# Patient Record
Sex: MALE | Race: BLACK OR AFRICAN AMERICAN | NOT HISPANIC OR LATINO | Employment: OTHER | URBAN - NONMETROPOLITAN AREA
[De-identification: names, ages, dates, MRNs, and addresses within clinical notes are randomized per-mention and may not be internally consistent; named-entity substitution may affect disease eponyms.]

---

## 2022-03-23 DIAGNOSIS — M50.93 CERVICAL DISC DISORDER OF CERVICOTHORACIC REGION: Primary | ICD-10-CM

## 2022-03-25 ENCOUNTER — HOSPITAL ENCOUNTER (OUTPATIENT)
Dept: RADIOLOGY | Facility: HOSPITAL | Age: 62
Discharge: HOME | End: 2022-03-25
Attending: PHYSICIAN ASSISTANT
Payer: MEDICARE

## 2022-03-25 DIAGNOSIS — M50.93 CERVICAL DISC DISORDER OF CERVICOTHORACIC REGION: ICD-10-CM

## 2022-03-25 PROCEDURE — 72141 MRI NECK SPINE W/O DYE: CPT | Mod: TC

## 2022-10-24 DIAGNOSIS — R94.31 NONSPECIFIC ABNORMAL ELECTROCARDIOGRAM (ECG) (EKG): Primary | ICD-10-CM

## 2022-10-24 DIAGNOSIS — R06.02 SHORTNESS OF BREATH: ICD-10-CM

## 2022-10-26 ENCOUNTER — HOSPITAL ENCOUNTER (OUTPATIENT)
Dept: RADIOLOGY | Facility: HOSPITAL | Age: 62
Discharge: HOME | End: 2022-10-26
Attending: INTERNAL MEDICINE
Payer: MEDICARE

## 2022-10-26 ENCOUNTER — CLINICAL SUPPORT (OUTPATIENT)
Dept: CARDIOLOGY | Facility: HOSPITAL | Age: 62
End: 2022-10-26
Attending: INTERNAL MEDICINE
Payer: MEDICARE

## 2022-10-26 DIAGNOSIS — R06.02 SHORTNESS OF BREATH: ICD-10-CM

## 2022-10-26 DIAGNOSIS — R94.31 NONSPECIFIC ABNORMAL ELECTROCARDIOGRAM (ECG) (EKG): ICD-10-CM

## 2022-10-26 PROCEDURE — A9500 TC99M SESTAMIBI: HCPCS

## 2022-10-26 PROCEDURE — 93017 CV STRESS TEST TRACING ONLY: CPT

## 2022-10-29 LAB
CV STRESS BASE HR: 107 BPM
DIASTOLIC BLOOD PRESSURE: 106 MMHG
NUC REST EJECTION FRACTION: 16
OHS CV CPX 85 PERCENT MAX PREDICTED HEART RATE MALE: 135
OHS CV CPX ESTIMATED METS: 5
OHS CV CPX MAX PREDICTED HEART RATE: 159
OHS CV CPX PATIENT IS FEMALE: 0
OHS CV CPX PATIENT IS MALE: 1
OHS CV CPX PEAK DIASTOLIC BLOOD PRESSURE: 86 MMHG
OHS CV CPX PEAK HEAR RATE: 136 BPM
OHS CV CPX PEAK RATE PRESSURE PRODUCT: NORMAL
OHS CV CPX PEAK SYSTOLIC BLOOD PRESSURE: 189 MMHG
OHS CV CPX PERCENT MAX PREDICTED HEART RATE ACHIEVED: 86
OHS CV CPX RATE PRESSURE PRODUCT PRESENTING: NORMAL
STRESS ECHO POST EXERCISE DUR MIN: 4 MINUTES
STRESS ECHO POST EXERCISE DUR SEC: 0 SECONDS
STRESS ST DEPRESSION: 1.5 MM
SYSTOLIC BLOOD PRESSURE: 144 MMHG

## 2022-11-10 ENCOUNTER — LAB VISIT (OUTPATIENT)
Dept: LAB | Facility: HOSPITAL | Age: 62
End: 2022-11-10
Attending: INTERNAL MEDICINE
Payer: MEDICARE

## 2022-11-10 DIAGNOSIS — R94.30 NONSPECIFIC ABNORMAL FUNCTION STUDY, CARDIOVASCULAR: Primary | ICD-10-CM

## 2022-11-10 LAB
ALBUMIN SERPL BCP-MCNC: 3.5 G/DL (ref 3.5–5.2)
ALP SERPL-CCNC: 97 U/L (ref 55–135)
ALT SERPL W/O P-5'-P-CCNC: 32 U/L (ref 10–44)
ANION GAP SERPL CALC-SCNC: 8 MMOL/L (ref 8–16)
APTT BLDCRRT: 32.7 SEC (ref 21–32)
AST SERPL-CCNC: 40 U/L (ref 10–40)
BASOPHILS # BLD AUTO: 0.01 K/UL (ref 0–0.2)
BASOPHILS NFR BLD: 0.2 % (ref 0–1.9)
BILIRUB SERPL-MCNC: 0.8 MG/DL (ref 0.1–1)
BUN SERPL-MCNC: 25 MG/DL (ref 8–23)
CALCIUM SERPL-MCNC: 8.9 MG/DL (ref 8.7–10.5)
CHLORIDE SERPL-SCNC: 103 MMOL/L (ref 95–110)
CO2 SERPL-SCNC: 27 MMOL/L (ref 23–29)
CREAT SERPL-MCNC: 1.8 MG/DL (ref 0.5–1.4)
DIFFERENTIAL METHOD: ABNORMAL
EOSINOPHIL # BLD AUTO: 0 K/UL (ref 0–0.5)
EOSINOPHIL NFR BLD: 0.7 % (ref 0–8)
ERYTHROCYTE [DISTWIDTH] IN BLOOD BY AUTOMATED COUNT: 19 % (ref 11.5–14.5)
EST. GFR  (NO RACE VARIABLE): 42.3 ML/MIN/1.73 M^2
GLUCOSE SERPL-MCNC: 126 MG/DL (ref 70–110)
HCT VFR BLD AUTO: 31.8 % (ref 40–54)
HGB BLD-MCNC: 9.9 G/DL (ref 14–18)
IMM GRANULOCYTES # BLD AUTO: 0.02 K/UL (ref 0–0.04)
IMM GRANULOCYTES NFR BLD AUTO: 0.4 % (ref 0–0.5)
INR PPP: 2.3 (ref 0.8–1.2)
LYMPHOCYTES # BLD AUTO: 1.4 K/UL (ref 1–4.8)
LYMPHOCYTES NFR BLD: 31.3 % (ref 18–48)
MCH RBC QN AUTO: 25.8 PG (ref 27–31)
MCHC RBC AUTO-ENTMCNC: 31.1 G/DL (ref 32–36)
MCV RBC AUTO: 83 FL (ref 82–98)
MONOCYTES # BLD AUTO: 0.6 K/UL (ref 0.3–1)
MONOCYTES NFR BLD: 12.4 % (ref 4–15)
NEUTROPHILS # BLD AUTO: 2.5 K/UL (ref 1.8–7.7)
NEUTROPHILS NFR BLD: 55 % (ref 38–73)
NRBC BLD-RTO: 2 /100 WBC
PLATELET # BLD AUTO: 333 K/UL (ref 150–450)
PMV BLD AUTO: 9.8 FL (ref 9.2–12.9)
POTASSIUM SERPL-SCNC: 3.7 MMOL/L (ref 3.5–5.1)
PROT SERPL-MCNC: 8.3 G/DL (ref 6–8.4)
PROTHROMBIN TIME: 23 SEC (ref 9–12.5)
RBC # BLD AUTO: 3.84 M/UL (ref 4.6–6.2)
SODIUM SERPL-SCNC: 138 MMOL/L (ref 136–145)
WBC # BLD AUTO: 4.51 K/UL (ref 3.9–12.7)

## 2022-11-10 PROCEDURE — 85730 THROMBOPLASTIN TIME PARTIAL: CPT | Performed by: INTERNAL MEDICINE

## 2022-11-10 PROCEDURE — 85610 PROTHROMBIN TIME: CPT | Performed by: INTERNAL MEDICINE

## 2022-11-10 PROCEDURE — 85025 COMPLETE CBC W/AUTO DIFF WBC: CPT | Performed by: INTERNAL MEDICINE

## 2022-11-10 PROCEDURE — 36415 COLL VENOUS BLD VENIPUNCTURE: CPT | Performed by: INTERNAL MEDICINE

## 2022-11-10 PROCEDURE — 80053 COMPREHEN METABOLIC PANEL: CPT | Performed by: INTERNAL MEDICINE

## 2022-11-14 PROBLEM — E78.49 OTHER HYPERLIPIDEMIA: Status: ACTIVE | Noted: 2022-11-14

## 2022-11-14 PROBLEM — I10 PRIMARY HYPERTENSION: Status: ACTIVE | Noted: 2022-11-14

## 2022-11-14 PROBLEM — R05.1 ACUTE COUGH: Status: ACTIVE | Noted: 2022-11-14

## 2022-11-14 PROBLEM — I25.10 CORONARY ARTERY DISEASE WITHOUT ANGINA PECTORIS: Status: ACTIVE | Noted: 2022-11-14

## 2022-11-16 ENCOUNTER — TELEPHONE (OUTPATIENT)
Dept: CARDIOLOGY | Facility: CLINIC | Age: 62
End: 2022-11-16
Payer: MEDICARE

## 2022-11-16 NOTE — TELEPHONE ENCOUNTER
Telephoned patient to schedule heart cath with Dr. Ortega on 11/28 for 12noon with 1030am arrival  Reviewed NPO status, holding Coumadin 11/25, 26, 27 and diuretics on 11/28  Requested e-mail [email or web address] all instructions too.    You have been scheduled for Heart Cath (angiogram) on Monday, November 28, 2022, for 12noon with Dr. Ortega    Please report to [facility] [address] off [address] and [address] First [address] Registration Dept for 10:30AM    Do not eat or drink anything after midnight except for small amount of water with your usual morning medications    Avoid all diuretics (fluid pills) and diabetic medications    *Stop taking Coumadin for 3 days before angiogram (none on 11/25, 26, and 27)    Continue or start taking Aspirin 81 mg daily especially when you stop Coumadin *    No caffeine or caffeine products 24 hours before angiogram    Please arrange for transportation home    Feel free to call [phone] with any questions.    Thank you   Nurse Nilam      Received records and request to schedule heart cath with Dr. Ortega- patient with Abnormal stress test ,shortness of breath and chest pain.   Also history cardiomyopathy, atrial fibrillation- all records scanned to Media as received. Awaiting Ekg, Echo and Carotid studies

## 2022-11-26 PROBLEM — I48.0 PAF (PAROXYSMAL ATRIAL FIBRILLATION): Status: ACTIVE | Noted: 2022-11-26

## 2022-11-26 PROBLEM — I42.0 DILATED CARDIOMYOPATHY: Status: ACTIVE | Noted: 2022-11-26

## 2022-11-26 PROBLEM — R94.39 ABNORMAL NUCLEAR STRESS TEST: Status: ACTIVE | Noted: 2022-11-26

## 2022-11-28 ENCOUNTER — HOSPITAL ENCOUNTER (OUTPATIENT)
Facility: HOSPITAL | Age: 62
Discharge: HOME | End: 2022-11-30
Attending: INTERNAL MEDICINE | Admitting: HOSPITALIST
Payer: MEDICARE

## 2022-11-28 DIAGNOSIS — I50.9 HEART FAILURE: ICD-10-CM

## 2022-11-28 DIAGNOSIS — I42.0 DCM (DILATED CARDIOMYOPATHY): ICD-10-CM

## 2022-11-28 DIAGNOSIS — R94.39 ABNORMAL STRESS TEST: ICD-10-CM

## 2022-11-28 DIAGNOSIS — R06.02 SOB (SHORTNESS OF BREATH): ICD-10-CM

## 2022-11-28 DIAGNOSIS — I48.0 PAF (PAROXYSMAL ATRIAL FIBRILLATION): ICD-10-CM

## 2022-11-28 DIAGNOSIS — R07.9 CHEST PAIN, UNSPECIFIED TYPE: ICD-10-CM

## 2022-11-28 DIAGNOSIS — I50.23 ACUTE ON CHRONIC SYSTOLIC CHF (CONGESTIVE HEART FAILURE): Primary | ICD-10-CM

## 2022-11-28 DIAGNOSIS — I10 PRIMARY HYPERTENSION: ICD-10-CM

## 2022-11-28 PROBLEM — N17.9 AKI (ACUTE KIDNEY INJURY): Status: ACTIVE | Noted: 2022-11-28

## 2022-11-28 PROBLEM — I27.20 PULMONARY HTN: Status: ACTIVE | Noted: 2022-11-28

## 2022-11-28 PROBLEM — E87.6 HYPOKALEMIA: Status: ACTIVE | Noted: 2022-11-28

## 2022-11-28 PROBLEM — I50.22 CHRONIC SYSTOLIC CONGESTIVE HEART FAILURE: Status: ACTIVE | Noted: 2022-11-28

## 2022-11-28 LAB
ALBUMIN SERPL BCP-MCNC: 3.8 G/DL (ref 3.5–5.2)
ALP SERPL-CCNC: 87 U/L (ref 55–135)
ALT SERPL W/O P-5'-P-CCNC: 20 U/L (ref 10–44)
ANION GAP SERPL CALC-SCNC: 14 MMOL/L (ref 8–16)
AST SERPL-CCNC: 20 U/L (ref 10–40)
BACTERIA #/AREA URNS HPF: ABNORMAL /HPF
BILIRUB SERPL-MCNC: 1.3 MG/DL (ref 0.1–1)
BILIRUB UR QL STRIP: NEGATIVE
BUN SERPL-MCNC: 22 MG/DL (ref 8–23)
CALCIUM SERPL-MCNC: 9.5 MG/DL (ref 8.7–10.5)
CATH EF QUANTITATIVE: 20 %
CHLORIDE SERPL-SCNC: 105 MMOL/L (ref 95–110)
CLARITY UR: CLEAR
CO2 SERPL-SCNC: 22 MMOL/L (ref 23–29)
COLOR UR: COLORLESS
CREAT SERPL-MCNC: 1.6 MG/DL (ref 0.5–1.4)
EST. GFR  (NO RACE VARIABLE): 49 ML/MIN/1.73 M^2
GLUCOSE SERPL-MCNC: 101 MG/DL (ref 70–110)
GLUCOSE UR QL STRIP: NEGATIVE
HGB UR QL STRIP: NEGATIVE
HYALINE CASTS #/AREA URNS LPF: 1 /LPF
KETONES UR QL STRIP: NEGATIVE
LEUKOCYTE ESTERASE UR QL STRIP: ABNORMAL
MICROSCOPIC COMMENT: ABNORMAL
NITRITE UR QL STRIP: NEGATIVE
PH UR STRIP: 6 [PH] (ref 5–8)
POTASSIUM SERPL-SCNC: 3.3 MMOL/L (ref 3.5–5.1)
PROT SERPL-MCNC: 8.8 G/DL (ref 6–8.4)
PROT UR QL STRIP: NEGATIVE
RBC #/AREA URNS HPF: 1 /HPF (ref 0–4)
SODIUM SERPL-SCNC: 141 MMOL/L (ref 136–145)
SP GR UR STRIP: 1.02 (ref 1–1.03)
URN SPEC COLLECT METH UR: ABNORMAL
UROBILINOGEN UR STRIP-ACNC: NEGATIVE EU/DL
WBC #/AREA URNS HPF: 6 /HPF (ref 0–5)

## 2022-11-28 PROCEDURE — 25500020 PHARM REV CODE 255: Performed by: INTERNAL MEDICINE

## 2022-11-28 PROCEDURE — G0378 HOSPITAL OBSERVATION PER HR: HCPCS

## 2022-11-28 PROCEDURE — 81000 URINALYSIS NONAUTO W/SCOPE: CPT | Performed by: HOSPITALIST

## 2022-11-28 PROCEDURE — 93460 R&L HRT ART/VENTRICLE ANGIO: CPT | Mod: 26,,, | Performed by: INTERNAL MEDICINE

## 2022-11-28 PROCEDURE — 99152 PR MOD CONSCIOUS SEDATION, SAME PHYS, 5+ YRS, FIRST 15 MIN: ICD-10-PCS | Mod: ,,, | Performed by: INTERNAL MEDICINE

## 2022-11-28 PROCEDURE — C1769 GUIDE WIRE: HCPCS | Performed by: INTERNAL MEDICINE

## 2022-11-28 PROCEDURE — 63600175 PHARM REV CODE 636 W HCPCS: Performed by: INTERNAL MEDICINE

## 2022-11-28 PROCEDURE — 93460 PR CATH PLACE/CORON ANGIO, IMG SUPER/INTERP,R&L HRT CATH, L HRT VENTRIC: ICD-10-PCS | Mod: 26,,, | Performed by: INTERNAL MEDICINE

## 2022-11-28 PROCEDURE — 99152 MOD SED SAME PHYS/QHP 5/>YRS: CPT | Mod: ,,, | Performed by: INTERNAL MEDICINE

## 2022-11-28 PROCEDURE — C1894 INTRO/SHEATH, NON-LASER: HCPCS | Performed by: INTERNAL MEDICINE

## 2022-11-28 PROCEDURE — 93460 R&L HRT ART/VENTRICLE ANGIO: CPT | Performed by: INTERNAL MEDICINE

## 2022-11-28 PROCEDURE — 27201423 OPTIME MED/SURG SUP & DEVICES STERILE SUPPLY: Performed by: INTERNAL MEDICINE

## 2022-11-28 PROCEDURE — 25000003 PHARM REV CODE 250: Performed by: INTERNAL MEDICINE

## 2022-11-28 PROCEDURE — 80053 COMPREHEN METABOLIC PANEL: CPT | Performed by: INTERNAL MEDICINE

## 2022-11-28 PROCEDURE — 99152 MOD SED SAME PHYS/QHP 5/>YRS: CPT | Performed by: INTERNAL MEDICINE

## 2022-11-28 PROCEDURE — 99153 MOD SED SAME PHYS/QHP EA: CPT | Performed by: INTERNAL MEDICINE

## 2022-11-28 PROCEDURE — 25000003 PHARM REV CODE 250: Performed by: HOSPITALIST

## 2022-11-28 RX ORDER — HEPARIN SODIUM 1000 [USP'U]/ML
INJECTION, SOLUTION INTRAVENOUS; SUBCUTANEOUS
Status: DISCONTINUED | OUTPATIENT
Start: 2022-11-28 | End: 2022-11-28

## 2022-11-28 RX ORDER — DIAZEPAM 5 MG/1
5 TABLET ORAL
Status: DISCONTINUED | OUTPATIENT
Start: 2022-11-28 | End: 2022-11-28

## 2022-11-28 RX ORDER — SODIUM CHLORIDE 9 MG/ML
INJECTION, SOLUTION INTRAVENOUS CONTINUOUS
Status: ACTIVE | OUTPATIENT
Start: 2022-11-28 | End: 2022-11-28

## 2022-11-28 RX ORDER — DIPHENHYDRAMINE HCL 50 MG
50 CAPSULE ORAL ONCE
Status: COMPLETED | OUTPATIENT
Start: 2022-11-28 | End: 2022-11-28

## 2022-11-28 RX ORDER — ACETAMINOPHEN 325 MG/1
650 TABLET ORAL EVERY 4 HOURS PRN
Status: DISCONTINUED | OUTPATIENT
Start: 2022-11-28 | End: 2022-11-30 | Stop reason: HOSPADM

## 2022-11-28 RX ORDER — METOPROLOL SUCCINATE 50 MG/1
100 TABLET, EXTENDED RELEASE ORAL DAILY
Status: DISCONTINUED | OUTPATIENT
Start: 2022-11-29 | End: 2022-11-30 | Stop reason: HOSPADM

## 2022-11-28 RX ORDER — NITROGLYCERIN 5 MG/ML
INJECTION, SOLUTION INTRAVENOUS
Status: DISCONTINUED | OUTPATIENT
Start: 2022-11-28 | End: 2022-11-28

## 2022-11-28 RX ORDER — SODIUM CHLORIDE 0.9 % (FLUSH) 0.9 %
10 SYRINGE (ML) INJECTION
Status: DISCONTINUED | OUTPATIENT
Start: 2022-11-28 | End: 2022-11-30 | Stop reason: HOSPADM

## 2022-11-28 RX ORDER — PRAVASTATIN SODIUM 20 MG/1
20 TABLET ORAL DAILY
Status: DISCONTINUED | OUTPATIENT
Start: 2022-11-28 | End: 2022-11-30 | Stop reason: HOSPADM

## 2022-11-28 RX ORDER — NAPROXEN SODIUM 220 MG/1
81 TABLET, FILM COATED ORAL ONCE
Status: COMPLETED | OUTPATIENT
Start: 2022-11-28 | End: 2022-11-28

## 2022-11-28 RX ORDER — POTASSIUM CHLORIDE 20 MEQ/1
20 TABLET, EXTENDED RELEASE ORAL ONCE
Status: COMPLETED | OUTPATIENT
Start: 2022-11-28 | End: 2022-11-28

## 2022-11-28 RX ORDER — POTASSIUM CHLORIDE 20 MEQ/1
40 TABLET, EXTENDED RELEASE ORAL ONCE
Status: COMPLETED | OUTPATIENT
Start: 2022-11-28 | End: 2022-11-28

## 2022-11-28 RX ORDER — FENTANYL CITRATE 50 UG/ML
INJECTION, SOLUTION INTRAMUSCULAR; INTRAVENOUS
Status: DISCONTINUED | OUTPATIENT
Start: 2022-11-28 | End: 2022-11-28

## 2022-11-28 RX ORDER — CYCLOBENZAPRINE HCL 10 MG
10 TABLET ORAL 2 TIMES DAILY
Status: DISCONTINUED | OUTPATIENT
Start: 2022-11-28 | End: 2022-11-30 | Stop reason: HOSPADM

## 2022-11-28 RX ORDER — SODIUM CHLORIDE 9 MG/ML
INJECTION, SOLUTION INTRAVENOUS CONTINUOUS
Status: DISCONTINUED | OUTPATIENT
Start: 2022-11-28 | End: 2022-11-28

## 2022-11-28 RX ORDER — ONDANSETRON 8 MG/1
8 TABLET, ORALLY DISINTEGRATING ORAL EVERY 8 HOURS PRN
Status: DISCONTINUED | OUTPATIENT
Start: 2022-11-28 | End: 2022-11-30 | Stop reason: HOSPADM

## 2022-11-28 RX ORDER — VERAPAMIL HYDROCHLORIDE 2.5 MG/ML
INJECTION, SOLUTION INTRAVENOUS
Status: DISCONTINUED | OUTPATIENT
Start: 2022-11-28 | End: 2022-11-28

## 2022-11-28 RX ORDER — LIDOCAINE HYDROCHLORIDE 20 MG/ML
INJECTION, SOLUTION EPIDURAL; INFILTRATION; INTRACAUDAL; PERINEURAL
Status: DISCONTINUED | OUTPATIENT
Start: 2022-11-28 | End: 2022-11-28

## 2022-11-28 RX ORDER — HYDROCODONE BITARTRATE AND ACETAMINOPHEN 10; 325 MG/1; MG/1
1 TABLET ORAL 2 TIMES DAILY PRN
Status: DISCONTINUED | OUTPATIENT
Start: 2022-11-28 | End: 2022-11-30 | Stop reason: HOSPADM

## 2022-11-28 RX ORDER — FUROSEMIDE 10 MG/ML
INJECTION INTRAMUSCULAR; INTRAVENOUS
Status: DISCONTINUED | OUTPATIENT
Start: 2022-11-28 | End: 2022-11-28

## 2022-11-28 RX ORDER — MIDAZOLAM HYDROCHLORIDE 1 MG/ML
INJECTION, SOLUTION INTRAMUSCULAR; INTRAVENOUS
Status: DISCONTINUED | OUTPATIENT
Start: 2022-11-28 | End: 2022-11-28

## 2022-11-28 RX ORDER — LATANOPROST 50 UG/ML
1 SOLUTION/ DROPS OPHTHALMIC NIGHTLY
Status: DISCONTINUED | OUTPATIENT
Start: 2022-11-28 | End: 2022-11-30 | Stop reason: HOSPADM

## 2022-11-28 RX ORDER — IODIXANOL 320 MG/ML
INJECTION, SOLUTION INTRAVASCULAR
Status: DISCONTINUED | OUTPATIENT
Start: 2022-11-28 | End: 2022-11-28

## 2022-11-28 RX ADMIN — SODIUM CHLORIDE: 0.9 INJECTION, SOLUTION INTRAVENOUS at 04:11

## 2022-11-28 RX ADMIN — DIPHENHYDRAMINE HYDROCHLORIDE 50 MG: 50 CAPSULE ORAL at 10:11

## 2022-11-28 RX ADMIN — PRAVASTATIN SODIUM 20 MG: 20 TABLET ORAL at 04:11

## 2022-11-28 RX ADMIN — POTASSIUM CHLORIDE 40 MEQ: 1500 TABLET, EXTENDED RELEASE ORAL at 06:11

## 2022-11-28 RX ADMIN — SODIUM CHLORIDE: 0.9 INJECTION, SOLUTION INTRAVENOUS at 10:11

## 2022-11-28 RX ADMIN — DIAZEPAM 5 MG: 5 TABLET ORAL at 10:11

## 2022-11-28 RX ADMIN — POTASSIUM CHLORIDE 20 MEQ: 1500 TABLET, EXTENDED RELEASE ORAL at 12:11

## 2022-11-28 RX ADMIN — LATANOPROST 1 DROP: 50 SOLUTION OPHTHALMIC at 08:11

## 2022-11-28 RX ADMIN — ASPIRIN 81 MG CHEWABLE TABLET 81 MG: 81 TABLET CHEWABLE at 10:11

## 2022-11-28 RX ADMIN — CYCLOBENZAPRINE HYDROCHLORIDE 10 MG: 10 TABLET, FILM COATED ORAL at 08:11

## 2022-11-28 NOTE — Clinical Note
The DP pulses were 1+ bilaterally. The PT pulses were 2+ bilaterally. The radial pulses were +2 bilaterally.

## 2022-11-28 NOTE — Clinical Note
The radial band was applied to the left radial artery. 11 cc's of air were inserted into the closure device.

## 2022-11-28 NOTE — ASSESSMENT & PLAN NOTE
Patient is identified as having Systolic (HFrEF) heart failure that is Acute on chronic. CHF is currently uncontrolled due to Continued edema of extremities and Rales/crackles on pulmonary exam. Latest ECHO performed and demonstrates- No results found for this or any previous visit.  . Continue Furosemide and monitor clinical status closely. Monitor on telemetry. Patient is on CHF pathway.  Monitor strict Is&Os and daily weights.  Place on fluid restriction of 2 L. Continue to stress to patient importance of self efficacy and  on diet for CHF. Last BNP reviewed- and noted below No results for input(s): BNP, BNPTRIAGEBLO in the last 168 hours..    Repeat BMP in AM prior to next dose of Lasix  Strict I/O's, daily weights, Na/fluid restriction  Cardiology following, plans for LiveVest placement

## 2022-11-28 NOTE — H&P
[facility] - Cath Lab ([facility])  Cardiology  History and Physical     Patient Name: Cesario Tracey Jr.  MRN: [id number]  Admission Date: 11/28/2022  Code Status: No Order   Attending Provider: Zion Ortega MD   Primary Care Physician: Braxton Guzman Jr, MD  Principal Problem:Abnormal nuclear stress test    Patient information was obtained from patient and past medical records.     Subjective:     Chief Complaint:  wxwertional dyspnea    HPI:  A 61 yo male with chf cardiomyopathy htn hlp pulmonary htn pf cva with left sided facial droop and weakness has an abnormal cardiolite and significant new onset limiting exertional shortness of breath he is FC II-III. HAS NOloeg edema but has orthpnea intermittently. He is refererd for r/lhc by DR HERNANDEZ.      Past Medical History:   Diagnosis Date    Abnormal nuclear stress test 11/26/2022    Cervical radiculopathy     to rt arm    CHF (congestive heart failure)     Chronic systolic congestive heart failure 11/28/2022    Dilated cardiomyopathy 11/26/2022    Hyperlipidemia     Hypertension     Obesity, unspecified     PAF (paroxysmal atrial fibrillation) 11/26/2022    Pulmonary HTN 11/28/2022    Stroke        No past surgical history on file.    Review of patient's allergies indicates:  No Known Allergies    No current facility-administered medications on file prior to encounter.     Current Outpatient Medications on File Prior to Encounter   Medication Sig    cyclobenzaprine (FLEXERIL) 10 MG tablet Take 1 tablet by mouth 2 (two) times daily.    furosemide (LASIX) 20 MG tablet Take 1 tablet by mouth once daily.    hydroCHLOROthiazide (HYDRODIURIL) 25 MG tablet Take 1 tablet by mouth once daily.    HYDROcodone-acetaminophen (NORCO)  mg per tablet Take 1 tablet by mouth 2 (two) times daily as needed.    latanoprost 0.005 % ophthalmic solution Place 1 drop into both eyes nightly.    losartan (COZAAR) 100 MG tablet Take 1 tablet (100 mg total) by mouth once  daily.    metoprolol succinate (TOPROL-XL) 100 MG 24 hr tablet Take 1 tablet by mouth once daily.    pravastatin (PRAVACHOL) 20 MG tablet Take 20 mg by mouth once daily.    warfarin (COUMADIN) 5 MG tablet Take 1 tablet by mouth daily as needed.     Family History       Problem Relation (Age of Onset)    Coronary artery disease Father    Diabetes Father    Heart attack Father    Hyperlipidemia Father    Hypertension Mother, Father          Tobacco Use    Smoking status: Never    Smokeless tobacco: Never   Substance and Sexual Activity    Alcohol use: Yes    Drug use: Never    Sexual activity: Not Currently     Review of Systems   Constitutional: Negative for malaise/fatigue.   Eyes:  Negative for blurred vision.   Cardiovascular:  Positive for dyspnea on exertion. Negative for chest pain, claudication, cyanosis, irregular heartbeat, leg swelling, near-syncope, orthopnea, palpitations and paroxysmal nocturnal dyspnea.   Respiratory:  Positive for shortness of breath. Negative for cough and hemoptysis.    Hematologic/Lymphatic: Negative for bleeding problem. Does not bruise/bleed easily.   Skin:  Negative for dry skin and itching.   Musculoskeletal:  Negative for falls, muscle weakness and myalgias.   Gastrointestinal:  Negative for abdominal pain, diarrhea, heartburn, hematemesis, hematochezia and melena.   Genitourinary:  Negative for flank pain and hematuria.   Neurological:  Negative for dizziness, focal weakness, headaches, light-headedness, numbness, paresthesias, seizures and weakness.   Psychiatric/Behavioral:  Negative for altered mental status and memory loss. The patient is not nervous/anxious.    Allergic/Immunologic: Negative for hives.   Objective:     Vital Signs (Most Recent):  Temp: 97.7 °F (36.5 °C) (11/28/22 1016)  Pulse: 103 (11/28/22 1016)  Resp: 18 (11/28/22 1016)  BP: (!) 148/100 (11/28/22 1016)  SpO2: 100 % (11/28/22 1016)   Vital Signs (24h Range):  Temp:  [97.7 °F (36.5 °C)] 97.7 °F  (36.5 °C)  Pulse:  [103] 103  Resp:  [18] 18  SpO2:  [100 %] 100 %  BP: (148)/(100) 148/100     Weight: 97.5 kg (215 lb)  Body mass index is 28.37 kg/m².    SpO2: 100 %       No intake or output data in the 24 hours ending 11/28/22 1111    Lines/Drains/Airways       Peripheral Intravenous Line  Duration                  Peripheral IV - Single Lumen 11/28/22 1015 20 G Right Antecubital <1 day                    Physical Exam  Vitals and nursing note reviewed.   Constitutional:       General: He is not in acute distress.     Appearance: He is well-developed. He is not diaphoretic.   HENT:      Head: Normocephalic and atraumatic.   Eyes:      General:         Right eye: No discharge.         Left eye: No discharge.      Pupils: Pupils are equal, round, and reactive to light.   Neck:      Thyroid: No thyromegaly.      Vascular: No JVD.   Cardiovascular:      Rate and Rhythm: Normal rate and regular rhythm.      Pulses: Intact distal pulses.      Heart sounds: Normal heart sounds. No murmur heard.    No friction rub. No gallop.   Pulmonary:      Effort: Pulmonary effort is normal. No respiratory distress.      Breath sounds: Normal breath sounds. No wheezing or rales.   Chest:      Chest wall: No tenderness.   Abdominal:      General: Bowel sounds are normal. There is no distension.      Palpations: Abdomen is soft.      Tenderness: There is no abdominal tenderness.   Musculoskeletal:         General: Normal range of motion.      Cervical back: Neck supple.   Skin:     General: Skin is warm and dry.      Findings: No erythema or rash.   Neurological:      Mental Status: He is alert and oriented to person, place, and time.      Cranial Nerves: No cranial nerve deficit.      Comments: LEFT FACIAL DROOP   Psychiatric:         Mood and Affect: Mood normal.         Behavior: Behavior normal.         Judgment: Judgment normal.       Significant Labs:   No results found for: CHOL  No results found for: HDL  No results found  for: LDLCALC  No results found for: TRIG  No results found for: CHOLHDL  [unfilled]  No results found for: TSH  Lab Results   Component Value Date    INR 2.3 (H) 11/10/2022     Lab Results   Component Value Date    WBC 4.51 11/10/2022    HGB 9.9 (L) 11/10/2022    HCT 31.8 (L) 11/10/2022    MCV 83 11/10/2022     11/10/2022    Reviewed labs from DR HERNANDEZ  Conclusion         Abnormal myocardial perfusion scan.    There is a severe intensity, reversible perfusion abnormality that is consistent with ischemia in the typical distribution of the LAD, LCX and RCA territory.    The gated perfusion images showed an ejection fraction of 16% at rest.    The EKG portion of this study is positive for ischemia.    The patient reported no chest pain during the stress test.    ECHO SHOWED decreased ef 30% with pa pressure 55 mmhg    Assessment and Plan:     * Abnormal nuclear stress test  Suggestive of multilevel disease for [facility]    Pulmonary HTN  With exertional dyspnea will get [facility]    Chronic systolic congestive heart failure  Has FRC II-III symptoms      PAF (paroxysmal atrial fibrillation)  On coumadin    Dilated cardiomyopathy  On b blockers arb and diuretics will assess right heart pressures    Other hyperlipidemia  Statins on board    Primary hypertension  On appropriate meds.        VTE Risk Mitigation (From admission, onward)    None      for [facility] rdaial brachial approach  I have explained the risks, benefits , and alternatives of the procedure in detail.the patient voices understanding and all questions have been answered.the patient agrees to proceed as planned.    Jessica Ortega MD  Cardiology   [facility] Cath Lab ([facility])

## 2022-11-28 NOTE — H&P
[facility] Medicine  History & Physical    Patient Name: Cesario Tracey Jr.  MRN: [id number]  Patient Class: OP- Observation  Admission Date: 11/28/2022  Attending Physician: Michael Trejo MD   Primary Care Provider: Braxton Guzman Jr, MD         Patient information was obtained from patient, past medical records and primary team.     Subjective:     Principal Problem:Acute on chronic systolic CHF (congestive heart failure)    Chief Complaint: No chief complaint on file.       HPI: 63 y/o male with PMHx of CAD, CHF, pA-fib on coumadin, HTN, HLD who is here today s/p [facility] with volume overload. Patient received Lasix 20 mg IV in cardiac recovery. He was noted to be SOB and with mild BLE edema, given Lasix 20 mg IV. Patient denies chest pain, fevers/chills, nausea/vomiting He reports symptoms were progressing prior to [facility] today. Cardiology requested hospital medication admission for CHF exacerbation, plans for possible LiveVest placement.      Past Medical History:   Diagnosis Date    Abnormal nuclear stress test 11/26/2022    Cervical radiculopathy     to rt arm    CHF (congestive heart failure)     Chronic systolic congestive heart failure 11/28/2022    Dilated cardiomyopathy 11/26/2022    Hyperlipidemia     Hypertension     Obesity, unspecified     PAF (paroxysmal atrial fibrillation) 11/26/2022    Pulmonary HTN 11/28/2022    Stroke        No past surgical history on file.    Review of patient's allergies indicates:  No Known Allergies    No current facility-administered medications on file prior to encounter.     Current Outpatient Medications on File Prior to Encounter   Medication Sig    cyclobenzaprine (FLEXERIL) 10 MG tablet Take 1 tablet by mouth 2 (two) times daily.    furosemide (LASIX) 20 MG tablet Take 1 tablet by mouth once daily.    hydroCHLOROthiazide (HYDRODIURIL) 25 MG tablet Take 1 tablet by mouth once daily.    HYDROcodone-acetaminophen (NORCO)  mg per tablet Take 1  tablet by mouth 2 (two) times daily as needed.    latanoprost 0.005 % ophthalmic solution Place 1 drop into both eyes nightly.    losartan (COZAAR) 100 MG tablet Take 1 tablet (100 mg total) by mouth once daily.    metoprolol succinate (TOPROL-XL) 100 MG 24 hr tablet Take 1 tablet by mouth once daily.    pravastatin (PRAVACHOL) 20 MG tablet Take 20 mg by mouth once daily.    warfarin (COUMADIN) 5 MG tablet Take 1 tablet by mouth daily as needed.     Family History       Problem Relation (Age of Onset)    Coronary artery disease Father    Diabetes Father    Heart attack Father    Hyperlipidemia Father    Hypertension Mother, Father          Tobacco Use    Smoking status: Never    Smokeless tobacco: Never   Substance and Sexual Activity    Alcohol use: Yes    Drug use: Never    Sexual activity: Not Currently     Review of Systems   All other systems reviewed and are negative.  Objective:     Vital Signs (Most Recent):  Temp: 97.8 °F (36.6 °C) (11/28/22 1614)  Pulse: 95 (11/28/22 1614)  Resp: 18 (11/28/22 1614)  BP: (!) 144/105 (11/28/22 1614)  SpO2: 98 % (11/28/22 1614)   Vital Signs (24h Range):  Temp:  [97.7 °F (36.5 °C)-98 °F (36.7 °C)] 97.8 °F (36.6 °C)  Pulse:  [] 95  Resp:  [14-31] 18  SpO2:  [98 %-100 %] 98 %  BP: (134-158)/() 144/105     Weight: 97.5 kg (215 lb)  Body mass index is 28.37 kg/m².    Physical Exam  Vitals and nursing note reviewed.   Constitutional:       General: He is not in acute distress.     Appearance: Normal appearance. He is normal weight.   HENT:      Head: Normocephalic and atraumatic.      Nose: Nose normal.      Mouth/Throat:      Mouth: Mucous membranes are dry.      Pharynx: Oropharynx is clear.   Eyes:      Extraocular Movements: Extraocular movements intact.      Pupils: Pupils are equal, round, and reactive to light.   Cardiovascular:      Pulses: Normal pulses.      Heart sounds: Normal heart sounds.   Pulmonary:      Effort: Pulmonary effort is normal. No  respiratory distress.      Breath sounds: Rales present. No wheezing or rhonchi.   Abdominal:      General: Abdomen is flat. Bowel sounds are normal. There is no distension.      Palpations: Abdomen is soft.      Tenderness: There is no abdominal tenderness. There is no guarding.   Musculoskeletal:      Comments: Trace BLE edema   Neurological:      General: No focal deficit present.      Mental Status: He is alert and oriented to person, place, and time.   Psychiatric:         Mood and Affect: Mood normal.         Behavior: Behavior normal.         CRANIAL NERVES     CN III, IV, VI   Pupils are equal, round, and reactive to light.     Significant Labs: All pertinent labs within the past 24 hours have been reviewed.  BMP:   Recent Labs   Lab 11/28/22  1017         K 3.3*      CO2 22*   BUN 22   CREATININE 1.6*   CALCIUM 9.5     CMP:   Recent Labs   Lab 11/28/22  1017      K 3.3*      CO2 22*      BUN 22   CREATININE 1.6*   CALCIUM 9.5   PROT 8.8*   ALBUMIN 3.8   BILITOT 1.3*   ALKPHOS 87   AST 20   ALT 20   ANIONGAP 14       Significant Imaging: I have reviewed all pertinent imaging results/findings within the past 24 hours.    Assessment/Plan:     * Acute on chronic systolic CHF (congestive heart failure)  Patient is identified as having Systolic (HFrEF) heart failure that is Acute on chronic. CHF is currently uncontrolled due to Continued edema of extremities and Rales/crackles on pulmonary exam. Latest ECHO performed and demonstrates- No results found for this or any previous visit.  . Continue Furosemide and monitor clinical status closely. Monitor on telemetry. Patient is on CHF pathway.  Monitor strict Is&Os and daily weights.  Place on fluid restriction of 2 L. Continue to stress to patient importance of self efficacy and  on diet for CHF. Last BNP reviewed- and noted below No results for input(s): BNP, BNPTRIAGEBLO in the last 168 hours..    Repeat BMP in AM prior  to next dose of Lasix  Strict I/O's, daily weights, Na/fluid restriction  Cardiology following, plans for LiveVest placement    OVIDIO (acute kidney injury)  Patient with acute kidney injury likely due to pre-renal azotemia OVIDIO is currently stable. Labs reviewed- Renal function/electrolytes with Estimated Creatinine Clearance: 59.6 mL/min (A) (based on SCr of 1.6 mg/dL (H)). according to latest data. Monitor urine output and serial BMP and adjust therapy as needed. Avoid nephrotoxins and renally dose meds for GFR listed above.     Previous Cr 1.8, unclear baseline, possibly CKD  Check UA, renal US      Hypokalemia  Replete, monitor      VTE Risk Mitigation (From admission, onward)           Ordered     IP VTE HIGH RISK PATIENT  Once         11/28/22 1441     Place sequential compression device  Until discontinued         11/28/22 1441                       Michael rTejo MD  Department of Hospital Medicine   [facility] - Telemetry ([facility])

## 2022-11-28 NOTE — NURSING
Report called to nurse caring for patient. Patient AAOX4 with not complaints of pain at this time. Tele monitor placed on patient. Patient transported to room 250 at this time.

## 2022-11-28 NOTE — HPI
63 y/o male with PMHx of CAD, CHF, pA-fib on coumadin, HTN, HLD who is here today s/p [facility] with volume overload. Patient received Lasix 20 mg IV in cardiac recovery. He was noted to be SOB and with mild BLE edema, given Lasix 20 mg IV. Patient denies chest pain, fevers/chills, nausea/vomiting He reports symptoms were progressing prior to [facility] today. Cardiology requested hospital medication admission for CHF exacerbation, plans for possible LiveVest placement.

## 2022-11-28 NOTE — Clinical Note
The catheter was inserted over the wire into the left ventricle. Hemodynamics were performed.  and Pullback was recorded.  The angiography was performed via power injection. The injected amount was 30 mL contrast at 12 mL/s. The PSI from the power injection was 800.

## 2022-11-28 NOTE — SUBJECTIVE & OBJECTIVE
Past Medical History:   Diagnosis Date    Abnormal nuclear stress test 11/26/2022    Cervical radiculopathy     to rt arm    CHF (congestive heart failure)     Chronic systolic congestive heart failure 11/28/2022    Dilated cardiomyopathy 11/26/2022    Hyperlipidemia     Hypertension     Obesity, unspecified     PAF (paroxysmal atrial fibrillation) 11/26/2022    Pulmonary HTN 11/28/2022    Stroke        No past surgical history on file.    Review of patient's allergies indicates:  No Known Allergies    No current facility-administered medications on file prior to encounter.     Current Outpatient Medications on File Prior to Encounter   Medication Sig    cyclobenzaprine (FLEXERIL) 10 MG tablet Take 1 tablet by mouth 2 (two) times daily.    furosemide (LASIX) 20 MG tablet Take 1 tablet by mouth once daily.    hydroCHLOROthiazide (HYDRODIURIL) 25 MG tablet Take 1 tablet by mouth once daily.    HYDROcodone-acetaminophen (NORCO)  mg per tablet Take 1 tablet by mouth 2 (two) times daily as needed.    latanoprost 0.005 % ophthalmic solution Place 1 drop into both eyes nightly.    losartan (COZAAR) 100 MG tablet Take 1 tablet (100 mg total) by mouth once daily.    metoprolol succinate (TOPROL-XL) 100 MG 24 hr tablet Take 1 tablet by mouth once daily.    pravastatin (PRAVACHOL) 20 MG tablet Take 20 mg by mouth once daily.    warfarin (COUMADIN) 5 MG tablet Take 1 tablet by mouth daily as needed.     Family History       Problem Relation (Age of Onset)    Coronary artery disease Father    Diabetes Father    Heart attack Father    Hyperlipidemia Father    Hypertension Mother, Father          Tobacco Use    Smoking status: Never    Smokeless tobacco: Never   Substance and Sexual Activity    Alcohol use: Yes    Drug use: Never    Sexual activity: Not Currently     Review of Systems   Constitutional: Negative for malaise/fatigue.   Eyes:  Negative for blurred vision.   Cardiovascular:  Positive for dyspnea on exertion.  Negative for chest pain, claudication, cyanosis, irregular heartbeat, leg swelling, near-syncope, orthopnea, palpitations and paroxysmal nocturnal dyspnea.   Respiratory:  Positive for shortness of breath. Negative for cough and hemoptysis.    Hematologic/Lymphatic: Negative for bleeding problem. Does not bruise/bleed easily.   Skin:  Negative for dry skin and itching.   Musculoskeletal:  Negative for falls, muscle weakness and myalgias.   Gastrointestinal:  Negative for abdominal pain, diarrhea, heartburn, hematemesis, hematochezia and melena.   Genitourinary:  Negative for flank pain and hematuria.   Neurological:  Negative for dizziness, focal weakness, headaches, light-headedness, numbness, paresthesias, seizures and weakness.   Psychiatric/Behavioral:  Negative for altered mental status and memory loss. The patient is not nervous/anxious.    Allergic/Immunologic: Negative for hives.   Objective:     Vital Signs (Most Recent):  Temp: 97.7 °F (36.5 °C) (11/28/22 1016)  Pulse: 103 (11/28/22 1016)  Resp: 18 (11/28/22 1016)  BP: (!) 148/100 (11/28/22 1016)  SpO2: 100 % (11/28/22 1016)   Vital Signs (24h Range):  Temp:  [97.7 °F (36.5 °C)] 97.7 °F (36.5 °C)  Pulse:  [103] 103  Resp:  [18] 18  SpO2:  [100 %] 100 %  BP: (148)/(100) 148/100     Weight: 97.5 kg (215 lb)  Body mass index is 28.37 kg/m².    SpO2: 100 %       No intake or output data in the 24 hours ending 11/28/22 1111    Lines/Drains/Airways       Peripheral Intravenous Line  Duration                  Peripheral IV - Single Lumen 11/28/22 1015 20 G Right Antecubital <1 day                    Physical Exam  Vitals and nursing note reviewed.   Constitutional:       General: He is not in acute distress.     Appearance: He is well-developed. He is not diaphoretic.   HENT:      Head: Normocephalic and atraumatic.   Eyes:      General:         Right eye: No discharge.         Left eye: No discharge.      Pupils: Pupils are equal, round, and reactive to light.    Neck:      Thyroid: No thyromegaly.      Vascular: No JVD.   Cardiovascular:      Rate and Rhythm: Normal rate and regular rhythm.      Pulses: Intact distal pulses.      Heart sounds: Normal heart sounds. No murmur heard.    No friction rub. No gallop.   Pulmonary:      Effort: Pulmonary effort is normal. No respiratory distress.      Breath sounds: Normal breath sounds. No wheezing or rales.   Chest:      Chest wall: No tenderness.   Abdominal:      General: Bowel sounds are normal. There is no distension.      Palpations: Abdomen is soft.      Tenderness: There is no abdominal tenderness.   Musculoskeletal:         General: Normal range of motion.      Cervical back: Neck supple.   Skin:     General: Skin is warm and dry.      Findings: No erythema or rash.   Neurological:      Mental Status: He is alert and oriented to person, place, and time.      Cranial Nerves: No cranial nerve deficit.      Comments: LEFT FACIAL DROOP   Psychiatric:         Mood and Affect: Mood normal.         Behavior: Behavior normal.         Judgment: Judgment normal.       Significant Labs:   No results found for: CHOL  No results found for: HDL  No results found for: LDLCALC  No results found for: TRIG  No results found for: CHOLHDL  [unfilled]  No results found for: TSH  Lab Results   Component Value Date    INR 2.3 (H) 11/10/2022     Lab Results   Component Value Date    WBC 4.51 11/10/2022    HGB 9.9 (L) 11/10/2022    HCT 31.8 (L) 11/10/2022    MCV 83 11/10/2022     11/10/2022    Reviewed labs from DR HERNANDEZ  Conclusion         Abnormal myocardial perfusion scan.    There is a severe intensity, reversible perfusion abnormality that is consistent with ischemia in the typical distribution of the LAD, LCX and RCA territory.    The gated perfusion images showed an ejection fraction of 16% at rest.    The EKG portion of this study is positive for ischemia.    The patient reported no chest pain during the stress test.    ECHO  SHOWED decreased ef 30% with pa pressure 55 mmhg

## 2022-11-28 NOTE — HPI
A 63 yo male with chf cardiomyopathy htn hlp pulmonary htn pf cva with left sided facial droop and weakness has an abnormal cardiolite and significant new onset limiting exertional shortness of breath he is FC II-III. HAS NOloeg edema but has orthpnea intermittently. He is refererd for r/lhc by DR HERNNADEZ.

## 2022-11-28 NOTE — Clinical Note
The catheter was inserted over the wire into the ostial  left coronary artery. Hemodynamics were performed.  An angiography was performed of the left coronary arteries. Multiple views were taken. Over wholey 175cm

## 2022-11-28 NOTE — OP NOTE
INPATIENT Operative Note         SUMMARY     Surgery Date: 11/28/2022     Surgeon(s) and Role:     * Zion Ortega MD - Primary    ASSISTANT:none    Pre-op Diagnosis:  Abnormal stress test [R94.39]  Chest pain, unspecified type [R07.9]  PAF (paroxysmal atrial fibrillation) [I48.0]  DCM (dilated cardiomyopathy) [I42.0]  SOB (shortness of breath) [R06.02]  Primary hypertension [I10]      Post-op Diagnosis:  Abnormal stress test [R94.39]  Chest pain, unspecified type [R07.9]  PAF (paroxysmal atrial fibrillation) [I48.0]  DCM (dilated cardiomyopathy) [I42.0]  SOB (shortness of breath) [R06.02]  Primary hypertension [I10]    Procedure(s) (LRB):  CATHETERIZATION, HEART, LEFT (Left)  INSERTION, CATHETER, RIGHT HEART (N/A)    COMPLICATION:none    Anesthesia: RN IV Sedation    Findings/Key Components:  Lad 70% diagonal 50-70%   Lcx 40%    Rca non obs disease.  Ef 25%  Lvedp 32  Moderate to sever pulmonary htn  Elevated rt and left filling pressures.    Estimated Blood Loss: < 50 ML.         SPECIMEN: NONE    Devices/Prostetics: None    PLAN:   Admit diurese switch to entresto   Discuss life evst  Lad intervention after optimized therapy.

## 2022-11-28 NOTE — ASSESSMENT & PLAN NOTE
Patient with acute kidney injury likely due to pre-renal azotemia OVIDIO is currently stable. Labs reviewed- Renal function/electrolytes with Estimated Creatinine Clearance: 59.6 mL/min (A) (based on SCr of 1.6 mg/dL (H)). according to latest data. Monitor urine output and serial BMP and adjust therapy as needed. Avoid nephrotoxins and renally dose meds for GFR listed above.     Previous Cr 1.8, unclear baseline, possibly CKD  Check UA, renal US

## 2022-11-28 NOTE — SUBJECTIVE & OBJECTIVE
Past Medical History:   Diagnosis Date    Abnormal nuclear stress test 11/26/2022    Cervical radiculopathy     to rt arm    CHF (congestive heart failure)     Chronic systolic congestive heart failure 11/28/2022    Dilated cardiomyopathy 11/26/2022    Hyperlipidemia     Hypertension     Obesity, unspecified     PAF (paroxysmal atrial fibrillation) 11/26/2022    Pulmonary HTN 11/28/2022    Stroke        No past surgical history on file.    Review of patient's allergies indicates:  No Known Allergies    No current facility-administered medications on file prior to encounter.     Current Outpatient Medications on File Prior to Encounter   Medication Sig    cyclobenzaprine (FLEXERIL) 10 MG tablet Take 1 tablet by mouth 2 (two) times daily.    furosemide (LASIX) 20 MG tablet Take 1 tablet by mouth once daily.    hydroCHLOROthiazide (HYDRODIURIL) 25 MG tablet Take 1 tablet by mouth once daily.    HYDROcodone-acetaminophen (NORCO)  mg per tablet Take 1 tablet by mouth 2 (two) times daily as needed.    latanoprost 0.005 % ophthalmic solution Place 1 drop into both eyes nightly.    losartan (COZAAR) 100 MG tablet Take 1 tablet (100 mg total) by mouth once daily.    metoprolol succinate (TOPROL-XL) 100 MG 24 hr tablet Take 1 tablet by mouth once daily.    pravastatin (PRAVACHOL) 20 MG tablet Take 20 mg by mouth once daily.    warfarin (COUMADIN) 5 MG tablet Take 1 tablet by mouth daily as needed.     Family History       Problem Relation (Age of Onset)    Coronary artery disease Father    Diabetes Father    Heart attack Father    Hyperlipidemia Father    Hypertension Mother, Father          Tobacco Use    Smoking status: Never    Smokeless tobacco: Never   Substance and Sexual Activity    Alcohol use: Yes    Drug use: Never    Sexual activity: Not Currently     Review of Systems   All other systems reviewed and are negative.  Objective:     Vital Signs (Most Recent):  Temp: 97.8 °F (36.6 °C) (11/28/22 1614)  Pulse: 95  (11/28/22 1614)  Resp: 18 (11/28/22 1614)  BP: (!) 144/105 (11/28/22 1614)  SpO2: 98 % (11/28/22 1614)   Vital Signs (24h Range):  Temp:  [97.7 °F (36.5 °C)-98 °F (36.7 °C)] 97.8 °F (36.6 °C)  Pulse:  [] 95  Resp:  [14-31] 18  SpO2:  [98 %-100 %] 98 %  BP: (134-158)/() 144/105     Weight: 97.5 kg (215 lb)  Body mass index is 28.37 kg/m².    Physical Exam  Vitals and nursing note reviewed.   Constitutional:       General: He is not in acute distress.     Appearance: Normal appearance. He is normal weight.   HENT:      Head: Normocephalic and atraumatic.      Nose: Nose normal.      Mouth/Throat:      Mouth: Mucous membranes are dry.      Pharynx: Oropharynx is clear.   Eyes:      Extraocular Movements: Extraocular movements intact.      Pupils: Pupils are equal, round, and reactive to light.   Cardiovascular:      Pulses: Normal pulses.      Heart sounds: Normal heart sounds.   Pulmonary:      Effort: Pulmonary effort is normal. No respiratory distress.      Breath sounds: Rales present. No wheezing or rhonchi.   Abdominal:      General: Abdomen is flat. Bowel sounds are normal. There is no distension.      Palpations: Abdomen is soft.      Tenderness: There is no abdominal tenderness. There is no guarding.   Musculoskeletal:      Comments: Trace BLE edema   Neurological:      General: No focal deficit present.      Mental Status: He is alert and oriented to person, place, and time.   Psychiatric:         Mood and Affect: Mood normal.         Behavior: Behavior normal.         CRANIAL NERVES     CN III, IV, VI   Pupils are equal, round, and reactive to light.     Significant Labs: All pertinent labs within the past 24 hours have been reviewed.  BMP:   Recent Labs   Lab 11/28/22  1017         K 3.3*      CO2 22*   BUN 22   CREATININE 1.6*   CALCIUM 9.5     CMP:   Recent Labs   Lab 11/28/22  1017      K 3.3*      CO2 22*      BUN 22   CREATININE 1.6*   CALCIUM 9.5    PROT 8.8*   ALBUMIN 3.8   BILITOT 1.3*   ALKPHOS 87   AST 20   ALT 20   ANIONGAP 14       Significant Imaging: I have reviewed all pertinent imaging results/findings within the past 24 hours.

## 2022-11-29 LAB
ANION GAP SERPL CALC-SCNC: 11 MMOL/L (ref 8–16)
BASOPHILS # BLD AUTO: 0.01 K/UL (ref 0–0.2)
BASOPHILS NFR BLD: 0.3 % (ref 0–1.9)
BNP SERPL-MCNC: 971 PG/ML (ref 0–99)
BUN SERPL-MCNC: 24 MG/DL (ref 8–23)
CALCIUM SERPL-MCNC: 9.3 MG/DL (ref 8.7–10.5)
CHLORIDE SERPL-SCNC: 108 MMOL/L (ref 95–110)
CO2 SERPL-SCNC: 20 MMOL/L (ref 23–29)
CREAT SERPL-MCNC: 1.4 MG/DL (ref 0.5–1.4)
DIFFERENTIAL METHOD: ABNORMAL
EOSINOPHIL # BLD AUTO: 0 K/UL (ref 0–0.5)
EOSINOPHIL NFR BLD: 0.8 % (ref 0–8)
ERYTHROCYTE [DISTWIDTH] IN BLOOD BY AUTOMATED COUNT: 19.6 % (ref 11.5–14.5)
EST. GFR  (NO RACE VARIABLE): 57 ML/MIN/1.73 M^2
GLUCOSE SERPL-MCNC: 80 MG/DL (ref 70–110)
HCT VFR BLD AUTO: 35.3 % (ref 40–54)
HGB BLD-MCNC: 10.9 G/DL (ref 14–18)
IMM GRANULOCYTES # BLD AUTO: 0.03 K/UL (ref 0–0.04)
IMM GRANULOCYTES NFR BLD AUTO: 0.8 % (ref 0–0.5)
INR PPP: 1.2 (ref 0.8–1.2)
LYMPHOCYTES # BLD AUTO: 1 K/UL (ref 1–4.8)
LYMPHOCYTES NFR BLD: 26.6 % (ref 18–48)
MAGNESIUM SERPL-MCNC: 1.8 MG/DL (ref 1.6–2.6)
MCH RBC QN AUTO: 26.1 PG (ref 27–31)
MCHC RBC AUTO-ENTMCNC: 30.9 G/DL (ref 32–36)
MCV RBC AUTO: 85 FL (ref 82–98)
MONOCYTES # BLD AUTO: 0.3 K/UL (ref 0.3–1)
MONOCYTES NFR BLD: 9.1 % (ref 4–15)
NEUTROPHILS # BLD AUTO: 2.3 K/UL (ref 1.8–7.7)
NEUTROPHILS NFR BLD: 62.4 % (ref 38–73)
NRBC BLD-RTO: 0 /100 WBC
PLATELET # BLD AUTO: 253 K/UL (ref 150–450)
PMV BLD AUTO: 9.6 FL (ref 9.2–12.9)
POTASSIUM SERPL-SCNC: 3.6 MMOL/L (ref 3.5–5.1)
PROTHROMBIN TIME: 12.7 SEC (ref 9–12.5)
RBC # BLD AUTO: 4.17 M/UL (ref 4.6–6.2)
SODIUM SERPL-SCNC: 139 MMOL/L (ref 136–145)
WBC # BLD AUTO: 3.72 K/UL (ref 3.9–12.7)

## 2022-11-29 PROCEDURE — G0378 HOSPITAL OBSERVATION PER HR: HCPCS

## 2022-11-29 PROCEDURE — 83880 ASSAY OF NATRIURETIC PEPTIDE: CPT | Performed by: PHYSICIAN ASSISTANT

## 2022-11-29 PROCEDURE — 80048 BASIC METABOLIC PNL TOTAL CA: CPT | Performed by: INTERNAL MEDICINE

## 2022-11-29 PROCEDURE — 25000003 PHARM REV CODE 250: Performed by: INTERNAL MEDICINE

## 2022-11-29 PROCEDURE — 83735 ASSAY OF MAGNESIUM: CPT | Performed by: HOSPITALIST

## 2022-11-29 PROCEDURE — 99225 PR SUBSEQUENT OBSERVATION CARE,LEVEL II: ICD-10-PCS | Mod: ,,, | Performed by: PHYSICIAN ASSISTANT

## 2022-11-29 PROCEDURE — 85610 PROTHROMBIN TIME: CPT | Performed by: HOSPITALIST

## 2022-11-29 PROCEDURE — 94761 N-INVAS EAR/PLS OXIMETRY MLT: CPT

## 2022-11-29 PROCEDURE — 25000003 PHARM REV CODE 250: Performed by: PHYSICIAN ASSISTANT

## 2022-11-29 PROCEDURE — 99225 PR SUBSEQUENT OBSERVATION CARE,LEVEL II: CPT | Mod: ,,, | Performed by: PHYSICIAN ASSISTANT

## 2022-11-29 PROCEDURE — 36415 COLL VENOUS BLD VENIPUNCTURE: CPT | Performed by: PHYSICIAN ASSISTANT

## 2022-11-29 PROCEDURE — 85025 COMPLETE CBC W/AUTO DIFF WBC: CPT | Performed by: PHYSICIAN ASSISTANT

## 2022-11-29 PROCEDURE — 25000003 PHARM REV CODE 250: Performed by: NURSE PRACTITIONER

## 2022-11-29 PROCEDURE — 63600175 PHARM REV CODE 636 W HCPCS: Performed by: NURSE PRACTITIONER

## 2022-11-29 RX ORDER — POTASSIUM CHLORIDE 20 MEQ/1
40 TABLET, EXTENDED RELEASE ORAL ONCE
Status: COMPLETED | OUTPATIENT
Start: 2022-11-29 | End: 2022-11-29

## 2022-11-29 RX ORDER — ASPIRIN 81 MG/1
81 TABLET ORAL DAILY
Status: DISCONTINUED | OUTPATIENT
Start: 2022-11-29 | End: 2022-11-30 | Stop reason: HOSPADM

## 2022-11-29 RX ORDER — FUROSEMIDE 10 MG/ML
40 INJECTION INTRAMUSCULAR; INTRAVENOUS ONCE
Status: COMPLETED | OUTPATIENT
Start: 2022-11-29 | End: 2022-11-29

## 2022-11-29 RX ORDER — FUROSEMIDE 10 MG/ML
60 INJECTION INTRAMUSCULAR; INTRAVENOUS
Status: DISCONTINUED | OUTPATIENT
Start: 2022-11-29 | End: 2022-11-29

## 2022-11-29 RX ORDER — WARFARIN SODIUM 5 MG/1
5 TABLET ORAL DAILY
Status: DISCONTINUED | OUTPATIENT
Start: 2022-11-30 | End: 2022-11-30 | Stop reason: HOSPADM

## 2022-11-29 RX ADMIN — CYCLOBENZAPRINE HYDROCHLORIDE 10 MG: 10 TABLET, FILM COATED ORAL at 09:11

## 2022-11-29 RX ADMIN — PRAVASTATIN SODIUM 20 MG: 20 TABLET ORAL at 09:11

## 2022-11-29 RX ADMIN — FUROSEMIDE 40 MG: 10 INJECTION, SOLUTION INTRAMUSCULAR; INTRAVENOUS at 11:11

## 2022-11-29 RX ADMIN — METOPROLOL SUCCINATE 100 MG: 50 TABLET, EXTENDED RELEASE ORAL at 09:11

## 2022-11-29 RX ADMIN — POTASSIUM CHLORIDE 40 MEQ: 1500 TABLET, EXTENDED RELEASE ORAL at 09:11

## 2022-11-29 RX ADMIN — ASPIRIN 81 MG: 81 TABLET, COATED ORAL at 09:11

## 2022-11-29 RX ADMIN — LATANOPROST 1 DROP: 50 SOLUTION OPHTHALMIC at 08:11

## 2022-11-29 RX ADMIN — CYCLOBENZAPRINE HYDROCHLORIDE 10 MG: 10 TABLET, FILM COATED ORAL at 08:11

## 2022-11-29 NOTE — ASSESSMENT & PLAN NOTE
Has FRC II-III symptoms    11/29/22  -Give additional dose of IV Lasix today  -Continue Toprol XL  -Will plan to add Entresto tmw pending creatinine trend  -Lifevest for SCD prevention, patient agreeable

## 2022-11-29 NOTE — SUBJECTIVE & OBJECTIVE
Review of Systems   Constitutional: Negative.   HENT: Negative.     Eyes: Negative.    Cardiovascular:  Positive for dyspnea on exertion (improved).   Respiratory:  Positive for shortness of breath (improved).    Endocrine: Negative.    Hematologic/Lymphatic: Negative.    Skin: Negative.    Musculoskeletal: Negative.    Gastrointestinal: Negative.    Genitourinary: Negative.    Neurological: Negative.    Psychiatric/Behavioral: Negative.     Allergic/Immunologic: Negative.    Objective:     Vital Signs (Most Recent):  Temp: 98.5 °F (36.9 °C) (11/29/22 1237)  Pulse: 86 (11/29/22 1237)  Resp: 17 (11/29/22 1237)  BP: (!) 143/85 (11/29/22 1237)  SpO2: 98 % (11/29/22 1237)   Vital Signs (24h Range):  Temp:  [97.8 °F (36.6 °C)-99 °F (37.2 °C)] 98.5 °F (36.9 °C)  Pulse:  [] 86  Resp:  [16-31] 17  SpO2:  [97 %-100 %] 98 %  BP: (130-158)/() 143/85     Weight: 100.2 kg (220 lb 14.4 oz)  Body mass index is 29.14 kg/m².     SpO2: 98 %  O2 Device (Oxygen Therapy): room air      Intake/Output Summary (Last 24 hours) at 11/29/2022 1342  Last data filed at 11/29/2022 0635  Gross per 24 hour   Intake 434.54 ml   Output 2075 ml   Net -1640.46 ml       Lines/Drains/Airways       Peripheral Intravenous Line  Duration                  Peripheral IV - Single Lumen 11/28/22 1015 20 G Right Antecubital 1 day                    Physical Exam  Vitals and nursing note reviewed.   Constitutional:       General: He is not in acute distress.     Appearance: Normal appearance. He is well-developed. He is not diaphoretic.   HENT:      Head: Normocephalic and atraumatic.   Eyes:      General:         Right eye: No discharge.         Left eye: No discharge.      Pupils: Pupils are equal, round, and reactive to light.   Neck:      Thyroid: No thyromegaly.      Vascular: No JVD.      Trachea: No tracheal deviation.   Cardiovascular:      Rate and Rhythm: Normal rate and regular rhythm.      Heart sounds: Normal heart sounds, S1 normal  and S2 normal. No murmur heard.  Pulmonary:      Effort: Pulmonary effort is normal. No respiratory distress.      Breath sounds: Normal breath sounds. No wheezing or rales.   Abdominal:      General: There is no distension.      Tenderness: There is no rebound.   Musculoskeletal:      Cervical back: Neck supple.      Right lower leg: No edema.      Left lower leg: No edema.   Skin:     General: Skin is warm and dry.      Findings: No erythema.      Comments: Left radial access site C/D/I; no bleeding erythema or drainage, intact pulse    Left brachial access site C/D/I; no bleeding, erythema or drainage, intact pulse   Neurological:      General: No focal deficit present.      Mental Status: He is alert and oriented to person, place, and time.   Psychiatric:         Mood and Affect: Mood normal.         Behavior: Behavior normal.         Thought Content: Thought content normal.       Significant Labs: CMP   Recent Labs   Lab 11/28/22  1017 11/29/22  0458    139   K 3.3* 3.6    108   CO2 22* 20*    80   BUN 22 24*   CREATININE 1.6* 1.4   CALCIUM 9.5 9.3   PROT 8.8*  --    ALBUMIN 3.8  --    BILITOT 1.3*  --    ALKPHOS 87  --    AST 20  --    ALT 20  --    ANIONGAP 14 11   , CBC   Recent Labs   Lab 11/29/22  0900   WBC 3.72*   HGB 10.9*   HCT 35.3*      , INR   Recent Labs   Lab 11/29/22  0458   INR 1.2   , Troponin No results for input(s): TROPONINI in the last 48 hours., and All pertinent lab results from the last 24 hours have been reviewed.    Significant Imaging: Echocardiogram: Transthoracic echo (TTE) complete (Cupid Only): No results found for this or any previous visit., EKG: Reviwed, and X-Ray: CXR: X-Ray Chest 1 View (CXR): No results found for this visit on 11/28/22. and X-Ray Chest PA and Lateral (CXR): No results found for this visit on 11/28/22.

## 2022-11-29 NOTE — HOSPITAL COURSE
KATERINE. Patient sitting at edge of bed, reports urinating a good bit today after Lasix. Denies SOB, chest pain, fevers, nausea. LiveVest placement pending.

## 2022-11-29 NOTE — ASSESSMENT & PLAN NOTE
Patient with acute kidney injury likely due to pre-renal azotemia OVIDIO is currently stable. Labs reviewed- Renal function/electrolytes with Estimated Creatinine Clearance: 69 mL/min (based on SCr of 1.4 mg/dL). according to latest data. Monitor urine output and serial BMP and adjust therapy as needed. Avoid nephrotoxins and renally dose meds for GFR listed above.   Renal US with normal echogenicity (11/29)

## 2022-11-29 NOTE — ASSESSMENT & PLAN NOTE
Suggestive of multilevel disease for lhc    11/29/22  -s/p LHC which showed 70% LAD lesion as well as 50-70% diagonal lesion  -Continue ASA, BB, statin  -Intervention to be performed at a later date

## 2022-11-29 NOTE — CONSULTS
Food & Nutrition Education    Diet Education: Heart Failure  Time Spent: 10 minutes    Learners: Pt    Nutrition Education provided with handouts:  Heart Failure Nutrition Therapy(nutritioncaremanual.org)    Comments:  RD educated patient on low sodium diet and fluid restriction related to hospital diagnosis. Discussed the importance of limiting sodium to 2,000 mg per day and reading food labels to avoid further complications of CHF. Discussed using salt free seasonings and other herbs and spices in meals to enhance flavor without additional sodium. Discussed 1500 ml fluid restriction per MD and dietary sources of fluid. RD recommended using a cup with measurements for fluids and to try to consume small sips spread throughout the day rather than a lot at one time.    NFPE not performed, pt appears well nourished.    All questions and concerns answered.    Provided handout with dietitian's contact information.    *Please re-consult as needed.    Thanks!  Leonel Palafox, Registration Eligible, Provisional LDN

## 2022-11-29 NOTE — PROGRESS NOTES
[facility] Tele[facility] ([facility])  Cardiology  Progress Note    Patient Name: Cesario Tracey Jr.  MRN: [id number]  Admission Date: 11/28/2022  Hospital Length of Stay: 0 days  Code Status: Full Code   Attending Physician: Michael Trejo MD   Primary Care Physician: Braxton Guzman Jr, MD  Expected Discharge Date:   Principal Problem:Acute on chronic systolic CHF (congestive heart failure)    Subjective:   HPI:  A 61 yo male with chf cardiomyopathy htn hlp pulmonary htn pf cva with left sided facial droop and weakness has an abnormal cardiolite and significant new onset limiting exertional shortness of breath he is FC II-III. HAS NOloeg edema but has orthpnea intermittently. He is refererd for r/lhc by DR HERNANDEZ.       Hospital Course:   11/29/22-Patient seen and examined today, s/p R/LHC yesterday which showed elevated filling pressures/pulmonary HTN and 70 % LAD lesion. Admitted for diuresis/med optimization. Feeling better today, less SOB. No chest pain symptoms. Creatinine stable. Discussed LifeVest for SCD prevention, patient agreeable.           Review of Systems   Constitutional: Negative.   HENT: Negative.     Eyes: Negative.    Cardiovascular:  Positive for dyspnea on exertion (improved).   Respiratory:  Positive for shortness of breath (improved).    Endocrine: Negative.    Hematologic/Lymphatic: Negative.    Skin: Negative.    Musculoskeletal: Negative.    Gastrointestinal: Negative.    Genitourinary: Negative.    Neurological: Negative.    Psychiatric/Behavioral: Negative.     Allergic/Immunologic: Negative.    Objective:     Vital Signs (Most Recent):  Temp: 98.5 °F (36.9 °C) (11/29/22 1237)  Pulse: 86 (11/29/22 1237)  Resp: 17 (11/29/22 1237)  BP: (!) 143/85 (11/29/22 1237)  SpO2: 98 % (11/29/22 1237)   Vital Signs (24h Range):  Temp:  [97.8 °F (36.6 °C)-99 °F (37.2 °C)] 98.5 °F (36.9 °C)  Pulse:  [] 86  Resp:  [16-31] 17  SpO2:  [97 %-100 %] 98 %  BP: (130-158)/() 143/85     Weight: 100.2 kg (220  lb 14.4 oz)  Body mass index is 29.14 kg/m².     SpO2: 98 %  O2 Device (Oxygen Therapy): room air      Intake/Output Summary (Last 24 hours) at 11/29/2022 1342  Last data filed at 11/29/2022 0635  Gross per 24 hour   Intake 434.54 ml   Output 2075 ml   Net -1640.46 ml       Lines/Drains/Airways       Peripheral Intravenous Line  Duration                  Peripheral IV - Single Lumen 11/28/22 1015 20 G Right Antecubital 1 day                    Physical Exam  Vitals and nursing note reviewed.   Constitutional:       General: He is not in acute distress.     Appearance: Normal appearance. He is well-developed. He is not diaphoretic.   HENT:      Head: Normocephalic and atraumatic.   Eyes:      General:         Right eye: No discharge.         Left eye: No discharge.      Pupils: Pupils are equal, round, and reactive to light.   Neck:      Thyroid: No thyromegaly.      Vascular: No JVD.      Trachea: No tracheal deviation.   Cardiovascular:      Rate and Rhythm: Normal rate and regular rhythm.      Heart sounds: Normal heart sounds, S1 normal and S2 normal. No murmur heard.  Pulmonary:      Effort: Pulmonary effort is normal. No respiratory distress.      Breath sounds: Normal breath sounds. No wheezing or rales.   Abdominal:      General: There is no distension.      Tenderness: There is no rebound.   Musculoskeletal:      Cervical back: Neck supple.      Right lower leg: No edema.      Left lower leg: No edema.   Skin:     General: Skin is warm and dry.      Findings: No erythema.      Comments: Left radial access site C/D/I; no bleeding erythema or drainage, intact pulse    Left brachial access site C/D/I; no bleeding, erythema or drainage, intact pulse   Neurological:      General: No focal deficit present.      Mental Status: He is alert and oriented to person, place, and time.   Psychiatric:         Mood and Affect: Mood normal.         Behavior: Behavior normal.         Thought Content: Thought content normal.        Significant Labs: CMP   Recent Labs   Lab 11/28/22  1017 11/29/22  0458    139   K 3.3* 3.6    108   CO2 22* 20*    80   BUN 22 24*   CREATININE 1.6* 1.4   CALCIUM 9.5 9.3   PROT 8.8*  --    ALBUMIN 3.8  --    BILITOT 1.3*  --    ALKPHOS 87  --    AST 20  --    ALT 20  --    ANIONGAP 14 11   , CBC   Recent Labs   Lab 11/29/22  0900   WBC 3.72*   HGB 10.9*   HCT 35.3*      , INR   Recent Labs   Lab 11/29/22  0458   INR 1.2   , Troponin No results for input(s): TROPONINI in the last 48 hours., and All pertinent lab results from the last 24 hours have been reviewed.    Significant Imaging: Echocardiogram: Transthoracic echo (TTE) complete (Cupid Only): No results found for this or any previous visit., EKG: Reviwed, and X-Ray: CXR: X-Ray Chest 1 View (CXR): No results found for this visit on 11/28/22. and X-Ray Chest PA and Lateral (CXR): No results found for this visit on 11/28/22.    Assessment and Plan:   Patient who presents with systolic CHF/cardiomyopathy s/p R/LHC which showed elevated filling pressures and LAD lesion (intervention planned at later time). Continue IV diuresis. Will plan to add Entresto tmw pending creatinine trend. LifeVest ordered for SCD prevention.    * Acute on chronic systolic CHF (congestive heart failure)  Has FRC II-III symptoms    11/29/22  -Give additional dose of IV Lasix today  -Continue Toprol XL  -Will plan to add Entresto tmw pending creatinine trend  -Lifevest for SCD prevention, patient agreeable    OVIDIO (acute kidney injury)  Stable, monitor with diuresis     Pulmonary HTN  With exertional dyspnea will get rhc    11/29/22  -Assess response to IV diuresis     PAF (paroxysmal atrial fibrillation)  On coumadin    11/29/22  -Will resume Coumadin this evening pending discussion with interventionalist    Dilated cardiomyopathy  On b blockers arb and diuretics will assess right heart pressures    11/29/22  -Continue BB, Lasix  -LifeVest for SCD  prevention    Abnormal nuclear stress test  Suggestive of multilevel disease for [facility]    11/29/22  -s/p LHC which showed 70% LAD lesion as well as 50-70% diagonal lesion  -Continue ASA, BB, statin  -Intervention to be performed at a later date    Coronary artery disease without angina pectoris  -Continue ASA, BB, statin  -LAD intervention to be performed at a later date    Other hyperlipidemia  Statins on board    Primary hypertension  On appropriate meds.        VTE Risk Mitigation (From admission, onward)         Ordered     IP VTE HIGH RISK PATIENT  Once         11/28/22 1441     Place sequential compression device  Until discontinued         11/28/22 1441                Maria R Washington PA-C  Cardiology  [facility] - Tele[facility] ([facility])

## 2022-11-29 NOTE — ASSESSMENT & PLAN NOTE
Patient is identified as having Systolic (HFrEF) heart failure that is Acute on chronic. CHF is currently uncontrolled due to Continued edema of extremities and Rales/crackles on pulmonary exam. Latest ECHO performed and demonstrates- No results found for this or any previous visit.  . Continue Furosemide and monitor clinical status closely. Monitor on telemetry. Patient is on CHF pathway.  Monitor strict Is&Os and daily weights.  Place on fluid restriction of 2 L. Continue to stress to patient importance of self efficacy and  on diet for CHF. Last BNP reviewed- and noted below   Recent Labs   Lab 11/29/22  0900   *   .  Lasix IV, strict I/O's, daily weights, Na/fluid restriction  Cardiology following, plans for LiveVest placement

## 2022-11-29 NOTE — PLAN OF CARE
[facility] - Tele[facility] (Hospital)  Initial Discharge Assessment       Primary Care Provider: Braxton Guzman Jr, MD    Admission Diagnosis: Abnormal stress test [R94.39]  Chest pain, unspecified type [R07.9]  PAF (paroxysmal atrial fibrillation) [I48.0]  DCM (dilated cardiomyopathy) [I42.0]  SOB (shortness of breath) [R06.02]  Primary hypertension [I10]    Admission Date: 11/28/2022  Expected Discharge Date:     Discharge Barriers Identified: None    Payor: MEDICARE / Plan: MEDICARE PART A & B / Product Type: Government /     Extended Emergency Contact Information  Primary Emergency Contact: Yessenia Tracey  Mobile Phone: [phone]  Relation: Spouse  Preferred language: English   needed? No  Secondary Emergency Contact: Lois Tracey  Mobile Phone: [phone]  Relation: Daughter  Preferred language: English   needed? No    Discharge Plan A: Home with family         [id number] - [place], [place] - [address] [address]  [address] [address]  Phone: [phone] Fax: [phone]      Initial Assessment (most recent)       Adult Discharge Assessment - 11/29/22 1503          Discharge Assessment    Assessment Type Discharge Planning Assessment     Confirmed/corrected address, phone number and insurance Yes     Confirmed Demographics Correct on Facesheet     Source of Information patient     Communicated MARA with patient/caregiver Date not available/Unable to determine     Reason For Admission Acute on chronic systolic CHF     Lives With alone     Do you expect to return to your current living situation? No     Do you have help at home or someone to help you manage your care at home? Yes     Who are your caregiver(s) and their phone number(s)? spouse     Prior to hospitilization cognitive status: Alert/Oriented;No Deficits     Current cognitive status: Alert/Oriented;No Deficits     Walking or Climbing Stairs Difficulty none     Dressing/Bathing Difficulty none     Home  Accessibility wheelchair accessible     Home Layout Able to live on 1st floor     Equipment Currently Used at Home none     Readmission within 30 days? No     Patient currently being followed by outpatient case management? No     Do you currently have service(s) that help you manage your care at home? No     Do you take prescription medications? Yes     Do you have prescription coverage? Yes     Do you have any problems affording any of your prescribed medications? No     Is the patient taking medications as prescribed? yes     Who is going to help you get home at discharge? spouse     How do you get to doctors appointments? car, drives self;family or friend will provide     Are you on dialysis? No     Do you take coumadin? No     Discharge Plan A Home with family     DME Needed Upon Discharge  --   Leftvest    Discharge Plan discussed with: Patient     Discharge Barriers Identified None                   Anticipated DC dispo: home with family   Prior Level of Function: Acute on chronic systolic CHF   PCP:   Braxton Guzman Jr., MD      Comments:  CM met with patient at bedside to introduce role and discuss discharge planning. Patient currently lives alone. Spouse, Yessenia, will be help at home and can provide transport at time of discharge. CM discharge needs depends on hospital progress. CM will continue following to assist with other needs.

## 2022-11-29 NOTE — HOSPITAL COURSE
11/29/22-Patient seen and examined today, s/p R/LHC yesterday which showed elevated filling pressures/pulmonary HTN and 70 % LAD lesion. Admitted for diuresis/med optimization. Feeling better today, less SOB. No chest pain symptoms. Creatinine stable. Discussed LifeVest for SCD prevention, patient agreeable.     11/30/22- Patient seen and examined today. Feels better today, states breathing is improving Lasix 40mg IV x1 ordered. Labs reviewed, stable. LifeVest ordered

## 2022-11-29 NOTE — ASSESSMENT & PLAN NOTE
On b blockers arb and diuretics will assess right heart pressures    11/29/22  -Continue BB, Lasix  -LifeVest for SCD prevention

## 2022-11-29 NOTE — ASSESSMENT & PLAN NOTE
On coumadin    11/29/22  -Will resume Coumadin this evening pending discussion with interventionalist

## 2022-11-29 NOTE — PLAN OF CARE
Patient remained free from falls throughout shift,call bell within reach. No complaints of pain or discomfort. L wrist puncture site CDI. Vital stable, will continue to monitor.

## 2022-11-29 NOTE — PROGRESS NOTES
[facility] Medicine  Progress Note    Patient Name: Cesario Tracey Jr.  MRN: [id number]  Patient Class: OP- Observation   Admission Date: 11/28/2022  Length of Stay: 0 days  Attending Physician: Michael Trejo MD  Primary Care Provider: Braxton Guzman Jr, MD        Subjective:     Principal Problem:Acute on chronic systolic CHF (congestive heart failure)        HPI:  61 y/o male with PMHx of CAD, CHF, pA-fib on coumadin, HTN, HLD who is here today s/p C with volume overload. Patient received Lasix 20 mg IV in cardiac recovery. He was noted to be SOB and with mild BLE edema, given Lasix 20 mg IV. Patient denies chest pain, fevers/chills, nausea/vomiting He reports symptoms were progressing prior to [facility] today. Cardiology requested hospital medication admission for CHF exacerbation, plans for possible LiveVest placement.      Overview/Hospital Course:  NAEON. Patient sitting at edge of bed, reports urinating a good bit today after Lasix. Denies SOB, chest pain, fevers, nausea. LiveVest placement pending.        Review of Systems   All other systems reviewed and are negative.  Objective:     Vital Signs (Most Recent):  Temp: 98.5 °F (36.9 °C) (11/29/22 1237)  Pulse: 96 (11/29/22 1320)  Resp: 17 (11/29/22 1237)  BP: (!) 143/85 (11/29/22 1237)  SpO2: 98 % (11/29/22 1237)   Vital Signs (24h Range):  Temp:  [97.8 °F (36.6 °C)-99 °F (37.2 °C)] 98.5 °F (36.9 °C)  Pulse:  [79-96] 96  Resp:  [16-24] 17  SpO2:  [97 %-100 %] 98 %  BP: (130-144)/() 143/85     Weight: 100.2 kg (220 lb 14.4 oz)  Body mass index is 29.14 kg/m².    Intake/Output Summary (Last 24 hours) at 11/29/2022 1540  Last data filed at 11/29/2022 0635  Gross per 24 hour   Intake 434.54 ml   Output 1500 ml   Net -1065.46 ml      Physical Exam  Constitutional:       General: He is not in acute distress.     Appearance: Normal appearance.   Cardiovascular:      Rate and Rhythm: Normal rate and regular rhythm.      Heart sounds: No  murmur heard.  Pulmonary:      Effort: Pulmonary effort is normal. No respiratory distress.      Breath sounds: Normal breath sounds. No wheezing.   Abdominal:      General: There is no distension.      Palpations: Abdomen is soft.      Tenderness: There is no abdominal tenderness.   Neurological:      Mental Status: He is alert.       Significant Labs: All pertinent labs within the past 24 hours have been reviewed.  BMP:   Recent Labs   Lab 11/29/22  0458   GLU 80      K 3.6      CO2 20*   BUN 24*   CREATININE 1.4   CALCIUM 9.3   MG 1.8     CBC:   Recent Labs   Lab 11/29/22  0900   WBC 3.72*   HGB 10.9*   HCT 35.3*          Significant Imaging: I have reviewed all pertinent imaging results/findings within the past 24 hours.      Assessment/Plan:      * Acute on chronic systolic CHF (congestive heart failure)  Patient is identified as having Systolic (HFrEF) heart failure that is Acute on chronic. CHF is currently uncontrolled due to Continued edema of extremities and Rales/crackles on pulmonary exam. Latest ECHO performed and demonstrates- No results found for this or any previous visit.  . Continue Furosemide and monitor clinical status closely. Monitor on telemetry. Patient is on CHF pathway.  Monitor strict Is&Os and daily weights.  Place on fluid restriction of 2 L. Continue to stress to patient importance of self efficacy and  on diet for CHF. Last BNP reviewed- and noted below   Recent Labs   Lab 11/29/22  0900   *   .  Lasix IV, strict I/O's, daily weights, Na/fluid restriction  Cardiology following, plans for LiveVest placement    OVIDIO (acute kidney injury)  Patient with acute kidney injury likely due to pre-renal azotemia OVIDIO is currently stable. Labs reviewed- Renal function/electrolytes with Estimated Creatinine Clearance: 69 mL/min (based on SCr of 1.4 mg/dL). according to latest data. Monitor urine output and serial BMP and adjust therapy as needed. Avoid nephrotoxins  and renally dose meds for GFR listed above.   Renal US with normal echogenicity (11/29)    Hypokalemia  Replete, monitor      VTE Risk Mitigation (From admission, onward)         Ordered     IP VTE HIGH RISK PATIENT  Once         11/28/22 1441     Place sequential compression device  Until discontinued         11/28/22 1441                Discharge Planning   MARA:      Code Status: Full Code   Is the patient medically ready for discharge?:     Reason for patient still in hospital (select all that apply): Consult recommendations and Other (specify) LiveVest placement  Discharge Plan A: Home with family        Michael Trejo MD  Department of Hospital Medicine   [facility] - Telemetry ([facility])

## 2022-11-29 NOTE — PROGRESS NOTES
"Pt to floor from CVRU  L radial puncture site with palpable pulse and w/ out complications   BP (!) 144/105   Pulse 85   Temp 97.8 °F (36.6 °C)   Resp 18   Ht 6' 1" (1.854 m)   Wt 98 kg (216 lb 0.8 oz)   SpO2 98%   BMI 28.50 kg/m²    IV at 100   Po K administered for k of 3.3   Urine sent to lab to be resulted   Call bell w/ in reach   Fall precautions in place   "

## 2022-11-29 NOTE — SUBJECTIVE & OBJECTIVE
Review of Systems   All other systems reviewed and are negative.  Objective:     Vital Signs (Most Recent):  Temp: 98.5 °F (36.9 °C) (11/29/22 1237)  Pulse: 96 (11/29/22 1320)  Resp: 17 (11/29/22 1237)  BP: (!) 143/85 (11/29/22 1237)  SpO2: 98 % (11/29/22 1237)   Vital Signs (24h Range):  Temp:  [97.8 °F (36.6 °C)-99 °F (37.2 °C)] 98.5 °F (36.9 °C)  Pulse:  [79-96] 96  Resp:  [16-24] 17  SpO2:  [97 %-100 %] 98 %  BP: (130-144)/() 143/85     Weight: 100.2 kg (220 lb 14.4 oz)  Body mass index is 29.14 kg/m².    Intake/Output Summary (Last 24 hours) at 11/29/2022 1540  Last data filed at 11/29/2022 0635  Gross per 24 hour   Intake 434.54 ml   Output 1500 ml   Net -1065.46 ml      Physical Exam  Constitutional:       General: He is not in acute distress.     Appearance: Normal appearance.   Cardiovascular:      Rate and Rhythm: Normal rate and regular rhythm.      Heart sounds: No murmur heard.  Pulmonary:      Effort: Pulmonary effort is normal. No respiratory distress.      Breath sounds: Normal breath sounds. No wheezing.   Abdominal:      General: There is no distension.      Palpations: Abdomen is soft.      Tenderness: There is no abdominal tenderness.   Neurological:      Mental Status: He is alert.       Significant Labs: All pertinent labs within the past 24 hours have been reviewed.  BMP:   Recent Labs   Lab 11/29/22  0458   GLU 80      K 3.6      CO2 20*   BUN 24*   CREATININE 1.4   CALCIUM 9.3   MG 1.8     CBC:   Recent Labs   Lab 11/29/22  0900   WBC 3.72*   HGB 10.9*   HCT 35.3*          Significant Imaging: I have reviewed all pertinent imaging results/findings within the past 24 hours.

## 2022-11-29 NOTE — PLAN OF CARE
Patient is stable. No signs or symptoms of acute distress. Continuous cardiac monitoring in place. Meds given as ordered. I's and O's monitored. Call light in reach. Chart orders reviewed.

## 2022-11-29 NOTE — PLAN OF CARE
Problem: Adult Inpatient Plan of Care  Goal: Patient-Specific Goal (Individualized)  Outcome: Ongoing, Progressing     Pt AAO   SR on tele   Blood pressure stable   On room air   L radial site w/ out complications   IVF at 100  Urine sent to lab for resulting

## 2022-11-30 ENCOUNTER — TELEPHONE (OUTPATIENT)
Dept: CARDIOLOGY | Facility: HOSPITAL | Age: 62
End: 2022-11-30
Payer: MEDICARE

## 2022-11-30 ENCOUNTER — DOCUMENTATION ONLY (OUTPATIENT)
Dept: CARDIOLOGY | Facility: CLINIC | Age: 62
End: 2022-11-30
Payer: MEDICARE

## 2022-11-30 VITALS
WEIGHT: 190.06 LBS | TEMPERATURE: 98 F | SYSTOLIC BLOOD PRESSURE: 132 MMHG | HEIGHT: 73 IN | OXYGEN SATURATION: 99 % | RESPIRATION RATE: 18 BRPM | HEART RATE: 90 BPM | DIASTOLIC BLOOD PRESSURE: 88 MMHG | BODY MASS INDEX: 25.19 KG/M2

## 2022-11-30 PROBLEM — E87.6 HYPOKALEMIA: Status: RESOLVED | Noted: 2022-11-28 | Resolved: 2022-11-30

## 2022-11-30 PROBLEM — N17.9 AKI (ACUTE KIDNEY INJURY): Status: RESOLVED | Noted: 2022-11-28 | Resolved: 2022-11-30

## 2022-11-30 LAB
ANION GAP SERPL CALC-SCNC: 11 MMOL/L (ref 8–16)
BUN SERPL-MCNC: 25 MG/DL (ref 8–23)
CALCIUM SERPL-MCNC: 9.2 MG/DL (ref 8.7–10.5)
CHLORIDE SERPL-SCNC: 107 MMOL/L (ref 95–110)
CO2 SERPL-SCNC: 22 MMOL/L (ref 23–29)
CREAT SERPL-MCNC: 1.5 MG/DL (ref 0.5–1.4)
EST. GFR  (NO RACE VARIABLE): 53 ML/MIN/1.73 M^2
GLUCOSE SERPL-MCNC: 91 MG/DL (ref 70–110)
INR PPP: 1.2 (ref 0.8–1.2)
POTASSIUM SERPL-SCNC: 3.6 MMOL/L (ref 3.5–5.1)
PROTHROMBIN TIME: 12.6 SEC (ref 9–12.5)
SODIUM SERPL-SCNC: 140 MMOL/L (ref 136–145)

## 2022-11-30 PROCEDURE — 25000003 PHARM REV CODE 250: Performed by: INTERNAL MEDICINE

## 2022-11-30 PROCEDURE — 25000003 PHARM REV CODE 250: Performed by: HOSPITALIST

## 2022-11-30 PROCEDURE — G0378 HOSPITAL OBSERVATION PER HR: HCPCS

## 2022-11-30 PROCEDURE — 25000003 PHARM REV CODE 250: Performed by: STUDENT IN AN ORGANIZED HEALTH CARE EDUCATION/TRAINING PROGRAM

## 2022-11-30 PROCEDURE — 63600175 PHARM REV CODE 636 W HCPCS

## 2022-11-30 PROCEDURE — 99225 PR SUBSEQUENT OBSERVATION CARE,LEVEL II: CPT | Mod: ,,,

## 2022-11-30 PROCEDURE — 99225 PR SUBSEQUENT OBSERVATION CARE,LEVEL II: ICD-10-PCS | Mod: ,,,

## 2022-11-30 PROCEDURE — 36415 COLL VENOUS BLD VENIPUNCTURE: CPT | Performed by: HOSPITALIST

## 2022-11-30 PROCEDURE — 80048 BASIC METABOLIC PNL TOTAL CA: CPT | Performed by: HOSPITALIST

## 2022-11-30 PROCEDURE — 85610 PROTHROMBIN TIME: CPT | Performed by: HOSPITALIST

## 2022-11-30 PROCEDURE — 94761 N-INVAS EAR/PLS OXIMETRY MLT: CPT

## 2022-11-30 PROCEDURE — 25000003 PHARM REV CODE 250: Performed by: PHYSICIAN ASSISTANT

## 2022-11-30 RX ORDER — ASPIRIN 81 MG/1
81 TABLET ORAL DAILY
Refills: 0
Start: 2022-12-01 | End: 2024-04-02

## 2022-11-30 RX ORDER — FUROSEMIDE 10 MG/ML
40 INJECTION INTRAMUSCULAR; INTRAVENOUS ONCE
Status: COMPLETED | OUTPATIENT
Start: 2022-11-30 | End: 2022-11-30

## 2022-11-30 RX ADMIN — FUROSEMIDE 40 MG: 10 INJECTION, SOLUTION INTRAMUSCULAR; INTRAVENOUS at 11:11

## 2022-11-30 RX ADMIN — METOPROLOL SUCCINATE 100 MG: 50 TABLET, EXTENDED RELEASE ORAL at 08:11

## 2022-11-30 RX ADMIN — WARFARIN SODIUM 7.5 MG: 2.5 TABLET ORAL at 12:11

## 2022-11-30 RX ADMIN — PRAVASTATIN SODIUM 20 MG: 20 TABLET ORAL at 08:11

## 2022-11-30 RX ADMIN — CYCLOBENZAPRINE HYDROCHLORIDE 10 MG: 10 TABLET, FILM COATED ORAL at 08:11

## 2022-11-30 RX ADMIN — ASPIRIN 81 MG: 81 TABLET, COATED ORAL at 08:11

## 2022-11-30 RX ADMIN — SACUBITRIL AND VALSARTAN 1 TABLET: 24; 26 TABLET, FILM COATED ORAL at 08:11

## 2022-11-30 NOTE — ASSESSMENT & PLAN NOTE
Has FRC II-III symptoms    11/29/22  -Give additional dose of IV Lasix today  -Continue Toprol XL  -Will plan to add Entresto tmw pending creatinine trend  -Lifevest for SCD prevention, patient agreeable    11/30/22  Lasix 40mg IV x1 ordered for today  LifeVest  Entresto added  Follow up w/ Dr. Helton, cardiology

## 2022-11-30 NOTE — PROGRESS NOTES
Pharmacy Consult Note: Warfarin    Cesario Tracey JrBecky 's warfarin will be dosed and monitored by Pharmacy.  Indication: Afib  Home Dose: warfarin 5 mg QD  Target INR is 2-3    INR   Date Value Ref Range Status   11/29/2022 1.2 0.8 - 1.2 Final     Comment:     Coumadin Therapy:  2.0 - 3.0 for INR for all indicators except mechanical heart valves  and antiphospholipid syndromes which should use 2.5 - 3.5.         Patient will be initiated on a loading dose of 7.5 mg on 11/29 and then continued on home dose of 5 mg daily. Today's dose of 7.5 mg should be given today.     PT/INR will be monitored daily. Dose adjustments will be made accordingly.      Thank you for allowing us to participate in this patient's care.     Nika Garber 11/29/2022 10:42 PM

## 2022-11-30 NOTE — ASSESSMENT & PLAN NOTE
Suggestive of multilevel disease for lhc    11/29/22  -s/p LHC which showed 70% LAD lesion as well as 50-70% diagonal lesion  -Continue ASA, BB, statin  -Intervention to be performed at a later date    11/30/22  LifeVest ordered  LAD intervention to be performed at a later date  Entresto added

## 2022-11-30 NOTE — PROGRESS NOTES
"  [facility]) nurse navigator notified of HFTCC candidate in need of education and introduction to 4-6 week program.      PT aao x 3 while lying in bed. Introduced self to pt as HFTCC nurse navigator.     Patient given "Home Care Guide for Heart Failure Patients" , "Heart Failure [facility]itional Care Clinic" flyer and "Daily weight and symptom tracker".  Encouraged pt to review information.      Reviewed the following key points of HFTCC program with pt and family:   1.) Take your medications as directed.    2.) Weight yourself daily   3.) Follow low salt and limited fluid diet.    4.) Stop smoking and start exercising   5.) Go to your appointments and call your team.      Pt reminded to follow Symptom tracker and to call at the onset of symptoms according to tracker.     Reviewed plan for follow up once discharged to include phone calls, in person and virtual visits to assist pt optimizing their heart failure medication regimen and encouraging healthy lifestyle modifications.  Reminded pt that program will assist them over the next 4-6 weeks and then patient will be transferred to long term care provider .  Reminded pt how to contact HFTCC navigator via phone and or via [employer].     Pt instructed appointment with NAI Brizuela will be printed on hospital discharge paperwork.     Pt also reminded HF nurse will call 48-72 hours after discharge to check on them.     PT verbalize read back of information given.  Encouraged pt and family to read over information often and contact team with any questions or concerns.    "

## 2022-11-30 NOTE — ASSESSMENT & PLAN NOTE
Patient is identified as having Systolic (HFrEF) heart failure that is Acute on chronic. CHF is currently uncontrolled due to Continued edema of extremities and Rales/crackles on pulmonary exam. Latest ECHO performed and demonstrates- No results found for this or any previous visit.  . Continue Furosemide and monitor clinical status closely. Monitor on telemetry. Patient is on CHF pathway.  Monitor strict Is&Os and daily weights.  Place on fluid restriction of 2 L. Continue to stress to patient importance of self efficacy and  on diet for CHF. Last BNP reviewed- and noted below   Recent Labs   Lab 11/29/22  0900   *   .  Lasix IV, strict I/O's, daily weights, Na/fluid restriction  Cardiology following, LiveVest placed this admit

## 2022-11-30 NOTE — PROGRESS NOTES
[facility] - Telemetry ([facility])  Cardiology  Progress Note    Patient Name: Cesario Tracey Jr.  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 HAS NOloeg edema but has orthpnea intermittently. He is refererd for r/lhc by DR HERNANDEZ.  Hospital Course:   11/29/22-Patient seen and examined today, s/p R/LHC yesterday which showed elevated filling pressures/pulmonary HTN and 70 % LAD lesion. Admitted for diuresis/med optimization. Feeling better today, less SOB. No chest pain symptoms. Creatinine stable. Discussed LifeVest for SCD prevention, patient agreeable.     11/30/22- Patient seen and examined today. Feels better today, states breathing is improving Lasix 40mg IV x1 ordered. Labs reviewed, stable. LifeVest ordered          Review of Systems   Constitutional: Negative.   HENT: Negative.     Eyes: Negative.    Cardiovascular: Negative.    Respiratory: Negative.     Skin: Negative.    Musculoskeletal: Negative.    Gastrointestinal: Negative.    Genitourinary: Negative.    Neurological: Negative.    Psychiatric/Behavioral: Negative.     Objective:     Vital Signs (Most Recent):  Temp: 98.2 °F (36.8 °C) (11/30/22 1139)  Pulse: 92 (11/30/22 1139)  Resp: 18 (11/30/22 1139)  BP: 132/88 (11/30/22 1139)  SpO2: 99 % (11/30/22 1139) Vital Signs (24h Range):  Temp:  [97.9 °F (36.6 °C)-98.5 °F (36.9 °C)] 98.2 °F (36.8 °C)  Pulse:  [85-96] 92  Resp:  [17-19] 18  SpO2:  [97 %-100 %] 99 %  BP: (122-143)/(81-93) 132/88     Weight: 86.2 kg (190 lb 0.6  oz)  Body mass index is 25.07 kg/m².     SpO2: 99 %  O2 Device (Oxygen Therapy): room air      Intake/Output Summary (Last 24 hours) at 11/30/2022 1147  Last data filed at 11/30/2022 0812  Gross per 24 hour   Intake 350 ml   Output --   Net 350 ml       Lines/Drains/Airways       Peripheral Intravenous Line  Duration                  Peripheral IV - Single Lumen 11/28/22 1015 20 G Right Antecubital 2 days                    Physical Exam  Vitals and nursing note reviewed.   Constitutional:       Appearance: Normal appearance.   HENT:      Head: Normocephalic and atraumatic.   Eyes:      General:         Right eye: No discharge.         Left eye: No discharge.      Pupils: Pupils are equal, round, and reactive to light.   Cardiovascular:      Rate and Rhythm: Normal rate and regular rhythm.      Heart sounds: S1 normal and S2 normal. No murmur heard.    No friction rub.   Pulmonary:      Effort: Pulmonary effort is normal. No respiratory distress.      Breath sounds: Normal breath sounds. No rales.   Abdominal:      Palpations: Abdomen is soft.      Tenderness: There is no abdominal tenderness.   Musculoskeletal:      Cervical back: Neck supple.      Right lower leg: No edema.      Left lower leg: No edema.   Skin:     General: Skin is warm and dry.   Neurological:      General: No focal deficit present.      Mental Status: He is alert and oriented to person, place, and time.   Psychiatric:         Mood and Affect: Mood normal.         Behavior: Behavior normal.         Thought Content: Thought content normal.       Significant Labs: BMP:   Recent Labs   Lab 11/29/22  0458 11/30/22  0506   GLU 80 91    140   K 3.6 3.6    107   CO2 20* 22*   BUN 24* 25*   CREATININE 1.4 1.5*   CALCIUM 9.3 9.2   MG 1.8  --    , CMP   Recent Labs   Lab 11/29/22  0458 11/30/22  0506    140   K 3.6 3.6    107   CO2 20* 22*   GLU 80 91   BUN 24* 25*   CREATININE 1.4 1.5*   CALCIUM 9.3 9.2   ANIONGAP 11 11   , CBC    Recent Labs   Lab 11/29/22  0900   WBC 3.72*   HGB 10.9*   HCT 35.3*      , INR   Recent Labs   Lab 11/29/22  0458 11/30/22  0506   INR 1.2 1.2   , Lipid Panel No results for input(s): CHOL, HDL, LDLCALC, TRIG, CHOLHDL in the last 48 hours., Troponin No results for input(s): TROPONINI in the last 48 hours., and All pertinent lab results from the last 24 hours have been reviewed.    Significant Imaging: Echocardiogram: Transthoracic echo (TTE) complete (Cupid Only): No results found for this or any previous visit. and EKG: reviewed    Assessment and Plan:         * Acute on chronic systolic CHF (congestive heart failure)  Has FRC II-III symptoms    11/29/22  -Give additional dose of IV Lasix today  -Continue Toprol XL  -Will plan to add Entresto tmw pending creatinine trend  -Lifevest for SCD prevention, patient agreeable    11/30/22  Lasix 40mg IV x1 ordered for today  LifeVest  Entresto added  Follow up w/ Dr. Helton, cardiology    OVIDIO (acute kidney injury)  Stable, monitor with diuresis     Pulmonary HTN  With exertional dyspnea will get rhc    11/29/22  -Assess response to IV diuresis     11/30/22  Lasix 40mg IV x1 ordered    PAF (paroxysmal atrial fibrillation)  On coumadin    11/29/22  -Will resume Coumadin this evening pending discussion with interventionalist    Dilated cardiomyopathy  On b blockers arb and diuretics will assess right heart pressures    11/29/22  -Continue BB, Lasix  -LifeVest for SCD prevention    11/29/22  LifeVest ordered  Entresto added    Abnormal nuclear stress test  Suggestive of multilevel disease for [facility]    11/29/22  -s/p [facility] which showed 70% LAD lesion as well as 50-70% diagonal lesion  -Continue ASA, BB, statin  -Intervention to be performed at a later date    11/30/22  LifeVest ordered  LAD intervention to be performed at a later date  Entresto added    Coronary artery disease without angina pectoris  -Continue ASA, BB, statin  -LAD intervention to be performed at a later  date    Other hyperlipidemia  Statins on board    Primary hypertension  On appropriate meds.        VTE Risk Mitigation (From admission, onward)           Ordered     warfarin (COUMADIN) tablet 5 mg  Daily         11/29/22 2206     IP VTE HIGH RISK PATIENT  Once         11/28/22 1441     Place sequential compression device  Until discontinued         11/28/22 1441                    Abby Mancera NP  Cardiology  [facility] - Telemetry ([facility])

## 2022-11-30 NOTE — NURSING
Discharge summary, health teachings and instructions given to patient --verbalized understanding. IV removed-no complications noted. Tele monitor removed and returned to the monitor room. All questions answered with regards to discharge instructions. Reminded of the importance of follow-up appointments. Instructed about awaiting the new prescribed meds from [facility] outpatient pharmacy before getting discharged-verbalized understanding. Lifevest on the patient.

## 2022-11-30 NOTE — PLAN OF CARE
[facility] - Telemetry (Hospital)  Discharge Final Note    Primary Care Provider: Braxton Guzman Jr, MD    Expected Discharge Date: 11/30/2022    Final Discharge Note (most recent)       Final Note - 11/30/22 1312          Final Note    Assessment Type Final Discharge Note     Anticipated Discharge Disposition Home or Self Care     Hospital Resources/Appts/Education Provided Appointments scheduled and added to AVS;Appointments scheduled by Navigator/Coordinator        Post-Acute Status    Discharge Delays None known at this time                     Important Message from Medicare           Cardiology appt scheduled   Dec7 New Patient 2:00 PM   [facility] - Cardiology

## 2022-11-30 NOTE — ASSESSMENT & PLAN NOTE
Patient with acute kidney injury likely due to pre-renal azotemia OVIDIO is currently stable. Labs reviewed- Renal function/electrolytes with Estimated Creatinine Clearance: 58.4 mL/min (A) (based on SCr of 1.5 mg/dL (H)). according to latest data. Monitor urine output and serial BMP and adjust therapy as needed. Avoid nephrotoxins and renally dose meds for GFR listed above.   Renal US with normal echogenicity (11/29)

## 2022-11-30 NOTE — PLAN OF CARE
11/30/22 0820   Post-Acute Status   Post-Acute Authorization HME   HME Status (!) Pending Delivery     Patient was approved for Lifevest and will be fit this morning.

## 2022-11-30 NOTE — DISCHARGE SUMMARY
[facility] Medicine  Discharge Summary      Patient Name: Cesario Tracey Jr.  MRN: [id number]  ANANTH: [id number]  Patient Class: OP- Observation  Admission Date: 11/28/2022  Hospital Length of Stay: 0 days  Discharge Date and Time:  11/30/2022 1:17 PM  Attending Physician: Michael Trejo MD   Discharging Provider: Michael Trejo MD  Primary Care Provider: Braxton Guzman Jr, MD    Primary Care Team: Networked reference to record PCT     HPI:   63 y/o male with PMHx of CAD, CHF, pA-fib on coumadin, HTN, HLD who is here today s/p C with volume overload. Patient received Lasix 20 mg IV in cardiac recovery. He was noted to be SOB and with mild BLE edema, given Lasix 20 mg IV. Patient denies chest pain, fevers/chills, nausea/vomiting He reports symptoms were progressing prior to C today. Cardiology requested hospital medication admission for CHF exacerbation, plans for possible LiveVest placement.      Procedure(s) (LRB):  CATHETERIZATION, HEART, LEFT (Left)  INSERTION, CATHETER, RIGHT HEART (N/A)      Hospital Course:   NAEON. Patient sitting at edge of bed, reports urinating a good bit today after Lasix. Denies SOB, chest pain, fevers, nausea. LiveVest placement pending.       Goals of Care Treatment Preferences:  Code Status: Full Code      Consults:   Consults (From admission, onward)        Status Ordering Provider     Pharmacy to dose Warfarin consult  Once        Provider:  (Not yet assigned)    Acknowledged ERENDIRA RIOS     Inpatient consult to Social Work/Case Management  Once        Provider:  (Not yet assigned)    Completed KRYSTIN DOWNS     Inpatient consult to Social Work  Once        Provider:  (Not yet assigned)    Completed ALEJANDRINA GRIFFITH     Inpatient consult to Social Work/Case Management  Once        Provider:  (Not yet assigned)    Completed MICHAEL TREJO     Inpatient consult to Registered Dietitian/Nutritionist  Once        Provider:  (Not yet assigned)     Completed GOLDIE FERNÁNDEZ          * Acute on chronic systolic CHF (congestive heart failure)  Patient is identified as having Systolic (HFrEF) heart failure that is Acute on chronic. CHF is currently uncontrolled due to Continued edema of extremities and Rales/crackles on pulmonary exam. Latest ECHO performed and demonstrates- No results found for this or any previous visit.  . Continue Furosemide and monitor clinical status closely. Monitor on telemetry. Patient is on CHF pathway.  Monitor strict Is&Os and daily weights.  Place on fluid restriction of 2 L. Continue to stress to patient importance of self efficacy and  on diet for CHF. Last BNP reviewed- and noted below   Recent Labs   Lab 11/29/22  0900   *   .  Lasix IV, strict I/O's, daily weights, Na/fluid restriction  Cardiology following, LiveVest placed this admit    OVIDIO (acute kidney injury)-resolved as of 11/30/2022  Patient with acute kidney injury likely due to pre-renal azotemia OVIDIO is currently stable. Labs reviewed- Renal function/electrolytes with Estimated Creatinine Clearance: 58.4 mL/min (A) (based on SCr of 1.5 mg/dL (H)). according to latest data. Monitor urine output and serial BMP and adjust therapy as needed. Avoid nephrotoxins and renally dose meds for GFR listed above.   Renal US with normal echogenicity (11/29)    Hypokalemia-resolved as of 11/30/2022  Replete, monitor      Final Active Diagnoses:    Diagnosis Date Noted POA    PRINCIPAL PROBLEM:  Acute on chronic systolic CHF (congestive heart failure) [I50.23] 11/28/2022 Yes    Pulmonary HTN [I27.20] 11/28/2022 Yes    Abnormal stress test [R94.39] 11/26/2022 Yes    DCM (dilated cardiomyopathy) [I42.0] 11/26/2022 Yes    PAF (paroxysmal atrial fibrillation) [I48.0] 11/26/2022 No    Coronary artery disease without angina pectoris [I25.10] 11/14/2022 Yes    Primary hypertension [I10] 11/14/2022 Yes    Other hyperlipidemia [E78.49] 11/14/2022 Yes    Acute cough [R05.1]  11/14/2022 Yes      Problems Resolved During this Admission:    Diagnosis Date Noted Date Resolved POA    OVIDIO (acute kidney injury) [N17.9] 11/28/2022 11/30/2022 Yes    Hypokalemia [E87.6] 11/28/2022 11/30/2022 Yes       Discharged Condition: stable    Disposition: Home or Self Care    Follow Up:   Follow-up Information     Jessica Ortega MD. Schedule an appointment as soon as possible for a visit in 1 week(s).    Specialties: Interventional Cardiology, Cardiology  Contact information:  [address]  [place] [id number] [phone]                       Patient Instructions:      Activity as tolerated       Significant Diagnostic Studies: Labs:   BMP:   Recent Labs   Lab 11/29/22  0458 11/30/22  0506   GLU 80 91    140   K 3.6 3.6    107   CO2 20* 22*   BUN 24* 25*   CREATININE 1.4 1.5*   CALCIUM 9.3 9.2   MG 1.8  --        Pending Diagnostic Studies:     None         Medications:  Reconciled Home Medications:      Medication List      START taking these medications    aspirin 81 MG EC tablet  Commonly known as: ECOTRIN  Take 1 tablet (81 mg total) by mouth once daily.  Start taking on: December 1, 2022     sacubitriL-valsartan 24-26 mg per tablet  Commonly known as: ENTRESTO  Take 1 tablet by mouth 2 (two) times daily.        CONTINUE taking these medications    cyclobenzaprine 10 MG tablet  Commonly known as: FLEXERIL  Take 1 tablet by mouth 2 (two) times daily.     furosemide 20 MG tablet  Commonly known as: LASIX  Take 1 tablet by mouth once daily.     hydroCHLOROthiazide 25 MG tablet  Commonly known as: HYDRODIURIL  Take 1 tablet by mouth once daily.     HYDROcodone-acetaminophen  mg per tablet  Commonly known as: NORCO  Take 1 tablet by mouth 2 (two) times daily as needed.     latanoprost 0.005 % ophthalmic solution  Place 1 drop into both eyes nightly.     metoprolol succinate 100 MG 24 hr tablet  Commonly known as: TOPROL-XL  Take 1 tablet by mouth once daily.     pravastatin  20 MG tablet  Commonly known as: PRAVACHOL  Take 20 mg by mouth once daily.     warfarin 5 MG tablet  Commonly known as: COUMADIN  Take 1 tablet by mouth daily as needed.        STOP taking these medications    losartan 100 MG tablet  Commonly known as: COZAAR            Indwelling Lines/Drains at time of discharge:   Lines/Drains/Airways     None                 Time spent on the discharge of patient: < 30 minutes         Michael Trejo MD  Department of Hospital Medicine  [facility] - Tele[facility] ([facility])

## 2022-11-30 NOTE — PLAN OF CARE
Pt remains fall free this shift.  Pt AAOx4, verbal, clear speech.  Skin warm and dry. No new skin issues.  Telemonitoring in progress  Patient to meet with the life vest tech on today.Encouraged patient to have any family members available and to have a list of questions or concerns he might have   Room air  Patient continent of bowel and bladder  Independent in room   Bed low, side rails up x 2, wheels locked, call light in reach.  Bed alarm maintained for safety.  Patient instructed to call for assistance.  Hourly rounding completed.  24 hour chart check completed  POC updated and reviewed with pt. Will continue POC

## 2022-11-30 NOTE — PROGRESS NOTES
Pharmacy Consult Note: Warfarin     Cesario Tracey Jr. 's Coumadin will be dosed and monitored by Pharmacy.      Target INR goal is 2-3.    INR   Date Value Ref Range Status   11/30/2022 1.2 0.8 - 1.2 Final     Comment:     Coumadin Therapy:  2.0 - 3.0 for INR for all indicators except mechanical heart valves  and antiphospholipid syndromes which should use 2.5 - 3.5.         Indication: afib  Home dose: 5 mg daily  Patient will be continued on a dose of 5 mg per day.    Dose for Today: 5 mg     PT/INR will be monitored daily. Dose adjustments will be made accordingly.      Thank you for allowing us to participate in this patient's care.     Smita Palm, PharmD 11/30/2022 2:15 PM

## 2022-11-30 NOTE — ASSESSMENT & PLAN NOTE
With exertional dyspnea will get rhc    11/29/22  -Assess response to IV diuresis     11/30/22  Lasix 40mg IV x1 ordered

## 2022-11-30 NOTE — ASSESSMENT & PLAN NOTE
On b blockers arb and diuretics will assess right heart pressures    11/29/22  -Continue BB, Lasix  -LifeVest for SCD prevention    11/29/22  LifeVest ordered  Entresto added

## 2022-11-30 NOTE — SUBJECTIVE & OBJECTIVE
Review of Systems   Constitutional: Negative.   HENT: Negative.     Eyes: Negative.    Cardiovascular: Negative.    Respiratory: Negative.     Skin: Negative.    Musculoskeletal: Negative.    Gastrointestinal: Negative.    Genitourinary: Negative.    Neurological: Negative.    Psychiatric/Behavioral: Negative.     Objective:     Vital Signs (Most Recent):  Temp: 98.2 °F (36.8 °C) (11/30/22 1139)  Pulse: 92 (11/30/22 1139)  Resp: 18 (11/30/22 1139)  BP: 132/88 (11/30/22 1139)  SpO2: 99 % (11/30/22 1139) Vital Signs (24h Range):  Temp:  [97.9 °F (36.6 °C)-98.5 °F (36.9 °C)] 98.2 °F (36.8 °C)  Pulse:  [85-96] 92  Resp:  [17-19] 18  SpO2:  [97 %-100 %] 99 %  BP: (122-143)/(81-93) 132/88     Weight: 86.2 kg (190 lb 0.6 oz)  Body mass index is 25.07 kg/m².     SpO2: 99 %  O2 Device (Oxygen Therapy): room air      Intake/Output Summary (Last 24 hours) at 11/30/2022 1147  Last data filed at 11/30/2022 0812  Gross per 24 hour   Intake 350 ml   Output --   Net 350 ml       Lines/Drains/Airways       Peripheral Intravenous Line  Duration                  Peripheral IV - Single Lumen 11/28/22 1015 20 G Right Antecubital 2 days                    Physical Exam  Vitals and nursing note reviewed.   Constitutional:       Appearance: Normal appearance.   HENT:      Head: Normocephalic and atraumatic.   Eyes:      General:         Right eye: No discharge.         Left eye: No discharge.      Pupils: Pupils are equal, round, and reactive to light.   Cardiovascular:      Rate and Rhythm: Normal rate and regular rhythm.      Heart sounds: S1 normal and S2 normal. No murmur heard.    No friction rub.   Pulmonary:      Effort: Pulmonary effort is normal. No respiratory distress.      Breath sounds: Normal breath sounds. No rales.   Abdominal:      Palpations: Abdomen is soft.      Tenderness: There is no abdominal tenderness.   Musculoskeletal:      Cervical back: Neck supple.      Right lower leg: No edema.      Left lower leg: No  edema.   Skin:     General: Skin is warm and dry.   Neurological:      General: No focal deficit present.      Mental Status: He is alert and oriented to person, place, and time.   Psychiatric:         Mood and Affect: Mood normal.         Behavior: Behavior normal.         Thought Content: Thought content normal.       Significant Labs: BMP:   Recent Labs   Lab 11/29/22  0458 11/30/22  0506   GLU 80 91    140   K 3.6 3.6    107   CO2 20* 22*   BUN 24* 25*   CREATININE 1.4 1.5*   CALCIUM 9.3 9.2   MG 1.8  --    , CMP   Recent Labs   Lab 11/29/22  0458 11/30/22  0506    140   K 3.6 3.6    107   CO2 20* 22*   GLU 80 91   BUN 24* 25*   CREATININE 1.4 1.5*   CALCIUM 9.3 9.2   ANIONGAP 11 11   , CBC   Recent Labs   Lab 11/29/22  0900   WBC 3.72*   HGB 10.9*   HCT 35.3*      , INR   Recent Labs   Lab 11/29/22 0458 11/30/22  0506   INR 1.2 1.2   , Lipid Panel No results for input(s): CHOL, HDL, LDLCALC, TRIG, CHOLHDL in the last 48 hours., Troponin No results for input(s): TROPONINI in the last 48 hours., and All pertinent lab results from the last 24 hours have been reviewed.    Significant Imaging: Echocardiogram: Transthoracic echo (TTE) complete (Cupid Only): No results found for this or any previous visit. and EKG: reviewed

## 2022-12-02 ENCOUNTER — TELEPHONE (OUTPATIENT)
Dept: CARDIOLOGY | Facility: CLINIC | Age: 62
End: 2022-12-02
Payer: MEDICARE

## 2022-12-02 NOTE — TELEPHONE ENCOUNTER
I called pt. No answer. LVM for him to return my call.         ----- Message from Dinora Sharp RN sent at 2022 10:36 AM CST -----  Regardin hour call

## 2022-12-02 NOTE — TELEPHONE ENCOUNTER
"[facility]) hospital discharge 48-72 hour phone follow up completed.     Most Recent Hospital Discharge Date: 11:30am  Last admission Diagnosis/chief complaint:Acute on chronic systolic CHF (congestive heart failure)    TCC nurse Navigator spoke with pt    Pt instructed to weigh daily. He states he does have a working scale at home. Also to keep a bp log and bring with him to his upcoming appt.     Pt reports the following:  []  Shortness of Breath with Activity  []  Shortness of Breath at rest   []  Fatigue  []  Edema   [] Chest pain or tightness  [] Weight Increase since discharge  [x] None of the above    Medications:   Discharge medication reviewed with pt.  Pt reports having medication list available and has all medications at home for use per list.     Education:   Confirmed pt received "Home Care Guide for Heart Failure Patients" while admitted. Reviewed key points as listed below.     Recommend 2 -3 gram sodium restriction and 1500 cc-2000 cc fluid restriction.  Encourage physical activity with graded exercise program.  Requested patient to weigh themselves daily, and to notify us if their weight increases by more than 3 lbs in 1 day or 5 lbs in 3 days.   Reminded patient to use "Daily weight and symptom tracker" to record and guide patient on when and how to call [facility]. PT may also use symptom tracker if no scale available  Pt reports being in the green (color) Zone. If in yellow/red, reminded that they should be calling [facility] today or now.     Watch for these Signs and Symptoms: If any of these occur, contact [facility] immediately:   Increase in shortness of breath with movement   Increase in swelling in your legs and ankles   Weight gain of more than 3 pounds in a day or 5 pounds in 3 days.   Difficulty breathing when you are lying down   Worsening fatigue or tiredness   Stomach bloating, a full feeling or a loss of appetite   Increased coughing--especially when you are " lying down      Pt was able to verbalize back to nurse in their own words correct diet/fluid restrictions, necessity for exercise, warning signs and symptoms, when and how to contact their TCC team.      Pt educated on follow-up plan while in [facility] program to include:   Week 1 -  F/u appt with Provider and HF nurse (Date) 12/7. Pt says he has placed a call to his primary cardiologist Dr Helton. He says he will keep appt with us if he is not able to get in with him before 12/7   Week 2-5 - In person/ Virtual/ phone call check ins    Week 5-7 - Pt will discharge from [facility] and transition to longterm care provider (Cardiology/PCP/ Advanced Heart Failure).      Patient active on myChart? no      Pt given the following contact information for ease of communication: [phone] (Mon-Fri, 8a-5p) & for urgent issues on the weekend call Tim on-call [phone] to page the Cardiology MD on call.  Pt also encouraged utilize [facility] messaging as well.      Will follow up with pt at first clinic visit and HF nurse navigator available for pt questions, issues or concerns.

## 2022-12-05 ENCOUNTER — TELEPHONE (OUTPATIENT)
Dept: CARDIOLOGY | Facility: HOSPITAL | Age: 62
End: 2022-12-05
Payer: MEDICARE

## 2023-01-20 PROBLEM — R73.01 IFG (IMPAIRED FASTING GLUCOSE): Status: ACTIVE | Noted: 2023-01-20

## 2023-01-20 PROBLEM — N18.31 STAGE 3A CHRONIC KIDNEY DISEASE: Status: ACTIVE | Noted: 2023-01-20

## 2023-01-20 PROBLEM — Z00.00 ROUTINE GENERAL MEDICAL EXAMINATION AT A HEALTH CARE FACILITY: Status: ACTIVE | Noted: 2023-01-20

## 2023-01-20 PROBLEM — R79.89 ELEVATED SERUM CREATININE: Status: ACTIVE | Noted: 2023-01-20

## 2023-01-20 PROBLEM — D64.9 ANEMIA: Status: ACTIVE | Noted: 2023-01-20

## 2023-04-05 PROBLEM — Z86.73 HISTORY OF CVA (CEREBROVASCULAR ACCIDENT): Status: ACTIVE | Noted: 2023-04-05

## 2023-04-24 PROBLEM — Z00.00 ROUTINE GENERAL MEDICAL EXAMINATION AT A HEALTH CARE FACILITY: Status: RESOLVED | Noted: 2023-01-20 | Resolved: 2023-04-24

## 2023-05-31 ENCOUNTER — LAB VISIT (OUTPATIENT)
Dept: LAB | Facility: HOSPITAL | Age: 63
End: 2023-05-31
Attending: INTERNAL MEDICINE
Payer: MEDICARE

## 2023-05-31 DIAGNOSIS — I42.0 CONGESTIVE CARDIOMYOPATHY: Primary | ICD-10-CM

## 2023-05-31 LAB
ALBUMIN SERPL BCP-MCNC: 4.2 G/DL (ref 3.5–5.2)
ALP SERPL-CCNC: 58 U/L (ref 55–135)
ALT SERPL W/O P-5'-P-CCNC: 20 U/L (ref 10–44)
ANION GAP SERPL CALC-SCNC: 2 MMOL/L (ref 8–16)
APTT PPP: 37 SEC (ref 21–32)
AST SERPL-CCNC: 21 U/L (ref 10–40)
BASO STIPL BLD QL SMEAR: ABNORMAL
BASOPHILS # BLD AUTO: 0.01 K/UL (ref 0–0.2)
BASOPHILS NFR BLD: 0.2 % (ref 0–1.9)
BILIRUB SERPL-MCNC: 0.5 MG/DL (ref 0.1–1)
BUN SERPL-MCNC: 26 MG/DL (ref 8–23)
CALCIUM SERPL-MCNC: 9.6 MG/DL (ref 8.7–10.5)
CHLORIDE SERPL-SCNC: 103 MMOL/L (ref 95–110)
CO2 SERPL-SCNC: 31 MMOL/L (ref 23–29)
CREAT SERPL-MCNC: 1.7 MG/DL (ref 0.5–1.4)
DIFFERENTIAL METHOD: ABNORMAL
EOSINOPHIL # BLD AUTO: 0 K/UL (ref 0–0.5)
EOSINOPHIL NFR BLD: 0.4 % (ref 0–8)
ERYTHROCYTE [DISTWIDTH] IN BLOOD BY AUTOMATED COUNT: 18 % (ref 11.5–14.5)
EST. GFR  (NO RACE VARIABLE): 45 ML/MIN/1.73 M^2
GLUCOSE SERPL-MCNC: 103 MG/DL (ref 70–110)
HCT VFR BLD AUTO: 37.1 % (ref 40–54)
HGB BLD-MCNC: 11.8 G/DL (ref 14–18)
IMM GRANULOCYTES # BLD AUTO: 0.07 K/UL (ref 0–0.04)
IMM GRANULOCYTES NFR BLD AUTO: 1.3 % (ref 0–0.5)
INR PPP: 3.7 (ref 0.8–1.2)
LYMPHOCYTES # BLD AUTO: 2.9 K/UL (ref 1–4.8)
LYMPHOCYTES NFR BLD: 52.3 % (ref 18–48)
MCH RBC QN AUTO: 26.5 PG (ref 27–31)
MCHC RBC AUTO-ENTMCNC: 31.8 G/DL (ref 32–36)
MCV RBC AUTO: 83 FL (ref 82–98)
MONOCYTES # BLD AUTO: 1.1 K/UL (ref 0.3–1)
MONOCYTES NFR BLD: 19.5 % (ref 4–15)
NEUTROPHILS # BLD AUTO: 1.5 K/UL (ref 1.8–7.7)
NEUTROPHILS NFR BLD: 26.3 % (ref 38–73)
NRBC BLD-RTO: 1 /100 WBC
PLATELET # BLD AUTO: 132 K/UL (ref 150–450)
PLATELET BLD QL SMEAR: ABNORMAL
PMV BLD AUTO: 9 FL (ref 9.2–12.9)
POLYCHROMASIA BLD QL SMEAR: ABNORMAL
POTASSIUM SERPL-SCNC: 3.6 MMOL/L (ref 3.5–5.1)
PROT SERPL-MCNC: 8.9 G/DL (ref 6–8.4)
PROTHROMBIN TIME: 33.7 SEC (ref 9–12.5)
RBC # BLD AUTO: 4.45 M/UL (ref 4.6–6.2)
SODIUM SERPL-SCNC: 136 MMOL/L (ref 136–145)
WBC # BLD AUTO: 5.55 K/UL (ref 3.9–12.7)

## 2023-05-31 PROCEDURE — 85025 COMPLETE CBC W/AUTO DIFF WBC: CPT | Performed by: INTERNAL MEDICINE

## 2023-05-31 PROCEDURE — 85610 PROTHROMBIN TIME: CPT | Performed by: INTERNAL MEDICINE

## 2023-05-31 PROCEDURE — 85730 THROMBOPLASTIN TIME PARTIAL: CPT | Mod: GA | Performed by: INTERNAL MEDICINE

## 2023-05-31 PROCEDURE — 80053 COMPREHEN METABOLIC PANEL: CPT | Performed by: INTERNAL MEDICINE

## 2023-05-31 PROCEDURE — 36415 COLL VENOUS BLD VENIPUNCTURE: CPT | Performed by: INTERNAL MEDICINE

## 2023-06-09 ENCOUNTER — PATIENT MESSAGE (OUTPATIENT)
Dept: CARDIOLOGY | Facility: CLINIC | Age: 63
End: 2023-06-09
Payer: MEDICARE

## 2023-06-09 ENCOUNTER — TELEPHONE (OUTPATIENT)
Dept: CARDIOLOGY | Facility: CLINIC | Age: 63
End: 2023-06-09
Payer: MEDICARE

## 2023-06-09 NOTE — TELEPHONE ENCOUNTER
Left voice mail message for patient to return call to discuss scheduling heart cath    Received records and request from Dr. Helton' office to schedule patient for heart cath with Abnormal stress test and history of CAD, cardiomyopathy  Will scan all to Media

## 2023-06-09 NOTE — TELEPHONE ENCOUNTER
Recalled patient and confirmed heart cath for 6/20 at 11am with Dr. Ortega  Reviewed all instructions and will e-mail written instructions as requested.

## 2023-06-20 ENCOUNTER — HOSPITAL ENCOUNTER (INPATIENT)
Facility: HOSPITAL | Age: 63
LOS: 14 days | Discharge: HOME HEALTH | DRG: 233 | End: 2023-07-05
Attending: INTERNAL MEDICINE | Admitting: INTERNAL MEDICINE
Payer: MEDICARE

## 2023-06-20 DIAGNOSIS — I25.10 CAD (CORONARY ARTERY DISEASE): ICD-10-CM

## 2023-06-20 DIAGNOSIS — R94.39 ABNORMAL STRESS TEST: ICD-10-CM

## 2023-06-20 DIAGNOSIS — Z98.890 POST-OPERATIVE STATE: ICD-10-CM

## 2023-06-20 DIAGNOSIS — I25.10 CAD, MULTIPLE VESSEL: ICD-10-CM

## 2023-06-20 DIAGNOSIS — I42.0 DCM (DILATED CARDIOMYOPATHY): ICD-10-CM

## 2023-06-20 DIAGNOSIS — I48.0 PAF (PAROXYSMAL ATRIAL FIBRILLATION): ICD-10-CM

## 2023-06-20 DIAGNOSIS — Z86.73 HISTORY OF CVA (CEREBROVASCULAR ACCIDENT): ICD-10-CM

## 2023-06-20 DIAGNOSIS — Z95.1 S/P CABG X 3: ICD-10-CM

## 2023-06-20 DIAGNOSIS — Z01.810 PRE-OPERATIVE CARDIOVASCULAR EXAMINATION: ICD-10-CM

## 2023-06-20 DIAGNOSIS — R06.02 SOB (SHORTNESS OF BREATH): ICD-10-CM

## 2023-06-20 DIAGNOSIS — I25.10 CORONARY ARTERY DISEASE WITHOUT ANGINA PECTORIS: Primary | ICD-10-CM

## 2023-06-20 DIAGNOSIS — D64.9 ANEMIA, UNSPECIFIED TYPE: ICD-10-CM

## 2023-06-20 LAB
BNP SERPL-MCNC: 62 PG/ML (ref 0–99)
FERRITIN SERPL-MCNC: 212 NG/ML (ref 20–300)
POCT GLUCOSE: 90 MG/DL (ref 70–110)

## 2023-06-20 PROCEDURE — 63600175 PHARM REV CODE 636 W HCPCS: Performed by: THORACIC SURGERY (CARDIOTHORACIC VASCULAR SURGERY)

## 2023-06-20 PROCEDURE — 27201423 OPTIME MED/SURG SUP & DEVICES STERILE SUPPLY: Performed by: INTERNAL MEDICINE

## 2023-06-20 PROCEDURE — C1769 GUIDE WIRE: HCPCS | Performed by: INTERNAL MEDICINE

## 2023-06-20 PROCEDURE — 94761 N-INVAS EAR/PLS OXIMETRY MLT: CPT

## 2023-06-20 PROCEDURE — 99152 MOD SED SAME PHYS/QHP 5/>YRS: CPT | Performed by: INTERNAL MEDICINE

## 2023-06-20 PROCEDURE — 25000003 PHARM REV CODE 250: Performed by: INTERNAL MEDICINE

## 2023-06-20 PROCEDURE — 93010 ELECTROCARDIOGRAM REPORT: CPT | Mod: ,,, | Performed by: INTERNAL MEDICINE

## 2023-06-20 PROCEDURE — 93458 L HRT ARTERY/VENTRICLE ANGIO: CPT | Performed by: INTERNAL MEDICINE

## 2023-06-20 PROCEDURE — 93571 IV DOP VEL&/PRESS C FLO 1ST: CPT | Mod: 26,52,, | Performed by: INTERNAL MEDICINE

## 2023-06-20 PROCEDURE — 92978 ENDOLUMINL IVUS OCT C 1ST: CPT | Performed by: INTERNAL MEDICINE

## 2023-06-20 PROCEDURE — 25500020 PHARM REV CODE 255: Performed by: INTERNAL MEDICINE

## 2023-06-20 PROCEDURE — 99152 MOD SED SAME PHYS/QHP 5/>YRS: CPT | Mod: ,,, | Performed by: INTERNAL MEDICINE

## 2023-06-20 PROCEDURE — 93572 IV DOP VEL&/PRESS C FLO EA: CPT | Mod: 26,52,, | Performed by: INTERNAL MEDICINE

## 2023-06-20 PROCEDURE — 92979 ENDOLUMINL IVUS OCT C EA: CPT | Mod: 26,,, | Performed by: INTERNAL MEDICINE

## 2023-06-20 PROCEDURE — 99153 MOD SED SAME PHYS/QHP EA: CPT | Performed by: INTERNAL MEDICINE

## 2023-06-20 PROCEDURE — 83880 ASSAY OF NATRIURETIC PEPTIDE: CPT | Performed by: THORACIC SURGERY (CARDIOTHORACIC VASCULAR SURGERY)

## 2023-06-20 PROCEDURE — C1725 CATH, TRANSLUMIN NON-LASER: HCPCS | Performed by: INTERNAL MEDICINE

## 2023-06-20 PROCEDURE — C1887 CATHETER, GUIDING: HCPCS | Performed by: INTERNAL MEDICINE

## 2023-06-20 PROCEDURE — 93572 PR HEART FLOW RESERV MEASURE,ADDN VESSL: ICD-10-PCS | Mod: 26,52,, | Performed by: INTERNAL MEDICINE

## 2023-06-20 PROCEDURE — 63600175 PHARM REV CODE 636 W HCPCS: Performed by: INTERNAL MEDICINE

## 2023-06-20 PROCEDURE — 25000003 PHARM REV CODE 250: Performed by: THORACIC SURGERY (CARDIOTHORACIC VASCULAR SURGERY)

## 2023-06-20 PROCEDURE — 99152 PR MOD CONSCIOUS SEDATION, SAME PHYS, 5+ YRS, FIRST 15 MIN: ICD-10-PCS | Mod: ,,, | Performed by: INTERNAL MEDICINE

## 2023-06-20 PROCEDURE — 93458 L HRT ARTERY/VENTRICLE ANGIO: CPT | Mod: 26,,, | Performed by: INTERNAL MEDICINE

## 2023-06-20 PROCEDURE — 92979 PR INTRAVASC CORONARY SO2,ADDN VESSEL: ICD-10-PCS | Mod: 26,,, | Performed by: INTERNAL MEDICINE

## 2023-06-20 PROCEDURE — 93799 UNLISTED CV SVC/PROCEDURE: CPT | Performed by: INTERNAL MEDICINE

## 2023-06-20 PROCEDURE — C1753 CATH, INTRAVAS ULTRASOUND: HCPCS | Performed by: INTERNAL MEDICINE

## 2023-06-20 PROCEDURE — 93458 PR CATH PLACE/CORON ANGIO, IMG SUPER/INTERP,W LEFT HEART VENTRICULOGRAPHY: ICD-10-PCS | Mod: 26,,, | Performed by: INTERNAL MEDICINE

## 2023-06-20 PROCEDURE — 93571 PR HEART FLOW RESERV MEASURE,INIT VESSL: ICD-10-PCS | Mod: 26,52,, | Performed by: INTERNAL MEDICINE

## 2023-06-20 PROCEDURE — 93005 ELECTROCARDIOGRAM TRACING: CPT

## 2023-06-20 PROCEDURE — 92979 ENDOLUMINL IVUS OCT C EA: CPT | Performed by: INTERNAL MEDICINE

## 2023-06-20 PROCEDURE — 36415 COLL VENOUS BLD VENIPUNCTURE: CPT | Performed by: THORACIC SURGERY (CARDIOTHORACIC VASCULAR SURGERY)

## 2023-06-20 PROCEDURE — 85347 COAGULATION TIME ACTIVATED: CPT | Performed by: INTERNAL MEDICINE

## 2023-06-20 PROCEDURE — 92978 ENDOLUMINL IVUS OCT C 1ST: CPT | Mod: 26,,, | Performed by: INTERNAL MEDICINE

## 2023-06-20 PROCEDURE — 92978 PR IVUS, CORONARY, 1ST VESSEL: ICD-10-PCS | Mod: 26,,, | Performed by: INTERNAL MEDICINE

## 2023-06-20 PROCEDURE — C1894 INTRO/SHEATH, NON-LASER: HCPCS | Performed by: INTERNAL MEDICINE

## 2023-06-20 PROCEDURE — C1760 CLOSURE DEV, VASC: HCPCS | Performed by: INTERNAL MEDICINE

## 2023-06-20 PROCEDURE — 93010 EKG 12-LEAD: ICD-10-PCS | Mod: ,,, | Performed by: INTERNAL MEDICINE

## 2023-06-20 PROCEDURE — 82728 ASSAY OF FERRITIN: CPT | Performed by: THORACIC SURGERY (CARDIOTHORACIC VASCULAR SURGERY)

## 2023-06-20 DEVICE — ANGIO-SEAL VIP VASCULAR CLOSURE DEVICE
Type: IMPLANTABLE DEVICE | Site: GROIN | Status: FUNCTIONAL
Brand: ANGIO-SEAL

## 2023-06-20 RX ORDER — IBUPROFEN 200 MG
24 TABLET ORAL
Status: DISCONTINUED | OUTPATIENT
Start: 2023-06-20 | End: 2023-06-22

## 2023-06-20 RX ORDER — SODIUM CHLORIDE 9 MG/ML
INJECTION, SOLUTION INTRAVENOUS CONTINUOUS
Status: DISCONTINUED | OUTPATIENT
Start: 2023-06-20 | End: 2023-06-20

## 2023-06-20 RX ORDER — SODIUM CHLORIDE 9 MG/ML
INJECTION, SOLUTION INTRAVENOUS CONTINUOUS
Status: ACTIVE | OUTPATIENT
Start: 2023-06-20 | End: 2023-06-20

## 2023-06-20 RX ORDER — AMITRIPTYLINE HYDROCHLORIDE 50 MG/1
50 TABLET, FILM COATED ORAL NIGHTLY
Status: DISCONTINUED | OUTPATIENT
Start: 2023-06-20 | End: 2023-06-25

## 2023-06-20 RX ORDER — CYANOCOBALAMIN 1000 UG/ML
1000 INJECTION, SOLUTION INTRAMUSCULAR; SUBCUTANEOUS ONCE
Status: COMPLETED | OUTPATIENT
Start: 2023-06-20 | End: 2023-06-20

## 2023-06-20 RX ORDER — HYDROCODONE BITARTRATE AND ACETAMINOPHEN 10; 325 MG/1; MG/1
1 TABLET ORAL 2 TIMES DAILY PRN
Status: DISCONTINUED | OUTPATIENT
Start: 2023-06-20 | End: 2023-06-22

## 2023-06-20 RX ORDER — CYCLOBENZAPRINE HCL 10 MG
10 TABLET ORAL 2 TIMES DAILY
Status: DISCONTINUED | OUTPATIENT
Start: 2023-06-20 | End: 2023-06-22

## 2023-06-20 RX ORDER — LATANOPROST 50 UG/ML
1 SOLUTION/ DROPS OPHTHALMIC NIGHTLY
Status: DISCONTINUED | OUTPATIENT
Start: 2023-06-20 | End: 2023-07-05 | Stop reason: HOSPADM

## 2023-06-20 RX ORDER — CEFAZOLIN SODIUM 1 G/3ML
INJECTION, POWDER, FOR SOLUTION INTRAMUSCULAR; INTRAVENOUS
Status: DISCONTINUED | OUTPATIENT
Start: 2023-06-20 | End: 2023-06-20

## 2023-06-20 RX ORDER — PRAVASTATIN SODIUM 20 MG/1
20 TABLET ORAL DAILY
Status: DISCONTINUED | OUTPATIENT
Start: 2023-06-20 | End: 2023-07-05 | Stop reason: HOSPADM

## 2023-06-20 RX ORDER — MIDAZOLAM HYDROCHLORIDE 1 MG/ML
INJECTION, SOLUTION INTRAMUSCULAR; INTRAVENOUS
Status: DISCONTINUED | OUTPATIENT
Start: 2023-06-20 | End: 2023-06-20

## 2023-06-20 RX ORDER — NAPROXEN SODIUM 220 MG/1
81 TABLET, FILM COATED ORAL ONCE
Status: COMPLETED | OUTPATIENT
Start: 2023-06-20 | End: 2023-06-20

## 2023-06-20 RX ORDER — GLUCAGON 1 MG
1 KIT INJECTION
Status: DISCONTINUED | OUTPATIENT
Start: 2023-06-20 | End: 2023-06-22

## 2023-06-20 RX ORDER — FOLIC ACID 1 MG/1
2 TABLET ORAL DAILY
Status: DISCONTINUED | OUTPATIENT
Start: 2023-06-20 | End: 2023-07-05 | Stop reason: HOSPADM

## 2023-06-20 RX ORDER — FUROSEMIDE 20 MG/1
20 TABLET ORAL DAILY
Status: DISCONTINUED | OUTPATIENT
Start: 2023-06-20 | End: 2023-06-22

## 2023-06-20 RX ORDER — ACETAMINOPHEN 325 MG/1
650 TABLET ORAL EVERY 4 HOURS PRN
Status: DISCONTINUED | OUTPATIENT
Start: 2023-06-20 | End: 2023-06-24

## 2023-06-20 RX ORDER — METOPROLOL SUCCINATE 50 MG/1
100 TABLET, EXTENDED RELEASE ORAL DAILY
Status: DISCONTINUED | OUTPATIENT
Start: 2023-06-20 | End: 2023-06-22

## 2023-06-20 RX ORDER — DAPAGLIFLOZIN 10 MG/1
10 TABLET, FILM COATED ORAL DAILY
Status: DISCONTINUED | OUTPATIENT
Start: 2023-06-20 | End: 2023-06-20 | Stop reason: ALTCHOICE

## 2023-06-20 RX ORDER — NITROGLYCERIN 5 MG/ML
INJECTION, SOLUTION INTRAVENOUS
Status: DISCONTINUED | OUTPATIENT
Start: 2023-06-20 | End: 2023-06-20

## 2023-06-20 RX ORDER — SODIUM CHLORIDE 0.9 % (FLUSH) 0.9 %
10 SYRINGE (ML) INJECTION
Status: ACTIVE | OUTPATIENT
Start: 2023-06-20

## 2023-06-20 RX ORDER — HYDROMORPHONE HYDROCHLORIDE 2 MG/ML
INJECTION, SOLUTION INTRAMUSCULAR; INTRAVENOUS; SUBCUTANEOUS
Status: DISCONTINUED | OUTPATIENT
Start: 2023-06-20 | End: 2023-06-20

## 2023-06-20 RX ORDER — LIDOCAINE HYDROCHLORIDE 20 MG/ML
INJECTION, SOLUTION INFILTRATION; PERINEURAL
Status: DISCONTINUED | OUTPATIENT
Start: 2023-06-20 | End: 2023-06-20

## 2023-06-20 RX ORDER — ASPIRIN 81 MG/1
81 TABLET ORAL DAILY
Status: DISCONTINUED | OUTPATIENT
Start: 2023-06-21 | End: 2023-06-22

## 2023-06-20 RX ORDER — DIAZEPAM 5 MG/1
5 TABLET ORAL
Status: DISCONTINUED | OUTPATIENT
Start: 2023-06-20 | End: 2023-06-20

## 2023-06-20 RX ORDER — HEPARIN SODIUM 1000 [USP'U]/ML
INJECTION, SOLUTION INTRAVENOUS; SUBCUTANEOUS
Status: DISCONTINUED | OUTPATIENT
Start: 2023-06-20 | End: 2023-06-20

## 2023-06-20 RX ORDER — CLOPIDOGREL 300 MG/1
600 TABLET, FILM COATED ORAL ONCE
Status: COMPLETED | OUTPATIENT
Start: 2023-06-20 | End: 2023-06-20

## 2023-06-20 RX ORDER — ONDANSETRON 8 MG/1
8 TABLET, ORALLY DISINTEGRATING ORAL EVERY 8 HOURS PRN
Status: DISCONTINUED | OUTPATIENT
Start: 2023-06-20 | End: 2023-06-22

## 2023-06-20 RX ORDER — FENTANYL CITRATE 50 UG/ML
INJECTION, SOLUTION INTRAMUSCULAR; INTRAVENOUS
Status: DISCONTINUED | OUTPATIENT
Start: 2023-06-20 | End: 2023-06-20

## 2023-06-20 RX ORDER — INSULIN ASPART 100 [IU]/ML
1-10 INJECTION, SOLUTION INTRAVENOUS; SUBCUTANEOUS
Status: DISCONTINUED | OUTPATIENT
Start: 2023-06-20 | End: 2023-06-22

## 2023-06-20 RX ORDER — IBUPROFEN 200 MG
16 TABLET ORAL
Status: DISCONTINUED | OUTPATIENT
Start: 2023-06-20 | End: 2023-06-22

## 2023-06-20 RX ORDER — DIPHENHYDRAMINE HCL 50 MG
50 CAPSULE ORAL ONCE
Status: COMPLETED | OUTPATIENT
Start: 2023-06-20 | End: 2023-06-20

## 2023-06-20 RX ADMIN — METOPROLOL SUCCINATE 100 MG: 50 TABLET, EXTENDED RELEASE ORAL at 02:06

## 2023-06-20 RX ADMIN — SACUBITRIL AND VALSARTAN 1 TABLET: 24; 26 TABLET, FILM COATED ORAL at 03:06

## 2023-06-20 RX ADMIN — PRAVASTATIN SODIUM 20 MG: 20 TABLET ORAL at 02:06

## 2023-06-20 RX ADMIN — SODIUM CHLORIDE: 9 INJECTION, SOLUTION INTRAVENOUS at 04:06

## 2023-06-20 RX ADMIN — AMITRIPTYLINE HYDROCHLORIDE 50 MG: 50 TABLET, FILM COATED ORAL at 08:06

## 2023-06-20 RX ADMIN — ASPIRIN 81 MG CHEWABLE TABLET 81 MG: 81 TABLET CHEWABLE at 09:06

## 2023-06-20 RX ADMIN — CYANOCOBALAMIN 1000 MCG: 1000 INJECTION, SOLUTION INTRAMUSCULAR; SUBCUTANEOUS at 06:06

## 2023-06-20 RX ADMIN — FOLIC ACID 2 MG: 1 TABLET ORAL at 06:06

## 2023-06-20 RX ADMIN — CYCLOBENZAPRINE HYDROCHLORIDE 10 MG: 10 TABLET, FILM COATED ORAL at 08:06

## 2023-06-20 RX ADMIN — SODIUM CHLORIDE: 9 INJECTION, SOLUTION INTRAVENOUS at 09:06

## 2023-06-20 RX ADMIN — FUROSEMIDE 20 MG: 20 TABLET ORAL at 02:06

## 2023-06-20 RX ADMIN — DIAZEPAM 5 MG: 5 TABLET ORAL at 09:06

## 2023-06-20 RX ADMIN — DIPHENHYDRAMINE HYDROCHLORIDE 50 MG: 50 CAPSULE ORAL at 09:06

## 2023-06-20 RX ADMIN — IRON SUCROSE 200 MG: 20 INJECTION, SOLUTION INTRAVENOUS at 06:06

## 2023-06-20 RX ADMIN — EPOETIN ALFA-EPBX 10000 UNITS: 10000 INJECTION, SOLUTION INTRAVENOUS; SUBCUTANEOUS at 08:06

## 2023-06-20 RX ADMIN — CLOPIDOGREL BISULFATE 600 MG: 300 TABLET, FILM COATED ORAL at 09:06

## 2023-06-20 NOTE — Clinical Note
The catheter was inserted into the ostium   left main. An angiography was performed of the left coronary arteries. The result was suboptimal..

## 2023-06-20 NOTE — Clinical Note
280 ml of contrast were injected throughout the case. 0 mL of contrast was the total wasted during the case. 280 mL was the total amount used during the case.

## 2023-06-20 NOTE — Clinical Note
The wire was repositioned to the distal left anterior descending artery IFR  performed of distal and proximal LAD. Distal LAD 0.6 and proximal LAD is 0.6..

## 2023-06-20 NOTE — Clinical Note
The wire was removed from the distal left anterior descending artery Pt is having a lot of coronary spasm and ST changes.

## 2023-06-20 NOTE — CONSULTS
[facility])  Cardiothoracic Surgery  Consult Note    Patient Name: Cesario Tracey Jr.  MRN: [id number]  Admission Date: 6/20/2023  Attending Physician: Caatlino Rose MD  Referring Provider: Zion Ortega MD    Patient information was obtained from patient and past medical records.     Consults  Subjective:     Principal Problem: Abnormal stress test    History of Present Illness: The patient is a 63-year-old male with CHF, history of AFib, status post CVA, hyperlipidemia, hypertension, diabetes and cardiomyopathy who had abnormal Cardiolite test on workup.  The patient has complaints of dyspnea on exertion and shortness of breath.  In addition, the patient complains of paroxysmal nocturnal dyspnea and orthopnea.  The patient was brought to the operating room and cardiac catheterization shows significant multivessel coronary artery disease with a 70% ostial left main disease and a 70% proximal LAD disease.      No current facility-administered medications on file prior to encounter.     Current Outpatient Medications on File Prior to Encounter   Medication Sig    amitriptyline (ELAVIL) 50 MG tablet Take 50 mg by mouth every evening.    aspirin (ECOTRIN) 81 MG EC tablet Take 1 tablet (81 mg total) by mouth once daily.    cyclobenzaprine (FLEXERIL) 10 MG tablet Take 1 tablet by mouth 2 (two) times daily.    dapagliflozin (FARXIGA) 10 mg tablet Take 1 tablet (10 mg total) by mouth once daily.    furosemide (LASIX) 20 MG tablet Take 1 tablet by mouth once daily.    hydroCHLOROthiazide (HYDRODIURIL) 25 MG tablet Take 1 tablet by mouth once daily.    HYDROcodone-acetaminophen (NORCO)  mg per tablet Take 1 tablet by mouth 2 (two) times daily as needed.    latanoprost 0.005 % ophthalmic solution Place 1 drop into both eyes nightly.    metoprolol succinate (TOPROL-XL) 100 MG 24 hr tablet Take 1 tablet by mouth once daily.    pravastatin (PRAVACHOL) 20 MG tablet Take 20 mg by mouth once daily.     sacubitriL-valsartan (ENTRESTO) 24-26 mg per tablet Take 1 tablet by mouth 2 (two) times daily.    warfarin (COUMADIN) 5 MG tablet Take 1 tablet by mouth daily as needed.       Review of patient's allergies indicates:  No Known Allergies    Past Medical History:   Diagnosis Date    Abnormal nuclear stress test 11/26/2022    Cervical radiculopathy     to rt arm    CHF (congestive heart failure)     Chronic systolic congestive heart failure 11/28/2022    Dilated cardiomyopathy 11/26/2022    Hyperlipidemia     Hypertension     Obesity, unspecified     PAF (paroxysmal atrial fibrillation) 11/26/2022    Pulmonary HTN 11/28/2022    Stroke      Past Surgical History:   Procedure Laterality Date    LEFT HEART CATHETERIZATION Left 11/28/2022    Procedure: CATHETERIZATION, HEART, LEFT;  Surgeon: Zion Ortega MD;  Location: [facility] CATH LAB;  Service: Cardiology;  Laterality: Left;    RIGHT HEART CATHETERIZATION N/A 11/28/2022    Procedure: INSERTION, CATHETER, RIGHT HEART;  Surgeon: Zion Ortega MD;  Location: [facility] CATH LAB;  Service: Cardiology;  Laterality: N/A;  Congestive heart failure     Family History       Problem Relation (Age of Onset)    Coronary artery disease Father    Diabetes Father    Heart attack Father    Hyperlipidemia Father    Hypertension Mother, Father          Tobacco Use    Smoking status: Never    Smokeless tobacco: Never   Substance and Sexual Activity    Alcohol use: Yes    Drug use: Never    Sexual activity: Not Currently     Review of Systems   Constitutional:  Positive for activity change.   HENT:  Positive for congestion.    Eyes:  Positive for visual disturbance.   Respiratory:  Positive for chest tightness and shortness of breath.    Cardiovascular:  Positive for palpitations and leg swelling. Negative for chest pain.   Gastrointestinal:  Positive for abdominal distention, constipation and diarrhea.   Genitourinary:  Positive for hematuria.   Musculoskeletal:  Positive for arthralgias and  back pain.   Skin: Negative.    Allergic/Immunologic: Positive for environmental allergies.   Neurological:  Positive for light-headedness.   Hematological:  Does not bruise/bleed easily.   Psychiatric/Behavioral:  Positive for dysphoric mood.    Objective:     Vital Signs (Most Recent):  Temp: 97.7 °F (36.5 °C) (06/20/23 1245)  Pulse: 63 (06/20/23 1449)  Resp: 11 (06/20/23 1430)  BP: (!) 171/98 (06/20/23 1450)  SpO2: 100 % (06/20/23 1430) Vital Signs (24h Range):  Temp:  [97.7 °F (36.5 °C)-98.1 °F (36.7 °C)] 97.7 °F (36.5 °C)  Pulse:  [60-70] 63  Resp:  [11-18] 11  SpO2:  [98 %-100 %] 100 %  BP: (149-172)/() 171/98     Weight: 108.9 kg (240 lb)  Body mass index is 31.66 kg/m².    SpO2: 100 %        Intake/Output - Last 3 Shifts       None             Lines/Drains/Airways       Peripheral Intravenous Line  Duration                  Peripheral IV - Single Lumen 06/20/23 0944 20 G Posterior;Right Wrist <1 day                     STS Risk Score:   Risk of Mortality:3.905%  Renal Failure:6.207%  Permanent Stroke:1.178%  Prolonged Ventilation:13.764%  DSW Infection:0.344%  Reoperation:1.990%  Morbidity or Mortality:19.780%  Short Length of Stay:28.250%  Long Length of Stay:11.055%       Physical Exam  Constitutional:       Appearance: Normal appearance.   HENT:      Head: Normocephalic and atraumatic.      Nose: Nose normal.      Mouth/Throat:      Mouth: Mucous membranes are moist.   Eyes:      Extraocular Movements: Extraocular movements intact.      Conjunctiva/sclera: Conjunctivae normal.      Pupils: Pupils are equal, round, and reactive to light.   Cardiovascular:      Rate and Rhythm: Normal rate and regular rhythm.      Pulses: Normal pulses.      Heart sounds: Normal heart sounds.   Pulmonary:      Effort: Pulmonary effort is normal.      Breath sounds: Normal breath sounds.   Abdominal:      General: Abdomen is flat. Bowel sounds are normal.      Palpations: Abdomen is soft.   Musculoskeletal:      Right  lower leg: No edema.      Left lower leg: No edema.   Skin:     General: Skin is warm and dry.   Neurological:      Mental Status: He is alert and oriented to person, place, and time.      Comments: The patient has a left facial droop.   Psychiatric:         Behavior: Behavior normal.          Significant Labs:  All pertinent labs from the last 24 hours have been reviewed.    Significant Diagnostics:  I have reviewed all pertinent imaging results/findings within the past 24 hours.      Assessment/Plan:     NYHA Score: NYHA III: marked limitation of physical activity, comfortable at rest    Left main coronary artery disease  The patient is a 63-year-old male with CHF, history of AFib, status post CVA, hyperlipidemia, hypertension, diabetes and cardiomyopathy who had a normal Cardiolite test on workup.  The patient was brought to the operating room and cardiac catheterization shows significant multivessel coronary artery disease with a 70% ostial left main disease and a 70% proximal LAD disease.    The patient is a candidate for coronary artery bypass grafting and Woodruff Maze 4 procedure.  Preop workup is in progress.  The risks and benefits of the surgery were explained to the patient.  The patient understands the risks and benefits of surgery and has to proceed with surgery which has been scheduled for 06/22/2023.        Thank you for your consult. I will follow-up with patient. Please contact us if you have any additional questions.    Rustam Dave MD  Cardiothoracic Surgery  [facility] ([facility])

## 2023-06-20 NOTE — ASSESSMENT & PLAN NOTE
The patient is a 63-year-old male with CHF, history of AFib, status post CVA, hyperlipidemia, hypertension, diabetes and cardiomyopathy who had a normal Cardiolite test on workup.  The patient was brought to the operating room and cardiac catheterization shows significant multivessel coronary artery disease with a 70% ostial left main disease and a 70% proximal LAD disease.    The patient is a candidate for coronary artery bypass grafting and Woodruff Maze 4 procedure.  Preop workup is in progress.  The risks and benefits of the surgery were explained to the patient.  The patient understands the risks and benefits of surgery and has to proceed with surgery which has been scheduled for 06/22/2023.

## 2023-06-20 NOTE — DISCHARGE INSTRUCTIONS
"    1. DIET: It is advisable for you to follow a diet that limits the intake of salt, sugar, saturated fats and cholesterol.     2. DRIVING: Due to sedation you received during your procedure, DO NOT drive or operate machinery for 24 hours. Avoid making critical decisions or signing legal documents until tomorrow.    3. ACTIVITY: AVOID activities that require bending of the affected arm/wrist for 3 days and submerging the site in water for 3 days. REMOVE the dressing the day after  the procedure, and shower.  Apply a bandaid to site after shower.  WEAR wrist immobilizer until tomorrow night.    You may RESUME your normal activities or prescribed exercise program as instructed by your physician after 3 days.                                                                                                                 4. WOUND CARE: It is not unusual to have a small amount of bruising to appear at or near the puncture site. It is also common to have a tender "knot" develop beneath the skin at the puncture site of the wrist/arm. This is usually scar tissue and is not a cause for concern or alarm. This tender knot may take several weeks to fully resolve. The bruise will usually spread over several days. However, if the lump gets bigger, call your doctor immediately.    5. DISCOMFORT: For general discomfort at the puncture site, you may take 1 or 2 Acetaminophen (Tylenol) tablets every 4 - 6 hours as needed. (Do not take more than 4000 mg a day)    6. SIGNS AND SYMPTOMS TO REPORT:  Call your physician immediately if any of the following occur:                                            1. Loss of feeling, warmth or color to the affected arm/wrist                                                                                                          2. Mild beeding from the site                 3. Pain that is sudden, sharp or persistent in the affected arm/wrist                 4. Swelling or a change in "lump" size, " increased redness or drainage at the puncture site                                                                               5. High fever (101 degrees or higher)    7. GO TO  THE EMERGENCY ROOM OR CALL 911 IF YOU HAVE: Chest pains or discomforts not relieved with 3 nitroglycerin doses (sublingual tablets or spray), numbness or severe pain of limb, if your limb becomes cold or discolored or if you develop uncontrolled bleeding from the puncture site (quickly apply firm, direct pressure above the site).     Post-op Heart Catheterization    1. DIET: It is advisable for you to follow a diet that limits the intake of salt, sugar, saturated fats and cholesterol.     2. FOR THE NEXT 24 HOURS:   For the next 8 hours, you should be watched by a responsible adult. This person should make sure your condition is not getting worse.   Don't drink any alcohol for the next 24 hours.  Don't drive, operate dangerous machinery, or make important business or personal decisions during the next 24 hours.  Your healthcare provider may tell you not to take any medicine by mouth for pain or sleep in the next 4 hours. These medicines may react with the medicines you were given in the hospital. This could cause a much stronger response than usual.       3. ACTIVITY:                                Day of discharge:             NO vigorous activity, lifting or straining                                                   Day after discharge:         Avoid heavy lifting (up to 10 lbs)                                                                        The day after discharge you may shower, but avoid tub baths for 5 days                                                                         Wash site gently with soap and water        NO powder or lotion to your procedure site.                 Before you shower, remove dressing, apply bandaid if desired                                                                                          "                                                                                   2nd day after discharge:  Resume normal activities                                                                         Exercise program as instructed      4. WOUND CARE: It is not unusual to have a small amount of bruising appear in the groin area. It is also common to have a tender "knot" develop beneath the skin at the puncture site in the groin. This is scar tissue only and is not a cause for concern or alarm. This tender knot may take several weeks to fully resolve. The bruise will usually spread over several days. If the lump gets bigger, call you doctor immediately.    5. DISCOMFORT: For general discomfort at the puncture site, you may take 1 or 2 Acetaminophen (Tylenol) tablets every 4 hours as needed. (Do not take more than 4000 mg a day)                 6. CALL YOUR HEALTHCARE PROVIDER IF YOU START TO HAVE THE FOLLOWING SYMPTOMS:        1. Problems with the affected leg: Pain, discomfort, loss of warmth, numbness or tingling                                                                                                                            2. Problems at the groin site: Bleeding, pain that is sudden/sharp/persistent,                   swelling at site or a change in "lump" size, increased redness or drainage at                     puncture site                                                               3. High fever (101 degrees or higher)       4.  Drowsiness that doesn't get better       5. Weakness or dizziness that doesn't get better            6. Repeated vomiting        7. GO TO  THE EMERGENCY ROOM OR CALL 911 IF YOU HAVE: Chest pains or discomforts not relieved with 3 nitroglycerin doses (sublingual tablets or spray), numbness or severe pain or if your foot or leg becomes cold or discolored or uncontrolled bleeding from site (apply direct pressure above site).   "

## 2023-06-20 NOTE — H&P (VIEW-ONLY)
[facility])  Cardiothoracic Surgery  Consult Note    Patient Name: Cesario Tracey Jr.  MRN: [id number]  Admission Date: 6/20/2023  Attending Physician: Catalino Rose MD  Referring Provider: Zion Ortega MD    Patient information was obtained from patient and past medical records.     Consults  Subjective:     Principal Problem: Abnormal stress test    History of Present Illness: The patient is a 63-year-old male with CHF, history of AFib, status post CVA, hyperlipidemia, hypertension, diabetes and cardiomyopathy who had abnormal Cardiolite test on workup.  The patient has complaints of dyspnea on exertion and shortness of breath.  In addition, the patient complains of paroxysmal nocturnal dyspnea and orthopnea.  The patient was brought to the operating room and cardiac catheterization shows significant multivessel coronary artery disease with a 70% ostial left main disease and a 70% proximal LAD disease.      No current facility-administered medications on file prior to encounter.     Current Outpatient Medications on File Prior to Encounter   Medication Sig    amitriptyline (ELAVIL) 50 MG tablet Take 50 mg by mouth every evening.    aspirin (ECOTRIN) 81 MG EC tablet Take 1 tablet (81 mg total) by mouth once daily.    cyclobenzaprine (FLEXERIL) 10 MG tablet Take 1 tablet by mouth 2 (two) times daily.    dapagliflozin (FARXIGA) 10 mg tablet Take 1 tablet (10 mg total) by mouth once daily.    furosemide (LASIX) 20 MG tablet Take 1 tablet by mouth once daily.    hydroCHLOROthiazide (HYDRODIURIL) 25 MG tablet Take 1 tablet by mouth once daily.    HYDROcodone-acetaminophen (NORCO)  mg per tablet Take 1 tablet by mouth 2 (two) times daily as needed.    latanoprost 0.005 % ophthalmic solution Place 1 drop into both eyes nightly.    metoprolol succinate (TOPROL-XL) 100 MG 24 hr tablet Take 1 tablet by mouth once daily.    pravastatin (PRAVACHOL) 20 MG tablet Take 20 mg by mouth once daily.     sacubitriL-valsartan (ENTRESTO) 24-26 mg per tablet Take 1 tablet by mouth 2 (two) times daily.    warfarin (COUMADIN) 5 MG tablet Take 1 tablet by mouth daily as needed.       Review of patient's allergies indicates:  No Known Allergies    Past Medical History:   Diagnosis Date    Abnormal nuclear stress test 11/26/2022    Cervical radiculopathy     to rt arm    CHF (congestive heart failure)     Chronic systolic congestive heart failure 11/28/2022    Dilated cardiomyopathy 11/26/2022    Hyperlipidemia     Hypertension     Obesity, unspecified     PAF (paroxysmal atrial fibrillation) 11/26/2022    Pulmonary HTN 11/28/2022    Stroke      Past Surgical History:   Procedure Laterality Date    LEFT HEART CATHETERIZATION Left 11/28/2022    Procedure: CATHETERIZATION, HEART, LEFT;  Surgeon: Zion Ortega MD;  Location: [facility] CATH LAB;  Service: Cardiology;  Laterality: Left;    RIGHT HEART CATHETERIZATION N/A 11/28/2022    Procedure: INSERTION, CATHETER, RIGHT HEART;  Surgeon: Zion Ortega MD;  Location: [facility] CATH LAB;  Service: Cardiology;  Laterality: N/A;  Congestive heart failure     Family History       Problem Relation (Age of Onset)    Coronary artery disease Father    Diabetes Father    Heart attack Father    Hyperlipidemia Father    Hypertension Mother, Father          Tobacco Use    Smoking status: Never    Smokeless tobacco: Never   Substance and Sexual Activity    Alcohol use: Yes    Drug use: Never    Sexual activity: Not Currently     Review of Systems   Constitutional:  Positive for activity change.   HENT:  Positive for congestion.    Eyes:  Positive for visual disturbance.   Respiratory:  Positive for chest tightness and shortness of breath.    Cardiovascular:  Positive for palpitations and leg swelling. Negative for chest pain.   Gastrointestinal:  Positive for abdominal distention, constipation and diarrhea.   Genitourinary:  Positive for hematuria.   Musculoskeletal:  Positive for arthralgias and  back pain.   Skin: Negative.    Allergic/Immunologic: Positive for environmental allergies.   Neurological:  Positive for light-headedness.   Hematological:  Does not bruise/bleed easily.   Psychiatric/Behavioral:  Positive for dysphoric mood.    Objective:     Vital Signs (Most Recent):  Temp: 97.7 °F (36.5 °C) (06/20/23 1245)  Pulse: 63 (06/20/23 1449)  Resp: 11 (06/20/23 1430)  BP: (!) 171/98 (06/20/23 1450)  SpO2: 100 % (06/20/23 1430) Vital Signs (24h Range):  Temp:  [97.7 °F (36.5 °C)-98.1 °F (36.7 °C)] 97.7 °F (36.5 °C)  Pulse:  [60-70] 63  Resp:  [11-18] 11  SpO2:  [98 %-100 %] 100 %  BP: (149-172)/() 171/98     Weight: 108.9 kg (240 lb)  Body mass index is 31.66 kg/m².    SpO2: 100 %        Intake/Output - Last 3 Shifts       None             Lines/Drains/Airways       Peripheral Intravenous Line  Duration                  Peripheral IV - Single Lumen 06/20/23 0944 20 G Posterior;Right Wrist <1 day                     STS Risk Score:   Risk of Mortality:3.905%  Renal Failure:6.207%  Permanent Stroke:1.178%  Prolonged Ventilation:13.764%  DSW Infection:0.344%  Reoperation:1.990%  Morbidity or Mortality:19.780%  Short Length of Stay:28.250%  Long Length of Stay:11.055%       Physical Exam  Constitutional:       Appearance: Normal appearance.   HENT:      Head: Normocephalic and atraumatic.      Nose: Nose normal.      Mouth/Throat:      Mouth: Mucous membranes are moist.   Eyes:      Extraocular Movements: Extraocular movements intact.      Conjunctiva/sclera: Conjunctivae normal.      Pupils: Pupils are equal, round, and reactive to light.   Cardiovascular:      Rate and Rhythm: Normal rate and regular rhythm.      Pulses: Normal pulses.      Heart sounds: Normal heart sounds.   Pulmonary:      Effort: Pulmonary effort is normal.      Breath sounds: Normal breath sounds.   Abdominal:      General: Abdomen is flat. Bowel sounds are normal.      Palpations: Abdomen is soft.   Musculoskeletal:      Right  lower leg: No edema.      Left lower leg: No edema.   Skin:     General: Skin is warm and dry.   Neurological:      Mental Status: He is alert and oriented to person, place, and time.      Comments: The patient has a left facial droop.   Psychiatric:         Behavior: Behavior normal.          Significant Labs:  All pertinent labs from the last 24 hours have been reviewed.    Significant Diagnostics:  I have reviewed all pertinent imaging results/findings within the past 24 hours.      Assessment/Plan:     NYHA Score: NYHA III: marked limitation of physical activity, comfortable at rest    Left main coronary artery disease  The patient is a 63-year-old male with CHF, history of AFib, status post CVA, hyperlipidemia, hypertension, diabetes and cardiomyopathy who had a normal Cardiolite test on workup.  The patient was brought to the operating room and cardiac catheterization shows significant multivessel coronary artery disease with a 70% ostial left main disease and a 70% proximal LAD disease.    The patient is a candidate for coronary artery bypass grafting and Woodruff Maze 4 procedure.  Preop workup is in progress.  The risks and benefits of the surgery were explained to the patient.  The patient understands the risks and benefits of surgery and has to proceed with surgery which has been scheduled for 06/22/2023.        Thank you for your consult. I will follow-up with patient. Please contact us if you have any additional questions.    Rustam Dave MD  Cardiothoracic Surgery  [facility] ([facility])

## 2023-06-20 NOTE — OP NOTE
INPATIENT Operative Note         SUMMARY     Surgery Date: 6/20/2023     Surgeon(s) and Role:     * Zion Ortega MD - Primary    ASSISTANT:none    Pre-op Diagnosis:  Abnormal stress test [R94.39]  CAD, multiple vessel [I25.10]  DCM (dilated cardiomyopathy) [I42.0]  SOB (shortness of breath) [R06.02]  PAF (paroxysmal atrial fibrillation) [I48.0]  History of CVA (cerebrovascular accident) [Z86.73]      Post-op Diagnosis:  Abnormal stress test [R94.39]  CAD, multiple vessel [I25.10]  DCM (dilated cardiomyopathy) [I42.0]  SOB (shortness of breath) [R06.02]  PAF (paroxysmal atrial fibrillation) [I48.0]  History of CVA (cerebrovascular accident) [Z86.73]    Procedure(s) (LRB):  Left heart cath (Left)  Angioplasty-coronary  IVUS, Coronary    COMPLICATION:none    Anesthesia: RN IV Sedation    Findings/Key Components:  Lmca significant by ivus aND IFR ABNORMAL IN BOTH LCX AND LAD   RCA NON OBS DISEASE. HAD DAMPENING OF PRESSURE WITH GUIDE CATHETER.   LAD LESION SIGNIFICANT BY IVUS. PATIENT NEEDS CABG WILL OBTAIN SURGICAL CONSULT.    Estimated Blood Loss: < 50 ML.         SPECIMEN: NONE    Devices/Prostetics: None    PLAN: CARDIAC SURGERY CONSULT.

## 2023-06-20 NOTE — Clinical Note
The catheter was inserted into the ostium   left main. An angiography was performed of the left coronary arteries. The catheter was unable to engage the area..

## 2023-06-20 NOTE — HPI
The patient is a 63-year-old male with CHF, history of AFib, status post CVA, hyperlipidemia, hypertension, diabetes and cardiomyopathy who had a normal Cardiolite test on workup.  The patient has complaints of dyspnea on exertion and shortness of breath.  In addition, the patient complains of paroxysmal nocturnal dyspnea and orthopnea.  The patient was brought to the operating room and cardiac catheterization shows significant multivessel coronary artery disease with a 70% ostial left main disease and a 70% proximal LAD disease.

## 2023-06-20 NOTE — SUBJECTIVE & OBJECTIVE
No current facility-administered medications on file prior to encounter.     Current Outpatient Medications on File Prior to Encounter   Medication Sig    amitriptyline (ELAVIL) 50 MG tablet Take 50 mg by mouth every evening.    aspirin (ECOTRIN) 81 MG EC tablet Take 1 tablet (81 mg total) by mouth once daily.    cyclobenzaprine (FLEXERIL) 10 MG tablet Take 1 tablet by mouth 2 (two) times daily.    dapagliflozin (FARXIGA) 10 mg tablet Take 1 tablet (10 mg total) by mouth once daily.    furosemide (LASIX) 20 MG tablet Take 1 tablet by mouth once daily.    hydroCHLOROthiazide (HYDRODIURIL) 25 MG tablet Take 1 tablet by mouth once daily.    HYDROcodone-acetaminophen (NORCO)  mg per tablet Take 1 tablet by mouth 2 (two) times daily as needed.    latanoprost 0.005 % ophthalmic solution Place 1 drop into both eyes nightly.    metoprolol succinate (TOPROL-XL) 100 MG 24 hr tablet Take 1 tablet by mouth once daily.    pravastatin (PRAVACHOL) 20 MG tablet Take 20 mg by mouth once daily.    sacubitriL-valsartan (ENTRESTO) 24-26 mg per tablet Take 1 tablet by mouth 2 (two) times daily.    warfarin (COUMADIN) 5 MG tablet Take 1 tablet by mouth daily as needed.       Review of patient's allergies indicates:  No Known Allergies    Past Medical History:   Diagnosis Date    Abnormal nuclear stress test 11/26/2022    Cervical radiculopathy     to rt arm    CHF (congestive heart failure)     Chronic systolic congestive heart failure 11/28/2022    Dilated cardiomyopathy 11/26/2022    Hyperlipidemia     Hypertension     Obesity, unspecified     PAF (paroxysmal atrial fibrillation) 11/26/2022    Pulmonary HTN 11/28/2022    Stroke      Past Surgical History:   Procedure Laterality Date    LEFT HEART CATHETERIZATION Left 11/28/2022    Procedure: CATHETERIZATION, HEART, LEFT;  Surgeon: Zion Ortega MD;  Location: [facility] CATH LAB;  Service: Cardiology;  Laterality: Left;    RIGHT HEART CATHETERIZATION N/A 11/28/2022    Procedure:  INSERTION, CATHETER, RIGHT HEART;  Surgeon: Zion Ortega MD;  Location: [facility] CATH LAB;  Service: Cardiology;  Laterality: N/A;  Congestive heart failure     Family History       Problem Relation (Age of Onset)    Coronary artery disease Father    Diabetes Father    Heart attack Father    Hyperlipidemia Father    Hypertension Mother, Father          Tobacco Use    Smoking status: Never    Smokeless tobacco: Never   Substance and Sexual Activity    Alcohol use: Yes    Drug use: Never    Sexual activity: Not Currently     Review of Systems   Constitutional:  Positive for activity change.   HENT:  Positive for congestion.    Eyes:  Positive for visual disturbance.   Respiratory:  Positive for chest tightness and shortness of breath.    Cardiovascular:  Positive for palpitations and leg swelling. Negative for chest pain.   Gastrointestinal:  Positive for abdominal distention, constipation and diarrhea.   Genitourinary:  Positive for hematuria.   Musculoskeletal:  Positive for arthralgias and back pain.   Skin: Negative.    Allergic/Immunologic: Positive for environmental allergies.   Neurological:  Positive for light-headedness.   Hematological:  Does not bruise/bleed easily.   Psychiatric/Behavioral:  Positive for dysphoric mood.    Objective:     Vital Signs (Most Recent):  Temp: 97.7 °F (36.5 °C) (06/20/23 1245)  Pulse: 63 (06/20/23 1449)  Resp: 11 (06/20/23 1430)  BP: (!) 171/98 (06/20/23 1450)  SpO2: 100 % (06/20/23 1430) Vital Signs (24h Range):  Temp:  [97.7 °F (36.5 °C)-98.1 °F (36.7 °C)] 97.7 °F (36.5 °C)  Pulse:  [60-70] 63  Resp:  [11-18] 11  SpO2:  [98 %-100 %] 100 %  BP: (149-172)/() 171/98     Weight: 108.9 kg (240 lb)  Body mass index is 31.66 kg/m².    SpO2: 100 %        Intake/Output - Last 3 Shifts       None             Lines/Drains/Airways       Peripheral Intravenous Line  Duration                  Peripheral IV - Single Lumen 06/20/23 0944 20 G Posterior;Right Wrist <1 day                      STS Risk Score: pending     Physical Exam  Constitutional:       Appearance: Normal appearance.   HENT:      Head: Normocephalic and atraumatic.      Nose: Nose normal.      Mouth/Throat:      Mouth: Mucous membranes are moist.   Eyes:      Extraocular Movements: Extraocular movements intact.      Conjunctiva/sclera: Conjunctivae normal.      Pupils: Pupils are equal, round, and reactive to light.   Cardiovascular:      Rate and Rhythm: Normal rate and regular rhythm.      Pulses: Normal pulses.      Heart sounds: Normal heart sounds.   Pulmonary:      Effort: Pulmonary effort is normal.      Breath sounds: Normal breath sounds.   Abdominal:      General: Abdomen is flat. Bowel sounds are normal.      Palpations: Abdomen is soft.   Musculoskeletal:      Right lower leg: No edema.      Left lower leg: No edema.   Skin:     General: Skin is warm and dry.   Neurological:      Mental Status: He is alert and oriented to person, place, and time.      Comments: The patient has a left facial droop.   Psychiatric:         Behavior: Behavior normal.          Significant Labs:  All pertinent labs from the last 24 hours have been reviewed.    Significant Diagnostics:  I have reviewed all pertinent imaging results/findings within the past 24 hours.

## 2023-06-20 NOTE — PLAN OF CARE
Patient updated on plan of care. Instructed  patient to use call light, call light within reach. Hourly rounding performed. Fall precautions maintained; bed alarm on. Vitals [id number]. Chart check complete. Education provided, questions encouraged. Rhythm-SR on tele box # [id number]. New admit from [facility]- left bracial heart cath done, right groin angiogram done, consult to cardiothoracic surgery-CABG Thursday, ABN stress test done, ECHO ordered, CXR ordered, IV hydration.      Problem: Adult Inpatient Plan of Care  Goal: Plan of Care Review  Outcome: Ongoing, Progressing

## 2023-06-20 NOTE — NURSING TRANSFER
Nursing Transfer Note      6/20/2023 1615 Pt transferred to Tele 208 via stretcher, assisted to bed. VSS. IVF to pump, R hand IV intact. R groin WDL, no hematoma. L radial site WDL, dressing CDI, L wrist immobilizer secure. Care to Kathie SILVESTRE.

## 2023-06-20 NOTE — H&P
Admit Note  Cardiology      SUBJECTIVE:     History of Present Illness:  Patient is a 63 y.o. male presents with cardiomyopathy chf afib previous cva htn hlp diabetes had heart in November 2022 for abnormal cardiolite was found to have 70% significantly ald stenosis his filling pressures were elevated and his pulmonary htn was moderate he was optimized medical thrapy wise his ef improved to 35-40% by repeat echo on entresto .his lifevest was discontinued. He continues to have some exertional shortness of breath he is referred for intervention of lad today after reviewing the case with DR HERNANDEZ    Past Medical History:   Diagnosis Date    Abnormal nuclear stress test 11/26/2022    Cervical radiculopathy     to rt arm    CHF (congestive heart failure)     Chronic systolic congestive heart failure 11/28/2022    Dilated cardiomyopathy 11/26/2022    Hyperlipidemia     Hypertension     Obesity, unspecified     PAF (paroxysmal atrial fibrillation) 11/26/2022    Pulmonary HTN 11/28/2022    Stroke      Past Surgical History:   Procedure Laterality Date    LEFT HEART CATHETERIZATION Left 11/28/2022    Procedure: CATHETERIZATION, HEART, LEFT;  Surgeon: Zion Ortega MD;  Location: [facility] CATH LAB;  Service: Cardiology;  Laterality: Left;    RIGHT HEART CATHETERIZATION N/A 11/28/2022    Procedure: INSERTION, CATHETER, RIGHT HEART;  Surgeon: Zion Ortega MD;  Location: [facility] CATH LAB;  Service: Cardiology;  Laterality: N/A;  Congestive heart failure     Family History   Problem Relation Age of Onset    Hypertension Mother     Diabetes Father     Hyperlipidemia Father     Hypertension Father     Heart attack Father     Coronary artery disease Father      Social History     Tobacco Use    Smoking status: Never    Smokeless tobacco: Never   Substance Use Topics    Alcohol use: Yes    Drug use: Never        Review of patient's allergies indicates:  No Known Allergies    Current Facility-Administered Medications   Medication     0.9%  NaCl infusion    diazePAM tablet 5 mg    sodium chloride 0.9% flush 10 mL     No current facility-administered medications on file prior to encounter.     Current Outpatient Medications on File Prior to Encounter   Medication Sig    amitriptyline (ELAVIL) 50 MG tablet Take 50 mg by mouth every evening.    aspirin (ECOTRIN) 81 MG EC tablet Take 1 tablet (81 mg total) by mouth once daily.    cyclobenzaprine (FLEXERIL) 10 MG tablet Take 1 tablet by mouth 2 (two) times daily.    dapagliflozin (FARXIGA) 10 mg tablet Take 1 tablet (10 mg total) by mouth once daily.    furosemide (LASIX) 20 MG tablet Take 1 tablet by mouth once daily.    hydroCHLOROthiazide (HYDRODIURIL) 25 MG tablet Take 1 tablet by mouth once daily.    HYDROcodone-acetaminophen (NORCO)  mg per tablet Take 1 tablet by mouth 2 (two) times daily as needed.    latanoprost 0.005 % ophthalmic solution Place 1 drop into both eyes nightly.    metoprolol succinate (TOPROL-XL) 100 MG 24 hr tablet Take 1 tablet by mouth once daily.    pravastatin (PRAVACHOL) 20 MG tablet Take 20 mg by mouth once daily.    sacubitriL-valsartan (ENTRESTO) 24-26 mg per tablet Take 1 tablet by mouth 2 (two) times daily.    warfarin (COUMADIN) 5 MG tablet Take 1 tablet by mouth daily as needed.         Review of Systems:  Constitutional: negative  Eyes: negative  Ears, nose, mouth, throat, and face: negative  Respiratory: shortness of breath  Cardiovascular: negative  Gastrointestinal: negative  Genitourinary:negative  Hematologic/lymphatic: negative  Musculoskeletal:negative,   Neurological: negative  Behavioral/Psych: negative  Endocrine: negative  Allergic/Immunologic: negative    OBJECTIVE:     Vital Signs (Most Recent)  Temp: 98.1 °F (36.7 °C) (06/20/23 0934)  Pulse: 70 (06/20/23 0934)  Resp: 18 (06/20/23 0934)  BP: (!) 171/93 (06/20/23 0934)  SpO2: 100 % (06/20/23 0934)    Vital Signs Range (Last 24H):  Temp:  [98.1 °F (36.7 °C)]   Pulse:  [70]   Resp:  [18]   BP:  (171)/(93)   SpO2:  [100 %]     Physical Exam:  General appearance: alert, appears stated age and cooperative  Head: Normocephalic, without obvious abnormality, atraumatic  Eyes:  conjunctivae/corneas clear. PERRL, EOM's intact. Fundi benign.  Nose: no discharge  Throat: normal findings: tongue midline and normal  Neck: no adenopathy, no carotid bruit, no JVD, supple, symmetrical, trachea midline and thyroid not enlarged, symmetric, no tenderness/mass/nodules  Back:  no skin lesions, erythema, or scars  Lungs:  clear to auscultation bilaterally  Chest wall: no tenderness  Heart: regular rate and rhythm, S1, S2 normal, no murmur, click, rub or gallop  Abdomen: soft, non-tender; bowel sounds normal; no masses,  no organomegaly  Extremities: extremities normal, atraumatic, no cyanosis or edema  Pulses: 2+ and symmetric  Skin: Skin color, texture, turgor normal. No rashes or lesions  Neurologic: Grossly normal    Laboratory:  Chemistry:   Lab Results   Component Value Date     06/02/2023    K 3.9 06/02/2023    CL 99 06/02/2023    CO2 25 06/02/2023    BUN 17 06/02/2023    CREATININE 1.50 (H) 06/02/2023    CALCIUM 9.0 06/02/2023     Cardiac Markers: No results found for: CKTOTAL, CKMB, CKMBINDEX, TROPONINI  Cardiac Markers (Last 3): No results found for: CKTOTAL, CKMB, CKMBINDEX, TROPONINI  CBC:   Lab Results   Component Value Date    WBC 6.3 06/02/2023    WBC 5.55 05/31/2023    HGB 11.9 (L) 06/02/2023    HGB 11.8 (L) 05/31/2023    HCT 35.9 (L) 06/02/2023    HCT 37.1 (L) 05/31/2023    MCV 81 06/02/2023    MCV 83 05/31/2023     (L) 06/02/2023     (L) 05/31/2023     Lipids:   Lab Results   Component Value Date    CHOL 139 06/02/2023    TRIG 90 06/02/2023    HDL 30 (L) 06/02/2023     Coagulation:   Lab Results   Component Value Date    INR 3.7 (H) 05/31/2023    APTT 37.0 (H) 05/31/2023       Diagnostic Results:  ECG: Reviewed  X-Ray: Reviewed  US: Reviewed  CT: Reviewed  Echo:  Reviewed      ASSESSMENT/PLAN:     Patient Active Problem List   Diagnosis    Acute cough    Primary hypertension    Other hyperlipidemia    Coronary artery disease without angina pectoris    Abnormal stress test    DCM (dilated cardiomyopathy)    PAF (paroxysmal atrial fibrillation)    Acute on chronic systolic CHF (congestive heart failure)    Pulmonary HTN    Anemia    IFG (impaired fasting glucose)    Stage 3a chronic kidney disease    History of CVA (cerebrovascular accident)    For pci of  ald after optimization of medical therapy     Plan: .   I have explained the risks, benefits , and alternatives of the procedure in detail.the patient voices understanding and all questions have been answered.the patient agrees to proceed as planned.

## 2023-06-20 NOTE — Clinical Note
The radial band was applied to the left radial artery. 15 cc's of air were inserted into the closure device.

## 2023-06-21 ENCOUNTER — ANESTHESIA EVENT (OUTPATIENT)
Dept: SURGERY | Facility: HOSPITAL | Age: 63
DRG: 233 | End: 2023-06-21
Payer: MEDICARE

## 2023-06-21 LAB
ABO + RH BLD: NORMAL
ALBUMIN SERPL BCP-MCNC: 3.8 G/DL (ref 3.5–5.2)
ALP SERPL-CCNC: 64 U/L (ref 55–135)
ALT SERPL W/O P-5'-P-CCNC: 8 U/L (ref 10–44)
ANION GAP SERPL CALC-SCNC: 10 MMOL/L (ref 8–16)
ANISOCYTOSIS BLD QL SMEAR: SLIGHT
AORTIC ROOT ANNULUS: 3.37 CM
APTT PPP: 28.7 SEC (ref 21–32)
ASCENDING AORTA: 3.91 CM
AST SERPL-CCNC: 19 U/L (ref 10–40)
AV INDEX (PROSTH): 1.02
AV MEAN GRADIENT: 4 MMHG
AV PEAK GRADIENT: 6 MMHG
AV REGURGITATION PRESSURE HALF TIME: 1004.64 MS
AV VALVE AREA: 4.21 CM2
AV VELOCITY RATIO: 0.94
BACTERIA #/AREA URNS HPF: ABNORMAL /HPF
BASOPHILS # BLD AUTO: 0.01 K/UL (ref 0–0.2)
BASOPHILS NFR BLD: 0.1 % (ref 0–1.9)
BILIRUB SERPL-MCNC: 1 MG/DL (ref 0.1–1)
BILIRUB UR QL STRIP: NEGATIVE
BLD GP AB SCN CELLS X3 SERPL QL: NORMAL
BRPFT: ABNORMAL
BSA FOR ECHO PROCEDURE: 2.37 M2
BUN SERPL-MCNC: 24 MG/DL (ref 8–23)
CALCIUM SERPL-MCNC: 9.8 MG/DL (ref 8.7–10.5)
CHLORIDE SERPL-SCNC: 103 MMOL/L (ref 95–110)
CLARITY UR: CLEAR
CO2 SERPL-SCNC: 27 MMOL/L (ref 23–29)
COLOR UR: YELLOW
CREAT SERPL-MCNC: 1.6 MG/DL (ref 0.5–1.4)
CV ECHO LV RWT: 0.4 CM
DACRYOCYTES BLD QL SMEAR: ABNORMAL
DIFFERENTIAL METHOD: ABNORMAL
DOP CALC AO PEAK VEL: 1.26 M/S
DOP CALC AO VTI: 22.6 CM
DOP CALC LVOT AREA: 4.1 CM2
DOP CALC LVOT DIAMETER: 2.29 CM
DOP CALC LVOT PEAK VEL: 1.19 M/S
DOP CALC LVOT STROKE VOLUME: 95.09 CM3
DOP CALC MV VTI: 208.8 CM
DOP CALC RVOT PEAK VEL: 0.59 M/S
DOP CALC RVOT VTI: 12.1 CM
DOP CALCLVOT PEAK VEL VTI: 23.1 CM
E WAVE DECELERATION TIME: 197.43 MSEC
E/A RATIO: 0.72
E/E' RATIO: 6.5 M/S
ECHO LV POSTERIOR WALL: 1.11 CM (ref 0.6–1.1)
EJECTION FRACTION: 25 %
EOSINOPHIL # BLD AUTO: 0 K/UL (ref 0–0.5)
EOSINOPHIL NFR BLD: 0 % (ref 0–8)
ERYTHROCYTE [DISTWIDTH] IN BLOOD BY AUTOMATED COUNT: 18.8 % (ref 11.5–14.5)
EST. GFR  (NO RACE VARIABLE): 48 ML/MIN/1.73 M^2
ESTIMATED AVG GLUCOSE: 123 MG/DL (ref 68–131)
FEF 25 75 LLN: 1.09
FEF 25 75 PRE REF: 134.4 %
FEF 25 75 REF: 2.68
FEV1 FVC LLN: 65
FEV1 FVC PRE REF: 112.1 %
FEV1 FVC REF: 77
FEV1 LLN: 2.36
FEV1 PRE REF: 78.2 %
FEV1 REF: 3.29
FRACTIONAL SHORTENING: 14 % (ref 28–44)
FVC LLN: 3.15
FVC PRE REF: 69.6 %
FVC REF: 4.26
GLUCOSE SERPL-MCNC: 111 MG/DL (ref 70–110)
GLUCOSE UR QL STRIP: ABNORMAL
HBA1C MFR BLD: 5.9 % (ref 4–5.6)
HCT VFR BLD AUTO: 32.2 % (ref 40–54)
HGB BLD-MCNC: 10.3 G/DL (ref 14–18)
HGB UR QL STRIP: NEGATIVE
IMM GRANULOCYTES # BLD AUTO: 0.2 K/UL (ref 0–0.04)
IMM GRANULOCYTES NFR BLD AUTO: 2.8 % (ref 0–0.5)
INR PPP: 1.2 (ref 0.8–1.2)
INTERVENTRICULAR SEPTUM: 1.3 CM (ref 0.6–1.1)
IVC DIAMETER: 1 CM
IVRT: 95.15 MSEC
KETONES UR QL STRIP: NEGATIVE
LA MAJOR: 6.52 CM
LA MINOR: 4.65 CM
LA WIDTH: 3.6 CM
LEFT ATRIUM SIZE: 3.29 CM
LEFT ATRIUM VOLUME INDEX: 23.5 ML/M2
LEFT ATRIUM VOLUME: 54.65 CM3
LEFT INTERNAL DIMENSION IN SYSTOLE: 4.85 CM (ref 2.1–4)
LEFT VENTRICLE DIASTOLIC VOLUME INDEX: 66.19 ML/M2
LEFT VENTRICLE DIASTOLIC VOLUME: 154.22 ML
LEFT VENTRICLE MASS INDEX: 121 G/M2
LEFT VENTRICLE SYSTOLIC VOLUME INDEX: 47.2 ML/M2
LEFT VENTRICLE SYSTOLIC VOLUME: 109.98 ML
LEFT VENTRICULAR INTERNAL DIMENSION IN DIASTOLE: 5.61 CM (ref 3.5–6)
LEFT VENTRICULAR MASS: 282.89 G
LEUKOCYTE ESTERASE UR QL STRIP: ABNORMAL
LV LATERAL E/E' RATIO: 5.78 M/S
LV SEPTAL E/E' RATIO: 7.43 M/S
LVOT MG: 2.89 MMHG
LVOT MV: 0.77 CM/S
LYMPHOCYTES # BLD AUTO: 3.7 K/UL (ref 1–4.8)
LYMPHOCYTES NFR BLD: 50.6 % (ref 18–48)
MCH RBC QN AUTO: 26.1 PG (ref 27–31)
MCHC RBC AUTO-ENTMCNC: 32 G/DL (ref 32–36)
MCV RBC AUTO: 82 FL (ref 82–98)
MICROSCOPIC COMMENT: ABNORMAL
MONOCYTES # BLD AUTO: 1.5 K/UL (ref 0.3–1)
MONOCYTES NFR BLD: 20.4 % (ref 4–15)
MV MEAN GRADIENT: 71 MMHG
MV PEAK A VEL: 0.72 M/S
MV PEAK E VEL: 0.52 M/S
MV PEAK GRADIENT: 87 MMHG
MV STENOSIS PRESSURE HALF TIME: 57.26 MS
MV VALVE AREA BY CONTINUITY EQUATION: 0.46 CM2
MV VALVE AREA P 1/2 METHOD: 3.84 CM2
NEUTROPHILS # BLD AUTO: 1.9 K/UL (ref 1.8–7.7)
NEUTROPHILS NFR BLD: 26.1 % (ref 38–73)
NITRITE UR QL STRIP: NEGATIVE
NRBC BLD-RTO: 3 /100 WBC
PEF LLN: 6.21
PEF PRE REF: 61.9 %
PEF REF: 9.09
PH UR STRIP: 7 [PH] (ref 5–8)
PISA AR MAX VEL: 3.12 M/S
PLATELET # BLD AUTO: 80 K/UL (ref 150–450)
PLATELET BLD QL SMEAR: ABNORMAL
PMV BLD AUTO: 9.4 FL (ref 9.2–12.9)
POCT GLUCOSE: 104 MG/DL (ref 70–110)
POCT GLUCOSE: 110 MG/DL (ref 70–110)
POCT GLUCOSE: 117 MG/DL (ref 70–110)
POCT GLUCOSE: 85 MG/DL (ref 70–110)
POLYCHROMASIA BLD QL SMEAR: ABNORMAL
POTASSIUM SERPL-SCNC: 3.6 MMOL/L (ref 3.5–5.1)
PRE FEF 25 75: 3.6 L/S (ref 1.09–4.97)
PRE FET 100: 4.2 SEC
PRE FEV1 FVC: 86.61 % (ref 65.46–87.6)
PRE FEV1: 2.57 L (ref 2.36–4.16)
PRE FVC: 2.97 L (ref 3.15–5.39)
PRE PEF: 5.63 L/S (ref 6.21–11.98)
PREALB SERPL-MCNC: 21 MG/DL (ref 20–43)
PROT SERPL-MCNC: 7.8 G/DL (ref 6–8.4)
PROT UR QL STRIP: ABNORMAL
PROTHROMBIN TIME: 12.3 SEC (ref 9–12.5)
PV MEAN GRADIENT: 0.75 MMHG
RA MAJOR: 4.25 CM
RA PRESSURE: 3 MMHG
RBC # BLD AUTO: 3.95 M/UL (ref 4.6–6.2)
RBC #/AREA URNS HPF: 1 /HPF (ref 0–4)
SODIUM SERPL-SCNC: 140 MMOL/L (ref 136–145)
SP GR UR STRIP: 1.02 (ref 1–1.03)
SPECIMEN OUTDATE: NORMAL
STJ: 3.94 CM
TDI LATERAL: 0.09 M/S
TDI SEPTAL: 0.07 M/S
TDI: 0.08 M/S
TRICUSPID ANNULAR PLANE SYSTOLIC EXCURSION: 1.8 CM
URN SPEC COLLECT METH UR: ABNORMAL
UROBILINOGEN UR STRIP-ACNC: ABNORMAL EU/DL
WBC # BLD AUTO: 7.27 K/UL (ref 3.9–12.7)
WBC #/AREA URNS HPF: 9 /HPF (ref 0–5)
YEAST URNS QL MICRO: ABNORMAL

## 2023-06-21 PROCEDURE — 84134 ASSAY OF PREALBUMIN: CPT | Performed by: THORACIC SURGERY (CARDIOTHORACIC VASCULAR SURGERY)

## 2023-06-21 PROCEDURE — 83036 HEMOGLOBIN GLYCOSYLATED A1C: CPT | Performed by: THORACIC SURGERY (CARDIOTHORACIC VASCULAR SURGERY)

## 2023-06-21 PROCEDURE — 81000 URINALYSIS NONAUTO W/SCOPE: CPT | Performed by: THORACIC SURGERY (CARDIOTHORACIC VASCULAR SURGERY)

## 2023-06-21 PROCEDURE — 99900031 HC PATIENT EDUCATION (STAT)

## 2023-06-21 PROCEDURE — 86900 BLOOD TYPING SEROLOGIC ABO: CPT | Performed by: THORACIC SURGERY (CARDIOTHORACIC VASCULAR SURGERY)

## 2023-06-21 PROCEDURE — 94761 N-INVAS EAR/PLS OXIMETRY MLT: CPT

## 2023-06-21 PROCEDURE — 99233 SBSQ HOSP IP/OBS HIGH 50: CPT | Mod: 25,,, | Performed by: INTERNAL MEDICINE

## 2023-06-21 PROCEDURE — 36415 COLL VENOUS BLD VENIPUNCTURE: CPT | Performed by: THORACIC SURGERY (CARDIOTHORACIC VASCULAR SURGERY)

## 2023-06-21 PROCEDURE — 85025 COMPLETE CBC W/AUTO DIFF WBC: CPT | Performed by: THORACIC SURGERY (CARDIOTHORACIC VASCULAR SURGERY)

## 2023-06-21 PROCEDURE — 86920 COMPATIBILITY TEST SPIN: CPT | Performed by: THORACIC SURGERY (CARDIOTHORACIC VASCULAR SURGERY)

## 2023-06-21 PROCEDURE — 99233 PR SUBSEQUENT HOSPITAL CARE,LEVL III: ICD-10-PCS | Mod: 25,,, | Performed by: INTERNAL MEDICINE

## 2023-06-21 PROCEDURE — 25000003 PHARM REV CODE 250: Performed by: INTERNAL MEDICINE

## 2023-06-21 PROCEDURE — 25000003 PHARM REV CODE 250: Performed by: THORACIC SURGERY (CARDIOTHORACIC VASCULAR SURGERY)

## 2023-06-21 PROCEDURE — 85730 THROMBOPLASTIN TIME PARTIAL: CPT | Performed by: THORACIC SURGERY (CARDIOTHORACIC VASCULAR SURGERY)

## 2023-06-21 PROCEDURE — 21400001 HC TELEMETRY ROOM

## 2023-06-21 PROCEDURE — 94799 UNLISTED PULMONARY SVC/PX: CPT

## 2023-06-21 PROCEDURE — 94010 BREATHING CAPACITY TEST: CPT

## 2023-06-21 PROCEDURE — 85610 PROTHROMBIN TIME: CPT | Performed by: THORACIC SURGERY (CARDIOTHORACIC VASCULAR SURGERY)

## 2023-06-21 PROCEDURE — 80053 COMPREHEN METABOLIC PANEL: CPT | Performed by: THORACIC SURGERY (CARDIOTHORACIC VASCULAR SURGERY)

## 2023-06-21 RX ORDER — CHLORHEXIDINE GLUCONATE ORAL RINSE 1.2 MG/ML
10 SOLUTION DENTAL
Status: DISCONTINUED | OUTPATIENT
Start: 2023-06-21 | End: 2023-06-22 | Stop reason: HOSPADM

## 2023-06-21 RX ORDER — HYDRALAZINE HYDROCHLORIDE 25 MG/1
25 TABLET, FILM COATED ORAL EVERY 6 HOURS PRN
Status: DISCONTINUED | OUTPATIENT
Start: 2023-06-21 | End: 2023-06-22

## 2023-06-21 RX ORDER — METOPROLOL TARTRATE 25 MG/1
25 TABLET, FILM COATED ORAL
Status: COMPLETED | OUTPATIENT
Start: 2023-06-21 | End: 2023-06-22

## 2023-06-21 RX ORDER — CEFAZOLIN SODIUM 2 G/50ML
2 SOLUTION INTRAVENOUS
Status: DISCONTINUED | OUTPATIENT
Start: 2023-06-21 | End: 2023-06-22 | Stop reason: HOSPADM

## 2023-06-21 RX ADMIN — LATANOPROST 1 DROP: 50 SOLUTION OPHTHALMIC at 08:06

## 2023-06-21 RX ADMIN — AMITRIPTYLINE HYDROCHLORIDE 50 MG: 50 TABLET, FILM COATED ORAL at 08:06

## 2023-06-21 RX ADMIN — CYCLOBENZAPRINE HYDROCHLORIDE 10 MG: 10 TABLET, FILM COATED ORAL at 08:06

## 2023-06-21 RX ADMIN — ASPIRIN 81 MG: 81 TABLET, COATED ORAL at 09:06

## 2023-06-21 RX ADMIN — METOPROLOL SUCCINATE 100 MG: 50 TABLET, EXTENDED RELEASE ORAL at 09:06

## 2023-06-21 RX ADMIN — FOLIC ACID 2 MG: 1 TABLET ORAL at 09:06

## 2023-06-21 RX ADMIN — FUROSEMIDE 20 MG: 20 TABLET ORAL at 09:06

## 2023-06-21 RX ADMIN — PRAVASTATIN SODIUM 20 MG: 20 TABLET ORAL at 09:06

## 2023-06-21 NOTE — PLAN OF CARE
[facility] - Telemetry (Hospital)  Initial Discharge Assessment       Primary Care Provider: Braxton Guzman Jr, MD    Admission Diagnosis: Abnormal stress test [R94.39]  CAD, multiple vessel [I25.10]  DCM (dilated cardiomyopathy) [I42.0]  SOB (shortness of breath) [R06.02]  PAF (paroxysmal atrial fibrillation) [I48.0]  History of CVA (cerebrovascular accident) [Z86.73]    Admission Date: 6/20/2023  Expected Discharge Date:     Transition of Care Barriers: None    Payor: MEDICARE / Plan: MEDICARE PART A & B / Product Type: Government /     Extended Emergency Contact Information  Primary Emergency Contact: TraceyYessenia yeager  Mobile Phone: [phone]  Relation: Spouse  Preferred language: English   needed? No  Secondary Emergency Contact: Lois Tracey  Mobile Phone: [phone]  Relation: Daughter  Preferred language: English   needed? No    Discharge Plan A: Home with family         [id number] - [place], [place] - [address]  [address] [address]  Phone: [phone] Fax: [phone]      Initial Assessment (most recent)       Adult Discharge Assessment - 06/21/23 1010          Discharge Assessment    Assessment Type Discharge Planning Assessment     Confirmed/corrected address, phone number and insurance Yes     Confirmed Demographics Correct on Facesheet     Source of Information patient     Communicated MARA with patient/caregiver Date not available/Unable to determine     Reason For Admission Abnormal stress test     People in Home alone     Facility Arrived From: home     Do you expect to return to your current living situation? No     Do you have help at home or someone to help you manage your care at home? Yes     Who are your caregiver(s) and their phone number(s)? family     Prior to hospitilization cognitive status: Alert/Oriented     Current cognitive status: Alert/Oriented     Walking or Climbing Stairs --   independent    Dressing/Bathing --   independent     Home Accessibility stairs to enter home     Number of Stairs, Main Entrance four     Surface of Stairs, Main Entrance concrete     Stair Railings, Main Entrance none     Landing, Stairs, Main Entrance adequate turning radius     Equipment Currently Used at Home none     Readmission within 30 days? No     Patient currently being followed by outpatient case management? No     Do you currently have service(s) that help you manage your care at home? No     Do you take prescription medications? Yes     Do you have prescription coverage? Yes     Coverage Medicare A & B     Do you have any problems affording any of your prescribed medications? No     Is the patient taking medications as prescribed? yes     Who is going to help you get home at discharge? family     How do you get to doctors appointments? car, drives self     Are you on dialysis? No     Do you take coumadin? Yes     Who monitors your labs? Cardiologist - [place]     Discharge Plan A Home with family     DME Needed Upon Discharge  none     Discharge Plan discussed with: Patient     Transition of Care Barriers None                   Anticipated DC dispo: home with family  Prior Level of Function: independent with ADLs  PCP: Braxton Guzman Jr., MD    Comments:  SW met with patient at bedside to introduce role and discuss discharge planning. Patient lives alone but has family who can help at home and can provide transport at time of discharge. CM discharge needs depends on hospital progress. SW will continue following to assist with other needs.

## 2023-06-21 NOTE — SUBJECTIVE & OBJECTIVE
Review of Systems   Constitutional: Negative.   HENT: Negative.     Eyes: Negative.    Cardiovascular: Negative.    Respiratory: Negative.     Endocrine: Negative.    Hematologic/Lymphatic: Negative.    Skin: Negative.    Musculoskeletal: Negative.    Gastrointestinal: Negative.    Genitourinary: Negative.    Neurological: Negative.    Psychiatric/Behavioral: Negative.     Allergic/Immunologic: Negative.    Objective:     Vital Signs (Most Recent):  Temp: 97.7 °F (36.5 °C) (06/21/23 0709)  Pulse: 73 (06/21/23 0900)  Resp: 17 (06/21/23 0709)  BP: (!) 143/87 (06/21/23 0709)  SpO2: 98 % (06/21/23 0709) Vital Signs (24h Range):  Temp:  [97.7 °F (36.5 °C)-98.6 °F (37 °C)] 97.7 °F (36.5 °C)  Pulse:  [60-80] 73  Resp:  [11-20] 17  SpO2:  [94 %-100 %] 98 %  BP: (142-192)/() 143/87     Weight: 108.9 kg (240 lb)  Body mass index is 31.66 kg/m².     SpO2: 98 %         Intake/Output Summary (Last 24 hours) at 6/21/2023 1143  Last data filed at 6/20/2023 2000  Gross per 24 hour   Intake --   Output 600 ml   Net -600 ml       Lines/Drains/Airways       Peripheral Intravenous Line  Duration                  Peripheral IV - Single Lumen 06/20/23 0944 20 G Posterior;Right Wrist 1 day                       Physical Exam  Vitals and nursing note reviewed.   Constitutional:       General: He is not in acute distress.     Appearance: Normal appearance. He is well-developed. He is not diaphoretic.   HENT:      Head: Normocephalic and atraumatic.   Eyes:      General:         Right eye: No discharge.         Left eye: No discharge.      Pupils: Pupils are equal, round, and reactive to light.   Neck:      Thyroid: No thyromegaly.      Vascular: No JVD.      Trachea: No tracheal deviation.   Cardiovascular:      Rate and Rhythm: Normal rate and regular rhythm.      Heart sounds: Normal heart sounds, S1 normal and S2 normal. No murmur heard.  Pulmonary:      Effort: Pulmonary effort is normal. No respiratory distress.      Breath  sounds: Normal breath sounds. No wheezing or rales.   Abdominal:      General: There is no distension.      Tenderness: There is no rebound.   Musculoskeletal:      Cervical back: Neck supple.      Right lower leg: No edema.      Left lower leg: No edema.   Skin:     General: Skin is warm and dry.      Findings: No erythema.      Comments: L radial access site C/D/I; no bleeding erythema or drainage no hematoma   Neurological:      General: No focal deficit present.      Mental Status: He is alert and oriented to person, place, and time.   Psychiatric:         Mood and Affect: Mood normal.         Behavior: Behavior normal.         Thought Content: Thought content normal.          Significant Labs: CMP No results for input(s): NA, K, CL, CO2, GLU, BUN, CREATININE, CALCIUM, PROT, ALBUMIN, BILITOT, ALKPHOS, AST, ALT, ANIONGAP, ESTGFRAFRICA, EGFRNONAA in the last 48 hours., CBC   Recent Labs   Lab 06/21/23  1113   WBC 7.27   HGB 10.3*   HCT 32.2*   PLT 80*   , INR No results for input(s): INR, PROTIME in the last 48 hours., Troponin No results for input(s): TROPONINI in the last 48 hours., and All pertinent lab results from the last 24 hours have been reviewed.    Significant Imaging: Echocardiogram: Transthoracic echo (TTE) complete (Cupid Only):   Results for orders placed or performed during the hospital encounter of 06/20/23   Echo   Result Value Ref Range    BSA 2.37 m2    TDI SEPTAL 0.07 m/s    LV LATERAL E/E' RATIO 5.78 m/s    LV SEPTAL E/E' RATIO 7.43 m/s    LA WIDTH 3.60 cm    IVC diameter 1.00 cm    Left Ventricular Outflow Tract Mean Velocity 0.77 cm/s    Left Ventricular Outflow Tract Mean Gradient 2.89 mmHg    TDI LATERAL 0.09 m/s    LVIDd 5.61 3.5 - 6.0 cm    IVS 1.30 (A) 0.6 - 1.1 cm    Posterior Wall 1.11 (A) 0.6 - 1.1 cm    Ao root annulus 3.37 cm    LVIDs 4.85 (A) 2.1 - 4.0 cm    FS 14 28 - 44 %    LA volume 54.65 cm3    STJ 3.94 cm    Ascending aorta 3.91 cm    LV mass 282.89 g    LA size 3.29 cm     TAPSE 1.80 cm    Left Ventricle Relative Wall Thickness 0.40 cm    AV regurgitation pressure 1/2 time 1,004.941413200010992 ms    AV mean gradient 4 mmHg    AV valve area 4.21 cm2    AV Velocity Ratio 0.94     AV index (prosthetic) 1.02     MV mean gradient 71 mmHg    MV valve area p 1/2 method 3.84 cm2    MV valve area by continuity eq 0.46 cm2    E/A ratio 0.72     Mean e' 0.08 m/s    E wave deceleration time 197.43 msec    IVRT 95.15 msec    LVOT diameter 2.29 cm    LVOT area 4.1 cm2    LVOT peak ezequiel 1.19 m/s    LVOT peak VTI 23.10 cm    Ao peak ezequiel 1.26 m/s    Ao VTI 22.6 cm    RVOT peak ezequiel 0.59 m/s    RVOT peak VTI 12.1 cm    LVOT stroke volume 95.09 cm3    AV peak gradient 6 mmHg    MV peak gradient 87 mmHg    PV mean gradient 0.75 mmHg    E/E' ratio 6.50 m/s    MV Peak E Ezequiel 0.52 m/s    AR Max Ezequiel 3.12 m/s    MV .8 cm    MV stenosis pressure 1/2 time 57.26 ms    MV Peak A Ezequiel 0.72 m/s    LV Systolic Volume 109.98 mL    LV Systolic Volume Index 47.2 mL/m2    LV Diastolic Volume 154.22 mL    LV Diastolic Volume Index 66.19 mL/m2    LA Volume Index 23.5 mL/m2    LV Mass Index 121 g/m2    RA Major Axis 4.25 cm    Left Atrium Minor Axis 4.65 cm    Left Atrium Major Axis 6.52 cm    Right Atrial Pressure (from IVC) 3 mmHg    EF 25 %    Narrative    · The left ventricle is moderately enlarged with eccentric hypertrophy and   severely decreased systolic function.  · Grade I left ventricular diastolic dysfunction.  · Normal right ventricular size with normal right ventricular systolic   function.  · Normal central venous pressure (3 mmHg).  · The estimated ejection fraction is 25%.  · Mild aortic regurgitation.  · Mild mitral regurgitation.  · There is severe left ventricular global hypokinesis.      , EKG: reviewed, and X-Ray: CXR: X-Ray Chest 1 View (CXR):   Results for orders placed or performed during the hospital encounter of 06/20/23   X-Ray Chest 1 View    Narrative    EXAMINATION:  XR CHEST 1  VIEW    CLINICAL HISTORY:  Pre Op;    TECHNIQUE:  Single frontal view of the chest was performed.    COMPARISON:  None    FINDINGS:  The lungs are clear, with normal appearance of pulmonary vasculature and no pleural effusion or pneumothorax.    The cardiac silhouette is normal in size. The hilar and mediastinal contours are unremarkable.    Bones are intact.      Impression    No acute abnormality.      Electronically signed by: Torey Grady  Date:    06/20/2023  Time:    21:40    and X-Ray Chest PA and Lateral (CXR): No results found for this visit on 06/20/23.

## 2023-06-21 NOTE — ANESTHESIA PREPROCEDURE EVALUATION
06/21/2023  Cesario Tracey Jr. is a 63 y.o., male.    Patient Active Problem List   Diagnosis    Acute cough    Primary hypertension    Other hyperlipidemia    Coronary artery disease without angina pectoris    Abnormal stress test    DCM (dilated cardiomyopathy)    PAF (paroxysmal atrial fibrillation)    Acute on chronic systolic CHF (congestive heart failure)    Pulmonary HTN    Anemia    IFG (impaired fasting glucose)    Stage 3a chronic kidney disease    History of CVA (cerebrovascular accident)    Left main coronary artery disease     Past Medical History:   Diagnosis Date    Abnormal nuclear stress test 11/26/2022    Cervical radiculopathy     to rt arm    CHF (congestive heart failure)     Chronic systolic congestive heart failure 11/28/2022    Dilated cardiomyopathy 11/26/2022    Hyperlipidemia     Hypertension     Obesity, unspecified     PAF (paroxysmal atrial fibrillation) 11/26/2022    Pulmonary HTN 11/28/2022    Stroke      Past Surgical History:   Procedure Laterality Date    INSTANTANEOUS WAVE-FREE RATIO  6/20/2023    Procedure: Instantaneous Wave-Free Ratio;  Surgeon: Zion Ortega MD;  Location: [facility] CATH LAB;  Service: Cardiology;;    IVUS, CORONARY  6/20/2023    Procedure: IVUS, Coronary;  Surgeon: Zion Ortega MD;  Location: [facility] CATH LAB;  Service: Cardiology;;    LEFT HEART CATHETERIZATION Left 11/28/2022    Procedure: CATHETERIZATION, HEART, LEFT;  Surgeon: Zion Ortega MD;  Location: [facility] CATH LAB;  Service: Cardiology;  Laterality: Left;    LEFT HEART CATHETERIZATION Left 6/20/2023    Procedure: Left heart cath;  Surgeon: Zion Ortega MD;  Location: [facility] CATH LAB;  Service: Cardiology;  Laterality: Left;    PERCUTANEOUS TRANSLUMINAL BALLOON ANGIOPLASTY OF CORONARY ARTERY  6/20/2023    Procedure: Angioplasty-coronary;  Surgeon: Zion Ortega  MD;  Location: [facility] CATH LAB;  Service: Cardiology;;    RIGHT HEART CATHETERIZATION N/A 11/28/2022    Procedure: INSERTION, CATHETER, RIGHT HEART;  Surgeon: Zion Ortega MD;  Location: [facility] CATH LAB;  Service: Cardiology;  Laterality: N/A;  Congestive heart failure       Pre-op Assessment    I have reviewed the Patient Summary Reports.    I have reviewed the NPO Status.   I have reviewed the Medications.     Review of Systems  Anesthesia Hx:  No problems with previous Anesthesia  History of prior surgery of interest to airway management or planning: Previous anesthesia: General  Denies Personal Hx of Anesthesia complications.   Social:  Non-Smoker, No Alcohol Use    Hematology/Oncology:     Oncology Normal    -- Anemia: Hematology Comments: HCT 32  PLATELETS 80K     EENT/Dental:EENT/Dental Normal   Cardiovascular:   Hypertension CAD  Dysrhythmias atrial fibrillation CHF ECG has been reviewed. LM, 2v CAD   Pulmonary:  Pulmonary Normal    Renal/:   Chronic Renal Disease, CKD    Hepatic/GI:  Hepatic/GI Normal    Musculoskeletal:  Spine Disorders: cervical Degenerative disease    Neurological:   CVA, residual symptoms Left facial paralysis; Left-hemiparesis   Endocrine:  Endocrine Normal Denies Diabetes. bmi 32 Obesity / BMI > 30  Dermatological:  Skin Normal    Psych:  Psychiatric Normal           Physical Exam  General: Alert, Cooperative, Well nourished and Oriented    Airway:  Mallampati: IV   Mouth Opening: Normal  TM Distance: Normal  Tongue: Normal  Neck ROM: Normal ROM  Neck: Girth Increased    Dental:  Intact  Patient denies any currently loose or chipped teeth; Patient denies any removable dental appliances  ECHO   The left ventricle is moderately enlarged with eccentric hypertrophy and severely decreased systolic function.   Grade I left ventricular diastolic dysfunction.   Normal right ventricular size with normal right ventricular systolic function.   Normal central venous pressure (3  mmHg).   The estimated ejection fraction is 25%.   Mild aortic regurgitation.   Mild mitral regurgitation.   There is severe left ventricular global hypokinesis.         Anesthesia Plan  Type of Anesthesia, risks & benefits discussed:    Anesthesia Type: Gen ETT  Intra-op Monitoring Plan: Standard ASA Monitors, Art Line, Central Line, TYE, PA and CO  Post Op Pain Control Plan: multimodal analgesia and IV/PO Opioids PRN  Induction:  IV  Airway Plan: Direct, Post-Induction  Informed Consent: Informed consent signed with the Patient and all parties understand the risks and agree with anesthesia plan.  All questions answered.   ASA Score: 4  Day of Surgery Review of History & Physical: H&P Update referred to the surgeon/provider.  Anesthesia Plan Notes: **Patient is Jehova's Witness, but consents to all blood products required for procedure (confirmed and discussed with Dr. Dave, patient, and patient's daughter at bedside.      Ready For Surgery From Anesthesia Perspective.     .

## 2023-06-21 NOTE — HOSPITAL COURSE
6/21/23-Patient seen and examined today, s/p LHC yesterday which showed multivessel CAD. Awaiting CABG. Stable this AM. No CP/SOB. No labs to review. CABG planned for AM. Echo showed EF of 25%, DD.    6/23/23-Patient seen and examined today, s/p CABG x 3 POD # 1. Sitting up in bed. Feels ok. Complains of fatigue and post-op pain. Labs stable.     6.24.2023  s/p CABG x 3 POD # 2  Feels well  Sitting in chair     6/26/23:  pt seen and examined this am.  No acute CV issues noted overnight.  Labs reviewed. Anemia improved s/p PRBC. Discussed w CV surgery, and will arrange Life Vest, for CHF.    6/27/23-Patient seen and examined today, sitting up in bedside chair. Feels ok. Still weak/tired, especially with exertion. Labs reviewed/stable. LifeVest ordered.

## 2023-06-21 NOTE — PROGRESS NOTES
[facility] - Tele[facility] ([facility])  Cardiology  Progress Note    Patient Name: Cesario Tracey Jr.  MRN: [id number]  Admission Date: 6/20/2023  Hospital Length of Stay: 0 days  Code Status: Full Code   Attending Physician: Rustam Dave MD   Primary Care Physician: Braxton Guzman Jr, MD  Expected Discharge Date:   Principal Problem:Abnormal stress test    Subjective:   HPI:  Patient is a 63 y.o. male presents with cardiomyopathy chf afib previous cva htn hlp diabetes had heart in November 2022 for abnormal cardiolite was found to have 70% significantly ald stenosis his filling pressures were elevated and his pulmonary htn was moderate he was optimized medical thrapy wise his ef improved to 35-40% by repeat echo on entresto .his lifevest was discontinued. He continues to have some exertional shortness of breath he is referred for intervention of lad today after reviewing the case with DR HERNANDEZ    [facility] Course:   6/21/23-Patient seen and examined today, s/p LHC yesterday which showed multivessel CAD. Awaiting CABG. Stable this AM. No CP/SOB. Platelets 80,000. CABG planned for AM. Echo showed EF of 25%, DD.          Review of Systems   Constitutional: Negative.   HENT: Negative.     Eyes: Negative.    Cardiovascular: Negative.    Respiratory: Negative.     Endocrine: Negative.    Hematologic/Lymphatic: Negative.    Skin: Negative.    Musculoskeletal: Negative.    Gastrointestinal: Negative.    Genitourinary: Negative.    Neurological: Negative.    Psychiatric/Behavioral: Negative.     Allergic/Immunologic: Negative.    Objective:     Vital Signs (Most Recent):  Temp: 97.7 °F (36.5 °C) (06/21/23 0709)  Pulse: 73 (06/21/23 0900)  Resp: 17 (06/21/23 0709)  BP: (!) 143/87 (06/21/23 0709)  SpO2: 98 % (06/21/23 0709) Vital Signs (24h Range):  Temp:  [97.7 °F (36.5 °C)-98.6 °F (37 °C)] 97.7 °F (36.5 °C)  Pulse:  [60-80] 73  Resp:  [11-20] 17  SpO2:  [94 %-100 %] 98 %  BP: (142-192)/() 143/87     Weight: 108.9 kg (240  lb)  Body mass index is 31.66 kg/m².     SpO2: 98 %         Intake/Output Summary (Last 24 hours) at 6/21/2023 1143  Last data filed at 6/20/2023 2000  Gross per 24 hour   Intake --   Output 600 ml   Net -600 ml       Lines/Drains/Airways       Peripheral Intravenous Line  Duration                  Peripheral IV - Single Lumen 06/20/23 0944 20 G Posterior;Right Wrist 1 day                       Physical Exam  Vitals and nursing note reviewed.   Constitutional:       General: He is not in acute distress.     Appearance: Normal appearance. He is well-developed. He is not diaphoretic.   HENT:      Head: Normocephalic and atraumatic.   Eyes:      General:         Right eye: No discharge.         Left eye: No discharge.      Pupils: Pupils are equal, round, and reactive to light.   Neck:      Thyroid: No thyromegaly.      Vascular: No JVD.      Trachea: No tracheal deviation.   Cardiovascular:      Rate and Rhythm: Normal rate and regular rhythm.      Heart sounds: Normal heart sounds, S1 normal and S2 normal. No murmur heard.  Pulmonary:      Effort: Pulmonary effort is normal. No respiratory distress.      Breath sounds: Normal breath sounds. No wheezing or rales.   Abdominal:      General: There is no distension.      Tenderness: There is no rebound.   Musculoskeletal:      Cervical back: Neck supple.      Right lower leg: No edema.      Left lower leg: No edema.   Skin:     General: Skin is warm and dry.      Findings: No erythema.      Comments: L radial access site C/D/I; no bleeding erythema or drainage no hematoma   Neurological:      General: No focal deficit present.      Mental Status: He is alert and oriented to person, place, and time.   Psychiatric:         Mood and Affect: Mood normal.         Behavior: Behavior normal.         Thought Content: Thought content normal.          Significant Labs: CMP No results for input(s): NA, K, CL, CO2, GLU, BUN, CREATININE, CALCIUM, PROT, ALBUMIN, BILITOT, ALKPHOS, AST,  ALT, ANIONGAP, ESTGFRAFRICA, EGFRNONAA in the last 48 hours., CBC   Recent Labs   Lab 06/21/23  1113   WBC 7.27   HGB 10.3*   HCT 32.2*   PLT 80*   , INR No results for input(s): INR, PROTIME in the last 48 hours., Troponin No results for input(s): TROPONINI in the last 48 hours., and All pertinent lab results from the last 24 hours have been reviewed.    Significant Imaging: Echocardiogram: Transthoracic echo (TTE) complete (Cupid Only):   Results for orders placed or performed during the hospital encounter of 06/20/23   Echo   Result Value Ref Range    BSA 2.37 m2    TDI SEPTAL 0.07 m/s    LV LATERAL E/E' RATIO 5.78 m/s    LV SEPTAL E/E' RATIO 7.43 m/s    LA WIDTH 3.60 cm    IVC diameter 1.00 cm    Left Ventricular Outflow Tract Mean Velocity 0.77 cm/s    Left Ventricular Outflow Tract Mean Gradient 2.89 mmHg    TDI LATERAL 0.09 m/s    LVIDd 5.61 3.5 - 6.0 cm    IVS 1.30 (A) 0.6 - 1.1 cm    Posterior Wall 1.11 (A) 0.6 - 1.1 cm    Ao root annulus 3.37 cm    LVIDs 4.85 (A) 2.1 - 4.0 cm    FS 14 28 - 44 %    LA volume 54.65 cm3    STJ 3.94 cm    Ascending aorta 3.91 cm    LV mass 282.89 g    LA size 3.29 cm    TAPSE 1.80 cm    Left Ventricle Relative Wall Thickness 0.40 cm    AV regurgitation pressure 1/2 time 1,004.688595070078947 ms    AV mean gradient 4 mmHg    AV valve area 4.21 cm2    AV Velocity Ratio 0.94     AV index (prosthetic) 1.02     MV mean gradient 71 mmHg    MV valve area p 1/2 method 3.84 cm2    MV valve area by continuity eq 0.46 cm2    E/A ratio 0.72     Mean e' 0.08 m/s    E wave deceleration time 197.43 msec    IVRT 95.15 msec    LVOT diameter 2.29 cm    LVOT area 4.1 cm2    LVOT peak ezequiel 1.19 m/s    LVOT peak VTI 23.10 cm    Ao peak ezequiel 1.26 m/s    Ao VTI 22.6 cm    RVOT peak ezequiel 0.59 m/s    RVOT peak VTI 12.1 cm    LVOT stroke volume 95.09 cm3    AV peak gradient 6 mmHg    MV peak gradient 87 mmHg    PV mean gradient 0.75 mmHg    E/E' ratio 6.50 m/s    MV Peak E Ezequiel 0.52 m/s    AR Max Ezequiel 3.12  m/s    MV .8 cm    MV stenosis pressure 1/2 time 57.26 ms    MV Peak A Ezequiel 0.72 m/s    LV Systolic Volume 109.98 mL    LV Systolic Volume Index 47.2 mL/m2    LV Diastolic Volume 154.22 mL    LV Diastolic Volume Index 66.19 mL/m2    LA Volume Index 23.5 mL/m2    LV Mass Index 121 g/m2    RA Major Axis 4.25 cm    Left Atrium Minor Axis 4.65 cm    Left Atrium Major Axis 6.52 cm    Right Atrial Pressure (from IVC) 3 mmHg    EF 25 %    Narrative    · The left ventricle is moderately enlarged with eccentric hypertrophy and   severely decreased systolic function.  · Grade I left ventricular diastolic dysfunction.  · Normal right ventricular size with normal right ventricular systolic   function.  · Normal central venous pressure (3 mmHg).  · The estimated ejection fraction is 25%.  · Mild aortic regurgitation.  · Mild mitral regurgitation.  · There is severe left ventricular global hypokinesis.      , EKG: reviewed, and X-Ray: CXR: X-Ray Chest 1 View (CXR):   Results for orders placed or performed during the hospital encounter of 06/20/23   X-Ray Chest 1 View    Narrative    EXAMINATION:  XR CHEST 1 VIEW    CLINICAL HISTORY:  Pre Op;    TECHNIQUE:  Single frontal view of the chest was performed.    COMPARISON:  None    FINDINGS:  The lungs are clear, with normal appearance of pulmonary vasculature and no pleural effusion or pneumothorax.    The cardiac silhouette is normal in size. The hilar and mediastinal contours are unremarkable.    Bones are intact.      Impression    No acute abnormality.      Electronically signed by: Torey Grady  Date:    06/20/2023  Time:    21:40    and X-Ray Chest PA and Lateral (CXR): No results found for this visit on 06/20/23.    Assessment and Plan:   Patient who presents with LM/multivessel CAD. Awaiting CABG. Continue OMT.     Left main coronary artery disease  -CP free  -Continue ASA, BB, Entresto, Lasix, statin  -Awaiting CABG    History of CVA (cerebrovascular accident)  -ASA,  statin    DCM (dilated cardiomyopathy)  -Stable compensated  -Continue BB, Entresto, po Lasix    Coronary artery disease without angina pectoris  -Continue OMT-ASA, BB, Entresto, statin    Other hyperlipidemia  -Continue statin    Primary hypertension  -Continue BB, Entresto        VTE Risk Mitigation (From admission, onward)         Ordered     IP VTE LOW RISK PATIENT  Once         06/21/23 1044     Place CHANTELL hose  Until discontinued         06/21/23 1044     Place sequential compression device  Until discontinued         06/21/23 1044                Maria R Washington PA-C  Cardiology  [facility] - Telemetry ([facility])

## 2023-06-21 NOTE — PLAN OF CARE
Patient updated on plan of care. Instructed  patient to use call light, call light within reach. Hourly rounding performed. Fall precautions maintained; bed alarm on. Vitals [id number]. Chart check complete. Education provided, questions encouraged. Rhythm-SR on tele box # [id number]. Scheduled CABG for tomorrow, pre-op orders released, NPO at [facility].      Problem: Adult Inpatient Plan of Care  Goal: Plan of Care Review  Outcome: Ongoing, Progressing

## 2023-06-22 ENCOUNTER — ANESTHESIA (OUTPATIENT)
Dept: SURGERY | Facility: HOSPITAL | Age: 63
DRG: 233 | End: 2023-06-22
Payer: MEDICARE

## 2023-06-22 PROBLEM — Z97.8 ENDOTRACHEALLY INTUBATED: Status: ACTIVE | Noted: 2023-06-22

## 2023-06-22 PROBLEM — Z95.1 S/P CABG X 3: Status: ACTIVE | Noted: 2023-06-22

## 2023-06-22 LAB
ABO + RH BLD: NORMAL
ALLENS TEST: ABNORMAL
ALLENS TEST: ABNORMAL
ANION GAP SERPL CALC-SCNC: 12 MMOL/L (ref 8–16)
ANION GAP SERPL CALC-SCNC: 12 MMOL/L (ref 8–16)
ANISOCYTOSIS BLD QL SMEAR: SLIGHT
APTT PPP: 22.2 SEC (ref 21–32)
APTT PPP: 33.5 SEC (ref 21–32)
BASOPHILS NFR BLD: 0 % (ref 0–1.9)
BLD GP AB SCN CELLS X3 SERPL QL: NORMAL
BLD PROD TYP BPU: NORMAL
BLOOD UNIT EXPIRATION DATE: NORMAL
BLOOD UNIT TYPE CODE: 5100
BLOOD UNIT TYPE CODE: 6200
BLOOD UNIT TYPE CODE: 6200
BLOOD UNIT TYPE: NORMAL
BUN SERPL-MCNC: 22 MG/DL (ref 8–23)
BUN SERPL-MCNC: 25 MG/DL (ref 8–23)
CALCIUM SERPL-MCNC: 7.2 MG/DL (ref 8.7–10.5)
CALCIUM SERPL-MCNC: 7.2 MG/DL (ref 8.7–10.5)
CHLORIDE SERPL-SCNC: 107 MMOL/L (ref 95–110)
CHLORIDE SERPL-SCNC: 109 MMOL/L (ref 95–110)
CO2 SERPL-SCNC: 20 MMOL/L (ref 23–29)
CO2 SERPL-SCNC: 24 MMOL/L (ref 23–29)
CODING SYSTEM: NORMAL
CREAT SERPL-MCNC: 1.5 MG/DL (ref 0.5–1.4)
CREAT SERPL-MCNC: 1.6 MG/DL (ref 0.5–1.4)
CROSSMATCH INTERPRETATION: NORMAL
DELSYS: ABNORMAL
DELSYS: ABNORMAL
DIFFERENTIAL METHOD: ABNORMAL
DISPENSE STATUS: NORMAL
EOSINOPHIL NFR BLD: 0 % (ref 0–8)
ERYTHROCYTE [DISTWIDTH] IN BLOOD BY AUTOMATED COUNT: 17.1 % (ref 11.5–14.5)
ERYTHROCYTE [DISTWIDTH] IN BLOOD BY AUTOMATED COUNT: 17.2 % (ref 11.5–14.5)
ERYTHROCYTE [SEDIMENTATION RATE] IN BLOOD BY WESTERGREN METHOD: 18 MM/H
EST. GFR  (NO RACE VARIABLE): 48 ML/MIN/1.73 M^2
EST. GFR  (NO RACE VARIABLE): 52 ML/MIN/1.73 M^2
FIBRINOGEN PPP-MCNC: 167 MG/DL (ref 182–400)
FIBRINOGEN PPP-MCNC: 186 MG/DL (ref 182–400)
FIO2: 100
FIO2: 30
GLUCOSE SERPL-MCNC: 112 MG/DL (ref 70–110)
GLUCOSE SERPL-MCNC: 118 MG/DL (ref 70–110)
GLUCOSE SERPL-MCNC: 132 MG/DL (ref 70–110)
GLUCOSE SERPL-MCNC: 145 MG/DL (ref 70–110)
GLUCOSE SERPL-MCNC: 149 MG/DL (ref 70–110)
GLUCOSE SERPL-MCNC: 158 MG/DL (ref 70–110)
GLUCOSE SERPL-MCNC: 162 MG/DL (ref 70–110)
GLUCOSE SERPL-MCNC: 167 MG/DL (ref 70–110)
GLUCOSE SERPL-MCNC: 189 MG/DL (ref 70–110)
GLUCOSE SERPL-MCNC: 192 MG/DL (ref 70–110)
GLUCOSE SERPL-MCNC: 249 MG/DL (ref 70–110)
GLUCOSE SERPL-MCNC: 324 MG/DL (ref 70–110)
HCO3 UR-SCNC: 19.8 MMOL/L (ref 24–28)
HCO3 UR-SCNC: 20.8 MMOL/L (ref 24–28)
HCO3 UR-SCNC: 22.7 MMOL/L (ref 24–28)
HCO3 UR-SCNC: 24.5 MMOL/L (ref 24–28)
HCO3 UR-SCNC: 24.8 MMOL/L (ref 24–28)
HCO3 UR-SCNC: 24.9 MMOL/L (ref 24–28)
HCO3 UR-SCNC: 25.2 MMOL/L (ref 24–28)
HCO3 UR-SCNC: 25.4 MMOL/L (ref 24–28)
HCO3 UR-SCNC: 25.8 MMOL/L (ref 24–28)
HCO3 UR-SCNC: 27.2 MMOL/L (ref 24–28)
HCO3 UR-SCNC: 27.8 MMOL/L (ref 24–28)
HCT VFR BLD AUTO: 22.4 % (ref 40–54)
HCT VFR BLD AUTO: 26.5 % (ref 40–54)
HCT VFR BLD AUTO: 30.2 % (ref 40–54)
HCT VFR BLD CALC: 16 %PCV (ref 36–54)
HCT VFR BLD CALC: 19 %PCV (ref 36–54)
HCT VFR BLD CALC: 20 %PCV (ref 36–54)
HCT VFR BLD CALC: 21 %PCV (ref 36–54)
HCT VFR BLD CALC: 21 %PCV (ref 36–54)
HCT VFR BLD CALC: 22 %PCV (ref 36–54)
HCT VFR BLD CALC: 24 %PCV (ref 36–54)
HCT VFR BLD CALC: 25 %PCV (ref 36–54)
HCT VFR BLD CALC: 26 %PCV (ref 36–54)
HCT VFR BLD CALC: 31 %PCV (ref 36–54)
HGB BLD-MCNC: 7.2 G/DL (ref 14–18)
HGB BLD-MCNC: 8.5 G/DL (ref 14–18)
HGB BLD-MCNC: 9.9 G/DL (ref 14–18)
IMM GRANULOCYTES # BLD AUTO: ABNORMAL K/UL (ref 0–0.04)
IMM GRANULOCYTES NFR BLD AUTO: ABNORMAL % (ref 0–0.5)
INR PPP: 1.4 (ref 0.8–1.2)
INR PPP: 1.7 (ref 0.8–1.2)
LYMPHOCYTES NFR BLD: 63 % (ref 18–48)
MAGNESIUM SERPL-MCNC: 3.2 MG/DL (ref 1.6–2.6)
MAGNESIUM SERPL-MCNC: 3.2 MG/DL (ref 1.6–2.6)
MAGNESIUM SERPL-MCNC: 3.4 MG/DL (ref 1.6–2.6)
MAP: 83
MCH RBC QN AUTO: 27.4 PG (ref 27–31)
MCH RBC QN AUTO: 27.5 PG (ref 27–31)
MCHC RBC AUTO-ENTMCNC: 32.1 G/DL (ref 32–36)
MCHC RBC AUTO-ENTMCNC: 32.1 G/DL (ref 32–36)
MCV RBC AUTO: 86 FL (ref 82–98)
MCV RBC AUTO: 86 FL (ref 82–98)
METAMYELOCYTES NFR BLD MANUAL: 7 %
MIN VOL: 12
MIN VOL: 13.7
MODE: ABNORMAL
MODE: ABNORMAL
MONOCYTES NFR BLD: 16 % (ref 4–15)
NEUTROPHILS NFR BLD: 8 % (ref 38–73)
NEUTS BAND NFR BLD MANUAL: 1 %
NRBC BLD-RTO: 4 /100 WBC
NUM UNITS TRANS PACKED RBC: NORMAL
OVALOCYTES BLD QL SMEAR: ABNORMAL
PCO2 BLDA: 28.8 MMHG (ref 35–45)
PCO2 BLDA: 37.7 MMHG (ref 35–45)
PCO2 BLDA: 38 MMHG (ref 35–45)
PCO2 BLDA: 39.9 MMHG (ref 35–45)
PCO2 BLDA: 41.8 MMHG (ref 35–45)
PCO2 BLDA: 42.1 MMHG (ref 35–45)
PCO2 BLDA: 42.8 MMHG (ref 35–45)
PCO2 BLDA: 43.2 MMHG (ref 35–45)
PCO2 BLDA: 44.2 MMHG (ref 35–45)
PCO2 BLDA: 46.2 MMHG (ref 35–45)
PCO2 BLDA: 54.3 MMHG (ref 35–45)
PEEP: 5
PEEP: 5
PH SMN: 7.3 [PH] (ref 7.35–7.45)
PH SMN: 7.31 [PH] (ref 7.35–7.45)
PH SMN: 7.32 [PH] (ref 7.35–7.45)
PH SMN: 7.34 [PH] (ref 7.35–7.45)
PH SMN: 7.37 [PH] (ref 7.35–7.45)
PH SMN: 7.38 [PH] (ref 7.35–7.45)
PH SMN: 7.38 [PH] (ref 7.35–7.45)
PH SMN: 7.41 [PH] (ref 7.35–7.45)
PH SMN: 7.44 [PH] (ref 7.35–7.45)
PH SMN: 7.45 [PH] (ref 7.35–7.45)
PH SMN: 7.48 [PH] (ref 7.35–7.45)
PIP: 29
PLATELET # BLD AUTO: 151 K/UL (ref 150–450)
PLATELET # BLD AUTO: 33 K/UL (ref 150–450)
PLATELET BLD QL SMEAR: ABNORMAL
PLATELET BLD QL SMEAR: ABNORMAL
PMV BLD AUTO: 11.1 FL (ref 9.2–12.9)
PMV BLD AUTO: 9.7 FL (ref 9.2–12.9)
PO2 BLDA: 123 MMHG (ref 80–100)
PO2 BLDA: 207 MMHG (ref 80–100)
PO2 BLDA: 215 MMHG (ref 80–100)
PO2 BLDA: 232 MMHG (ref 80–100)
PO2 BLDA: 237 MMHG (ref 80–100)
PO2 BLDA: 241 MMHG (ref 80–100)
PO2 BLDA: 290 MMHG (ref 80–100)
PO2 BLDA: 336 MMHG (ref 80–100)
PO2 BLDA: 348 MMHG (ref 80–100)
PO2 BLDA: 401 MMHG (ref 80–100)
PO2 BLDA: 402 MMHG (ref 80–100)
POC ACTIVATED CLOTTING TIME K: 143 SEC (ref 74–137)
POC ACTIVATED CLOTTING TIME K: 161 SEC (ref 74–137)
POC ACTIVATED CLOTTING TIME K: 161 SEC (ref 74–137)
POC ACTIVATED CLOTTING TIME K: 636 SEC (ref 74–137)
POC ACTIVATED CLOTTING TIME K: 690 SEC (ref 74–137)
POC ACTIVATED CLOTTING TIME K: 696 SEC (ref 74–137)
POC ACTIVATED CLOTTING TIME K: 967 SEC (ref 74–137)
POC BE: -1 MMOL/L
POC BE: -1 MMOL/L
POC BE: -3 MMOL/L
POC BE: -4 MMOL/L
POC BE: -5 MMOL/L
POC BE: 0 MMOL/L
POC BE: 0 MMOL/L
POC BE: 1 MMOL/L
POC BE: 1 MMOL/L
POC BE: 2 MMOL/L
POC BE: 4 MMOL/L
POC IONIZED CALCIUM: 0.88 MMOL/L (ref 1.06–1.42)
POC IONIZED CALCIUM: 0.97 MMOL/L (ref 1.06–1.42)
POC IONIZED CALCIUM: 1 MMOL/L (ref 1.06–1.42)
POC IONIZED CALCIUM: 1.01 MMOL/L (ref 1.06–1.42)
POC IONIZED CALCIUM: 1.01 MMOL/L (ref 1.06–1.42)
POC IONIZED CALCIUM: 1.03 MMOL/L (ref 1.06–1.42)
POC IONIZED CALCIUM: 1.04 MMOL/L (ref 1.06–1.42)
POC IONIZED CALCIUM: 1.15 MMOL/L (ref 1.06–1.42)
POC IONIZED CALCIUM: 1.17 MMOL/L (ref 1.06–1.42)
POC IONIZED CALCIUM: 1.18 MMOL/L (ref 1.06–1.42)
POC SATURATED O2: 100 % (ref 95–100)
POC SATURATED O2: 99 % (ref 95–100)
POCT GLUCOSE: 185 MG/DL (ref 70–110)
POCT GLUCOSE: 188 MG/DL (ref 70–110)
POCT GLUCOSE: 195 MG/DL (ref 70–110)
POCT GLUCOSE: 203 MG/DL (ref 70–110)
POCT GLUCOSE: 213 MG/DL (ref 70–110)
POCT GLUCOSE: 220 MG/DL (ref 70–110)
POCT GLUCOSE: 232 MG/DL (ref 70–110)
POCT GLUCOSE: 241 MG/DL (ref 70–110)
POCT GLUCOSE: 326 MG/DL (ref 70–110)
POCT GLUCOSE: 96 MG/DL (ref 70–110)
POLYCHROMASIA BLD QL SMEAR: ABNORMAL
POTASSIUM BLD-SCNC: 3.5 MMOL/L (ref 3.5–5.1)
POTASSIUM BLD-SCNC: 3.5 MMOL/L (ref 3.5–5.1)
POTASSIUM BLD-SCNC: 3.7 MMOL/L (ref 3.5–5.1)
POTASSIUM BLD-SCNC: 3.9 MMOL/L (ref 3.5–5.1)
POTASSIUM BLD-SCNC: 3.9 MMOL/L (ref 3.5–5.1)
POTASSIUM BLD-SCNC: 4.4 MMOL/L (ref 3.5–5.1)
POTASSIUM BLD-SCNC: 4.5 MMOL/L (ref 3.5–5.1)
POTASSIUM BLD-SCNC: 4.5 MMOL/L (ref 3.5–5.1)
POTASSIUM BLD-SCNC: 5.1 MMOL/L (ref 3.5–5.1)
POTASSIUM BLD-SCNC: 5.9 MMOL/L (ref 3.5–5.1)
POTASSIUM SERPL-SCNC: 3.9 MMOL/L (ref 3.5–5.1)
POTASSIUM SERPL-SCNC: 3.9 MMOL/L (ref 3.5–5.1)
POTASSIUM SERPL-SCNC: 4.4 MMOL/L (ref 3.5–5.1)
PROTHROMBIN TIME: 14 SEC (ref 9–12.5)
PROTHROMBIN TIME: 17.7 SEC (ref 9–12.5)
PS: 10
RBC # BLD AUTO: 2.62 M/UL (ref 4.6–6.2)
RBC # BLD AUTO: 3.1 M/UL (ref 4.6–6.2)
SAMPLE: ABNORMAL
SITE: ABNORMAL
SITE: ABNORMAL
SODIUM BLD-SCNC: 137 MMOL/L (ref 136–145)
SODIUM BLD-SCNC: 139 MMOL/L (ref 136–145)
SODIUM BLD-SCNC: 140 MMOL/L (ref 136–145)
SODIUM BLD-SCNC: 141 MMOL/L (ref 136–145)
SODIUM BLD-SCNC: 142 MMOL/L (ref 136–145)
SODIUM BLD-SCNC: 143 MMOL/L (ref 136–145)
SODIUM BLD-SCNC: 144 MMOL/L (ref 136–145)
SODIUM BLD-SCNC: 144 MMOL/L (ref 136–145)
SODIUM SERPL-SCNC: 141 MMOL/L (ref 136–145)
SODIUM SERPL-SCNC: 143 MMOL/L (ref 136–145)
SP02: 100
SP02: 100
SPONT RATE: 30
UNIT NUMBER: NORMAL
UNIT NUMBER: NORMAL
VOL: 461
VT: 500
WBC # BLD AUTO: 12.9 K/UL (ref 3.9–12.7)
WBC # BLD AUTO: 38.06 K/UL (ref 3.9–12.7)
WBC OTHER NFR BLD MANUAL: 5 %

## 2023-06-22 PROCEDURE — 20000000 HC ICU ROOM

## 2023-06-22 PROCEDURE — 63600175 PHARM REV CODE 636 W HCPCS: Performed by: THORACIC SURGERY (CARDIOTHORACIC VASCULAR SURGERY)

## 2023-06-22 PROCEDURE — 33533 PR CABG, ARTERIAL, SINGLE: ICD-10-PCS | Mod: ,,, | Performed by: THORACIC SURGERY (CARDIOTHORACIC VASCULAR SURGERY)

## 2023-06-22 PROCEDURE — 83735 ASSAY OF MAGNESIUM: CPT | Mod: 91 | Performed by: THORACIC SURGERY (CARDIOTHORACIC VASCULAR SURGERY)

## 2023-06-22 PROCEDURE — P9045 ALBUMIN (HUMAN), 5%, 250 ML: HCPCS | Mod: JZ,JG | Performed by: THORACIC SURGERY (CARDIOTHORACIC VASCULAR SURGERY)

## 2023-06-22 PROCEDURE — 94640 AIRWAY INHALATION TREATMENT: CPT

## 2023-06-22 PROCEDURE — 25000003 PHARM REV CODE 250: Performed by: NURSE ANESTHETIST, CERTIFIED REGISTERED

## 2023-06-22 PROCEDURE — 33533 CABG ARTERIAL SINGLE: CPT | Mod: ,,, | Performed by: THORACIC SURGERY (CARDIOTHORACIC VASCULAR SURGERY)

## 2023-06-22 PROCEDURE — C9399 UNCLASSIFIED DRUGS OR BIOLOG: HCPCS | Performed by: THORACIC SURGERY (CARDIOTHORACIC VASCULAR SURGERY)

## 2023-06-22 PROCEDURE — 36000712 HC OR TIME LEV V 1ST 15 MIN: Performed by: THORACIC SURGERY (CARDIOTHORACIC VASCULAR SURGERY)

## 2023-06-22 PROCEDURE — 27800903 OPTIME MED/SURG SUP & DEVICES OTHER IMPLANTS: Performed by: THORACIC SURGERY (CARDIOTHORACIC VASCULAR SURGERY)

## 2023-06-22 PROCEDURE — 86920 COMPATIBILITY TEST SPIN: CPT | Performed by: THORACIC SURGERY (CARDIOTHORACIC VASCULAR SURGERY)

## 2023-06-22 PROCEDURE — 25000003 PHARM REV CODE 250: Performed by: THORACIC SURGERY (CARDIOTHORACIC VASCULAR SURGERY)

## 2023-06-22 PROCEDURE — C1729 CATH, DRAINAGE: HCPCS | Performed by: THORACIC SURGERY (CARDIOTHORACIC VASCULAR SURGERY)

## 2023-06-22 PROCEDURE — 63600175 PHARM REV CODE 636 W HCPCS

## 2023-06-22 PROCEDURE — 25000003 PHARM REV CODE 250: Performed by: INTERNAL MEDICINE

## 2023-06-22 PROCEDURE — 84295 ASSAY OF SERUM SODIUM: CPT

## 2023-06-22 PROCEDURE — P9016 RBC LEUKOCYTES REDUCED: HCPCS | Performed by: THORACIC SURGERY (CARDIOTHORACIC VASCULAR SURGERY)

## 2023-06-22 PROCEDURE — 25000242 PHARM REV CODE 250 ALT 637 W/ HCPCS: Performed by: THORACIC SURGERY (CARDIOTHORACIC VASCULAR SURGERY)

## 2023-06-22 PROCEDURE — S0017 INJECTION, AMINOCAPROIC ACID: HCPCS | Performed by: NURSE ANESTHETIST, CERTIFIED REGISTERED

## 2023-06-22 PROCEDURE — 99900035 HC TECH TIME PER 15 MIN (STAT)

## 2023-06-22 PROCEDURE — 82803 BLOOD GASES ANY COMBINATION: CPT

## 2023-06-22 PROCEDURE — 84132 ASSAY OF SERUM POTASSIUM: CPT

## 2023-06-22 PROCEDURE — 63600175 PHARM REV CODE 636 W HCPCS: Performed by: NURSE ANESTHETIST, CERTIFIED REGISTERED

## 2023-06-22 PROCEDURE — 33518 CABG ARTERY-VEIN TWO: CPT | Mod: ,,, | Performed by: THORACIC SURGERY (CARDIOTHORACIC VASCULAR SURGERY)

## 2023-06-22 PROCEDURE — C1769 GUIDE WIRE: HCPCS | Performed by: THORACIC SURGERY (CARDIOTHORACIC VASCULAR SURGERY)

## 2023-06-22 PROCEDURE — C9113 INJ PANTOPRAZOLE SODIUM, VIA: HCPCS | Performed by: THORACIC SURGERY (CARDIOTHORACIC VASCULAR SURGERY)

## 2023-06-22 PROCEDURE — 37799 UNLISTED PX VASCULAR SURGERY: CPT

## 2023-06-22 PROCEDURE — 27201423 OPTIME MED/SURG SUP & DEVICES STERILE SUPPLY: Performed by: THORACIC SURGERY (CARDIOTHORACIC VASCULAR SURGERY)

## 2023-06-22 PROCEDURE — P9035 PLATELET PHERES LEUKOREDUCED: HCPCS | Performed by: THORACIC SURGERY (CARDIOTHORACIC VASCULAR SURGERY)

## 2023-06-22 PROCEDURE — 37000008 HC ANESTHESIA 1ST 15 MINUTES: Performed by: THORACIC SURGERY (CARDIOTHORACIC VASCULAR SURGERY)

## 2023-06-22 PROCEDURE — C9290 INJ, BUPIVACAINE LIPOSOME: HCPCS | Performed by: THORACIC SURGERY (CARDIOTHORACIC VASCULAR SURGERY)

## 2023-06-22 PROCEDURE — 85014 HEMATOCRIT: CPT

## 2023-06-22 PROCEDURE — 85730 THROMBOPLASTIN TIME PARTIAL: CPT | Performed by: THORACIC SURGERY (CARDIOTHORACIC VASCULAR SURGERY)

## 2023-06-22 PROCEDURE — 99232 PR SUBSEQUENT HOSPITAL CARE,LEVL II: ICD-10-PCS | Mod: ,,, | Performed by: INTERNAL MEDICINE

## 2023-06-22 PROCEDURE — C1887 CATHETER, GUIDING: HCPCS | Performed by: THORACIC SURGERY (CARDIOTHORACIC VASCULAR SURGERY)

## 2023-06-22 PROCEDURE — 84132 ASSAY OF SERUM POTASSIUM: CPT | Performed by: THORACIC SURGERY (CARDIOTHORACIC VASCULAR SURGERY)

## 2023-06-22 PROCEDURE — 33508 ENDOSCOPIC VEIN HARVEST: CPT | Mod: 59,,, | Performed by: THORACIC SURGERY (CARDIOTHORACIC VASCULAR SURGERY)

## 2023-06-22 PROCEDURE — 82330 ASSAY OF CALCIUM: CPT

## 2023-06-22 PROCEDURE — 94003 VENT MGMT INPAT SUBQ DAY: CPT

## 2023-06-22 PROCEDURE — 85014 HEMATOCRIT: CPT | Performed by: THORACIC SURGERY (CARDIOTHORACIC VASCULAR SURGERY)

## 2023-06-22 PROCEDURE — 85027 COMPLETE CBC AUTOMATED: CPT | Performed by: THORACIC SURGERY (CARDIOTHORACIC VASCULAR SURGERY)

## 2023-06-22 PROCEDURE — 85060 BLOOD SMEAR INTERPRETATION: CPT | Mod: ,,, | Performed by: PATHOLOGY

## 2023-06-22 PROCEDURE — 85060 PATHOLOGIST REVIEW: ICD-10-PCS | Mod: ,,, | Performed by: PATHOLOGY

## 2023-06-22 PROCEDURE — 85018 HEMOGLOBIN: CPT | Performed by: THORACIC SURGERY (CARDIOTHORACIC VASCULAR SURGERY)

## 2023-06-22 PROCEDURE — 36430 TRANSFUSION BLD/BLD COMPNT: CPT

## 2023-06-22 PROCEDURE — 85384 FIBRINOGEN ACTIVITY: CPT | Mod: 91 | Performed by: THORACIC SURGERY (CARDIOTHORACIC VASCULAR SURGERY)

## 2023-06-22 PROCEDURE — 99291 CRITICAL CARE FIRST HOUR: CPT | Mod: ,,, | Performed by: INTERNAL MEDICINE

## 2023-06-22 PROCEDURE — 80048 BASIC METABOLIC PNL TOTAL CA: CPT | Performed by: THORACIC SURGERY (CARDIOTHORACIC VASCULAR SURGERY)

## 2023-06-22 PROCEDURE — 37000009 HC ANESTHESIA EA ADD 15 MINS: Performed by: THORACIC SURGERY (CARDIOTHORACIC VASCULAR SURGERY)

## 2023-06-22 PROCEDURE — P9045 ALBUMIN (HUMAN), 5%, 250 ML: HCPCS | Mod: JG

## 2023-06-22 PROCEDURE — 33259 PR ABLATE/ RECONSTUCT ATRIA, W OTHER PROCED EXTENS W/ BYPASS: ICD-10-PCS | Mod: ,,, | Performed by: THORACIC SURGERY (CARDIOTHORACIC VASCULAR SURGERY)

## 2023-06-22 PROCEDURE — 99232 SBSQ HOSP IP/OBS MODERATE 35: CPT | Mod: ,,, | Performed by: INTERNAL MEDICINE

## 2023-06-22 PROCEDURE — 86900 BLOOD TYPING SEROLOGIC ABO: CPT | Performed by: THORACIC SURGERY (CARDIOTHORACIC VASCULAR SURGERY)

## 2023-06-22 PROCEDURE — 85007 BL SMEAR W/DIFF WBC COUNT: CPT | Performed by: THORACIC SURGERY (CARDIOTHORACIC VASCULAR SURGERY)

## 2023-06-22 PROCEDURE — 33259 ABLATE ATRIA W/BYPASS ADD-ON: CPT | Mod: ,,, | Performed by: THORACIC SURGERY (CARDIOTHORACIC VASCULAR SURGERY)

## 2023-06-22 PROCEDURE — 27200667 HC PACEMAKER, TEMPORARY MONITORING, PER SHIFT

## 2023-06-22 PROCEDURE — 33518 PR CABG, ARTERY-VEIN, TWO: ICD-10-PCS | Mod: ,,, | Performed by: THORACIC SURGERY (CARDIOTHORACIC VASCULAR SURGERY)

## 2023-06-22 PROCEDURE — A4216 STERILE WATER/SALINE, 10 ML: HCPCS | Performed by: NURSE ANESTHETIST, CERTIFIED REGISTERED

## 2023-06-22 PROCEDURE — 33508 PR ENDOSCOPY W/VIDEO-ASST VEIN HARVEST,CABG: ICD-10-PCS | Mod: 59,,, | Performed by: THORACIC SURGERY (CARDIOTHORACIC VASCULAR SURGERY)

## 2023-06-22 PROCEDURE — 36000713 HC OR TIME LEV V EA ADD 15 MIN: Performed by: THORACIC SURGERY (CARDIOTHORACIC VASCULAR SURGERY)

## 2023-06-22 PROCEDURE — 99291 PR CRITICAL CARE, E/M 30-74 MINUTES: ICD-10-PCS | Mod: ,,, | Performed by: INTERNAL MEDICINE

## 2023-06-22 PROCEDURE — 27000221 HC OXYGEN, UP TO 24 HOURS

## 2023-06-22 PROCEDURE — 94761 N-INVAS EAR/PLS OXIMETRY MLT: CPT

## 2023-06-22 PROCEDURE — 85610 PROTHROMBIN TIME: CPT | Performed by: THORACIC SURGERY (CARDIOTHORACIC VASCULAR SURGERY)

## 2023-06-22 PROCEDURE — 85027 COMPLETE CBC AUTOMATED: CPT | Mod: 91 | Performed by: THORACIC SURGERY (CARDIOTHORACIC VASCULAR SURGERY)

## 2023-06-22 RX ORDER — NICARDIPINE HYDROCHLORIDE 0.2 MG/ML
0-15 INJECTION INTRAVENOUS CONTINUOUS
Status: DISCONTINUED | OUTPATIENT
Start: 2023-06-22 | End: 2023-06-24

## 2023-06-22 RX ORDER — OXYCODONE HYDROCHLORIDE 5 MG/1
5 TABLET ORAL EVERY 4 HOURS PRN
Status: DISCONTINUED | OUTPATIENT
Start: 2023-06-22 | End: 2023-06-24

## 2023-06-22 RX ORDER — PROPOFOL 10 MG/ML
VIAL (ML) INTRAVENOUS
Status: DISCONTINUED | OUTPATIENT
Start: 2023-06-22 | End: 2023-06-22 | Stop reason: HOSPADM

## 2023-06-22 RX ORDER — CEFAZOLIN SODIUM 2 G/50ML
2 SOLUTION INTRAVENOUS
Status: COMPLETED | OUTPATIENT
Start: 2023-06-22 | End: 2023-06-23

## 2023-06-22 RX ORDER — INDOCYANINE GREEN AND WATER 25 MG
KIT INJECTION
Status: DISCONTINUED | OUTPATIENT
Start: 2023-06-22 | End: 2023-06-22 | Stop reason: HOSPADM

## 2023-06-22 RX ORDER — AMINOCAPROIC ACID 250 MG/ML
INJECTION, SOLUTION INTRAVENOUS
Status: DISCONTINUED | OUTPATIENT
Start: 2023-06-22 | End: 2023-06-22 | Stop reason: HOSPADM

## 2023-06-22 RX ORDER — POTASSIUM CHLORIDE 20 MEQ/1
20 TABLET, EXTENDED RELEASE ORAL EVERY 12 HOURS
Status: DISCONTINUED | OUTPATIENT
Start: 2023-06-23 | End: 2023-07-05 | Stop reason: HOSPADM

## 2023-06-22 RX ORDER — FENTANYL CITRATE 50 UG/ML
25 INJECTION, SOLUTION INTRAMUSCULAR; INTRAVENOUS
Status: DISCONTINUED | OUTPATIENT
Start: 2023-06-22 | End: 2023-06-24

## 2023-06-22 RX ORDER — MUPIROCIN 20 MG/G
OINTMENT TOPICAL 2 TIMES DAILY
Status: DISCONTINUED | OUTPATIENT
Start: 2023-06-22 | End: 2023-06-22 | Stop reason: SDUPTHER

## 2023-06-22 RX ORDER — ADHESIVE BANDAGE
5 BANDAGE TOPICAL 2 TIMES DAILY
Status: DISCONTINUED | OUTPATIENT
Start: 2023-06-23 | End: 2023-07-05 | Stop reason: HOSPADM

## 2023-06-22 RX ORDER — ADENOSINE 3 MG/ML
INJECTION INTRAVENOUS
Status: DISCONTINUED | OUTPATIENT
Start: 2023-06-22 | End: 2023-06-22 | Stop reason: HOSPADM

## 2023-06-22 RX ORDER — OXYCODONE HYDROCHLORIDE 5 MG/1
10 TABLET ORAL EVERY 4 HOURS PRN
Status: DISCONTINUED | OUTPATIENT
Start: 2023-06-22 | End: 2023-06-24

## 2023-06-22 RX ORDER — CEFAZOLIN SODIUM 1 G/3ML
INJECTION, POWDER, FOR SOLUTION INTRAMUSCULAR; INTRAVENOUS
Status: DISCONTINUED | OUTPATIENT
Start: 2023-06-22 | End: 2023-06-22 | Stop reason: HOSPADM

## 2023-06-22 RX ORDER — BUPIVACAINE HYDROCHLORIDE 2.5 MG/ML
INJECTION, SOLUTION EPIDURAL; INFILTRATION; INTRACAUDAL
Status: DISCONTINUED | OUTPATIENT
Start: 2023-06-22 | End: 2023-06-22 | Stop reason: HOSPADM

## 2023-06-22 RX ORDER — ALBUMIN HUMAN 50 G/1000ML
SOLUTION INTRAVENOUS CONTINUOUS PRN
Status: DISCONTINUED | OUTPATIENT
Start: 2023-06-22 | End: 2023-06-22 | Stop reason: HOSPADM

## 2023-06-22 RX ORDER — CALCIUM GLUCONATE 20 MG/ML
1 INJECTION, SOLUTION INTRAVENOUS
Status: DISCONTINUED | OUTPATIENT
Start: 2023-06-22 | End: 2023-07-05 | Stop reason: HOSPADM

## 2023-06-22 RX ORDER — PANTOPRAZOLE SODIUM 40 MG/10ML
40 INJECTION, POWDER, LYOPHILIZED, FOR SOLUTION INTRAVENOUS DAILY
Status: DISCONTINUED | OUTPATIENT
Start: 2023-06-22 | End: 2023-06-23

## 2023-06-22 RX ORDER — DOCUSATE SODIUM 100 MG/1
100 CAPSULE, LIQUID FILLED ORAL 2 TIMES DAILY
Status: DISCONTINUED | OUTPATIENT
Start: 2023-06-23 | End: 2023-07-05 | Stop reason: HOSPADM

## 2023-06-22 RX ORDER — ACETAMINOPHEN 10 MG/ML
1000 INJECTION, SOLUTION INTRAVENOUS
Status: DISCONTINUED | OUTPATIENT
Start: 2023-06-22 | End: 2023-06-22 | Stop reason: SDUPTHER

## 2023-06-22 RX ORDER — CALCIUM GLUCONATE 20 MG/ML
2 INJECTION, SOLUTION INTRAVENOUS
Status: DISCONTINUED | OUTPATIENT
Start: 2023-06-22 | End: 2023-07-05 | Stop reason: HOSPADM

## 2023-06-22 RX ORDER — ONDANSETRON 2 MG/ML
4 INJECTION INTRAMUSCULAR; INTRAVENOUS EVERY 12 HOURS PRN
Status: DISCONTINUED | OUTPATIENT
Start: 2023-06-22 | End: 2023-07-05 | Stop reason: HOSPADM

## 2023-06-22 RX ORDER — EPINEPHRINE 1 MG/ML
INJECTION, SOLUTION INTRACARDIAC; INTRAMUSCULAR; INTRAVENOUS; SUBCUTANEOUS
Status: DISCONTINUED | OUTPATIENT
Start: 2023-06-22 | End: 2023-06-22 | Stop reason: HOSPADM

## 2023-06-22 RX ORDER — SODIUM BICARBONATE 1 MEQ/ML
SYRINGE (ML) INTRAVENOUS
Status: DISCONTINUED | OUTPATIENT
Start: 2023-06-22 | End: 2023-06-22 | Stop reason: HOSPADM

## 2023-06-22 RX ORDER — FENTANYL CITRATE 50 UG/ML
12.5 INJECTION, SOLUTION INTRAMUSCULAR; INTRAVENOUS
Status: DISCONTINUED | OUTPATIENT
Start: 2023-06-22 | End: 2023-06-24

## 2023-06-22 RX ORDER — POTASSIUM CHLORIDE 14.9 MG/ML
20 INJECTION INTRAVENOUS
Status: DISCONTINUED | OUTPATIENT
Start: 2023-06-22 | End: 2023-07-05 | Stop reason: HOSPADM

## 2023-06-22 RX ORDER — VASOPRESSIN IN DEXTROSE 5 % 25/250 ML
0.04 PLASTIC BAG, INJECTION (ML) INTRAVENOUS CONTINUOUS
Status: DISCONTINUED | OUTPATIENT
Start: 2023-06-22 | End: 2023-06-24

## 2023-06-22 RX ORDER — MAGNESIUM SULFATE HEPTAHYDRATE 40 MG/ML
INJECTION, SOLUTION INTRAVENOUS
Status: DISCONTINUED | OUTPATIENT
Start: 2023-06-22 | End: 2023-06-22 | Stop reason: HOSPADM

## 2023-06-22 RX ORDER — LANOLIN ALCOHOL/MO/W.PET/CERES
1 CREAM (GRAM) TOPICAL DAILY
Status: DISCONTINUED | OUTPATIENT
Start: 2023-06-23 | End: 2023-07-05 | Stop reason: HOSPADM

## 2023-06-22 RX ORDER — PAPAVERINE HYDROCHLORIDE 30 MG/ML
INJECTION INTRAMUSCULAR; INTRAVENOUS
Status: DISCONTINUED | OUTPATIENT
Start: 2023-06-22 | End: 2023-06-22 | Stop reason: HOSPADM

## 2023-06-22 RX ORDER — ONDANSETRON 2 MG/ML
INJECTION INTRAMUSCULAR; INTRAVENOUS
Status: DISCONTINUED | OUTPATIENT
Start: 2023-06-22 | End: 2023-06-22 | Stop reason: HOSPADM

## 2023-06-22 RX ORDER — METOCLOPRAMIDE HYDROCHLORIDE 5 MG/ML
5 INJECTION INTRAMUSCULAR; INTRAVENOUS EVERY 6 HOURS PRN
Status: DISCONTINUED | OUTPATIENT
Start: 2023-06-22 | End: 2023-07-05 | Stop reason: HOSPADM

## 2023-06-22 RX ORDER — DEXTROSE, SODIUM CHLORIDE, SODIUM LACTATE, POTASSIUM CHLORIDE, AND CALCIUM CHLORIDE 5; .6; .31; .03; .02 G/100ML; G/100ML; G/100ML; G/100ML; G/100ML
INJECTION, SOLUTION INTRAVENOUS CONTINUOUS
Status: ACTIVE | OUTPATIENT
Start: 2023-06-22 | End: 2023-06-23

## 2023-06-22 RX ORDER — CALCIUM GLUCONATE 20 MG/ML
3 INJECTION, SOLUTION INTRAVENOUS
Status: DISCONTINUED | OUTPATIENT
Start: 2023-06-22 | End: 2023-07-05 | Stop reason: HOSPADM

## 2023-06-22 RX ORDER — CYANOCOBALAMIN (VITAMIN B-12) 250 MCG
1000 TABLET ORAL DAILY
Status: DISCONTINUED | OUTPATIENT
Start: 2023-06-24 | End: 2023-07-05 | Stop reason: HOSPADM

## 2023-06-22 RX ORDER — MIDAZOLAM HYDROCHLORIDE 1 MG/ML
INJECTION INTRAMUSCULAR; INTRAVENOUS
Status: DISCONTINUED | OUTPATIENT
Start: 2023-06-22 | End: 2023-06-22 | Stop reason: HOSPADM

## 2023-06-22 RX ORDER — HYDROCODONE BITARTRATE AND ACETAMINOPHEN 500; 5 MG/1; MG/1
TABLET ORAL
Status: DISCONTINUED | OUTPATIENT
Start: 2023-06-22 | End: 2023-06-28

## 2023-06-22 RX ORDER — POTASSIUM CHLORIDE 14.9 MG/ML
60 INJECTION INTRAVENOUS
Status: DISCONTINUED | OUTPATIENT
Start: 2023-06-22 | End: 2023-07-05 | Stop reason: HOSPADM

## 2023-06-22 RX ORDER — VASOPRESSIN IN DEXTROSE 5 % 25/250 ML
0.01 PLASTIC BAG, INJECTION (ML) INTRAVENOUS CONTINUOUS
Status: DISCONTINUED | OUTPATIENT
Start: 2023-06-22 | End: 2023-06-22 | Stop reason: HOSPADM

## 2023-06-22 RX ORDER — AMIODARONE HYDROCHLORIDE 200 MG/1
200 TABLET ORAL
Status: COMPLETED | OUTPATIENT
Start: 2023-06-22 | End: 2023-06-22

## 2023-06-22 RX ORDER — NICARDIPINE HYDROCHLORIDE 2.5 MG/ML
INJECTION INTRAVENOUS
Status: DISCONTINUED | OUTPATIENT
Start: 2023-06-22 | End: 2023-06-22 | Stop reason: HOSPADM

## 2023-06-22 RX ORDER — LIDOCAINE HYDROCHLORIDE 20 MG/ML
INJECTION INTRAVENOUS
Status: DISCONTINUED | OUTPATIENT
Start: 2023-06-22 | End: 2023-06-22 | Stop reason: HOSPADM

## 2023-06-22 RX ORDER — CHLORHEXIDINE GLUCONATE ORAL RINSE 1.2 MG/ML
10 SOLUTION DENTAL 2 TIMES DAILY
Status: COMPLETED | OUTPATIENT
Start: 2023-06-22 | End: 2023-06-27

## 2023-06-22 RX ORDER — MAGNESIUM SULFATE HEPTAHYDRATE 40 MG/ML
4 INJECTION, SOLUTION INTRAVENOUS
Status: DISCONTINUED | OUTPATIENT
Start: 2023-06-22 | End: 2023-07-05 | Stop reason: HOSPADM

## 2023-06-22 RX ORDER — HEPARIN SODIUM 1000 [USP'U]/ML
INJECTION, SOLUTION INTRAVENOUS; SUBCUTANEOUS
Status: DISCONTINUED | OUTPATIENT
Start: 2023-06-22 | End: 2023-06-22 | Stop reason: HOSPADM

## 2023-06-22 RX ORDER — ALBUMIN HUMAN 50 G/1000ML
25 SOLUTION INTRAVENOUS
Status: DISCONTINUED | OUTPATIENT
Start: 2023-06-22 | End: 2023-07-05 | Stop reason: HOSPADM

## 2023-06-22 RX ORDER — NOREPINEPHRINE BITARTRATE 1 MG/ML
INJECTION, SOLUTION INTRAVENOUS
Status: DISCONTINUED | OUTPATIENT
Start: 2023-06-22 | End: 2023-06-22 | Stop reason: HOSPADM

## 2023-06-22 RX ORDER — ASCORBIC ACID 500 MG
500 TABLET ORAL 2 TIMES DAILY
Status: DISCONTINUED | OUTPATIENT
Start: 2023-06-23 | End: 2023-07-05 | Stop reason: HOSPADM

## 2023-06-22 RX ORDER — CLOPIDOGREL BISULFATE 75 MG/1
75 TABLET ORAL DAILY
Status: DISCONTINUED | OUTPATIENT
Start: 2023-06-23 | End: 2023-06-23

## 2023-06-22 RX ORDER — FOLIC ACID 1 MG/1
1 TABLET ORAL DAILY
Status: DISCONTINUED | OUTPATIENT
Start: 2023-06-24 | End: 2023-06-22

## 2023-06-22 RX ORDER — KETAMINE HCL IN 0.9 % NACL 50 MG/5 ML
SYRINGE (ML) INTRAVENOUS
Status: DISCONTINUED | OUTPATIENT
Start: 2023-06-22 | End: 2023-06-22 | Stop reason: HOSPADM

## 2023-06-22 RX ORDER — ACETAMINOPHEN 10 MG/ML
INJECTION, SOLUTION INTRAVENOUS
Status: DISCONTINUED | OUTPATIENT
Start: 2023-06-22 | End: 2023-06-22 | Stop reason: HOSPADM

## 2023-06-22 RX ORDER — LIDOCAINE HYDROCHLORIDE ANHYDROUS AND DEXTROSE MONOHYDRATE .8; 5 G/100ML; G/100ML
INJECTION, SOLUTION INTRAVENOUS CONTINUOUS PRN
Status: DISCONTINUED | OUTPATIENT
Start: 2023-06-22 | End: 2023-06-22 | Stop reason: HOSPADM

## 2023-06-22 RX ORDER — ACETAMINOPHEN 10 MG/ML
1000 INJECTION, SOLUTION INTRAVENOUS EVERY 8 HOURS
Status: COMPLETED | OUTPATIENT
Start: 2023-06-22 | End: 2023-06-23

## 2023-06-22 RX ORDER — IPRATROPIUM BROMIDE AND ALBUTEROL SULFATE 2.5; .5 MG/3ML; MG/3ML
3 SOLUTION RESPIRATORY (INHALATION) EVERY 4 HOURS
Status: DISPENSED | OUTPATIENT
Start: 2023-06-22 | End: 2023-06-23

## 2023-06-22 RX ORDER — ROCURONIUM BROMIDE 10 MG/ML
INJECTION, SOLUTION INTRAVENOUS
Status: DISCONTINUED | OUTPATIENT
Start: 2023-06-22 | End: 2023-06-22 | Stop reason: HOSPADM

## 2023-06-22 RX ORDER — PROTAMINE SULFATE 10 MG/ML
INJECTION, SOLUTION INTRAVENOUS
Status: DISCONTINUED | OUTPATIENT
Start: 2023-06-22 | End: 2023-06-22 | Stop reason: HOSPADM

## 2023-06-22 RX ORDER — FENTANYL CITRATE 50 UG/ML
INJECTION, SOLUTION INTRAMUSCULAR; INTRAVENOUS
Status: DISCONTINUED | OUTPATIENT
Start: 2023-06-22 | End: 2023-06-22 | Stop reason: HOSPADM

## 2023-06-22 RX ORDER — DEXMEDETOMIDINE HYDROCHLORIDE 4 UG/ML
0-1.4 INJECTION INTRAVENOUS CONTINUOUS
Status: DISCONTINUED | OUTPATIENT
Start: 2023-06-22 | End: 2023-06-23

## 2023-06-22 RX ORDER — FUROSEMIDE 10 MG/ML
40 INJECTION INTRAMUSCULAR; INTRAVENOUS 2 TIMES DAILY
Status: DISCONTINUED | OUTPATIENT
Start: 2023-06-23 | End: 2023-07-05 | Stop reason: HOSPADM

## 2023-06-22 RX ORDER — SODIUM CHLORIDE, SODIUM LACTATE, POTASSIUM CHLORIDE, CALCIUM CHLORIDE 600; 310; 30; 20 MG/100ML; MG/100ML; MG/100ML; MG/100ML
1000 INJECTION, SOLUTION INTRAVENOUS
Status: DISCONTINUED | OUTPATIENT
Start: 2023-06-22 | End: 2023-07-05 | Stop reason: HOSPADM

## 2023-06-22 RX ORDER — POTASSIUM CHLORIDE 29.8 MG/ML
40 INJECTION INTRAVENOUS
Status: DISCONTINUED | OUTPATIENT
Start: 2023-06-22 | End: 2023-07-05 | Stop reason: HOSPADM

## 2023-06-22 RX ORDER — METOPROLOL TARTRATE 25 MG/1
25 TABLET, FILM COATED ORAL 2 TIMES DAILY
Status: DISCONTINUED | OUTPATIENT
Start: 2023-06-23 | End: 2023-06-27

## 2023-06-22 RX ORDER — ASPIRIN 81 MG/1
81 TABLET ORAL DAILY
Status: DISCONTINUED | OUTPATIENT
Start: 2023-06-23 | End: 2023-06-29

## 2023-06-22 RX ORDER — PHENYLEPHRINE HYDROCHLORIDE 10 MG/ML
INJECTION INTRAVENOUS
Status: DISCONTINUED | OUTPATIENT
Start: 2023-06-22 | End: 2023-06-22 | Stop reason: HOSPADM

## 2023-06-22 RX ORDER — PHYTONADIONE 1 MG/.5ML
2 INJECTION, EMULSION INTRAMUSCULAR; INTRAVENOUS; SUBCUTANEOUS ONCE
Status: DISCONTINUED | OUTPATIENT
Start: 2023-06-22 | End: 2023-06-22

## 2023-06-22 RX ORDER — POLYETHYLENE GLYCOL 3350 17 G/17G
17 POWDER, FOR SOLUTION ORAL DAILY
Status: DISCONTINUED | OUTPATIENT
Start: 2023-06-23 | End: 2023-07-05 | Stop reason: HOSPADM

## 2023-06-22 RX ADMIN — PHYTONADIONE 2 MG: 10 INJECTION, EMULSION INTRAMUSCULAR; INTRAVENOUS; SUBCUTANEOUS at 09:06

## 2023-06-22 RX ADMIN — FENTANYL CITRATE 50 MCG: 50 INJECTION, SOLUTION INTRAMUSCULAR; INTRAVENOUS at 07:06

## 2023-06-22 RX ADMIN — PHENYLEPHRINE HYDROCHLORIDE 100 MCG: 10 INJECTION INTRAVENOUS at 08:06

## 2023-06-22 RX ADMIN — DEXMEDETOMIDINE HYDROCHLORIDE 0.4 MCG/KG/HR: 4 INJECTION INTRAVENOUS at 06:06

## 2023-06-22 RX ADMIN — METOPROLOL TARTRATE 25 MG: 25 TABLET, FILM COATED ORAL at 05:06

## 2023-06-22 RX ADMIN — SODIUM CHLORIDE 10 UNITS/HR: 9 INJECTION, SOLUTION INTRAVENOUS at 10:06

## 2023-06-22 RX ADMIN — HEPARIN SODIUM 40000 UNITS: 1000 INJECTION, SOLUTION INTRAVENOUS; SUBCUTANEOUS at 09:06

## 2023-06-22 RX ADMIN — CALCIUM CHLORIDE 0.5 G: 100 INJECTION, SOLUTION INTRAVENOUS at 01:06

## 2023-06-22 RX ADMIN — NOREPINEPHRINE BITARTRATE 16 MCG: 1 INJECTION, SOLUTION, CONCENTRATE INTRAVENOUS at 02:06

## 2023-06-22 RX ADMIN — CEFAZOLIN 2 G: 330 INJECTION, POWDER, FOR SOLUTION INTRAMUSCULAR; INTRAVENOUS at 12:06

## 2023-06-22 RX ADMIN — NOREPINEPHRINE BITARTRATE 16 MCG: 1 INJECTION, SOLUTION, CONCENTRATE INTRAVENOUS at 01:06

## 2023-06-22 RX ADMIN — MAGNESIUM SULFATE HEPTAHYDRATE 2 G: 2 INJECTION, SOLUTION INTRAVENOUS at 07:06

## 2023-06-22 RX ADMIN — EPINEPHRINE 0.08 MCG/KG/MIN: 1 INJECTION INTRAMUSCULAR; INTRAVENOUS; SUBCUTANEOUS at 12:06

## 2023-06-22 RX ADMIN — FENTANYL CITRATE 25 MCG: 50 INJECTION, SOLUTION INTRAMUSCULAR; INTRAVENOUS at 07:06

## 2023-06-22 RX ADMIN — POTASSIUM CHLORIDE 20 MEQ: 200 INJECTION, SOLUTION INTRAVENOUS at 06:06

## 2023-06-22 RX ADMIN — FENTANYL CITRATE 25 MCG: 50 INJECTION INTRAMUSCULAR; INTRAVENOUS at 05:06

## 2023-06-22 RX ADMIN — SODIUM BICARBONATE 100 MEQ: 84 INJECTION INTRAVENOUS at 01:06

## 2023-06-22 RX ADMIN — NOREPINEPHRINE BITARTRATE 16 MCG: 1 INJECTION, SOLUTION, CONCENTRATE INTRAVENOUS at 09:06

## 2023-06-22 RX ADMIN — LATANOPROST 1 DROP: 50 SOLUTION OPHTHALMIC at 09:06

## 2023-06-22 RX ADMIN — SODIUM CHLORIDE: 0.9 INJECTION, SOLUTION INTRAVENOUS at 07:06

## 2023-06-22 RX ADMIN — EPINEPHRINE 2.5 MCG: 1 INJECTION, SOLUTION INTRACARDIAC; INTRAMUSCULAR; INTRAVENOUS; SUBCUTANEOUS at 10:06

## 2023-06-22 RX ADMIN — IPRATROPIUM BROMIDE AND ALBUTEROL SULFATE 3 ML: .5; 3 SOLUTION RESPIRATORY (INHALATION) at 03:06

## 2023-06-22 RX ADMIN — FENTANYL CITRATE 25 MCG: 50 INJECTION INTRAMUSCULAR; INTRAVENOUS at 06:06

## 2023-06-22 RX ADMIN — NOREPINEPHRINE BITARTRATE 0.01 MCG/KG/MIN: 1 INJECTION, SOLUTION, CONCENTRATE INTRAVENOUS at 09:06

## 2023-06-22 RX ADMIN — CHLORHEXIDINE GLUCONATE 0.12% ORAL RINSE 10 ML: 1.2 LIQUID ORAL at 09:06

## 2023-06-22 RX ADMIN — ACETAMINOPHEN 1000 MG: 10 INJECTION, SOLUTION INTRAVENOUS at 07:06

## 2023-06-22 RX ADMIN — Medication 40 MG: at 01:06

## 2023-06-22 RX ADMIN — ROCURONIUM BROMIDE 50 MG: 10 SOLUTION INTRAVENOUS at 10:06

## 2023-06-22 RX ADMIN — PANTOPRAZOLE SODIUM 40 MG: 40 INJECTION, POWDER, FOR SOLUTION INTRAVENOUS at 03:06

## 2023-06-22 RX ADMIN — ROCURONIUM BROMIDE 100 MG: 10 SOLUTION INTRAVENOUS at 07:06

## 2023-06-22 RX ADMIN — SUGAMMADEX 200 MG: 100 INJECTION, SOLUTION INTRAVENOUS at 02:06

## 2023-06-22 RX ADMIN — AMINOCAPROIC ACID 5 G: 250 INJECTION, SOLUTION INTRAVENOUS at 08:06

## 2023-06-22 RX ADMIN — SODIUM CHLORIDE, POTASSIUM CHLORIDE, SODIUM LACTATE AND CALCIUM CHLORIDE: 600; 310; 30; 20 INJECTION, SOLUTION INTRAVENOUS at 01:06

## 2023-06-22 RX ADMIN — CEFAZOLIN SODIUM 2 G: 2 SOLUTION INTRAVENOUS at 08:06

## 2023-06-22 RX ADMIN — ROCURONIUM BROMIDE 30 MG: 10 SOLUTION INTRAVENOUS at 12:06

## 2023-06-22 RX ADMIN — CALCIUM CHLORIDE 1 G: 100 INJECTION, SOLUTION INTRAVENOUS at 01:06

## 2023-06-22 RX ADMIN — ACETAMINOPHEN 650 MG: 325 TABLET ORAL at 06:06

## 2023-06-22 RX ADMIN — VASOPRESSIN 0.04 UNITS/MIN: 0.2 INJECTION INTRAVENOUS at 03:06

## 2023-06-22 RX ADMIN — ROCURONIUM BROMIDE 30 MG: 10 SOLUTION INTRAVENOUS at 09:06

## 2023-06-22 RX ADMIN — PHENYLEPHRINE HYDROCHLORIDE 200 MCG: 10 INJECTION INTRAVENOUS at 08:06

## 2023-06-22 RX ADMIN — INSULIN HUMAN 10 UNITS: 100 INJECTION, SOLUTION PARENTERAL at 12:06

## 2023-06-22 RX ADMIN — SODIUM CHLORIDE: 0.9 INJECTION, SOLUTION INTRAVENOUS at 10:06

## 2023-06-22 RX ADMIN — PROPOFOL 30 MG: 10 INJECTION, EMULSION INTRAVENOUS at 07:06

## 2023-06-22 RX ADMIN — NICARDIPINE HYDROCHLORIDE 0.2 MG: 2.5 INJECTION INTRAVENOUS at 01:06

## 2023-06-22 RX ADMIN — LIDOCAINE HYDROCHLORIDE 60 MG: 20 INJECTION INTRAVENOUS at 07:06

## 2023-06-22 RX ADMIN — NOREPINEPHRINE BITARTRATE 8 MCG: 1 INJECTION, SOLUTION, CONCENTRATE INTRAVENOUS at 09:06

## 2023-06-22 RX ADMIN — SODIUM CHLORIDE, SODIUM LACTATE, POTASSIUM CHLORIDE, CALCIUM CHLORIDE AND DEXTROSE MONOHYDRATE: 5; 600; 310; 30; 20 INJECTION, SOLUTION INTRAVENOUS at 03:06

## 2023-06-22 RX ADMIN — SODIUM CHLORIDE 1 UNITS/HR: 9 INJECTION, SOLUTION INTRAVENOUS at 03:06

## 2023-06-22 RX ADMIN — OXYCODONE HYDROCHLORIDE 10 MG: 5 TABLET ORAL at 07:06

## 2023-06-22 RX ADMIN — PROTAMINE SULFATE 450 MG: 10 INJECTION, SOLUTION INTRAVENOUS at 01:06

## 2023-06-22 RX ADMIN — LIDOCAINE HYDROCHLORIDE 2 MG/MIN: 8 INJECTION, SOLUTION INTRAVENOUS at 07:06

## 2023-06-22 RX ADMIN — INSULIN HUMAN 5 UNITS: 100 INJECTION, SOLUTION PARENTERAL at 07:06

## 2023-06-22 RX ADMIN — PHENYLEPHRINE HYDROCHLORIDE 100 MCG: 10 INJECTION INTRAVENOUS at 09:06

## 2023-06-22 RX ADMIN — ACETAMINOPHEN 1000 MG: 10 INJECTION INTRAVENOUS at 11:06

## 2023-06-22 RX ADMIN — VANCOMYCIN HYDROCHLORIDE 1000 MG: 1 INJECTION, POWDER, LYOPHILIZED, FOR SOLUTION INTRAVENOUS at 09:06

## 2023-06-22 RX ADMIN — ROCURONIUM BROMIDE 50 MG: 10 SOLUTION INTRAVENOUS at 01:06

## 2023-06-22 RX ADMIN — AMITRIPTYLINE HYDROCHLORIDE 50 MG: 50 TABLET, FILM COATED ORAL at 09:06

## 2023-06-22 RX ADMIN — VASOPRESSIN 0.04 UNITS/MIN: 20 INJECTION INTRAVENOUS at 10:06

## 2023-06-22 RX ADMIN — ROCURONIUM BROMIDE 20 MG: 10 SOLUTION INTRAVENOUS at 08:06

## 2023-06-22 RX ADMIN — EPINEPHRINE 0.14 MCG/KG/MIN: 1 INJECTION INTRAMUSCULAR; INTRAVENOUS; SUBCUTANEOUS at 07:06

## 2023-06-22 RX ADMIN — CEFAZOLIN 2 G: 330 INJECTION, POWDER, FOR SOLUTION INTRAMUSCULAR; INTRAVENOUS at 08:06

## 2023-06-22 RX ADMIN — NOREPINEPHRINE BITARTRATE 16 MCG: 1 INJECTION, SOLUTION, CONCENTRATE INTRAVENOUS at 10:06

## 2023-06-22 RX ADMIN — EPINEPHRINE 0.1 MCG/KG/MIN: 1 INJECTION INTRAMUSCULAR; INTRAVENOUS; SUBCUTANEOUS at 03:06

## 2023-06-22 RX ADMIN — AMINOCAPROIC ACID 5 G: 250 INJECTION, SOLUTION INTRAVENOUS at 01:06

## 2023-06-22 RX ADMIN — EPINEPHRINE 2 MCG: 1 INJECTION, SOLUTION INTRACARDIAC; INTRAMUSCULAR; INTRAVENOUS; SUBCUTANEOUS at 10:06

## 2023-06-22 RX ADMIN — CHLORHEXIDINE GLUCONATE 0.12% ORAL RINSE 10 ML: 1.2 LIQUID ORAL at 06:06

## 2023-06-22 RX ADMIN — PROTAMINE SULFATE 50 MG: 10 INJECTION, SOLUTION INTRAVENOUS at 01:06

## 2023-06-22 RX ADMIN — MIDAZOLAM HYDROCHLORIDE 2 MG: 1 INJECTION, SOLUTION INTRAMUSCULAR; INTRAVENOUS at 07:06

## 2023-06-22 RX ADMIN — INSULIN HUMAN 10 UNITS: 100 INJECTION, SOLUTION PARENTERAL at 10:06

## 2023-06-22 RX ADMIN — OXYCODONE HYDROCHLORIDE 10 MG: 5 TABLET ORAL at 11:06

## 2023-06-22 RX ADMIN — ROCURONIUM BROMIDE 30 MG: 10 SOLUTION INTRAVENOUS at 08:06

## 2023-06-22 RX ADMIN — NOREPINEPHRINE BITARTRATE 8 MCG: 1 INJECTION, SOLUTION, CONCENTRATE INTRAVENOUS at 10:06

## 2023-06-22 RX ADMIN — DEXMEDETOMIDINE 0.3 MCG/KG/HR: 200 INJECTION, SOLUTION INTRAVENOUS at 10:06

## 2023-06-22 RX ADMIN — ALBUMIN HUMAN: 50 SOLUTION INTRAVENOUS at 10:06

## 2023-06-22 RX ADMIN — AMIODARONE HYDROCHLORIDE 200 MG: 200 TABLET ORAL at 06:06

## 2023-06-22 RX ADMIN — ACETAMINOPHEN 1000 MG: 10 INJECTION INTRAVENOUS at 04:06

## 2023-06-22 RX ADMIN — INSULIN HUMAN 10 UNITS: 100 INJECTION, SOLUTION PARENTERAL at 11:06

## 2023-06-22 RX ADMIN — IPRATROPIUM BROMIDE AND ALBUTEROL SULFATE 3 ML: .5; 3 SOLUTION RESPIRATORY (INHALATION) at 08:06

## 2023-06-22 RX ADMIN — PHENYLEPHRINE HYDROCHLORIDE 200 MCG: 10 INJECTION INTRAVENOUS at 07:06

## 2023-06-22 RX ADMIN — ALBUMIN (HUMAN) 25 G: 2.5 SOLUTION INTRAVENOUS at 03:06

## 2023-06-22 RX ADMIN — DEXMEDETOMIDINE HYDROCHLORIDE 0.3 MCG/KG/HR: 4 INJECTION INTRAVENOUS at 03:06

## 2023-06-22 RX ADMIN — SODIUM CHLORIDE 1600 MG: 9 INJECTION, SOLUTION INTRAVENOUS at 07:06

## 2023-06-22 NOTE — PROGRESS NOTES
The patient is a 63-year-old male with CHF, AFib, status post CVA, hyperlipidemia, hypertension, diabetes and cardiomyopathy who had an abnormal Cardiolite test on workup.  Cardiac catheterization shows severe multivessel coronary disease with a 70% ostial left main disease.  Echocardiogram shows a severely depressed ejection fraction of 25%.  The patient is now in the operating room for coronary artery bypass grafting and Woodruff Maze 4 procedure.  The risks and benefits of surgery were explained to the patient and his wife.  The patient risks include but not limited to death, stroke, myocardial infarction, heart failure, renal failure, infection, bleeding, reoperation, prolonged hospitalization and prolonged intubation.  In addition, the patient understands that and Impella left ventricular assist device may be inserted in the event of post cardiotomy cardiogenic shock.  The patient understand the risks and benefits of surgery and has agreed to proceed with urgent coronary artery bypass grafting and Woodruff Maze 4 procedure.

## 2023-06-22 NOTE — HOSPITAL COURSE
Transferred to ICU intubated sedated with Precedex.  On pressors vasopressin and epinephrine.  Chest x-ray and ABG reviewed.  Status post CABG x3.  I changed him to pressure support.  Cardiac index 1.8 cardiac output 4.2  6/23 seen and examined.  No major events overnight.  Postop day 1 status post CABG x3.  On vaso on epinephrine drip.  On insulin drip.  Paced rhythm at 90.  Extubated yesterday around 6:00 p.m..  Urine output last 24 hours 1.2 L.  Chest tube output 400 mL.  Positive 4 L since admission  6/24 no major events overnight.  Postop day 2 status post CABG.  Paced rhythm.  Status post PICC line.  Afebrile.  Urine output 1.4 L last 24 hours positive 2.4 L since admission

## 2023-06-22 NOTE — ASSESSMENT & PLAN NOTE
Patient is identified as having Systolic (HFrEF) heart failure that is Chronic. CHF is currently controlled. Latest ECHO performed and demonstrates- Results for orders placed during the hospital encounter of 06/20/23    Echo    Interpretation Summary  · The left ventricle is moderately enlarged with eccentric hypertrophy and severely decreased systolic function.  · Grade I left ventricular diastolic dysfunction.  · Normal right ventricular size with normal right ventricular systolic function.  · Normal central venous pressure (3 mmHg).  · The estimated ejection fraction is 25%.  · Mild aortic regurgitation.  · Mild mitral regurgitation.  · There is severe left ventricular global hypokinesis.  . Continue Beta Blocker and monitor clinical status closely. Monitor on telemetry. Patient is on CHF pathway.  Monitor strict Is&Os and daily weights.  Place on fluid restriction of 1.5 L. Continue to stress to patient importance of self efficacy and  on diet for CHF. Last BNP reviewed- and noted below   Recent Labs   Lab 06/20/23 1807   BNP 63   .

## 2023-06-22 NOTE — TRANSFER OF CARE
"Anesthesia Transfer of Care Note    Patient: Gustavo Tracey Jr.    Procedure(s) Performed: Procedure(s) (LRB):  CORONARY ARTERY BYPASS GRAFT (CABG) (N/A)  PURDY MAZE PROCEDURE (N/A)  SURGICAL PROCUREMENT, VEIN, ENDOSCOPIC (Left)  ECHOCARDIOGRAM,TRANSESOPHAGEAL (N/A)  BLOCK, NERVE, INTERCOSTAL, 2 OR MORE (N/A)  CLOSURE, LEFT ATRIAL APPENDAGE, USING DEVICE (Left)    Patient location: ICU    Anesthesia Type: general    Transport from OR: Transported from OR on room air with adequate spontaneous ventilation. Transported from OR intubated on 100% O2  with adequate ventilation controlled by transport ventilator    Post pain: adequate analgesia    Post assessment: no apparent anesthetic complications    Post vital signs: stable    Level of consciousness: sedated    Nausea/Vomiting: no nausea/vomiting    Complications: none    Transfer of care protocol was followed      Last vitals:   Visit Vitals  /77   Pulse 76   Temp 36.8 °C (98.3 °F)   Resp 18   Ht 6' 1" (1.854 m)   Wt 108.9 kg (240 lb)   SpO2 97%   BMI 31.66 kg/m²     "

## 2023-06-22 NOTE — ANESTHESIA PROCEDURE NOTES
Monitor TYE    Diagnosis: CAD  Patient location during procedure: OR  Procedure start time: 6/22/2023 7:55 AM  Exam type: Monitor Only    Staffing  Authorizing Provider: Javid Cramer MD  Performing Provider: Javid Cramer MD    Staffing  Anesthesiologist Present  Yes  Setup & Induction  Patient preparation: bite block inserted  Probe Insertion: easy      Findings    Aortic valve without significant stenosis or AI  Mitral valve without significant stenosis; mild MR    Tricuspid valve without significant stenosis or TR  No vegetations or pericardial effusions noted    EF approx 25%  Intervent septum thickness WNL and without defect; septal akinesis noted  Images saved to disk   Impression    Other Findings    Probe Removal

## 2023-06-22 NOTE — HPI
63-year-old male patient with abnormal stress test found to have severe CAD on left heart catheterization status post CABG x3 today transferred to ICU for postop care.

## 2023-06-22 NOTE — ANESTHESIA PROCEDURE NOTES
Arterial    Diagnosis: CAD    Patient location during procedure: done in OR  Procedure start time: 6/22/2023 7:24 AM  Timeout: 6/22/2023 7:24 AM  Procedure end time: 6/22/2023 7:26 AM    Staffing  Authorizing Provider: Javid Cramer MD  Performing Provider: Javid Cramer MD    Anesthesiologist was present at the time of the procedure.    Preanesthetic Checklist  Completed: patient identified, IV checked, site marked, risks and benefits discussed, surgical consent, monitors and equipment checked, pre-op evaluation, timeout performed and anesthesia consent givenArterial  Skin Prep: chlorhexidine gluconate  Local Infiltration: lidocaine  Orientation: right  Location: radial    Catheter Size: 20 G  Catheter placement by Ultrasound guidance and Anatomical landmarks. Heme positive aspiration all ports.   Vessel Caliber: large, patent, compressibility normal  Vascular Doppler:  not done  Needle advanced into vessel with real time Ultrasound guidance.Insertion Attempts: 2  Assessment  Dressing: secured with tape and tegaderm and sutured in place and taped  Patient: Tolerated well

## 2023-06-22 NOTE — INTERVAL H&P NOTE
The patient has been examined and the H&P has been reviewed:    I concur with the findings and no changes have occurred since H&P was written.    Surgery risks, benefits and alternative options discussed and understood by patient/family.          Active Hospital Problems    Diagnosis  POA    *Abnormal stress test [R94.39]  Yes    Left main coronary artery disease [I25.10]  Unknown    History of CVA (cerebrovascular accident) [Z86.73]  Not Applicable    Stage 3a chronic kidney disease [N18.31]  Yes    IFG (impaired fasting glucose) [R73.01]  Yes    Pulmonary HTN [I27.20]  Yes    Acute on chronic systolic CHF (congestive heart failure) [I50.23]  Yes    PAF (paroxysmal atrial fibrillation) [I48.0]  Yes    DCM (dilated cardiomyopathy) [I42.0]  Yes    Primary hypertension [I10]  Yes    Other hyperlipidemia [E78.49]  Yes    Coronary artery disease without angina pectoris [I25.10]  Yes      Resolved Hospital Problems   No resolved problems to display.

## 2023-06-22 NOTE — CONSULTS
Interested CPAP bottle on-call hello id fever [facility] - Intensive Care ([facility])  Critical Care Medicine  Consult Note    Patient Name: Gustavo Tracey Jr.  MRN: [id number]  Admission Date: 6/20/2023  Hospital Length of Stay: 1 days  Code Status: Full Code  Attending Physician: Rustam Dave MD   Primary Care Provider: Braxton Guzman Jr, MD   Principal Problem: Abnormal stress test    [unfilled]  Subjective:     HPI:  63-year-old male patient with abnormal stress test found to have severe CAD on left heart catheterization status post CABG x3 today transferred to ICU for postop care.      Hospital/ICU Course:  Transferred to ICU intubated sedated with Precedex.  On pressors vasopressin and epinephrine.  Chest x-ray and ABG reviewed.  Status post CABG x3.  I changed him to pressure support.  Cardiac index 1.8 cardiac output 4.2      Past Medical History:   Diagnosis Date    Abnormal nuclear stress test 11/26/2022    Cervical radiculopathy     to rt arm    CHF (congestive heart failure)     Chronic systolic congestive heart failure 11/28/2022    Dilated cardiomyopathy 11/26/2022    Hyperlipidemia     Hypertension     Obesity, unspecified     PAF (paroxysmal atrial fibrillation) 11/26/2022    Pulmonary HTN 11/28/2022    Stroke        Past Surgical History:   Procedure Laterality Date    INSTANTANEOUS WAVE-FREE RATIO  6/20/2023    Procedure: Instantaneous Wave-Free Ratio;  Surgeon: Zion Ortega MD;  Location: [facility] CATH LAB;  Service: Cardiology;;    IVUS, CORONARY  6/20/2023    Procedure: IVUS, Coronary;  Surgeon: Zion Ortega MD;  Location: [facility] CATH LAB;  Service: Cardiology;;    LEFT HEART CATHETERIZATION Left 11/28/2022    Procedure: CATHETERIZATION, HEART, LEFT;  Surgeon: Zion Ortega MD;  Location: [facility] CATH LAB;  Service: Cardiology;  Laterality: Left;    LEFT HEART CATHETERIZATION Left 6/20/2023    Procedure: Left heart cath;  Surgeon: Zion Ortega MD;  Location: [facility] CATH LAB;  Service:  Cardiology;  Laterality: Left;    PERCUTANEOUS TRANSLUMINAL BALLOON ANGIOPLASTY OF CORONARY ARTERY  6/20/2023    Procedure: Angioplasty-coronary;  Surgeon: Zion Ortega MD;  Location: [facility] CATH LAB;  Service: Cardiology;;    RIGHT HEART CATHETERIZATION N/A 11/28/2022    Procedure: INSERTION, CATHETER, RIGHT HEART;  Surgeon: Zion Ortega MD;  Location: [facility] CATH LAB;  Service: Cardiology;  Laterality: N/A;  Congestive heart failure       Review of patient's allergies indicates:  No Known Allergies    Family History       Problem Relation (Age of Onset)    Coronary artery disease Father    Diabetes Father    Heart attack Father    Hyperlipidemia Father    Hypertension Mother, Father          Tobacco Use    Smoking status: Never    Smokeless tobacco: Never   Substance and Sexual Activity    Alcohol use: Yes    Drug use: Never    Sexual activity: Not Currently         Review of Systems   Unable to perform ROS: Intubated   Objective:     Vital Signs (Most Recent):  Temp: 98.3 °F (36.8 °C) (06/22/23 0429)  Pulse: 91 (06/22/23 1545)  Resp: (!) 25 (06/22/23 1545)  BP: (!) 86/57 (06/22/23 1506)  SpO2: 100 % (06/22/23 1545) Vital Signs (24h Range):  Temp:  [97.9 °F (36.6 °C)-98.9 °F (37.2 °C)] 98.3 °F (36.8 °C)  Pulse:  [] 91  Resp:  [16-27] 25  SpO2:  [95 %-100 %] 100 %  BP: ()/(57-92) 86/57  Arterial Line BP: ()/(58-65) 113/64     Weight: 108.9 kg (240 lb)  Body mass index is 31.66 kg/m².      Intake/Output Summary (Last 24 hours) at 6/22/2023 1554  Last data filed at 6/22/2023 1455  Gross per 24 hour   Intake 4250 ml   Output 400 ml   Net 3850 ml        Physical Exam  Constitutional:       Appearance: He is obese.   HENT:      Mouth/Throat:      Comments: ETT  Neck:      Comments: Right IJ right heart catheter  Cardiovascular:      Rate and Rhythm: Normal rate and regular rhythm.   Pulmonary:      Effort: Respiratory distress present.      Breath sounds: No stridor.      Comments: Chest tubes  noted  Abdominal:      Palpations: Abdomen is soft.   Genitourinary:     Comments: Rankin catheter  Musculoskeletal:         General: Deformity present.      Comments: Radial A-line   Skin:     General: Skin is warm.      Comments: Lower extremity dressing    Chest wound dressing    Neurological:      General: No focal deficit present.        Vents:  Vent Mode: A/C (06/22/23 1453)  Set Rate: 18 BPM (06/22/23 1453)  Vt Set: 500 mL (06/22/23 1453)  PEEP/CPAP: 5 cmH20 (06/22/23 1453)  Oxygen Concentration (%): 100 (06/22/23 1453)  Peak Airway Pressure: 19 cmH20 (06/22/23 1453)  Total Ve: 10.3 L/m (06/22/23 1453)  F/VT Ratio<105 (RSBI): (!) 35.29 (06/22/23 1453)    Lines/Drains/Airways       Central Venous Catheter Line  Duration             Pulmonary Artery Catheter Assessment  06/22/23 0741 <1 day              Drain  Duration                  Chest Tube 06/22/23 1317 Tube - 3 Left Pleural 24 Fr. <1 day         Closed/Suction Drain 06/22/23 1257 Left Leg Bulb 19 Fr. <1 day         Urethral Catheter 06/22/23 0733 Silicone;Temperature probe;Straight-tip 16 Fr. <1 day         Y Chest Tube 1 and 2 06/22/23 1316 1 Right Mediastinal 24 Fr. 2 Left Mediastinal 24 Fr. <1 day              Airway  Duration                  Airway - Non-Surgical 06/22/23 0729 <1 day              Arterial Line  Duration             Arterial Line 06/22/23 0724 Right Radial <1 day              Peripheral Intravenous Line  Duration                  Peripheral IV - Single Lumen 06/20/23 0944 20 G Posterior;Right Wrist 2 days                    Significant Labs:    CBC/Anemia Profile:  Recent Labs   Lab   0000 06/20/23  1807 06/21/23  1113 06/22/23  0805 06/22/23  1311 06/22/23  1328 06/22/23  1351 06/22/23  1456 06/22/23  1505   WBC  --   --  7.27  --  12.90*  --   --  38.06*  --    HGB  --   --  10.3*  --  8.5*  --   --  7.2*  --    HCT   < >  --  32.2*   < > 26.5*   < > 24* 22.4* 21*   PLT  --   --  80*  --  33*  --   --  151  --    MCV  --   --  82   --  86  --   --  86  --    RDW  --   --  18.8*  --  17.2*  --   --  17.1*  --    FERRITIN  --  212  --   --   --   --   --   --   --     < > = values in this interval not displayed.        Chemistries:  Recent Labs   Lab 06/21/23  1113 06/22/23  1456    143   K 3.6 3.9    107   CO2 27 24   BUN 24* 22   CREATININE 1.6* 1.5*   CALCIUM 9.8 7.2*   ALBUMIN 3.8  --    PROT 7.8  --    BILITOT 1.0  --    ALKPHOS 64  --    ALT 8*  --    AST 19  --    MG  --  3.4*      Latest Reference Range & Units 06/22/23 15:05   POC PH 7.35 - 7.45  7.440   POC PCO2 35 - 45 mmHg 38.0   POC PO2 80 - 100 mmHg 348 (H)   POC HCO3 24 - 28 mmol/L 25.8   Sodium, Blood Gas 136 - 145 mmol/L 144   Potassium, Blood Gas 3.5 - 5.1 mmol/L 3.9   POC SATURATED O2 95 - 100 % 100   Sample  ARTERIAL   POC Ionized Calcium 1.06 - 1.42 mmol/L 1.01 (L)   POC Glucose 70 - 110 mg/dL 167 (H)   POC Hematocrit 36 - 54 %PCV 21 (L)   POC BE -2 to 2 mmol/L 2   FiO2  100   Vt  500   PiP  29   PEEP  5   DelSys  Adult Vent   Site  Kristin/UAC   Mode  AC/PRVC   Rate  18     2D echo 06/21/2023    The left ventricle is moderately enlarged with eccentric hypertrophy and severely decreased systolic function.  Grade I left ventricular diastolic dysfunction.  Normal right ventricular size with normal right ventricular systolic function.  Normal central venous pressure (3 mmHg).  The estimated ejection fraction is 25%.  Mild aortic regurgitation.  Mild mitral regurgitation.  There is severe left ventricular global hypokinesis.     Significant Imaging:       Chest x-ray 06/22/2023    EXAMINATION:  XR CHEST AP PORTABLE     CLINICAL HISTORY:  Post-op;     COMPARISON:  06/20/2023     FINDINGS:  There has been interval placement of multiple sternotomy wires.  There has been interval placement of a right internal jugular venous [place]-Klever line.  Its tip is in the region of the main pulmonary artery.  There has been interval placement of an endotracheal tube.  Its tip is located  75 mm superior to the lesley.  There has been interval placement of a mediastinal drain.  There has been interval placement of a left-sided chest tube.  There is no pneumothorax visualized.  There has been interval development of a mild amount of haziness in the base of the left lung.  The right lung is clear.  The right costophrenic angle is sharp.  The left costophrenic angle is not well seen secondary to overlying ribs.  The size of the heart is normal.     Impression:     1. There has been interval placement of multiple sternotomy wires. There has been interval placement of a right internal jugular venous [place]-Klever line. Its tip is in the region of the main pulmonary artery. There has been interval placement of an endotracheal tube. Its tip is located 75 mm superior to the lesley. There has been interval placement of a mediastinal drain. There has been interval placement of a left-sided chest tube. There is no pneumothorax visualized.  2. There has been interval development of a mild amount of haziness in the base of the left lung.  This is characteristic of atelectasis          ABG  Recent Labs   Lab 06/22/23  1505   PH 7.440   PO2 348*   PCO2 38.0   HCO3 25.8   BE 2     Assessment/Plan:     Neuro  History of CVA (cerebrovascular accident)  Aspirin Plavix statin    Cardiac/Vascular  Acute on chronic systolic CHF (congestive heart failure)  Patient is identified as having Systolic (HFrEF) heart failure that is Chronic. CHF is currently controlled. Latest ECHO performed and demonstrates- Results for orders placed during the hospital encounter of 06/20/23    Echo    Interpretation Summary  · The left ventricle is moderately enlarged with eccentric hypertrophy and severely decreased systolic function.  · Grade I left ventricular diastolic dysfunction.  · Normal right ventricular size with normal right ventricular systolic function.  · Normal central venous pressure (3 mmHg).  · The estimated ejection fraction is  25%.  · Mild aortic regurgitation.  · Mild mitral regurgitation.  · There is severe left ventricular global hypokinesis.  . Continue Beta Blocker and monitor clinical status closely. Monitor on telemetry. Patient is on CHF pathway.  Monitor strict Is&Os and daily weights.  Place on fluid restriction of 1.5 L. Continue to stress to patient importance of self efficacy and  on diet for CHF. Last BNP reviewed- and noted below   Recent Labs   Lab 06/20/23  1807   BNP 63   .      PAF (paroxysmal atrial fibrillation)  Beta-blocker aspirin Plavix cardiac monitoring     DCM (dilated cardiomyopathy)  Avoid fluid overload.  Strict I&Os.    Coronary artery disease without angina pectoris  Status post CABG x3.  Aspirin statin Plavix beta-blocker    Other  Endotracheally intubated  O2 mV pulmonary toilet.  Wean off sedation SBT to extubate        Critical Care Daily Checklist:    A: Awake: RASS Goal/Actual Goal: RASS Goal: 0-->alert and calm  Actual:     B: Spontaneous Breathing Trial Performed? Spon. Breathing Trial Initiated?: Not initiated (06/22/23 1627)   C: SAT & SBT Coordinated?  Y                started on pressure support for extubation      D: Delirium: CAM-ICU     E: Early Mobility Performed? Yes   F: Feeding Goal:    Status:     Current Diet Order   No orders of the defined types were placed in this encounter.      AS: Analgesia/Sedation Precedex gtt    T: Thromboembolic Prophylaxis Mechanical prophylaxis   H: HOB > 300 Yes   U: Stress Ulcer Prophylaxis (if needed) PPI   G: Glucose Control Fingerstick p.r.n.   B: Bowel Function     I: Indwelling Catheter (Lines & Rankin) Necessity Critically ill keep Rankin   D: De-escalation of Antimicrobials/Pharmacotherapies Prophylactic cephalosporin and vancomycin for wound infection prophylaxis    Plan for the day/ETD Y    Code Status:  Family/Goals of Care: Full Code       Critical Care Time: 35 minutes  Critical secondary to Patient has a condition that poses threat to  life and bodily function:  SEVERE CAD STATUS POST CABG X3 ENDOTRACHEALLY INTUBATED     Critical care was time spent personally by me on the following activities: development of treatment plan with patient or surrogate and bedside caregivers, discussions with consultants, evaluation of patient's response to treatment, examination of patient, ordering and performing treatments and interventions, ordering and review of laboratory studies, ordering and review of radiographic studies, pulse oximetry, re-evaluation of patient's condition. This critical care time did not overlap with that of any other provider or involve time for any procedures.    Thank you for your consult. I will follow-up with patient. Please contact us if you have any additional questions.     Leidy Rose MD  Critical Care Medicine  [facility] - Intensive Care \[facility]\"")

## 2023-06-22 NOTE — ANESTHESIA PROCEDURE NOTES
[place] Klever Line    Diagnosis: CAD  Patient location during procedure: done in OR  Procedure start time: 6/22/2023 7:41 AM  Timeout: 6/22/2023 7:40 AM  Procedure end time: 6/22/2023 7:50 AM    Staffing  Authorizing Provider: Javid Cramer MD  Performing Provider: Javid Cramer MD    Anesthesiologist was present at the time of the procedure.  Preanesthetic Checklist  Completed: patient identified, IV checked, site marked, risks and benefits discussed, surgical consent, monitors and equipment checked, pre-op evaluation, timeout performed and anesthesia consent given  [place] Klever Line  Skin Prep: chlorhexidine gluconate  Local Infiltration: none  Location: right,  internal jugular vein  Vessel Caliber: medium, patent, compressibility normal  Vascular Doppler:  not done  Introducer: 9 Fr single lumen,   Device: standard thermodilution catheter   Catheter Size: 8 Fr  Catheter placement by yes. Heme positive aspiration all ports. PAC floated with balloon up not wedgedSterile sheath used  Locked at: 47 cm.Insertion Attempts: 2  Indication: intravenous therapy, hemodynamic monitoring  Ultrasound Guidance  Needle advanced into vessel with real time Ultrasound guidance.  Guidewire confirmed in vessel.  Sterile sheath used.  Assessment  Central Line Bundle Protocol followed. Hand hygiene before procedure, surgical cap worn, surgical mask worn, sterile surgical gloves worn, large sterile drape used.  Verification: ultrasound  Dressing: sutured in place and taped  Patient: Tolerated Well

## 2023-06-22 NOTE — ANESTHESIA POSTPROCEDURE EVALUATION
Anesthesia Post Evaluation    Patient: Gustavo Tracey Jr.    Procedure(s) Performed: Procedure(s) (LRB):  CORONARY ARTERY BYPASS GRAFT (CABG) (N/A)  PURDY MAZE PROCEDURE (N/A)  SURGICAL PROCUREMENT, VEIN, ENDOSCOPIC (Left)  ECHOCARDIOGRAM,TRANSESOPHAGEAL (N/A)  BLOCK, NERVE, INTERCOSTAL, 2 OR MORE (N/A)  CLOSURE, LEFT ATRIAL APPENDAGE, USING DEVICE (Left)    Final Anesthesia Type: general      Patient location during evaluation: ICU  Patient participation: No - Unable to Participate, Intubation  Level of consciousness: sedated  Post-procedure vital signs: reviewed and stable  Pain management: adequate  Airway patency: patent  MONSERRAT mitigation strategies: Multimodal analgesia  PONV status at discharge: No PONV  Anesthetic complications: no      Cardiovascular status: hemodynamically stable, hypotensive and stable (pressors required for support)  Respiratory status: ETT, intubated and ventilator  Hydration status: euvolemic  Follow-up not needed.          Vitals Value Taken Time   BP 94/67 06/22/23 1802   Temp 38.4 °C (101.12 °F) 06/22/23 1826   Pulse 95 06/22/23 1826   Resp 25 06/22/23 1826   SpO2 97 % 06/22/23 1826   Vitals shown include unvalidated device data.      No case tracking events are documented in the log.      Pain/Ashwin Score: Pain Rating Prior to Med Admin: 5 (6/22/2023  6:16 PM)

## 2023-06-22 NOTE — OP NOTE
[facility] - Surgery ([facility])  Cardiothoracic Surgery  Operative Note    SUMMARY     Date of Procedure: 6/22/2023     Procedure: Procedure(s) (LRB):  CORONARY ARTERY BYPASS GRAFT (CABG) (N/A)  PURDY MAZE PROCEDURE (N/A)  SURGICAL PROCUREMENT, VEIN, ENDOSCOPIC (Left)  ECHOCARDIOGRAM,TRANSESOPHAGEAL (N/A)  BLOCK, NERVE, INTERCOSTAL, 2 OR MORE (N/A)  CLOSURE, LEFT ATRIAL APPENDAGE, USING DEVICE (Left)      Coronary artery bypass grafting x3 with LIMA to LAD and reverse saphenous vein graft to OM and reverse saphenous vein graft to diagonal 1    Purdy Maze 4 procedure     Surgeon(s) and Role:     * Rustam Dave MD - Primary    Assisting Surgeon: None    Pre-Operative Diagnosis: Abnormal stress test [R94.39]  CAD, multiple vessel [I25.10]  PAF (paroxysmal atrial fibrillation) [I48.0]    Post-Operative Diagnosis: Post-Op Diagnosis Codes:     * Abnormal stress test [R94.39]     * CAD, multiple vessel [I25.10]     * PAF (paroxysmal atrial fibrillation) [I48.0]    Anesthesia: General    Operative Findings (including complications, if any):  Intraop patient developed atrial fibrillation with rapid ventricular response.  Patient had rate increased to 180s to 190s with hemodynamic instability.  Patient was cardioverted.  Intraop TYE shows preoperatively severely depressed ejection fraction with akinetic/hypokinetic ventricular septum.  Post bypass, the patient's ejection fraction was improved with contractility in the the interventricular septum.  The patient is greater saphenous vein was about 5 mm in diameter.  With 2 areas of varicosities.  The vein grafts were performed such that the varicose areas of the greater saphenous vein were excised and not use for the bypass.  Intraop epiaortic ultrasound shows that the ascending aorta was free of mobile atheroma.  An intraop indocyanine green angiogram shows the vein graft were wide up open and perfusing the myocardium.  And the LIMA to LAD was also wide open and perfusing the  anterior wall    Description of Technical Procedures:  The patient was prepped and draped sterilely.  A parasternal block was then performed by injecting Exparel into the intercostal spaces bilaterally midclavicular line.  A median sternotomy incision was then performed which was carried down sharply to the sternum was then encountered.  The sternum was then opened with a sternal saw.  The left half of the sternum was elevated with a mammary retractor.  The left internal mammary artery was then dissected down as a pedicle.  While this was being performed a 2nd member of the team was harvesting the greater saphenous vein from the patient's left thigh using an endoscopic vein harvesting technique.  The patient was then given heparin.  The pericardium was then opened.  Pericardial sutures were placed.  An epiaortic ultrasound then showed the ascending aorta was free of mobile atherosclerotic plaques.  The aortic pursestring was then placed in the distal ascending aorta.  The venous pursestring was placed in the right atrial appendage.  And the retrograde cardioplegia cannula pursestring was placed in the free wall of the right atrium.  And an antegrade cardioplegia cannula pursestring was placed in the mid ascending aorta.  Therapeutic ACT was then ascertained.  The patient was then cannulated with a 24 Yoruba aortic cannula.  A 3 stage venous cannula was then placed in the IVC.  And the retrograde cardioplegia cannula was placed in the coronary sinus.  An antegrade cardioplegia cannula was placed in the mid ascending aorta.  The patient was then started on cardiopulmonary bypass.    Next the decision was then made to perform the left atrial lesions of the Woodruff Maze 4 procedure.  The right pulmonary veins were then dissected out and the oblique sinus was entered.  A radial frequency ablation probe was then used to perform the right pulmonary vein lesions.  Attention was placed to ensure that the ablation line was on the  left atrial wall.  The aorta was then crossclamped.  The left pulmonary veins were then dissected out.  The left pulmonary vein isolation was then performed with a radiofrequency probe on the left atrial wall.  Next a box lesion was then performed by applying a cryoprobe through the transverse sinus to the roof of the left atrium and connecting the pulmonary vein probes.  And the inferior lesion of the pulmonary veins were also connected with the radiofrequency probe.  The left atrial appendage was isolated with a radiofrequency ablation probe.  The left atrial appendage was measured to a 45 AtriClip.  Which was then applied.  A connection lesion was then made between the left atrial appendage lesion and the pulmonary vein lesions with a cryoprobe.    Attention was then drawn to the OM.  The OM was dissected out and the OM was identified and opened.  The OM is about 3 mm in diameter.  The reverse saphenous vein graft distal anastomosis was then performed to the OM as an end-to-side anastomosis using 7 0 continuous Prolene sutures.  An indocyanine green angiogram was then performed which showed that the graft perfused the OM and the OM was perfusing the lateral wall of the left ventricle.  Next attention was drawn to the aorta with 4.4 punch aortotomy was performed.  The proximal anastomosis of the reverse saphenous vein graft was then performed to the aorta using 6 0 continuous Prolene sutures.  Next attention was drawn to diagonal 1.  The diagonal was partially intra myocardial.  The diagonal 1 was dissected out.  The diagonal was about 2.25 mm in diameter.  The distal anastomosis of the reverse saphenous vein graft was then performed to the diagonal as an end-to-side anastomosis using 7 0 continuous Prolene sutures.  An indocyanine green angiogram was then performed which showed that the diagonal 1 graft was wide open and perfusing the left ventricular myocardium.  Next attention was drawn to the aorta with 4.4  punch aortotomy was performed.  The proximal anastomosis of the reverse saphenous vein graft was then performed to the aorta using 6 0 continuous Prolene sutures.  Both proximal anastomosis were marked with a proximal anastomosis marker.  An indocyanine green angiogram was then performed to interrogate the proximal anastomosis and the proximal anastomosis were wide open and perfused.  Next attention was drawn to the LAD.  The LAD was dissected out the mid LAD region.  The LAD was then opened.  The LAD is about 2.5 mm in diameter.  The LIMA was then anastomosed to the mid LAD as an end-to-side anastomosis using 8 0 continuous Prolene sutures.  Next an indocyanine angiogram was then performed which showed the LIMA to LAD graft was wide open and perfusing the anterior wall of the left atrium..  Next the patient was then started on hotshot cardioplegia.  While this was being performed the decision was then made to perform the right-sided lesions of the Woodruff Maze 4 procedure.  Through the retrograde cardioplegia insertion site the radiofrequency probe was inserted and the SVC and IVC lesions were performed.  The lesion to the right atrium was also performed.  A cryoprobe was then introduced and the tricuspid valve annular lesion was then performed at about 2 oclock..  By this time the patient has been rewarming.  The aortic crossclamp was then removed.  Atrial and ventricular pacemaker wires were placed.  Two mediastinal and a left pleural chest tube were placed.  Ventilation was then restarted.  The patient was then weaned from cardiopulmonary bypass without any problems.  The patient was then decannulated.  Surgical sites were inspected and were free of bleeding.  Patient's heparin was then reversed with protamine.  The patient appeared coagulopathic with diffuse oozing from surgical sites.  Patient's post bypass CBC returned a platelet count of 33.  The patient was then transfused 2 units packed red blood cells with  improvement in coagulopathy.  The patient's sternum was then closed with figure-of-eight sternal wires.  Fascia was closed with 1. Vicryl sutures.  And skin was closed with 4 0 Monocryl sutures.    Significant Surgical Tasks Conducted by the Assistant(s), if Applicable:  Endoscopic vein harvesting    Estimated Blood Loss (EBL): 750 ml  Implants:   Implant Name Type Inv. Item Serial No.  Lot No. LRB No. Used Action   KRISHNA/WIRE TEMPORARY CARDIAC PACING WIRE   N/A  [id number]  4 Implanted   AME RADIOMARK GRAFT MARKER   N/A  [id number]  2 Implanted   ATRICURE OTILIA EXCLUSION SYSTEM   N/A  [id number]  1 Implanted       Specimens:   Specimen (24h ago, onward)      None                    Condition: Good    Disposition: ICU - intubated and hemodynamically stable.    Attestation: I performed the procedure.

## 2023-06-22 NOTE — ANESTHESIA PROCEDURE NOTES
Intubation    Date/Time: 6/22/2023 7:29 AM  Performed by: Janet Vieyra CRNA  Authorized by: Janet Vieyra CRNA     Intubation:     Induction:  Intravenous    Intubated:  Postinduction    Mask Ventilation:  Easy mask    Attempts:  1    Attempted By:  CRNA    Method of Intubation:  Direct    Blade:  Faust 2    Laryngeal View Grade: Grade I - full view of cords      Difficult Airway Encountered?: No      Complications:  None    Airway Device:  Oral endotracheal tube    Airway Device Size:  8.0    Style/Cuff Inflation:  Cuffed (inflated to minimal occlusive pressure)    Tube secured:  23    Secured at:  The lips    Placement Verified By:  Capnometry    Complicating Factors:  None    Findings Post-Intubation:  BS equal bilateral and atraumatic/condition of teeth unchanged

## 2023-06-22 NOTE — CONSULTS
[facility]ensive [facility])  Cardiology  Consult Note    Patient Name: Gustavo Tracey Jr.  MRN: [id number]  Admission Date: 6/20/2023  Hospital Length of Stay: 1 days  Code Status: Full Code   Attending Provider: Rustam Dave MD   Consulting Provider: Catalino Rose MD  Primary Care Physician: Braxton Guzman Jr, MD  Principal Problem:Abnormal stress test    Patient information was obtained from patient, past medical records and ER records.     Consult to Cardiology  Consult performed by: Catalino Rose MD  Consult ordered by: Rustam Dave MD  Reason for consult: s/p cabg 3        Subjective:     Chief Complaint:  S/p cabg x3    HPI:   6.22.2023  S/p cabg x3 earlier   Intubated, chest tubes   On vaso and epi , ci 1.9       No new subjective & objective note has been filed under this hospital service since the last note was generated.    Assessment and Plan:     S/P CABG x 3  6.22.2023  S/p cabg x3 earlier   Intubated, chest tubes   On vaso and epi , ci 1.9     Left main coronary artery disease  -CP free  -Continue ASA, BB, Entresto, Lasix, statin  -Awaiting CABG    History of CVA (cerebrovascular accident)  -ASA, statin    DCM (dilated cardiomyopathy)  -Stable compensated  -Continue BB, Entresto, po Lasix    Coronary artery disease without angina pectoris  -Continue OMT-ASA, BB, Entresto, statin    Other hyperlipidemia  -Continue statin    Primary hypertension  -Continue BB, Entresto        VTE Risk Mitigation (From admission, onward)         Ordered     Place CHANTELL hose  Until discontinued         06/21/23 1044     Place sequential compression device  Until discontinued         06/21/23 1044                Thank you for your consult. I will follow-up with patient. Please contact us if you have any additional questions.    Catalino Rose MD  Cardiology   [facility])

## 2023-06-22 NOTE — NURSING
Flowtrack cardiac monitor reading CI 1.3/CO 3.0. Pt hemodynamically stable without change in pressor support. Resting comfortably in bed. Aaox4. RR even and unlabored. Position of Friona checked and unchanged from when pt was received from OR. Second cardiac monitor retrieved and connected to arterial line to compare. Arterial flowtrack reading CI 2.3/CO 4.8. Dr. Dave called and updated on discrepancy between hemodynamic monitors. Per MD, disconnect swan from cardiac monitor and use arterial line.

## 2023-06-22 NOTE — SUBJECTIVE & OBJECTIVE
Past Medical History:   Diagnosis Date    Abnormal nuclear stress test 11/26/2022    Cervical radiculopathy     to rt arm    CHF (congestive heart failure)     Chronic systolic congestive heart failure 11/28/2022    Dilated cardiomyopathy 11/26/2022    Hyperlipidemia     Hypertension     Obesity, unspecified     PAF (paroxysmal atrial fibrillation) 11/26/2022    Pulmonary HTN 11/28/2022    Stroke        Past Surgical History:   Procedure Laterality Date    INSTANTANEOUS WAVE-FREE RATIO  6/20/2023    Procedure: Instantaneous Wave-Free Ratio;  Surgeon: Zion Ortega MD;  Location: [facility] CATH LAB;  Service: Cardiology;;    IVUS, CORONARY  6/20/2023    Procedure: IVUS, Coronary;  Surgeon: Zion Ortega MD;  Location: [facility] CATH LAB;  Service: Cardiology;;    LEFT HEART CATHETERIZATION Left 11/28/2022    Procedure: CATHETERIZATION, HEART, LEFT;  Surgeon: Zion Ortega MD;  Location: [facility] CATH LAB;  Service: Cardiology;  Laterality: Left;    LEFT HEART CATHETERIZATION Left 6/20/2023    Procedure: Left heart cath;  Surgeon: Zion Ortega MD;  Location: [facility] CATH LAB;  Service: Cardiology;  Laterality: Left;    PERCUTANEOUS TRANSLUMINAL BALLOON ANGIOPLASTY OF CORONARY ARTERY  6/20/2023    Procedure: Angioplasty-coronary;  Surgeon: Zion Ortega MD;  Location: [facility] CATH LAB;  Service: Cardiology;;    RIGHT HEART CATHETERIZATION N/A 11/28/2022    Procedure: INSERTION, CATHETER, RIGHT HEART;  Surgeon: Zion Ortega MD;  Location: [facility] CATH LAB;  Service: Cardiology;  Laterality: N/A;  Congestive heart failure       Review of patient's allergies indicates:  No Known Allergies    Family History       Problem Relation (Age of Onset)    Coronary artery disease Father    Diabetes Father    Heart attack Father    Hyperlipidemia Father    Hypertension Mother, Father          Tobacco Use    Smoking status: Never    Smokeless tobacco: Never   Substance and Sexual Activity    Alcohol use: Yes    Drug use: Never    Sexual  activity: Not Currently         Review of Systems   Unable to perform ROS: Intubated   Objective:     Vital Signs (Most Recent):  Temp: 98.3 °F (36.8 °C) (06/22/23 0429)  Pulse: 91 (06/22/23 1545)  Resp: (!) 25 (06/22/23 1545)  BP: (!) 86/57 (06/22/23 1506)  SpO2: 100 % (06/22/23 1545) Vital Signs (24h Range):  Temp:  [97.9 °F (36.6 °C)-98.9 °F (37.2 °C)] 98.3 °F (36.8 °C)  Pulse:  [] 91  Resp:  [16-27] 25  SpO2:  [95 %-100 %] 100 %  BP: ()/(57-92) 86/57  Arterial Line BP: ()/(58-65) 113/64     Weight: 108.9 kg (240 lb)  Body mass index is 31.66 kg/m².      Intake/Output Summary (Last 24 hours) at 6/22/2023 1554  Last data filed at 6/22/2023 1455  Gross per 24 hour   Intake 4250 ml   Output 400 ml   Net 3850 ml        Physical Exam  Constitutional:       Appearance: He is obese.   HENT:      Mouth/Throat:      Comments: ETT  Neck:      Comments: Right IJ right heart catheter  Cardiovascular:      Rate and Rhythm: Normal rate and regular rhythm.   Pulmonary:      Effort: Respiratory distress present.      Breath sounds: No stridor.      Comments: Chest tubes noted  Abdominal:      Palpations: Abdomen is soft.   Genitourinary:     Comments: Rankin catheter  Musculoskeletal:         General: Deformity present.      Comments: Radial A-line   Skin:     General: Skin is warm.      Comments: Lower extremity dressing    Chest wound dressing    Neurological:      General: No focal deficit present.        Vents:  Vent Mode: A/C (06/22/23 1453)  Set Rate: 18 BPM (06/22/23 1453)  Vt Set: 500 mL (06/22/23 1453)  PEEP/CPAP: 5 cmH20 (06/22/23 1453)  Oxygen Concentration (%): 100 (06/22/23 1453)  Peak Airway Pressure: 19 cmH20 (06/22/23 1453)  Total Ve: 10.3 L/m (06/22/23 1453)  F/VT Ratio<105 (RSBI): (!) 35.29 (06/22/23 1453)    Lines/Drains/Airways       Central Venous Catheter Line  Duration             Pulmonary Artery Catheter Assessment  06/22/23 0741 <1 day              Drain  Duration                  Chest  Tube 06/22/23 1317 Tube - 3 Left Pleural 24 Fr. <1 day         Closed/Suction Drain 06/22/23 1257 Left Leg Bulb 19 Fr. <1 day         Urethral Catheter 06/22/23 0733 Silicone;Temperature probe;Straight-tip 16 Fr. <1 day         Y Chest Tube 1 and 2 06/22/23 1316 1 Right Mediastinal 24 Fr. 2 Left Mediastinal 24 Fr. <1 day              Airway  Duration                  Airway - Non-Surgical 06/22/23 0729 <1 day              Arterial Line  Duration             Arterial Line 06/22/23 0724 Right Radial <1 day              Peripheral Intravenous Line  Duration                  Peripheral IV - Single Lumen 06/20/23 0944 20 G Posterior;Right Wrist 2 days                    Significant Labs:    CBC/Anemia Profile:  Recent Labs   Lab   0000 06/20/23  1807 06/21/23  1113 06/22/23  0805 06/22/23  1311 06/22/23  1328 06/22/23  1351 06/22/23  1456 06/22/23  1505   WBC  --   --  7.27  --  12.90*  --   --  38.06*  --    HGB  --   --  10.3*  --  8.5*  --   --  7.2*  --    HCT   < >  --  32.2*   < > 26.5*   < > 24* 22.4* 21*   PLT  --   --  80*  --  33*  --   --  151  --    MCV  --   --  82  --  86  --   --  86  --    RDW  --   --  18.8*  --  17.2*  --   --  17.1*  --    FERRITIN  --  212  --   --   --   --   --   --   --     < > = values in this interval not displayed.        Chemistries:  Recent Labs   Lab 06/21/23  1113 06/22/23  1456    143   K 3.6 3.9    107   CO2 27 24   BUN 24* 22   CREATININE 1.6* 1.5*   CALCIUM 9.8 7.2*   ALBUMIN 3.8  --    PROT 7.8  --    BILITOT 1.0  --    ALKPHOS 64  --    ALT 8*  --    AST 19  --    MG  --  3.4*      Latest Reference Range & Units 06/22/23 15:05   POC PH 7.35 - 7.45  7.440   POC PCO2 35 - 45 mmHg 38.0   POC PO2 80 - 100 mmHg 348 (H)   POC HCO3 24 - 28 mmol/L 25.8   Sodium, Blood Gas 136 - 145 mmol/L 144   Potassium, Blood Gas 3.5 - 5.1 mmol/L 3.9   POC SATURATED O2 95 - 100 % 100   Sample  ARTERIAL   POC Ionized Calcium 1.06 - 1.42 mmol/L 1.01 (L)   POC Glucose 70 - 110 mg/dL  167 (H)   POC Hematocrit 36 - 54 %PCV 21 (L)   POC BE -2 to 2 mmol/L 2   FiO2  100   Vt  500   PiP  29   PEEP  5   DelSys  Adult Vent   Site  Kristin/UAC   Mode  AC/PRVC   Rate  18     2D echo 06/21/2023    The left ventricle is moderately enlarged with eccentric hypertrophy and severely decreased systolic function.  Grade I left ventricular diastolic dysfunction.  Normal right ventricular size with normal right ventricular systolic function.  Normal central venous pressure (3 mmHg).  The estimated ejection fraction is 25%.  Mild aortic regurgitation.  Mild mitral regurgitation.  There is severe left ventricular global hypokinesis.     Significant Imaging:       Chest x-ray 06/22/2023    EXAMINATION:  XR CHEST AP PORTABLE     CLINICAL HISTORY:  Post-op;     COMPARISON:  06/20/2023     FINDINGS:  There has been interval placement of multiple sternotomy wires.  There has been interval placement of a right internal jugular venous Creighton-Klever line.  Its tip is in the region of the main pulmonary artery.  There has been interval placement of an endotracheal tube.  Its tip is located 75 mm superior to the lesley.  There has been interval placement of a mediastinal drain.  There has been interval placement of a left-sided chest tube.  There is no pneumothorax visualized.  There has been interval development of a mild amount of haziness in the base of the left lung.  The right lung is clear.  The right costophrenic angle is sharp.  The left costophrenic angle is not well seen secondary to overlying ribs.  The size of the heart is normal.     Impression:     1. There has been interval placement of multiple sternotomy wires. There has been interval placement of a right internal jugular venous Creighton-Klever line. Its tip is in the region of the main pulmonary artery. There has been interval placement of an endotracheal tube. Its tip is located 75 mm superior to the lesley. There has been interval placement of a mediastinal drain.  There has been interval placement of a left-sided chest tube. There is no pneumothorax visualized.  2. There has been interval development of a mild amount of haziness in the base of the left lung.  This is characteristic of atelectasis

## 2023-06-22 NOTE — PLAN OF CARE
Patient acknowledged that he is a [employer] and initially declined blood transfusions.  Dr. Dave spoke to the patient regarding the possible need for a blood transfusion as well as other blood products.  The patient and his wife discussed their options and both decided that they would be amenable to receiving blood products.

## 2023-06-23 PROBLEM — Z98.890 POST-OPERATIVE STATE: Status: ACTIVE | Noted: 2023-06-23

## 2023-06-23 PROBLEM — R06.02 SOB (SHORTNESS OF BREATH): Status: ACTIVE | Noted: 2023-06-23

## 2023-06-23 LAB
ALLENS TEST: ABNORMAL
ANION GAP SERPL CALC-SCNC: 11 MMOL/L (ref 8–16)
ANION GAP SERPL CALC-SCNC: 9 MMOL/L (ref 8–16)
APTT PPP: 24.3 SEC (ref 21–32)
BASOPHILS NFR BLD: 0 % (ref 0–1.9)
BUN SERPL-MCNC: 27 MG/DL (ref 8–23)
BUN SERPL-MCNC: 29 MG/DL (ref 8–23)
CALCIUM SERPL-MCNC: 8.1 MG/DL (ref 8.7–10.5)
CALCIUM SERPL-MCNC: 9.5 MG/DL (ref 8.7–10.5)
CHLORIDE SERPL-SCNC: 108 MMOL/L (ref 95–110)
CHLORIDE SERPL-SCNC: 110 MMOL/L (ref 95–110)
CO2 SERPL-SCNC: 21 MMOL/L (ref 23–29)
CO2 SERPL-SCNC: 22 MMOL/L (ref 23–29)
CREAT SERPL-MCNC: 1.6 MG/DL (ref 0.5–1.4)
CREAT SERPL-MCNC: 1.7 MG/DL (ref 0.5–1.4)
DELSYS: ABNORMAL
DIFFERENTIAL METHOD: ABNORMAL
EOSINOPHIL NFR BLD: 0 % (ref 0–8)
ERYTHROCYTE [DISTWIDTH] IN BLOOD BY AUTOMATED COUNT: 15.9 % (ref 11.5–14.5)
EST. GFR  (NO RACE VARIABLE): 45 ML/MIN/1.73 M^2
EST. GFR  (NO RACE VARIABLE): 48 ML/MIN/1.73 M^2
FIO2: 32
FLOW: 3
GLUCOSE SERPL-MCNC: 126 MG/DL (ref 70–110)
GLUCOSE SERPL-MCNC: 138 MG/DL (ref 70–110)
GLUCOSE SERPL-MCNC: 166 MG/DL (ref 70–110)
HCO3 UR-SCNC: 20.2 MMOL/L (ref 24–28)
HCT VFR BLD AUTO: 28.2 % (ref 40–54)
HCT VFR BLD CALC: 27 %PCV (ref 36–54)
HGB BLD-MCNC: 9.5 G/DL (ref 14–18)
IMM GRANULOCYTES # BLD AUTO: ABNORMAL K/UL (ref 0–0.04)
IMM GRANULOCYTES NFR BLD AUTO: ABNORMAL % (ref 0–0.5)
INR PPP: 1.1 (ref 0.8–1.2)
LYMPHOCYTES NFR BLD: 40 % (ref 18–48)
MAGNESIUM SERPL-MCNC: 2.5 MG/DL (ref 1.6–2.6)
MAGNESIUM SERPL-MCNC: 3.1 MG/DL (ref 1.6–2.6)
MCH RBC QN AUTO: 27.8 PG (ref 27–31)
MCHC RBC AUTO-ENTMCNC: 33.7 G/DL (ref 32–36)
MCV RBC AUTO: 83 FL (ref 82–98)
METAMYELOCYTES NFR BLD MANUAL: 1 %
MODE: ABNORMAL
MONOCYTES NFR BLD: 11 % (ref 4–15)
NEUTROPHILS NFR BLD: 45 % (ref 38–73)
NEUTS BAND NFR BLD MANUAL: 3 %
NRBC BLD-RTO: 25 /100 WBC
OVALOCYTES BLD QL SMEAR: ABNORMAL
PATH REV BLD -IMP: NORMAL
PCO2 BLDA: 34 MMHG (ref 35–45)
PH SMN: 7.38 [PH] (ref 7.35–7.45)
PLATELET # BLD AUTO: 116 K/UL (ref 150–450)
PMV BLD AUTO: 10.3 FL (ref 9.2–12.9)
PO2 BLDA: 84 MMHG (ref 80–100)
POC BE: -5 MMOL/L
POC IONIZED CALCIUM: 1.05 MMOL/L (ref 1.06–1.42)
POC SATURATED O2: 96 % (ref 95–100)
POCT GLUCOSE: 104 MG/DL (ref 70–110)
POCT GLUCOSE: 117 MG/DL (ref 70–110)
POCT GLUCOSE: 122 MG/DL (ref 70–110)
POCT GLUCOSE: 128 MG/DL (ref 70–110)
POCT GLUCOSE: 151 MG/DL (ref 70–110)
POCT GLUCOSE: 153 MG/DL (ref 70–110)
POCT GLUCOSE: 184 MG/DL (ref 70–110)
POCT GLUCOSE: 198 MG/DL (ref 70–110)
POCT GLUCOSE: 79 MG/DL (ref 70–110)
POCT GLUCOSE: 82 MG/DL (ref 70–110)
POCT GLUCOSE: 94 MG/DL (ref 70–110)
POCT GLUCOSE: 96 MG/DL (ref 70–110)
POTASSIUM BLD-SCNC: 3.5 MMOL/L (ref 3.5–5.1)
POTASSIUM SERPL-SCNC: 4.4 MMOL/L (ref 3.5–5.1)
POTASSIUM SERPL-SCNC: 4.8 MMOL/L (ref 3.5–5.1)
PROTHROMBIN TIME: 11.7 SEC (ref 9–12.5)
RBC # BLD AUTO: 3.42 M/UL (ref 4.6–6.2)
SAMPLE: ABNORMAL
SITE: ABNORMAL
SODIUM BLD-SCNC: 143 MMOL/L (ref 136–145)
SODIUM SERPL-SCNC: 140 MMOL/L (ref 136–145)
SODIUM SERPL-SCNC: 141 MMOL/L (ref 136–145)
WBC # BLD AUTO: 4.81 K/UL (ref 3.9–12.7)

## 2023-06-23 PROCEDURE — 84295 ASSAY OF SERUM SODIUM: CPT

## 2023-06-23 PROCEDURE — 82803 BLOOD GASES ANY COMBINATION: CPT

## 2023-06-23 PROCEDURE — 99233 SBSQ HOSP IP/OBS HIGH 50: CPT | Mod: ,,, | Performed by: PHYSICIAN ASSISTANT

## 2023-06-23 PROCEDURE — 87040 BLOOD CULTURE FOR BACTERIA: CPT | Performed by: INTERNAL MEDICINE

## 2023-06-23 PROCEDURE — 25000003 PHARM REV CODE 250: Performed by: INTERNAL MEDICINE

## 2023-06-23 PROCEDURE — 97162 PT EVAL MOD COMPLEX 30 MIN: CPT

## 2023-06-23 PROCEDURE — 80048 BASIC METABOLIC PNL TOTAL CA: CPT | Mod: 91 | Performed by: THORACIC SURGERY (CARDIOTHORACIC VASCULAR SURGERY)

## 2023-06-23 PROCEDURE — 97166 OT EVAL MOD COMPLEX 45 MIN: CPT

## 2023-06-23 PROCEDURE — 37799 UNLISTED PX VASCULAR SURGERY: CPT

## 2023-06-23 PROCEDURE — 97530 THERAPEUTIC ACTIVITIES: CPT

## 2023-06-23 PROCEDURE — 83735 ASSAY OF MAGNESIUM: CPT | Performed by: THORACIC SURGERY (CARDIOTHORACIC VASCULAR SURGERY)

## 2023-06-23 PROCEDURE — 27200667 HC PACEMAKER, TEMPORARY MONITORING, PER SHIFT

## 2023-06-23 PROCEDURE — 85730 THROMBOPLASTIN TIME PARTIAL: CPT | Performed by: THORACIC SURGERY (CARDIOTHORACIC VASCULAR SURGERY)

## 2023-06-23 PROCEDURE — 84132 ASSAY OF SERUM POTASSIUM: CPT

## 2023-06-23 PROCEDURE — 85027 COMPLETE CBC AUTOMATED: CPT | Performed by: THORACIC SURGERY (CARDIOTHORACIC VASCULAR SURGERY)

## 2023-06-23 PROCEDURE — 25000003 PHARM REV CODE 250: Performed by: THORACIC SURGERY (CARDIOTHORACIC VASCULAR SURGERY)

## 2023-06-23 PROCEDURE — 36569 INSJ PICC 5 YR+ W/O IMAGING: CPT

## 2023-06-23 PROCEDURE — 27000221 HC OXYGEN, UP TO 24 HOURS

## 2023-06-23 PROCEDURE — 20000000 HC ICU ROOM

## 2023-06-23 PROCEDURE — 94761 N-INVAS EAR/PLS OXIMETRY MLT: CPT

## 2023-06-23 PROCEDURE — 99291 PR CRITICAL CARE, E/M 30-74 MINUTES: ICD-10-PCS | Mod: ,,, | Performed by: INTERNAL MEDICINE

## 2023-06-23 PROCEDURE — 99291 CRITICAL CARE FIRST HOUR: CPT | Mod: ,,, | Performed by: INTERNAL MEDICINE

## 2023-06-23 PROCEDURE — C1751 CATH, INF, PER/CENT/MIDLINE: HCPCS

## 2023-06-23 PROCEDURE — P9045 ALBUMIN (HUMAN), 5%, 250 ML: HCPCS | Mod: JZ,JG | Performed by: THORACIC SURGERY (CARDIOTHORACIC VASCULAR SURGERY)

## 2023-06-23 PROCEDURE — 63600175 PHARM REV CODE 636 W HCPCS: Performed by: THORACIC SURGERY (CARDIOTHORACIC VASCULAR SURGERY)

## 2023-06-23 PROCEDURE — 85014 HEMATOCRIT: CPT

## 2023-06-23 PROCEDURE — 82330 ASSAY OF CALCIUM: CPT

## 2023-06-23 PROCEDURE — 94640 AIRWAY INHALATION TREATMENT: CPT

## 2023-06-23 PROCEDURE — C9399 UNCLASSIFIED DRUGS OR BIOLOG: HCPCS | Performed by: THORACIC SURGERY (CARDIOTHORACIC VASCULAR SURGERY)

## 2023-06-23 PROCEDURE — 25000242 PHARM REV CODE 250 ALT 637 W/ HCPCS: Performed by: THORACIC SURGERY (CARDIOTHORACIC VASCULAR SURGERY)

## 2023-06-23 PROCEDURE — 99900035 HC TECH TIME PER 15 MIN (STAT)

## 2023-06-23 PROCEDURE — 99233 PR SUBSEQUENT HOSPITAL CARE,LEVL III: ICD-10-PCS | Mod: ,,, | Performed by: PHYSICIAN ASSISTANT

## 2023-06-23 PROCEDURE — 85610 PROTHROMBIN TIME: CPT | Performed by: THORACIC SURGERY (CARDIOTHORACIC VASCULAR SURGERY)

## 2023-06-23 PROCEDURE — 85007 BL SMEAR W/DIFF WBC COUNT: CPT | Performed by: THORACIC SURGERY (CARDIOTHORACIC VASCULAR SURGERY)

## 2023-06-23 RX ORDER — IBUPROFEN 200 MG
24 TABLET ORAL
Status: DISCONTINUED | OUTPATIENT
Start: 2023-06-23 | End: 2023-07-05 | Stop reason: HOSPADM

## 2023-06-23 RX ORDER — INSULIN ASPART 100 [IU]/ML
1-10 INJECTION, SOLUTION INTRAVENOUS; SUBCUTANEOUS
Status: DISCONTINUED | OUTPATIENT
Start: 2023-06-23 | End: 2023-07-05 | Stop reason: HOSPADM

## 2023-06-23 RX ORDER — GLUCAGON 1 MG
1 KIT INJECTION
Status: DISCONTINUED | OUTPATIENT
Start: 2023-06-23 | End: 2023-07-05 | Stop reason: HOSPADM

## 2023-06-23 RX ORDER — IBUPROFEN 200 MG
16 TABLET ORAL
Status: DISCONTINUED | OUTPATIENT
Start: 2023-06-23 | End: 2023-07-05 | Stop reason: HOSPADM

## 2023-06-23 RX ORDER — ACETAMINOPHEN 325 MG/1
650 TABLET ORAL EVERY 6 HOURS
Status: DISPENSED | OUTPATIENT
Start: 2023-06-23 | End: 2023-06-26

## 2023-06-23 RX ORDER — PANTOPRAZOLE SODIUM 40 MG/1
40 TABLET, DELAYED RELEASE ORAL DAILY
Status: DISCONTINUED | OUTPATIENT
Start: 2023-06-23 | End: 2023-07-05 | Stop reason: HOSPADM

## 2023-06-23 RX ADMIN — MAGNESIUM HYDROXIDE 400 MG: 400 SUSPENSION ORAL at 09:06

## 2023-06-23 RX ADMIN — ASPIRIN 81 MG: 81 TABLET, COATED ORAL at 08:06

## 2023-06-23 RX ADMIN — DOCUSATE SODIUM 100 MG: 100 CAPSULE, LIQUID FILLED ORAL at 09:06

## 2023-06-23 RX ADMIN — VASOPRESSIN 0.04 UNITS/MIN: 0.2 INJECTION INTRAVENOUS at 12:06

## 2023-06-23 RX ADMIN — POTASSIUM CHLORIDE 20 MEQ: 200 INJECTION, SOLUTION INTRAVENOUS at 02:06

## 2023-06-23 RX ADMIN — ACETAMINOPHEN 650 MG: 325 TABLET ORAL at 11:06

## 2023-06-23 RX ADMIN — METOPROLOL TARTRATE 25 MG: 25 TABLET, FILM COATED ORAL at 09:06

## 2023-06-23 RX ADMIN — PANTOPRAZOLE SODIUM 40 MG: 40 TABLET, DELAYED RELEASE ORAL at 08:06

## 2023-06-23 RX ADMIN — OXYCODONE HYDROCHLORIDE 5 MG: 5 TABLET ORAL at 03:06

## 2023-06-23 RX ADMIN — POTASSIUM CHLORIDE 20 MEQ: 1500 TABLET, EXTENDED RELEASE ORAL at 09:06

## 2023-06-23 RX ADMIN — CALCIUM GLUCONATE 1 G: 20 INJECTION, SOLUTION INTRAVENOUS at 02:06

## 2023-06-23 RX ADMIN — CLOPIDOGREL BISULFATE 75 MG: 75 TABLET ORAL at 08:06

## 2023-06-23 RX ADMIN — VASOPRESSIN 0.03 UNITS/MIN: 0.2 INJECTION INTRAVENOUS at 04:06

## 2023-06-23 RX ADMIN — FENTANYL CITRATE 25 MCG: 50 INJECTION INTRAMUSCULAR; INTRAVENOUS at 01:06

## 2023-06-23 RX ADMIN — CHLORHEXIDINE GLUCONATE 0.12% ORAL RINSE 10 ML: 1.2 LIQUID ORAL at 09:06

## 2023-06-23 RX ADMIN — FUROSEMIDE 40 MG: 10 INJECTION, SOLUTION INTRAMUSCULAR; INTRAVENOUS at 08:06

## 2023-06-23 RX ADMIN — IPRATROPIUM BROMIDE AND ALBUTEROL SULFATE 3 ML: .5; 3 SOLUTION RESPIRATORY (INHALATION) at 04:06

## 2023-06-23 RX ADMIN — DEXMEDETOMIDINE HYDROCHLORIDE 0.4 MCG/KG/HR: 4 INJECTION INTRAVENOUS at 01:06

## 2023-06-23 RX ADMIN — CHLORHEXIDINE GLUCONATE 0.12% ORAL RINSE 10 ML: 1.2 LIQUID ORAL at 08:06

## 2023-06-23 RX ADMIN — IPRATROPIUM BROMIDE AND ALBUTEROL SULFATE 3 ML: .5; 3 SOLUTION RESPIRATORY (INHALATION) at 12:06

## 2023-06-23 RX ADMIN — POTASSIUM CHLORIDE 20 MEQ: 1500 TABLET, EXTENDED RELEASE ORAL at 08:06

## 2023-06-23 RX ADMIN — ALBUMIN (HUMAN) 25 G: 2.5 SOLUTION INTRAVENOUS at 02:06

## 2023-06-23 RX ADMIN — FERROUS SULFATE TAB 325 MG (65 MG ELEMENTAL FE) 1 EACH: 325 (65 FE) TAB at 08:06

## 2023-06-23 RX ADMIN — OXYCODONE HYDROCHLORIDE 10 MG: 5 TABLET ORAL at 08:06

## 2023-06-23 RX ADMIN — FUROSEMIDE 40 MG: 10 INJECTION, SOLUTION INTRAMUSCULAR; INTRAVENOUS at 09:06

## 2023-06-23 RX ADMIN — FOLIC ACID 2 MG: 1 TABLET ORAL at 08:06

## 2023-06-23 RX ADMIN — OXYCODONE HYDROCHLORIDE 10 MG: 5 TABLET ORAL at 03:06

## 2023-06-23 RX ADMIN — FENTANYL CITRATE 25 MCG: 50 INJECTION INTRAMUSCULAR; INTRAVENOUS at 09:06

## 2023-06-23 RX ADMIN — IPRATROPIUM BROMIDE AND ALBUTEROL SULFATE 3 ML: .5; 3 SOLUTION RESPIRATORY (INHALATION) at 07:06

## 2023-06-23 RX ADMIN — PRAVASTATIN SODIUM 20 MG: 20 TABLET ORAL at 08:06

## 2023-06-23 RX ADMIN — LATANOPROST 1 DROP: 50 SOLUTION OPHTHALMIC at 09:06

## 2023-06-23 RX ADMIN — DOCUSATE SODIUM 100 MG: 100 CAPSULE, LIQUID FILLED ORAL at 08:06

## 2023-06-23 RX ADMIN — OXYCODONE HYDROCHLORIDE 10 MG: 5 TABLET ORAL at 09:06

## 2023-06-23 RX ADMIN — ONDANSETRON 4 MG: 2 INJECTION INTRAMUSCULAR; INTRAVENOUS at 05:06

## 2023-06-23 RX ADMIN — AMITRIPTYLINE HYDROCHLORIDE 50 MG: 50 TABLET, FILM COATED ORAL at 09:06

## 2023-06-23 RX ADMIN — OXYCODONE HYDROCHLORIDE AND ACETAMINOPHEN 500 MG: 500 TABLET ORAL at 09:06

## 2023-06-23 RX ADMIN — EPINEPHRINE 0.08 MCG/KG/MIN: 1 INJECTION INTRAMUSCULAR; INTRAVENOUS; SUBCUTANEOUS at 01:06

## 2023-06-23 RX ADMIN — CALCIUM CHLORIDE 2 G: 100 INJECTION, SOLUTION INTRAVENOUS at 07:06

## 2023-06-23 RX ADMIN — MAGNESIUM HYDROXIDE 400 MG: 400 SUSPENSION ORAL at 08:06

## 2023-06-23 RX ADMIN — CEFAZOLIN SODIUM 2 G: 2 SOLUTION INTRAVENOUS at 03:06

## 2023-06-23 RX ADMIN — OXYCODONE HYDROCHLORIDE AND ACETAMINOPHEN 500 MG: 500 TABLET ORAL at 08:06

## 2023-06-23 RX ADMIN — POLYETHYLENE GLYCOL 3350 17 G: 17 POWDER, FOR SOLUTION ORAL at 08:06

## 2023-06-23 RX ADMIN — SODIUM CHLORIDE 6.5 UNITS/HR: 9 INJECTION, SOLUTION INTRAVENOUS at 05:06

## 2023-06-23 NOTE — PLAN OF CARE
P.T. EVAL COMPLETE.  PT CURRENTLY REQUIRES FERNANDO X 2 FOR SEATED SCOOT AND SIT<>STAND TF.  P.T. RECOMMENDS HHPT AT D/C WITH FAMILY ASSIST AS NEEDED

## 2023-06-23 NOTE — ASSESSMENT & PLAN NOTE
6.22.2023  S/p cabg x3 earlier   Intubated, chest tubes   On vaso and epi , ci 1.9     6/23/23  -Stable, recovering well  -Continue ASA, Plavix, BB, statin  -IS usage and ambulation as able  -Mgmt as per CTS

## 2023-06-23 NOTE — PT/OT/SLP EVAL
Physical Therapy Evaluation    Patient Name:  Gustavo Tracey Jr.   MRN:  [id number]    Recommendations:     Discharge Recommendations: home health PT (WITH FAMILY ASSIST AS NEEDED)   Discharge Equipment Recommendations: shower chair   Barriers to discharge: None    Assessment:     Gustavo Tracey Jr. is a 63 y.o. male admitted with a medical diagnosis of Abnormal stress test.  He presents with the following impairments/functional limitations: weakness, impaired endurance, impaired functional mobility, gait instability, impaired balance, pain, decreased safety awareness, decreased lower extremity function, decreased coordination, impaired cardiopulmonary response to activity, decreased upper extremity function, edema.    Rehab Prognosis: Good; patient would benefit from acute skilled PT services to address these deficits and reach maximum level of function.    Recent Surgery: Procedure(s) (LRB):  CORONARY ARTERY BYPASS GRAFT (CABG) (N/A)  PURDY MAZE PROCEDURE (N/A)  SURGICAL PROCUREMENT, VEIN, ENDOSCOPIC (Left)  ECHOCARDIOGRAM,TRANSESOPHAGEAL (N/A)  BLOCK, NERVE, INTERCOSTAL, 2 OR MORE (N/A)  CLOSURE, LEFT ATRIAL APPENDAGE, USING DEVICE (Left) 1 Day Post-Op    Plan:     During this hospitalization, patient to be seen 3 x/week to address the identified rehab impairments via gait training, therapeutic activities, therapeutic exercises and progress toward the following goals:    Plan of Care Expires:  07/07/23    Subjective     Chief Complaint: GAS PAIN  Patient/Family Comments/goals:   Pain/Comfort:  Pain Rating 1: 10/10  Location - Orientation 1: lower  Location 1: abdomen (GAS PAIN)  Pain Addressed 1: Reposition, Distraction, Nurse notified  Pain Rating 2: 10/10  Location - Side 2: Bilateral  Location - Orientation 2: generalized  Location 2: chest (CHEST TUBE SITE)  Pain Addressed 2: Reposition, Distraction    Patients cultural, spiritual, Temple conflicts given the current situation:      Living Environment:  PT  LIVES ALONE BUT WILL BE STAYING WITH WIFE AND DAUGHTER DURING RECOVERY, 1 STORY HOUSE 4 STEPS WITH RAIL, PT AMB INDEP COMMUNITY DISTANCES, DRIVES, INDEP WITH ADL'S  Prior to admission, patients level of function was INDEP.  Equipment used at home: none.  DME owned (not currently used): none.  Upon discharge, patient will have assistance from FAMILY.    Objective:     Communicated with NURSE SANTORO prior to session.  Patient found up in chair with arterial line, blood pressure cuff, central line, chest tube, pulse ox (continuous), telemetry, wound vac, peripheral IV, oxygen, ANDERS drain, owusu catheter, external pacer  upon PT entry to room.    General Precautions: Standard, respiratory, sternal, fall, [place] Klever catheter (PA cordis)  Orthopedic Precautions:N/A   Braces: N/A  Respiratory Status: Nasal cannula, flow 2 L/min    Exams:  Cognitive Exam:  Patient is oriented to Person, Place, Time, and Situation  Postural Exam:  Patient presented with the following abnormalities:    -       Rounded shoulders  Sensation:    -       Intact  Skin Integrity/Edema:      -       Edema: Mild BLE  RLE ROM: WFL  RLE Strength: WFL  LLE ROM: WFL  LLE Strength: WFL    Functional Mobility:  Bed Mobility:     Scooting: minimum assistance and of 2 persons, SEATED SCOOTING ON/OFF BED PAN IN CHAIR  Transfers:     Sit to Stand:  minimum assistance and of 2 persons with no AD, CUES FOR CORRECT TECHNIQUE, GOOD COMPLIANCE WITH STERNAL PRECAUTIONS  Gait: NT, LIMITED BY ICU LINES  Balance: FAIR SITTING BALANCE, POOR+ DYNAMIC BALANCE    AM-PAC 6 CLICK MOBILITY  Total Score:8     Treatment & Education:  PT EDUCATED IN ROLE OF P.T. AND POC IN ACUTE CARE HOSPITAL SETTING, EDUCATED IN STERNAL PRECAUTIONS, ENCOURAGED TO INCREASE TIME OOB IN CHAIR TO TOLERANCE, EDUCATED IN AND ENCOURAGED TO PERFORM BLE THEREX WHILE SEATED OR SUPINE THROUGHOUT THE DAY TO TOLERANCE: HIP FLEX/EXT, QUAD SET, LAQ, HEEL SLIDES, AP'S.  PT AGREEABLE TO REQUESTS  PT EDUCATED ON  "RISK FOR FALLS DUE TO GENERALIZED WEAKNESS, EDUCATED ON "CALL DON'T FALL", ENCOURAGED TO CALL FOR ASSISTANCE WITH ALL NEEDS SUCH AS BED<>CHAIR TRANSFERS OR TRIPS TO BATHROOM, PT AGREEABLE TO SAFETY PRECAUTIONS    Patient left up in chair with all lines intact, call button in reach, bed alarm on, and NURSE notified.    GOALS:   Multidisciplinary Problems       Physical Therapy Goals          Problem: Physical Therapy    Goal Priority Disciplines Outcome Goal Variances Interventions   Physical Therapy Goal     PT, PT/OT      Description: LTG'S TO BE MET IN 14 DAYS (7-7-23)  PT WILL REQUIRE CGA FOR BED MOBILITY  PT WILL REQUIRE SPV FOR BED<>CHAIR TF'S  PT WILL  FEET NO AD SPV                         History:     Past Medical History:   Diagnosis Date    Abnormal nuclear stress test 11/26/2022    Cervical radiculopathy     to rt arm    CHF (congestive heart failure)     Chronic systolic congestive heart failure 11/28/2022    Dilated cardiomyopathy 11/26/2022    Hyperlipidemia     Hypertension     Obesity, unspecified     PAF (paroxysmal atrial fibrillation) 11/26/2022    Pulmonary HTN 11/28/2022    Stroke        Past Surgical History:   Procedure Laterality Date    INSTANTANEOUS WAVE-FREE RATIO  6/20/2023    Procedure: Instantaneous Wave-Free Ratio;  Surgeon: Zion Ortega MD;  Location: [facility] CATH LAB;  Service: Cardiology;;    IVUS, CORONARY  6/20/2023    Procedure: IVUS, Coronary;  Surgeon: Zion Ortega MD;  Location: [facility] CATH LAB;  Service: Cardiology;;    LEFT HEART CATHETERIZATION Left 11/28/2022    Procedure: CATHETERIZATION, HEART, LEFT;  Surgeon: Zion Ortega MD;  Location: [facility] CATH LAB;  Service: Cardiology;  Laterality: Left;    LEFT HEART CATHETERIZATION Left 6/20/2023    Procedure: Left heart cath;  Surgeon: Zion Ortega MD;  Location: [facility] CATH LAB;  Service: Cardiology;  Laterality: Left;    PERCUTANEOUS TRANSLUMINAL BALLOON ANGIOPLASTY OF CORONARY ARTERY  6/20/2023    Procedure: " Angioplasty-coronary;  Surgeon: Zion Ortega MD;  Location: [facility] CATH LAB;  Service: Cardiology;;    RIGHT HEART CATHETERIZATION N/A 11/28/2022    Procedure: INSERTION, CATHETER, RIGHT HEART;  Surgeon: Zion Ortega MD;  Location: [facility] CATH LAB;  Service: Cardiology;  Laterality: N/A;  Congestive heart failure       Time Tracking:     PT Received On: 06/23/23  PT Start Time: 1045     PT Stop Time: 1110  PT Total Time (min): 25 min     Billable Minutes: Evaluation 15 and Therapeutic Activity 10    06/23/2023

## 2023-06-23 NOTE — SUBJECTIVE & OBJECTIVE
Interval History: The patient is postop day 1 status post coronary artery bypass grafting x3 and Woodruff Maze 4 procedure.    ROS  Medications:  Continuous Infusions:   dexmedeTOMIDine (Precedex) infusion (titrating) Stopped (06/23/23 0415)    dextrose 5% lactated ringers 25 mL/hr at 06/22/23 1509    EPINEPHrine Stopped (06/23/23 0843)    niCARdipine      vasopressin 0.04 Units/min (06/23/23 0900)     Scheduled Meds:   acetaminophen  650 mg Oral Q6H    albuterol-ipratropium  3 mL Nebulization Q4H    amitriptyline  50 mg Oral QHS    ascorbic acid (vitamin C)  500 mg Oral BID    aspirin  81 mg Oral Daily    chlorhexidine  10 mL Mouth/Throat BID    clopidogreL  75 mg Oral Daily    [START ON 6/24/2023] cyanocobalamin  1,000 mcg Oral Daily    docusate sodium  100 mg Oral BID    ferrous sulfate  1 tablet Oral Daily    folic acid  2 mg Oral Daily    furosemide (LASIX) injection  40 mg Intravenous BID    insulin detemir U-100  10 Units Subcutaneous Daily    latanoprost  1 drop Both Eyes Nightly    magnesium hydroxide 400 mg/5 ml  5 mL Oral BID    metoprolol tartrate  25 mg Oral BID    pantoprazole  40 mg Oral Daily    polyethylene glycol  17 g Oral Daily    potassium chloride  20 mEq Oral Q12H    pravastatin  20 mg Oral Daily     PRN Meds:sodium chloride, acetaminophen, albumin human 5%, calcium gluconate IVPB, calcium gluconate IVPB, calcium gluconate IVPB, dextrose 10%, dextrose 10%, fentaNYL, fentaNYL, glucagon (human recombinant), glucose, glucose, insulin aspart U-100, lactated ringers, magnesium sulfate IVPB, metoclopramide HCl, ondansetron, oxyCODONE, oxyCODONE, pneumoc 20-mary conj-dip cr(PF), potassium chloride in water, potassium chloride in water, potassium chloride in water, sodium chloride 0.9%     Objective:     Vital Signs (Most Recent):  Temp: 97.6 °F (36.4 °C) (06/23/23 0730)  Pulse: 91 (06/23/23 0945)  Resp: (!) 27 (06/23/23 0945)  BP: 111/80 (06/23/23 0402)  SpO2: 100 % (06/23/23 0945) Vital Signs (24h  Range):  Temp:  [97.6 °F (36.4 °C)-101.5 °F (38.6 °C)] 97.6 °F (36.4 °C)  Pulse:  [88-99] 91  Resp:  [17-49] 27  SpO2:  [94 %-100 %] 100 %  BP: ()/(57-80) 111/80  Arterial Line BP: (-2-139)/(-3-76) 106/60     Weight: 113.5 kg (250 lb 3.6 oz)  Body mass index is 33.01 kg/m².    SpO2: 100 %       Intake/Output - Last 3 Shifts         06/21 0700  06/22 0659 06/22 0700 06/23 0659 06/23 0700  06/24 0659    I.V. (mL/kg)  2238.3 (19.7) 146.6 (1.3)    Blood  637.5     IV Piggyback  4736.8 99.3    Total Intake(mL/kg)  7612.6 (67.1) 245.8 (2.2)    Urine (mL/kg/hr) 1350 (0.5) 1210 (0.4) 425 (1.2)    Drains  45     Other  0     Chest Tube  415 54    Total Output 1350 1670 479    Net -1350 +5942.6 -233.2                   Lines/Drains/Airways       Central Venous Catheter Line  Duration             Pulmonary Artery Catheter Assessment  06/22/23 0741 1 day              Drain  Duration                  Urethral Catheter 06/22/23 0733 Silicone;Temperature probe;Straight-tip 16 Fr. 1 day         Chest Tube 06/22/23 1317 Tube - 3 Left Pleural 24 Fr. <1 day         Closed/Suction Drain 06/22/23 1257 Left Leg Bulb 19 Fr. <1 day         Y Chest Tube 1 and 2 06/22/23 1316 1 Right Mediastinal 24 Fr. 2 Left Mediastinal 24 Fr. <1 day              Arterial Line  Duration             Arterial Line 06/22/23 2350 Left Radial <1 day              Line  Duration                  Pacer Wires 06/22/23 1500 <1 day              Peripheral Intravenous Line  Duration                  Peripheral IV - Single Lumen 06/20/23 0944 20 G Posterior;Right Wrist 3 days                     Physical Exam  Constitutional:       Appearance: Normal appearance.   HENT:      Head: Normocephalic and atraumatic.      Nose: Nose normal.   Cardiovascular:      Rate and Rhythm: Normal rate and regular rhythm.      Pulses: Normal pulses.      Heart sounds: Normal heart sounds.   Pulmonary:      Effort: Pulmonary effort is normal.      Breath sounds: Normal breath  sounds.   Abdominal:      General: Abdomen is flat.      Palpations: Abdomen is soft.   Musculoskeletal:      Right lower leg: Edema present.      Left lower leg: Edema present.   Skin:     General: Skin is warm and dry.   Neurological:      Mental Status: He is alert and oriented to person, place, and time.   Psychiatric:         Behavior: Behavior normal.          Significant Labs:  All pertinent labs from the last 24 hours have been reviewed.    Significant Diagnostics:  I have reviewed all pertinent imaging results/findings within the past 24 hours.

## 2023-06-23 NOTE — PLAN OF CARE
Nutrition Recommendations 6/23:  1. Recommend modify pt diet to Consistent carbohydrate, Cardiac diet   2. If pt PO intake 50% or <, recommend [place] TID to assist filling nutritional gaps   3. Recommend Evan BID x 2 weeks for wound healing   4. Recommend continue bowel regimen (No BM x 4 days)   5. Recommend weekly weights  6. Collaboration of care with medical providers     Goals:   1. Pt diet will be modified by RD follow up   2. Pt will tolerate and consume > 60% est Needs by RD follow up   3. Pt will consume Evan BID prior to RD follow up   4. Pt will have BM by RD follow up    Esther Lawrence, Registration Eligible, Provisional LDN

## 2023-06-23 NOTE — PT/OT/SLP EVAL
Occupational Therapy Evaluation and Treatment    Patient Name: Gustavo Tracey Jr.   MRN: [id number]  Admitting Diagnosis: Abnormal stress test   Recent Surgery: Procedure(s) (LRB):  CORONARY ARTERY BYPASS GRAFT (CABG) (N/A)  PURDY MAZE PROCEDURE (N/A)  SURGICAL PROCUREMENT, VEIN, ENDOSCOPIC (Left)  ECHOCARDIOGRAM,TRANSESOPHAGEAL (N/A)  BLOCK, NERVE, INTERCOSTAL, 2 OR MORE (N/A)  CLOSURE, LEFT ATRIAL APPENDAGE, USING DEVICE (Left) 1 Day Post-Op    Recommendations:     Discharge Recommendations: home health OT (24/7 SPV and A)  Level of Assistance Recommended: 24 hours significant assistance  Discharge Equipment Recommendations: shower chair  Barriers to discharge: None    Assessment:     Gustavo Tracey Jr. is a 63 y.o. male with a medical diagnosis of Abnormal stress test. He presents with performance deficits affecting function including weakness, impaired endurance, impaired self care skills, impaired functional mobility, impaired balance, impaired cardiopulmonary response to activity, pain, decreased safety awareness, edema, impaired fine motor (sternal precautions).    Rehab Prognosis: Good; patient would benefit from acute OT services to address these deficits and reach maximum level of function.    Plan:     Patient to be seen 2 x/week to address the above listed problems via self-care/home management, therapeutic activities, therapeutic exercises  Plan of Care Expires: 07/07/23  Plan of Care Reviewed with: patient, spouse      Subjective     Chief Complaint: No chief complaint on file.    Patient Comments/Goals: get stronger  Pain/Comfort:  Pain Rating 1: 8/10  Location 1: abdomen  Pain Addressed 1:  (activity pacing)    Social History:  Living Environment: Patient typically lives at home alone. Reports rekindling relationship with his wife. Plans to d/c home with wife and daughter to a 1 story home.  Prior Level of Function: Prior to admission, patient was independent with ADLs and community distance  ambulation.  Roles and Routines: Patient was driving and not working prior to admission.  Equipment Used at Home: none  DME owned (not currently used): none  Assistance Upon Discharge: family      Objective:     Communicated with nurse, Brian, prior to session. Patient found up in chair with arterial line, blood pressure cuff, central line, chest tube, owusu catheter, external pacer, ANDERS drain, oxygen, peripheral IV, wound vac, telemetry, pulse ox (continuous) upon OT entry to room.    General Precautions: Standard, fall, [place] Klever catheter (PA cordis), sternal, respiratory   Orthopedic Precautions: N/A   Braces: N/A    Respiratory Status: Nasal cannula, flow 2 L/min    Occupational Performance    Bed Mobility:   OOB in chair at start and end of session    Functional Mobility/Transfers:  Sit <> Stand Transfer with minimum assistance and 2 persons with no AD  Seated scooting to edge of chair with min A of 2  Unable to progress away from bedside chair due to multiple ICU lines    Activities of Daily Living:  Upper Body Dressing: maximal assistance  Lower Body Dressing: total assistance    Cognitive/Visual Perceptual:  Cognitive/Psychosocial Skills:    -     Oriented to: Person, Place, Time, Situation  -     Follows Commands/attention: Follows one-step commands    Hearing: Intact    Physical Exam:  Balance:    -     Sitting: contact guard assistance  -     Standing: minimum assistance  Limited assessment of B UE due to multiple ICU lines and sternal precautions. Grossly WFL AROM. Will continue to assess.    AMPAC 6 Click ADL:  AMPAC Total Score: 10    Treatment & Education:  Therapist provided facilitation and instruction of proper body mechanics, energy conservation, and fall prevention strategies during tasks listed above.  Patient educated on role of OT, POC, and goals for therapy  Patient educated on importance of OOB activities with staff member assistance and sitting OOB majority of the day.  Educated on sternal  precautions and their functional implications. Will require reinforcement of all education.    Patient left up in chair with all lines intact, call button in reach, and RN notified.    GOALS:   Multidisciplinary Problems       Occupational Therapy Goals          Problem: Occupational Therapy    Goal Priority Disciplines Outcome Interventions   Occupational Therapy Goal     OT, PT/OT     Description: Goals to be met by: 7/7/23     Patient will increase functional independence with ADLs by performing:    Toileting from toilet with Minimal Assistance for hygiene and clothing management.   Toilet transfer to toilet with Contact Guard Assistance.  Demonstrates 100% functional compliance with sternal precautions.                           History:     Past Medical History:   Diagnosis Date    Abnormal nuclear stress test 11/26/2022    Cervical radiculopathy     to rt arm    CHF (congestive heart failure)     Chronic systolic congestive heart failure 11/28/2022    Dilated cardiomyopathy 11/26/2022    Hyperlipidemia     Hypertension     Obesity, unspecified     PAF (paroxysmal atrial fibrillation) 11/26/2022    Pulmonary HTN 11/28/2022    Stroke          Past Surgical History:   Procedure Laterality Date    INSTANTANEOUS WAVE-FREE RATIO  6/20/2023    Procedure: Instantaneous Wave-Free Ratio;  Surgeon: Zion Ortega MD;  Location: [facility] CATH LAB;  Service: Cardiology;;    IVUS, CORONARY  6/20/2023    Procedure: IVUS, Coronary;  Surgeon: Zion Ortega MD;  Location: [facility] CATH LAB;  Service: Cardiology;;    LEFT HEART CATHETERIZATION Left 11/28/2022    Procedure: CATHETERIZATION, HEART, LEFT;  Surgeon: Zion Ortega MD;  Location: [facility] CATH LAB;  Service: Cardiology;  Laterality: Left;    LEFT HEART CATHETERIZATION Left 6/20/2023    Procedure: Left heart cath;  Surgeon: Zion Ortega MD;  Location: [facility] CATH LAB;  Service: Cardiology;  Laterality: Left;    PERCUTANEOUS TRANSLUMINAL BALLOON ANGIOPLASTY OF CORONARY ARTERY   6/20/2023    Procedure: Angioplasty-coronary;  Surgeon: Zion Ortega MD;  Location: [facility] CATH LAB;  Service: Cardiology;;    RIGHT HEART CATHETERIZATION N/A 11/28/2022    Procedure: INSERTION, CATHETER, RIGHT HEART;  Surgeon: Zion Ortega MD;  Location: [facility] CATH LAB;  Service: Cardiology;  Laterality: N/A;  Congestive heart failure       Time Tracking:     OT Date of Treatment: 06/23/23  OT Start Time: 1100  OT Stop Time: 1125  OT Total Time (min): 25 min    Billable Minutes: Evaluation 15 and Therapeutic Activity 10    6/23/2023

## 2023-06-23 NOTE — PLAN OF CARE
Plan of care reviewed with pt and pt's family at bedside.  Pt underwent CABG x3.  Off all gtts. PA cath and cordis removed, art line removed.  PICC line placed per MD order.  Titrated O2 off, stable on room air  Pt educated on sternal precautions and IS.  Insulin gtt off, accuchecks achs and diet initiated, decreased appetite.  CT x3, ANDERS x1, and owusu cath remain in use.  Pain management progressing on PO meds.

## 2023-06-23 NOTE — HOSPITAL COURSE
06/23/2023   The patient is postop day 1 status post coronary artery bypass grafting x3 and Woodruff Maze 4 procedure.    06/24/2023   The patient is postop day 2 status post coronary artery bypass grafting x3 and Woodruff Maze 4 procedure.    06/25/2023   The patient is postop day 3 status post coronary artery bypass grafting x3 and Woodruff Maze 4 procedure.    06/26/2023   The patient is postop day 4 status post coronary artery bypass grafting x3 and Woodruff Maze 4 procedure.    06/27/2023   The patient is postop day 5 status post coronary artery bypass grafting x3 and Woodruff Maze 4 procedure.

## 2023-06-23 NOTE — SUBJECTIVE & OBJECTIVE
6/23 seen and examined.  No major events overnight.  Postop day 1 status post CABG x3.  On vaso on epinephrine drip.  On insulin drip.  Paced rhythm at 90.  Extubated yesterday around 6:00 p.m..  Urine output last 24 hours 1.2 L.  Chest tube output 400 mL.  Positive 4 L since admission.  Last BM 6 /19.  T-max 101°  Review of Systems   Constitutional:  Positive for activity change and fatigue. Negative for chills and fever.   HENT:  Negative for drooling, ear discharge and nosebleeds.    Eyes:  Negative for pain, discharge and itching.   Respiratory:  Positive for shortness of breath. Negative for choking.    Cardiovascular:  Negative for chest pain.   Gastrointestinal:  Negative for anal bleeding.   Endocrine: Negative for cold intolerance.   Genitourinary:  Negative for hematuria.   Musculoskeletal:  Positive for arthralgias. Negative for neck stiffness.   Skin:  Negative for rash.   Allergic/Immunologic: Negative for immunocompromised state.   Neurological:  Positive for weakness. Negative for seizures and facial asymmetry.   Hematological:  Negative for adenopathy.   Psychiatric/Behavioral:  Negative for behavioral problems, self-injury and suicidal ideas.    Objective:     Vital Signs (Most Recent):  Temp: 98.4 °F (36.9 °C) (06/23/23 0445)  Pulse: 91 (06/23/23 0709)  Resp: (!) 22 (06/23/23 0711)  BP: 111/80 (06/23/23 0402)  SpO2: 99 % (06/23/23 0709) Vital Signs (24h Range):  Temp:  [98.4 °F (36.9 °C)-101.5 °F (38.6 °C)] 98.4 °F (36.9 °C)  Pulse:  [88-99] 91  Resp:  [17-49] 22  SpO2:  [94 %-100 %] 99 %  BP: ()/(57-80) 111/80  Arterial Line BP: (-2-139)/(-3-76) 111/56     Weight: 113.5 kg (250 lb 3.6 oz)  Body mass index is 33.01 kg/m².      Intake/Output Summary (Last 24 hours) at 6/23/2023 0804  Last data filed at 6/23/2023 0600  Gross per 24 hour   Intake 7512.6 ml   Output 1670 ml   Net 5842.6 ml          Physical Exam  Constitutional:       Appearance: He is obese.   Neck:      Comments: Right IJ right  heart catheter  Cardiovascular:      Rate and Rhythm: Normal rate and regular rhythm.   Pulmonary:      Effort: Respiratory distress present.      Breath sounds: No stridor.      Comments: Chest tubes noted  Abdominal:      Palpations: Abdomen is soft.   Genitourinary:     Comments: Rankin catheter  Musculoskeletal:         General: Deformity present.      Comments: Radial A-line   Skin:     General: Skin is warm.      Comments: Lower extremity dressing    Chest wound dressing    Neurological:      General: No focal deficit present.        Vents:  Vent Mode: (S) Spont (06/22/23 1627)  Set Rate: 18 BPM (06/22/23 1453)  Vt Set: 500 mL (06/22/23 1453)  Pressure Support: 10 cmH20 (06/22/23 1627)  PEEP/CPAP: 5 cmH20 (06/22/23 1627)  Oxygen Concentration (%): 30 (06/22/23 1627)  Peak Airway Pressure: 15 cmH20 (06/22/23 1627)  Total Ve: 12.5 L/m (06/22/23 1627)  F/VT Ratio<105 (RSBI): (!) 59.6 (06/22/23 1627)    Lines/Drains/Airways       Central Venous Catheter Line  Duration             Pulmonary Artery Catheter Assessment  06/22/23 0741 1 day              Drain  Duration                  Urethral Catheter 06/22/23 0733 Silicone;Temperature probe;Straight-tip 16 Fr. 1 day         Chest Tube 06/22/23 1317 Tube - 3 Left Pleural 24 Fr. <1 day         Closed/Suction Drain 06/22/23 1257 Left Leg Bulb 19 Fr. <1 day         Y Chest Tube 1 and 2 06/22/23 1316 1 Right Mediastinal 24 Fr. 2 Left Mediastinal 24 Fr. <1 day              Arterial Line  Duration             Arterial Line 06/22/23 2350 Left Radial <1 day              Line  Duration                  Pacer Wires 06/22/23 1500 <1 day              Peripheral Intravenous Line  Duration                  Peripheral IV - Single Lumen 06/20/23 0944 20 G Posterior;Right Wrist 2 days                    Significant Labs:    CBC/Anemia Profile:  Recent Labs   Lab 06/22/23  1311 06/22/23  1328 06/22/23  1456 06/22/23  1505 06/22/23  2020 06/23/23  0228 06/23/23  0355   WBC 12.90*  --   38.06*  --   --   --  4.81   HGB 8.5*  --  7.2*  --  9.9*  --  9.5*   HCT 26.5*   < > 22.4*   < > 30.2* 27* 28.2*   PLT 33*  --  151  --   --   --  116*   MCV 86  --  86  --   --   --  83   RDW 17.2*  --  17.1*  --   --   --  15.9*    < > = values in this interval not displayed.          Chemistries:  Recent Labs   Lab 06/21/23  1113 06/21/23  1113 06/22/23  1456 06/22/23  1717 06/22/23 2020 06/23/23  0355     --  143 141  --  140   K 3.6  --  3.9 3.9 4.4 4.4     --  107 109  --  110   CO2 27  --  24 20*  --  21*   BUN 24*  --  22 25*  --  27*   CREATININE 1.6*  --  1.5* 1.6*  --  1.7*   CALCIUM 9.8  --  7.2* 7.2*  --  8.1*   ALBUMIN 3.8  --   --   --   --   --    PROT 7.8  --   --   --   --   --    BILITOT 1.0  --   --   --   --   --    ALKPHOS 64  --   --   --   --   --    ALT 8*  --   --   --   --   --    AST 19  --   --   --   --   --    MG  --    < > 3.4* 3.2* 3.2* 3.1*    < > = values in this interval not displayed.        Latest Reference Range & Units 06/22/23 15:05   POC PH 7.35 - 7.45  7.440   POC PCO2 35 - 45 mmHg 38.0   POC PO2 80 - 100 mmHg 348 (H)   POC HCO3 24 - 28 mmol/L 25.8   Sodium, Blood Gas 136 - 145 mmol/L 144   Potassium, Blood Gas 3.5 - 5.1 mmol/L 3.9   POC SATURATED O2 95 - 100 % 100   Sample  ARTERIAL   POC Ionized Calcium 1.06 - 1.42 mmol/L 1.01 (L)   POC Glucose 70 - 110 mg/dL 167 (H)   POC Hematocrit 36 - 54 %PCV 21 (L)   POC BE -2 to 2 mmol/L 2   FiO2  100   Vt  500   PiP  29   PEEP  5   DelSys  Adult Vent   Site  Kristin/UAC   Mode  AC/PRVC   Rate  18     2D echo 06/21/2023    The left ventricle is moderately enlarged with eccentric hypertrophy and severely decreased systolic function.  Grade I left ventricular diastolic dysfunction.  Normal right ventricular size with normal right ventricular systolic function.  Normal central venous pressure (3 mmHg).  The estimated ejection fraction is 25%.  Mild aortic regurgitation.  Mild mitral regurgitation.  There is severe left  ventricular global hypokinesis.     Significant Imaging:       Chest x-ray 06/22/2023    EXAMINATION:  XR CHEST AP PORTABLE     CLINICAL HISTORY:  Post-op;     COMPARISON:  06/20/2023     FINDINGS:  There has been interval placement of multiple sternotomy wires.  There has been interval placement of a right internal jugular venous Chimayo-Klever line.  Its tip is in the region of the main pulmonary artery.  There has been interval placement of an endotracheal tube.  Its tip is located 75 mm superior to the lesley.  There has been interval placement of a mediastinal drain.  There has been interval placement of a left-sided chest tube.  There is no pneumothorax visualized.  There has been interval development of a mild amount of haziness in the base of the left lung.  The right lung is clear.  The right costophrenic angle is sharp.  The left costophrenic angle is not well seen secondary to overlying ribs.  The size of the heart is normal.     Impression:     1. There has been interval placement of multiple sternotomy wires. There has been interval placement of a right internal jugular venous Chimayo-Klever line. Its tip is in the region of the main pulmonary artery. There has been interval placement of an endotracheal tube. Its tip is located 75 mm superior to the lesley. There has been interval placement of a mediastinal drain. There has been interval placement of a left-sided chest tube. There is no pneumothorax visualized.  2. There has been interval development of a mild amount of haziness in the base of the left lung.  This is characteristic of atelectasis

## 2023-06-23 NOTE — ASSESSMENT & PLAN NOTE
6/23 postop day 1.  Aspirin statin Plavix.  Wean off pressors.  Monitor chest tube output.  Pacing wires in place

## 2023-06-23 NOTE — PROGRESS NOTES
[facility] - Intensive Care ([facility])  Cardiothoracic Surgery  Progress Note    Patient Name: Gustavo Tracey Jr.  MRN: [id number]  Admission Date: 6/20/2023  Hospital Length of Stay: 2 days  Code Status: Full Code   Attending Physician: Rustam Dave MD   Referring Provider: Zion Ortega MD  Principal Problem:Abnormal stress test            Subjective:     Post-Op Info:  Procedure(s) (LRB):  CORONARY ARTERY BYPASS GRAFT (CABG) (N/A)  PURDY MAZE PROCEDURE (N/A)  SURGICAL PROCUREMENT, VEIN, ENDOSCOPIC (Left)  ECHOCARDIOGRAM,TRANSESOPHAGEAL (N/A)  BLOCK, NERVE, INTERCOSTAL, 2 OR MORE (N/A)  CLOSURE, LEFT ATRIAL APPENDAGE, USING DEVICE (Left)   1 Day Post-Op     Interval History: The patient is postop day 1 status post coronary artery bypass grafting x3 and Purdy Maze 4 procedure.    ROS  Medications:  Continuous Infusions:   dexmedeTOMIDine (Precedex) infusion (titrating) Stopped (06/23/23 0415)    dextrose 5% lactated ringers 25 mL/hr at 06/22/23 1509    EPINEPHrine Stopped (06/23/23 0843)    niCARdipine      vasopressin 0.04 Units/min (06/23/23 0900)     Scheduled Meds:   acetaminophen  650 mg Oral Q6H    albuterol-ipratropium  3 mL Nebulization Q4H    amitriptyline  50 mg Oral QHS    ascorbic acid (vitamin C)  500 mg Oral BID    aspirin  81 mg Oral Daily    chlorhexidine  10 mL Mouth/Throat BID    clopidogreL  75 mg Oral Daily    [START ON 6/24/2023] cyanocobalamin  1,000 mcg Oral Daily    docusate sodium  100 mg Oral BID    ferrous sulfate  1 tablet Oral Daily    folic acid  2 mg Oral Daily    furosemide (LASIX) injection  40 mg Intravenous BID    insulin detemir U-100  10 Units Subcutaneous Daily    latanoprost  1 drop Both Eyes Nightly    magnesium hydroxide 400 mg/5 ml  5 mL Oral BID    metoprolol tartrate  25 mg Oral BID    pantoprazole  40 mg Oral Daily    polyethylene glycol  17 g Oral Daily    potassium chloride  20 mEq Oral Q12H    pravastatin  20 mg Oral Daily     PRN Meds:sodium  chloride, acetaminophen, albumin human 5%, calcium gluconate IVPB, calcium gluconate IVPB, calcium gluconate IVPB, dextrose 10%, dextrose 10%, fentaNYL, fentaNYL, glucagon (human recombinant), glucose, glucose, insulin aspart U-100, lactated ringers, magnesium sulfate IVPB, metoclopramide HCl, ondansetron, oxyCODONE, oxyCODONE, pneumoc 20-mary conj-dip cr(PF), potassium chloride in water, potassium chloride in water, potassium chloride in water, sodium chloride 0.9%     Objective:     Vital Signs (Most Recent):  Temp: 97.6 °F (36.4 °C) (06/23/23 0730)  Pulse: 91 (06/23/23 0945)  Resp: (!) 27 (06/23/23 0945)  BP: 111/80 (06/23/23 0402)  SpO2: 100 % (06/23/23 0945) Vital Signs (24h Range):  Temp:  [97.6 °F (36.4 °C)-101.5 °F (38.6 °C)] 97.6 °F (36.4 °C)  Pulse:  [88-99] 91  Resp:  [17-49] 27  SpO2:  [94 %-100 %] 100 %  BP: ()/(57-80) 111/80  Arterial Line BP: (-2-139)/(-3-76) 106/60     Weight: 113.5 kg (250 lb 3.6 oz)  Body mass index is 33.01 kg/m².    SpO2: 100 %       Intake/Output - Last 3 Shifts         06/21 0700 06/22 0659 06/22 0700 06/23 0659 06/23 0700 06/24 0659    I.V. (mL/kg)  2238.3 (19.7) 146.6 (1.3)    Blood  637.5     IV Piggyback  4736.8 99.3    Total Intake(mL/kg)  7612.6 (67.1) 245.8 (2.2)    Urine (mL/kg/hr) 1350 (0.5) 1210 (0.4) 425 (1.2)    Drains  45     Other  0     Chest Tube  415 54    Total Output 1350 1670 479    Net -1350 +5942.6 -233.2                   Lines/Drains/Airways       Central Venous Catheter Line  Duration             Pulmonary Artery Catheter Assessment  06/22/23 0741 1 day              Drain  Duration                  Urethral Catheter 06/22/23 0733 Silicone;Temperature probe;Straight-tip 16 Fr. 1 day         Chest Tube 06/22/23 1317 Tube - 3 Left Pleural 24 Fr. <1 day         Closed/Suction Drain 06/22/23 1257 Left Leg Bulb 19 Fr. <1 day         Y Chest Tube 1 and 2 06/22/23 1316 1 Right Mediastinal 24 Fr. 2 Left Mediastinal 24 Fr. <1 day              Arterial  Line  Duration             Arterial Line 06/22/23 2350 Left Radial <1 day              Line  Duration                  Pacer Wires 06/22/23 1500 <1 day              Peripheral Intravenous Line  Duration                  Peripheral IV - Single Lumen 06/20/23 0944 20 G Posterior;Right Wrist 3 days                     Physical Exam  Constitutional:       Appearance: Normal appearance.   HENT:      Head: Normocephalic and atraumatic.      Nose: Nose normal.   Cardiovascular:      Rate and Rhythm: Normal rate and regular rhythm.      Pulses: Normal pulses.      Heart sounds: Normal heart sounds.   Pulmonary:      Effort: Pulmonary effort is normal.      Breath sounds: Normal breath sounds.   Abdominal:      General: Abdomen is flat.      Palpations: Abdomen is soft.   Musculoskeletal:      Right lower leg: Edema present.      Left lower leg: Edema present.   Skin:     General: Skin is warm and dry.   Neurological:      Mental Status: He is alert and oriented to person, place, and time.   Psychiatric:         Behavior: Behavior normal.          Significant Labs:  All pertinent labs from the last 24 hours have been reviewed.    Significant Diagnostics:  I have reviewed all pertinent imaging results/findings within the past 24 hours.    Assessment/Plan:     S/P CABG x 3  06/23/2023   The patient is postop day 1 status post coronary artery bypass grafting x3 and Woodruff Maze 4 procedure.  Overall the patient is doing well.    Neuro:  Patient is awake alert and oriented x3.  Neuro exam is nonfocal.  Patient moves all 4.  Patient's pain is being controlled with current pain regimen.    Cardiac:  Patient has been hemodynamically stable cardiac index has been about 2.2.  Patient is on low-dose epi and vasopressin.  Wean vasopressin and epi to off.    Respiratory:  Patient was extubated yesterday.  Patient has good sats on nasal cannula.  Continue pulmonary toileting.  Continue incentive spirometer.  GI:  Will advance patient's to  regular diet as tolerated.    Renal: Patient has good urine output.  Creatinine is 1.7.  Will start diuresis.  Endocrine:  Patient's glucose is controlled with an insulin drip.  Will convert to sliding scale and long-acting insulin.  Heme:  Hematocrit is 28.2 and platelet count is 116.  Will start patient on a NOAC for anticoagulation once patient has chest tubes have been discontinued.  Patient requires anticoagulation since he is status post Woodruff Maze 4 for atrial fibrillation.  Id:  White count is 4.  Will continue to follow.  Patient is on venu op antibiotics.    Activities:  Patient is out of bed to chair.  Will advance activities as tolerated.    Line tubes and drains:  Patient has a right IJ Lima and Cordis, chest tubes, pacer wires, A-line, Rankin catheter and saphenectomy site ANDERS drains.    Left main coronary artery disease  The patient is a 63-year-old male with CHF, history of AFib, status post CVA, hyperlipidemia, hypertension, diabetes and cardiomyopathy who had a normal Cardiolite test on workup.  The patient was brought to the operating room and cardiac catheterization shows significant multivessel coronary artery disease with a 70% ostial left main disease and a 70% proximal LAD disease.    The patient is a candidate for coronary artery bypass grafting and Woodruff Maze 4 procedure.  Preop workup is in progress.  The risks and benefits of the surgery were explained to the patient.  The patient understands the risks and benefits of surgery and has to proceed with surgery which has been scheduled for 06/22/2023.        Rustam Dave MD  Cardiothoracic Surgery  [facility] - Intensive Care ([facility])

## 2023-06-23 NOTE — CONSULTS
[facility] - Intensive Care ([facility])  Adult Nutrition  Consult Note    SUMMARY     Recommendations    Recommendation/Intervention:   1. Recommend modify pt diet to Consistent carbohydrate, Cardiac diet   2. If pt PO intake 50% or <, recommend Suplena TID to assist filling nutritional gaps   3. Recommend Evan BID x 2 weeks for wound healing   4. Recommend continue bowel regimen (No BM x 4 days)   5. Recommend weekly weights    Goals:   1. Pt diet will be modified by RD follow up   2. Pt will tolerate and consume > 60% est Needs by RD follow up   3. Pt will consume Evan BID prior to RD follow up   4. Pt will have BM by RD follow up  Nutrition Goal Status: new  Communication of RD Recs: other (comment); (POC, sticky note)    Assessment and Plan    Nutrition Problem  Food and nutrition related knowledge deficit  Inadequate protein/energy intake  Increased nutrient needs     Related to (etiology):   Lack of prior exposure to accurate nutrition related information   Decreased ability to consume sufficient protein/energy   Increased demand for nutrition     Signs and Symptoms (as evidenced by):   Demonstrated inability to apply food/nutrition related knowledge as pt consumes high sodium/fat foods; CVA, CABG  Decreased appetite: 25%   Diabetic with wound healing needs     Interventions(treatment strategy):  1. Consistent carbohydrate, Sodium and Fat modified diet   2. Commercial beverage   3. Wound healing medical food supplement therapy  4. Collaboration of care with medical providers     Nutrition Diagnosis Status:   New         Malnutrition Assessment                                       Reason for Assessment    Reason For Assessment: consult (Post Op)  Diagnosis:  (Abnormal stress test)  Relevant Medical History: S/p CABG x 3 6/22/23, CHF, CKD 3, HTN, HLD, CAD, DCM, PAF, IFG  Hx: CVA 4/2023, Diabetes   General Information Comments:   6/23/23: 63 y.o. Male admitted for Abnormal stress test. Pt is postop day 1 S/p CABG  "x3 and Woodruff Maze 4 procedure, noted overall pt doing well. Visited pt at bedside, pt working with RN's. Spoke to pt wife outside pt room. Pt wife stated that pt had a good appetite PTA, confirmed 100% PO intake of meals, reported that pt has not consumed meal since admit, noted pt in a lot of pain. Pt wife stated she belives pt weight is between 220-250 lbs last weighed on home scale. Provided pt wife with "Cardiac TLC Nutrition Therapy" and "Low-Sodium Nutrition Therapy" (nutritioncaremanual.org) handouts. Discussed the importance of limiting sodium to less than 2,000 mg per day. Recommended salt free seasonings and other herbs and spices in meals to enhance flavor without additional sodium. Recommended a well balanced diet with a variety of fresh foods, fruits and vegetables (5 cups/day), whole grains (3 oz/day), and fat-free or low fat dairy. Discussed reading food packages, food labels, and nutrition facts labels to identify nutrient content of foods. Discussed the importance of fiber (especially soluble fiber), dietary sources, and a goal intake of >20-30g/day. Pt wife expressed appreciation and understanding of nutritional education. All questions/concerns answered at this time. Encouraged pt wife to use RD contact information provided on handouts with any further questions/concerns, informed pt wife will discuss nutrition education with pt at follow up. Pt appeared well nourished, no NFPE warranted at this time. Spoke to RN via secure, confirmed pt is a diabetic, stated "yes ally I was told hx of dm but does not take anything at home for it". Reviewed chart: Currently no PO intake charted: LBM 6/19 (x 4 days no BM); Skin: dry, incisions chest/ L leg WDL; Boris score: 18 (mild risk); Edema: None. Labs, meds, weight reviewed. Weight charted 5/16 240 lbs, 6/23 250 lbs (BMI 33.01, Obese), +10 lb wt gain x 1+ month. RD will continue to monitor.  Nutrition Discharge Planning: Consistent carbohydrate, Cardiac " "diet    Nutrition Risk Screen    Nutrition Risk Screen: no indicators present    Nutrition/Diet History    Spiritual, Cultural Beliefs, Amish Practices, Values that Affect Care: yes (Worship)  Food Allergies: NKFA  Factors Affecting Nutritional Intake: pain    Anthropometrics    Temp: 97.5 °F (36.4 °C)  Height Method: Stated  Height: 6' 1" (185.4 cm)  Height (inches): 73 in  Weight Method: Bed Scale  Weight: 113.5 kg (250 lb 3.6 oz)  Weight (lb): 250.22 lb  Ideal Body Weight (IBW), Male: 184 lb  % Ideal Body Weight, Male (lb): 135.99 %  BMI (Calculated): 33  BMI Grade: 30 - 34.9- obesity - grade I     Wt Readings from Last 15 Encounters:   06/23/23 113.5 kg (250 lb 3.6 oz)   05/16/23 108.9 kg (240 lb)   04/05/23 107 kg (236 lb)   01/20/23 101.6 kg (224 lb)   11/30/22 86.2 kg (190 lb 0.6 oz)   11/21/22 97.5 kg (215 lb)   11/14/22 97.1 kg (214 lb)     Lab/Procedures/Meds    Pertinent Labs Reviewed: reviewed  Pertinent Labs Comments: Calcium (L), Glu (H), BUN (H), Cr (H), eGFR 45  Pertinent Medications Reviewed: reviewed  Pertinent Medications Comments: pravastatin, potassium chloride, polyethylene glycol, pantoprazole, Lopressor, magnesium hydroxide, insulin Levemir, furosemide, folic acid, docusate, asprin, vitamin C, amitriptyline, acetaminophen  BMP  Lab Results   Component Value Date     06/23/2023    K 4.4 06/23/2023     06/23/2023    CO2 21 (L) 06/23/2023    BUN 27 (H) 06/23/2023    CREATININE 1.7 (H) 06/23/2023    CALCIUM 8.1 (L) 06/23/2023    ANIONGAP 9 06/23/2023    EGFRNORACEVR 45 (A) 06/23/2023     Recent Labs   Lab 06/23/23 1133   POCTGLUCOSE 96     Lab Results   Component Value Date    HGBA1C 5.9 (H) 06/21/2023     Scheduled Meds:   acetaminophen  650 mg Oral Q6H    albuterol-ipratropium  3 mL Nebulization Q4H    amitriptyline  50 mg Oral QHS    ascorbic acid (vitamin C)  500 mg Oral BID    aspirin  81 mg Oral Daily    chlorhexidine  10 mL Mouth/Throat BID    [START ON " 6/24/2023] cyanocobalamin  1,000 mcg Oral Daily    docusate sodium  100 mg Oral BID    ferrous sulfate  1 tablet Oral Daily    folic acid  2 mg Oral Daily    furosemide (LASIX) injection  40 mg Intravenous BID    insulin detemir U-100  10 Units Subcutaneous Daily    latanoprost  1 drop Both Eyes Nightly    magnesium hydroxide 400 mg/5 ml  5 mL Oral BID    metoprolol tartrate  25 mg Oral BID    pantoprazole  40 mg Oral Daily    polyethylene glycol  17 g Oral Daily    potassium chloride  20 mEq Oral Q12H    pravastatin  20 mg Oral Daily     Continuous Infusions:   EPINEPHrine Stopped (06/23/23 0843)    niCARdipine      vasopressin Stopped (06/23/23 0930)     PRN Meds:.sodium chloride, acetaminophen, albumin human 5%, calcium gluconate IVPB, calcium gluconate IVPB, calcium gluconate IVPB, dextrose 10%, dextrose 10%, fentaNYL, fentaNYL, glucagon (human recombinant), glucose, glucose, insulin aspart U-100, lactated ringers, magnesium sulfate IVPB, metoclopramide HCl, ondansetron, oxyCODONE, oxyCODONE, pneumoc 20-mary conj-dip cr(PF), potassium chloride in water, potassium chloride in water, potassium chloride in water, sodium chloride 0.9%    Physical Findings/Assessment         Estimated/Assessed Needs    Weight Used For Calorie Calculations: 113.5 kg (250 lb 3.6 oz)  Energy Calorie Requirements (kcal): 1989 kcals (MSJ (CABG, Obese BMI 33.01)  Energy Need Method: [facility]  Protein Requirements: 54-73 g (0.6-0.8 g/kg Adj BW (CKD 3, no dialysis, Diabetic, Obese 135.99% IBW)  Weight Used For Protein Calculations: 91.1 kg (200 lb 13.4 oz) (Adj BW)  Fluid Requirements (mL): 1989 mL (1 mL/kcal)  Estimated Fluid Requirement Method: RDA Method  RDA Method (mL): 1989  CHO Requirement: 249 (1989 kcals/8)      Nutrition Prescription Ordered    Current Diet Order: Cardiac diet    Evaluation of Received Nutrient/Fluid Intake  I/O: (Net since admit)   6/23: +3068.4 mL    Energy Calories Required: not meeting needs  Protein  Required: not meeting needs  Fluid Required: not meeting needs  Tolerance: tolerating  % Intake of Estimated Energy Needs: 25%  % Meal Intake: 25%     Nutrition Risk    Level of Risk/Frequency of Follow-up: high (F/u x 2 weekly)       Monitor and Evaluation    Food and Nutrient Intake: energy intake, food and beverage intake  Food and Nutrient Adminstration: diet order  Knowledge/Beliefs/Attitudes: food and nutrition knowledge/skill, beliefs and attitudes  Anthropometric Measurements: weight, weight change, body mass index  Biochemical Data, Medical Tests and Procedures: glucose/endocrine profile, electrolyte and renal panel, inflammatory profile       Nutrition Follow-Up    RD Follow-up?: Yes  Esther Lawrence, Registration Eligible, Provisional LDN

## 2023-06-23 NOTE — PLAN OF CARE
PT AAOX4, with precedex titrated to maintain RASS goal of 0. Gtt off at 0415 per MD for PT to get up to chair.  On 2L NC sats in upper 90s.  Vaso and Epi gtt on and titrated per order to maintain MAP goal range.  Pacer wires in place, A-Paced at 90.  Midline incision dressing clean, dry and intact with prevena wound vac.  Max T for shift 101.5, now normothermic at 98.5.  Insulin gtt titrated per protocol with Q1H bloodsugar checks.   Electrolyte replacements given per PRN orders.   PRN pain medications given (see mar).  PT tolerated getting up to chair well, C/O slight nausea (PRN med given with full relief).  Rankin remains in place with adequate UOP.    POC reviewed with PT at bedside.   Alarms audible and appropriate.  Call light in reach, chair locked. Instructed pt to call for any assistance.

## 2023-06-23 NOTE — PROGRESS NOTES
[facility] - Intensive Care ([facility])  Cardiology  Progress Note    Patient Name: Gustavo Tracey Jr.  MRN: [id number]  Admission Date: 6/20/2023  Hospital Length of Stay: 2 days  Code Status: Full Code   Attending Physician: Rustam Dave MD   Primary Care Physician: Braxton Guzman Jr, MD  Expected Discharge Date:   Principal Problem:Abnormal stress test    Subjective:   HPI:  Patient is a 63 y.o. male presents with cardiomyopathy chf afib previous cva htn hlp diabetes had heart in November 2022 for abnormal cardiolite was found to have 70% significantly ald stenosis his filling pressures were elevated and his pulmonary htn was moderate he was optimized medical thrapy wise his ef improved to 35-40% by repeat echo on entresto .his lifevest was discontinued. He continues to have some exertional shortness of breath he is referred for intervention of lad today after reviewing the case with DR HERNANDEZ    [facility] Course:   6/21/23-Patient seen and examined today, s/p LHC yesterday which showed multivessel CAD. Awaiting CABG. Stable this AM. No CP/SOB. No labs to review. CABG planned for AM. Echo showed EF of 25%, DD.    6/23/23-Patient seen and examined today, s/p CABG x 3 POD # 1. Sitting up in bed. Feels ok. Complains of fatigue and post-op pain. Labs stable.         Review of Systems   Constitutional: Positive for malaise/fatigue.   HENT: Negative.     Eyes: Negative.    Cardiovascular:  Positive for chest pain (incisional).   Respiratory: Negative.     Hematologic/Lymphatic: Negative.    Skin: Negative.    Musculoskeletal:  Positive for arthritis and joint pain.   Gastrointestinal: Negative.    Genitourinary: Negative.    Neurological: Negative.    Psychiatric/Behavioral: Negative.     Allergic/Immunologic: Negative.    Objective:     Vital Signs (Most Recent):  Temp: 97.6 °F (36.4 °C) (06/23/23 0730)  Pulse: 91 (06/23/23 1315)  Resp: (!) 22 (06/23/23 1315)  BP: 111/80 (06/23/23 0402)  SpO2: 99 % (06/23/23 1315) Vital  Signs (24h Range):  Temp:  [97.6 °F (36.4 °C)-101.5 °F (38.6 °C)] 97.6 °F (36.4 °C)  Pulse:  [88-99] 91  Resp:  [16-49] 22  SpO2:  [93 %-100 %] 99 %  BP: ()/(57-80) 111/80  Arterial Line BP: (-2-139)/(-3-85) 120/66     Weight: 113.5 kg (250 lb 3.6 oz)  Body mass index is 33.01 kg/m².     SpO2: 99 %         Intake/Output Summary (Last 24 hours) at 6/23/2023 1323  Last data filed at 6/23/2023 1300  Gross per 24 hour   Intake 5608.42 ml   Output 2940 ml   Net 2668.42 ml       Lines/Drains/Airways       Peripherally Inserted Central Catheter Line  Duration             PICC Double Lumen 06/23/23 1222 right basilic <1 day              Drain  Duration                  Chest Tube 06/22/23 1317 Tube - 3 Left Pleural 24 Fr. 1 day         Closed/Suction Drain 06/22/23 1257 Left Leg Bulb 19 Fr. 1 day         Urethral Catheter 06/22/23 0733 Silicone;Temperature probe;Straight-tip 16 Fr. 1 day         Y Chest Tube 1 and 2 06/22/23 1316 1 Right Mediastinal 24 Fr. 2 Left Mediastinal 24 Fr. 1 day              Arterial Line  Duration             Arterial Line 06/22/23 2350 Left Radial <1 day              Line  Duration                  Pacer Wires 06/22/23 1500 <1 day              Peripheral Intravenous Line  Duration                  Peripheral IV - Single Lumen 06/20/23 0944 20 G Posterior;Right Wrist 3 days                       Physical Exam  Vitals and nursing note reviewed.   Constitutional:       General: He is not in acute distress.     Appearance: Normal appearance. He is well-developed. He is not diaphoretic.   HENT:      Head: Normocephalic and atraumatic.   Eyes:      General:         Right eye: No discharge.         Left eye: No discharge.      Pupils: Pupils are equal, round, and reactive to light.   Neck:      Thyroid: No thyromegaly.      Vascular: No JVD.      Trachea: No tracheal deviation.   Cardiovascular:      Rate and Rhythm: Normal rate and regular rhythm.      Heart sounds: Normal heart sounds, S1 normal  and S2 normal. No murmur heard.     Comments: Sternotomy site C/D/I; dressing in place    Chest tubes in place  Pulmonary:      Effort: Pulmonary effort is normal. No respiratory distress.      Breath sounds: No wheezing.      Comments: Diminished BS at bases  Abdominal:      General: There is no distension.      Tenderness: There is no rebound.   Musculoskeletal:      Cervical back: Neck supple.      Right lower leg: Edema present.      Left lower leg: Edema present.   Skin:     General: Skin is warm and dry.      Findings: No erythema.   Neurological:      General: No focal deficit present.      Mental Status: He is alert and oriented to person, place, and time.   Psychiatric:         Mood and Affect: Mood normal.         Behavior: Behavior normal.         Thought Content: Thought content normal.          Significant Labs: CMP   Recent Labs   Lab 06/22/23  1456 06/22/23  1717 06/22/23 2020 06/23/23  0355    141  --  140   K 3.9 3.9 4.4 4.4    109  --  110   CO2 24 20*  --  21*   * 249*  --  138*   BUN 22 25*  --  27*   CREATININE 1.5* 1.6*  --  1.7*   CALCIUM 7.2* 7.2*  --  8.1*   ANIONGAP 12 12  --  9   , CBC   Recent Labs   Lab 06/22/23  1311 06/22/23  1328 06/22/23  1456 06/22/23  1505 06/22/23 2020 06/23/23  0228 06/23/23  0355   WBC 12.90*  --  38.06*  --   --   --  4.81   HGB 8.5*  --  7.2*  --  9.9*  --  9.5*   HCT 26.5*   < > 22.4*   < > 30.2*   < > 28.2*   PLT 33*  --  151  --   --   --  116*    < > = values in this interval not displayed.   , Troponin No results for input(s): TROPONINI in the last 48 hours., and All pertinent lab results from the last 24 hours have been reviewed.    Significant Imaging: Echocardiogram: Transthoracic echo (TTE) complete (Cupid Only):   Results for orders placed or performed during the hospital encounter of 06/20/23   Echo   Result Value Ref Range    BSA 2.37 m2    TDI SEPTAL 0.07 m/s    LV LATERAL E/E' RATIO 5.78 m/s    LV SEPTAL E/E' RATIO 7.43 m/s     LA WIDTH 3.60 cm    IVC diameter 1.00 cm    Left Ventricular Outflow Tract Mean Velocity 0.77 cm/s    Left Ventricular Outflow Tract Mean Gradient 2.89 mmHg    TDI LATERAL 0.09 m/s    LVIDd 5.61 3.5 - 6.0 cm    IVS 1.30 (A) 0.6 - 1.1 cm    Posterior Wall 1.11 (A) 0.6 - 1.1 cm    Ao root annulus 3.37 cm    LVIDs 4.85 (A) 2.1 - 4.0 cm    FS 14 28 - 44 %    LA volume 54.65 cm3    STJ 3.94 cm    Ascending aorta 3.91 cm    LV mass 282.89 g    LA size 3.29 cm    TAPSE 1.80 cm    Left Ventricle Relative Wall Thickness 0.40 cm    AV regurgitation pressure 1/2 time 1,[id number] ms    AV mean gradient 4 mmHg    AV valve area 4.21 cm2    AV Velocity Ratio 0.94     AV index (prosthetic) 1.02     MV mean gradient 71 mmHg    MV valve area p 1/2 method 3.84 cm2    MV valve area by continuity eq 0.46 cm2    E/A ratio 0.72     Mean e' 0.08 m/s    E wave deceleration time 197.43 msec    IVRT 95.15 msec    LVOT diameter 2.29 cm    LVOT area 4.1 cm2    LVOT peak ezequiel 1.19 m/s    LVOT peak VTI 23.10 cm    Ao peak ezequiel 1.26 m/s    Ao VTI 22.6 cm    RVOT peak ezequiel 0.59 m/s    RVOT peak VTI 12.1 cm    LVOT stroke volume 95.09 cm3    AV peak gradient 6 mmHg    MV peak gradient 87 mmHg    PV mean gradient 0.75 mmHg    E/E' ratio 6.50 m/s    MV Peak E Ezequiel 0.52 m/s    AR Max Ezequiel 3.12 m/s    MV .8 cm    MV stenosis pressure 1/2 time 57.26 ms    MV Peak A Ezequiel 0.72 m/s    LV Systolic Volume 109.98 mL    LV Systolic Volume Index 47.2 mL/m2    LV Diastolic Volume 154.22 mL    LV Diastolic Volume Index 66.19 mL/m2    LA Volume Index 23.5 mL/m2    LV Mass Index 121 g/m2    RA Major Axis 4.25 cm    Left Atrium Minor Axis 4.65 cm    Left Atrium Major Axis 6.52 cm    Right Atrial Pressure (from IVC) 3 mmHg    EF 25 %    Narrative    · The left ventricle is moderately enlarged with eccentric hypertrophy and   severely decreased systolic function.  · Grade I left ventricular diastolic dysfunction.  · Normal right ventricular size with  normal right ventricular systolic   function.  · Normal central venous pressure (3 mmHg).  · The estimated ejection fraction is 25%.  · Mild aortic regurgitation.  · Mild mitral regurgitation.  · There is severe left ventricular global hypokinesis.      , EKG: Reviewed, and X-Ray: CXR: X-Ray Chest 1 View (CXR):   Results for orders placed or performed during the hospital encounter of 06/20/23   X-Ray Chest 1 View    Narrative    EXAMINATION:  XR CHEST 1 VIEW    CLINICAL HISTORY:  Pre Op;    TECHNIQUE:  Single frontal view of the chest was performed.    COMPARISON:  None    FINDINGS:  The lungs are clear, with normal appearance of pulmonary vasculature and no pleural effusion or pneumothorax.    The cardiac silhouette is normal in size. The hilar and mediastinal contours are unremarkable.    Bones are intact.      Impression    No acute abnormality.      Electronically signed by: Torey Grady  Date:    06/20/2023  Time:    21:40    and X-Ray Chest PA and Lateral (CXR): No results found for this visit on 06/20/23.    Assessment and Plan:   Patient with DCM/multivessel CAD recovering s/p CABG x 3. Continue current post-op mgmt as per CTS.    S/P CABG x 3  6.22.2023  S/p cabg x3 earlier   Intubated, chest tubes   On vaso and epi , ci 1.9     6/23/23  -Stable, recovering well  -Continue ASA, Plavix, BB, statin  -IS usage and ambulation as able  -Mgmt as per CTS    Left main coronary artery disease  -CP free  -Continue ASA, BB, Entresto, Lasix, statin  -Awaiting CABG    History of CVA (cerebrovascular accident)  -ASA, statin    DCM (dilated cardiomyopathy)  -Stable compensated  -Continue BB, Entresto, po Lasix    Coronary artery disease without angina pectoris  -Continue OMT-ASA, BB, Entresto, statin    Other hyperlipidemia  -Continue statin    Primary hypertension  -Continue BB, Entresto    6/23/23  -Continue BB  -Resume Entresto as tolerated        VTE Risk Mitigation (From admission, onward)         Ordered     Place  CHANTELL hose  Until discontinued         06/21/23 1044     Place sequential compression device  Until discontinued         06/21/23 1044                Maria R Washington PA-C  Cardiology  [facility] ([facility])

## 2023-06-23 NOTE — SUBJECTIVE & OBJECTIVE
Review of Systems   Constitutional: Positive for malaise/fatigue.   HENT: Negative.     Eyes: Negative.    Cardiovascular:  Positive for chest pain (incisional).   Respiratory: Negative.     Hematologic/Lymphatic: Negative.    Skin: Negative.    Musculoskeletal:  Positive for arthritis and joint pain.   Gastrointestinal: Negative.    Genitourinary: Negative.    Neurological: Negative.    Psychiatric/Behavioral: Negative.     Allergic/Immunologic: Negative.    Objective:     Vital Signs (Most Recent):  Temp: 97.6 °F (36.4 °C) (06/23/23 0730)  Pulse: 91 (06/23/23 1315)  Resp: (!) 22 (06/23/23 1315)  BP: 111/80 (06/23/23 0402)  SpO2: 99 % (06/23/23 1315) Vital Signs (24h Range):  Temp:  [97.6 °F (36.4 °C)-101.5 °F (38.6 °C)] 97.6 °F (36.4 °C)  Pulse:  [88-99] 91  Resp:  [16-49] 22  SpO2:  [93 %-100 %] 99 %  BP: ()/(57-80) 111/80  Arterial Line BP: (-2-139)/(-3-85) 120/66     Weight: 113.5 kg (250 lb 3.6 oz)  Body mass index is 33.01 kg/m².     SpO2: 99 %         Intake/Output Summary (Last 24 hours) at 6/23/2023 1323  Last data filed at 6/23/2023 1300  Gross per 24 hour   Intake 5608.42 ml   Output 2940 ml   Net 2668.42 ml       Lines/Drains/Airways       Peripherally Inserted Central Catheter Line  Duration             PICC Double Lumen 06/23/23 1222 right basilic <1 day              Drain  Duration                  Chest Tube 06/22/23 1317 Tube - 3 Left Pleural 24 Fr. 1 day         Closed/Suction Drain 06/22/23 1257 Left Leg Bulb 19 Fr. 1 day         Urethral Catheter 06/22/23 0733 Silicone;Temperature probe;Straight-tip 16 Fr. 1 day         Y Chest Tube 1 and 2 06/22/23 1316 1 Right Mediastinal 24 Fr. 2 Left Mediastinal 24 Fr. 1 day              Arterial Line  Duration             Arterial Line 06/22/23 2350 Left Radial <1 day              Line  Duration                  Pacer Wires 06/22/23 1500 <1 day              Peripheral Intravenous Line  Duration                  Peripheral IV - Single Lumen 06/20/23  0944 20 G Posterior;Right Wrist 3 days                       Physical Exam  Vitals and nursing note reviewed.   Constitutional:       General: He is not in acute distress.     Appearance: Normal appearance. He is well-developed. He is not diaphoretic.   HENT:      Head: Normocephalic and atraumatic.   Eyes:      General:         Right eye: No discharge.         Left eye: No discharge.      Pupils: Pupils are equal, round, and reactive to light.   Neck:      Thyroid: No thyromegaly.      Vascular: No JVD.      Trachea: No tracheal deviation.   Cardiovascular:      Rate and Rhythm: Normal rate and regular rhythm.      Heart sounds: Normal heart sounds, S1 normal and S2 normal. No murmur heard.     Comments: Sternotomy site C/D/I; dressing in place    Chest tubes in place  Pulmonary:      Effort: Pulmonary effort is normal. No respiratory distress.      Breath sounds: No wheezing.      Comments: Diminished BS at bases  Abdominal:      General: There is no distension.      Tenderness: There is no rebound.   Musculoskeletal:      Cervical back: Neck supple.      Right lower leg: Edema present.      Left lower leg: Edema present.   Skin:     General: Skin is warm and dry.      Findings: No erythema.   Neurological:      General: No focal deficit present.      Mental Status: He is alert and oriented to person, place, and time.   Psychiatric:         Mood and Affect: Mood normal.         Behavior: Behavior normal.         Thought Content: Thought content normal.          Significant Labs: CMP   Recent Labs   Lab 06/22/23  1456 06/22/23  1717 06/22/23 2020 06/23/23  0355    141  --  140   K 3.9 3.9 4.4 4.4    109  --  110   CO2 24 20*  --  21*   * 249*  --  138*   BUN 22 25*  --  27*   CREATININE 1.5* 1.6*  --  1.7*   CALCIUM 7.2* 7.2*  --  8.1*   ANIONGAP 12 12  --  9   , CBC   Recent Labs   Lab 06/22/23  1311 06/22/23  1328 06/22/23  1456 06/22/23  1505 06/22/23 2020 06/23/23  0228 06/23/23  0355   WBC  12.90*  --  38.06*  --   --   --  4.81   HGB 8.5*  --  7.2*  --  9.9*  --  9.5*   HCT 26.5*   < > 22.4*   < > 30.2*   < > 28.2*   PLT 33*  --  151  --   --   --  116*    < > = values in this interval not displayed.   , Troponin No results for input(s): TROPONINI in the last 48 hours., and All pertinent lab results from the last 24 hours have been reviewed.    Significant Imaging: Echocardiogram: Transthoracic echo (TTE) complete (Cupid Only):   Results for orders placed or performed during the hospital encounter of 06/20/23   Echo   Result Value Ref Range    BSA 2.37 m2    TDI SEPTAL 0.07 m/s    LV LATERAL E/E' RATIO 5.78 m/s    LV SEPTAL E/E' RATIO 7.43 m/s    LA WIDTH 3.60 cm    IVC diameter 1.00 cm    Left Ventricular Outflow Tract Mean Velocity 0.77 cm/s    Left Ventricular Outflow Tract Mean Gradient 2.89 mmHg    TDI LATERAL 0.09 m/s    LVIDd 5.61 3.5 - 6.0 cm    IVS 1.30 (A) 0.6 - 1.1 cm    Posterior Wall 1.11 (A) 0.6 - 1.1 cm    Ao root annulus 3.37 cm    LVIDs 4.85 (A) 2.1 - 4.0 cm    FS 14 28 - 44 %    LA volume 54.65 cm3    STJ 3.94 cm    Ascending aorta 3.91 cm    LV mass 282.89 g    LA size 3.29 cm    TAPSE 1.80 cm    Left Ventricle Relative Wall Thickness 0.40 cm    AV regurgitation pressure 1/2 time 1,004.697504365491741 ms    AV mean gradient 4 mmHg    AV valve area 4.21 cm2    AV Velocity Ratio 0.94     AV index (prosthetic) 1.02     MV mean gradient 71 mmHg    MV valve area p 1/2 method 3.84 cm2    MV valve area by continuity eq 0.46 cm2    E/A ratio 0.72     Mean e' 0.08 m/s    E wave deceleration time 197.43 msec    IVRT 95.15 msec    LVOT diameter 2.29 cm    LVOT area 4.1 cm2    LVOT peak ezequiel 1.19 m/s    LVOT peak VTI 23.10 cm    Ao peak ezequiel 1.26 m/s    Ao VTI 22.6 cm    RVOT peak ezequiel 0.59 m/s    RVOT peak VTI 12.1 cm    LVOT stroke volume 95.09 cm3    AV peak gradient 6 mmHg    MV peak gradient 87 mmHg    PV mean gradient 0.75 mmHg    E/E' ratio 6.50 m/s    MV Peak E Ezequiel 0.52 m/s    AR Max Ezequiel  3.12 m/s    MV .8 cm    MV stenosis pressure 1/2 time 57.26 ms    MV Peak A Ezequiel 0.72 m/s    LV Systolic Volume 109.98 mL    LV Systolic Volume Index 47.2 mL/m2    LV Diastolic Volume 154.22 mL    LV Diastolic Volume Index 66.19 mL/m2    LA Volume Index 23.5 mL/m2    LV Mass Index 121 g/m2    RA Major Axis 4.25 cm    Left Atrium Minor Axis 4.65 cm    Left Atrium Major Axis 6.52 cm    Right Atrial Pressure (from IVC) 3 mmHg    EF 25 %    Narrative    · The left ventricle is moderately enlarged with eccentric hypertrophy and   severely decreased systolic function.  · Grade I left ventricular diastolic dysfunction.  · Normal right ventricular size with normal right ventricular systolic   function.  · Normal central venous pressure (3 mmHg).  · The estimated ejection fraction is 25%.  · Mild aortic regurgitation.  · Mild mitral regurgitation.  · There is severe left ventricular global hypokinesis.      , EKG: Reviewed, and X-Ray: CXR: X-Ray Chest 1 View (CXR):   Results for orders placed or performed during the hospital encounter of 06/20/23   X-Ray Chest 1 View    Narrative    EXAMINATION:  XR CHEST 1 VIEW    CLINICAL HISTORY:  Pre Op;    TECHNIQUE:  Single frontal view of the chest was performed.    COMPARISON:  None    FINDINGS:  The lungs are clear, with normal appearance of pulmonary vasculature and no pleural effusion or pneumothorax.    The cardiac silhouette is normal in size. The hilar and mediastinal contours are unremarkable.    Bones are intact.      Impression    No acute abnormality.      Electronically signed by: Torey Grady  Date:    06/20/2023  Time:    21:40    and X-Ray Chest PA and Lateral (CXR): No results found for this visit on 06/20/23.

## 2023-06-23 NOTE — PROGRESS NOTES
Interested CPAP bottle on-call hello id fever [facility] - Intensive Care ([facility])  Critical Care Medicine  Consult Note    Patient Name: Gustavo Tracey Jr.  MRN: [id number]  Admission Date: 6/20/2023  Hospital Length of Stay: 2 days  Code Status: Full Code  Attending Physician: Rustam Dave MD   Primary Care Provider: Braxton Guzman Jr, MD   Principal Problem: Abnormal stress test    [unfilled]  Subjective:     HPI:  63-year-old male patient with abnormal stress test found to have severe CAD on left heart catheterization status post CABG x3 today transferred to ICU for postop care.        Hospital/ICU Course:  Transferred to ICU intubated sedated with Precedex.  On pressors vasopressin and epinephrine.  Chest x-ray and ABG reviewed.  Status post CABG x3.  I changed him to pressure support.  Cardiac index 1.8 cardiac output 4.2  6/23 seen and examined.  No major events overnight.  Postop day 1 status post CABG x3.  On vaso on epinephrine drip.  On insulin drip.  Paced rhythm at 90.  Extubated yesterday around 6:00 p.m..  Urine output last 24 hours 1.2 L.  Chest tube output 400 mL.  Positive 4 L since admission        6/23 seen and examined.  No major events overnight.  Postop day 1 status post CABG x3.  On vaso on epinephrine drip.  On insulin drip.  Paced rhythm at 90.  Extubated yesterday around 6:00 p.m..  Urine output last 24 hours 1.2 L.  Chest tube output 400 mL.  Positive 4 L since admission.  Last BM 6 /19.  T-max 101°  Review of Systems   Constitutional:  Positive for activity change and fatigue. Negative for chills and fever.   HENT:  Negative for drooling, ear discharge and nosebleeds.    Eyes:  Negative for pain, discharge and itching.   Respiratory:  Positive for shortness of breath. Negative for choking.    Cardiovascular:  Negative for chest pain.   Gastrointestinal:  Negative for anal bleeding.   Endocrine: Negative for cold intolerance.   Genitourinary:  Negative for hematuria.    Musculoskeletal:  Positive for arthralgias. Negative for neck stiffness.   Skin:  Negative for rash.   Allergic/Immunologic: Negative for immunocompromised state.   Neurological:  Positive for weakness. Negative for seizures and facial asymmetry.   Hematological:  Negative for adenopathy.   Psychiatric/Behavioral:  Negative for behavioral problems, self-injury and suicidal ideas.    Objective:     Vital Signs (Most Recent):  Temp: 98.4 °F (36.9 °C) (06/23/23 0445)  Pulse: 91 (06/23/23 0709)  Resp: (!) 22 (06/23/23 0711)  BP: 111/80 (06/23/23 0402)  SpO2: 99 % (06/23/23 0709) Vital Signs (24h Range):  Temp:  [98.4 °F (36.9 °C)-101.5 °F (38.6 °C)] 98.4 °F (36.9 °C)  Pulse:  [88-99] 91  Resp:  [17-49] 22  SpO2:  [94 %-100 %] 99 %  BP: ()/(57-80) 111/80  Arterial Line BP: (-2-139)/(-3-76) 111/56     Weight: 113.5 kg (250 lb 3.6 oz)  Body mass index is 33.01 kg/m².      Intake/Output Summary (Last 24 hours) at 6/23/2023 0804  Last data filed at 6/23/2023 0600  Gross per 24 hour   Intake 7512.6 ml   Output 1670 ml   Net 5842.6 ml          Physical Exam  Constitutional:       Appearance: He is obese.   Neck:      Comments: Right IJ right heart catheter  Cardiovascular:      Rate and Rhythm: Normal rate and regular rhythm.   Pulmonary:      Effort: Respiratory distress present.      Breath sounds: No stridor.      Comments: Chest tubes noted  Abdominal:      Palpations: Abdomen is soft.   Genitourinary:     Comments: Rankin catheter  Musculoskeletal:         General: Deformity present.      Comments: Radial A-line   Skin:     General: Skin is warm.      Comments: Lower extremity dressing    Chest wound dressing    Neurological:      General: No focal deficit present.        Vents:  Vent Mode: (S) Spont (06/22/23 1627)  Set Rate: 18 BPM (06/22/23 1453)  Vt Set: 500 mL (06/22/23 1453)  Pressure Support: 10 cmH20 (06/22/23 1627)  PEEP/CPAP: 5 cmH20 (06/22/23 1627)  Oxygen Concentration (%): 30 (06/22/23 1627)  Peak  Airway Pressure: 15 cmH20 (06/22/23 1627)  Total Ve: 12.5 L/m (06/22/23 1627)  F/VT Ratio<105 (RSBI): (!) 59.6 (06/22/23 1627)    Lines/Drains/Airways       Central Venous Catheter Line  Duration             Pulmonary Artery Catheter Assessment  06/22/23 0741 1 day              Drain  Duration                  Urethral Catheter 06/22/23 0733 Silicone;Temperature probe;Straight-tip 16 Fr. 1 day         Chest Tube 06/22/23 1317 Tube - 3 Left Pleural 24 Fr. <1 day         Closed/Suction Drain 06/22/23 1257 Left Leg Bulb 19 Fr. <1 day         Y Chest Tube 1 and 2 06/22/23 1316 1 Right Mediastinal 24 Fr. 2 Left Mediastinal 24 Fr. <1 day              Arterial Line  Duration             Arterial Line 06/22/23 2350 Left Radial <1 day              Line  Duration                  Pacer Wires 06/22/23 1500 <1 day              Peripheral Intravenous Line  Duration                  Peripheral IV - Single Lumen 06/20/23 0944 20 G Posterior;Right Wrist 2 days                    Significant Labs:    CBC/Anemia Profile:  Recent Labs   Lab 06/22/23  1311 06/22/23  1328 06/22/23  1456 06/22/23  1505 06/22/23 2020 06/23/23  0228 06/23/23  0355   WBC 12.90*  --  38.06*  --   --   --  4.81   HGB 8.5*  --  7.2*  --  9.9*  --  9.5*   HCT 26.5*   < > 22.4*   < > 30.2* 27* 28.2*   PLT 33*  --  151  --   --   --  116*   MCV 86  --  86  --   --   --  83   RDW 17.2*  --  17.1*  --   --   --  15.9*    < > = values in this interval not displayed.          Chemistries:  Recent Labs   Lab 06/21/23  1113 06/21/23  1113 06/22/23  1456 06/22/23  1717 06/22/23 2020 06/23/23  0355     --  143 141  --  140   K 3.6  --  3.9 3.9 4.4 4.4     --  107 109  --  110   CO2 27  --  24 20*  --  21*   BUN 24*  --  22 25*  --  27*   CREATININE 1.6*  --  1.5* 1.6*  --  1.7*   CALCIUM 9.8  --  7.2* 7.2*  --  8.1*   ALBUMIN 3.8  --   --   --   --   --    PROT 7.8  --   --   --   --   --    BILITOT 1.0  --   --   --   --   --    ALKPHOS 64  --   --   --    --   --    ALT 8*  --   --   --   --   --    AST 19  --   --   --   --   --    MG  --    < > 3.4* 3.2* 3.2* 3.1*    < > = values in this interval not displayed.        Latest Reference Range & Units 06/22/23 15:05   POC PH 7.35 - 7.45  7.440   POC PCO2 35 - 45 mmHg 38.0   POC PO2 80 - 100 mmHg 348 (H)   POC HCO3 24 - 28 mmol/L 25.8   Sodium, Blood Gas 136 - 145 mmol/L 144   Potassium, Blood Gas 3.5 - 5.1 mmol/L 3.9   POC SATURATED O2 95 - 100 % 100   Sample  ARTERIAL   POC Ionized Calcium 1.06 - 1.42 mmol/L 1.01 (L)   POC Glucose 70 - 110 mg/dL 167 (H)   POC Hematocrit 36 - 54 %PCV 21 (L)   POC BE -2 to 2 mmol/L 2   FiO2  100   Vt  500   PiP  29   PEEP  5   DelSys  Adult Vent   Site  Kristin/UAC   Mode  AC/PRVC   Rate  18     2D echo 06/21/2023    The left ventricle is moderately enlarged with eccentric hypertrophy and severely decreased systolic function.  Grade I left ventricular diastolic dysfunction.  Normal right ventricular size with normal right ventricular systolic function.  Normal central venous pressure (3 mmHg).  The estimated ejection fraction is 25%.  Mild aortic regurgitation.  Mild mitral regurgitation.  There is severe left ventricular global hypokinesis.     Significant Imaging:       Chest x-ray 06/22/2023    EXAMINATION:  XR CHEST AP PORTABLE     CLINICAL HISTORY:  Post-op;     COMPARISON:  06/20/2023     FINDINGS:  There has been interval placement of multiple sternotomy wires.  There has been interval placement of a right internal jugular venous [place]Klever line.  Its tip is in the region of the main pulmonary artery.  There has been interval placement of an endotracheal tube.  Its tip is located 75 mm superior to the lesley.  There has been interval placement of a mediastinal drain.  There has been interval placement of a left-sided chest tube.  There is no pneumothorax visualized.  There has been interval development of a mild amount of haziness in the base of the left lung.  The right lung is  clear.  The right costophrenic angle is sharp.  The left costophrenic angle is not well seen secondary to overlying ribs.  The size of the heart is normal.     Impression:     1. There has been interval placement of multiple sternotomy wires. There has been interval placement of a right internal jugular venous [place]-Klever line. Its tip is in the region of the main pulmonary artery. There has been interval placement of an endotracheal tube. Its tip is located 75 mm superior to the lesley. There has been interval placement of a mediastinal drain. There has been interval placement of a left-sided chest tube. There is no pneumothorax visualized.  2. There has been interval development of a mild amount of haziness in the base of the left lung.  This is characteristic of atelectasis            ABG  Recent Labs   Lab 06/23/23  0228   PH 7.381   PO2 84   PCO2 34.0*   HCO3 20.2*   BE -5     Assessment/Plan:     Neuro  History of CVA (cerebrovascular accident)  Aspirin Plavix statin  6/23 aspirin statin p.o. plavix     Cardiac/Vascular  S/P CABG x 3  6/23 postop day 1.  Aspirin statin Plavix.  Wean off pressors.  Monitor chest tube output.  Pacing wires in place    Acute on chronic systolic CHF (congestive heart failure)  Patient is identified as having Systolic (HFrEF) heart failure that is Chronic. CHF is currently controlled. Latest ECHO performed and demonstrates- Results for orders placed during the hospital encounter of 06/20/23    Echo    Interpretation Summary  · The left ventricle is moderately enlarged with eccentric hypertrophy and severely decreased systolic function.  · Grade I left ventricular diastolic dysfunction.  · Normal right ventricular size with normal right ventricular systolic function.  · Normal central venous pressure (3 mmHg).  · The estimated ejection fraction is 25%.  · Mild aortic regurgitation.  · Mild mitral regurgitation.  · There is severe left ventricular global hypokinesis.  . Continue Beta  Blocker and monitor clinical status closely. Monitor on telemetry. Patient is on CHF pathway.  Monitor strict Is&Os and daily weights.  Place on fluid restriction of 1.5 L. Continue to stress to patient importance of self efficacy and  on diet for CHF. Last BNP reviewed- and noted below   Recent Labs   Lab 06/20/23 1807   BNP 63   .      PAF (paroxysmal atrial fibrillation)  Beta-blocker aspirin Plavix cardiac monitoring   6/23 patient rhythm.    DCM (dilated cardiomyopathy)  Avoid fluid overload.  Strict I&Os.  6/23 IV Lasix.  Positive 4 L.    Coronary artery disease without angina pectoris  Status post CABG x3.  Aspirin statin Plavix beta-blocker    Other  Endotracheally intubated  O2 mV pulmonary toilet.  Wean off sedation SBT to extubate  6/23 extubated yesterday in the evening.  O2 via nasal cannula.  Target O2 sat 94%      Critical Care Daily Checklist:    A: Awake: RASS Goal/Actual Goal: RASS Goal: 0-->alert and calm  Actual:     B: Spontaneous Breathing Trial Performed? Spon. Breathing Trial Initiated?: Initiated (06/22/23 1725)   C: SAT & SBT Coordinated?  Y                  D: Delirium: CAM-ICU Overall CAM-ICU: Negative   E: Early Mobility Performed? Yes   F: Feeding Goal:    Status:     Current Diet Order   No orders of the defined types were placed in this encounter.      AS: Analgesia/Sedation Wean  off Precedex gtt    T: Thromboembolic Prophylaxis Mechanical prophylaxis   H: HOB > 300 Yes   U: Stress Ulcer Prophylaxis (if needed) PPI   G: Glucose Control Fingerstick p.r.n.   B: Bowel Function     I: Indwelling Catheter (Lines & Rankin) Necessity Critically ill keep Rankin   D: De-escalation of Antimicrobials/Pharmacotherapies T-max 101° send blood culture.  Received cephalosporin and vancomycin prophylaxis periop    Plan for the day/ETD Y    Code Status:  Family/Goals of Care: Full Code       Critical Care Time: 35 minutes  Critical secondary to Patient has a condition that poses threat to life  and bodily function:  SEVERE CAD STATUS POST CABG X3   Critical care was time spent personally by me on the following activities: development of treatment plan with patient or surrogate and bedside caregivers, discussions with consultants, evaluation of patient's response to treatment, examination of patient, ordering and performing treatments and interventions, ordering and review of laboratory studies, ordering and review of radiographic studies, pulse oximetry, re-evaluation of patient's condition. This critical care time did not overlap with that of any other provider or involve time for any procedures.         Leidy Rose MD  Critical Care Medicine  [facility] - Intensive Care ([facility])

## 2023-06-23 NOTE — ASSESSMENT & PLAN NOTE
06/23/2023   The patient is postop day 1 status post coronary artery bypass grafting x3 and Woodruff Maze 4 procedure.  Overall the patient is doing well.    Neuro:  Patient is awake alert and oriented x3.  Neuro exam is nonfocal.  Patient moves all 4.  Patient's pain is being controlled with current pain regimen.    Cardiac:  Patient has been hemodynamically stable cardiac index has been about 2.2.  Patient is on low-dose epi and vasopressin.  Wean vasopressin and epi to off.    Respiratory:  Patient was extubated yesterday.  Patient has good sats on nasal cannula.  Continue pulmonary toileting.  Continue incentive spirometer.  GI:  Will advance patient's to regular diet as tolerated.    Renal: Patient has good urine output.  Creatinine is 1.7.  Will start diuresis.  Endocrine:  Patient's glucose is controlled with an insulin drip.  Will convert to sliding scale and long-acting insulin.  Heme:  Hematocrit is 28.2 and platelet count is 116.  Will start patient on a NOAC for anticoagulation once patient has chest tubes have been discontinued.  Patient requires anticoagulation since he is status post Woodruff Maze 4 for atrial fibrillation.  Id:  White count is 4.  Will continue to follow.  Patient is on venu op antibiotics.    Activities:  Patient is out of bed to chair.  Will advance activities as tolerated.    Line tubes and drains:  Patient has a right IJ [place] and Cordis, chest tubes, pacer wires, A-line, Rankin catheter and saphenectomy site ANDERS drains.

## 2023-06-23 NOTE — PROCEDURES
"Gustavo Tracey Jr. is a 63 y.o. male patient.    Temp: 97.6 °F (36.4 °C) (06/23/23 0730)  Pulse: 92 (06/23/23 1100)  Resp: 16 (06/23/23 1100)  BP: 111/80 (06/23/23 0402)  SpO2: 100 % (06/23/23 1100)  Weight: 113.5 kg (250 lb 3.6 oz) (06/23/23 0527)  Height: 6' 1" (185.4 cm) (06/22/23 1627)    PICC  Date/Time: 6/23/2023 12:22 PM  Performed by: Geovany Castillo RN  Supervising provider: Brian Mata RN  Time out: Immediately prior to procedure a time out was called to verify the correct patient, procedure, equipment, support staff and site/side marked as required  Indications: vascular access  Anesthesia: local infiltration  Local anesthetic: lidocaine 1% without epinephrine  Anesthetic Total (mL): 3  Preparation: skin prepped with ChloraPrep  Skin prep agent dried: skin prep agent completely dried prior to procedure  Sterile barriers: all five maximum sterile barriers used - cap, mask, sterile gown, sterile gloves, and large sterile sheet  Hand hygiene: hand hygiene performed prior to central venous catheter insertion  Location details: right basilic  Catheter type: double lumen  Catheter size: 4 Fr  Catheter Length: 40cm    Ultrasound guidance: yes  Vessel Caliber: medium and patent, compressibility normal  Vascular Doppler: not done  Needle advanced into vessel with real time Ultrasound guidance.  Guidewire confirmed in vessel.  Sterile sheath used.  no esophageal manometryNumber of attempts: 1  Post-procedure: blood return through all ports, chlorhexidine patch and sterile dressing applied  Estimated blood loss (mL): 0  Specimens: No  Implants: No  Assessment: placement verified by x-ray  Complications: none        Name Geovany Castillo RN  6/23/2023    "

## 2023-06-23 NOTE — ASSESSMENT & PLAN NOTE
O2 mV pulmonary toilet.  Wean off sedation SBT to extubate  6/23 extubated yesterday in the evening.  O2 via nasal cannula.  Target O2 sat 94%

## 2023-06-24 LAB
ANION GAP SERPL CALC-SCNC: 13 MMOL/L (ref 8–16)
ANION GAP SERPL CALC-SCNC: 8 MMOL/L (ref 8–16)
BASOPHILS # BLD AUTO: 0.02 K/UL (ref 0–0.2)
BASOPHILS NFR BLD: 0.2 % (ref 0–1.9)
BUN SERPL-MCNC: 34 MG/DL (ref 8–23)
BUN SERPL-MCNC: 38 MG/DL (ref 8–23)
CALCIUM SERPL-MCNC: 8.6 MG/DL (ref 8.7–10.5)
CALCIUM SERPL-MCNC: 8.7 MG/DL (ref 8.7–10.5)
CHLORIDE SERPL-SCNC: 105 MMOL/L (ref 95–110)
CHLORIDE SERPL-SCNC: 106 MMOL/L (ref 95–110)
CO2 SERPL-SCNC: 21 MMOL/L (ref 23–29)
CO2 SERPL-SCNC: 23 MMOL/L (ref 23–29)
CREAT SERPL-MCNC: 1.7 MG/DL (ref 0.5–1.4)
CREAT SERPL-MCNC: 1.7 MG/DL (ref 0.5–1.4)
DACRYOCYTES BLD QL SMEAR: ABNORMAL
DIFFERENTIAL METHOD: ABNORMAL
EOSINOPHIL # BLD AUTO: 0 K/UL (ref 0–0.5)
EOSINOPHIL NFR BLD: 0 % (ref 0–8)
ERYTHROCYTE [DISTWIDTH] IN BLOOD BY AUTOMATED COUNT: 16.6 % (ref 11.5–14.5)
EST. GFR  (NO RACE VARIABLE): 45 ML/MIN/1.73 M^2
EST. GFR  (NO RACE VARIABLE): 45 ML/MIN/1.73 M^2
GLUCOSE SERPL-MCNC: 116 MG/DL (ref 70–110)
GLUCOSE SERPL-MCNC: 162 MG/DL (ref 70–110)
HCT VFR BLD AUTO: 23.7 % (ref 40–54)
HGB BLD-MCNC: 7.8 G/DL (ref 14–18)
IMM GRANULOCYTES # BLD AUTO: 0.35 K/UL (ref 0–0.04)
IMM GRANULOCYTES NFR BLD AUTO: 3.9 % (ref 0–0.5)
LYMPHOCYTES # BLD AUTO: 2.1 K/UL (ref 1–4.8)
LYMPHOCYTES NFR BLD: 23.1 % (ref 18–48)
MAGNESIUM SERPL-MCNC: 2.2 MG/DL (ref 1.6–2.6)
MAGNESIUM SERPL-MCNC: 2.3 MG/DL (ref 1.6–2.6)
MCH RBC QN AUTO: 28 PG (ref 27–31)
MCHC RBC AUTO-ENTMCNC: 32.9 G/DL (ref 32–36)
MCV RBC AUTO: 85 FL (ref 82–98)
MONOCYTES # BLD AUTO: 3.4 K/UL (ref 0.3–1)
MONOCYTES NFR BLD: 38.3 % (ref 4–15)
NEUTROPHILS # BLD AUTO: 3.1 K/UL (ref 1.8–7.7)
NEUTROPHILS NFR BLD: 34.5 % (ref 38–73)
NRBC BLD-RTO: 11 /100 WBC
OVALOCYTES BLD QL SMEAR: ABNORMAL
PLATELET # BLD AUTO: 62 K/UL (ref 150–450)
PLATELET BLD QL SMEAR: ABNORMAL
PMV BLD AUTO: 10.3 FL (ref 9.2–12.9)
POCT GLUCOSE: 111 MG/DL (ref 70–110)
POCT GLUCOSE: 128 MG/DL (ref 70–110)
POCT GLUCOSE: 59 MG/DL (ref 70–110)
POCT GLUCOSE: 67 MG/DL (ref 70–110)
POCT GLUCOSE: 77 MG/DL (ref 70–110)
POCT GLUCOSE: 97 MG/DL (ref 70–110)
POTASSIUM SERPL-SCNC: 4.1 MMOL/L (ref 3.5–5.1)
POTASSIUM SERPL-SCNC: 4.6 MMOL/L (ref 3.5–5.1)
RBC # BLD AUTO: 2.79 M/UL (ref 4.6–6.2)
SODIUM SERPL-SCNC: 137 MMOL/L (ref 136–145)
SODIUM SERPL-SCNC: 139 MMOL/L (ref 136–145)
WBC # BLD AUTO: 8.92 K/UL (ref 3.9–12.7)

## 2023-06-24 PROCEDURE — 63600175 PHARM REV CODE 636 W HCPCS: Performed by: THORACIC SURGERY (CARDIOTHORACIC VASCULAR SURGERY)

## 2023-06-24 PROCEDURE — 99232 SBSQ HOSP IP/OBS MODERATE 35: CPT | Mod: ,,, | Performed by: INTERNAL MEDICINE

## 2023-06-24 PROCEDURE — 21400001 HC TELEMETRY ROOM

## 2023-06-24 PROCEDURE — 99291 CRITICAL CARE FIRST HOUR: CPT | Mod: ,,, | Performed by: INTERNAL MEDICINE

## 2023-06-24 PROCEDURE — 83735 ASSAY OF MAGNESIUM: CPT | Performed by: THORACIC SURGERY (CARDIOTHORACIC VASCULAR SURGERY)

## 2023-06-24 PROCEDURE — 25000003 PHARM REV CODE 250: Performed by: INTERNAL MEDICINE

## 2023-06-24 PROCEDURE — 80048 BASIC METABOLIC PNL TOTAL CA: CPT | Performed by: THORACIC SURGERY (CARDIOTHORACIC VASCULAR SURGERY)

## 2023-06-24 PROCEDURE — 94761 N-INVAS EAR/PLS OXIMETRY MLT: CPT

## 2023-06-24 PROCEDURE — 99291 PR CRITICAL CARE, E/M 30-74 MINUTES: ICD-10-PCS | Mod: ,,, | Performed by: INTERNAL MEDICINE

## 2023-06-24 PROCEDURE — 27200667 HC PACEMAKER, TEMPORARY MONITORING, PER SHIFT

## 2023-06-24 PROCEDURE — 99232 PR SUBSEQUENT HOSPITAL CARE,LEVL II: ICD-10-PCS | Mod: ,,, | Performed by: INTERNAL MEDICINE

## 2023-06-24 PROCEDURE — 25000003 PHARM REV CODE 250: Performed by: THORACIC SURGERY (CARDIOTHORACIC VASCULAR SURGERY)

## 2023-06-24 PROCEDURE — 85025 COMPLETE CBC W/AUTO DIFF WBC: CPT | Performed by: THORACIC SURGERY (CARDIOTHORACIC VASCULAR SURGERY)

## 2023-06-24 RX ORDER — HYDROMORPHONE HYDROCHLORIDE 2 MG/1
2 TABLET ORAL EVERY 4 HOURS PRN
Status: DISCONTINUED | OUTPATIENT
Start: 2023-06-24 | End: 2023-07-05 | Stop reason: HOSPADM

## 2023-06-24 RX ADMIN — DOCUSATE SODIUM 100 MG: 100 CAPSULE, LIQUID FILLED ORAL at 08:06

## 2023-06-24 RX ADMIN — AMITRIPTYLINE HYDROCHLORIDE 50 MG: 50 TABLET, FILM COATED ORAL at 08:06

## 2023-06-24 RX ADMIN — METOPROLOL TARTRATE 25 MG: 25 TABLET, FILM COATED ORAL at 08:06

## 2023-06-24 RX ADMIN — CHLORHEXIDINE GLUCONATE 0.12% ORAL RINSE 10 ML: 1.2 LIQUID ORAL at 08:06

## 2023-06-24 RX ADMIN — LATANOPROST 1 DROP: 50 SOLUTION OPHTHALMIC at 08:06

## 2023-06-24 RX ADMIN — FUROSEMIDE 40 MG: 10 INJECTION, SOLUTION INTRAMUSCULAR; INTRAVENOUS at 09:06

## 2023-06-24 RX ADMIN — ACETAMINOPHEN 650 MG: 325 TABLET ORAL at 11:06

## 2023-06-24 RX ADMIN — CYANOCOBALAMIN TAB 1000 MCG 1000 MCG: 1000 TAB at 09:06

## 2023-06-24 RX ADMIN — FUROSEMIDE 40 MG: 10 INJECTION, SOLUTION INTRAMUSCULAR; INTRAVENOUS at 08:06

## 2023-06-24 RX ADMIN — CHLORHEXIDINE GLUCONATE 0.12% ORAL RINSE 10 ML: 1.2 LIQUID ORAL at 09:06

## 2023-06-24 RX ADMIN — OXYCODONE HYDROCHLORIDE AND ACETAMINOPHEN 500 MG: 500 TABLET ORAL at 09:06

## 2023-06-24 RX ADMIN — MAGNESIUM HYDROXIDE 400 MG: 400 SUSPENSION ORAL at 08:06

## 2023-06-24 RX ADMIN — POTASSIUM CHLORIDE 20 MEQ: 1500 TABLET, EXTENDED RELEASE ORAL at 09:06

## 2023-06-24 RX ADMIN — FOLIC ACID 2 MG: 1 TABLET ORAL at 09:06

## 2023-06-24 RX ADMIN — POLYETHYLENE GLYCOL 3350 17 G: 17 POWDER, FOR SOLUTION ORAL at 09:06

## 2023-06-24 RX ADMIN — PANTOPRAZOLE SODIUM 40 MG: 40 TABLET, DELAYED RELEASE ORAL at 09:06

## 2023-06-24 RX ADMIN — ONDANSETRON 4 MG: 2 INJECTION INTRAMUSCULAR; INTRAVENOUS at 05:06

## 2023-06-24 RX ADMIN — ACETAMINOPHEN 650 MG: 325 TABLET ORAL at 05:06

## 2023-06-24 RX ADMIN — METOPROLOL TARTRATE 25 MG: 25 TABLET, FILM COATED ORAL at 09:06

## 2023-06-24 RX ADMIN — HYDROMORPHONE HYDROCHLORIDE 2 MG: 2 TABLET ORAL at 09:06

## 2023-06-24 RX ADMIN — POTASSIUM CHLORIDE 20 MEQ: 1500 TABLET, EXTENDED RELEASE ORAL at 08:06

## 2023-06-24 RX ADMIN — MAGNESIUM HYDROXIDE 400 MG: 400 SUSPENSION ORAL at 09:06

## 2023-06-24 RX ADMIN — FERROUS SULFATE TAB 325 MG (65 MG ELEMENTAL FE) 1 EACH: 325 (65 FE) TAB at 09:06

## 2023-06-24 RX ADMIN — Medication 16 G: at 11:06

## 2023-06-24 RX ADMIN — APIXABAN 2.5 MG: 2.5 TABLET, FILM COATED ORAL at 09:06

## 2023-06-24 RX ADMIN — PRAVASTATIN SODIUM 20 MG: 20 TABLET ORAL at 09:06

## 2023-06-24 RX ADMIN — DOCUSATE SODIUM 100 MG: 100 CAPSULE, LIQUID FILLED ORAL at 09:06

## 2023-06-24 RX ADMIN — OXYCODONE HYDROCHLORIDE AND ACETAMINOPHEN 500 MG: 500 TABLET ORAL at 08:06

## 2023-06-24 RX ADMIN — ASPIRIN 81 MG: 81 TABLET, COATED ORAL at 09:06

## 2023-06-24 RX ADMIN — OXYCODONE HYDROCHLORIDE 10 MG: 5 TABLET ORAL at 04:06

## 2023-06-24 RX ADMIN — Medication 16 G: at 04:06

## 2023-06-24 NOTE — PROGRESS NOTES
[facility] - Intensive Care [facility])  Cardiothoracic Surgery  Progress Note    Patient Name: Gustavo Tracey Jr.  MRN: [id number]  Admission Date: 6/20/2023  Hospital Length of Stay: 3 days  Code Status: Full Code   Attending Physician: Rustam Dave MD   Referring Provider: Zion Ortega MD  Principal Problem:Abnormal stress test            Subjective:     Post-Op Info:  Procedure(s) (LRB):  CORONARY ARTERY BYPASS GRAFT (CABG) (N/A)  PURDY MAZE PROCEDURE (N/A)  SURGICAL PROCUREMENT, VEIN, ENDOSCOPIC (Left)  ECHOCARDIOGRAM,TRANSESOPHAGEAL (N/A)  BLOCK, NERVE, INTERCOSTAL, 2 OR MORE (N/A)  CLOSURE, LEFT ATRIAL APPENDAGE, USING DEVICE (Left)   2 Days Post-Op     Interval History: The patient is postop day 2 status post coronary artery bypass grafting x3 and Purdy Maze 4 procedure.    ROS  Medications:  Continuous Infusions:   EPINEPHrine Stopped (06/23/23 0843)    niCARdipine      vasopressin Stopped (06/23/23 0930)     Scheduled Meds:   acetaminophen  650 mg Oral Q6H    amitriptyline  50 mg Oral QHS    apixaban  2.5 mg Oral BID    ascorbic acid (vitamin C)  500 mg Oral BID    aspirin  81 mg Oral Daily    chlorhexidine  10 mL Mouth/Throat BID    cyanocobalamin  1,000 mcg Oral Daily    docusate sodium  100 mg Oral BID    ferrous sulfate  1 tablet Oral Daily    folic acid  2 mg Oral Daily    furosemide (LASIX) injection  40 mg Intravenous BID    insulin detemir U-100  10 Units Subcutaneous Daily    latanoprost  1 drop Both Eyes Nightly    magnesium hydroxide 400 mg/5 ml  5 mL Oral BID    metoprolol tartrate  25 mg Oral BID    pantoprazole  40 mg Oral Daily    polyethylene glycol  17 g Oral Daily    potassium chloride  20 mEq Oral Q12H    pravastatin  20 mg Oral Daily     PRN Meds:sodium chloride, acetaminophen, albumin human 5%, calcium gluconate IVPB, calcium gluconate IVPB, calcium gluconate IVPB, dextrose 10%, dextrose 10%, glucagon (human recombinant), glucose, glucose, HYDROmorphone, insulin  aspart U-100, lactated ringers, magnesium sulfate IVPB, metoclopramide HCl, ondansetron, pneumoc 20-mary conj-dip cr(PF), potassium chloride in water, potassium chloride in water, potassium chloride in water, sodium chloride 0.9%     Objective:     Vital Signs (Most Recent):  Temp: 98.2 °F (36.8 °C) (06/24/23 0715)  Pulse: 90 (06/24/23 0800)  Resp: (!) 33 (06/24/23 0800)  BP: (!) 90/58 (06/24/23 0800)  SpO2: (!) 94 % (06/24/23 0800) Vital Signs (24h Range):  Temp:  [97.5 °F (36.4 °C)-98.2 °F (36.8 °C)] 98.2 °F (36.8 °C)  Pulse:  [90-94] 90  Resp:  [8-40] 33  SpO2:  [89 %-100 %] 94 %  BP: ()/(51-73) 90/58  Arterial Line BP: ()/(51-85) 112/63     Weight: 113.5 kg (250 lb 3.6 oz)  Body mass index is 33.01 kg/m².    SpO2: (!) 94 %       Intake/Output - Last 3 Shifts         06/22 0700 06/23 0659 06/23 0700 06/24 0659 06/24 0700  06/25 0659    P.O.  240     I.V. (mL/kg) 2238.3 (19.7) 350.7 (3.1)     Blood 637.5      IV Piggyback 4736.8 99.3     Total Intake(mL/kg) 7612.6 (67.1) 690 (6.1)     Urine (mL/kg/hr) 1210 (0.4) 1420 (0.5) 95 (0.4)    Drains 45 25     Other 0      Chest Tube 415 360 42    Total Output 1670 1805 137    Net +5942.6 -1115 -137                   Lines/Drains/Airways       Peripherally Inserted Central Catheter Line  Duration             PICC Double Lumen 06/23/23 1222 right basilic <1 day              Drain  Duration                  Urethral Catheter 06/22/23 0733 Silicone;Temperature probe;Straight-tip 16 Fr. 2 days              Line  Duration                  Pacer Wires 06/22/23 1500 1 day              Peripheral Intravenous Line  Duration                  Peripheral IV - Single Lumen 06/20/23 0944 20 G Posterior;Right Wrist 3 days                     Physical Exam  Constitutional:       Appearance: Normal appearance.   HENT:      Head: Atraumatic.   Cardiovascular:      Rate and Rhythm: Normal rate.      Heart sounds: Normal heart sounds.   Pulmonary:      Breath sounds: Normal  breath sounds.   Abdominal:      General: Abdomen is flat.      Palpations: Abdomen is soft.   Skin:     General: Skin is warm and dry.   Neurological:      Mental Status: He is alert and oriented to person, place, and time.   Psychiatric:         Behavior: Behavior normal.          Significant Labs:  All pertinent labs from the last 24 hours have been reviewed.    Significant Diagnostics:  I have reviewed all pertinent imaging results/findings within the past 24 hours.    Assessment/Plan:     S/P CABG x 3  06/23/2023   The patient is postop day 1 status post coronary artery bypass grafting x3 and Woodruff Maze 4 procedure.  Overall the patient is doing well.    Neuro:  Patient is awake alert and oriented x3.  Neuro exam is nonfocal.  Patient moves all 4.  Patient's pain is being controlled with current pain regimen.    Cardiac:  Patient has been hemodynamically stable cardiac index has been about 2.2.  Patient is on low-dose epi and vasopressin.  Wean vasopressin and epi to off.    Respiratory:  Patient was extubated yesterday.  Patient has good sats on nasal cannula.  Continue pulmonary toileting.  Continue incentive spirometer.  GI:  Will advance patient's to regular diet as tolerated.    Renal: Patient has good urine output.  Creatinine is 1.7.  Will start diuresis.  Endocrine:  Patient's glucose is controlled with an insulin drip.  Will convert to sliding scale and long-acting insulin.  Heme:  Hematocrit is 28.2 and platelet count is 116.  Will start patient on a NOAC for anticoagulation once patient has chest tubes have been discontinued.  Patient requires anticoagulation since he is status post Woodruff Maze 4 for atrial fibrillation.  Id:  White count is 4.  Will continue to follow.  Patient is on venu op antibiotics.    Activities:  Patient is out of bed to chair.  Will advance activities as tolerated.    Line tubes and drains:  Patient has a right IJ [place] and Cordis, chest tubes, pacer wires, A-line, Rankin catheter  and saphenectomy site ANDERS drains.    06/24/2023   The patient is postop day 2 status post coronary artery bypass grafting x3 and Woodruff Maze 4 procedure.  Overall the patient is doing well.    Neuro:  Patient is awake alert and oriented x3.  Neuro exam is nonfocal.  Patient moves all 4.  Pain is controlled current pain med main.  Cardiac:  Patient is hemodynamically stable.  Patient is off all pressors.  Continue metoprolol.    Respiratory:  Patient has good sats continue pulmonary toileting.  Continue incentive spirometer.    GI:  Patient is tolerating p.o. intake.  Continue advance as tolerated.    Renal:  Patient has good urine output.  Creatinine is 1.7.  Continue diuresis.  Endocrine:  Glucose is controlled with sliding scale.  Heme:  Hematocrit is 23.7 and platelet count is 62.  Will start patient on apixaban.  While patient is platelet count is low will start patient on 2.5 mg p.o. b.i.d. of apixaban.  Will increase dose once platelet count recovers.  Id:  White count is 8.92.  Will follow continue to follow white count.  Activities patient is out of bed to chair continue to advance as tolerated.    Line tubes and drains:  Patient has pacer wires, PICC line and Rankin catheter.  Chest tubes have been discontinued.  Will discontinue Rankin catheter.    Left main coronary artery disease  The patient is a 63-year-old male with CHF, history of AFib, status post CVA, hyperlipidemia, hypertension, diabetes and cardiomyopathy who had a normal Cardiolite test on workup.  The patient was brought to the operating room and cardiac catheterization shows significant multivessel coronary artery disease with a 70% ostial left main disease and a 70% proximal LAD disease.    The patient is a candidate for coronary artery bypass grafting and Woodruff Maze 4 procedure.  Preop workup is in progress.  The risks and benefits of the surgery were explained to the patient.  The patient understands the risks and benefits of surgery and has to  proceed with surgery which has been scheduled for 06/22/2023.        Rustam Dave MD  Cardiothoracic Surgery  [facility] - Intensive Care ([facility])

## 2023-06-24 NOTE — PLAN OF CARE
POC reviewed with pt. Pt remains aaox4 and VSS. Plan to pull chest tubes and ANDERS drain this am. Report to be given to day shift RN who will assume care.

## 2023-06-24 NOTE — PROGRESS NOTES
Interested CPAP bottle on-call hello id fever [facility] - Intensive Care ([facility])  Critical Care Medicine  Consult Note    Patient Name: Gustavo Tracey Jr.  MRN: [id number]  Admission Date: 6/20/2023  Hospital Length of Stay: 3 days  Code Status: Full Code  Attending Physician: Rustam Dave MD   Primary Care Provider: Braxton Guzman Jr, MD   Principal Problem: Abnormal stress test    [unfilled]  Subjective:     HPI:  63-year-old male patient with abnormal stress test found to have severe CAD on left heart catheterization status post CABG x3 today transferred to ICU for postop care.        Hospital/ICU Course:  Transferred to ICU intubated sedated with Precedex.  On pressors vasopressin and epinephrine.  Chest x-ray and ABG reviewed.  Status post CABG x3.  I changed him to pressure support.  Cardiac index 1.8 cardiac output 4.2  6/23 seen and examined.  No major events overnight.  Postop day 1 status post CABG x3.  On vaso on epinephrine drip.  On insulin drip.  Paced rhythm at 90.  Extubated yesterday around 6:00 p.m..  Urine output last 24 hours 1.2 L.  Chest tube output 400 mL.  Positive 4 L since admission  6/24 no major events overnight.  Postop day 2 status post CABG.  Paced rhythm.  Status post PICC line.  Afebrile.  Urine output 1.4 L last 24 hours positive 2.4 L since admission        6/24 no major events overnight.  Postop day 2 status post CABG.  Paced rhythm.  Status post PICC line.  Afebrile.  Urine output 1.4 L last 24 hours positive 2.4 L since admission O2 sat 95% on 2 liter/minutes  Review of Systems   Constitutional:  Positive for activity change and fatigue. Negative for chills and fever.   HENT:  Negative for drooling, ear discharge and nosebleeds.    Eyes:  Negative for pain, discharge and itching.   Respiratory:  Positive for shortness of breath. Negative for choking.    Cardiovascular:  Negative for chest pain.   Gastrointestinal:  Negative for anal bleeding.   Endocrine: Negative for cold  intolerance.   Genitourinary:  Negative for hematuria.   Musculoskeletal:  Positive for arthralgias. Negative for neck stiffness.   Skin:  Negative for rash.   Allergic/Immunologic: Negative for immunocompromised state.   Neurological:  Positive for weakness. Negative for seizures and facial asymmetry.   Hematological:  Negative for adenopathy.   Psychiatric/Behavioral:  Negative for behavioral problems, self-injury and suicidal ideas.    Objective:     Vital Signs (Most Recent):  Temp: 98.2 °F (36.8 °C) (06/24/23 0715)  Pulse: 91 (06/24/23 1000)  Resp: (!) 24 (06/24/23 1000)  BP: 98/60 (06/24/23 1000)  SpO2: 95 % (06/24/23 1000) Vital Signs (24h Range):  Temp:  [97.5 °F (36.4 °C)-98.2 °F (36.8 °C)] 98.2 °F (36.8 °C)  Pulse:  [90-92] 91  Resp:  [8-40] 24  SpO2:  [89 %-100 %] 95 %  BP: ()/(51-73) 98/60  Arterial Line BP: ()/(51-85) 112/63     Weight: 113.5 kg (250 lb 3.6 oz)  Body mass index is 33.01 kg/m².      Intake/Output Summary (Last 24 hours) at 6/24/2023 1049  Last data filed at 6/24/2023 0900  Gross per 24 hour   Intake 646.67 ml   Output 1322 ml   Net -675.33 ml          Physical Exam  Constitutional:       Appearance: He is obese.   Neck:      Comments: Right IJ right heart catheter removed  Cardiovascular:      Rate and Rhythm: Normal rate and regular rhythm.   Pulmonary:      Effort: Respiratory distress present.      Breath sounds: No stridor.      Comments: Chest tubes removed  Abdominal:      Palpations: Abdomen is soft.   Genitourinary:     Comments: Rankin catheter  Musculoskeletal:         General: Swelling and deformity present.      Comments: Right upper extremity PICC line   Skin:     General: Skin is warm.      Comments: Lower extremity dressing    Chest wound dressing    Neurological:      General: No focal deficit present.   Psychiatric:         Mood and Affect: Mood normal.         Thought Content: Thought content normal.         Judgment: Judgment normal.        Vents:  Vent  Mode: (S) Spont (06/22/23 1627)  Set Rate: 18 BPM (06/22/23 1453)  Vt Set: 500 mL (06/22/23 1453)  Pressure Support: 10 cmH20 (06/22/23 1627)  PEEP/CPAP: 5 cmH20 (06/22/23 1627)  Oxygen Concentration (%): 30 (06/22/23 1627)  Peak Airway Pressure: 15 cmH20 (06/22/23 1627)  Total Ve: 12.5 L/m (06/22/23 1627)  F/VT Ratio<105 (RSBI): (!) 59.6 (06/22/23 1627)    Lines/Drains/Airways       Peripherally Inserted Central Catheter Line  Duration             PICC Double Lumen 06/23/23 1222 right basilic <1 day              Drain  Duration                  Urethral Catheter 06/22/23 0733 Silicone;Temperature probe;Straight-tip 16 Fr. 2 days              Line  Duration                  Pacer Wires 06/22/23 1500 1 day              Peripheral Intravenous Line  Duration                  Peripheral IV - Single Lumen 06/20/23 0944 20 G Posterior;Right Wrist 4 days                    Significant Labs:    CBC/Anemia Profile:  Recent Labs   Lab 06/22/23  1456 06/22/23  1505 06/22/23  2020 06/23/23  0228 06/23/23  0355 06/24/23  0444   WBC 38.06*  --   --   --  4.81 8.92   HGB 7.2*  --  9.9*  --  9.5* 7.8*   HCT 22.4*   < > 30.2* 27* 28.2* 23.7*     --   --   --  116* 62*   MCV 86  --   --   --  83 85   RDW 17.1*  --   --   --  15.9* 16.6*    < > = values in this interval not displayed.          Chemistries:  Recent Labs   Lab 06/23/23  0355 06/23/23  1700 06/24/23  0444    141 137   K 4.4 4.8 4.6    108 106   CO2 21* 22* 23   BUN 27* 29* 34*   CREATININE 1.7* 1.6* 1.7*   CALCIUM 8.1* 9.5 8.7   MG 3.1* 2.5 2.3        Latest Reference Range & Units 06/22/23 15:05   POC PH 7.35 - 7.45  7.440   POC PCO2 35 - 45 mmHg 38.0   POC PO2 80 - 100 mmHg 348 (H)   POC HCO3 24 - 28 mmol/L 25.8   Sodium, Blood Gas 136 - 145 mmol/L 144   Potassium, Blood Gas 3.5 - 5.1 mmol/L 3.9   POC SATURATED O2 95 - 100 % 100   Sample  ARTERIAL   POC Ionized Calcium 1.06 - 1.42 mmol/L 1.01 (L)   POC Glucose 70 - 110 mg/dL 167 (H)   POC Hematocrit  36 - 54 %PCV 21 (L)   POC BE -2 to 2 mmol/L 2   FiO2  100   Vt  500   PiP  29   PEEP  5   DelSys  Adult Vent   Site  Kristin/UAC   Mode  AC/PRVC   Rate  18     2D echo 06/21/2023    The left ventricle is moderately enlarged with eccentric hypertrophy and severely decreased systolic function.  Grade I left ventricular diastolic dysfunction.  Normal right ventricular size with normal right ventricular systolic function.  Normal central venous pressure (3 mmHg).  The estimated ejection fraction is 25%.  Mild aortic regurgitation.  Mild mitral regurgitation.  There is severe left ventricular global hypokinesis.     Significant Imaging:       Chest x-ray 06/22/2023    EXAMINATION:  XR CHEST AP PORTABLE     CLINICAL HISTORY:  Post-op;     COMPARISON:  06/20/2023     FINDINGS:  There has been interval placement of multiple sternotomy wires.  There has been interval placement of a right internal jugular venous Raymore-Klever line.  Its tip is in the region of the main pulmonary artery.  There has been interval placement of an endotracheal tube.  Its tip is located 75 mm superior to the lesley.  There has been interval placement of a mediastinal drain.  There has been interval placement of a left-sided chest tube.  There is no pneumothorax visualized.  There has been interval development of a mild amount of haziness in the base of the left lung.  The right lung is clear.  The right costophrenic angle is sharp.  The left costophrenic angle is not well seen secondary to overlying ribs.  The size of the heart is normal.     Impression:     1. There has been interval placement of multiple sternotomy wires. There has been interval placement of a right internal jugular venous Raymore-Klever line. Its tip is in the region of the main pulmonary artery. There has been interval placement of an endotracheal tube. Its tip is located 75 mm superior to the lesley. There has been interval placement of a mediastinal drain. There has been interval  placement of a left-sided chest tube. There is no pneumothorax visualized.  2. There has been interval development of a mild amount of haziness in the base of the left lung.  This is characteristic of atelectasis            ABG  Recent Labs   Lab 06/23/23  0228   PH 7.381   PO2 84   PCO2 34.0*   HCO3 20.2*   BE -5     Assessment/Plan:     Neuro  History of CVA (cerebrovascular accident)  Aspirin Plavix statin  6/23 aspirin statin p.o. plavix   6/24 aspirin apixaban statins    Cardiac/Vascular  S/P CABG x 3  6/23 postop day 1.  Aspirin statin Plavix.  Wean off pressors.  Monitor chest tube output.  Pacing wires in place  6/24 postop day 2.  Aspirin statin apixaban beta-blocker chest tubes out.  Central line discontinued A-line discontinued out of bed to chair    Acute on chronic systolic CHF (congestive heart failure)  Patient is identified as having Systolic (HFrEF) heart failure that is Chronic. CHF is currently controlled. Latest ECHO performed and demonstrates- Results for orders placed during the hospital encounter of 06/20/23    Echo    Interpretation Summary  · The left ventricle is moderately enlarged with eccentric hypertrophy and severely decreased systolic function.  · Grade I left ventricular diastolic dysfunction.  · Normal right ventricular size with normal right ventricular systolic function.  · Normal central venous pressure (3 mmHg).  · The estimated ejection fraction is 25%.  · Mild aortic regurgitation.  · Mild mitral regurgitation.  · There is severe left ventricular global hypokinesis.  . Continue Beta Blocker and monitor clinical status closely. Monitor on telemetry. Patient is on CHF pathway.  Monitor strict Is&Os and daily weights.  Place on fluid restriction of 1.5 L. Continue to stress to patient importance of self efficacy and  on diet for CHF. Last BNP reviewed- and noted below   Recent Labs   Lab 06/20/23 1807   BNP 63   .      PAF (paroxysmal atrial fibrillation)  Beta-blocker  aspirin Plavix cardiac monitoring   6/23 patient rhythm.  6/24 beta-blocker apixaban    DCM (dilated cardiomyopathy)  Avoid fluid overload.  Strict I&Os.  6/23 IV Lasix.  Positive 4 L.  6/24 beta-blocker Lasix strict I&Os    Coronary artery disease without angina pectoris  Status post CABG x3.  Aspirin statin Plavix beta-blocker  6/24 status post CABG times 3 postop day 2.  Aspirin statin beta-blocker.    Other  Endotracheally intubated  O2 mV pulmonary toilet.  Wean off sedation SBT to extubate  6/23 extubated yesterday in the evening.  O2 via nasal cannula.  Target O2 sat 94%      Critical Care Daily Checklist:    A: Awake: RASS Goal/Actual Goal: RASS Goal: 0-->alert and calm  Actual:     B: Spontaneous Breathing Trial Performed? Spon. Breathing Trial Initiated?: Initiated (06/22/23 1725)   C: SAT & SBT Coordinated?  Not intubated              D: Delirium: CAM-ICU Overall CAM-ICU: Negative   E: Early Mobility Performed? Out of bed to chair   F: Feeding Goal: Goals: 1. Pt diet will be modified by RD follow up 2. Pt will tolerate and consume > 60% est Needs by RD follow up 3. Pt will consume Evan BID prior to RD follow up 4. Pt will have BM by RD follow up  Status: Nutrition Goal Status: new   Current Diet Order   Procedures    Diet Cardiac      AS: Analgesia/Sedation Not sedated   T: Thromboembolic Prophylaxis Mechanical prophylaxis plus apixaban for AFib   H: HOB > 300 Yes   U: Stress Ulcer Prophylaxis (if needed) PPI   G: Glucose Control Fingerstick p.r.n. sliding scale insulin   B: Bowel Function     I: Indwelling Catheter (Lines & Rankin) Necessity Critically ill keep Rankin   D: De-escalation of Antimicrobials/Pharmacotherapies Fever resolved blood culture no growth to date afebrile not on antibiotic    Plan for the day/ETD Y    Code Status:  Family/Goals of Care: Full Code       Critical Care Time: 35 minutes  Critical secondary to Patient has a condition that poses threat to life and bodily function:  SEVERE  CAD STATUS POST CABG X3   Critical care was time spent personally by me on the following activities: development of treatment plan with patient or surrogate and bedside caregivers, discussions with consultants, evaluation of patient's response to treatment, examination of patient, ordering and performing treatments and interventions, ordering and review of laboratory studies, ordering and review of radiographic studies, pulse oximetry, re-evaluation of patient's condition. This critical care time did not overlap with that of any other provider or involve time for any procedures.         Leidy Rose MD  Critical Care Medicine  [facility] - Intensive Care [facility])

## 2023-06-24 NOTE — ASSESSMENT & PLAN NOTE
06/23/2023   The patient is postop day 1 status post coronary artery bypass grafting x3 and Woodruff Maze 4 procedure.  Overall the patient is doing well.    Neuro:  Patient is awake alert and oriented x3.  Neuro exam is nonfocal.  Patient moves all 4.  Patient's pain is being controlled with current pain regimen.    Cardiac:  Patient has been hemodynamically stable cardiac index has been about 2.2.  Patient is on low-dose epi and vasopressin.  Wean vasopressin and epi to off.    Respiratory:  Patient was extubated yesterday.  Patient has good sats on nasal cannula.  Continue pulmonary toileting.  Continue incentive spirometer.  GI:  Will advance patient's to regular diet as tolerated.    Renal: Patient has good urine output.  Creatinine is 1.7.  Will start diuresis.  Endocrine:  Patient's glucose is controlled with an insulin drip.  Will convert to sliding scale and long-acting insulin.  Heme:  Hematocrit is 28.2 and platelet count is 116.  Will start patient on a NOAC for anticoagulation once patient has chest tubes have been discontinued.  Patient requires anticoagulation since he is status post Woodruff Maze 4 for atrial fibrillation.  Id:  White count is 4.  Will continue to follow.  Patient is on venu op antibiotics.    Activities:  Patient is out of bed to chair.  Will advance activities as tolerated.    Line tubes and drains:  Patient has a right IJ Morton and Cordis, chest tubes, pacer wires, A-line, Rankin catheter and saphenectomy site ANDERS drains.    06/24/2023   The patient is postop day 2 status post coronary artery bypass grafting x3 and Woodruff Maze 4 procedure.  Overall the patient is doing well.    Neuro:  Patient is awake alert and oriented x3.  Neuro exam is nonfocal.  Patient moves all 4.  Pain is controlled current pain med main.  Cardiac:  Patient is hemodynamically stable.  Patient is off all pressors.  Continue metoprolol.    Respiratory:  Patient has good sats continue pulmonary toileting.  Continue incentive  spirometer.    GI:  Patient is tolerating p.o. intake.  Continue advance as tolerated.    Renal:  Patient has good urine output.  Creatinine is 1.7.  Continue diuresis.  Endocrine:  Glucose is controlled with sliding scale.  Heme:  Hematocrit is 23.7 and platelet count is 62.  Will start patient on apixaban.  While patient is platelet count is low will start patient on 2.5 mg p.o. b.i.d. of apixaban.  Will increase dose once platelet count recovers.  Id:  White count is 8.92.  Will follow continue to follow white count.  Activities patient is out of bed to chair continue to advance as tolerated.    Line tubes and drains:  Patient has pacer wires, PICC line and Rankin catheter.  Chest tubes have been discontinued.  Will discontinue Rankin catheter.

## 2023-06-24 NOTE — ASSESSMENT & PLAN NOTE
Status post CABG x3.  Aspirin statin Plavix beta-blocker  6/24 status post CABG times 3 postop day 2.  Aspirin statin beta-blocker.

## 2023-06-24 NOTE — SUBJECTIVE & OBJECTIVE
Interval History: The patient is postop day 2 status post coronary artery bypass grafting x3 and Woodruff Maze 4 procedure.    ROS  Medications:  Continuous Infusions:   EPINEPHrine Stopped (06/23/23 0843)    niCARdipine      vasopressin Stopped (06/23/23 0930)     Scheduled Meds:   acetaminophen  650 mg Oral Q6H    amitriptyline  50 mg Oral QHS    apixaban  2.5 mg Oral BID    ascorbic acid (vitamin C)  500 mg Oral BID    aspirin  81 mg Oral Daily    chlorhexidine  10 mL Mouth/Throat BID    cyanocobalamin  1,000 mcg Oral Daily    docusate sodium  100 mg Oral BID    ferrous sulfate  1 tablet Oral Daily    folic acid  2 mg Oral Daily    furosemide (LASIX) injection  40 mg Intravenous BID    insulin detemir U-100  10 Units Subcutaneous Daily    latanoprost  1 drop Both Eyes Nightly    magnesium hydroxide 400 mg/5 ml  5 mL Oral BID    metoprolol tartrate  25 mg Oral BID    pantoprazole  40 mg Oral Daily    polyethylene glycol  17 g Oral Daily    potassium chloride  20 mEq Oral Q12H    pravastatin  20 mg Oral Daily     PRN Meds:sodium chloride, acetaminophen, albumin human 5%, calcium gluconate IVPB, calcium gluconate IVPB, calcium gluconate IVPB, dextrose 10%, dextrose 10%, glucagon (human recombinant), glucose, glucose, HYDROmorphone, insulin aspart U-100, lactated ringers, magnesium sulfate IVPB, metoclopramide HCl, ondansetron, pneumoc 20-mary conj-dip cr(PF), potassium chloride in water, potassium chloride in water, potassium chloride in water, sodium chloride 0.9%     Objective:     Vital Signs (Most Recent):  Temp: 98.2 °F (36.8 °C) (06/24/23 0715)  Pulse: 90 (06/24/23 0800)  Resp: (!) 33 (06/24/23 0800)  BP: (!) 90/58 (06/24/23 0800)  SpO2: (!) 94 % (06/24/23 0800) Vital Signs (24h Range):  Temp:  [97.5 °F (36.4 °C)-98.2 °F (36.8 °C)] 98.2 °F (36.8 °C)  Pulse:  [90-94] 90  Resp:  [8-40] 33  SpO2:  [89 %-100 %] 94 %  BP: ()/(51-73) 90/58  Arterial Line BP: ()/(51-85) 112/63     Weight: 113.5 kg (250 lb 3.6  oz)  Body mass index is 33.01 kg/m².    SpO2: (!) 94 %       Intake/Output - Last 3 Shifts         06/22 0700  06/23 0659 06/23 0700  06/24 0659 06/24 0700  06/25 0659    P.O.  240     I.V. (mL/kg) 2238.3 (19.7) 350.7 (3.1)     Blood 637.5      IV Piggyback 4736.8 99.3     Total Intake(mL/kg) 7612.6 (67.1) 690 (6.1)     Urine (mL/kg/hr) 1210 (0.4) 1420 (0.5) 95 (0.4)    Drains 45 25     Other 0      Chest Tube 415 360 42    Total Output 1670 1805 137    Net +5942.6 -1115 -137                   Lines/Drains/Airways       Peripherally Inserted Central Catheter Line  Duration             PICC Double Lumen 06/23/23 1222 right basilic <1 day              Drain  Duration                  Urethral Catheter 06/22/23 0733 Silicone;Temperature probe;Straight-tip 16 Fr. 2 days              Line  Duration                  Pacer Wires 06/22/23 1500 1 day              Peripheral Intravenous Line  Duration                  Peripheral IV - Single Lumen 06/20/23 0944 20 G Posterior;Right Wrist 3 days                     Physical Exam  Constitutional:       Appearance: Normal appearance.   HENT:      Head: Atraumatic.   Cardiovascular:      Rate and Rhythm: Normal rate.      Heart sounds: Normal heart sounds.   Pulmonary:      Breath sounds: Normal breath sounds.   Abdominal:      General: Abdomen is flat.      Palpations: Abdomen is soft.   Skin:     General: Skin is warm and dry.   Neurological:      Mental Status: He is alert and oriented to person, place, and time.   Psychiatric:         Behavior: Behavior normal.          Significant Labs:  All pertinent labs from the last 24 hours have been reviewed.    Significant Diagnostics:  I have reviewed all pertinent imaging results/findings within the past 24 hours.

## 2023-06-24 NOTE — SUBJECTIVE & OBJECTIVE
6/24 no major events overnight.  Postop day 2 status post CABG.  Paced rhythm.  Status post PICC line.  Afebrile.  Urine output 1.4 L last 24 hours positive 2.4 L since admission O2 sat 95% on 2 liter/minutes  Review of Systems   Constitutional:  Positive for activity change and fatigue. Negative for chills and fever.   HENT:  Negative for drooling, ear discharge and nosebleeds.    Eyes:  Negative for pain, discharge and itching.   Respiratory:  Positive for shortness of breath. Negative for choking.    Cardiovascular:  Negative for chest pain.   Gastrointestinal:  Negative for anal bleeding.   Endocrine: Negative for cold intolerance.   Genitourinary:  Negative for hematuria.   Musculoskeletal:  Positive for arthralgias. Negative for neck stiffness.   Skin:  Negative for rash.   Allergic/Immunologic: Negative for immunocompromised state.   Neurological:  Positive for weakness. Negative for seizures and facial asymmetry.   Hematological:  Negative for adenopathy.   Psychiatric/Behavioral:  Negative for behavioral problems, self-injury and suicidal ideas.    Objective:     Vital Signs (Most Recent):  Temp: 98.2 °F (36.8 °C) (06/24/23 0715)  Pulse: 91 (06/24/23 1000)  Resp: (!) 24 (06/24/23 1000)  BP: 98/60 (06/24/23 1000)  SpO2: 95 % (06/24/23 1000) Vital Signs (24h Range):  Temp:  [97.5 °F (36.4 °C)-98.2 °F (36.8 °C)] 98.2 °F (36.8 °C)  Pulse:  [90-92] 91  Resp:  [8-40] 24  SpO2:  [89 %-100 %] 95 %  BP: ()/(51-73) 98/60  Arterial Line BP: ()/(51-85) 112/63     Weight: 113.5 kg (250 lb 3.6 oz)  Body mass index is 33.01 kg/m².      Intake/Output Summary (Last 24 hours) at 6/24/2023 1049  Last data filed at 6/24/2023 0900  Gross per 24 hour   Intake 646.67 ml   Output 1322 ml   Net -675.33 ml          Physical Exam  Constitutional:       Appearance: He is obese.   Neck:      Comments: Right IJ right heart catheter removed  Cardiovascular:      Rate and Rhythm: Normal rate and regular rhythm.   Pulmonary:       Effort: Respiratory distress present.      Breath sounds: No stridor.      Comments: Chest tubes removed  Abdominal:      Palpations: Abdomen is soft.   Genitourinary:     Comments: Rnakin catheter  Musculoskeletal:         General: Swelling and deformity present.      Comments: Right upper extremity PICC line   Skin:     General: Skin is warm.      Comments: Lower extremity dressing    Chest wound dressing    Neurological:      General: No focal deficit present.   Psychiatric:         Mood and Affect: Mood normal.         Thought Content: Thought content normal.         Judgment: Judgment normal.        Vents:  Vent Mode: (S) Spont (06/22/23 1627)  Set Rate: 18 BPM (06/22/23 1453)  Vt Set: 500 mL (06/22/23 1453)  Pressure Support: 10 cmH20 (06/22/23 1627)  PEEP/CPAP: 5 cmH20 (06/22/23 1627)  Oxygen Concentration (%): 30 (06/22/23 1627)  Peak Airway Pressure: 15 cmH20 (06/22/23 1627)  Total Ve: 12.5 L/m (06/22/23 1627)  F/VT Ratio<105 (RSBI): (!) 59.6 (06/22/23 1627)    Lines/Drains/Airways       Peripherally Inserted Central Catheter Line  Duration             PICC Double Lumen 06/23/23 1222 right basilic <1 day              Drain  Duration                  Urethral Catheter 06/22/23 0733 Silicone;Temperature probe;Straight-tip 16 Fr. 2 days              Line  Duration                  Pacer Wires 06/22/23 1500 1 day              Peripheral Intravenous Line  Duration                  Peripheral IV - Single Lumen 06/20/23 0944 20 G Posterior;Right Wrist 4 days                    Significant Labs:    CBC/Anemia Profile:  Recent Labs   Lab 06/22/23  1456 06/22/23  1505 06/22/23  2020 06/23/23  0228 06/23/23  0355 06/24/23  0444   WBC 38.06*  --   --   --  4.81 8.92   HGB 7.2*  --  9.9*  --  9.5* 7.8*   HCT 22.4*   < > 30.2* 27* 28.2* 23.7*     --   --   --  116* 62*   MCV 86  --   --   --  83 85   RDW 17.1*  --   --   --  15.9* 16.6*    < > = values in this interval not displayed.          Chemistries:  Recent  Labs   Lab 06/23/23  0355 06/23/23  1700 06/24/23  0444    141 137   K 4.4 4.8 4.6    108 106   CO2 21* 22* 23   BUN 27* 29* 34*   CREATININE 1.7* 1.6* 1.7*   CALCIUM 8.1* 9.5 8.7   MG 3.1* 2.5 2.3        Latest Reference Range & Units 06/22/23 15:05   POC PH 7.35 - 7.45  7.440   POC PCO2 35 - 45 mmHg 38.0   POC PO2 80 - 100 mmHg 348 (H)   POC HCO3 24 - 28 mmol/L 25.8   Sodium, Blood Gas 136 - 145 mmol/L 144   Potassium, Blood Gas 3.5 - 5.1 mmol/L 3.9   POC SATURATED O2 95 - 100 % 100   Sample  ARTERIAL   POC Ionized Calcium 1.06 - 1.42 mmol/L 1.01 (L)   POC Glucose 70 - 110 mg/dL 167 (H)   POC Hematocrit 36 - 54 %PCV 21 (L)   POC BE -2 to 2 mmol/L 2   FiO2  100   Vt  500   PiP  29   PEEP  5   DelSys  Adult Vent   Site  Kristin/UAC   Mode  AC/PRVC   Rate  18     2D echo 06/21/2023    The left ventricle is moderately enlarged with eccentric hypertrophy and severely decreased systolic function.  Grade I left ventricular diastolic dysfunction.  Normal right ventricular size with normal right ventricular systolic function.  Normal central venous pressure (3 mmHg).  The estimated ejection fraction is 25%.  Mild aortic regurgitation.  Mild mitral regurgitation.  There is severe left ventricular global hypokinesis.     Significant Imaging:       Chest x-ray 06/22/2023    EXAMINATION:  XR CHEST AP PORTABLE     CLINICAL HISTORY:  Post-op;     COMPARISON:  06/20/2023     FINDINGS:  There has been interval placement of multiple sternotomy wires.  There has been interval placement of a right internal jugular venous Lannon-Klever line.  Its tip is in the region of the main pulmonary artery.  There has been interval placement of an endotracheal tube.  Its tip is located 75 mm superior to the lesley.  There has been interval placement of a mediastinal drain.  There has been interval placement of a left-sided chest tube.  There is no pneumothorax visualized.  There has been interval development of a mild amount of haziness in  the base of the left lung.  The right lung is clear.  The right costophrenic angle is sharp.  The left costophrenic angle is not well seen secondary to overlying ribs.  The size of the heart is normal.     Impression:     1. There has been interval placement of multiple sternotomy wires. There has been interval placement of a right internal jugular venous Dupont-Klever line. Its tip is in the region of the main pulmonary artery. There has been interval placement of an endotracheal tube. Its tip is located 75 mm superior to the lesley. There has been interval placement of a mediastinal drain. There has been interval placement of a left-sided chest tube. There is no pneumothorax visualized.  2. There has been interval development of a mild amount of haziness in the base of the left lung.  This is characteristic of atelectasis

## 2023-06-24 NOTE — ASSESSMENT & PLAN NOTE
Avoid fluid overload.  Strict I&Os.  6/23 IV Lasix.  Positive 4 L.  6/24 beta-blocker Lasix strict I&Os

## 2023-06-24 NOTE — PROGRESS NOTES
[facility] - Intensive Care ([facility])  Cardiology  Progress Note    Patient Name: Gustavo Tracey Jr.  MRN: [id number]  Admission Date: 6/20/2023  Hospital Length of Stay: 3 days  Code Status: Full Code   Attending Physician: Rustam Dave MD   Primary Care Physician: Braxton Guzman Jr, MD  Expected Discharge Date:   Principal Problem:Abnormal stress test    Subjective:     Hospital Course:   6/21/23-Patient seen and examined today, s/p LHC yesterday which showed multivessel CAD. Awaiting CABG. Stable this AM. No CP/SOB. No labs to review. CABG planned for AM. Echo showed EF of 25%, DD.    6/23/23-Patient seen and examined today, s/p CABG x 3 POD # 1. Sitting up in bed. Feels ok. Complains of fatigue and post-op pain. Labs stable.     6.24.2023  s/p CABG x 3 POD # 2  Feels well  Sitting in chair       No new subjective & objective note has been filed under this hospital service since the last note was generated.    Assessment and Plan:         S/P CABG x 3  6.22.2023  S/p cabg x3 earlier   Intubated, chest tubes   On vaso and epi , ci 1.9     6/23/23  -Stable, recovering well  -Continue ASA, Plavix, BB, statin  -IS usage and ambulation as able  -Mgmt as per CTS    6.24.2023  Doing well   Encourage incentive spirometer   CTS managing   Will follow prn     Left main coronary artery disease  -CP free  -Continue ASA, BB, Entresto, Lasix, statin  -Awaiting CABG    History of CVA (cerebrovascular accident)  -ASA, statin    DCM (dilated cardiomyopathy)  -Stable compensated  -Continue BB, Entresto, po Lasix    Coronary artery disease without angina pectoris  -Continue OMT-ASA, BB, Entresto, statin    Other hyperlipidemia  -Continue statin    Primary hypertension  -Continue BB, Entresto    6/23/23  -Continue BB  -Resume Entresto as tolerated        VTE Risk Mitigation (From admission, onward)         Ordered     apixaban tablet 2.5 mg  2 times daily         06/24/23 0933     Place CHANTELL hose  Until discontinued         06/21/23  1044     Place sequential compression device  Until discontinued         06/21/23 1044                Catalino Rose MD  Cardiology  '[place] - Intensive Care ([facility])

## 2023-06-24 NOTE — ASSESSMENT & PLAN NOTE
6.22.2023  S/p cabg x3 earlier   Intubated, chest tubes   On vaso and epi , ci 1.9     6/23/23  -Stable, recovering well  -Continue ASA, Plavix, BB, statin  -IS usage and ambulation as able  -Mgmt as per CTS    6.24.2023  Doing well   Encourage incentive spirometer   CTS managing   Will follow prn    Chief Complaint   Patient presents with    Urinary Frequency     Patient son states that patient was treated at [facility] last Thursday for UTI. He states that his mother had high WBC in urine. Patient is hallucinating, not wanting to sleep in her room, and is seeing people. Assessment & Plan:     1. Dementia with behavioral disturbance, unspecified dementia type [facility])  Assessment & Plan:  Urine culture reviewed, negative, no indication for abx at this time  Recent episode likely d/t dementia, advised to contact neurology for sooner appointment with recurrence  2. Hypertensive kidney disease with stage 3a chronic kidney disease ([facility] [address]  Assessment & Plan:  BP recheck at goal, continue regimen  3. Stage 3a chronic kidney disease ([facility] [address]  Assessment & Plan:  Avoid nephrotoxins, continue hydration    Follow-up and Dispositions    Return in 2 months (on 9/26/2022) for cholesterol, prediabetes, blood pressure, lab results (CMP, lipid panel). Subjective:     HPI    Patient is accompanied today by her son, Demetria Tena -  Patient was seen by neurology on 07/21/2022   Labs and UA with culture was ordered  UA showed some bacteria but culture was negative  Patient is currently on aricept for dementia  Has a follow up appointment with neurology on 08/31/2022    Hypertension-  Symptoms:  None  BP readings at home are 237H systolic  Comorbid: CKD  Current treatment: Losartan 25 mg, amlodipine 10 mg    CKD-  Stage of Chronic Kidney Disease III   Denies any BLE swelling, SOB, chest pain  NSAID use:  Uses OTC occasionally  Risk factors/Comorbid: HTN, HLD    Review of Systems   Constitutional:  Negative for chills, fever and malaise/fatigue. Respiratory:  Negative for shortness of breath. Cardiovascular:  Negative for chest pain.      Objective:   /74   Pulse 99   Temp 98.1 °F (36.7 °C) (Oral)   Resp 16   Ht 5' 7\" (1.702 m)   Wt 154 lb (69.9 kg)   SpO2 96%   BMI 24.12 kg/m²      Physical Exam  Vitals and nursing note reviewed. Constitutional:       General: She is not in acute distress. Appearance: She is not ill-appearing. HENT:      Head: Normocephalic and atraumatic. Cardiovascular:      Rate and Rhythm: Normal rate and regular rhythm. Pulmonary:      Effort: Pulmonary effort is normal. No respiratory distress. Breath sounds: No wheezing, rhonchi or rales. Skin:     General: Skin is warm and dry. Neurological:      General: No focal deficit present. Mental Status: She is alert and oriented to person, place, and time. Psychiatric:         Mood and Affect: Mood normal.         Thought Content:  Thought content normal.         Judgment: Judgment normal.          Tracy Miller, FNP-C

## 2023-06-24 NOTE — ASSESSMENT & PLAN NOTE
6/23 postop day 1.  Aspirin statin Plavix.  Wean off pressors.  Monitor chest tube output.  Pacing wires in place  6/24 postop day 2.  Aspirin statin apixaban beta-blocker chest tubes out.  Central line discontinued A-line discontinued out of bed to chair

## 2023-06-24 NOTE — PLAN OF CARE
Plan of care reviewed with pt.  VSS. CT x3 and ANDERS removed by MD this shift.  Rankin cath d/c, due to void by 2029.  Pt with little to no appetite and nauseous. Hypoglycemia requiring PO glucose tabs. Boost plus provided.  Pt educated on sternal precautions and IS.  Pain well managed on PO meds.  Step down to tele, awaiting bed availability for transfer.

## 2023-06-25 LAB
ANION GAP SERPL CALC-SCNC: 10 MMOL/L (ref 8–16)
ANION GAP SERPL CALC-SCNC: 10 MMOL/L (ref 8–16)
BASOPHILS # BLD AUTO: 0.02 K/UL (ref 0–0.2)
BASOPHILS NFR BLD: 0.1 % (ref 0–1.9)
BILIRUB UR QL STRIP: NEGATIVE
BUN SERPL-MCNC: 34 MG/DL (ref 8–23)
BUN SERPL-MCNC: 36 MG/DL (ref 8–23)
CALCIUM SERPL-MCNC: 8.3 MG/DL (ref 8.7–10.5)
CALCIUM SERPL-MCNC: 8.5 MG/DL (ref 8.7–10.5)
CHLORIDE SERPL-SCNC: 100 MMOL/L (ref 95–110)
CHLORIDE SERPL-SCNC: 104 MMOL/L (ref 95–110)
CLARITY UR: CLEAR
CO2 SERPL-SCNC: 23 MMOL/L (ref 23–29)
CO2 SERPL-SCNC: 25 MMOL/L (ref 23–29)
COLOR UR: COLORLESS
CREAT SERPL-MCNC: 1.5 MG/DL (ref 0.5–1.4)
CREAT SERPL-MCNC: 1.6 MG/DL (ref 0.5–1.4)
DACRYOCYTES BLD QL SMEAR: ABNORMAL
DIFFERENTIAL METHOD: ABNORMAL
EOSINOPHIL # BLD AUTO: 0 K/UL (ref 0–0.5)
EOSINOPHIL NFR BLD: 0 % (ref 0–8)
ERYTHROCYTE [DISTWIDTH] IN BLOOD BY AUTOMATED COUNT: 16.8 % (ref 11.5–14.5)
EST. GFR  (NO RACE VARIABLE): 48 ML/MIN/1.73 M^2
EST. GFR  (NO RACE VARIABLE): 52 ML/MIN/1.73 M^2
GLUCOSE SERPL-MCNC: 94 MG/DL (ref 70–110)
GLUCOSE SERPL-MCNC: 99 MG/DL (ref 70–110)
GLUCOSE UR QL STRIP: NEGATIVE
HCT VFR BLD AUTO: 21.5 % (ref 40–54)
HGB BLD-MCNC: 6.9 G/DL (ref 14–18)
HGB UR QL STRIP: NEGATIVE
IMM GRANULOCYTES # BLD AUTO: 0.67 K/UL (ref 0–0.04)
IMM GRANULOCYTES NFR BLD AUTO: 3.5 % (ref 0–0.5)
KETONES UR QL STRIP: NEGATIVE
LEUKOCYTE ESTERASE UR QL STRIP: NEGATIVE
LYMPHOCYTES # BLD AUTO: 3.9 K/UL (ref 1–4.8)
LYMPHOCYTES NFR BLD: 20.2 % (ref 18–48)
MAGNESIUM SERPL-MCNC: 2.1 MG/DL (ref 1.6–2.6)
MAGNESIUM SERPL-MCNC: 3.3 MG/DL (ref 1.6–2.6)
MCH RBC QN AUTO: 27.7 PG (ref 27–31)
MCHC RBC AUTO-ENTMCNC: 32.1 G/DL (ref 32–36)
MCV RBC AUTO: 86 FL (ref 82–98)
MONOCYTES # BLD AUTO: 10.4 K/UL (ref 0.3–1)
MONOCYTES NFR BLD: 53.8 % (ref 4–15)
NEUTROPHILS # BLD AUTO: 4.4 K/UL (ref 1.8–7.7)
NEUTROPHILS NFR BLD: 22.4 % (ref 38–73)
NITRITE UR QL STRIP: NEGATIVE
NRBC BLD-RTO: 5 /100 WBC
PH UR STRIP: 5 [PH] (ref 5–8)
PLATELET # BLD AUTO: 61 K/UL (ref 150–450)
PLATELET BLD QL SMEAR: ABNORMAL
PMV BLD AUTO: 10.1 FL (ref 9.2–12.9)
POCT GLUCOSE: 105 MG/DL (ref 70–110)
POCT GLUCOSE: 106 MG/DL (ref 70–110)
POCT GLUCOSE: 118 MG/DL (ref 70–110)
POCT GLUCOSE: 96 MG/DL (ref 70–110)
POLYCHROMASIA BLD QL SMEAR: ABNORMAL
POTASSIUM SERPL-SCNC: 4.1 MMOL/L (ref 3.5–5.1)
POTASSIUM SERPL-SCNC: 4.3 MMOL/L (ref 3.5–5.1)
PROT UR QL STRIP: NEGATIVE
RBC # BLD AUTO: 2.49 M/UL (ref 4.6–6.2)
SODIUM SERPL-SCNC: 135 MMOL/L (ref 136–145)
SODIUM SERPL-SCNC: 137 MMOL/L (ref 136–145)
SP GR UR STRIP: 1.01 (ref 1–1.03)
URN SPEC COLLECT METH UR: ABNORMAL
UROBILINOGEN UR STRIP-ACNC: NEGATIVE EU/DL
WBC # BLD AUTO: 19.42 K/UL (ref 3.9–12.7)

## 2023-06-25 PROCEDURE — 21400001 HC TELEMETRY ROOM

## 2023-06-25 PROCEDURE — P9016 RBC LEUKOCYTES REDUCED: HCPCS | Performed by: THORACIC SURGERY (CARDIOTHORACIC VASCULAR SURGERY)

## 2023-06-25 PROCEDURE — 80048 BASIC METABOLIC PNL TOTAL CA: CPT | Performed by: THORACIC SURGERY (CARDIOTHORACIC VASCULAR SURGERY)

## 2023-06-25 PROCEDURE — 25000003 PHARM REV CODE 250: Performed by: INTERNAL MEDICINE

## 2023-06-25 PROCEDURE — 25000003 PHARM REV CODE 250: Performed by: THORACIC SURGERY (CARDIOTHORACIC VASCULAR SURGERY)

## 2023-06-25 PROCEDURE — 81003 URINALYSIS AUTO W/O SCOPE: CPT | Performed by: THORACIC SURGERY (CARDIOTHORACIC VASCULAR SURGERY)

## 2023-06-25 PROCEDURE — 97530 THERAPEUTIC ACTIVITIES: CPT

## 2023-06-25 PROCEDURE — 83735 ASSAY OF MAGNESIUM: CPT | Performed by: THORACIC SURGERY (CARDIOTHORACIC VASCULAR SURGERY)

## 2023-06-25 PROCEDURE — 97116 GAIT TRAINING THERAPY: CPT

## 2023-06-25 PROCEDURE — 63600175 PHARM REV CODE 636 W HCPCS: Performed by: THORACIC SURGERY (CARDIOTHORACIC VASCULAR SURGERY)

## 2023-06-25 PROCEDURE — S0030 INJECTION, METRONIDAZOLE: HCPCS | Performed by: THORACIC SURGERY (CARDIOTHORACIC VASCULAR SURGERY)

## 2023-06-25 PROCEDURE — 94761 N-INVAS EAR/PLS OXIMETRY MLT: CPT

## 2023-06-25 PROCEDURE — 87040 BLOOD CULTURE FOR BACTERIA: CPT | Performed by: THORACIC SURGERY (CARDIOTHORACIC VASCULAR SURGERY)

## 2023-06-25 PROCEDURE — 85025 COMPLETE CBC W/AUTO DIFF WBC: CPT | Performed by: THORACIC SURGERY (CARDIOTHORACIC VASCULAR SURGERY)

## 2023-06-25 PROCEDURE — 36415 COLL VENOUS BLD VENIPUNCTURE: CPT | Performed by: THORACIC SURGERY (CARDIOTHORACIC VASCULAR SURGERY)

## 2023-06-25 PROCEDURE — 36430 TRANSFUSION BLD/BLD COMPNT: CPT

## 2023-06-25 RX ORDER — HYDROCODONE BITARTRATE AND ACETAMINOPHEN 500; 5 MG/1; MG/1
TABLET ORAL
Status: DISCONTINUED | OUTPATIENT
Start: 2023-06-25 | End: 2023-07-05 | Stop reason: HOSPADM

## 2023-06-25 RX ORDER — METRONIDAZOLE 500 MG/100ML
500 INJECTION, SOLUTION INTRAVENOUS
Status: DISCONTINUED | OUTPATIENT
Start: 2023-06-25 | End: 2023-06-28

## 2023-06-25 RX ORDER — MAGNESIUM SULFATE HEPTAHYDRATE 40 MG/ML
4 INJECTION, SOLUTION INTRAVENOUS ONCE
Status: COMPLETED | OUTPATIENT
Start: 2023-06-25 | End: 2023-06-25

## 2023-06-25 RX ORDER — SYRING-NEEDL,DISP,INSUL,0.3 ML 29 G X1/2"
296 SYRINGE, EMPTY DISPOSABLE MISCELLANEOUS ONCE
Status: COMPLETED | OUTPATIENT
Start: 2023-06-25 | End: 2023-06-25

## 2023-06-25 RX ORDER — LINEZOLID 2 MG/ML
600 INJECTION, SOLUTION INTRAVENOUS
Status: DISCONTINUED | OUTPATIENT
Start: 2023-06-25 | End: 2023-06-25

## 2023-06-25 RX ORDER — AMITRIPTYLINE HYDROCHLORIDE 50 MG/1
50 TABLET, FILM COATED ORAL NIGHTLY
Status: DISCONTINUED | OUTPATIENT
Start: 2023-06-25 | End: 2023-07-05 | Stop reason: HOSPADM

## 2023-06-25 RX ADMIN — AMITRIPTYLINE HYDROCHLORIDE 50 MG: 50 TABLET, FILM COATED ORAL at 08:06

## 2023-06-25 RX ADMIN — DOCUSATE SODIUM 100 MG: 100 CAPSULE, LIQUID FILLED ORAL at 09:06

## 2023-06-25 RX ADMIN — CEFEPIME 2 G: 2 INJECTION, POWDER, FOR SOLUTION INTRAVENOUS at 06:06

## 2023-06-25 RX ADMIN — APIXABAN 2.5 MG: 2.5 TABLET, FILM COATED ORAL at 08:06

## 2023-06-25 RX ADMIN — FERROUS SULFATE TAB 325 MG (65 MG ELEMENTAL FE) 1 EACH: 325 (65 FE) TAB at 09:06

## 2023-06-25 RX ADMIN — CEFEPIME 2 G: 2 INJECTION, POWDER, FOR SOLUTION INTRAVENOUS at 10:06

## 2023-06-25 RX ADMIN — MAGNESIUM HYDROXIDE 400 MG: 400 SUSPENSION ORAL at 08:06

## 2023-06-25 RX ADMIN — METRONIDAZOLE 500 MG: 5 INJECTION, SOLUTION INTRAVENOUS at 06:06

## 2023-06-25 RX ADMIN — POLYETHYLENE GLYCOL 3350 17 G: 17 POWDER, FOR SOLUTION ORAL at 09:06

## 2023-06-25 RX ADMIN — MAGNESIUM SULFATE HEPTAHYDRATE 4 G: 40 INJECTION, SOLUTION INTRAVENOUS at 06:06

## 2023-06-25 RX ADMIN — OXYCODONE HYDROCHLORIDE AND ACETAMINOPHEN 500 MG: 500 TABLET ORAL at 09:06

## 2023-06-25 RX ADMIN — MAGNESIUM HYDROXIDE 400 MG: 400 SUSPENSION ORAL at 09:06

## 2023-06-25 RX ADMIN — POTASSIUM CHLORIDE 20 MEQ: 1500 TABLET, EXTENDED RELEASE ORAL at 09:06

## 2023-06-25 RX ADMIN — BE HEALTH MAGNESIUM CITRATE ORAL SOLUTION - LEMON 296 ML: 1.75 LIQUID ORAL at 11:06

## 2023-06-25 RX ADMIN — METOPROLOL TARTRATE 25 MG: 25 TABLET, FILM COATED ORAL at 09:06

## 2023-06-25 RX ADMIN — METRONIDAZOLE 500 MG: 5 INJECTION, SOLUTION INTRAVENOUS at 11:06

## 2023-06-25 RX ADMIN — OXYCODONE HYDROCHLORIDE AND ACETAMINOPHEN 500 MG: 500 TABLET ORAL at 08:06

## 2023-06-25 RX ADMIN — METOPROLOL TARTRATE 25 MG: 25 TABLET, FILM COATED ORAL at 08:06

## 2023-06-25 RX ADMIN — PANTOPRAZOLE SODIUM 40 MG: 40 TABLET, DELAYED RELEASE ORAL at 09:06

## 2023-06-25 RX ADMIN — ACETAMINOPHEN 650 MG: 325 TABLET ORAL at 05:06

## 2023-06-25 RX ADMIN — ACETAMINOPHEN 650 MG: 325 TABLET ORAL at 12:06

## 2023-06-25 RX ADMIN — DOCUSATE SODIUM 100 MG: 100 CAPSULE, LIQUID FILLED ORAL at 08:06

## 2023-06-25 RX ADMIN — ACETAMINOPHEN 650 MG: 325 TABLET ORAL at 11:06

## 2023-06-25 RX ADMIN — FUROSEMIDE 40 MG: 10 INJECTION, SOLUTION INTRAMUSCULAR; INTRAVENOUS at 09:06

## 2023-06-25 RX ADMIN — ASPIRIN 81 MG: 81 TABLET, COATED ORAL at 09:06

## 2023-06-25 RX ADMIN — CHLORHEXIDINE GLUCONATE 0.12% ORAL RINSE 10 ML: 1.2 LIQUID ORAL at 09:06

## 2023-06-25 RX ADMIN — ACETAMINOPHEN 650 MG: 325 TABLET ORAL at 06:06

## 2023-06-25 RX ADMIN — MAGNESIUM SULFATE HEPTAHYDRATE 4 G: 40 INJECTION, SOLUTION INTRAVENOUS at 10:06

## 2023-06-25 RX ADMIN — LATANOPROST 1 DROP: 50 SOLUTION OPHTHALMIC at 08:06

## 2023-06-25 RX ADMIN — FUROSEMIDE 40 MG: 10 INJECTION, SOLUTION INTRAMUSCULAR; INTRAVENOUS at 08:06

## 2023-06-25 RX ADMIN — FOLIC ACID 2 MG: 1 TABLET ORAL at 09:06

## 2023-06-25 RX ADMIN — PRAVASTATIN SODIUM 20 MG: 20 TABLET ORAL at 09:06

## 2023-06-25 RX ADMIN — APIXABAN 2.5 MG: 2.5 TABLET, FILM COATED ORAL at 09:06

## 2023-06-25 RX ADMIN — CHLORHEXIDINE GLUCONATE 0.12% ORAL RINSE 10 ML: 1.2 LIQUID ORAL at 08:06

## 2023-06-25 RX ADMIN — CYANOCOBALAMIN TAB 1000 MCG 1000 MCG: 1000 TAB at 09:06

## 2023-06-25 RX ADMIN — VANCOMYCIN HYDROCHLORIDE 2000 MG: 10 INJECTION, POWDER, LYOPHILIZED, FOR SOLUTION INTRAVENOUS at 12:06

## 2023-06-25 NOTE — PT/OT/SLP PROGRESS
Physical Therapy Treatment    Patient Name:  Gustavo Tracey Jr.   MRN:  [id number]    Recommendations:     Discharge Recommendations: home health PT and 24/7 SPV  Discharge Equipment Recommendations: shower chair  Barriers to discharge: None    Assessment:     Gustavo Tracey Jr. is a 63 y.o. male admitted with a medical diagnosis of Abnormal stress test.  He presents with the following impairments/functional limitations: weakness, impaired endurance, decreased ROM, decreased coordination, impaired self care skills, decreased upper extremity function, impaired functional mobility, decreased lower extremity function, gait instability, decreased safety awareness, impaired balance, impaired cardiopulmonary response to activity.    Rehab Prognosis: Good; patient would benefit from acute skilled PT services to address these deficits and reach maximum level of function.    Recent Surgery: Procedure(s) (LRB):  CORONARY ARTERY BYPASS GRAFT (CABG) (N/A)  PURDY MAZE PROCEDURE (N/A)  SURGICAL PROCUREMENT, VEIN, ENDOSCOPIC (Left)  ECHOCARDIOGRAM,TRANSESOPHAGEAL (N/A)  BLOCK, NERVE, INTERCOSTAL, 2 OR MORE (N/A)  CLOSURE, LEFT ATRIAL APPENDAGE, USING DEVICE (Left) 3 Days Post-Op    Plan:     During this hospitalization, patient to be seen 3 x/week to address the identified rehab impairments via gait training, therapeutic activities, therapeutic exercises and progress toward the following goals:    Plan of Care Expires:  07/07/23    Subjective     Chief Complaint: Patient had no complaints.   Patient/Family Comments/goals: Get better and return home.   Pain/Comfort:  Pain Rating 1: 0/10      Objective:     Communicated with RN prior to session.  Patient found up in chair with peripheral IV, wound vac, external pacer upon PT entry to room.     General Precautions: Standard, fall, sternal  Orthopedic Precautions: N/A  Braces: N/A  Respiratory Status: Room air     Functional Mobility:  Transfers:     Sit to Stand:  minimum assistance  with no AD  Gait: 80 feet x2, Min A       AM-PAC 6 CLICK MOBILITY  Turning over in bed (including adjusting bedclothes, sheets and blankets)?: 3  Sitting down on and standing up from a chair with arms (e.g., wheelchair, bedside commode, etc.): 3  Moving from lying on back to sitting on the side of the bed?: 3  Moving to and from a bed to a chair (including a wheelchair)?: 3  Need to walk in hospital room?: 3  Climbing 3-5 steps with a railing?: 3  Basic Mobility Total Score: 18       Treatment & Education:    Patient found up in chair upon PT arrival to room. Patient completed STS transfer, SPV. Patient compliant with sternal precautions. Patient ambulated 80 feet x2, Min A with one seated rest break. Patient tolerated therapy session well.     Patient left up in chair with all lines intact and call button in reach..    GOALS:   Multidisciplinary Problems       Physical Therapy Goals          Problem: Physical Therapy    Goal Priority Disciplines Outcome Goal Variances Interventions   Physical Therapy Goal     PT, PT/OT Ongoing, Progressing     Description: LTG'S TO BE MET IN 14 DAYS (7-7-23)  PT WILL REQUIRE CGA FOR BED MOBILITY  PT WILL REQUIRE SPV FOR BED<>CHAIR TF'S  PT WILL  FEET NO AD SPV                         Time Tracking:     PT Received On: 06/25/23  PT Start Time: 0730     PT Stop Time: 0753  PT Total Time (min): 23 min     Billable Minutes: Gait Training 12 and Therapeutic Activity 11    Treatment Type: Treatment  PT/PTA: PT     Number of PTA visits since last PT visit: 0     06/25/2023

## 2023-06-25 NOTE — PLAN OF CARE
Discussed Plan of Care with patient and verbalized understanding - Patient remains AAOx4 - remains free of falls, accidents and trauma during the day shift. Bed is in the low position and the call light is within reach. Patient sat up in chair all day, ambulated in the [place] with PT - received 2 units PRBCs and started on IV antibiotics - blood cultures, urine specimen collected - need sputum to send to lab.  Will continue to monitor

## 2023-06-25 NOTE — PT/OT/SLP PROGRESS
"Occupational Therapy   Treatment    Name: Gustavo Tracey Jr.  MRN: [id number]  Admitting Diagnosis:  Abnormal stress test  3 Days Post-Op    Recommendations:     Discharge Recommendations: home health OT (24/7 SPV and A)  Discharge Equipment Recommendations:  shower chair  Barriers to discharge:  None    Assessment:     Gustavo Tracey Jr. is a 63 y.o. male with a medical diagnosis of Abnormal stress test.  He presents with the following performance deficits affecting function are weakness, impaired endurance, impaired self care skills, impaired functional mobility, impaired balance, impaired cardiopulmonary response to activity (sternal precautions).     Rehab Prognosis:  Good; patient would benefit from acute skilled OT services to address these deficits and reach maximum level of function.       Plan:     Patient to be seen 2 x/week to address the above listed problems via self-care/home management, therapeutic activities, therapeutic exercises  Plan of Care Expires: 07/07/23  Plan of Care Reviewed with: patient    Subjective     Chief Complaint: Reported "I am doing better today.'  Patient/Family Comments/goals: get stronger  Pain/Comfort:  Pain Rating 1: 0/10    Objective:     Communicated with: NurseCassandra, prior to session.  Patient found up in chair with peripheral IV, wound vac, external pacer upon OT entry to room.    General Precautions: Standard, fall, sternal    Orthopedic Precautions:N/A  Braces: N/A  Respiratory Status: Room air     Occupational Performance:     Bed Mobility:    OOB in chair at start and end of treatment session    Functional Mobility/Transfers:  Patient completed Sit <> Stand Transfer with minimum assistance  with  no assistive device   Patient completed Bed <> Chair Transfer using Step Transfer technique with minimum assistance with no assistive device  Functional Mobility: Patient completed x80ft x2 trials functional mobility without AD and min A to increase dynamic standing balance " and activity tolerance needed for ADL completion.  Seated rest break between mobility trials. Dizziness after initial mobility trial that resolved with rest.    Activities of Daily Living:  Grooming: setup completed simple grooming from bedside chair/tray with setup for item retrieval.    [facility] 6 Click ADL: 16    Treatment & Education:  Patient tolerated intervention well. Provided with additional education regarding sternal precautions and their functional implications. Patient provided minimal cueing throughout for compliance. Encouraged completion of B UE AROM therex, within sternal precautions, throughout the day to increase functional strength and activity tolerance needed for ADL completion. Educated on benefits of OOB activity and need to call for A to transfer back to bed.    Patient stated understanding and in agreement with POC. Chair alarm initiated for safety.    Patient left up in chair with all lines intact, call button in reach, and chair alarm on    GOALS:   Multidisciplinary Problems       Occupational Therapy Goals          Problem: Occupational Therapy    Goal Priority Disciplines Outcome Interventions   Occupational Therapy Goal     OT, PT/OT Ongoing, Progressing    Description: Goals to be met by: 7/7/23     Patient will increase functional independence with ADLs by performing:    Toileting from toilet with Minimal Assistance for hygiene and clothing management.   Toilet transfer to toilet with Contact Guard Assistance.  Demonstrates 100% functional compliance with sternal precautions.                         Time Tracking:     OT Date of Treatment: 06/25/23  OT Start Time: 0710  OT Stop Time: 0735  OT Total Time (min): 25 min    Billable Minutes:Therapeutic Activity 25 6/25/2023

## 2023-06-25 NOTE — NURSING TRANSFER
Nursing Transfer Note      6/24/2023     Reason patient is being transferred: no longer needing higher level of care    Transfer To: tele room 205    Transfer via wheelchair    Transfer with cardiac monitoring    Transported by nurse    Telemetry: Rate 90 and Rhythm a paced    Medicines sent: yes    Any special needs or follow-up needed: none    Chart send with patient: Yes    Notified: pt notified family    Patient reassessed at: nurse present upon arrival (date, time)  1  Upon arrival to floor: cardiac monitor applied, patient oriented to room, call bell in reach, and bed in lowest position

## 2023-06-25 NOTE — CONSULTS
Pharmacokinetic Initial Assessment: IV Vancomycin    Assessment/Plan:    Initiate intravenous vancomycin with loading dose of 2000 mg once with subsequent doses when random concentrations are less than 20 mcg/mL  Desired empiric serum trough concentration is 15 to 20 mcg/mL  Draw vancomycin random level on 6/26 at 0600.  Pharmacy will continue to follow and monitor vancomycin.      Please contact pharmacy at extension [phone]   with any questions regarding this assessment.     Patient brief summary:  Gustavo Tracey Jr. is a 63 y.o. male initiated on antimicrobial therapy with IV Vancomycin for empiric coverage post CABG    Drug Allergies:   Review of patient's allergies indicates:  No Known Allergies    Actual Body Weight:   113.5 kg    Renal Function:   Estimated Creatinine Clearance: 67.4 mL/min (A) (based on SCr of 1.5 mg/dL (H)).,     Dialysis Method (if applicable):  N/A    CBC (last 72 hours):  Recent Labs   Lab Result Units 06/22/23  1311 06/22/23  1456 06/22/23 2020 06/23/23  0355 06/24/23  0444 06/25/23  0522   WBC K/uL 12.90* 38.06*  --  4.81 8.92 19.42*   Hemoglobin g/dL 8.5* 7.2* 9.9* 9.5* 7.8* 6.9*   Hematocrit % 26.5* 22.4* 30.2* 28.2* 23.7* 21.5*   Platelets K/uL 33* 151  --  116* 62* 61*   Gran % %  --  8.0*  --  45.0 34.5* 22.4*   Lymph % %  --  63.0*  --  40.0 23.1 20.2   Mono % %  --  16.0*  --  11.0 38.3* 53.8*   Eosinophil % %  --  0.0  --  0.0 0.0 0.0   Basophil % %  --  0.0  --  0.0 0.2 0.1   Differential Method   --  Manual  --  Manual Automated Automated       Metabolic Panel (last 72 hours):  Recent Labs   Lab Result Units 06/22/23  1456 06/22/23  1717 06/22/23 2020 06/23/23  0355 06/23/23  1700 06/24/23  0444 06/24/23  1728 06/25/23  0522   Sodium mmol/L 143 141  --  140 141 137 139 137   Potassium mmol/L 3.9 3.9 4.4 4.4 4.8 4.6 4.1 4.3   Chloride mmol/L 107 109  --  110 108 106 105 104   CO2 mmol/L 24 20*  --  21* 22* 23 21* 23   Glucose mg/dL 324* 249*  --  138* 126* 116* 162* 99    BUN mg/dL 22 25*  --  27* 29* 34* 38* 36*   Creatinine mg/dL 1.5* 1.6*  --  1.7* 1.6* 1.7* 1.7* 1.5*   Magnesium mg/dL 3.4* 3.2* 3.2* 3.1* 2.5 2.3 2.2 2.1       Drug levels (last 3 results):  No results for input(s): VANCOMYCINRA, VANCORANDOM, VANCOMYCINPE, VANCOPEAK, VANCOMYCINTR, VANCOTROUGH in the last 72 hours.    Microbiologic Results:  Microbiology Results (last 7 days)       Procedure Component Value Units Date/Time    Blood culture [[id number]] Collected: 06/25/23 1134    Order Status: Sent Specimen: Blood Updated: 06/25/23 1134    Culture, Respiratory with Gram Stain [[id number]]     Order Status: No result Specimen: Respiratory from Sputum, Expectorated     Blood culture [[id number]] Collected: 06/23/23 0913    Order Status: Completed Specimen: Blood from Antecubital, Left Arm Updated: 06/24/23 1812     Blood Culture, Routine No Growth to date      No Growth to date    Blood culture [[id number]] Collected: 06/23/23 0913    Order Status: Completed Specimen: Blood from Antecubital, Right Arm Updated: 06/24/23 1812     Blood Culture, Routine No Growth to date      No Growth to date             Patient

## 2023-06-26 LAB
ANION GAP SERPL CALC-SCNC: 10 MMOL/L (ref 8–16)
ANION GAP SERPL CALC-SCNC: 12 MMOL/L (ref 8–16)
ANION GAP SERPL CALC-SCNC: 8 MMOL/L (ref 8–16)
ANISOCYTOSIS BLD QL SMEAR: SLIGHT
BASOPHILS NFR BLD: 0 % (ref 0–1.9)
BLASTS NFR BLD MANUAL: 25 %
BLD PROD TYP BPU: NORMAL
BLOOD UNIT EXPIRATION DATE: NORMAL
BLOOD UNIT TYPE CODE: 5100
BLOOD UNIT TYPE: NORMAL
BUN SERPL-MCNC: 27 MG/DL (ref 8–23)
BUN SERPL-MCNC: 30 MG/DL (ref 8–23)
BUN SERPL-MCNC: 34 MG/DL (ref 8–23)
CALCIUM SERPL-MCNC: 8.2 MG/DL (ref 8.7–10.5)
CALCIUM SERPL-MCNC: 8.2 MG/DL (ref 8.7–10.5)
CALCIUM SERPL-MCNC: 8.4 MG/DL (ref 8.7–10.5)
CHLORIDE SERPL-SCNC: 101 MMOL/L (ref 95–110)
CHLORIDE SERPL-SCNC: 101 MMOL/L (ref 95–110)
CHLORIDE SERPL-SCNC: 102 MMOL/L (ref 95–110)
CO2 SERPL-SCNC: 22 MMOL/L (ref 23–29)
CO2 SERPL-SCNC: 24 MMOL/L (ref 23–29)
CO2 SERPL-SCNC: 26 MMOL/L (ref 23–29)
CODING SYSTEM: NORMAL
CREAT SERPL-MCNC: 1.3 MG/DL (ref 0.5–1.4)
CREAT SERPL-MCNC: 1.4 MG/DL (ref 0.5–1.4)
CREAT SERPL-MCNC: 1.5 MG/DL (ref 0.5–1.4)
CROSSMATCH INTERPRETATION: NORMAL
DIFFERENTIAL METHOD: ABNORMAL
DISPENSE STATUS: NORMAL
EOSINOPHIL NFR BLD: 0 % (ref 0–8)
ERYTHROCYTE [DISTWIDTH] IN BLOOD BY AUTOMATED COUNT: 16.3 % (ref 11.5–14.5)
EST. GFR  (NO RACE VARIABLE): 52 ML/MIN/1.73 M^2
EST. GFR  (NO RACE VARIABLE): 57 ML/MIN/1.73 M^2
EST. GFR  (NO RACE VARIABLE): >60 ML/MIN/1.73 M^2
GIANT PLATELETS BLD QL SMEAR: PRESENT
GLUCOSE SERPL-MCNC: 100 MG/DL (ref 70–110)
GLUCOSE SERPL-MCNC: 104 MG/DL (ref 70–110)
GLUCOSE SERPL-MCNC: 108 MG/DL (ref 70–110)
HCT VFR BLD AUTO: 27.7 % (ref 40–54)
HGB BLD-MCNC: 9 G/DL (ref 14–18)
IMM GRANULOCYTES # BLD AUTO: ABNORMAL K/UL (ref 0–0.04)
IMM GRANULOCYTES NFR BLD AUTO: ABNORMAL % (ref 0–0.5)
LYMPHOCYTES NFR BLD: 41 % (ref 18–48)
MAGNESIUM SERPL-MCNC: 2.3 MG/DL (ref 1.6–2.6)
MAGNESIUM SERPL-MCNC: 2.7 MG/DL (ref 1.6–2.6)
MAGNESIUM SERPL-MCNC: 2.9 MG/DL (ref 1.6–2.6)
MCH RBC QN AUTO: 28 PG (ref 27–31)
MCHC RBC AUTO-ENTMCNC: 32.5 G/DL (ref 32–36)
MCV RBC AUTO: 86 FL (ref 82–98)
METAMYELOCYTES NFR BLD MANUAL: 3 %
MONOCYTES NFR BLD: 0 % (ref 4–15)
NEUTROPHILS NFR BLD: 30 % (ref 38–73)
NEUTS BAND NFR BLD MANUAL: 1 %
NRBC BLD-RTO: 9 /100 WBC
NUM UNITS TRANS PACKED RBC: NORMAL
PATH REV BLD -IMP: NORMAL
PLATELET # BLD AUTO: 62 K/UL (ref 150–450)
PLATELET BLD QL SMEAR: ABNORMAL
PMV BLD AUTO: 10.1 FL (ref 9.2–12.9)
POCT GLUCOSE: 101 MG/DL (ref 70–110)
POCT GLUCOSE: 105 MG/DL (ref 70–110)
POCT GLUCOSE: 106 MG/DL (ref 70–110)
POCT GLUCOSE: 116 MG/DL (ref 70–110)
POTASSIUM SERPL-SCNC: 3.7 MMOL/L (ref 3.5–5.1)
POTASSIUM SERPL-SCNC: 3.8 MMOL/L (ref 3.5–5.1)
POTASSIUM SERPL-SCNC: 4.2 MMOL/L (ref 3.5–5.1)
RBC # BLD AUTO: 3.21 M/UL (ref 4.6–6.2)
SMUDGE CELLS BLD QL SMEAR: PRESENT
SODIUM SERPL-SCNC: 135 MMOL/L (ref 136–145)
SODIUM SERPL-SCNC: 135 MMOL/L (ref 136–145)
SODIUM SERPL-SCNC: 136 MMOL/L (ref 136–145)
VANCOMYCIN SERPL-MCNC: 13.1 UG/ML
WBC # BLD AUTO: 18.3 K/UL (ref 3.9–12.7)

## 2023-06-26 PROCEDURE — 25000003 PHARM REV CODE 250: Performed by: INTERNAL MEDICINE

## 2023-06-26 PROCEDURE — S0030 INJECTION, METRONIDAZOLE: HCPCS | Performed by: THORACIC SURGERY (CARDIOTHORACIC VASCULAR SURGERY)

## 2023-06-26 PROCEDURE — 87205 SMEAR GRAM STAIN: CPT | Performed by: THORACIC SURGERY (CARDIOTHORACIC VASCULAR SURGERY)

## 2023-06-26 PROCEDURE — 99233 SBSQ HOSP IP/OBS HIGH 50: CPT | Mod: ,,, | Performed by: INTERNAL MEDICINE

## 2023-06-26 PROCEDURE — 85007 BL SMEAR W/DIFF WBC COUNT: CPT | Performed by: THORACIC SURGERY (CARDIOTHORACIC VASCULAR SURGERY)

## 2023-06-26 PROCEDURE — 87070 CULTURE OTHR SPECIMN AEROBIC: CPT | Performed by: THORACIC SURGERY (CARDIOTHORACIC VASCULAR SURGERY)

## 2023-06-26 PROCEDURE — 85027 COMPLETE CBC AUTOMATED: CPT | Performed by: THORACIC SURGERY (CARDIOTHORACIC VASCULAR SURGERY)

## 2023-06-26 PROCEDURE — 97535 SELF CARE MNGMENT TRAINING: CPT

## 2023-06-26 PROCEDURE — 80202 ASSAY OF VANCOMYCIN: CPT | Performed by: THORACIC SURGERY (CARDIOTHORACIC VASCULAR SURGERY)

## 2023-06-26 PROCEDURE — 99233 PR SUBSEQUENT HOSPITAL CARE,LEVL III: ICD-10-PCS | Mod: ,,, | Performed by: INTERNAL MEDICINE

## 2023-06-26 PROCEDURE — 97530 THERAPEUTIC ACTIVITIES: CPT

## 2023-06-26 PROCEDURE — 25000003 PHARM REV CODE 250: Performed by: THORACIC SURGERY (CARDIOTHORACIC VASCULAR SURGERY)

## 2023-06-26 PROCEDURE — 63600175 PHARM REV CODE 636 W HCPCS: Performed by: THORACIC SURGERY (CARDIOTHORACIC VASCULAR SURGERY)

## 2023-06-26 PROCEDURE — 94761 N-INVAS EAR/PLS OXIMETRY MLT: CPT

## 2023-06-26 PROCEDURE — 21400001 HC TELEMETRY ROOM

## 2023-06-26 PROCEDURE — 25000242 PHARM REV CODE 250 ALT 637 W/ HCPCS: Performed by: THORACIC SURGERY (CARDIOTHORACIC VASCULAR SURGERY)

## 2023-06-26 PROCEDURE — 80048 BASIC METABOLIC PNL TOTAL CA: CPT | Mod: 91 | Performed by: THORACIC SURGERY (CARDIOTHORACIC VASCULAR SURGERY)

## 2023-06-26 PROCEDURE — 85060 BLOOD SMEAR INTERPRETATION: CPT | Mod: ,,, | Performed by: PATHOLOGY

## 2023-06-26 PROCEDURE — 85060 PATHOLOGIST REVIEW: ICD-10-PCS | Mod: ,,, | Performed by: PATHOLOGY

## 2023-06-26 PROCEDURE — 83735 ASSAY OF MAGNESIUM: CPT | Performed by: THORACIC SURGERY (CARDIOTHORACIC VASCULAR SURGERY)

## 2023-06-26 PROCEDURE — 97116 GAIT TRAINING THERAPY: CPT

## 2023-06-26 RX ORDER — SODIUM CHLORIDE 9 MG/ML
INJECTION, SOLUTION INTRAVENOUS
Status: DISCONTINUED | OUTPATIENT
Start: 2023-06-26 | End: 2023-07-05 | Stop reason: HOSPADM

## 2023-06-26 RX ADMIN — CHLORHEXIDINE GLUCONATE 0.12% ORAL RINSE 10 ML: 1.2 LIQUID ORAL at 09:06

## 2023-06-26 RX ADMIN — METOPROLOL TARTRATE 25 MG: 25 TABLET, FILM COATED ORAL at 08:06

## 2023-06-26 RX ADMIN — DOCUSATE SODIUM 100 MG: 100 CAPSULE, LIQUID FILLED ORAL at 09:06

## 2023-06-26 RX ADMIN — POTASSIUM CHLORIDE 20 MEQ: 1500 TABLET, EXTENDED RELEASE ORAL at 09:06

## 2023-06-26 RX ADMIN — FOLIC ACID 2 MG: 1 TABLET ORAL at 08:06

## 2023-06-26 RX ADMIN — SODIUM CHLORIDE 30 ML/HR: 9 INJECTION, SOLUTION INTRAVENOUS at 02:06

## 2023-06-26 RX ADMIN — POLYETHYLENE GLYCOL 3350 17 G: 17 POWDER, FOR SOLUTION ORAL at 08:06

## 2023-06-26 RX ADMIN — AMITRIPTYLINE HYDROCHLORIDE 50 MG: 50 TABLET, FILM COATED ORAL at 09:06

## 2023-06-26 RX ADMIN — CEFEPIME 2 G: 2 INJECTION, POWDER, FOR SOLUTION INTRAVENOUS at 02:06

## 2023-06-26 RX ADMIN — ACETAMINOPHEN 650 MG: 325 TABLET ORAL at 05:06

## 2023-06-26 RX ADMIN — METRONIDAZOLE 500 MG: 5 INJECTION, SOLUTION INTRAVENOUS at 05:06

## 2023-06-26 RX ADMIN — FERROUS SULFATE TAB 325 MG (65 MG ELEMENTAL FE) 1 EACH: 325 (65 FE) TAB at 08:06

## 2023-06-26 RX ADMIN — METOPROLOL TARTRATE 25 MG: 25 TABLET, FILM COATED ORAL at 09:06

## 2023-06-26 RX ADMIN — OXYCODONE HYDROCHLORIDE AND ACETAMINOPHEN 500 MG: 500 TABLET ORAL at 09:06

## 2023-06-26 RX ADMIN — SACUBITRIL AND VALSARTAN 1 TABLET: 24; 26 TABLET, FILM COATED ORAL at 09:06

## 2023-06-26 RX ADMIN — ASPIRIN 81 MG: 81 TABLET, COATED ORAL at 08:06

## 2023-06-26 RX ADMIN — CYANOCOBALAMIN TAB 1000 MCG 1000 MCG: 1000 TAB at 08:06

## 2023-06-26 RX ADMIN — DOCUSATE SODIUM 100 MG: 100 CAPSULE, LIQUID FILLED ORAL at 08:06

## 2023-06-26 RX ADMIN — POTASSIUM CHLORIDE 20 MEQ: 1500 TABLET, EXTENDED RELEASE ORAL at 08:06

## 2023-06-26 RX ADMIN — CEFEPIME 2 G: 2 INJECTION, POWDER, FOR SOLUTION INTRAVENOUS at 05:06

## 2023-06-26 RX ADMIN — METRONIDAZOLE 500 MG: 5 INJECTION, SOLUTION INTRAVENOUS at 09:06

## 2023-06-26 RX ADMIN — CEFEPIME 2 G: 2 INJECTION, POWDER, FOR SOLUTION INTRAVENOUS at 10:06

## 2023-06-26 RX ADMIN — METRONIDAZOLE 500 MG: 5 INJECTION, SOLUTION INTRAVENOUS at 02:06

## 2023-06-26 RX ADMIN — APIXABAN 2.5 MG: 2.5 TABLET, FILM COATED ORAL at 08:06

## 2023-06-26 RX ADMIN — FUROSEMIDE 40 MG: 10 INJECTION, SOLUTION INTRAMUSCULAR; INTRAVENOUS at 09:06

## 2023-06-26 RX ADMIN — SACUBITRIL AND VALSARTAN 1 TABLET: 24; 26 TABLET, FILM COATED ORAL at 11:06

## 2023-06-26 RX ADMIN — CHLORHEXIDINE GLUCONATE 0.12% ORAL RINSE 10 ML: 1.2 LIQUID ORAL at 08:06

## 2023-06-26 RX ADMIN — OXYCODONE HYDROCHLORIDE AND ACETAMINOPHEN 500 MG: 500 TABLET ORAL at 08:06

## 2023-06-26 RX ADMIN — MAGNESIUM HYDROXIDE 400 MG: 400 SUSPENSION ORAL at 09:06

## 2023-06-26 RX ADMIN — PANTOPRAZOLE SODIUM 40 MG: 40 TABLET, DELAYED RELEASE ORAL at 08:06

## 2023-06-26 RX ADMIN — FUROSEMIDE 40 MG: 10 INJECTION, SOLUTION INTRAMUSCULAR; INTRAVENOUS at 08:06

## 2023-06-26 RX ADMIN — PRAVASTATIN SODIUM 20 MG: 20 TABLET ORAL at 08:06

## 2023-06-26 RX ADMIN — APIXABAN 2.5 MG: 2.5 TABLET, FILM COATED ORAL at 09:06

## 2023-06-26 RX ADMIN — LATANOPROST 1 DROP: 50 SOLUTION OPHTHALMIC at 09:06

## 2023-06-26 RX ADMIN — VANCOMYCIN HYDROCHLORIDE 1750 MG: 10 INJECTION, POWDER, LYOPHILIZED, FOR SOLUTION INTRAVENOUS at 08:06

## 2023-06-26 RX ADMIN — EMPAGLIFLOZIN 10 MG: 10 TABLET, FILM COATED ORAL at 04:06

## 2023-06-26 RX ADMIN — MAGNESIUM HYDROXIDE 400 MG: 400 SUSPENSION ORAL at 08:06

## 2023-06-26 NOTE — PLAN OF CARE
Problem: Adult Inpatient Plan of Care  Goal: Plan of Care Review  Outcome: Ongoing, Progressing  Goal: Patient-Specific Goal (Individualized)  Outcome: Ongoing, Progressing  Goal: Absence of Hospital-Acquired Illness or Injury  Outcome: Ongoing, Progressing  Goal: Optimal Comfort and Wellbeing  Outcome: Ongoing, Progressing  Goal: Readiness for Transition of Care  Outcome: Ongoing, Progressing     Problem: Infection  Goal: Absence of Infection Signs and Symptoms  Outcome: Ongoing, Progressing     Problem: Skin Injury Risk Increased  Goal: Skin Health and Integrity  Outcome: Ongoing, Progressing     Problem: Fall Injury Risk  Goal: Absence of Fall and Fall-Related Injury  Outcome: Ongoing, Progressing     Problem: Bowel Motility Impaired (Cardiovascular Surgery)  Goal: Effective Bowel Elimination (Cardiovascular Surgery)  Outcome: Ongoing, Progressing     Problem: Glycemic Control Impaired (Cardiovascular Surgery)  Goal: Blood Glucose Level Within Targeted Range (Cardiovascular Surgery)  Outcome: Ongoing, Progressing     Problem: Pain (Cardiovascular Surgery)  Goal: Acceptable Pain Control  Outcome: Ongoing, Progressing     Problem: Postoperative Nausea and Vomiting (Cardiovascular Surgery)  Goal: Nausea and Vomiting Relief (Cardiovascular Surgery)  Outcome: Ongoing, Progressing

## 2023-06-26 NOTE — PT/OT/SLP PROGRESS
Physical Therapy Treatment    Patient Name:  Gustavo Tracey Jr.   MRN:  [id number]    Recommendations:     Discharge Recommendations: home health PT  Discharge Equipment Recommendations: shower chair  Barriers to discharge: None    Assessment:     Gustavo Tracey Jr. is a 63 y.o. male admitted with a medical diagnosis of Abnormal stress test.  He presents with the following impairments/functional limitations: weakness, impaired endurance, gait instability, impaired balance, impaired functional mobility, decreased safety awareness, impaired cardiopulmonary response to activity, decreased lower extremity function, decreased upper extremity function, decreased coordination.    Rehab Prognosis: Good; patient would benefit from acute skilled PT services to address these deficits and reach maximum level of function.    Recent Surgery: Procedure(s) (LRB):  CORONARY ARTERY BYPASS GRAFT (CABG) (N/A)  PURDY MAZE PROCEDURE (N/A)  SURGICAL PROCUREMENT, VEIN, ENDOSCOPIC (Left)  ECHOCARDIOGRAM,TRANSESOPHAGEAL (N/A)  BLOCK, NERVE, INTERCOSTAL, 2 OR MORE (N/A)  CLOSURE, LEFT ATRIAL APPENDAGE, USING DEVICE (Left) 4 Days Post-Op    Plan:     During this hospitalization, patient to be seen 3 x/week to address the identified rehab impairments via gait training, therapeutic activities, therapeutic exercises and progress toward the following goals:    Plan of Care Expires:  07/07/23    Subjective     Chief Complaint: REQUEST TO USE THE BATHROOM, EAGER TO WALK  Patient/Family Comments/goals:   Pain/Comfort:  Pain Rating 1: 0/10      Objective:     Communicated with NURSE RODNEY KING prior to session.  Patient found up in chair with telemetry, wound vac, external pacer, peripheral IV (PACER TURNED OFF PRIOR TO THERAPY SESSION PER PT) upon PT entry to room.     General Precautions: Standard, fall, sternal  Orthopedic Precautions: N/A  Braces: N/A  Respiratory Status: Room air     Functional Mobility:  Bed Mobility:     Scooting: stand by  "assistance  Transfers:     Sit to Stand:  stand by assistance with no AD  Bed to Chair: contact guard assistance with  no AD  using  Step Transfer  Toilet Transfer: contact guard assistance with  no AD  using  Step Transfer  Gait: PT ' NO AD WITH CGA, STEADY PACE, NO LOB, C/O MILD SOB ON ROOM AIR, 1 SMALL STANDING REST BREAK  Balance: GOOD SITTING BALANCE, FAIR DYNAMIC BALANCE DURING GAIT    AM-PAC 6 CLICK MOBILITY  Turning over in bed (including adjusting bedclothes, sheets and blankets)?: 1  Sitting down on and standing up from a chair with arms (e.g., wheelchair, bedside commode, etc.): 4  Moving from lying on back to sitting on the side of the bed?: 1  Moving to and from a bed to a chair (including a wheelchair)?: 3  Need to walk in hospital room?: 3  Climbing 3-5 steps with a railing?: 1  Basic Mobility Total Score: 13     Treatment & Education:  PT EDUCATION:  -ROLE OF P.T. AND POC IN ACUTE CARE HOSPITAL SETTING  -REVIEW STERNAL PRECAUTIONS  -ENCOURAGED TO INCREASE TIME OOB IN CHAIR TO TOLERANCE TO INCREASE CV ENDURANCE  -TO CONTINUE THERAPUETIC EXERCISES THROUGHOUT THE DAY TO INCREASE ACTIVITY TOLERANCE AND DECREASE RISK FOR PNEUMONIA AND BLOOD CLOTS: HIP FLEX/EXT, HIP ABD/ADD, QUAD SET, HEEL SLIDE, AP  PT EDUCATED ON RISK FOR FALLS DUE TO GENERALIZED WEAKNESS, EDUCATED ON "CALL DON'T FALL", ENCOURAGED TO CALL FOR ASSISTANCE WITH ALL NEEDS SUCH AS BED<>CHAIR TRANSFERS OR TRIPS TO BATHROOM, PT AGREEABLE TO SAFETY PRECAUTIONS    Patient left up in chair with call button in reach, chair alarm on, and NURSE notified..    GOALS:   Multidisciplinary Problems       Physical Therapy Goals          Problem: Physical Therapy    Goal Priority Disciplines Outcome Goal Variances Interventions   Physical Therapy Goal     PT, PT/OT Ongoing, Progressing     Description: LTG'S TO BE MET IN 14 DAYS (7-7-23)  PT WILL REQUIRE CGA FOR BED MOBILITY  PT WILL REQUIRE SPV FOR BED<>CHAIR TF'S  PT WILL  FEET NO AD SPV   "                       Time Tracking:     PT Received On: 06/26/23  PT Start Time: 0750     PT Stop Time: 0815  PT Total Time (min): 25 min     Billable Minutes: Gait Training 10 and Therapeutic Activity 15    Treatment Type: Treatment  PT/PTA: PT     Number of PTA visits since last PT visit: 0     06/26/2023

## 2023-06-26 NOTE — SUBJECTIVE & OBJECTIVE
Review of Systems   Constitutional: Positive for malaise/fatigue.   HENT: Negative.     Eyes: Negative.    Cardiovascular:  Positive for dyspnea on exertion.   Respiratory:  Positive for shortness of breath.    Endocrine: Negative.    Hematologic/Lymphatic: Negative.    Skin: Negative.    Musculoskeletal: Negative.    Gastrointestinal: Negative.    Genitourinary: Negative.    Neurological: Negative.    Psychiatric/Behavioral: Negative.     Allergic/Immunologic: Negative.    Objective:     Vital Signs (Most Recent):  Temp: 98.3 °F (36.8 °C) (06/26/23 0704)  Pulse: 89 (06/26/23 0900)  Resp: 18 (06/26/23 0737)  BP: 114/63 (06/26/23 0704)  SpO2: 96 % (06/26/23 0737) Vital Signs (24h Range):  Temp:  [97.6 °F (36.4 °C)-98.9 °F (37.2 °C)] 98.3 °F (36.8 °C)  Pulse:  [88-92] 89  Resp:  [16-20] 18  SpO2:  [93 %-99 %] 96 %  BP: (100-120)/(56-66) 114/63     Weight: 113.5 kg (250 lb 3.6 oz)  Body mass index is 33.01 kg/m².     SpO2: 96 %         Intake/Output Summary (Last 24 hours) at 6/26/2023 1034  Last data filed at 6/26/2023 0609  Gross per 24 hour   Intake 1605 ml   Output 950 ml   Net 655 ml       Lines/Drains/Airways       Peripherally Inserted Central Catheter Line  Duration             PICC Double Lumen 06/23/23 1222 right basilic 2 days              Line  Duration                  Pacer Wires 06/22/23 1500 3 days              Peripheral Intravenous Line  Duration                  Peripheral IV - Single Lumen 06/25/23 1058 20 G Anterior;Distal;Left Upper Arm <1 day                       Physical Exam  Vitals and nursing note reviewed.   Constitutional:       Appearance: He is well-developed.   HENT:      Head: Normocephalic.   Neck:      Vascular: No carotid bruit or JVD.   Cardiovascular:      Rate and Rhythm: Normal rate and regular rhythm.      Pulses:           Radial pulses are 2+ on the right side and 2+ on the left side.      Heart sounds: S1 normal and S2 normal. Heart sounds not distant. No midsystolic click  and no opening snap. No murmur heard.    No friction rub. No S3 or S4 sounds.   Pulmonary:      Effort: Pulmonary effort is normal.      Breath sounds: Examination of the right-lower field reveals decreased breath sounds. Examination of the left-lower field reveals decreased breath sounds. Decreased breath sounds present. No wheezing or rales.   Abdominal:      General: Bowel sounds are decreased. There is no distension or abdominal bruit.      Palpations: Abdomen is soft.      Tenderness: There is no abdominal tenderness.   Musculoskeletal:      Cervical back: Neck supple.   Skin:     General: Skin is warm.   Neurological:      Mental Status: He is alert and oriented to person, place, and time.   Psychiatric:         Behavior: Behavior normal.          Significant Labs: ABG: No results for input(s): PH, PCO2, HCO3, POCSATURATED, BE in the last 48 hours., Blood Culture:   Recent Labs   Lab 06/25/23  1134   LABBLOO No Growth to date   , BMP:   Recent Labs   Lab 06/25/23  0522 06/25/23  1639 06/26/23  0609   GLU 99 94 100    135* 135*   K 4.3 4.1 3.8    100 101   CO2 23 25 24   BUN 36* 34* 34*   CREATININE 1.5* 1.6* 1.5*   CALCIUM 8.3* 8.5* 8.2*   MG 2.1 3.3* 2.9*   , CMP   Recent Labs   Lab 06/25/23  0522 06/25/23  1639 06/26/23  0609    135* 135*   K 4.3 4.1 3.8    100 101   CO2 23 25 24   GLU 99 94 100   BUN 36* 34* 34*   CREATININE 1.5* 1.6* 1.5*   CALCIUM 8.3* 8.5* 8.2*   ANIONGAP 10 10 10   , CBC   Recent Labs   Lab 06/25/23  0522 06/26/23  0609   WBC 19.42* 18.30*   HGB 6.9* 9.0*   HCT 21.5* 27.7*   PLT 61* 62*   , INR No results for input(s): INR, PROTIME in the last 48 hours., Lipid Panel No results for input(s): CHOL, HDL, LDLCALC, TRIG, CHOLHDL in the last 48 hours., and Troponin No results for input(s): TROPONINI in the last 48 hours.    Significant Imaging: Echocardiogram: Transthoracic echo (TTE) complete (Cupid Only):   Results for orders placed or performed during the hospital  encounter of 06/20/23   Echo   Result Value Ref Range    BSA 2.37 m2    TDI SEPTAL 0.07 m/s    LV LATERAL E/E' RATIO 5.78 m/s    LV SEPTAL E/E' RATIO 7.43 m/s    LA WIDTH 3.60 cm    IVC diameter 1.00 cm    Left Ventricular Outflow Tract Mean Velocity 0.77 cm/s    Left Ventricular Outflow Tract Mean Gradient 2.89 mmHg    TDI LATERAL 0.09 m/s    LVIDd 5.61 3.5 - 6.0 cm    IVS 1.30 (A) 0.6 - 1.1 cm    Posterior Wall 1.11 (A) 0.6 - 1.1 cm    Ao root annulus 3.37 cm    LVIDs 4.85 (A) 2.1 - 4.0 cm    FS 14 28 - 44 %    LA volume 54.65 cm3    STJ 3.94 cm    Ascending aorta 3.91 cm    LV mass 282.89 g    LA size 3.29 cm    TAPSE 1.80 cm    Left Ventricle Relative Wall Thickness 0.40 cm    AV regurgitation pressure 1/2 time 1,[id number] ms    AV mean gradient 4 mmHg    AV valve area 4.21 cm2    AV Velocity Ratio 0.94     AV index (prosthetic) 1.02     MV mean gradient 71 mmHg    MV valve area p 1/2 method 3.84 cm2    MV valve area by continuity eq 0.46 cm2    E/A ratio 0.72     Mean e' 0.08 m/s    E wave deceleration time 197.43 msec    IVRT 95.15 msec    LVOT diameter 2.29 cm    LVOT area 4.1 cm2    LVOT peak ezequiel 1.19 m/s    LVOT peak VTI 23.10 cm    Ao peak ezequiel 1.26 m/s    Ao VTI 22.6 cm    RVOT peak ezequiel 0.59 m/s    RVOT peak VTI 12.1 cm    LVOT stroke volume 95.09 cm3    AV peak gradient 6 mmHg    MV peak gradient 87 mmHg    PV mean gradient 0.75 mmHg    E/E' ratio 6.50 m/s    MV Peak E Ezequiel 0.52 m/s    AR Max Ezequiel 3.12 m/s    MV .8 cm    MV stenosis pressure 1/2 time 57.26 ms    MV Peak A Ezequiel 0.72 m/s    LV Systolic Volume 109.98 mL    LV Systolic Volume Index 47.2 mL/m2    LV Diastolic Volume 154.22 mL    LV Diastolic Volume Index 66.19 mL/m2    LA Volume Index 23.5 mL/m2    LV Mass Index 121 g/m2    RA Major Axis 4.25 cm    Left Atrium Minor Axis 4.65 cm    Left Atrium Major Axis 6.52 cm    Right Atrial Pressure (from IVC) 3 mmHg    EF 25 %    Narrative    · The left ventricle is moderately enlarged with  eccentric hypertrophy and   severely decreased systolic function.  · Grade I left ventricular diastolic dysfunction.  · Normal right ventricular size with normal right ventricular systolic   function.  · Normal central venous pressure (3 mmHg).  · The estimated ejection fraction is 25%.  · Mild aortic regurgitation.  · Mild mitral regurgitation.  · There is severe left ventricular global hypokinesis.

## 2023-06-26 NOTE — SUBJECTIVE & OBJECTIVE
Interval History: The patient is postop day 3 status post coronary artery bypass grafting x3 and Woodruff Maze 4 procedure.    ROS  Medications:  Continuous Infusions:  Scheduled Meds:   acetaminophen  650 mg Oral Q6H    amitriptyline  50 mg Oral QHS    apixaban  2.5 mg Oral BID    ascorbic acid (vitamin C)  500 mg Oral BID    aspirin  81 mg Oral Daily    ceFEPime (MAXIPIME) IVPB  2 g Intravenous Q8H    chlorhexidine  10 mL Mouth/Throat BID    cyanocobalamin  1,000 mcg Oral Daily    docusate sodium  100 mg Oral BID    ferrous sulfate  1 tablet Oral Daily    folic acid  2 mg Oral Daily    furosemide (LASIX) injection  40 mg Intravenous BID    latanoprost  1 drop Both Eyes Nightly    magnesium hydroxide 400 mg/5 ml  5 mL Oral BID    metoprolol tartrate  25 mg Oral BID    metronidazole  500 mg Intravenous Q8H    pantoprazole  40 mg Oral Daily    polyethylene glycol  17 g Oral Daily    potassium chloride  20 mEq Oral Q12H    pravastatin  20 mg Oral Daily    vancomycin (VANCOCIN) IVPB  15 mg/kg Intravenous Once     PRN Meds:sodium chloride, sodium chloride, sodium chloride 0.9%, albumin human 5%, calcium gluconate IVPB, calcium gluconate IVPB, calcium gluconate IVPB, dextrose 10%, dextrose 10%, glucagon (human recombinant), glucose, glucose, HYDROmorphone, insulin aspart U-100, lactated ringers, magnesium sulfate IVPB, metoclopramide HCl, ondansetron, pneumoc 20-mary conj-dip cr(PF), potassium chloride in water, potassium chloride in water, potassium chloride in water, sodium chloride 0.9%, Pharmacy to dose Vancomycin consult **AND** vancomycin - pharmacy to dose     Objective:     Vital Signs (Most Recent):  Temp: 98.3 °F (36.8 °C) (06/26/23 0704)  Pulse: 89 (06/26/23 0900)  Resp: 18 (06/26/23 0737)  BP: 114/63 (06/26/23 0704)  SpO2: 96 % (06/26/23 0737) Vital Signs (24h Range):  Temp:  [97.6 °F (36.4 °C)-98.9 °F (37.2 °C)] 98.3 °F (36.8 °C)  Pulse:  [88-92] 89  Resp:  [16-20] 18  SpO2:  [93 %-99 %] 96 %  BP:  (100-120)/(56-66) 114/63     Weight: 113.5 kg (250 lb 3.6 oz)  Body mass index is 33.01 kg/m².    SpO2: 96 %       Intake/Output - Last 3 Shifts         06/24 0700  06/25 0659 06/25 0700  06/26 0659 06/26 0700  06/27 0659    P.O. 600 480     I.V. (mL/kg)       Blood  1245     IV Piggyback       Total Intake(mL/kg) 600 (5.3) 1725 (15.2)     Urine (mL/kg/hr) 1455 (0.5) 1300 (0.5)     Drains       Stool  0     Chest Tube 42      Total Output 1497 1300     Net -897 +425            Urine Occurrence 1 x      Stool Occurrence  1 x             Lines/Drains/Airways       Peripherally Inserted Central Catheter Line  Duration             PICC Double Lumen 06/23/23 1222 right basilic 2 days              Line  Duration                  Pacer Wires 06/22/23 1500 3 days              Peripheral Intravenous Line  Duration                  Peripheral IV - Single Lumen 06/25/23 1058 20 G Anterior;Distal;Left Upper Arm <1 day                     Physical Exam  Constitutional:       Appearance: Normal appearance.   HENT:      Head: Normocephalic and atraumatic.   Cardiovascular:      Rate and Rhythm: Normal rate and regular rhythm.      Heart sounds: Normal heart sounds.   Pulmonary:      Effort: Pulmonary effort is normal.      Breath sounds: Normal breath sounds.   Abdominal:      General: Abdomen is flat.      Palpations: Abdomen is soft.   Musculoskeletal:      Right lower leg: Edema present.      Left lower leg: Edema present.   Skin:     General: Skin is warm and dry.   Neurological:      Mental Status: He is alert and oriented to person, place, and time.   Psychiatric:         Behavior: Behavior normal.          Significant Labs:  All pertinent labs from the last 24 hours have been reviewed.    Significant Diagnostics:  I have reviewed all pertinent imaging results/findings within the past 24 hours.

## 2023-06-26 NOTE — SUBJECTIVE & OBJECTIVE
Interval History: The patient is postop day 4 status post coronary artery bypass grafting x3 and Woodruff Maze 4 procedure.    ROS  Medications:  Continuous Infusions:  Scheduled Meds:   acetaminophen  650 mg Oral Q6H    amitriptyline  50 mg Oral QHS    apixaban  2.5 mg Oral BID    ascorbic acid (vitamin C)  500 mg Oral BID    aspirin  81 mg Oral Daily    ceFEPime (MAXIPIME) IVPB  2 g Intravenous Q8H    chlorhexidine  10 mL Mouth/Throat BID    cyanocobalamin  1,000 mcg Oral Daily    docusate sodium  100 mg Oral BID    ferrous sulfate  1 tablet Oral Daily    folic acid  2 mg Oral Daily    furosemide (LASIX) injection  40 mg Intravenous BID    latanoprost  1 drop Both Eyes Nightly    magnesium hydroxide 400 mg/5 ml  5 mL Oral BID    metoprolol tartrate  25 mg Oral BID    metronidazole  500 mg Intravenous Q8H    pantoprazole  40 mg Oral Daily    polyethylene glycol  17 g Oral Daily    potassium chloride  20 mEq Oral Q12H    pravastatin  20 mg Oral Daily    vancomycin (VANCOCIN) IVPB  15 mg/kg Intravenous Once     PRN Meds:sodium chloride, sodium chloride, sodium chloride 0.9%, albumin human 5%, calcium gluconate IVPB, calcium gluconate IVPB, calcium gluconate IVPB, dextrose 10%, dextrose 10%, glucagon (human recombinant), glucose, glucose, HYDROmorphone, insulin aspart U-100, lactated ringers, magnesium sulfate IVPB, metoclopramide HCl, ondansetron, pneumoc 20-mary conj-dip cr(PF), potassium chloride in water, potassium chloride in water, potassium chloride in water, sodium chloride 0.9%, Pharmacy to dose Vancomycin consult **AND** vancomycin - pharmacy to dose     Objective:     Vital Signs (Most Recent):  Temp: 98.3 °F (36.8 °C) (06/26/23 0704)  Pulse: 89 (06/26/23 0900)  Resp: 18 (06/26/23 0737)  BP: 114/63 (06/26/23 0704)  SpO2: 96 % (06/26/23 0737) Vital Signs (24h Range):  Temp:  [97.6 °F (36.4 °C)-98.9 °F (37.2 °C)] 98.3 °F (36.8 °C)  Pulse:  [88-92] 89  Resp:  [16-20] 18  SpO2:  [93 %-99 %] 96 %  BP:  (100-120)/(56-66) 114/63     Weight: 113.5 kg (250 lb 3.6 oz)  Body mass index is 33.01 kg/m².    SpO2: 96 %       Intake/Output - Last 3 Shifts         06/24 0700  06/25 0659 06/25 0700  06/26 0659 06/26 0700  06/27 0659    P.O. 600 480     I.V. (mL/kg)       Blood  1245     IV Piggyback       Total Intake(mL/kg) 600 (5.3) 1725 (15.2)     Urine (mL/kg/hr) 1455 (0.5) 1300 (0.5)     Drains       Stool  0     Chest Tube 42      Total Output 1497 1300     Net -897 +425            Urine Occurrence 1 x      Stool Occurrence  1 x             Lines/Drains/Airways       Peripherally Inserted Central Catheter Line  Duration             PICC Double Lumen 06/23/23 1222 right basilic 2 days              Line  Duration                  Pacer Wires 06/22/23 1500 3 days              Peripheral Intravenous Line  Duration                  Peripheral IV - Single Lumen 06/25/23 1058 20 G Anterior;Distal;Left Upper Arm <1 day                     Physical Exam  Constitutional:       Appearance: Normal appearance.   HENT:      Head: Normocephalic and atraumatic.      Nose: Nose normal.   Cardiovascular:      Rate and Rhythm: Normal rate and regular rhythm.      Heart sounds: Normal heart sounds.   Pulmonary:      Breath sounds: Normal breath sounds.   Abdominal:      General: Abdomen is flat. Bowel sounds are normal.      Palpations: Abdomen is soft.   Musculoskeletal:      Right lower leg: Edema present.      Left lower leg: Edema present.   Skin:     General: Skin is warm and dry.   Neurological:      Mental Status: He is oriented to person, place, and time.   Psychiatric:         Behavior: Behavior normal.          Significant Labs:  All pertinent labs from the last 24 hours have been reviewed.    Significant Diagnostics:  I have reviewed all pertinent imaging results/findings within the past 24 hours.

## 2023-06-26 NOTE — ASSESSMENT & PLAN NOTE
-Stable compensated  -Continue BB, Entresto, po Lasix    Optimize meds in future for CHF.  Arrange Life Vest prior to discharge.

## 2023-06-26 NOTE — PROGRESS NOTES
[facility] - [facility])  Cardiothoracic Surgery  Progress Note    Patient Name: Gustavo Tracey Jr.  MRN: [id number]  Admission Date: 6/20/2023  Hospital Length of Stay: 5 days  Code Status: Full Code   Attending Physician: Rustam Dave MD   Referring Provider: Zion Ortega MD  Principal Problem:Abnormal stress test            Subjective:     Post-Op Info:  Procedure(s) (LRB):  CORONARY ARTERY BYPASS GRAFT (CABG) (N/A)  PURDY MAZE PROCEDURE (N/A)  SURGICAL PROCUREMENT, VEIN, ENDOSCOPIC (Left)  ECHOCARDIOGRAM,TRANSESOPHAGEAL (N/A)  BLOCK, NERVE, INTERCOSTAL, 2 OR MORE (N/A)  CLOSURE, LEFT ATRIAL APPENDAGE, USING DEVICE (Left)   4 Days Post-Op     Interval History: The patient is postop day 4 status post coronary artery bypass grafting x3 and Purdy Maze 4 procedure.    ROS  Medications:  Continuous Infusions:  Scheduled Meds:   acetaminophen  650 mg Oral Q6H    amitriptyline  50 mg Oral QHS    apixaban  2.5 mg Oral BID    ascorbic acid (vitamin C)  500 mg Oral BID    aspirin  81 mg Oral Daily    ceFEPime (MAXIPIME) IVPB  2 g Intravenous Q8H    chlorhexidine  10 mL Mouth/Throat BID    cyanocobalamin  1,000 mcg Oral Daily    docusate sodium  100 mg Oral BID    ferrous sulfate  1 tablet Oral Daily    folic acid  2 mg Oral Daily    furosemide (LASIX) injection  40 mg Intravenous BID    latanoprost  1 drop Both Eyes Nightly    magnesium hydroxide 400 mg/5 ml  5 mL Oral BID    metoprolol tartrate  25 mg Oral BID    metronidazole  500 mg Intravenous Q8H    pantoprazole  40 mg Oral Daily    polyethylene glycol  17 g Oral Daily    potassium chloride  20 mEq Oral Q12H    pravastatin  20 mg Oral Daily    vancomycin (VANCOCIN) IVPB  15 mg/kg Intravenous Once     PRN Meds:sodium chloride, sodium chloride, sodium chloride 0.9%, albumin human 5%, calcium gluconate IVPB, calcium gluconate IVPB, calcium gluconate IVPB, dextrose 10%, dextrose 10%, glucagon (human recombinant), glucose, glucose,  HYDROmorphone, insulin aspart U-100, lactated ringers, magnesium sulfate IVPB, metoclopramide HCl, ondansetron, pneumoc 20-mary conj-dip cr(PF), potassium chloride in water, potassium chloride in water, potassium chloride in water, sodium chloride 0.9%, Pharmacy to dose Vancomycin consult **AND** vancomycin - pharmacy to dose     Objective:     Vital Signs (Most Recent):  Temp: 98.3 °F (36.8 °C) (06/26/23 0704)  Pulse: 89 (06/26/23 0900)  Resp: 18 (06/26/23 0737)  BP: 114/63 (06/26/23 0704)  SpO2: 96 % (06/26/23 0737) Vital Signs (24h Range):  Temp:  [97.6 °F (36.4 °C)-98.9 °F (37.2 °C)] 98.3 °F (36.8 °C)  Pulse:  [88-92] 89  Resp:  [16-20] 18  SpO2:  [93 %-99 %] 96 %  BP: (100-120)/(56-66) 114/63     Weight: 113.5 kg (250 lb 3.6 oz)  Body mass index is 33.01 kg/m².    SpO2: 96 %       Intake/Output - Last 3 Shifts         06/24 0700 06/25 0659 06/25 0700 06/26 0659 06/26 0700 06/27 0659    P.O. 600 480     I.V. (mL/kg)       Blood  1245     IV Piggyback       Total Intake(mL/kg) 600 (5.3) 1725 (15.2)     Urine (mL/kg/hr) 1455 (0.5) 1300 (0.5)     Drains       Stool  0     Chest Tube 42      Total Output 1497 1300     Net -897 +425            Urine Occurrence 1 x      Stool Occurrence  1 x             Lines/Drains/Airways       Peripherally Inserted Central Catheter Line  Duration             PICC Double Lumen 06/23/23 1222 right basilic 2 days              Line  Duration                  Pacer Wires 06/22/23 1500 3 days              Peripheral Intravenous Line  Duration                  Peripheral IV - Single Lumen 06/25/23 1058 20 G Anterior;Distal;Left Upper Arm <1 day                     Physical Exam  Constitutional:       Appearance: Normal appearance.   HENT:      Head: Normocephalic and atraumatic.      Nose: Nose normal.   Cardiovascular:      Rate and Rhythm: Normal rate and regular rhythm.      Heart sounds: Normal heart sounds.   Pulmonary:      Breath sounds: Normal breath sounds.   Abdominal:       General: Abdomen is flat. Bowel sounds are normal.      Palpations: Abdomen is soft.   Musculoskeletal:      Right lower leg: Edema present.      Left lower leg: Edema present.   Skin:     General: Skin is warm and dry.   Neurological:      Mental Status: He is oriented to person, place, and time.   Psychiatric:         Behavior: Behavior normal.          Significant Labs:  All pertinent labs from the last 24 hours have been reviewed.    Significant Diagnostics:  I have reviewed all pertinent imaging results/findings within the past 24 hours.    Assessment/Plan:     S/P CABG x 3  06/23/2023   The patient is postop day 1 status post coronary artery bypass grafting x3 and Woodruff Maze 4 procedure.  Overall the patient is doing well.    Neuro:  Patient is awake alert and oriented x3.  Neuro exam is nonfocal.  Patient moves all 4.  Patient's pain is being controlled with current pain regimen.    Cardiac:  Patient has been hemodynamically stable cardiac index has been about 2.2.  Patient is on low-dose epi and vasopressin.  Wean vasopressin and epi to off.    Respiratory:  Patient was extubated yesterday.  Patient has good sats on nasal cannula.  Continue pulmonary toileting.  Continue incentive spirometer.  GI:  Will advance patient's to regular diet as tolerated.    Renal: Patient has good urine output.  Creatinine is 1.7.  Will start diuresis.  Endocrine:  Patient's glucose is controlled with an insulin drip.  Will convert to sliding scale and long-acting insulin.  Heme:  Hematocrit is 28.2 and platelet count is 116.  Will start patient on a NOAC for anticoagulation once patient has chest tubes have been discontinued.  Patient requires anticoagulation since he is status post Woodruff Maze 4 for atrial fibrillation.  Id:  White count is 4.  Will continue to follow.  Patient is on venu op antibiotics.    Activities:  Patient is out of bed to chair.  Will advance activities as tolerated.    Line tubes and drains:  Patient has a  right IJ Hampton and Cordis, chest tubes, pacer wires, A-line, Rankin catheter and saphenectomy site ANDERS drains.    06/24/2023   The patient is postop day 2 status post coronary artery bypass grafting x3 and Woodruff Maze 4 procedure.  Overall the patient is doing well.    Neuro:  Patient is awake alert and oriented x3.  Neuro exam is nonfocal.  Patient moves all 4.  Pain is controlled current pain med main.  Cardiac:  Patient is hemodynamically stable.  Patient is off all pressors.  Continue metoprolol.    Respiratory:  Patient has good sats continue pulmonary toileting.  Continue incentive spirometer.    GI:  Patient is tolerating p.o. intake.  Continue advance as tolerated.    Renal:  Patient has good urine output.  Creatinine is 1.7.  Continue diuresis.  Endocrine:  Glucose is controlled with sliding scale.  Heme:  Hematocrit is 23.7 and platelet count is 62.  Will start patient on apixaban.  While patient is platelet count is low will start patient on 2.5 mg p.o. b.i.d. of apixaban.  Will increase dose once platelet count recovers.  Id:  White count is 8.92.  Will follow continue to follow white count.  Activities patient is out of bed to chair continue to advance as tolerated.    Line tubes and drains:  Patient has pacer wires, PICC line and Rankin catheter.  Chest tubes have been discontinued.  Will discontinue Rankin catheter.    06/25/2023   The patient is postop day 3 status post coronary artery bypass grafting x3 and Woodruff Maze 4 procedure.    Neuro:  Patient is awake alert and oriented x3.  Neuro exam is nonfocal.  Pain is well controlled.    Cardiac:  Patient is hemodynamically stable.  Continue metoprolol.  Respiratory: Patient has good sats on room air continue pulmonary toileting.  GI:  Patient is tolerating a regular diet.  Patient has not had a bowel movement.  Renal: Patient has good urine output.  Creatinine is 1.6.  Heme:  Hematocrit is 21.5 and platelet count is 61.  Will transfuse 2 units of plaque probe  blood cells.  Continue apixaban 2.5 mg for anticoagulation.  Id:  White count is increased to 19.  Will panculture patient.  Will start on empiric broad-spectrum antibiotics.  Activities:  Patient is ambulating in the hallways.  Advance as tolerated.  Line tubes and drains:  Patient has a PICC line, and pacer wires.    06/26/2023   The patient is postop day 4 status post coronary artery bypass grafting x3 and Woodruff Maze 4 procedure.  Overall the patient is doing well.  Anticipate discharge in the next 48-72 hours.  Neuro:  Patient is awake alert and oriented x3.  Neuro exam is nonfocal.  Patient's pain is well controlled.  Cardiac:  Patient is tolerating metoprolol.  Patient has preop low ejection fraction.  Will add Entresto.  In light of severely depressed ejection fraction preoperatively,  patient will need LifeVest prior to discharge.  Will start patient on his preop Entresto and dapagliflozin  Respiratory:  Patient has good sats on room air.  Continue pulmonary toileting.  Continue incentive spirometer.  GI:  Patient is tolerating regular diet.  Patient has had bowel movements.    Renal:  Patient has good urine output.  Creatinine is 1.5.  Will continue diuresis.  Patient appears to still be fluid overloaded.    Heme:  Hematocrit is 27.  Status post 2 units packed red cells.  Platelet count this 62.  Continue apixaban 2.5 mg for anticoagulation.    Id: Patient's white count is down to 18.  Patient is on broad-spectrum antibiotics.  Cultures are pending.    Heme:  Patient's glucose is controlled with sliding scale.  Activities:  Patient is ambulating in the hallways.    Line tubes and drains:  Patient has a PICC line and pacer wires.    Left main coronary artery disease  The patient is a 63-year-old male with CHF, history of AFib, status post CVA, hyperlipidemia, hypertension, diabetes and cardiomyopathy who had a normal Cardiolite test on workup.  The patient was brought to the operating room and cardiac  catheterization shows significant multivessel coronary artery disease with a 70% ostial left main disease and a 70% proximal LAD disease.    The patient is a candidate for coronary artery bypass grafting and Woodruff Maze 4 procedure.  Preop workup is in progress.  The risks and benefits of the surgery were explained to the patient.  The patient understands the risks and benefits of surgery and has to proceed with surgery which has been scheduled for 06/22/2023.        Rustam Dave MD  Cardiothoracic Surgery  Pending sale to [facility] ([facility])

## 2023-06-26 NOTE — PROGRESS NOTES
[place] [facility] ([facility])  Cardiology  Progress Note    Patient Name: Gustavo Tracey Jr.  MRN: [id number]  Admission Date: 6/20/2023  Hospital Length of Stay: 5 days  Code Status: Full Code   Attending Physician: Rustam Dave MD   Primary Care Physician: Braxton Guzman Jr, MD  Expected Discharge Date:   Principal Problem:Abnormal stress test    Subjective:     HPI:  History of Present Illness:  Patient is a 63 y.o. male presents with cardiomyopathy chf afib previous cva htn hlp diabetes had heart in November 2022 for abnormal cardiolite was found to have 70% significantly ald stenosis his filling pressures were elevated and his pulmonary htn was moderate he was optimized medical thrapy wise his ef improved to 35-40% by repeat echo on entresto .his lifevest was discontinued. He continues to have some exertional shortness of breath he is referred for intervention of lad today after reviewing the case with DR HERNANDEZ      [facility] Course:   6/21/23-Patient seen and examined today, s/p LHC yesterday which showed multivessel CAD. Awaiting CABG. Stable this AM. No CP/SOB. No labs to review. CABG planned for AM. Echo showed EF of 25%, DD.    6/23/23-Patient seen and examined today, s/p CABG x 3 POD # 1. Sitting up in bed. Feels ok. Complains of fatigue and post-op pain. Labs stable.     6.24.2023  s/p CABG x 3 POD # 2  Feels well  Sitting in chair     6/26/23:  pt seen and examined this am.  No acute CV issues noted overnight.  Labs reviewed. Anemia improved s/p PRBC. Discussed w CV surgery, and will arrange Life Vest, for CHF.          Review of Systems   Constitutional: Positive for malaise/fatigue.   HENT: Negative.     Eyes: Negative.    Cardiovascular:  Positive for dyspnea on exertion.   Respiratory:  Positive for shortness of breath.    Endocrine: Negative.    Hematologic/Lymphatic: Negative.    Skin: Negative.    Musculoskeletal: Negative.    Gastrointestinal: Negative.    Genitourinary: Negative.     Neurological: Negative.    Psychiatric/Behavioral: Negative.     Allergic/Immunologic: Negative.    Objective:     Vital Signs (Most Recent):  Temp: 98.3 °F (36.8 °C) (06/26/23 0704)  Pulse: 89 (06/26/23 0900)  Resp: 18 (06/26/23 0737)  BP: 114/63 (06/26/23 0704)  SpO2: 96 % (06/26/23 0737) Vital Signs (24h Range):  Temp:  [97.6 °F (36.4 °C)-98.9 °F (37.2 °C)] 98.3 °F (36.8 °C)  Pulse:  [88-92] 89  Resp:  [16-20] 18  SpO2:  [93 %-99 %] 96 %  BP: (100-120)/(56-66) 114/63     Weight: 113.5 kg (250 lb 3.6 oz)  Body mass index is 33.01 kg/m².     SpO2: 96 %         Intake/Output Summary (Last 24 hours) at 6/26/2023 1034  Last data filed at 6/26/2023 0609  Gross per 24 hour   Intake 1605 ml   Output 950 ml   Net 655 ml       Lines/Drains/Airways       Peripherally Inserted Central Catheter Line  Duration             PICC Double Lumen 06/23/23 1222 right basilic 2 days              Line  Duration                  Pacer Wires 06/22/23 1500 3 days              Peripheral Intravenous Line  Duration                  Peripheral IV - Single Lumen 06/25/23 1058 20 G Anterior;Distal;Left Upper Arm <1 day                       Physical Exam  Vitals and nursing note reviewed.   Constitutional:       Appearance: He is well-developed.   HENT:      Head: Normocephalic.   Neck:      Vascular: No carotid bruit or JVD.   Cardiovascular:      Rate and Rhythm: Normal rate and regular rhythm.      Pulses:           Radial pulses are 2+ on the right side and 2+ on the left side.      Heart sounds: S1 normal and S2 normal. Heart sounds not distant. No midsystolic click and no opening snap. No murmur heard.    No friction rub. No S3 or S4 sounds.   Pulmonary:      Effort: Pulmonary effort is normal.      Breath sounds: Examination of the right-lower field reveals decreased breath sounds. Examination of the left-lower field reveals decreased breath sounds. Decreased breath sounds present. No wheezing or rales.   Abdominal:      General: Bowel  sounds are decreased. There is no distension or abdominal bruit.      Palpations: Abdomen is soft.      Tenderness: There is no abdominal tenderness.   Musculoskeletal:      Cervical back: Neck supple.   Skin:     General: Skin is warm.   Neurological:      Mental Status: He is alert and oriented to person, place, and time.   Psychiatric:         Behavior: Behavior normal.          Significant Labs: ABG: No results for input(s): PH, PCO2, HCO3, POCSATURATED, BE in the last 48 hours., Blood Culture:   Recent Labs   Lab 06/25/23  1134   LABBLOO No Growth to date   , BMP:   Recent Labs   Lab 06/25/23  0522 06/25/23  1639 06/26/23  0609   GLU 99 94 100    135* 135*   K 4.3 4.1 3.8    100 101   CO2 23 25 24   BUN 36* 34* 34*   CREATININE 1.5* 1.6* 1.5*   CALCIUM 8.3* 8.5* 8.2*   MG 2.1 3.3* 2.9*   , CMP   Recent Labs   Lab 06/25/23  0522 06/25/23  1639 06/26/23  0609    135* 135*   K 4.3 4.1 3.8    100 101   CO2 23 25 24   GLU 99 94 100   BUN 36* 34* 34*   CREATININE 1.5* 1.6* 1.5*   CALCIUM 8.3* 8.5* 8.2*   ANIONGAP 10 10 10   , CBC   Recent Labs   Lab 06/25/23  0522 06/26/23  0609   WBC 19.42* 18.30*   HGB 6.9* 9.0*   HCT 21.5* 27.7*   PLT 61* 62*   , INR No results for input(s): INR, PROTIME in the last 48 hours., Lipid Panel No results for input(s): CHOL, HDL, LDLCALC, TRIG, CHOLHDL in the last 48 hours., and Troponin No results for input(s): TROPONINI in the last 48 hours.    Significant Imaging: Echocardiogram: Transthoracic echo (TTE) complete (Cupid Only):   Results for orders placed or performed during the hospital encounter of 06/20/23   Echo   Result Value Ref Range    BSA 2.37 m2    TDI SEPTAL 0.07 m/s    LV LATERAL E/E' RATIO 5.78 m/s    LV SEPTAL E/E' RATIO 7.43 m/s    LA WIDTH 3.60 cm    IVC diameter 1.00 cm    Left Ventricular Outflow Tract Mean Velocity 0.77 cm/s    Left Ventricular Outflow Tract Mean Gradient 2.89 mmHg    TDI LATERAL 0.09 m/s    LVIDd 5.61 3.5 - 6.0 cm    IVS  1.30 (A) 0.6 - 1.1 cm    Posterior Wall 1.11 (A) 0.6 - 1.1 cm    Ao root annulus 3.37 cm    LVIDs 4.85 (A) 2.1 - 4.0 cm    FS 14 28 - 44 %    LA volume 54.65 cm3    STJ 3.94 cm    Ascending aorta 3.91 cm    LV mass 282.89 g    LA size 3.29 cm    TAPSE 1.80 cm    Left Ventricle Relative Wall Thickness 0.40 cm    AV regurgitation pressure 1/2 time 1,[id number] ms    AV mean gradient 4 mmHg    AV valve area 4.21 cm2    AV Velocity Ratio 0.94     AV index (prosthetic) 1.02     MV mean gradient 71 mmHg    MV valve area p 1/2 method 3.84 cm2    MV valve area by continuity eq 0.46 cm2    E/A ratio 0.72     Mean e' 0.08 m/s    E wave deceleration time 197.43 msec    IVRT 95.15 msec    LVOT diameter 2.29 cm    LVOT area 4.1 cm2    LVOT peak ezequiel 1.19 m/s    LVOT peak VTI 23.10 cm    Ao peak ezequiel 1.26 m/s    Ao VTI 22.6 cm    RVOT peak ezequiel 0.59 m/s    RVOT peak VTI 12.1 cm    LVOT stroke volume 95.09 cm3    AV peak gradient 6 mmHg    MV peak gradient 87 mmHg    PV mean gradient 0.75 mmHg    E/E' ratio 6.50 m/s    MV Peak E Ezequiel 0.52 m/s    AR Max Ezequiel 3.12 m/s    MV .8 cm    MV stenosis pressure 1/2 time 57.26 ms    MV Peak A Ezequiel 0.72 m/s    LV Systolic Volume 109.98 mL    LV Systolic Volume Index 47.2 mL/m2    LV Diastolic Volume 154.22 mL    LV Diastolic Volume Index 66.19 mL/m2    LA Volume Index 23.5 mL/m2    LV Mass Index 121 g/m2    RA Major Axis 4.25 cm    Left Atrium Minor Axis 4.65 cm    Left Atrium Major Axis 6.52 cm    Right Atrial Pressure (from IVC) 3 mmHg    EF 25 %    Narrative    · The left ventricle is moderately enlarged with eccentric hypertrophy and   severely decreased systolic function.  · Grade I left ventricular diastolic dysfunction.  · Normal right ventricular size with normal right ventricular systolic   function.  · Normal central venous pressure (3 mmHg).  · The estimated ejection fraction is 25%.  · Mild aortic regurgitation.  · Mild mitral regurgitation.  · There is severe left  ventricular global hypokinesis.        Assessment and Plan:     * Abnormal stress test  Multivessel CAD on cath.  S/p CABG.    Post-operative state  Continue post op CABG care/mgt.  Ambulate.    S/P CABG x 3  S/p CABG.  Progressing in post op period.  Continue current post op care/mgt.    Left main coronary artery disease  -CP free  -Continue ASA, BB, Entresto, Lasix, statin  -Awaiting CABG    History of CVA (cerebrovascular accident)  -ASA, statin    Stage 3a chronic kidney disease  Monitor.  F/u labs.    PAF (paroxysmal atrial fibrillation)  Monitor.  Eliquis.  S/p Maze procedure w CABG.    DCM (dilated cardiomyopathy)  -Stable compensated  -Continue BB, Entresto, po Lasix    Optimize meds in future for CHF.  Arrange Life Vest prior to discharge.    Coronary artery disease without angina pectoris  -Continue OMT-ASA, BB, Entresto, statin    Other hyperlipidemia  -Continue statin    Primary hypertension  -Continue BB, Entresto    6/23/23  -Continue BB  -Resume Entresto as tolerated        VTE Risk Mitigation (From admission, onward)         Ordered     apixaban tablet 2.5 mg  2 times daily         06/24/23 0933     Place CHANTELL hose  Until discontinued         06/21/23 1044     Place sequential compression device  Until discontinued         06/21/23 1044                Humberto Nuno MD  Cardiology  [place] - Telemetry ([facility])

## 2023-06-26 NOTE — PLAN OF CARE
Ongoing (interventions implemented as appropriate)  Pt is alert and oriented.   VSS  Pt able to make needs known.  Pt remained afebrile throughout this shift.   Pt remained free of falls this shift.   Pt denies pain this shift.  Plan of care reviewed. Patient verbalizes understanding.   Pt moving/turing independent. Frequent weight shifting encouraged.  Patient paced rhythm on monitor.   Bed low, side rails up x 2, wheels locked, call light in reach.   Hourly rounding completed.   Will continue to observe.    - - -

## 2023-06-26 NOTE — CONSULTS
Pharmacokinetic Assessment Follow Up: IV Vancomycin    Vancomycin serum concentration assessment(s):    The random level was drawn correctly and can be used to guide therapy at this time. The measurement is below the desired definitive target range of 15 to 20 mcg/mL.    Vancomycin Regimen Plan:    Continue pulse dosing regimen with Vancomycin 1750 mg IV once today. The next serum random concentration will be measured at 0845 on 6/27.    Drug levels (last 3 results):  Recent Labs   Lab Result Units 06/26/23  0609   Vancomycin, Random ug/mL 13.1       Pharmacy will continue to follow and monitor vancomycin.    Please contact pharmacy at extension [phone]   for questions regarding this assessment.    Patient brief summary:  Gustavo Tracey Jr. is a 63 y.o. male initiated on antimicrobial therapy with IV Vancomycin for treatment of  empiric coverage post CABG    The patient's current regimen is pulse dosing. He will receive 1750 mg IV once today.    Drug Allergies:   Review of patient's allergies indicates:  No Known Allergies    Actual Body Weight:   113.5 kg    Renal Function:   Estimated Creatinine Clearance: 67.4 mL/min (A) (based on SCr of 1.5 mg/dL (H)).,     Dialysis Method (if applicable):  N/A    CBC (last 72 hours):  Recent Labs   Lab Result Units 06/24/23  0444 06/25/23  0522 06/26/23  0609   WBC K/uL 8.92 19.42* 18.30*   Hemoglobin g/dL 7.8* 6.9* 9.0*   Hematocrit % 23.7* 21.5* 27.7*   Platelets K/uL 62* 61* 62*   Gran % % 34.5* 22.4* 30.0*   Lymph % % 23.1 20.2 41.0   Mono % % 38.3* 53.8* 0.0*   Eosinophil % % 0.0 0.0 0.0   Basophil % % 0.2 0.1 0.0   Differential Method  Automated Automated Manual       Metabolic Panel (last 72 hours):  Recent Labs   Lab Result Units 06/23/23  1700 06/24/23  0444 06/24/23  1728 06/25/23  0522 06/25/23  1152 06/25/23  1639 06/26/23  0609   Sodium mmol/L 141 137 139 137  --  135* 135*   Potassium mmol/L 4.8 4.6 4.1 4.3  --  4.1 3.8   Chloride mmol/L 108 106 105 104  --   100 101   CO2 mmol/L 22* 23 21* 23  --  25 24   Glucose mg/dL 126* 116* 162* 99  --  94 100   Glucose, UA   --   --   --   --  Negative  --   --    BUN mg/dL 29* 34* 38* 36*  --  34* 34*   Creatinine mg/dL 1.6* 1.7* 1.7* 1.5*  --  1.6* 1.5*   Magnesium mg/dL 2.5 2.3 2.2 2.1  --  3.3* 2.9*       Vancomycin Administrations:  vancomycin given in the last 96 hours                     vancomycin 1.75 g in 5 % dextrose 500 mL IVPB (mg) 1,750 mg New Bag 06/26/23 0846    vancomycin 2 g in dextrose 5 % 500 mL IVPB (mg) 2,000 mg New Bag 06/25/23 1213    vancomycin (VANCOCIN) 1,000 mg in dextrose 5 % (D5W) 250 mL IVPB (Vial-Mate) (mg) 1,000 mg New Bag 06/22/23 2110                    Microbiologic Results:  Microbiology Results (last 7 days)       Procedure Component Value Units Date/Time    Blood culture [[id number]] Collected: 06/25/23 1134    Order Status: Completed Specimen: Blood Updated: 06/26/23 0115     Blood Culture, Routine No Growth to date    Blood culture [[id number]] Collected: 06/23/23 0913    Order Status: Completed Specimen: Blood from Antecubital, Left Arm Updated: 06/25/23 1812     Blood Culture, Routine No Growth to date      No Growth to date      No Growth to date    Blood culture [[id number]] Collected: 06/23/23 0913    Order Status: Completed Specimen: Blood from Antecubital, Right Arm Updated: 06/25/23 1812     Blood Culture, Routine No Growth to date      No Growth to date      No Growth to date    Culture, Respiratory with Gram Stain [[id number]]     Order Status: No result Specimen: Respiratory from Sputum, Expectorated

## 2023-06-26 NOTE — PT/OT/SLP PROGRESS
"Occupational Therapy   Treatment    Name: Gustavo Tracey Jr.  MRN: [id number]  Admitting Diagnosis:  Abnormal stress test  4 Days Post-Op    Recommendations:     Discharge Recommendations: home health OT (24/7 SPV and A)  Discharge Equipment Recommendations:  shower chair  Barriers to discharge:  None    Assessment:     Gustavo Tracey Jr. is a 63 y.o. male with a medical diagnosis of Abnormal stress test.  He presents with the following performance deficits affecting function are weakness, impaired endurance, impaired self care skills, impaired functional mobility, impaired balance, impaired cardiopulmonary response to activity (sternal precautions).     Rehab Prognosis:  Good; patient would benefit from acute skilled OT services to address these deficits and reach maximum level of function.       Plan:     Patient to be seen 2 x/week to address the above listed problems via self-care/home management, therapeutic activities, therapeutic exercises  Plan of Care Expires: 07/07/23  Plan of Care Reviewed with: patient    Subjective     Chief Complaint: Reported "I am feeling better."  Patient/Family Comments/goals: get better and home  Pain/Comfort:  Pain Rating 1: 0/10    Objective:     Communicated with: NurseMedina, prior to session.  Patient found up in chair with peripheral IV, wound vac, external pacer, telemetry (pacer turned off via MD prior to therapist arrival per patient) upon OT entry to room.    General Precautions: Standard, fall, sternal    Orthopedic Precautions:N/A  Braces: N/A  Respiratory Status: Room air     Occupational Performance:     Functional Mobility/Transfers:  Patient completed Sit <> Stand Transfer with stand by assistance  with  no assistive device   Patient completed Bed <> Chair Transfer using Step Transfer technique with stand by assistance with no assistive device  Patient completed Toilet Transfer Step Transfer technique with stand by assistance with  no AD  Functional Mobility: " Patient completed x200ft x2 trials functional mobility without AD and CGA to increase dynamic standing balance and activity tolerance needed for ADL completion.  Utilizing standing rest breaks as needed throughout. Good self initiation. Minimal dyspnea on exertion.    [facility] 6 Click ADL: 20    Treatment & Education:  Presented to room initially and patient requesting to use restroom. Walked to commode with CGA and transferred to commode. Requested to remain on commode in attempts to have BM. Left with emergency call light in hand. ([phone]).   Returned to room ([id number]) for completion of mobility trial as listed above.    Educated at both sessions on needed to call for A with all transfers to decrease fall risk. Reinforced sternal precautions and their functional implications, continued improvements in compliances. Patient in agreement with POC.    Patient left up in chair with all lines intact, call button in reach, and chair alarm on    GOALS:   Multidisciplinary Problems       Occupational Therapy Goals          Problem: Occupational Therapy    Goal Priority Disciplines Outcome Interventions   Occupational Therapy Goal     OT, PT/OT Ongoing, Progressing    Description: Goals to be met by: 7/7/23     Patient will increase functional independence with ADLs by performing:    Toileting from toilet with Minimal Assistance for hygiene and clothing management.   Toilet transfer to toilet with Contact Guard Assistance.  Demonstrates 100% functional compliance with sternal precautions.                         Time Tracking:     OT Date of Treatment: 06/26/23  OT Start Time: 0750 (1100)  OT Stop Time: 0800 (1115)  OT Total Time (min): 10 min    Billable Minutes:Self Care/Home Management 10  Therapeutic Activity 15    6/26/2023

## 2023-06-26 NOTE — ASSESSMENT & PLAN NOTE
06/23/2023   The patient is postop day 1 status post coronary artery bypass grafting x3 and Woodruff Maze 4 procedure.  Overall the patient is doing well.    Neuro:  Patient is awake alert and oriented x3.  Neuro exam is nonfocal.  Patient moves all 4.  Patient's pain is being controlled with current pain regimen.    Cardiac:  Patient has been hemodynamically stable cardiac index has been about 2.2.  Patient is on low-dose epi and vasopressin.  Wean vasopressin and epi to off.    Respiratory:  Patient was extubated yesterday.  Patient has good sats on nasal cannula.  Continue pulmonary toileting.  Continue incentive spirometer.  GI:  Will advance patient's to regular diet as tolerated.    Renal: Patient has good urine output.  Creatinine is 1.7.  Will start diuresis.  Endocrine:  Patient's glucose is controlled with an insulin drip.  Will convert to sliding scale and long-acting insulin.  Heme:  Hematocrit is 28.2 and platelet count is 116.  Will start patient on a NOAC for anticoagulation once patient has chest tubes have been discontinued.  Patient requires anticoagulation since he is status post Woodruff Maze 4 for atrial fibrillation.  Id:  White count is 4.  Will continue to follow.  Patient is on venu op antibiotics.    Activities:  Patient is out of bed to chair.  Will advance activities as tolerated.    Line tubes and drains:  Patient has a right IJ [place] and Cordis, chest tubes, pacer wires, A-line, Rankin catheter and saphenectomy site ANDERS drains.    06/24/2023   The patient is postop day 2 status post coronary artery bypass grafting x3 and Woodruff Maze 4 procedure.  Overall the patient is doing well.    Neuro:  Patient is awake alert and oriented x3.  Neuro exam is nonfocal.  Patient moves all 4.  Pain is controlled current pain med main.  Cardiac:  Patient is hemodynamically stable.  Patient is off all pressors.  Continue metoprolol.    Respiratory:  Patient has good sats continue pulmonary toileting.  Continue incentive  spirometer.    GI:  Patient is tolerating p.o. intake.  Continue advance as tolerated.    Renal:  Patient has good urine output.  Creatinine is 1.7.  Continue diuresis.  Endocrine:  Glucose is controlled with sliding scale.  Heme:  Hematocrit is 23.7 and platelet count is 62.  Will start patient on apixaban.  While patient is platelet count is low will start patient on 2.5 mg p.o. b.i.d. of apixaban.  Will increase dose once platelet count recovers.  Id:  White count is 8.92.  Will follow continue to follow white count.  Activities patient is out of bed to chair continue to advance as tolerated.    Line tubes and drains:  Patient has pacer wires, PICC line and Rankin catheter.  Chest tubes have been discontinued.  Will discontinue Rankin catheter.    06/25/2023   The patient is postop day 3 status post coronary artery bypass grafting x3 and Woodruff Maze 4 procedure.    Neuro:  Patient is awake alert and oriented x3.  Neuro exam is nonfocal.  Pain is well controlled.    Cardiac:  Patient is hemodynamically stable.  Continue metoprolol.  Respiratory: Patient has good sats on room air continue pulmonary toileting.  GI:  Patient is tolerating a regular diet.  Patient has not had a bowel movement.  Renal: Patient has good urine output.  Creatinine is 1.6.  Heme:  Hematocrit is 21.5 and platelet count is 61.  Will transfuse 2 units of plaque probe blood cells.  Continue apixaban 2.5 mg for anticoagulation.  Id:  White count is increased to 19.  Will panculture patient.  Will start on empiric broad-spectrum antibiotics.  Activities:  Patient is ambulating in the hallways.  Advance as tolerated.  Line tubes and drains:  Patient has a PICC line, and pacer wires.    06/26/2023   The patient is postop day 4 status post coronary artery bypass grafting x3 and Woodruff Maze 4 procedure.  Overall the patient is doing well.  Anticipate discharge in the next 48-72 hours.  Neuro:  Patient is awake alert and oriented x3.  Neuro exam is nonfocal.   Patient's pain is well controlled.  Cardiac:  Patient is tolerating metoprolol.  Patient has preop low ejection fraction.  Will add Entresto.  In light of severely depressed ejection fraction preoperatively,  patient will need LifeVest prior to discharge.  Will start patient on his preop Entresto and dapagliflozin  Respiratory:  Patient has good sats on room air.  Continue pulmonary toileting.  Continue incentive spirometer.  GI:  Patient is tolerating regular diet.  Patient has had bowel movements.    Renal:  Patient has good urine output.  Creatinine is 1.5.  Will continue diuresis.  Patient appears to still be fluid overloaded.    Heme:  Hematocrit is 27.  Status post 2 units packed red cells.  Platelet count this 62.  Continue apixaban 2.5 mg for anticoagulation.    Id: Patient's white count is down to 18.  Patient is on broad-spectrum antibiotics.  Cultures are pending.    Heme:  Patient's glucose is controlled with sliding scale.  Activities:  Patient is ambulating in the hallways.    Line tubes and drains:  Patient has a PICC line and pacer wires.

## 2023-06-26 NOTE — PROGRESS NOTES
[place] - Telemetry [facility])  Cardiothoracic Surgery  Progress Note    Patient Name: Gustavo Tracey Jr.  MRN: [id number]  Admission Date: 6/20/2023  Hospital Length of Stay: 5 days  Code Status: Full Code   Attending Physician: Rustam Dave MD   Referring Provider: Zion Ortega MD  Principal Problem:Abnormal stress test            Subjective:     Post-Op Info:  Procedure(s) (LRB):  CORONARY ARTERY BYPASS GRAFT (CABG) (N/A)  PURDY MAZE PROCEDURE (N/A)  SURGICAL PROCUREMENT, VEIN, ENDOSCOPIC (Left)  ECHOCARDIOGRAM,TRANSESOPHAGEAL (N/A)  BLOCK, NERVE, INTERCOSTAL, 2 OR MORE (N/A)  CLOSURE, LEFT ATRIAL APPENDAGE, USING DEVICE (Left)   4 Days Post-Op     Interval History: The patient is postop day 3 status post coronary artery bypass grafting x3 and Purdy Maze 4 procedure.    ROS  Medications:  Continuous Infusions:  Scheduled Meds:   acetaminophen  650 mg Oral Q6H    amitriptyline  50 mg Oral QHS    apixaban  2.5 mg Oral BID    ascorbic acid (vitamin C)  500 mg Oral BID    aspirin  81 mg Oral Daily    ceFEPime (MAXIPIME) IVPB  2 g Intravenous Q8H    chlorhexidine  10 mL Mouth/Throat BID    cyanocobalamin  1,000 mcg Oral Daily    docusate sodium  100 mg Oral BID    ferrous sulfate  1 tablet Oral Daily    folic acid  2 mg Oral Daily    furosemide (LASIX) injection  40 mg Intravenous BID    latanoprost  1 drop Both Eyes Nightly    magnesium hydroxide 400 mg/5 ml  5 mL Oral BID    metoprolol tartrate  25 mg Oral BID    metronidazole  500 mg Intravenous Q8H    pantoprazole  40 mg Oral Daily    polyethylene glycol  17 g Oral Daily    potassium chloride  20 mEq Oral Q12H    pravastatin  20 mg Oral Daily    vancomycin (VANCOCIN) IVPB  15 mg/kg Intravenous Once     PRN Meds:sodium chloride, sodium chloride, sodium chloride 0.9%, albumin human 5%, calcium gluconate IVPB, calcium gluconate IVPB, calcium gluconate IVPB, dextrose 10%, dextrose 10%, glucagon (human recombinant), glucose, glucose,  HYDROmorphone, insulin aspart U-100, lactated ringers, magnesium sulfate IVPB, metoclopramide HCl, ondansetron, pneumoc 20-mary conj-dip cr(PF), potassium chloride in water, potassium chloride in water, potassium chloride in water, sodium chloride 0.9%, Pharmacy to dose Vancomycin consult **AND** vancomycin - pharmacy to dose     Objective:     Vital Signs (Most Recent):  Temp: 98.3 °F (36.8 °C) (06/26/23 0704)  Pulse: 89 (06/26/23 0900)  Resp: 18 (06/26/23 0737)  BP: 114/63 (06/26/23 0704)  SpO2: 96 % (06/26/23 0737) Vital Signs (24h Range):  Temp:  [97.6 °F (36.4 °C)-98.9 °F (37.2 °C)] 98.3 °F (36.8 °C)  Pulse:  [88-92] 89  Resp:  [16-20] 18  SpO2:  [93 %-99 %] 96 %  BP: (100-120)/(56-66) 114/63     Weight: 113.5 kg (250 lb 3.6 oz)  Body mass index is 33.01 kg/m².    SpO2: 96 %       Intake/Output - Last 3 Shifts         06/24 0700 06/25 0659 06/25 0700 06/26 0659 06/26 0700 06/27 0659    P.O. 600 480     I.V. (mL/kg)       Blood  1245     IV Piggyback       Total Intake(mL/kg) 600 (5.3) 1725 (15.2)     Urine (mL/kg/hr) 1455 (0.5) 1300 (0.5)     Drains       Stool  0     Chest Tube 42      Total Output 1497 1300     Net -897 +425            Urine Occurrence 1 x      Stool Occurrence  1 x             Lines/Drains/Airways       Peripherally Inserted Central Catheter Line  Duration             PICC Double Lumen 06/23/23 1222 right basilic 2 days              Line  Duration                  Pacer Wires 06/22/23 1500 3 days              Peripheral Intravenous Line  Duration                  Peripheral IV - Single Lumen 06/25/23 1058 20 G Anterior;Distal;Left Upper Arm <1 day                     Physical Exam  Constitutional:       Appearance: Normal appearance.   HENT:      Head: Normocephalic and atraumatic.   Cardiovascular:      Rate and Rhythm: Normal rate and regular rhythm.      Heart sounds: Normal heart sounds.   Pulmonary:      Effort: Pulmonary effort is normal.      Breath sounds: Normal breath sounds.    Abdominal:      General: Abdomen is flat.      Palpations: Abdomen is soft.   Musculoskeletal:      Right lower leg: Edema present.      Left lower leg: Edema present.   Skin:     General: Skin is warm and dry.   Neurological:      Mental Status: He is alert and oriented to person, place, and time.   Psychiatric:         Behavior: Behavior normal.          Significant Labs:  All pertinent labs from the last 24 hours have been reviewed.    Significant Diagnostics:  I have reviewed all pertinent imaging results/findings within the past 24 hours.    Assessment/Plan:     S/P CABG x 3  06/23/2023   The patient is postop day 1 status post coronary artery bypass grafting x3 and Woodruff Maze 4 procedure.  Overall the patient is doing well.    Neuro:  Patient is awake alert and oriented x3.  Neuro exam is nonfocal.  Patient moves all 4.  Patient's pain is being controlled with current pain regimen.    Cardiac:  Patient has been hemodynamically stable cardiac index has been about 2.2.  Patient is on low-dose epi and vasopressin.  Wean vasopressin and epi to off.    Respiratory:  Patient was extubated yesterday.  Patient has good sats on nasal cannula.  Continue pulmonary toileting.  Continue incentive spirometer.  GI:  Will advance patient's to regular diet as tolerated.    Renal: Patient has good urine output.  Creatinine is 1.7.  Will start diuresis.  Endocrine:  Patient's glucose is controlled with an insulin drip.  Will convert to sliding scale and long-acting insulin.  Heme:  Hematocrit is 28.2 and platelet count is 116.  Will start patient on a NOAC for anticoagulation once patient has chest tubes have been discontinued.  Patient requires anticoagulation since he is status post Woodruff Maze 4 for atrial fibrillation.  Id:  White count is 4.  Will continue to follow.  Patient is on venu op antibiotics.    Activities:  Patient is out of bed to chair.  Will advance activities as tolerated.    Line tubes and drains:  Patient has  a right IJ [place] and Cordis, chest tubes, pacer wires, A-line, Rankin catheter and saphenectomy site ANDERS drains.    06/24/2023   The patient is postop day 2 status post coronary artery bypass grafting x3 and Woodruff Maze 4 procedure.  Overall the patient is doing well.    Neuro:  Patient is awake alert and oriented x3.  Neuro exam is nonfocal.  Patient moves all 4.  Pain is controlled current pain med main.  Cardiac:  Patient is hemodynamically stable.  Patient is off all pressors.  Continue metoprolol.    Respiratory:  Patient has good sats continue pulmonary toileting.  Continue incentive spirometer.    GI:  Patient is tolerating p.o. intake.  Continue advance as tolerated.    Renal:  Patient has good urine output.  Creatinine is 1.7.  Continue diuresis.  Endocrine:  Glucose is controlled with sliding scale.  Heme:  Hematocrit is 23.7 and platelet count is 62.  Will start patient on apixaban.  While patient is platelet count is low will start patient on 2.5 mg p.o. b.i.d. of apixaban.  Will increase dose once platelet count recovers.  Id:  White count is 8.92.  Will follow continue to follow white count.  Activities patient is out of bed to chair continue to advance as tolerated.    Line tubes and drains:  Patient has pacer wires, PICC line and Rankin catheter.  Chest tubes have been discontinued.  Will discontinue Rankin catheter.    06/25/2023   The patient is postop day 3 status post coronary artery bypass grafting x3 and Woodruff Maze 4 procedure.    Neuro:  Patient is awake alert and oriented x3.  Neuro exam is nonfocal.  Pain is well controlled.    Cardiac:  Patient is hemodynamically stable.  Continue metoprolol.  Respiratory: Patient has good sats on room air continue pulmonary toileting.  GI:  Patient is tolerating a regular diet.  Patient has not had a bowel movement.  Renal: Patient has good urine output.  Creatinine is 1.6.  Heme:  Hematocrit is 21.5 and platelet count is 61.  Will transfuse 2 units of plaque probe  blood cells.  Continue apixaban 2.5 mg for anticoagulation.  Id:  White count is increased to 19.  Will panculture patient.  Will start on empiric broad-spectrum antibiotics.  Activities:  Patient is ambulating in the hallways.  Advance as tolerated.  Line tubes and drains:  Patient has a PICC line, and pacer wires.    Left main coronary artery disease  The patient is a 63-year-old male with CHF, history of AFib, status post CVA, hyperlipidemia, hypertension, diabetes and cardiomyopathy who had a normal Cardiolite test on workup.  The patient was brought to the operating room and cardiac catheterization shows significant multivessel coronary artery disease with a 70% ostial left main disease and a 70% proximal LAD disease.    The patient is a candidate for coronary artery bypass grafting and Woodruff Maze 4 procedure.  Preop workup is in progress.  The risks and benefits of the surgery were explained to the patient.  The patient understands the risks and benefits of surgery and has to proceed with surgery which has been scheduled for 06/22/2023.        Rustam Dave MD  Cardiothoracic Surgery  [facility])

## 2023-06-27 LAB
ANION GAP SERPL CALC-SCNC: 12 MMOL/L (ref 8–16)
ANION GAP SERPL CALC-SCNC: 13 MMOL/L (ref 8–16)
ANION GAP SERPL CALC-SCNC: 13 MMOL/L (ref 8–16)
ANISOCYTOSIS BLD QL SMEAR: SLIGHT
BASOPHILS NFR BLD: 0 % (ref 0–1.9)
BLASTS NFR BLD MANUAL: 26 %
BUN SERPL-MCNC: 22 MG/DL (ref 8–23)
BUN SERPL-MCNC: 25 MG/DL (ref 8–23)
BUN SERPL-MCNC: 28 MG/DL (ref 8–23)
CALCIUM SERPL-MCNC: 8.2 MG/DL (ref 8.7–10.5)
CALCIUM SERPL-MCNC: 8.2 MG/DL (ref 8.7–10.5)
CALCIUM SERPL-MCNC: 8.4 MG/DL (ref 8.7–10.5)
CHLORIDE SERPL-SCNC: 100 MMOL/L (ref 95–110)
CHLORIDE SERPL-SCNC: 101 MMOL/L (ref 95–110)
CHLORIDE SERPL-SCNC: 101 MMOL/L (ref 95–110)
CO2 SERPL-SCNC: 21 MMOL/L (ref 23–29)
CO2 SERPL-SCNC: 22 MMOL/L (ref 23–29)
CO2 SERPL-SCNC: 23 MMOL/L (ref 23–29)
CREAT SERPL-MCNC: 1.2 MG/DL (ref 0.5–1.4)
CREAT SERPL-MCNC: 1.2 MG/DL (ref 0.5–1.4)
CREAT SERPL-MCNC: 1.3 MG/DL (ref 0.5–1.4)
DIFFERENTIAL METHOD: ABNORMAL
EOSINOPHIL NFR BLD: 0 % (ref 0–8)
ERYTHROCYTE [DISTWIDTH] IN BLOOD BY AUTOMATED COUNT: 16.6 % (ref 11.5–14.5)
EST. GFR  (NO RACE VARIABLE): >60 ML/MIN/1.73 M^2
GLUCOSE SERPL-MCNC: 109 MG/DL (ref 70–110)
GLUCOSE SERPL-MCNC: 110 MG/DL (ref 70–110)
GLUCOSE SERPL-MCNC: 89 MG/DL (ref 70–110)
HCT VFR BLD AUTO: 29.9 % (ref 40–54)
HGB BLD-MCNC: 9.7 G/DL (ref 14–18)
IMM GRANULOCYTES # BLD AUTO: ABNORMAL K/UL (ref 0–0.04)
IMM GRANULOCYTES NFR BLD AUTO: ABNORMAL % (ref 0–0.5)
LYMPHOCYTES NFR BLD: 38 % (ref 18–48)
MAGNESIUM SERPL-MCNC: 2.4 MG/DL (ref 1.6–2.6)
MAGNESIUM SERPL-MCNC: 2.6 MG/DL (ref 1.6–2.6)
MAGNESIUM SERPL-MCNC: 2.7 MG/DL (ref 1.6–2.6)
MCH RBC QN AUTO: 28.3 PG (ref 27–31)
MCHC RBC AUTO-ENTMCNC: 32.4 G/DL (ref 32–36)
MCV RBC AUTO: 87 FL (ref 82–98)
METAMYELOCYTES NFR BLD MANUAL: 3 %
MONOCYTES NFR BLD: 5 % (ref 4–15)
MYELOCYTES NFR BLD MANUAL: 1 %
NEUTROPHILS NFR BLD: 22 % (ref 38–73)
NEUTS BAND NFR BLD MANUAL: 5 %
NRBC BLD-RTO: 5 /100 WBC
PLATELET # BLD AUTO: 65 K/UL (ref 150–450)
PLATELET BLD QL SMEAR: ABNORMAL
PMV BLD AUTO: 10.4 FL (ref 9.2–12.9)
POCT GLUCOSE: 100 MG/DL (ref 70–110)
POCT GLUCOSE: 111 MG/DL (ref 70–110)
POCT GLUCOSE: 118 MG/DL (ref 70–110)
POCT GLUCOSE: 129 MG/DL (ref 70–110)
POLYCHROMASIA BLD QL SMEAR: ABNORMAL
POTASSIUM SERPL-SCNC: 3.8 MMOL/L (ref 3.5–5.1)
POTASSIUM SERPL-SCNC: 3.9 MMOL/L (ref 3.5–5.1)
POTASSIUM SERPL-SCNC: 4 MMOL/L (ref 3.5–5.1)
RBC # BLD AUTO: 3.43 M/UL (ref 4.6–6.2)
SODIUM SERPL-SCNC: 135 MMOL/L (ref 136–145)
SODIUM SERPL-SCNC: 135 MMOL/L (ref 136–145)
SODIUM SERPL-SCNC: 136 MMOL/L (ref 136–145)
VANCOMYCIN SERPL-MCNC: 13.4 UG/ML
WBC # BLD AUTO: 12.26 K/UL (ref 3.9–12.7)

## 2023-06-27 PROCEDURE — 25000242 PHARM REV CODE 250 ALT 637 W/ HCPCS: Performed by: THORACIC SURGERY (CARDIOTHORACIC VASCULAR SURGERY)

## 2023-06-27 PROCEDURE — 63600175 PHARM REV CODE 636 W HCPCS: Performed by: THORACIC SURGERY (CARDIOTHORACIC VASCULAR SURGERY)

## 2023-06-27 PROCEDURE — 97530 THERAPEUTIC ACTIVITIES: CPT

## 2023-06-27 PROCEDURE — 80202 ASSAY OF VANCOMYCIN: CPT | Performed by: THORACIC SURGERY (CARDIOTHORACIC VASCULAR SURGERY)

## 2023-06-27 PROCEDURE — 21400001 HC TELEMETRY ROOM

## 2023-06-27 PROCEDURE — 97116 GAIT TRAINING THERAPY: CPT

## 2023-06-27 PROCEDURE — 25000003 PHARM REV CODE 250: Performed by: INTERNAL MEDICINE

## 2023-06-27 PROCEDURE — 99232 SBSQ HOSP IP/OBS MODERATE 35: CPT | Mod: ,,, | Performed by: PHYSICIAN ASSISTANT

## 2023-06-27 PROCEDURE — 94761 N-INVAS EAR/PLS OXIMETRY MLT: CPT

## 2023-06-27 PROCEDURE — 99232 PR SUBSEQUENT HOSPITAL CARE,LEVL II: ICD-10-PCS | Mod: ,,, | Performed by: PHYSICIAN ASSISTANT

## 2023-06-27 PROCEDURE — 83735 ASSAY OF MAGNESIUM: CPT | Mod: 91 | Performed by: THORACIC SURGERY (CARDIOTHORACIC VASCULAR SURGERY)

## 2023-06-27 PROCEDURE — 25000003 PHARM REV CODE 250: Performed by: THORACIC SURGERY (CARDIOTHORACIC VASCULAR SURGERY)

## 2023-06-27 PROCEDURE — 85007 BL SMEAR W/DIFF WBC COUNT: CPT | Performed by: THORACIC SURGERY (CARDIOTHORACIC VASCULAR SURGERY)

## 2023-06-27 PROCEDURE — 80048 BASIC METABOLIC PNL TOTAL CA: CPT | Mod: 91 | Performed by: THORACIC SURGERY (CARDIOTHORACIC VASCULAR SURGERY)

## 2023-06-27 PROCEDURE — S0030 INJECTION, METRONIDAZOLE: HCPCS | Performed by: THORACIC SURGERY (CARDIOTHORACIC VASCULAR SURGERY)

## 2023-06-27 PROCEDURE — 85027 COMPLETE CBC AUTOMATED: CPT | Performed by: THORACIC SURGERY (CARDIOTHORACIC VASCULAR SURGERY)

## 2023-06-27 RX ORDER — METOPROLOL TARTRATE 50 MG/1
50 TABLET ORAL 2 TIMES DAILY
Status: DISCONTINUED | OUTPATIENT
Start: 2023-06-27 | End: 2023-07-05 | Stop reason: HOSPADM

## 2023-06-27 RX ORDER — POTASSIUM CHLORIDE 20 MEQ/1
20 TABLET, EXTENDED RELEASE ORAL ONCE
Status: COMPLETED | OUTPATIENT
Start: 2023-06-27 | End: 2023-06-27

## 2023-06-27 RX ORDER — HYDROMORPHONE HYDROCHLORIDE 2 MG/1
2 TABLET ORAL EVERY 4 HOURS PRN
Qty: 7 TABLET | Refills: 0 | Status: SHIPPED | OUTPATIENT
Start: 2023-06-27 | End: 2023-07-04

## 2023-06-27 RX ORDER — AMOXICILLIN AND CLAVULANATE POTASSIUM 875; 125 MG/1; MG/1
1 TABLET, FILM COATED ORAL EVERY 12 HOURS
Qty: 14 TABLET | Refills: 0 | Status: SHIPPED | OUTPATIENT
Start: 2023-06-27 | End: 2023-07-05 | Stop reason: HOSPADM

## 2023-06-27 RX ORDER — ASCORBIC ACID 500 MG
500 TABLET ORAL 2 TIMES DAILY
Qty: 60 TABLET | Refills: 0 | Status: SHIPPED | OUTPATIENT
Start: 2023-06-27 | End: 2023-08-04

## 2023-06-27 RX ORDER — ADHESIVE BANDAGE
5 BANDAGE TOPICAL 2 TIMES DAILY
Qty: 300 ML | Refills: 0 | Status: SHIPPED | OUTPATIENT
Start: 2023-06-27 | End: 2023-07-27

## 2023-06-27 RX ORDER — LANOLIN ALCOHOL/MO/W.PET/CERES
1000 CREAM (GRAM) TOPICAL DAILY
Qty: 30 TABLET | Refills: 0 | Status: SHIPPED | OUTPATIENT
Start: 2023-06-28 | End: 2023-08-04

## 2023-06-27 RX ORDER — PANTOPRAZOLE SODIUM 40 MG/1
40 TABLET, DELAYED RELEASE ORAL DAILY
Qty: 30 TABLET | Refills: 0 | Status: SHIPPED | OUTPATIENT
Start: 2023-06-28 | End: 2024-04-02

## 2023-06-27 RX ORDER — FERROUS SULFATE 325(65) MG
325 TABLET, DELAYED RELEASE (ENTERIC COATED) ORAL DAILY
Qty: 30 TABLET | Refills: 0 | Status: SHIPPED | OUTPATIENT
Start: 2023-06-27 | End: 2023-08-04

## 2023-06-27 RX ORDER — DOCUSATE SODIUM 100 MG/1
100 CAPSULE, LIQUID FILLED ORAL 2 TIMES DAILY
Qty: 60 CAPSULE | Refills: 0 | Status: SHIPPED | OUTPATIENT
Start: 2023-06-27 | End: 2023-08-04

## 2023-06-27 RX ORDER — FOLIC ACID 1 MG/1
2 TABLET ORAL DAILY
Qty: 60 TABLET | Refills: 0 | Status: SHIPPED | OUTPATIENT
Start: 2023-06-28 | End: 2024-04-02

## 2023-06-27 RX ORDER — METOPROLOL TARTRATE 25 MG/1
25 TABLET, FILM COATED ORAL ONCE
Status: COMPLETED | OUTPATIENT
Start: 2023-06-27 | End: 2023-06-27

## 2023-06-27 RX ORDER — POLYETHYLENE GLYCOL 3350 17 G/17G
17 POWDER, FOR SOLUTION ORAL DAILY
Qty: 510 G | Refills: 0 | Status: SHIPPED | OUTPATIENT
Start: 2023-06-28 | End: 2023-08-04

## 2023-06-27 RX ADMIN — VANCOMYCIN HYDROCHLORIDE 1750 MG: 10 INJECTION, POWDER, LYOPHILIZED, FOR SOLUTION INTRAVENOUS at 10:06

## 2023-06-27 RX ADMIN — POTASSIUM CHLORIDE 20 MEQ: 1500 TABLET, EXTENDED RELEASE ORAL at 09:06

## 2023-06-27 RX ADMIN — CEFEPIME 2 G: 2 INJECTION, POWDER, FOR SOLUTION INTRAVENOUS at 02:06

## 2023-06-27 RX ADMIN — METOPROLOL TARTRATE 50 MG: 50 TABLET, FILM COATED ORAL at 09:06

## 2023-06-27 RX ADMIN — FUROSEMIDE 40 MG: 10 INJECTION, SOLUTION INTRAMUSCULAR; INTRAVENOUS at 09:06

## 2023-06-27 RX ADMIN — METOPROLOL TARTRATE 25 MG: 25 TABLET, FILM COATED ORAL at 09:06

## 2023-06-27 RX ADMIN — EMPAGLIFLOZIN 10 MG: 10 TABLET, FILM COATED ORAL at 04:06

## 2023-06-27 RX ADMIN — LATANOPROST 1 DROP: 50 SOLUTION OPHTHALMIC at 09:06

## 2023-06-27 RX ADMIN — AMITRIPTYLINE HYDROCHLORIDE 50 MG: 50 TABLET, FILM COATED ORAL at 09:06

## 2023-06-27 RX ADMIN — SODIUM CHLORIDE 30 ML/HR: 9 INJECTION, SOLUTION INTRAVENOUS at 02:06

## 2023-06-27 RX ADMIN — CEFEPIME 2 G: 2 INJECTION, POWDER, FOR SOLUTION INTRAVENOUS at 04:06

## 2023-06-27 RX ADMIN — CYANOCOBALAMIN TAB 1000 MCG 1000 MCG: 1000 TAB at 09:06

## 2023-06-27 RX ADMIN — SACUBITRIL AND VALSARTAN 1 TABLET: 24; 26 TABLET, FILM COATED ORAL at 09:06

## 2023-06-27 RX ADMIN — APIXABAN 2.5 MG: 2.5 TABLET, FILM COATED ORAL at 09:06

## 2023-06-27 RX ADMIN — PRAVASTATIN SODIUM 20 MG: 20 TABLET ORAL at 09:06

## 2023-06-27 RX ADMIN — METRONIDAZOLE 500 MG: 5 INJECTION, SOLUTION INTRAVENOUS at 02:06

## 2023-06-27 RX ADMIN — FERROUS SULFATE TAB 325 MG (65 MG ELEMENTAL FE) 1 EACH: 325 (65 FE) TAB at 09:06

## 2023-06-27 RX ADMIN — FOLIC ACID 2 MG: 1 TABLET ORAL at 09:06

## 2023-06-27 RX ADMIN — MAGNESIUM SULFATE HEPTAHYDRATE 4 G: 40 INJECTION, SOLUTION INTRAVENOUS at 10:06

## 2023-06-27 RX ADMIN — METRONIDAZOLE 500 MG: 5 INJECTION, SOLUTION INTRAVENOUS at 09:06

## 2023-06-27 RX ADMIN — METRONIDAZOLE 500 MG: 5 INJECTION, SOLUTION INTRAVENOUS at 04:06

## 2023-06-27 RX ADMIN — CEFEPIME 2 G: 2 INJECTION, POWDER, FOR SOLUTION INTRAVENOUS at 09:06

## 2023-06-27 RX ADMIN — ASPIRIN 81 MG: 81 TABLET, COATED ORAL at 09:06

## 2023-06-27 RX ADMIN — OXYCODONE HYDROCHLORIDE AND ACETAMINOPHEN 500 MG: 500 TABLET ORAL at 09:06

## 2023-06-27 RX ADMIN — CHLORHEXIDINE GLUCONATE 0.12% ORAL RINSE 10 ML: 1.2 LIQUID ORAL at 09:06

## 2023-06-27 RX ADMIN — PANTOPRAZOLE SODIUM 40 MG: 40 TABLET, DELAYED RELEASE ORAL at 09:06

## 2023-06-27 NOTE — PROGRESS NOTES
[facility] Tele[facility] ([facility])  Cardiology  Progress Note    Patient Name: Gustavo Tracey Jr.  MRN: [id number]  Admission Date: 6/20/2023  Hospital Length of Stay: 6 days  Code Status: Full Code   Attending Physician: Rustam Dave MD   Primary Care Physician: Braxton Guzman Jr, MD  Expected Discharge Date:   Principal Problem:Abnormal stress test    Subjective:   HPI:  Patient is a 63 y.o. male presents with cardiomyopathy chf afib previous cva htn hlp diabetes had heart in November 2022 for abnormal cardiolite was found to have 70% significantly ald stenosis his filling pressures were elevated and his pulmonary htn was moderate he was optimized medical thrapy wise his ef improved to 35-40% by repeat echo on entresto .his lifevest was discontinued. He continues to have some exertional shortness of breath he is referred for intervention of lad today after reviewing the case with DR HERNANDEZ    [facility] Course:   6/21/23-Patient seen and examined today, s/p LHC yesterday which showed multivessel CAD. Awaiting CABG. Stable this AM. No CP/SOB. No labs to review. CABG planned for AM. Echo showed EF of 25%, DD.    6/23/23-Patient seen and examined today, s/p CABG x 3 POD # 1. Sitting up in bed. Feels ok. Complains of fatigue and post-op pain. Labs stable.     6.24.2023  s/p CABG x 3 POD # 2  Feels well  Sitting in chair     6/26/23:  pt seen and examined this am.  No acute CV issues noted overnight.  Labs reviewed. Anemia improved s/p PRBC. Discussed w CV surgery, and will arrange Life Vest, for CHF.    6/27/23-Patient seen and examined today, sitting up in bedside chair. Feels ok. Still weak/tired, especially with exertion. Labs reviewed/stable. LifeVest ordered.          Review of Systems   Constitutional: Positive for malaise/fatigue.   HENT: Negative.     Eyes: Negative.    Cardiovascular:  Positive for chest pain (incisional) and dyspnea on exertion.   Respiratory: Negative.     Hematologic/Lymphatic: Bruises/bleeds  easily.   Skin: Negative.    Musculoskeletal:  Positive for arthritis and joint pain.   Gastrointestinal: Negative.    Genitourinary: Negative.    Neurological: Negative.    Psychiatric/Behavioral: Negative.     Allergic/Immunologic: Negative.    Objective:     Vital Signs (Most Recent):  Temp: 98.2 °F (36.8 °C) (06/27/23 0717)  Pulse: 84 (06/27/23 1100)  Resp: 18 (06/27/23 0717)  BP: 136/69 (06/27/23 0717)  SpO2: 97 % (06/27/23 0717) Vital Signs (24h Range):  Temp:  [97.9 °F (36.6 °C)-98.8 °F (37.1 °C)] 98.2 °F (36.8 °C)  Pulse:  [84-99] 84  Resp:  [18] 18  SpO2:  [93 %-97 %] 97 %  BP: ()/(53-75) 136/69     Weight: 113.5 kg (250 lb 3.6 oz)  Body mass index is 33.01 kg/m².     SpO2: 97 %         Intake/Output Summary (Last 24 hours) at 6/27/2023 1127  Last data filed at 6/27/2023 1054  Gross per 24 hour   Intake 1162.52 ml   Output 2000 ml   Net -837.48 ml       Lines/Drains/Airways       Peripherally Inserted Central Catheter Line  Duration             PICC Double Lumen 06/23/23 1222 right basilic 3 days              Line  Duration                  Pacer Wires 06/22/23 1500 4 days              Peripheral Intravenous Line  Duration                  Peripheral IV - Single Lumen 06/25/23 1058 20 G Anterior;Distal;Left Upper Arm 2 days                       Physical Exam  Vitals and nursing note reviewed.   Constitutional:       General: He is not in acute distress.     Appearance: Normal appearance. He is well-developed. He is not diaphoretic.   HENT:      Head: Normocephalic and atraumatic.   Eyes:      General:         Right eye: No discharge.         Left eye: No discharge.      Pupils: Pupils are equal, round, and reactive to light.   Neck:      Thyroid: No thyromegaly.      Vascular: No JVD.      Trachea: No tracheal deviation.   Cardiovascular:      Rate and Rhythm: Normal rate and regular rhythm.      Heart sounds: Normal heart sounds, S1 normal and S2 normal. No murmur heard.     Comments: Sternotomy  site C/D/I; dressing in place  Pulmonary:      Effort: Pulmonary effort is normal. No respiratory distress.      Breath sounds: No wheezing.      Comments: Diminished BS at bases  Abdominal:      General: There is no distension.      Tenderness: There is no rebound.   Musculoskeletal:      Cervical back: Neck supple.      Right lower leg: Edema present.      Left lower leg: Edema present.   Skin:     General: Skin is warm and dry.      Findings: No erythema.   Neurological:      Mental Status: He is alert and oriented to person, place, and time.   Psychiatric:         Mood and Affect: Mood normal.         Behavior: Behavior normal.         Thought Content: Thought content normal.          Significant Labs: CMP   Recent Labs   Lab 06/26/23  1637 06/26/23  2159 06/27/23  0744   * 136 136   K 4.2 3.7 4.0    102 101   CO2 26 22* 23    104 89   BUN 30* 27* 28*   CREATININE 1.4 1.3 1.2   CALCIUM 8.4* 8.2* 8.2*   ANIONGAP 8 12 12   , CBC   Recent Labs   Lab 06/26/23  0609 06/27/23  0522   WBC 18.30* 12.26   HGB 9.0* 9.7*   HCT 27.7* 29.9*   PLT 62* 65*   , Troponin No results for input(s): TROPONINI in the last 48 hours., and All pertinent lab results from the last 24 hours have been reviewed.    Significant Imaging: Echocardiogram: Transthoracic echo (TTE) complete (Cupid Only):   Results for orders placed or performed during the hospital encounter of 06/20/23   Echo   Result Value Ref Range    BSA 2.37 m2    TDI SEPTAL 0.07 m/s    LV LATERAL E/E' RATIO 5.78 m/s    LV SEPTAL E/E' RATIO 7.43 m/s    LA WIDTH 3.60 cm    IVC diameter 1.00 cm    Left Ventricular Outflow Tract Mean Velocity 0.77 cm/s    Left Ventricular Outflow Tract Mean Gradient 2.89 mmHg    TDI LATERAL 0.09 m/s    LVIDd 5.61 3.5 - 6.0 cm    IVS 1.30 (A) 0.6 - 1.1 cm    Posterior Wall 1.11 (A) 0.6 - 1.1 cm    Ao root annulus 3.37 cm    LVIDs 4.85 (A) 2.1 - 4.0 cm    FS 14 28 - 44 %    LA volume 54.65 cm3    STJ 3.94 cm    Ascending aorta  3.91 cm    LV mass 282.89 g    LA size 3.29 cm    TAPSE 1.80 cm    Left Ventricle Relative Wall Thickness 0.40 cm    AV regurgitation pressure 1/2 time 1,004.882683706481877 ms    AV mean gradient 4 mmHg    AV valve area 4.21 cm2    AV Velocity Ratio 0.94     AV index (prosthetic) 1.02     MV mean gradient 71 mmHg    MV valve area p 1/2 method 3.84 cm2    MV valve area by continuity eq 0.46 cm2    E/A ratio 0.72     Mean e' 0.08 m/s    E wave deceleration time 197.43 msec    IVRT 95.15 msec    LVOT diameter 2.29 cm    LVOT area 4.1 cm2    LVOT peak ezequiel 1.19 m/s    LVOT peak VTI 23.10 cm    Ao peak ezequiel 1.26 m/s    Ao VTI 22.6 cm    RVOT peak ezequiel 0.59 m/s    RVOT peak VTI 12.1 cm    LVOT stroke volume 95.09 cm3    AV peak gradient 6 mmHg    MV peak gradient 87 mmHg    PV mean gradient 0.75 mmHg    E/E' ratio 6.50 m/s    MV Peak E Ezequiel 0.52 m/s    AR Max Ezequiel 3.12 m/s    MV .8 cm    MV stenosis pressure 1/2 time 57.26 ms    MV Peak A Ezequiel 0.72 m/s    LV Systolic Volume 109.98 mL    LV Systolic Volume Index 47.2 mL/m2    LV Diastolic Volume 154.22 mL    LV Diastolic Volume Index 66.19 mL/m2    LA Volume Index 23.5 mL/m2    LV Mass Index 121 g/m2    RA Major Axis 4.25 cm    Left Atrium Minor Axis 4.65 cm    Left Atrium Major Axis 6.52 cm    Right Atrial Pressure (from IVC) 3 mmHg    EF 25 %    Narrative    · The left ventricle is moderately enlarged with eccentric hypertrophy and   severely decreased systolic function.  · Grade I left ventricular diastolic dysfunction.  · Normal right ventricular size with normal right ventricular systolic   function.  · Normal central venous pressure (3 mmHg).  · The estimated ejection fraction is 25%.  · Mild aortic regurgitation.  · Mild mitral regurgitation.  · There is severe left ventricular global hypokinesis.      , EKG: Reviewed, and X-Ray: CXR: X-Ray Chest 1 View (CXR):   Results for orders placed or performed during the hospital encounter of 06/20/23   X-Ray Chest 1 View     Narrative    EXAMINATION:  XR CHEST 1 VIEW    CLINICAL HISTORY:  Pre Op;    TECHNIQUE:  Single frontal view of the chest was performed.    COMPARISON:  None    FINDINGS:  The lungs are clear, with normal appearance of pulmonary vasculature and no pleural effusion or pneumothorax.    The cardiac silhouette is normal in size. The hilar and mediastinal contours are unremarkable.    Bones are intact.      Impression    No acute abnormality.      Electronically signed by: Torey Grady  Date:    06/20/2023  Time:    21:40    and X-Ray Chest PA and Lateral (CXR): No results found for this visit on 06/20/23.    Assessment and Plan:   Patient who presents with multivessel CAD. Recovering well s/p CABG. Mgmt as per CTS. LifeVest being arranged.     * Abnormal stress test  Multivessel CAD on cath.  S/p CABG.    Post-operative state  Continue post op CABG care/mgt.  Ambulate.    S/P CABG x 3  S/p CABG.  Progressing in post op period.  Continue current post op care/mgt.    Left main coronary artery disease  -CP free  -Continue ASA, BB, Entresto, Lasix, statin  -Awaiting CABG    History of CVA (cerebrovascular accident)  -ASA, statin    Stage 3a chronic kidney disease  Monitor.  F/u labs.    PAF (paroxysmal atrial fibrillation)  Monitor.  Eliquis.  S/p Maze procedure w CABG.    DCM (dilated cardiomyopathy)  -Stable compensated  -Continue BB, Entresto, po Lasix    Optimize meds in future for CHF.  Arrange Life Vest prior to discharge.    6/27/23  -Continue BB, Entresto, po Lasix  -LifeVest being arranged for SCD prevention    Coronary artery disease without angina pectoris  -Continue OMT-ASA, BB, Entresto, statin    Other hyperlipidemia  -Continue statin    Primary hypertension  -Continue BB, Entresto    6/23/23  -Continue BB  -Resume Entresto as tolerated        VTE Risk Mitigation (From admission, onward)         Ordered     apixaban tablet 2.5 mg  2 times daily         06/24/23 0933     Place CHANTELL hose  Until discontinued          06/21/23 1044     Place sequential compression device  Until discontinued         06/21/23 1044                Maria R Washington PA-C  Cardiology  [facility] - Telemetry ([facility])

## 2023-06-27 NOTE — SUBJECTIVE & OBJECTIVE
Review of Systems   Constitutional: Positive for malaise/fatigue.   HENT: Negative.     Eyes: Negative.    Cardiovascular:  Positive for chest pain (incisional) and dyspnea on exertion.   Respiratory: Negative.     Hematologic/Lymphatic: Bruises/bleeds easily.   Skin: Negative.    Musculoskeletal:  Positive for arthritis and joint pain.   Gastrointestinal: Negative.    Genitourinary: Negative.    Neurological: Negative.    Psychiatric/Behavioral: Negative.     Allergic/Immunologic: Negative.    Objective:     Vital Signs (Most Recent):  Temp: 98.2 °F (36.8 °C) (06/27/23 0717)  Pulse: 84 (06/27/23 1100)  Resp: 18 (06/27/23 0717)  BP: 136/69 (06/27/23 0717)  SpO2: 97 % (06/27/23 0717) Vital Signs (24h Range):  Temp:  [97.9 °F (36.6 °C)-98.8 °F (37.1 °C)] 98.2 °F (36.8 °C)  Pulse:  [84-99] 84  Resp:  [18] 18  SpO2:  [93 %-97 %] 97 %  BP: ()/(53-75) 136/69     Weight: 113.5 kg (250 lb 3.6 oz)  Body mass index is 33.01 kg/m².     SpO2: 97 %         Intake/Output Summary (Last 24 hours) at 6/27/2023 1127  Last data filed at 6/27/2023 1054  Gross per 24 hour   Intake 1162.52 ml   Output 2000 ml   Net -837.48 ml       Lines/Drains/Airways       Peripherally Inserted Central Catheter Line  Duration             PICC Double Lumen 06/23/23 1222 right basilic 3 days              Line  Duration                  Pacer Wires 06/22/23 1500 4 days              Peripheral Intravenous Line  Duration                  Peripheral IV - Single Lumen 06/25/23 1058 20 G Anterior;Distal;Left Upper Arm 2 days                       Physical Exam  Vitals and nursing note reviewed.   Constitutional:       General: He is not in acute distress.     Appearance: Normal appearance. He is well-developed. He is not diaphoretic.   HENT:      Head: Normocephalic and atraumatic.   Eyes:      General:         Right eye: No discharge.         Left eye: No discharge.      Pupils: Pupils are equal, round, and reactive to light.   Neck:      Thyroid:  No thyromegaly.      Vascular: No JVD.      Trachea: No tracheal deviation.   Cardiovascular:      Rate and Rhythm: Normal rate and regular rhythm.      Heart sounds: Normal heart sounds, S1 normal and S2 normal. No murmur heard.     Comments: Sternotomy site C/D/I; dressing in place  Pulmonary:      Effort: Pulmonary effort is normal. No respiratory distress.      Breath sounds: No wheezing.      Comments: Diminished BS at bases  Abdominal:      General: There is no distension.      Tenderness: There is no rebound.   Musculoskeletal:      Cervical back: Neck supple.      Right lower leg: Edema present.      Left lower leg: Edema present.   Skin:     General: Skin is warm and dry.      Findings: No erythema.   Neurological:      Mental Status: He is alert and oriented to person, place, and time.   Psychiatric:         Mood and Affect: Mood normal.         Behavior: Behavior normal.         Thought Content: Thought content normal.          Significant Labs: CMP   Recent Labs   Lab 06/26/23  1637 06/26/23  2159 06/27/23  0744   * 136 136   K 4.2 3.7 4.0    102 101   CO2 26 22* 23    104 89   BUN 30* 27* 28*   CREATININE 1.4 1.3 1.2   CALCIUM 8.4* 8.2* 8.2*   ANIONGAP 8 12 12   , CBC   Recent Labs   Lab 06/26/23  0609 06/27/23  0522   WBC 18.30* 12.26   HGB 9.0* 9.7*   HCT 27.7* 29.9*   PLT 62* 65*   , Troponin No results for input(s): TROPONINI in the last 48 hours., and All pertinent lab results from the last 24 hours have been reviewed.    Significant Imaging: Echocardiogram: Transthoracic echo (TTE) complete (Cupid Only):   Results for orders placed or performed during the hospital encounter of 06/20/23   Echo   Result Value Ref Range    BSA 2.37 m2    TDI SEPTAL 0.07 m/s    LV LATERAL E/E' RATIO 5.78 m/s    LV SEPTAL E/E' RATIO 7.43 m/s    LA WIDTH 3.60 cm    IVC diameter 1.00 cm    Left Ventricular Outflow Tract Mean Velocity 0.77 cm/s    Left Ventricular Outflow Tract Mean Gradient 2.89 mmHg     TDI LATERAL 0.09 m/s    LVIDd 5.61 3.5 - 6.0 cm    IVS 1.30 (A) 0.6 - 1.1 cm    Posterior Wall 1.11 (A) 0.6 - 1.1 cm    Ao root annulus 3.37 cm    LVIDs 4.85 (A) 2.1 - 4.0 cm    FS 14 28 - 44 %    LA volume 54.65 cm3    STJ 3.94 cm    Ascending aorta 3.91 cm    LV mass 282.89 g    LA size 3.29 cm    TAPSE 1.80 cm    Left Ventricle Relative Wall Thickness 0.40 cm    AV regurgitation pressure 1/2 time 1,004.551804539447319 ms    AV mean gradient 4 mmHg    AV valve area 4.21 cm2    AV Velocity Ratio 0.94     AV index (prosthetic) 1.02     MV mean gradient 71 mmHg    MV valve area p 1/2 method 3.84 cm2    MV valve area by continuity eq 0.46 cm2    E/A ratio 0.72     Mean e' 0.08 m/s    E wave deceleration time 197.43 msec    IVRT 95.15 msec    LVOT diameter 2.29 cm    LVOT area 4.1 cm2    LVOT peak ezequiel 1.19 m/s    LVOT peak VTI 23.10 cm    Ao peak ezequiel 1.26 m/s    Ao VTI 22.6 cm    RVOT peak ezequiel 0.59 m/s    RVOT peak VTI 12.1 cm    LVOT stroke volume 95.09 cm3    AV peak gradient 6 mmHg    MV peak gradient 87 mmHg    PV mean gradient 0.75 mmHg    E/E' ratio 6.50 m/s    MV Peak E Ezequiel 0.52 m/s    AR Max Ezequiel 3.12 m/s    MV .8 cm    MV stenosis pressure 1/2 time 57.26 ms    MV Peak A Ezequiel 0.72 m/s    LV Systolic Volume 109.98 mL    LV Systolic Volume Index 47.2 mL/m2    LV Diastolic Volume 154.22 mL    LV Diastolic Volume Index 66.19 mL/m2    LA Volume Index 23.5 mL/m2    LV Mass Index 121 g/m2    RA Major Axis 4.25 cm    Left Atrium Minor Axis 4.65 cm    Left Atrium Major Axis 6.52 cm    Right Atrial Pressure (from IVC) 3 mmHg    EF 25 %    Narrative    · The left ventricle is moderately enlarged with eccentric hypertrophy and   severely decreased systolic function.  · Grade I left ventricular diastolic dysfunction.  · Normal right ventricular size with normal right ventricular systolic   function.  · Normal central venous pressure (3 mmHg).  · The estimated ejection fraction is 25%.  · Mild aortic  regurgitation.  · Mild mitral regurgitation.  · There is severe left ventricular global hypokinesis.      , EKG: Reviewed, and X-Ray: CXR: X-Ray Chest 1 View (CXR):   Results for orders placed or performed during the hospital encounter of 06/20/23   X-Ray Chest 1 View    Narrative    EXAMINATION:  XR CHEST 1 VIEW    CLINICAL HISTORY:  Pre Op;    TECHNIQUE:  Single frontal view of the chest was performed.    COMPARISON:  None    FINDINGS:  The lungs are clear, with normal appearance of pulmonary vasculature and no pleural effusion or pneumothorax.    The cardiac silhouette is normal in size. The hilar and mediastinal contours are unremarkable.    Bones are intact.      Impression    No acute abnormality.      Electronically signed by: Torey Grady  Date:    06/20/2023  Time:    21:40    and X-Ray Chest PA and Lateral (CXR): No results found for this visit on 06/20/23.

## 2023-06-27 NOTE — PLAN OF CARE
FAIR TOLERANCE TO TX, REPORTS FEELING POORLY TODAY, MODA FOR BED MOBILITY, FERNANDO FOR TF'S AND GAIT

## 2023-06-27 NOTE — PROGRESS NOTES
Pharmacokinetic Assessment Follow Up: IV Vancomycin    Vancomycin serum concentration assessment(s):    The random level was drawn correctly and can be used to guide therapy at this time. The measurement is below the desired definitive target range of 15 to 20 mcg/mL.    Vancomycin Regimen Plan:    Will give a 15mg/kg= 1750mg x1 dose now.   Re-dose when the random level is less than 20 mcg/mL, next level to be drawn at 0930 on 6/28    Drug levels (last 3 results):  Recent Labs   Lab Result Units 06/26/23  0609 06/27/23  0744   Vancomycin, Random ug/mL 13.1 13.4       Pharmacy will continue to follow and monitor vancomycin.    Please contact pharmacy at extension [phone] for questions regarding this assessment.    Thank you for the consult,   Aysha Michaels       Patient brief summary:  Gustavo Tracey Jr. is a 63 y.o. male initiated on antimicrobial therapy with IV Vancomycin for treatment of  empiric s/p CABG    The patient's current regimen is pulse dosing    Drug Allergies:   Review of patient's allergies indicates:  No Known Allergies    Actual Body Weight:   113.5kg    Renal Function:   Estimated Creatinine Clearance: 84.2 mL/min (based on SCr of 1.2 mg/dL).,     Dialysis Method (if applicable):  N/A    CBC (last 72 hours):  Recent Labs   Lab Result Units 06/25/23  0522 06/26/23  0609 06/27/23  0522   WBC K/uL 19.42* 18.30* 12.26   Hemoglobin g/dL 6.9* 9.0* 9.7*   Hematocrit % 21.5* 27.7* 29.9*   Platelets K/uL 61* 62* 65*   Gran % % 22.4* 30.0* 22.0*   Lymph % % 20.2 41.0 38.0   Mono % % 53.8* 0.0* 5.0   Eosinophil % % 0.0 0.0 0.0   Basophil % % 0.1 0.0 0.0   Differential Method  Automated Manual Manual       Metabolic Panel (last 72 hours):  Recent Labs   Lab Result Units 06/24/23  1728 06/25/23  0522 06/25/23  1152 06/25/23  1639 06/26/23  0609 06/26/23  1637 06/26/23  2159 06/27/23  0744   Sodium mmol/L 139 137  --  135* 135* 135* 136 136   Potassium mmol/L 4.1 4.3  --  4.1 3.8 4.2 3.7 4.0   Chloride  mmol/L 105 104  --  100 101 101 102 101   CO2 mmol/L 21* 23  --  25 24 26 22* 23   Glucose mg/dL 162* 99  --  94 100 108 104 89   Glucose, UA   --   --  Negative  --   --   --   --   --    BUN mg/dL 38* 36*  --  34* 34* 30* 27* 28*   Creatinine mg/dL 1.7* 1.5*  --  1.6* 1.5* 1.4 1.3 1.2   Magnesium mg/dL 2.2 2.1  --  3.3* 2.9* 2.7* 2.3 2.4       Vancomycin Administrations:  vancomycin given in the last 96 hours                     vancomycin 1.75 g in 5 % dextrose 500 mL IVPB (mg) 1,750 mg New Bag 06/27/23 1038    vancomycin 1.75 g in 5 % dextrose 500 mL IVPB (mg) 1,750 mg New Bag 06/26/23 0846    vancomycin 2 g in dextrose 5 % 500 mL IVPB (mg) 2,000 mg New Bag 06/25/23 1213                    Microbiologic Results:  Microbiology Results (last 7 days)       Procedure Component Value Units Date/Time    Culture, Respiratory with Gram Stain [[id number]] Collected: 06/26/23 2158    Order Status: Completed Specimen: Respiratory from Sputum, Expectorated Updated: 06/27/23 0520     Gram Stain (Respiratory) <10 epithelial cells per low power field.     Gram Stain (Respiratory) Few WBC's     Gram Stain (Respiratory) Moderate Gram positive cocci     Gram Stain (Respiratory) Rare Gram negative rods    Blood culture [[id number]] Collected: 06/25/23 1134    Order Status: Completed Specimen: Blood Updated: 06/26/23 2012     Blood Culture, Routine No Growth to date      No Growth to date    Blood culture [[id number]] Collected: 06/23/23 0913    Order Status: Completed Specimen: Blood from Antecubital, Left Arm Updated: 06/26/23 1812     Blood Culture, Routine No Growth to date      No Growth to date      No Growth to date      No Growth to date    Blood culture [[id number]] Collected: 06/23/23 0913    Order Status: Completed Specimen: Blood from Antecubital, Right Arm Updated: 06/26/23 1812     Blood Culture, Routine No Growth to date      No Growth to date      No Growth to date      No Growth to date

## 2023-06-27 NOTE — ASSESSMENT & PLAN NOTE
-Stable compensated  -Continue BB, Entresto, po Lasix    Optimize meds in future for CHF.  Arrange Life Vest prior to discharge.    6/27/23  -Continue BB, Entresto, po Lasix  -LifeVest being arranged for SCD prevention

## 2023-06-27 NOTE — PT/OT/SLP PROGRESS
"Physical Therapy Treatment    Patient Name:  Gustavo Tracey Jr.   MRN:  [id number]    Recommendations:     Discharge Recommendations: home health PT (WITH FAMILY ASSIST)  Discharge Equipment Recommendations: shower chair  Barriers to discharge: None    Assessment:     Gustavo Tracey Jr. is a 63 y.o. male admitted with a medical diagnosis of Abnormal stress test.  He presents with the following impairments/functional limitations: weakness, impaired endurance, impaired functional mobility, gait instability, impaired balance, decreased safety awareness, decreased lower extremity function, decreased coordination.    Rehab Prognosis: Good; patient would benefit from acute skilled PT services to address these deficits and reach maximum level of function.    Recent Surgery: Procedure(s) (LRB):  CORONARY ARTERY BYPASS GRAFT (CABG) (N/A)  PURDY MAZE PROCEDURE (N/A)  SURGICAL PROCUREMENT, VEIN, ENDOSCOPIC (Left)  ECHOCARDIOGRAM,TRANSESOPHAGEAL (N/A)  BLOCK, NERVE, INTERCOSTAL, 2 OR MORE (N/A)  CLOSURE, LEFT ATRIAL APPENDAGE, USING DEVICE (Left) 5 Days Post-Op    Plan:     During this hospitalization, patient to be seen 3 x/week to address the identified rehab impairments via gait training, therapeutic activities, therapeutic exercises and progress toward the following goals:    Plan of Care Expires:  07/07/23    Subjective     Chief Complaint: "VERY TIRED", C/O SORENESS TO CHEST, NO SLEEP, UP ALL NIGHT DUE TO DIARRHEA  Patient/Family Comments/goals:   Pain/Comfort:  Pain Rating 1: 0/10 (PT DENIES PAIN BUT REPORTS SORENESS TO CHEST)      Objective:     Communicated with NURSE STEVENS prior to session.  Patient found supine with telemetry, peripheral IV, wound vac, external pacer upon PT entry to room.     General Precautions: Standard, fall, sternal  Orthopedic Precautions: N/A  Braces: N/A  Respiratory Status: Room air     Functional Mobility:  Bed Mobility:     Scooting: stand by assistance  Supine to Sit: moderate " "assistance  Transfers:     Sit to Stand:  minimum assistance with no AD  Bed to Chair: minimum assistance with  no AD  using  Step Transfer  Gait: PT AMB 50' NO AD FERNANDO, QUICK TO FATIGUE SO ASSIST BACK TO ROOM, CUES FOR UPRIGHT POSTURE  Balance: FAIR SITTING BALANCE, POOR+ DYNAMIC BALANCE DURING GAIT    AM-PAC 6 CLICK MOBILITY  Turning over in bed (including adjusting bedclothes, sheets and blankets)?: 2  Sitting down on and standing up from a chair with arms (e.g., wheelchair, bedside commode, etc.): 3  Moving from lying on back to sitting on the side of the bed?: 2  Moving to and from a bed to a chair (including a wheelchair)?: 3  Need to walk in hospital room?: 3  Climbing 3-5 steps with a railing?: 1  Basic Mobility Total Score: 14     Treatment & Education:  PT EDUCATION:  - ROLE OF P.T. AND POC IN ACUTE CARE HOSPITAL SETTING  - REVIEW STERNAL PRECAUTIONS, ACTIVITY PACING  - PT PRESENTS WITH L FACIAL DROOP, PT REPORTS IT BECOMES FROM PROMINENT WITH FATIGUE, REPORTS FROM CVA 15-20 YEARS AGO  - ENCOURAGED TO INCREASE TIME OOB IN CHAIR TO TOLERANCE   - TO CONTINUE THERAPUETIC EXERCISES THROUGHOUT THE DAY TO INCREASE ACTIVITY TOLERANCE AND DECREASE RISK FOR PNEUMONIA AND BLOOD CLOTS: HIP FLEX/EXT, HIP ABD/ADD, QUAD SET, HEEL SLIDE, AP  - RISK FOR FALLS DUE TO GENERALIZED WEAKNESS, EDUCATED ON "CALL DON'T FALL", ENCOURAGED TO CALL FOR ASSISTANCE WITH ALL NEEDS SUCH AS BED<>CHAIR TRANSFERS OR TRIPS TO BATHROOM, PT AGREEABLE TO SAFETY PRECAUTIONS    Patient left up in chair with all lines intact, call button in reach, and NURSE notified..    GOALS:   Multidisciplinary Problems       Physical Therapy Goals          Problem: Physical Therapy    Goal Priority Disciplines Outcome Goal Variances Interventions   Physical Therapy Goal     PT, PT/OT Ongoing, Progressing     Description: LTG'S TO BE MET IN 14 DAYS (7-7-23)  PT WILL REQUIRE CGA FOR BED MOBILITY  PT WILL REQUIRE SPV FOR BED<>CHAIR TF'S  PT WILL  FEET NO " GIANNA SPV                         Time Tracking:     PT Received On: 06/27/23  PT Start Time: 0745     PT Stop Time: 0810  PT Total Time (min): 25 min     Billable Minutes: Gait Training 10 and Therapeutic Activity 15    Treatment Type: Treatment  PT/PTA: PT     Number of PTA visits since last PT visit: 0     06/27/2023

## 2023-06-27 NOTE — PT/OT/SLP PROGRESS
"Occupational Therapy   Treatment    Name: Gustavo Tracey Jr.  MRN: [id number]  Admitting Diagnosis:  Abnormal stress test  5 Days Post-Op    Recommendations:     Discharge Recommendations: home health OT (24/7 SPV and A)  Discharge Equipment Recommendations:  shower chair  Barriers to discharge:  None    Assessment:     Gustavo Tracey Jr. is a 63 y.o. male with a medical diagnosis of Abnormal stress test.  He presents with the following performance deficits affecting function are weakness, impaired endurance, impaired self care skills, impaired functional mobility, impaired balance, impaired cardiopulmonary response to activity (sternal precautions).     Rehab Prognosis:  Good; patient would benefit from acute skilled OT services to address these deficits and reach maximum level of function.       Plan:     Patient to be seen 2 x/week to address the above listed problems via self-care/home management, therapeutic activities, therapeutic exercises  Plan of Care Expires: 07/07/23  Plan of Care Reviewed with: patient    Subjective     Chief Complaint: Reported "I didn't sleep at all last night."  Patient/Family Comments/goals: get better  Pain/Comfort:  Pain Rating 1: 0/10    Objective:     Communicated with: NurseMedina, prior to session.  Patient found supine with peripheral IV, telemetry, wound vac, external pacer upon OT entry to room.    General Precautions: Standard, fall, sternal    Orthopedic Precautions:N/A  Braces: N/A  Respiratory Status: Room air     Occupational Performance:     Bed Mobility:    Patient completed Supine to Sit with moderate assistance     Functional Mobility/Transfers:  Patient completed Sit <> Stand Transfer with minimum assistance  with  no assistive device   Patient completed Bed <> Chair Transfer using Step Transfer technique with minimum assistance with no assistive device  Functional Mobility: Patient completed x50ft x2 trials functional mobility without AD and min HHA to increase " dynamic standing balance and activity tolerance needed for ADL completion.  V/C throughout mobility trial to keep eyes open.    Activities of Daily Living:  Feeding:  modified independence breakfast from bedside chair/tray    [facility] 6 Click ADL: 20    Treatment & Education:  Patient tolerated intervention fair this date. Voicing significant fatigue from poor rest last night. Requiring increased A with all mobility and tolerating decreased duration standing activities. Increased v/c for compliance with sternal precautions. Patient noted to have L sided facial droop this date. Per patient CVA 15-20 years ago and symptoms exacerbate with fatigue. Nurse and charge nurse informed. Patient provided with increased education on activity pacing to prevent over exertion. Encouraged completion of B UE AROM therex, within sternal precautions, throughout the day to increase functional strength and activity tolerance needed for ADL completion. Patient stated understanding, in agreement with POC, and in agreement to call for A to transfer back to bed.    Patient left up in chair with all lines intact, call button in reach, chair alarm on, and nurse notified    GOALS:   Multidisciplinary Problems       Occupational Therapy Goals          Problem: Occupational Therapy    Goal Priority Disciplines Outcome Interventions   Occupational Therapy Goal     OT, PT/OT Ongoing, Progressing    Description: Goals to be met by: 7/7/23     Patient will increase functional independence with ADLs by performing:    Toileting from toilet with Minimal Assistance for hygiene and clothing management.   Toilet transfer to toilet with Contact Guard Assistance.  Demonstrates 100% functional compliance with sternal precautions.                         Time Tracking:     OT Date of Treatment: 06/27/23  OT Start Time: 0750  OT Stop Time: 0815  OT Total Time (min): 25 min    Billable Minutes:Therapeutic Activity 25 6/27/2023

## 2023-06-27 NOTE — PLAN OF CARE
SW received a visit from Jolie RAGSDALE MD regarding Patient's LifeVest. SW let Dr. Dave know that Patient already had a LifeVest that he could bring up to the hospital to adjust the setting for discharge. Dr. Dave expressed that he wants Patient to have LifeVest with correct settings before discharge. SW stated that she would get patient to bring LifeVest to bedside to get settings adjusted. Dr. Dave stated, that possible DC would be tomorrow.     SW went and meet with Patient at bedside. SW inquired about if patient did have LifeVest already at home. Patient acknowledged that Patient did have LifeVest at home. SW inquired about if Patient was able to get family/ friend to bring it up to him at bedside. Patient stated that he can get LifeVest. SW stated that [employer] the [employer], would come adjust the settings to be able to DC. SW also confirmed that Patient was agreeable to [facility] upon DC.     SW will ensure that Patient's LifeVest and HH is set up upon DC.    SW will continue to follow and assist as needed.

## 2023-06-27 NOTE — PHYSICIAN QUERY
PT Name: Gustavo Tracey Jr.  MR #: [id number]  DOCUMENTATION CLARIFICATION      CDS/: Raymond Salmon Jr RN CCDS              Contact information:[email or web address]     This form is a permanent document in the medical record.      Query Date: June 27, 2023    By submitting this query, we are merely seeking further clarification of documentation. Please utilize your independent clinical judgment when addressing the question(s) below.    The Medical Record contains the following:   Indicators  Supporting Clinical Findings Location in Medical Record   x PT        INR        PTT PT=17.7-->14-->11.7    INR=1.7-->1.4-->1.1    PTT=33.5-->22.2-->24.3   6/22-6/23 Labs    6/22-6/23 Labs    6/22-6/23 Labs   x Platelets 33-->151-->116-->62   6/22-6/24 Labs   x Coagulopathy or Coagulation Defect documented Patient's post bypass CBC returned a platelet count of 33.  The patient was then transfused 2 units packed red blood cells with improvement in coagulopathy   6/22 Op Note   x Acute/Chronic Illness Pre-Operative Diagnosis: Abnormal stress test   CAD, multiple vessel   PAF (paroxysmal atrial fibrillation)    6/22 Op Note   x Treatment Patient's heparin was then reversed with protamine.  The patient appeared coagulopathic with diffuse oozing from surgical sites 6/22 Op Note    Other       Provider, please specify the coagulopathy diagnosis (Rustam All That Apply)    [   ] Coagulation Defect due to (please specify):_________   [   X] Coagulopathy Secondary to Anticoagulation Therapy   [   ] Coagulation deficiency unspecified   [   ] Other (please specify):_______         Please document in your progress notes daily for the duration of treatment until resolved, and include in your discharge summary.    Form No. [id number]

## 2023-06-27 NOTE — PHYSICIAN QUERY
PT Name: Gustavo Tracey Jr.  MR #: [id number]    DOCUMENTATION CLARIFICATION      CDS/: Raymond Salmon Jr RN CCDS              Contact information:[email or web address]     This form is a permanent document in the medical record.      Query Date: June 27, 2023    By submitting this query, we are merely seeking further clarification of documentation. Please utilize your independent clinical judgment when addressing the question(s) below.    The Medical Record contains the following:   Indicators  Supporting Clinical Findings Location in Medical Record   x Anemia documented Labs reviewed. Anemia improved s/p PRBC. Discussed w CV surgery, and will arrange Life Vest, for CHF.   6/26 Progress Note   x H&H 10.3/32.2-->8.5/26.6-->7.2/22.4-->9.9/30.2-->7.8/23.7-->6.9/21.5   6/21-6/25 Labs   x BP                    HR HR=90-->92-->91-->91    BP=74/56-->97/56-->81/54-->100/57 6/22-6/26 Vs Flow sheet    6/22-6/26 Vs Flow sheet      Bleeding     x Procedure/Surgery Performed/EBL Estimated Blood Loss (EBL): 750 ml 6/22 Op Note   x Transfusion(s) Transfuse RBC 2 Units Completed 06/22/23 1755        Transfuse RBC 2 Units Completed 06/25/23 1940     6/22 Transfusion Record         6/25 Transfusion Record    x Acute/Chronic illness Pre-Operative Diagnosis: Abnormal stress test   CAD, multiple vessel   PAF (paroxysmal atrial fibrillation) .   6/22 Op Note   x Treatments Heme:  Hematocrit is 21.5 and platelet count is 61.  Will transfuse 2 units of plaque probe blood cells.    6/25 Cardiothoracic Progress Note   x Other The patient is postop day 3 status post coronary artery bypass grafting x3 and [place] Maze 4 procedure.   6/25 Cardiothoracic Progress Note     Provider, please specify the Anemia diagnosis    [   X] Acute blood loss anemia expected post-operatively    [   ] Anemia, unspecified    [   ] Other (please specify): _________________              Please document in your progress notes daily for the duration of  treatment, until resolved, and include in your discharge summary.    Form No. [id number]

## 2023-06-27 NOTE — PLAN OF CARE
SIBR rounds completed at bedside with CM, Pt experience, and charge nurse. POC discussed and all questions and concerns addressed.  Currently pending medical stability for DC home with HH and life vest once cleared per CVT.  Pt verbalized understanding. No additional needs at this time. Pt instructed to call for any additional questions. Care team will continue to follow.

## 2023-06-27 NOTE — PLAN OF CARE
Tolerated intervention fair this date. Significant fatigue. Recommending HHOT with 24/7 SPV and A at d/c.

## 2023-06-28 ENCOUNTER — TELEPHONE (OUTPATIENT)
Dept: CARDIAC REHAB | Facility: CLINIC | Age: 63
End: 2023-06-28
Payer: MEDICARE

## 2023-06-28 PROBLEM — D75.829 HEPARIN INDUCED THROMBOCYTOPENIA: Status: ACTIVE | Noted: 2023-06-28

## 2023-06-28 LAB
ALBUMIN SERPL BCP-MCNC: 3.5 G/DL (ref 3.5–5.2)
ALP SERPL-CCNC: 53 U/L (ref 55–135)
ALT SERPL W/O P-5'-P-CCNC: 13 U/L (ref 10–44)
ANION GAP SERPL CALC-SCNC: 10 MMOL/L (ref 8–16)
ANION GAP SERPL CALC-SCNC: 14 MMOL/L (ref 8–16)
ANISOCYTOSIS BLD QL SMEAR: SLIGHT
APTT PPP: 28.7 SEC (ref 21–32)
AST SERPL-CCNC: 28 U/L (ref 10–40)
BACTERIA BLD CULT: NORMAL
BACTERIA BLD CULT: NORMAL
BASOPHILS NFR BLD: 0 % (ref 0–1.9)
BILIRUB DIRECT SERPL-MCNC: 0.5 MG/DL (ref 0.1–0.3)
BILIRUB SERPL-MCNC: 1.1 MG/DL (ref 0.1–1)
BLASTS NFR BLD MANUAL: 23 %
BUN SERPL-MCNC: 22 MG/DL (ref 8–23)
BUN SERPL-MCNC: 22 MG/DL (ref 8–23)
CALCIUM SERPL-MCNC: 8.6 MG/DL (ref 8.7–10.5)
CALCIUM SERPL-MCNC: 8.7 MG/DL (ref 8.7–10.5)
CHLORIDE SERPL-SCNC: 100 MMOL/L (ref 95–110)
CHLORIDE SERPL-SCNC: 100 MMOL/L (ref 95–110)
CO2 SERPL-SCNC: 19 MMOL/L (ref 23–29)
CO2 SERPL-SCNC: 21 MMOL/L (ref 23–29)
CREAT SERPL-MCNC: 1.3 MG/DL (ref 0.5–1.4)
CREAT SERPL-MCNC: 1.3 MG/DL (ref 0.5–1.4)
DIFFERENTIAL METHOD: ABNORMAL
EOSINOPHIL NFR BLD: 0 % (ref 0–8)
ERYTHROCYTE [DISTWIDTH] IN BLOOD BY AUTOMATED COUNT: 16.6 % (ref 11.5–14.5)
EST. GFR  (NO RACE VARIABLE): >60 ML/MIN/1.73 M^2
EST. GFR  (NO RACE VARIABLE): >60 ML/MIN/1.73 M^2
GLUCOSE SERPL-MCNC: 112 MG/DL (ref 70–110)
GLUCOSE SERPL-MCNC: 127 MG/DL (ref 70–110)
HCT VFR BLD AUTO: 29.8 % (ref 40–54)
HGB BLD-MCNC: 9.5 G/DL (ref 14–18)
IMM GRANULOCYTES # BLD AUTO: ABNORMAL K/UL (ref 0–0.04)
IMM GRANULOCYTES NFR BLD AUTO: ABNORMAL % (ref 0–0.5)
LYMPHOCYTES NFR BLD: 21 % (ref 18–48)
MAGNESIUM SERPL-MCNC: 2.4 MG/DL (ref 1.6–2.6)
MAGNESIUM SERPL-MCNC: 2.4 MG/DL (ref 1.6–2.6)
MCH RBC QN AUTO: 27.5 PG (ref 27–31)
MCHC RBC AUTO-ENTMCNC: 31.9 G/DL (ref 32–36)
MCV RBC AUTO: 86 FL (ref 82–98)
METAMYELOCYTES NFR BLD MANUAL: 4 %
MONOCYTES NFR BLD: 4 % (ref 4–15)
MYELOCYTES NFR BLD MANUAL: 4 %
NEUTROPHILS NFR BLD: 40 % (ref 38–73)
NEUTS BAND NFR BLD MANUAL: 4 %
NRBC BLD-RTO: 3 /100 WBC
OVALOCYTES BLD QL SMEAR: ABNORMAL
PLATELET # BLD AUTO: 54 K/UL (ref 150–450)
PLATELET BLD QL SMEAR: ABNORMAL
PMV BLD AUTO: 10 FL (ref 9.2–12.9)
POCT GLUCOSE: 100 MG/DL (ref 70–110)
POCT GLUCOSE: 101 MG/DL (ref 70–110)
POCT GLUCOSE: 105 MG/DL (ref 70–110)
POCT GLUCOSE: 96 MG/DL (ref 70–110)
POIKILOCYTOSIS BLD QL SMEAR: SLIGHT
POLYCHROMASIA BLD QL SMEAR: ABNORMAL
POTASSIUM SERPL-SCNC: 4.1 MMOL/L (ref 3.5–5.1)
POTASSIUM SERPL-SCNC: 4.4 MMOL/L (ref 3.5–5.1)
PROT SERPL-MCNC: 7.7 G/DL (ref 6–8.4)
RBC # BLD AUTO: 3.45 M/UL (ref 4.6–6.2)
SODIUM SERPL-SCNC: 129 MMOL/L (ref 136–145)
SODIUM SERPL-SCNC: 135 MMOL/L (ref 136–145)
VANCOMYCIN SERPL-MCNC: 11.7 UG/ML
WBC # BLD AUTO: 8.15 K/UL (ref 3.9–12.7)

## 2023-06-28 PROCEDURE — 97530 THERAPEUTIC ACTIVITIES: CPT

## 2023-06-28 PROCEDURE — 94761 N-INVAS EAR/PLS OXIMETRY MLT: CPT

## 2023-06-28 PROCEDURE — 21400001 HC TELEMETRY ROOM

## 2023-06-28 PROCEDURE — 99223 PR INITIAL HOSPITAL CARE,LEVL III: ICD-10-PCS | Mod: ,,, | Performed by: INTERNAL MEDICINE

## 2023-06-28 PROCEDURE — 25000003 PHARM REV CODE 250: Performed by: INTERNAL MEDICINE

## 2023-06-28 PROCEDURE — 80048 BASIC METABOLIC PNL TOTAL CA: CPT | Mod: 91 | Performed by: THORACIC SURGERY (CARDIOTHORACIC VASCULAR SURGERY)

## 2023-06-28 PROCEDURE — 85730 THROMBOPLASTIN TIME PARTIAL: CPT | Performed by: THORACIC SURGERY (CARDIOTHORACIC VASCULAR SURGERY)

## 2023-06-28 PROCEDURE — 80202 ASSAY OF VANCOMYCIN: CPT | Performed by: THORACIC SURGERY (CARDIOTHORACIC VASCULAR SURGERY)

## 2023-06-28 PROCEDURE — 80076 HEPATIC FUNCTION PANEL: CPT | Performed by: THORACIC SURGERY (CARDIOTHORACIC VASCULAR SURGERY)

## 2023-06-28 PROCEDURE — 86022 PLATELET ANTIBODIES: CPT | Performed by: THORACIC SURGERY (CARDIOTHORACIC VASCULAR SURGERY)

## 2023-06-28 PROCEDURE — 25000003 PHARM REV CODE 250: Performed by: THORACIC SURGERY (CARDIOTHORACIC VASCULAR SURGERY)

## 2023-06-28 PROCEDURE — 85027 COMPLETE CBC AUTOMATED: CPT | Performed by: THORACIC SURGERY (CARDIOTHORACIC VASCULAR SURGERY)

## 2023-06-28 PROCEDURE — 83735 ASSAY OF MAGNESIUM: CPT | Mod: 91 | Performed by: THORACIC SURGERY (CARDIOTHORACIC VASCULAR SURGERY)

## 2023-06-28 PROCEDURE — 99223 1ST HOSP IP/OBS HIGH 75: CPT | Mod: ,,, | Performed by: INTERNAL MEDICINE

## 2023-06-28 PROCEDURE — 63600175 PHARM REV CODE 636 W HCPCS: Performed by: THORACIC SURGERY (CARDIOTHORACIC VASCULAR SURGERY)

## 2023-06-28 PROCEDURE — 36415 COLL VENOUS BLD VENIPUNCTURE: CPT | Performed by: THORACIC SURGERY (CARDIOTHORACIC VASCULAR SURGERY)

## 2023-06-28 PROCEDURE — 97116 GAIT TRAINING THERAPY: CPT

## 2023-06-28 PROCEDURE — 85007 BL SMEAR W/DIFF WBC COUNT: CPT | Performed by: THORACIC SURGERY (CARDIOTHORACIC VASCULAR SURGERY)

## 2023-06-28 PROCEDURE — 25000242 PHARM REV CODE 250 ALT 637 W/ HCPCS: Performed by: THORACIC SURGERY (CARDIOTHORACIC VASCULAR SURGERY)

## 2023-06-28 RX ORDER — METRONIDAZOLE 500 MG/1
500 TABLET ORAL EVERY 8 HOURS
Status: DISCONTINUED | OUTPATIENT
Start: 2023-06-28 | End: 2023-06-29

## 2023-06-28 RX ORDER — ACETAMINOPHEN 325 MG/1
650 TABLET ORAL EVERY 6 HOURS PRN
Status: DISCONTINUED | OUTPATIENT
Start: 2023-06-28 | End: 2023-06-30

## 2023-06-28 RX ADMIN — CEFEPIME 2 G: 2 INJECTION, POWDER, FOR SOLUTION INTRAVENOUS at 06:06

## 2023-06-28 RX ADMIN — FUROSEMIDE 40 MG: 10 INJECTION, SOLUTION INTRAMUSCULAR; INTRAVENOUS at 09:06

## 2023-06-28 RX ADMIN — METRONIDAZOLE 500 MG: 500 TABLET ORAL at 04:06

## 2023-06-28 RX ADMIN — SACUBITRIL AND VALSARTAN 1 TABLET: 24; 26 TABLET, FILM COATED ORAL at 09:06

## 2023-06-28 RX ADMIN — POTASSIUM CHLORIDE 20 MEQ: 1500 TABLET, EXTENDED RELEASE ORAL at 09:06

## 2023-06-28 RX ADMIN — VANCOMYCIN HYDROCHLORIDE 1250 MG: 1.25 INJECTION, POWDER, LYOPHILIZED, FOR SOLUTION INTRAVENOUS at 01:06

## 2023-06-28 RX ADMIN — CEFEPIME 2 G: 2 INJECTION, POWDER, FOR SOLUTION INTRAVENOUS at 09:06

## 2023-06-28 RX ADMIN — APIXABAN 5 MG: 2.5 TABLET, FILM COATED ORAL at 09:06

## 2023-06-28 RX ADMIN — CEFEPIME 2 G: 2 INJECTION, POWDER, FOR SOLUTION INTRAVENOUS at 02:06

## 2023-06-28 RX ADMIN — EMPAGLIFLOZIN 10 MG: 10 TABLET, FILM COATED ORAL at 06:06

## 2023-06-28 RX ADMIN — PRAVASTATIN SODIUM 20 MG: 20 TABLET ORAL at 09:06

## 2023-06-28 RX ADMIN — SODIUM CHLORIDE 30 ML/HR: 9 INJECTION, SOLUTION INTRAVENOUS at 02:06

## 2023-06-28 RX ADMIN — METOPROLOL TARTRATE 50 MG: 50 TABLET, FILM COATED ORAL at 09:06

## 2023-06-28 RX ADMIN — OXYCODONE HYDROCHLORIDE AND ACETAMINOPHEN 500 MG: 500 TABLET ORAL at 09:06

## 2023-06-28 RX ADMIN — ACETAMINOPHEN 650 MG: 325 TABLET ORAL at 11:06

## 2023-06-28 RX ADMIN — FERROUS SULFATE TAB 325 MG (65 MG ELEMENTAL FE) 1 EACH: 325 (65 FE) TAB at 09:06

## 2023-06-28 RX ADMIN — METRONIDAZOLE 500 MG: 500 TABLET ORAL at 01:06

## 2023-06-28 RX ADMIN — CYANOCOBALAMIN TAB 1000 MCG 1000 MCG: 1000 TAB at 09:06

## 2023-06-28 RX ADMIN — PANTOPRAZOLE SODIUM 40 MG: 40 TABLET, DELAYED RELEASE ORAL at 09:06

## 2023-06-28 RX ADMIN — ASPIRIN 81 MG: 81 TABLET, COATED ORAL at 09:06

## 2023-06-28 RX ADMIN — AMITRIPTYLINE HYDROCHLORIDE 50 MG: 50 TABLET, FILM COATED ORAL at 09:06

## 2023-06-28 RX ADMIN — FOLIC ACID 2 MG: 1 TABLET ORAL at 09:06

## 2023-06-28 RX ADMIN — DOCUSATE SODIUM 100 MG: 100 CAPSULE, LIQUID FILLED ORAL at 09:06

## 2023-06-28 RX ADMIN — METRONIDAZOLE 500 MG: 500 TABLET ORAL at 09:06

## 2023-06-28 NOTE — SUBJECTIVE & OBJECTIVE
Oncology Treatment Plan:   [No matching plan found]    Medications:  Continuous Infusions:  Scheduled Meds:   amitriptyline  50 mg Oral QHS    apixaban  2.5 mg Oral BID    ascorbic acid (vitamin C)  500 mg Oral BID    aspirin  81 mg Oral Daily    ceFEPime (MAXIPIME) IVPB  2 g Intravenous Q8H    cyanocobalamin  1,000 mcg Oral Daily    docusate sodium  100 mg Oral BID    empagliflozin  10 mg Oral Daily    ferrous sulfate  1 tablet Oral Daily    folic acid  2 mg Oral Daily    furosemide (LASIX) injection  40 mg Intravenous BID    latanoprost  1 drop Both Eyes Nightly    magnesium hydroxide 400 mg/5 ml  5 mL Oral BID    metoprolol tartrate  50 mg Oral BID    metroNIDAZOLE  500 mg Oral Q8H    pantoprazole  40 mg Oral Daily    polyethylene glycol  17 g Oral Daily    potassium chloride  20 mEq Oral Q12H    pravastatin  20 mg Oral Daily    sacubitriL-valsartan  1 tablet Oral BID     PRN Meds:sodium chloride, sodium chloride, sodium chloride 0.9%, albumin human 5%, calcium gluconate IVPB, calcium gluconate IVPB, calcium gluconate IVPB, dextrose 10%, dextrose 10%, glucagon (human recombinant), glucose, glucose, HYDROmorphone, insulin aspart U-100, lactated ringers, magnesium sulfate IVPB, metoclopramide HCl, ondansetron, pneumoc 20-mary conj-dip cr(PF), potassium chloride in water, potassium chloride in water, potassium chloride in water, sodium chloride 0.9%, Pharmacy to dose Vancomycin consult **AND** vancomycin - pharmacy to dose     Review of patient's allergies indicates:  No Known Allergies     Past Medical History:   Diagnosis Date    Abnormal nuclear stress test 11/26/2022    Cervical radiculopathy     to rt arm    CHF (congestive heart failure)     Chronic systolic congestive heart failure 11/28/2022    Dilated cardiomyopathy 11/26/2022    Hyperlipidemia     Hypertension     Obesity, unspecified     PAF (paroxysmal atrial fibrillation) 11/26/2022    Pulmonary HTN 11/28/2022    Stroke      Past Surgical History:    Procedure Laterality Date    CLOSURE OF LEFT ATRIAL APPENDAGE USING DEVICE Left 6/22/2023    Procedure: CLOSURE, LEFT ATRIAL APPENDAGE, USING DEVICE;  Surgeon: Rustam Dave MD;  Location: [facility] OR;  Service: Cardiovascular;  Laterality: Left;  LIGATION OF LEFT ATRIAL APPENDAGE WITH OTILIA EXCLUSION SYSTEM    CORONARY ARTERY BYPASS GRAFT (CABG) N/A 6/22/2023    Procedure: CORONARY ARTERY BYPASS GRAFT (CABG);  Surgeon: Rustam Dave MD;  Location: [facility] OR;  Service: Cardiovascular;  Laterality: N/A;  3-VESSEL WITH EPI-AORTIC ULTRASOUND    PURDY MAZE PROCEDURE N/A 6/22/2023    Procedure: PURDY MAZE PROCEDURE;  Surgeon: Rustam Dave MD;  Location: [facility] OR;  Service: Cardiovascular;  Laterality: N/A;    ECHOCARDIOGRAM,TRANSESOPHAGEAL N/A 6/22/2023    Procedure: ECHOCARDIOGRAM,TRANSESOPHAGEAL;  Surgeon: Rustam Dave MD;  Location: [facility] OR;  Service: Cardiovascular;  Laterality: N/A;    ENDOSCOPIC HARVEST OF VEIN Left 6/22/2023    Procedure: SURGICAL PROCUREMENT, VEIN, ENDOSCOPIC;  Surgeon: Rustam Dave MD;  Location: [facility] OR;  Service: Cardiovascular;  Laterality: Left;    INJECTION OF ANESTHETIC AGENT AROUND MULTIPLE INTERCOSTAL NERVES N/A 6/22/2023    Procedure: BLOCK, NERVE, INTERCOSTAL, 2 OR MORE;  Surgeon: Rustam Dave MD;  Location: [facility] OR;  Service: Cardiovascular;  Laterality: N/A;  PARASTERNAL NERVE BLOCK    INSTANTANEOUS WAVE-FREE RATIO  6/20/2023    Procedure: Instantaneous Wave-Free Ratio;  Surgeon: Zion Ortega MD;  Location: [facility] CATH LAB;  Service: Cardiology;;    IVUS, CORONARY  6/20/2023    Procedure: IVUS, Coronary;  Surgeon: Zion Ortega MD;  Location: [facility] CATH LAB;  Service: Cardiology;;    LEFT HEART CATHETERIZATION Left 11/28/2022    Procedure: CATHETERIZATION, HEART, LEFT;  Surgeon: Zion Ortega MD;  Location: [facility] CATH LAB;  Service: Cardiology;  Laterality: Left;    LEFT HEART CATHETERIZATION Left 6/20/2023    Procedure: Left heart cath;  Surgeon: Zion Ortega MD;   Location: [facility] CATH LAB;  Service: Cardiology;  Laterality: Left;    PERCUTANEOUS TRANSLUMINAL BALLOON ANGIOPLASTY OF CORONARY ARTERY  6/20/2023    Procedure: Angioplasty-coronary;  Surgeon: Zion Ortega MD;  Location: [facility] CATH LAB;  Service: Cardiology;;    RIGHT HEART CATHETERIZATION N/A 11/28/2022    Procedure: INSERTION, CATHETER, RIGHT HEART;  Surgeon: Zion Ortega MD;  Location: [facility] CATH LAB;  Service: Cardiology;  Laterality: N/A;  Congestive heart failure     Family History       Problem Relation (Age of Onset)    Coronary artery disease Father    Diabetes Father    Heart attack Father    Hyperlipidemia Father    Hypertension Mother, Father          Tobacco Use    Smoking status: Never    Smokeless tobacco: Never   Substance and Sexual Activity    Alcohol use: Yes    Drug use: Never    Sexual activity: Not Currently       Review of Systems   Constitutional:  Positive for fatigue. Negative for activity change, appetite change, chills, diaphoresis, fever and unexpected weight change.   HENT:  Negative for congestion, dental problem, drooling, ear discharge, ear pain, facial swelling, hearing loss, mouth sores, nosebleeds, postnasal drip, rhinorrhea, sinus pressure, sneezing, sore throat, tinnitus, trouble swallowing and voice change.    Eyes:  Negative for photophobia, pain, discharge, redness, itching and visual disturbance.   Respiratory:  Negative for apnea, cough, choking, chest tightness, shortness of breath, wheezing and stridor.    Cardiovascular:  Negative for chest pain, palpitations and leg swelling.   Gastrointestinal:  Negative for abdominal distention, abdominal pain, anal bleeding, blood in stool, constipation, diarrhea, nausea, rectal pain and vomiting.   Endocrine: Negative for cold intolerance, heat intolerance, polydipsia, polyphagia and polyuria.   Genitourinary:  Negative for decreased urine volume, difficulty urinating, dysuria, enuresis, flank pain, frequency, genital sores,  hematuria, penile discharge, penile pain, penile swelling, scrotal swelling, testicular pain and urgency.   Musculoskeletal:  Negative for arthralgias, back pain, gait problem, joint swelling, myalgias, neck pain and neck stiffness.   Skin:  Negative for color change, pallor, rash and wound.   Allergic/Immunologic: Negative for environmental allergies, food allergies and immunocompromised state.   Neurological:  Positive for weakness. Negative for dizziness, tremors, seizures, syncope, facial asymmetry, speech difficulty, light-headedness, numbness and headaches.   Hematological:  Negative for adenopathy. Does not bruise/bleed easily.   Psychiatric/Behavioral:  Positive for dysphoric mood. Negative for agitation, behavioral problems, confusion, decreased concentration, hallucinations, self-injury, sleep disturbance and suicidal ideas. The patient is nervous/anxious. The patient is not hyperactive.    Objective:     Vital Signs (Most Recent):  Temp: 98.3 °F (36.8 °C) (06/28/23 0709)  Pulse: 97 (06/28/23 0709)  Resp: 18 (06/28/23 0709)  BP: 110/66 (06/28/23 0709)  SpO2: 97 % (06/28/23 0709) Vital Signs (24h Range):  Temp:  [97.8 °F (36.6 °C)-98.3 °F (36.8 °C)] 98.3 °F (36.8 °C)  Pulse:  [78-97] 97  Resp:  [16-18] 18  SpO2:  [97 %-98 %] 97 %  BP: (100-137)/(56-82) 110/66     Weight: 113.5 kg (250 lb 3.6 oz)  Body mass index is 33.01 kg/m².  Body surface area is 2.42 meters squared.      Intake/Output Summary (Last 24 hours) at 6/28/2023 0738  Last data filed at 6/28/2023 0142  Gross per 24 hour   Intake 1626.71 ml   Output 1250 ml   Net 376.71 ml        Physical Exam  Vitals reviewed.   Constitutional:       General: He is not in acute distress.     Appearance: He is well-developed. He is ill-appearing. He is not diaphoretic.   HENT:      Head: Normocephalic.      Right Ear: External ear normal.      Left Ear: External ear normal.      Nose: Nose normal.      Right Sinus: No maxillary sinus tenderness or frontal sinus  tenderness.      Left Sinus: No maxillary sinus tenderness or frontal sinus tenderness.      Mouth/Throat:      Pharynx: No oropharyngeal exudate.   Eyes:      General: Lids are normal. No scleral icterus.        Right eye: No discharge.         Left eye: No discharge.      Extraocular Movements:      Right eye: Normal extraocular motion.      Left eye: Normal extraocular motion.      Conjunctiva/sclera:      Right eye: Right conjunctiva is not injected. No hemorrhage.     Left eye: Left conjunctiva is not injected. No hemorrhage.     Pupils: Pupils are equal, round, and reactive to light.   Neck:      Thyroid: No thyromegaly.      Vascular: No JVD.      Trachea: No tracheal deviation.   Cardiovascular:      Rate and Rhythm: Normal rate.   Pulmonary:      Effort: Pulmonary effort is normal. No respiratory distress.      Breath sounds: No stridor.   Abdominal:      General: Bowel sounds are normal.      Palpations: Abdomen is soft. There is no hepatomegaly, splenomegaly or mass.      Tenderness: There is no abdominal tenderness.   Musculoskeletal:         General: No tenderness. Normal range of motion.      Cervical back: Normal range of motion and neck supple.   Lymphadenopathy:      Head:      Right side of head: No posterior auricular or occipital adenopathy.      Left side of head: No posterior auricular or occipital adenopathy.      Cervical: No cervical adenopathy.      Right cervical: No superficial, deep or posterior cervical adenopathy.     Left cervical: No superficial, deep or posterior cervical adenopathy.      Upper Body:      Right upper body: No supraclavicular adenopathy.      Left upper body: No supraclavicular adenopathy.   Skin:     General: Skin is dry.      Findings: No erythema or rash.      Nails: There is no clubbing.   Neurological:      Mental Status: He is alert and oriented to person, place, and time.      Cranial Nerves: No cranial nerve deficit.      Coordination: Coordination normal.    Psychiatric:         Behavior: Behavior normal.         Thought Content: Thought content normal.         Judgment: Judgment normal.        Significant Labs:   BMP:   Recent Labs   Lab 06/27/23  0744 06/27/23 1626 06/27/23 2129   GLU 89 109 110    135* 135*   K 4.0 3.8 3.9    100 101   CO2 23 22* 21*   BUN 28* 25* 22   CREATININE 1.2 1.3 1.2   CALCIUM 8.2* 8.2* 8.4*   MG 2.4 2.6 2.7*   , CBC:   Recent Labs   Lab 06/27/23  0522 06/28/23  0442   WBC 12.26 8.15   HGB 9.7* 9.5*   HCT 29.9* 29.8*   PLT 65* 54*   , CMP:   Recent Labs   Lab 06/27/23  0744 06/27/23 1626 06/27/23 2129    135* 135*   K 4.0 3.8 3.9    100 101   CO2 23 22* 21*   GLU 89 109 110   BUN 28* 25* 22   CREATININE 1.2 1.3 1.2   CALCIUM 8.2* 8.2* 8.4*   ANIONGAP 12 13 13   , Coagulation: No results for input(s): PT, INR, APTT in the last 48 hours., Haptoglobin: No results for input(s): HAPTOGLOBIN in the last 48 hours., Immunology: No results for input(s): SPEP, TANO, FLOR, FREELAMBDALI in the last 48 hours., LDH: No results for input(s): LDHCSF, BFSOURCE in the last 48 hours., LFTs: No results for input(s): ALT, AST, ALKPHOS, BILITOT, PROT, ALBUMIN in the last 48 hours., Reticulocytes: No results for input(s): RETIC in the last 48 hours., Tumor Markers: No results for input(s): PSA, CEA, , AFPTM, [id number],  in the last 48 hours.    Invalid input(s): ALGTM, Uric Acid No results for input(s): URICACID in the last 48 hours., and Urine Studies: No results for input(s): COLORU, APPEARANCEUA, PHUR, SPECGRAV, PROTEINUA, GLUCUA, KETONESU, BILIRUBINUA, OCCULTUA, NITRITE, UROBILINOGEN, LEUKOCYTESUR, RBCUA, WBCUA, BACTERIA, SQUAMEPITHEL, HYALINECASTS in the last 48 hours.    Invalid input(s): GENA    Diagnostic Results:  I have reviewed all pertinent imaging results/findings within the past 24 hours.

## 2023-06-28 NOTE — HPI
63-year-old male history of AC bypass on 06/22/2023 platelet count prior to surgery lower end of normal of 150.  Patient now is 6 days postop with declining platelet count I was asked to see the patient for evaluation of heparin induced thrombocytopenia patient is sitting at bedside no active bleeding no active thrombosis or necrosis of skin

## 2023-06-28 NOTE — TELEPHONE ENCOUNTER
Letter regarding Phase II cardiac rehab was sent to patient, along with telephone # for Maria Elena Fitzpatrick.  Marga Otto RN  Cardiac Rehab Nurse

## 2023-06-28 NOTE — ASSESSMENT & PLAN NOTE
06/23/2023   The patient is postop day 1 status post coronary artery bypass grafting x3 and Woodruff Maze 4 procedure.  Overall the patient is doing well.    Neuro:  Patient is awake alert and oriented x3.  Neuro exam is nonfocal.  Patient moves all 4.  Patient's pain is being controlled with current pain regimen.    Cardiac:  Patient has been hemodynamically stable cardiac index has been about 2.2.  Patient is on low-dose epi and vasopressin.  Wean vasopressin and epi to off.    Respiratory:  Patient was extubated yesterday.  Patient has good sats on nasal cannula.  Continue pulmonary toileting.  Continue incentive spirometer.  GI:  Will advance patient's to regular diet as tolerated.    Renal: Patient has good urine output.  Creatinine is 1.7.  Will start diuresis.  Endocrine:  Patient's glucose is controlled with an insulin drip.  Will convert to sliding scale and long-acting insulin.  Heme:  Hematocrit is 28.2 and platelet count is 116.  Will start patient on a NOAC for anticoagulation once patient has chest tubes have been discontinued.  Patient requires anticoagulation since he is status post Woodruff Maze 4 for atrial fibrillation.  Id:  White count is 4.  Will continue to follow.  Patient is on venu op antibiotics.    Activities:  Patient is out of bed to chair.  Will advance activities as tolerated.    Line tubes and drains:  Patient has a right IJ [place] and [place], chest tubes, pacer wires, A-line, Rankin catheter and saphenectomy site ANDERS drains.    06/24/2023   The patient is postop day 2 status post coronary artery bypass grafting x3 and Woodruff Maze 4 procedure.  Overall the patient is doing well.    Neuro:  Patient is awake alert and oriented x3.  Neuro exam is nonfocal.  Patient moves all 4.  Pain is controlled current pain med main.  Cardiac:  Patient is hemodynamically stable.  Patient is off all pressors.  Continue metoprolol.    Respiratory:  Patient has good sats continue pulmonary toileting.  Continue incentive  spirometer.    GI:  Patient is tolerating p.o. intake.  Continue advance as tolerated.    Renal:  Patient has good urine output.  Creatinine is 1.7.  Continue diuresis.  Endocrine:  Glucose is controlled with sliding scale.  Heme:  Hematocrit is 23.7 and platelet count is 62.  Will start patient on apixaban.  While patient is platelet count is low will start patient on 2.5 mg p.o. b.i.d. of apixaban.  Will increase dose once platelet count recovers.  Id:  White count is 8.92.  Will follow continue to follow white count.  Activities patient is out of bed to chair continue to advance as tolerated.    Line tubes and drains:  Patient has pacer wires, PICC line and Rankin catheter.  Chest tubes have been discontinued.  Will discontinue Rankin catheter.    06/25/2023   The patient is postop day 3 status post coronary artery bypass grafting x3 and Woodruff Maze 4 procedure.    Neuro:  Patient is awake alert and oriented x3.  Neuro exam is nonfocal.  Pain is well controlled.    Cardiac:  Patient is hemodynamically stable.  Continue metoprolol.  Respiratory: Patient has good sats on room air continue pulmonary toileting.  GI:  Patient is tolerating a regular diet.  Patient has not had a bowel movement.  Renal: Patient has good urine output.  Creatinine is 1.6.  Heme:  Hematocrit is 21.5 and platelet count is 61.  Will transfuse 2 units of plaque probe blood cells.  Continue apixaban 2.5 mg for anticoagulation.  Id:  White count is increased to 19.  Will panculture patient.  Will start on empiric broad-spectrum antibiotics.  Activities:  Patient is ambulating in the hallways.  Advance as tolerated.  Line tubes and drains:  Patient has a PICC line, and pacer wires.    06/26/2023   The patient is postop day 4 status post coronary artery bypass grafting x3 and Woodruff Maze 4 procedure.  Overall the patient is doing well.  Anticipate discharge in the next 48-72 hours.  Neuro:  Patient is awake alert and oriented x3.  Neuro exam is nonfocal.   Patient's pain is well controlled.  Cardiac:  Patient is tolerating metoprolol.  Patient has preop low ejection fraction.  Will add Entresto.  In light of severely depressed ejection fraction preoperatively,  patient will need LifeVest prior to discharge.  Will start patient on his preop Entresto and dapagliflozin  Respiratory:  Patient has good sats on room air.  Continue pulmonary toileting.  Continue incentive spirometer.  GI:  Patient is tolerating regular diet.  Patient has had bowel movements.    Renal:  Patient has good urine output.  Creatinine is 1.5.  Will continue diuresis.  Patient appears to still be fluid overloaded.    Heme:  Hematocrit is 27.  Status post 2 units packed red cells.  Platelet count this 62.  Continue apixaban 2.5 mg for anticoagulation.    Id: Patient's white count is down to 18.  Patient is on broad-spectrum antibiotics.  Cultures are pending.    Heme:  Patient's glucose is controlled with sliding scale.  Activities:  Patient is ambulating in the hallways.    Line tubes and drains:  Patient has a PICC line and pacer wires.    06/27/2023   The patient is postop day 5 status post coronary artery bypass grafting x3 and Woodruff Maze 4 procedure.  Overall the patient is doing well.  Anticipate discharge in the next 24 hours.    Neuro:  Patient is awake alert and oriented x3.  Neuro exam is nonfocal.  Pain is well controlled.    Cardiac:  Patient is tolerating metoprolol.  Will increase metoprolol.  Continue Entresto.  Patient will be fitted with a LifeVest prior to discharge.  Patient is currently on empagliflozin.  Patient will be on his home dapagliflozin on discharge.    Respiratory: Patient has good sats on room air.  Continue pulmonary toileting.  Continue incentive spirometer.    GI:  Patient is tolerating a regular diet.  And patient has had bowel movements.    Renal:  Patient has good urine output.  Creatinine is 1.2.  Will place patient on home diuretic doses.  Patient's appears  relatively euvolemic.    Heme:  Hematocrit is 29.  And platelet count is 65.  Continue apixaban 2.5 mg for anticoagulation.    Id:  Patient's white count is down to 12.  Patient is on broad-spectrum antibiotics.  Cultures are no growth to date.  Will discharge patient on p.o. Augmentin.    Endocrine: Glucose is controlled with a sliding scale.    Activities:  Patient is ambulating in the hallways.    Line tubes and drains:  Patient has a PICC line which will be discontinued prior to discharge.

## 2023-06-28 NOTE — PROGRESS NOTES
[facility] - Telemetry ([facility])  Cardiothoracic Surgery  Progress Note    Patient Name: Gustavo Tracey Jr.  MRN: [id number]  Admission Date: 6/20/2023  Hospital Length of Stay: 6 days  Code Status: Full Code   Attending Physician: Rustam Dave MD   Referring Provider: Zion Ortega MD  Principal Problem:Abnormal stress test            Subjective:     Post-Op Info:  Procedure(s) (LRB):  CORONARY ARTERY BYPASS GRAFT (CABG) (N/A)  PURDY MAZE PROCEDURE (N/A)  SURGICAL PROCUREMENT, VEIN, ENDOSCOPIC (Left)  ECHOCARDIOGRAM,TRANSESOPHAGEAL (N/A)  BLOCK, NERVE, INTERCOSTAL, 2 OR MORE (N/A)  CLOSURE, LEFT ATRIAL APPENDAGE, USING DEVICE (Left)   5 Days Post-Op     Interval History:  The patient is postop day 5 status post coronary artery bypass grafting times 3 and Purdy Maze    ROS  Medications:  Continuous Infusions:  Scheduled Meds:   amitriptyline  50 mg Oral QHS    apixaban  2.5 mg Oral BID    ascorbic acid (vitamin C)  500 mg Oral BID    aspirin  81 mg Oral Daily    ceFEPime (MAXIPIME) IVPB  2 g Intravenous Q8H    cyanocobalamin  1,000 mcg Oral Daily    docusate sodium  100 mg Oral BID    empagliflozin  10 mg Oral Daily    ferrous sulfate  1 tablet Oral Daily    folic acid  2 mg Oral Daily    furosemide (LASIX) injection  40 mg Intravenous BID    latanoprost  1 drop Both Eyes Nightly    magnesium hydroxide 400 mg/5 ml  5 mL Oral BID    metoprolol tartrate  50 mg Oral BID    metronidazole  500 mg Intravenous Q8H    pantoprazole  40 mg Oral Daily    polyethylene glycol  17 g Oral Daily    potassium chloride  20 mEq Oral Q12H    pravastatin  20 mg Oral Daily    sacubitriL-valsartan  1 tablet Oral BID     PRN Meds:sodium chloride, sodium chloride, sodium chloride 0.9%, albumin human 5%, calcium gluconate IVPB, calcium gluconate IVPB, calcium gluconate IVPB, dextrose 10%, dextrose 10%, glucagon (human recombinant), glucose, glucose, HYDROmorphone, insulin aspart U-100, lactated ringers, magnesium sulfate IVPB, metoclopramide  HCl, ondansetron, pneumoc 20-mary conj-dip cr(PF), potassium chloride in water, potassium chloride in water, potassium chloride in water, sodium chloride 0.9%, Pharmacy to dose Vancomycin consult **AND** vancomycin - pharmacy to dose     Objective:     Vital Signs (Most Recent):  Temp: 97.8 °F (36.6 °C) (06/27/23 1938)  Pulse: 93 (06/27/23 1938)  Resp: 18 (06/27/23 1938)  BP: 134/68 (06/27/23 1938)  SpO2: 98 % (06/27/23 1938) Vital Signs (24h Range):  Temp:  [97.8 °F (36.6 °C)-98.8 °F (37.1 °C)] 97.8 °F (36.6 °C)  Pulse:  [84-95] 93  Resp:  [16-18] 18  SpO2:  [93 %-98 %] 98 %  BP: ()/(53-75) 134/68     Weight: 113.5 kg (250 lb 3.6 oz)  Body mass index is 33.01 kg/m².    SpO2: 98 %       Intake/Output - Last 3 Shifts         06/25 0700  06/26 0659 06/26 0700 06/27 0659 06/27 0700  06/28 0659    P.O. 480 320 960    I.V. (mL/kg)   195.6 (1.7)    Blood 1245      IV Piggyback  962.5 711.1    Total Intake(mL/kg) 1725 (15.2) 1282.5 (11.3) 1866.7 (16.4)    Urine (mL/kg/hr) 1300 (0.5) 1550 (0.6) 650 (0.4)    Stool 0 0 0    Chest Tube       Total Output 1300 1550 650    Net +425 -267.5 +1216.7           Stool Occurrence 1 x 1 x 2 x            Lines/Drains/Airways       Peripherally Inserted Central Catheter Line  Duration             PICC Double Lumen 06/23/23 1222 right basilic 4 days              Peripheral Intravenous Line  Duration                  Peripheral IV - Single Lumen 06/25/23 1058 20 G Anterior;Distal;Left Upper Arm 2 days                     Physical Exam  Constitutional:       Appearance: Normal appearance.   HENT:      Head: Normocephalic and atraumatic.   Cardiovascular:      Rate and Rhythm: Normal rate and regular rhythm.      Heart sounds: Normal heart sounds.   Pulmonary:      Effort: Pulmonary effort is normal.      Breath sounds: Normal breath sounds.   Abdominal:      General: Abdomen is flat. Bowel sounds are normal.      Palpations: Abdomen is soft.   Musculoskeletal:      Right lower leg: No  edema.      Left lower leg: No edema.   Skin:     General: Skin is warm and dry.   Neurological:      Mental Status: He is alert and oriented to person, place, and time.   Psychiatric:         Behavior: Behavior normal.          Significant Labs:  All pertinent labs from the last 24 hours have been reviewed.    Significant Diagnostics:  I have reviewed all pertinent imaging results/findings within the past 24 hours.    Assessment/Plan:     S/P CABG x 3  06/23/2023   The patient is postop day 1 status post coronary artery bypass grafting x3 and Woodruff Maze 4 procedure.  Overall the patient is doing well.    Neuro:  Patient is awake alert and oriented x3.  Neuro exam is nonfocal.  Patient moves all 4.  Patient's pain is being controlled with current pain regimen.    Cardiac:  Patient has been hemodynamically stable cardiac index has been about 2.2.  Patient is on low-dose epi and vasopressin.  Wean vasopressin and epi to off.    Respiratory:  Patient was extubated yesterday.  Patient has good sats on nasal cannula.  Continue pulmonary toileting.  Continue incentive spirometer.  GI:  Will advance patient's to regular diet as tolerated.    Renal: Patient has good urine output.  Creatinine is 1.7.  Will start diuresis.  Endocrine:  Patient's glucose is controlled with an insulin drip.  Will convert to sliding scale and long-acting insulin.  Heme:  Hematocrit is 28.2 and platelet count is 116.  Will start patient on a NOAC for anticoagulation once patient has chest tubes have been discontinued.  Patient requires anticoagulation since he is status post Woodruff Maze 4 for atrial fibrillation.  Id:  White count is 4.  Will continue to follow.  Patient is on venu op antibiotics.    Activities:  Patient is out of bed to chair.  Will advance activities as tolerated.    Line tubes and drains:  Patient has a right IJ Norris and Cordis, chest tubes, pacer wires, A-line, Rankin catheter and saphenectomy site ANDERS drains.    06/24/2023   The  patient is postop day 2 status post coronary artery bypass grafting x3 and Woodruff Maze 4 procedure.  Overall the patient is doing well.    Neuro:  Patient is awake alert and oriented x3.  Neuro exam is nonfocal.  Patient moves all 4.  Pain is controlled current pain med main.  Cardiac:  Patient is hemodynamically stable.  Patient is off all pressors.  Continue metoprolol.    Respiratory:  Patient has good sats continue pulmonary toileting.  Continue incentive spirometer.    GI:  Patient is tolerating p.o. intake.  Continue advance as tolerated.    Renal:  Patient has good urine output.  Creatinine is 1.7.  Continue diuresis.  Endocrine:  Glucose is controlled with sliding scale.  Heme:  Hematocrit is 23.7 and platelet count is 62.  Will start patient on apixaban.  While patient is platelet count is low will start patient on 2.5 mg p.o. b.i.d. of apixaban.  Will increase dose once platelet count recovers.  Id:  White count is 8.92.  Will follow continue to follow white count.  Activities patient is out of bed to chair continue to advance as tolerated.    Line tubes and drains:  Patient has pacer wires, PICC line and Rankin catheter.  Chest tubes have been discontinued.  Will discontinue Rankin catheter.    06/25/2023   The patient is postop day 3 status post coronary artery bypass grafting x3 and Woodruff Maze 4 procedure.    Neuro:  Patient is awake alert and oriented x3.  Neuro exam is nonfocal.  Pain is well controlled.    Cardiac:  Patient is hemodynamically stable.  Continue metoprolol.  Respiratory: Patient has good sats on room air continue pulmonary toileting.  GI:  Patient is tolerating a regular diet.  Patient has not had a bowel movement.  Renal: Patient has good urine output.  Creatinine is 1.6.  Heme:  Hematocrit is 21.5 and platelet count is 61.  Will transfuse 2 units of plaque probe blood cells.  Continue apixaban 2.5 mg for anticoagulation.  Id:  White count is increased to 19.  Will panculture patient.  Will  start on empiric broad-spectrum antibiotics.  Activities:  Patient is ambulating in the hallways.  Advance as tolerated.  Line tubes and drains:  Patient has a PICC line, and pacer wires.    06/26/2023   The patient is postop day 4 status post coronary artery bypass grafting x3 and Woodruff Maze 4 procedure.  Overall the patient is doing well.  Anticipate discharge in the next 48-72 hours.  Neuro:  Patient is awake alert and oriented x3.  Neuro exam is nonfocal.  Patient's pain is well controlled.  Cardiac:  Patient is tolerating metoprolol.  Patient has preop low ejection fraction.  Will add Entresto.  In light of severely depressed ejection fraction preoperatively,  patient will need LifeVest prior to discharge.  Will start patient on his preop Entresto and dapagliflozin  Respiratory:  Patient has good sats on room air.  Continue pulmonary toileting.  Continue incentive spirometer.  GI:  Patient is tolerating regular diet.  Patient has had bowel movements.    Renal:  Patient has good urine output.  Creatinine is 1.5.  Will continue diuresis.  Patient appears to still be fluid overloaded.    Heme:  Hematocrit is 27.  Status post 2 units packed red cells.  Platelet count this 62.  Continue apixaban 2.5 mg for anticoagulation.    Id: Patient's white count is down to 18.  Patient is on broad-spectrum antibiotics.  Cultures are pending.    Heme:  Patient's glucose is controlled with sliding scale.  Activities:  Patient is ambulating in the hallways.    Line tubes and drains:  Patient has a PICC line and pacer wires.    06/27/2023   The patient is postop day 5 status post coronary artery bypass grafting x3 and Woodruff Maze 4 procedure.  Overall the patient is doing well.  Anticipate discharge in the next 24 hours.    Neuro:  Patient is awake alert and oriented x3.  Neuro exam is nonfocal.  Pain is well controlled.    Cardiac:  Patient is tolerating metoprolol.  Will increase metoprolol.  Continue Entresto.  Patient will be  fitted with a LifeVest prior to discharge.  Patient is currently on empagliflozin.  Patient will be on his home dapagliflozin on discharge.    Respiratory: Patient has good sats on room air.  Continue pulmonary toileting.  Continue incentive spirometer.    GI:  Patient is tolerating a regular diet.  And patient has had bowel movements.    Renal:  Patient has good urine output.  Creatinine is 1.2.  Will place patient on home diuretic doses.  Patient's appears relatively euvolemic.    Heme:  Hematocrit is 29.  And platelet count is 65.  Continue apixaban 2.5 mg for anticoagulation.  Patient has anemia and thrombocytopenia.  Will refer to heme Onc as outpatient.    Id:  Patient's white count is down to 12.  Patient is on broad-spectrum antibiotics.  Cultures are no growth to date.  Will discharge patient on p.o. Augmentin.    Endocrine: Glucose is controlled with a sliding scale.    Activities:  Patient is ambulating in the hallways.    Line tubes and drains:  Patient has a PICC line which will be discontinued prior to discharge.      Left main coronary artery disease  The patient is a 63-year-old male with CHF, history of AFib, status post CVA, hyperlipidemia, hypertension, diabetes and cardiomyopathy who had a normal Cardiolite test on workup.  The patient was brought to the operating room and cardiac catheterization shows significant multivessel coronary artery disease with a 70% ostial left main disease and a 70% proximal LAD disease.    The patient is a candidate for coronary artery bypass grafting and Woodruff Maze 4 procedure.  Preop workup is in progress.  The risks and benefits of the surgery were explained to the patient.  The patient understands the risks and benefits of surgery and has to proceed with surgery which has been scheduled for 06/22/2023.        Rustam Dave MD  Cardiothoracic Surgery  [facility] ([facility])

## 2023-06-28 NOTE — PT/OT/SLP PROGRESS
Physical Therapy Treatment    Patient Name:  Gustavo Tracey Jr.   MRN:  [id number]    Recommendations:     Discharge Recommendations: home health PT  Discharge Equipment Recommendations: shower chair  Barriers to discharge: None    Assessment:     Gustavo Tracey Jr. is a 63 y.o. male admitted with a medical diagnosis of Abnormal stress test.  He presents with the following impairments/functional limitations: weakness, impaired endurance, impaired functional mobility, gait instability, impaired balance, pain, decreased safety awareness, decreased coordination, impaired cardiopulmonary response to activity.    Rehab Prognosis: Good; patient would benefit from acute skilled PT services to address these deficits and reach maximum level of function.    Recent Surgery: Procedure(s) (LRB):  CORONARY ARTERY BYPASS GRAFT (CABG) (N/A)  PURDY MAZE PROCEDURE (N/A)  SURGICAL PROCUREMENT, VEIN, ENDOSCOPIC (Left)  ECHOCARDIOGRAM,TRANSESOPHAGEAL (N/A)  BLOCK, NERVE, INTERCOSTAL, 2 OR MORE (N/A)  CLOSURE, LEFT ATRIAL APPENDAGE, USING DEVICE (Left) 6 Days Post-Op    Plan:     During this hospitalization, patient to be seen 3 x/week to address the identified rehab impairments via gait training, therapeutic activities, therapeutic exercises and progress toward the following goals:    Plan of Care Expires:  07/07/23    Subjective     Chief Complaint: PT REPORTS FEELING MUCH BETTER TODAY, WELL RESTED, READY TO GO HOME  Patient/Family Comments/goals:   Pain/Comfort:  Pain Rating 1: 0/10      Objective:     Communicated with NURSE KATZ prior to session.  Patient found right sidelying with telemetry, peripheral IV upon PT entry to room.     General Precautions: Standard, fall, sternal  Orthopedic Precautions: N/A  Braces: N/A  Respiratory Status: Room air     Functional Mobility:  Bed Mobility:     Supine to Sit: stand by assistance-CUES FOR CORRECT TECHNIQUE  Transfers:     Sit to Stand:  stand by assistance with no AD  Bed to Chair: stand  "by assistance with  no AD  using  Step Transfer  Gait: PT ' NO AD WITH SBA, NARROW AURE BUT NO LOB, 1 SMALL SITTING BREAK AND 1 SMALL STANDING BREAK, GOOD EFFORT  Balance: GOOD SITTING BALANCE, FAIR DYNAMIC BALANCE DURING GAIT    AM-PAC 6 CLICK MOBILITY  Turning over in bed (including adjusting bedclothes, sheets and blankets)?: 4  Sitting down on and standing up from a chair with arms (e.g., wheelchair, bedside commode, etc.): 4  Moving from lying on back to sitting on the side of the bed?: 4  Moving to and from a bed to a chair (including a wheelchair)?: 4  Need to walk in hospital room?: 4  Climbing 3-5 steps with a railing?: 1  Basic Mobility Total Score: 21     Treatment & Education:  PT EDUCATION:  - ROLE OF P.T. AND POC IN ACUTE CARE HOSPITAL SETTING  - REVIEW STERNAL PRECAUTIONS SINCE PT NON-COMPLIANT WITH BED MOBILITY  - ENCOURAGED TO INCREASE TIME OOB IN CHAIR TO TOLERANCE   - TO CONTINUE THERAPUETIC EXERCISES THROUGHOUT THE DAY TO INCREASE ACTIVITY TOLERANCE AND DECREASE RISK FOR PNEUMONIA AND BLOOD CLOTS: HIP FLEX/EXT, HIP ABD/ADD, QUAD SET, HEEL SLIDE, AP  - RISK FOR FALLS DUE TO GENERALIZED WEAKNESS, EDUCATED ON "CALL DON'T FALL", ENCOURAGED TO CALL FOR ASSISTANCE WITH ALL NEEDS SUCH AS BED<>CHAIR TRANSFERS OR TRIPS TO BATHROOM, PT AGREEABLE TO SAFETY PRECAUTIONS    Patient left up in chair with all lines intact, call button in reach, NURSE notified, and SET UP FOR BREAKFAST ..    GOALS:   Multidisciplinary Problems       Physical Therapy Goals          Problem: Physical Therapy    Goal Priority Disciplines Outcome Goal Variances Interventions   Physical Therapy Goal     PT, PT/OT Ongoing, Progressing     Description: LTG'S TO BE MET IN 14 DAYS (7-7-23)  PT WILL REQUIRE CGA FOR BED MOBILITY  PT WILL REQUIRE SPV FOR BED<>CHAIR TF'S  PT WILL  FEET NO AD SPV                         Time Tracking:     PT Received On: 06/28/23  PT Start Time: 0720     PT Stop Time: 0745  PT Total Time (min): 25 " min     Billable Minutes: Gait Training 10 and Therapeutic Activity 15    Treatment Type: Treatment  PT/PTA: PT     Number of PTA visits since last PT visit: 0     06/28/2023

## 2023-06-28 NOTE — PROGRESS NOTES
Subjective:      Patient ID: Gustavo Tracey Jr. is a 63 y.o. male.    Chief Complaint: No chief complaint on file.    HPI: Left main coronary artery disease  The patient is a 63-year-old male with CHF, history of AFib, status post CVA, hyperlipidemia, hypertension, diabetes and cardiomyopathy who had a normal Cardiolite test on workup.  The patient was brought to the operating room and cardiac catheterization shows significant multivessel coronary artery disease with a 70% ostial left main disease and a 70% proximal LAD disease.     The patient is a candidate for coronary artery bypass grafting and Purdy Maze 4 procedure.  Preop workup is in progress.  The risks and benefits of the surgery were explained to the patient.  The patient understands the risks and benefits of surgery and has to proceed with surgery which has been scheduled for 06/22/2023.  Post-Op Info:  Procedure(s) (LRB):  CORONARY ARTERY BYPASS GRAFT (CABG) (N/A)  PURDY MAZE PROCEDURE (N/A)  SURGICAL PROCUREMENT, VEIN, ENDOSCOPIC (Left)  ECHOCARDIOGRAM,TRANSESOPHAGEAL (N/A)  BLOCK, NERVE, INTERCOSTAL, 2 OR MORE (N/A)  CLOSURE, LEFT ATRIAL APPENDAGE, USING DEVICE (Left)   S/P CABG x 3  06/23/2023   The patient is postop day 1 status post coronary artery bypass grafting x3 and Purdy Maze 4 procedure.  Overall the patient is doing well.    Neuro:  Patient is awake alert and oriented x3.  Neuro exam is nonfocal.  Patient moves all 4.  Patient's pain is being controlled with current pain regimen.    Cardiac:  Patient has been hemodynamically stable cardiac index has been about 2.2.  Patient is on low-dose epi and vasopressin.  Wean vasopressin and epi to off.    Respiratory:  Patient was extubated yesterday.  Patient has good sats on nasal cannula.  Continue pulmonary toileting.  Continue incentive spirometer.  GI:  Will advance patient's to regular diet as tolerated.    Renal: Patient has good urine output.  Creatinine is 1.7.  Will start diuresis.  Endocrine:   Patient's glucose is controlled with an insulin drip.  Will convert to sliding scale and long-acting insulin.  Heme:  Hematocrit is 28.2 and platelet count is 116.  Will start patient on a NOAC for anticoagulation once patient has chest tubes have been discontinued.  Patient requires anticoagulation since he is status post Woodruff Maze 4 for atrial fibrillation.  Id:  White count is 4.  Will continue to follow.  Patient is on venu op antibiotics.    Activities:  Patient is out of bed to chair.  Will advance activities as tolerated.    Line tubes and drains:  Patient has a right IJ [place] and [place], chest tubes, pacer wires, A-line, Rankin catheter and saphenectomy site ANDERS drains.     06/24/2023   The patient is postop day 2 status post coronary artery bypass grafting x3 and Woodruff Maze 4 procedure.  Overall the patient is doing well.    Neuro:  Patient is awake alert and oriented x3.  Neuro exam is nonfocal.  Patient moves all 4.  Pain is controlled current pain med main.  Cardiac:  Patient is hemodynamically stable.  Patient is off all pressors.  Continue metoprolol.    Respiratory:  Patient has good sats continue pulmonary toileting.  Continue incentive spirometer.    GI:  Patient is tolerating p.o. intake.  Continue advance as tolerated.    Renal:  Patient has good urine output.  Creatinine is 1.7.  Continue diuresis.  Endocrine:  Glucose is controlled with sliding scale.  Heme:  Hematocrit is 23.7 and platelet count is 62.  Will start patient on apixaban.  While patient is platelet count is low will start patient on 2.5 mg p.o. b.i.d. of apixaban.  Will increase dose once platelet count recovers.  Id:  White count is 8.92.  Will follow continue to follow white count.  Activities patient is out of bed to chair continue to advance as tolerated.    Line tubes and drains:  Patient has pacer wires, PICC line and Rankin catheter.  Chest tubes have been discontinued.  Will discontinue Rankin catheter.     06/25/2023   The patient  is postop day 3 status post coronary artery bypass grafting x3 and Woodruff Maze 4 procedure.    Neuro:  Patient is awake alert and oriented x3.  Neuro exam is nonfocal.  Pain is well controlled.    Cardiac:  Patient is hemodynamically stable.  Continue metoprolol.  Respiratory: Patient has good sats on room air continue pulmonary toileting.  GI:  Patient is tolerating a regular diet.  Patient has not had a bowel movement.  Renal: Patient has good urine output.  Creatinine is 1.6.  Heme:  Hematocrit is 21.5 and platelet count is 61.  Will transfuse 2 units of plaque probe blood cells.  Continue apixaban 2.5 mg for anticoagulation.  Id:  White count is increased to 19.  Will panculture patient.  Will start on empiric broad-spectrum antibiotics.  Activities:  Patient is ambulating in the hallways.  Advance as tolerated.  Line tubes and drains:  Patient has a PICC line, and pacer wires.     06/26/2023   The patient is postop day 4 status post coronary artery bypass grafting x3 and Woodruff Maze 4 procedure.  Overall the patient is doing well.  Anticipate discharge in the next 48-72 hours.  Neuro:  Patient is awake alert and oriented x3.  Neuro exam is nonfocal.  Patient's pain is well controlled.  Cardiac:  Patient is tolerating metoprolol.  Patient has preop low ejection fraction.  Will add Entresto.  In light of severely depressed ejection fraction preoperatively,  patient will need LifeVest prior to discharge.  Will start patient on his preop Entresto and dapagliflozin  Respiratory:  Patient has good sats on room air.  Continue pulmonary toileting.  Continue incentive spirometer.  GI:  Patient is tolerating regular diet.  Patient has had bowel movements.    Renal:  Patient has good urine output.  Creatinine is 1.5.  Will continue diuresis.  Patient appears to still be fluid overloaded.    Heme:  Hematocrit is 27.  Status post 2 units packed red cells.  Platelet count this 62.  Continue apixaban 2.5 mg for anticoagulation.     Id: Patient's white count is down to 18.  Patient is on broad-spectrum antibiotics.  Cultures are pending.    Heme:  Patient's glucose is controlled with sliding scale.  Activities:  Patient is ambulating in the hallways.    Line tubes and drains:  Patient has a PICC line and pacer wires.     06/27/2023   The patient is postop day 5 status post coronary artery bypass grafting x3 and Woodruff Maze 4 procedure.  Overall the patient is doing well.  Anticipate discharge in the next 24 hours.    Neuro:  Patient is awake alert and oriented x3.  Neuro exam is nonfocal.  Pain is well controlled.    Cardiac:  Patient is tolerating metoprolol.  Will increase metoprolol.  Continue Entresto.  Patient will be fitted with a LifeVest prior to discharge.  Patient is currently on empagliflozin.  Patient will be on his home dapagliflozin on discharge.    Respiratory: Patient has good sats on room air.  Continue pulmonary toileting.  Continue incentive spirometer.    GI:  Patient is tolerating a regular diet.  And patient has had bowel movements.    Renal:  Patient has good urine output.  Creatinine is 1.2.  Will place patient on home diuretic doses.  Patient's appears relatively euvolemic.    Heme:  Hematocrit is 29.  And platelet count is 65.  Continue apixaban 2.5 mg for anticoagulation.  Patient has anemia and thrombocytopenia.  Will refer to heme Onc as outpatient.    Id:  Patient's white count is down to 12.  Patient is on broad-spectrum antibiotics.  Cultures are no growth to date.  Will discharge patient on p.o. Augmentin.    Endocrine: Glucose is controlled with a sliding scale.    Activities:  Patient is ambulating in the hallways.    Line tubes and drains:  Patient has a PICC line which will be discontinued prior to discharge.           Review of patient's allergies indicates:   Allergen Reactions    Heparin analogues        Past Medical History:   Diagnosis Date    Abnormal nuclear stress test 11/26/2022    Cervical  radiculopathy     to rt arm    CHF (congestive heart failure)     Chronic systolic congestive heart failure 11/28/2022    Dilated cardiomyopathy 11/26/2022    Hyperlipidemia     Hypertension     Obesity, unspecified     PAF (paroxysmal atrial fibrillation) 11/26/2022    Pulmonary HTN 11/28/2022    Stroke        Family History   Problem Relation Age of Onset    Hypertension Mother     Diabetes Father     Hyperlipidemia Father     Hypertension Father     Heart attack Father     Coronary artery disease Father        Social History     Socioeconomic History    Marital status:    Tobacco Use    Smoking status: Never    Smokeless tobacco: Never   Substance and Sexual Activity    Alcohol use: Yes    Drug use: Never    Sexual activity: Not Currently       Past Surgical History:   Procedure Laterality Date    CLOSURE OF LEFT ATRIAL APPENDAGE USING DEVICE Left 6/22/2023    Procedure: CLOSURE, LEFT ATRIAL APPENDAGE, USING DEVICE;  Surgeon: Rustam Dave MD;  Location: [facility] OR;  Service: Cardiovascular;  Laterality: Left;  LIGATION OF LEFT ATRIAL APPENDAGE WITH OTILIA EXCLUSION SYSTEM    CORONARY ARTERY BYPASS GRAFT (CABG) N/A 6/22/2023    Procedure: CORONARY ARTERY BYPASS GRAFT (CABG);  Surgeon: Rustam Dave MD;  Location: [facility] OR;  Service: Cardiovascular;  Laterality: N/A;  3-VESSEL WITH EPI-AORTIC ULTRASOUND    PURDY MAZE PROCEDURE N/A 6/22/2023    Procedure: PURDY MAZE PROCEDURE;  Surgeon: Rustam Dave MD;  Location: [facility] OR;  Service: Cardiovascular;  Laterality: N/A;    ECHOCARDIOGRAM,TRANSESOPHAGEAL N/A 6/22/2023    Procedure: ECHOCARDIOGRAM,TRANSESOPHAGEAL;  Surgeon: Rustam Dave MD;  Location: [facility] OR;  Service: Cardiovascular;  Laterality: N/A;    ENDOSCOPIC HARVEST OF VEIN Left 6/22/2023    Procedure: SURGICAL PROCUREMENT, VEIN, ENDOSCOPIC;  Surgeon: Rustam Dave MD;  Location: [facility] OR;  Service: Cardiovascular;  Laterality: Left;    INJECTION OF ANESTHETIC AGENT AROUND MULTIPLE INTERCOSTAL NERVES  N/A 6/22/2023    Procedure: BLOCK, NERVE, INTERCOSTAL, 2 OR MORE;  Surgeon: Rustam Dave MD;  Location: [facility] OR;  Service: Cardiovascular;  Laterality: N/A;  PARASTERNAL NERVE BLOCK    INSTANTANEOUS WAVE-FREE RATIO  6/20/2023    Procedure: Instantaneous Wave-Free Ratio;  Surgeon: Zion Ortega MD;  Location: [facility] CATH LAB;  Service: Cardiology;;    IVUS, CORONARY  6/20/2023    Procedure: IVUS, Coronary;  Surgeon: Zion Ortega MD;  Location: [facility] CATH LAB;  Service: Cardiology;;    LEFT HEART CATHETERIZATION Left 11/28/2022    Procedure: CATHETERIZATION, HEART, LEFT;  Surgeon: Zion Ortega MD;  Location: [facility] CATH LAB;  Service: Cardiology;  Laterality: Left;    LEFT HEART CATHETERIZATION Left 6/20/2023    Procedure: Left heart cath;  Surgeon: Zion Ortega MD;  Location: [facility] CATH LAB;  Service: Cardiology;  Laterality: Left;    PERCUTANEOUS TRANSLUMINAL BALLOON ANGIOPLASTY OF CORONARY ARTERY  6/20/2023    Procedure: Angioplasty-coronary;  Surgeon: Zion Ortega MD;  Location: [facility] CATH LAB;  Service: Cardiology;;    RIGHT HEART CATHETERIZATION N/A 11/28/2022    Procedure: INSERTION, CATHETER, RIGHT HEART;  Surgeon: Zion Ortega MD;  Location: [facility] CATH LAB;  Service: Cardiology;  Laterality: N/A;  Congestive heart failure       Review of Systems   Constitutional:  Negative for activity change and appetite change.   HENT:  Negative for dental problem, nosebleeds and sore throat.    Eyes:  Negative for discharge and visual disturbance.   Respiratory:  Positive for chest tightness. Negative for cough and stridor.    Cardiovascular:  Negative for leg swelling.   Gastrointestinal:  Negative for abdominal distention and abdominal pain.   Genitourinary:  Negative for difficulty urinating and dysuria.   Musculoskeletal:  Negative for arthralgias, back pain and joint swelling.   Allergic/Immunologic: Negative for environmental allergies.   Neurological:  Negative for dizziness, syncope and headaches.  "  Hematological:  Does not bruise/bleed easily.   Psychiatric/Behavioral:  Negative for behavioral problems.         Objective:   /66 (Patient Position: Sitting)   Pulse 97   Temp 98.3 °F (36.8 °C) (Oral)   Resp 18   Ht 6' 1" (1.854 m)   Wt 113.5 kg (250 lb 3.6 oz)   SpO2 97%   BMI 33.01 kg/m²     X-Ray Chest AP Portable  Narrative: EXAMINATION:  XR CHEST AP PORTABLE    CLINICAL HISTORY:  Post-op; lines and tubes in place, subsequent encounter    COMPARISON:  June 23, 2023    FINDINGS:  EKG leads and other life-saving devices continue to overlie the chest.  Stable right arm PICC line, left-sided chest tube and large-bore mediastinal drain.  Negative for pneumothorax.  Stable bilateral lower lobe atelectasis/consolidation without new pulmonary opacities.  Interval decrease in the amount of air within loops of bowel underneath the left hemidiaphragm.  The hilar and mediastinal contours and osseous structures are unchanged.  Impression: 1.  Overall, no significant interval change has occurred.    Electronically signed by: Selvin Rowe MD  Date:    06/24/2023  Time:    07:00         Physical Exam  Vitals and nursing note reviewed.   Constitutional:       Appearance: He is obese.   HENT:      Head: Normocephalic.      Mouth/Throat:      Mouth: Mucous membranes are moist.   Eyes:      Extraocular Movements: Extraocular movements intact.      Pupils: Pupils are equal, round, and reactive to light.   Cardiovascular:      Rate and Rhythm: Normal rate and regular rhythm.      Pulses: Normal pulses.      Heart sounds: Normal heart sounds.      Comments: Midline incision healing well  Leg incisions also healing well  Pulmonary:      Effort: Pulmonary effort is normal.      Breath sounds: Normal breath sounds.   Abdominal:      Palpations: Abdomen is soft.   Musculoskeletal:         General: Normal range of motion.      Cervical back: Normal range of motion and neck supple.   Skin:     General: Skin is warm.      " Capillary Refill: Capillary refill takes less than 2 seconds.   Neurological:      General: No focal deficit present.      Mental Status: He is alert and oriented to person, place, and time.   Psychiatric:         Mood and Affect: Mood normal.       Assessment:     1. Coronary artery disease without angina pectoris    2. Abnormal stress test    3. CAD, multiple vessel    4. DCM (dilated cardiomyopathy)    5. SOB (shortness of breath)    6. PAF (paroxysmal atrial fibrillation)    7. History of CVA (cerebrovascular accident)    8. Pre-operative cardiovascular examination    9. CAD (coronary artery disease)    10. Post-operative state    11. S/P CABG x 3    12. Anemia, unspecified type          Plan   The patient is postop day 6 status post coronary artery bypass grafting x3 and Woodruff Maze 4 procedure.    Neuro:  Patient is awake alert and oriented x3.  Neuro exam is nonfocal.  Pain is well controlled.    Cardiac:  Metoprolol.  Continue Entresto.  Patient will be fitted with a LifeVest prior to discharge.  Patient is currently on empagliflozin.  Patient will be on his home dapagliflozin on discharge.    Respiratory:  Aggressive pulmonary toilet  Continue incentive spirometer.    GI:  Cardiac diet    Renal:  Creatinine back to baseline   Will place patient on home diuretic doses.    Heme:  Hematocrit is 29.  And platelet count is 55 consult Heme-Onc for possible hit  Continue apixaban 5 mg 5 mg for anticoagulation.     d:  Patient's white count is down   Patient is on broad-spectrum antibiotics.  Cultures are no growth to date.  Will discharge patient on p.o. Augmentin.    Endocrine: Glucose is controlled with a sliding scale.    Activities:  Patient is ambulating in the hallways.    Line tubes and drains:  Patient has a PICC line which will be discontinued prior to discharge.    Waiting for up trend in the platelet count before discharge          Malissa Graham MD  [facility]  [place]

## 2023-06-28 NOTE — CONSULTS
[facility] - Telemetry ([facility])  Hematology/Oncology  Consult Note    Patient Name: Gustavo Tracey Jr.  MRN: [id number]  Admission Date: 6/20/2023  Hospital Length of Stay: 7 days  Code Status: Full Code   Attending Provider: Rustam Dave MD  Consulting Provider: Elan Nichole MD  Primary Care Physician: Braxton Guzman Jr, MD  Principal Problem:Abnormal stress test    Consults  Subjective:     HPI:  63-year-old male history of AC bypass on 06/22/2023 platelet count prior to surgery lower end of normal of 150.  Patient now is 6 days postop with declining platelet count I was asked to see the patient for evaluation of heparin induced thrombocytopenia patient is sitting at bedside no active bleeding no active thrombosis or necrosis of skin      Oncology Treatment Plan:   [No matching plan found]    Medications:  Continuous Infusions:  Scheduled Meds:   amitriptyline  50 mg Oral QHS    apixaban  2.5 mg Oral BID    ascorbic acid (vitamin C)  500 mg Oral BID    aspirin  81 mg Oral Daily    ceFEPime (MAXIPIME) IVPB  2 g Intravenous Q8H    cyanocobalamin  1,000 mcg Oral Daily    docusate sodium  100 mg Oral BID    empagliflozin  10 mg Oral Daily    ferrous sulfate  1 tablet Oral Daily    folic acid  2 mg Oral Daily    furosemide (LASIX) injection  40 mg Intravenous BID    latanoprost  1 drop Both Eyes Nightly    magnesium hydroxide 400 mg/5 ml  5 mL Oral BID    metoprolol tartrate  50 mg Oral BID    metroNIDAZOLE  500 mg Oral Q8H    pantoprazole  40 mg Oral Daily    polyethylene glycol  17 g Oral Daily    potassium chloride  20 mEq Oral Q12H    pravastatin  20 mg Oral Daily    sacubitriL-valsartan  1 tablet Oral BID     PRN Meds:sodium chloride, sodium chloride, sodium chloride 0.9%, albumin human 5%, calcium gluconate IVPB, calcium gluconate IVPB, calcium gluconate IVPB, dextrose 10%, dextrose 10%, glucagon (human recombinant), glucose, glucose, HYDROmorphone, insulin aspart U-100, lactated ringers,  magnesium sulfate IVPB, metoclopramide HCl, ondansetron, pneumoc 20-mary conj-dip cr(PF), potassium chloride in water, potassium chloride in water, potassium chloride in water, sodium chloride 0.9%, Pharmacy to dose Vancomycin consult **AND** vancomycin - pharmacy to dose     Review of patient's allergies indicates:  No Known Allergies     Past Medical History:   Diagnosis Date    Abnormal nuclear stress test 11/26/2022    Cervical radiculopathy     to rt arm    CHF (congestive heart failure)     Chronic systolic congestive heart failure 11/28/2022    Dilated cardiomyopathy 11/26/2022    Hyperlipidemia     Hypertension     Obesity, unspecified     PAF (paroxysmal atrial fibrillation) 11/26/2022    Pulmonary HTN 11/28/2022    Stroke      Past Surgical History:   Procedure Laterality Date    CLOSURE OF LEFT ATRIAL APPENDAGE USING DEVICE Left 6/22/2023    Procedure: CLOSURE, LEFT ATRIAL APPENDAGE, USING DEVICE;  Surgeon: Rustam Dave MD;  Location: [facility] OR;  Service: Cardiovascular;  Laterality: Left;  LIGATION OF LEFT ATRIAL APPENDAGE WITH OTILIA EXCLUSION SYSTEM    CORONARY ARTERY BYPASS GRAFT (CABG) N/A 6/22/2023    Procedure: CORONARY ARTERY BYPASS GRAFT (CABG);  Surgeon: Rustam Dave MD;  Location: [facility] OR;  Service: Cardiovascular;  Laterality: N/A;  3-VESSEL WITH EPI-AORTIC ULTRASOUND    PURDY MAZE PROCEDURE N/A 6/22/2023    Procedure: PURDY MAZE PROCEDURE;  Surgeon: Rustam Dave MD;  Location: [facility] OR;  Service: Cardiovascular;  Laterality: N/A;    ECHOCARDIOGRAM,TRANSESOPHAGEAL N/A 6/22/2023    Procedure: ECHOCARDIOGRAM,TRANSESOPHAGEAL;  Surgeon: Rustam Dave MD;  Location: [facility] OR;  Service: Cardiovascular;  Laterality: N/A;    ENDOSCOPIC HARVEST OF VEIN Left 6/22/2023    Procedure: SURGICAL PROCUREMENT, VEIN, ENDOSCOPIC;  Surgeon: Rustam Dave MD;  Location: [facility] OR;  Service: Cardiovascular;  Laterality: Left;    INJECTION OF ANESTHETIC AGENT AROUND MULTIPLE INTERCOSTAL NERVES  N/A 6/22/2023    Procedure: BLOCK, NERVE, INTERCOSTAL, 2 OR MORE;  Surgeon: Rustam Dave MD;  Location: [facility] OR;  Service: Cardiovascular;  Laterality: N/A;  PARASTERNAL NERVE BLOCK    INSTANTANEOUS WAVE-FREE RATIO  6/20/2023    Procedure: Instantaneous Wave-Free Ratio;  Surgeon: Zion Ortega MD;  Location: [facility] CATH LAB;  Service: Cardiology;;    IVUS, CORONARY  6/20/2023    Procedure: IVUS, Coronary;  Surgeon: Zion Ortega MD;  Location: [facility] CATH LAB;  Service: Cardiology;;    LEFT HEART CATHETERIZATION Left 11/28/2022    Procedure: CATHETERIZATION, HEART, LEFT;  Surgeon: Zion Ortega MD;  Location: [facility] CATH LAB;  Service: Cardiology;  Laterality: Left;    LEFT HEART CATHETERIZATION Left 6/20/2023    Procedure: Left heart cath;  Surgeon: Zion Ortega MD;  Location: [facility] CATH LAB;  Service: Cardiology;  Laterality: Left;    PERCUTANEOUS TRANSLUMINAL BALLOON ANGIOPLASTY OF CORONARY ARTERY  6/20/2023    Procedure: Angioplasty-coronary;  Surgeon: Zion Ortega MD;  Location: [facility] CATH LAB;  Service: Cardiology;;    RIGHT HEART CATHETERIZATION N/A 11/28/2022    Procedure: INSERTION, CATHETER, RIGHT HEART;  Surgeon: Zion Ortega MD;  Location: [facility] CATH LAB;  Service: Cardiology;  Laterality: N/A;  Congestive heart failure     Family History       Problem Relation (Age of Onset)    Coronary artery disease Father    Diabetes Father    Heart attack Father    Hyperlipidemia Father    Hypertension Mother, Father          Tobacco Use    Smoking status: Never    Smokeless tobacco: Never   Substance and Sexual Activity    Alcohol use: Yes    Drug use: Never    Sexual activity: Not Currently       Review of Systems   Constitutional:  Positive for fatigue. Negative for activity change, appetite change, chills, diaphoresis, fever and unexpected weight change.   HENT:  Negative for congestion, dental problem, drooling, ear discharge, ear pain, facial swelling, hearing loss, mouth sores,  nosebleeds, postnasal drip, rhinorrhea, sinus pressure, sneezing, sore throat, tinnitus, trouble swallowing and voice change.    Eyes:  Negative for photophobia, pain, discharge, redness, itching and visual disturbance.   Respiratory:  Negative for apnea, cough, choking, chest tightness, shortness of breath, wheezing and stridor.    Cardiovascular:  Negative for chest pain, palpitations and leg swelling.   Gastrointestinal:  Negative for abdominal distention, abdominal pain, anal bleeding, blood in stool, constipation, diarrhea, nausea, rectal pain and vomiting.   Endocrine: Negative for cold intolerance, heat intolerance, polydipsia, polyphagia and polyuria.   Genitourinary:  Negative for decreased urine volume, difficulty urinating, dysuria, enuresis, flank pain, frequency, genital sores, hematuria, penile discharge, penile pain, penile swelling, scrotal swelling, testicular pain and urgency.   Musculoskeletal:  Negative for arthralgias, back pain, gait problem, joint swelling, myalgias, neck pain and neck stiffness.   Skin:  Negative for color change, pallor, rash and wound.   Allergic/Immunologic: Negative for environmental allergies, food allergies and immunocompromised state.   Neurological:  Positive for weakness. Negative for dizziness, tremors, seizures, syncope, facial asymmetry, speech difficulty, light-headedness, numbness and headaches.   Hematological:  Negative for adenopathy. Does not bruise/bleed easily.   Psychiatric/Behavioral:  Positive for dysphoric mood. Negative for agitation, behavioral problems, confusion, decreased concentration, hallucinations, self-injury, sleep disturbance and suicidal ideas. The patient is nervous/anxious. The patient is not hyperactive.    Objective:     Vital Signs (Most Recent):  Temp: 98.3 °F (36.8 °C) (06/28/23 0709)  Pulse: 97 (06/28/23 0709)  Resp: 18 (06/28/23 0709)  BP: 110/66 (06/28/23 0709)  SpO2: 97 % (06/28/23 0709) Vital Signs (24h Range):  Temp:  [97.8 °F  (36.6 °C)-98.3 °F (36.8 °C)] 98.3 °F (36.8 °C)  Pulse:  [78-97] 97  Resp:  [16-18] 18  SpO2:  [97 %-98 %] 97 %  BP: (100-137)/(56-82) 110/66     Weight: 113.5 kg (250 lb 3.6 oz)  Body mass index is 33.01 kg/m².  Body surface area is 2.42 meters squared.      Intake/Output Summary (Last 24 hours) at 6/28/2023 0738  Last data filed at 6/28/2023 0142  Gross per 24 hour   Intake 1626.71 ml   Output 1250 ml   Net 376.71 ml        Physical Exam  Vitals reviewed.   Constitutional:       General: He is not in acute distress.     Appearance: He is well-developed. He is ill-appearing. He is not diaphoretic.   HENT:      Head: Normocephalic.      Right Ear: External ear normal.      Left Ear: External ear normal.      Nose: Nose normal.      Right Sinus: No maxillary sinus tenderness or frontal sinus tenderness.      Left Sinus: No maxillary sinus tenderness or frontal sinus tenderness.      Mouth/Throat:      Pharynx: No oropharyngeal exudate.   Eyes:      General: Lids are normal. No scleral icterus.        Right eye: No discharge.         Left eye: No discharge.      Extraocular Movements:      Right eye: Normal extraocular motion.      Left eye: Normal extraocular motion.      Conjunctiva/sclera:      Right eye: Right conjunctiva is not injected. No hemorrhage.     Left eye: Left conjunctiva is not injected. No hemorrhage.     Pupils: Pupils are equal, round, and reactive to light.   Neck:      Thyroid: No thyromegaly.      Vascular: No JVD.      Trachea: No tracheal deviation.   Cardiovascular:      Rate and Rhythm: Normal rate.   Pulmonary:      Effort: Pulmonary effort is normal. No respiratory distress.      Breath sounds: No stridor.   Abdominal:      General: Bowel sounds are normal.      Palpations: Abdomen is soft. There is no hepatomegaly, splenomegaly or mass.      Tenderness: There is no abdominal tenderness.   Musculoskeletal:         General: No tenderness. Normal range of motion.      Cervical back: Normal  range of motion and neck supple.   Lymphadenopathy:      Head:      Right side of head: No posterior auricular or occipital adenopathy.      Left side of head: No posterior auricular or occipital adenopathy.      Cervical: No cervical adenopathy.      Right cervical: No superficial, deep or posterior cervical adenopathy.     Left cervical: No superficial, deep or posterior cervical adenopathy.      Upper Body:      Right upper body: No supraclavicular adenopathy.      Left upper body: No supraclavicular adenopathy.   Skin:     General: Skin is dry.      Findings: No erythema or rash.      Nails: There is no clubbing.   Neurological:      Mental Status: He is alert and oriented to person, place, and time.      Cranial Nerves: No cranial nerve deficit.      Coordination: Coordination normal.   Psychiatric:         Behavior: Behavior normal.         Thought Content: Thought content normal.         Judgment: Judgment normal.        Significant Labs:   BMP:   Recent Labs   Lab 06/27/23  0744 06/27/23  1626 06/27/23 2129   GLU 89 109 110    135* 135*   K 4.0 3.8 3.9    100 101   CO2 23 22* 21*   BUN 28* 25* 22   CREATININE 1.2 1.3 1.2   CALCIUM 8.2* 8.2* 8.4*   MG 2.4 2.6 2.7*   , CBC:   Recent Labs   Lab 06/27/23  0522 06/28/23  0442   WBC 12.26 8.15   HGB 9.7* 9.5*   HCT 29.9* 29.8*   PLT 65* 54*   , CMP:   Recent Labs   Lab 06/27/23  0744 06/27/23  1626 06/27/23 2129    135* 135*   K 4.0 3.8 3.9    100 101   CO2 23 22* 21*   GLU 89 109 110   BUN 28* 25* 22   CREATININE 1.2 1.3 1.2   CALCIUM 8.2* 8.2* 8.4*   ANIONGAP 12 13 13   , Coagulation: No results for input(s): PT, INR, APTT in the last 48 hours., Haptoglobin: No results for input(s): HAPTOGLOBIN in the last 48 hours., Immunology: No results for input(s): SPEP, TANO, FLOR, FREELAMBDALI in the last 48 hours., LDH: No results for input(s): LDHCSF, BFSOURCE in the last 48 hours., LFTs: No results for input(s): ALT, AST, ALKPHOS, BILITOT, PROT,  ALBUMIN in the last 48 hours., Reticulocytes: No results for input(s): RETIC in the last 48 hours., Tumor Markers: No results for input(s): PSA, CEA, , AFPTM, [id number],  in the last 48 hours.    Invalid input(s): ALGTM, Uric Acid No results for input(s): URICACID in the last 48 hours., and Urine Studies: No results for input(s): COLORU, APPEARANCEUA, PHUR, SPECGRAV, PROTEINUA, GLUCUA, KETONESU, BILIRUBINUA, OCCULTUA, NITRITE, UROBILINOGEN, LEUKOCYTESUR, RBCUA, WBCUA, BACTERIA, SQUAMEPITHEL, HYALINECASTS in the last 48 hours.    Invalid input(s): WRIGHTSUR    Diagnostic Results:  I have reviewed all pertinent imaging results/findings within the past 24 hours.    Assessment/Plan:     Heparin induced thrombocytopenia  Patient is 6 days postop with declining platelet count.  Patient has had hit score is between 3 and 4.  Intermediate in nature.  I will discuss with operative surgeon whether he wishes to wait for hit panel to return or begin patient on Eliquis 10 mg p.o. b.i.d. x7 days followed by 5 mg p.o. b.i.d. until patient is hit antibody test returns.  Discussed implications with patient    S/P CABG x 3  Cared for and noted by primary care team in operative surgeon    Left main coronary artery disease  Cared for by primary care team in operative surgeon    Primary hypertension  Cared for by primary care team        Thank you for your consult. I will follow-up with patient. Please contact us if you have any additional questions.    Elan Nichole MD  Hematology/Oncology  [facility] [facility] - Telemetry ([facility])

## 2023-06-28 NOTE — PLAN OF CARE
Nutrition Recommendations 6/28:  1. Recommend modify pt diet to Consistent carbohydrate, Cardiac diet   2. Recommend Boost glucose control TID to assist with filling nutritional gaps   3. Recommend Evan BID x 2 weeks for wound healing   4. Recommend continue bowel regimen (No BM x 4 days) (Resolved)  5. Recommend weekly weights  6. Collaboration of care with medical providers     Goals:   1. Pt diet will be modified by RD follow up   2. Pt will tolerate and consume > 60% est Needs by RD follow up   3. Pt will consume Evan BID prior to RD follow up   4. Pt will have BM by RD follow up (Resolved)    Esther Lawrence, Registration Eligible, Provisional LDN

## 2023-06-28 NOTE — PLAN OF CARE
Ongoing (interventions implemented as appropriate)  Pt is alert and oriented.   VSS  Pt able to make needs known.  Pt remained afebrile throughout this shift.   Pt remained free of falls this shift.   Plan of care reviewed. Patient verbalizes understanding.   Pt moving/turing independent. Frequent weight shifting encouraged.  Patient normal sinus rhythm on monitor.   Bed low, side rails up x 2, wheels locked, call light in reach.   Hourly rounding completed.   Will continue to observe.

## 2023-06-28 NOTE — PROGRESS NOTES
[facility] - Telemetry ([facility])  Adult Nutrition  Progress Note    SUMMARY       Recommendations    Recommendation/Intervention:   1. Recommend modify pt diet to Consistent carbohydrate, Cardiac diet   2. Recommend Boost glucose control TID to assist with filling nutritional gaps   3. Recommend Evan BID x 2 weeks for wound healing   4. Recommend continue bowel regimen (No BM x 4 days) (Resolved)  5. Recommend weekly weights    Goals:   1. Pt diet will be modified by RD follow up   2. Pt will tolerate and consume > 60% est Needs by RD follow up   3. Pt will consume Evan BID prior to RD follow up   4. Pt will have BM by RD follow up (Resolved)  Nutrition Goal Status: Continues/ Resolved   Communication of RD Recs: other (comment); (POC, sticky note)    Assessment and Plan    Nutrition Problem  Inadequate protein/energy intake  Increased nutrient needs      Related to (etiology):   Decreased ability to consume sufficient protein/energy   Increased demand for nutrition      Signs and Symptoms (as evidenced by):   Decreased appetite: < 50% PO intake of meals  Diabetic with wound healing needs      Interventions(treatment strategy):  1. Consistent carbohydrate, Sodium and Fat modified diet   2. Commercial beverage   3. Wound healing medical food supplement therapy  4. Collaboration of care with medical providers      Nutrition Diagnosis Status:   Continues     Malnutrition Assessment                                       Reason for Assessment    Reason For Assessment: consult (Post Op)  Diagnosis:  (Abnormal stress test)  Relevant Medical History: S/p CABG x 3 6/22/23, CHF, CKD 3, HTN, HLD, CAD, DCM, PAF, IFG  Hx: CVA 4/2023  General Information Comments: 6/23/23: 63 y.o. Male admitted for Abnormal stress test. Pt is postop day 1 S/p CABG x3 and [place] Maze 4 procedure, noted overall pt doing well. Visited pt at bedside, pt working with RN's. Spoke to pt wife outside pt room. Pt wife stated that pt had a good appetite PTA,  "confirmed 100% PO intake of meals, reported that pt has not consumed meal since admit, noted pt in a lot of pain. Pt wife stated she belives pt weight is between 220-250 lbs last weighed on home scale. Provided pt wife with "Cardiac TLC Nutrition Therapy" and "Low-Sodium Nutrition Therapy" (nutritioncaremanual.org) handouts. Discussed the importance of limiting sodium to less than 2,000 mg per day. Recommended salt free seasonings and other herbs and spices in meals to enhance flavor without additional sodium. Recommended a well balanced diet with a variety of fresh foods, fruits and vegetables (5 cups/day), whole grains (3 oz/day), and fat-free or low fat dairy. Discussed reading food packages, food labels, and nutrition facts labels to identify nutrient content of foods. Discussed the importance of fiber (especially soluble fiber), dietary sources, and a goal intake of >20-30g/day. Pt wife expressed appreciation and understanding of nutritional education. All questions/concerns answered at this time. Encouraged pt wife to use RD contact information provided on handouts with any further questions/concerns, informed pt wife will discuss nutrition education with pt at follow up. Pt appeared well nourished, no NFPE warranted at this time. Reviewed chart: Currently no PO intake charted: LBM 6/19 (x 4 days no BM); Skin: dry, incisions chest/ L leg WDL; Boris score: 18 (mild risk); Edema: None. Labs, meds, weight reviewed. Weight charted 5/16 240 lbs, 6/23 250 lbs (BMI 33.01, Obese), +10 lb wt gain x 1+ month. RD will continue to monitor.  Nutrition Discharge Planning: Cardiac diet    Follow up:  6/28: Pt noted to discharge, per Cardiothoracic Physician note 6/28 "Waiting for up trend in the platelet count before discharge". Spoke to RN via secure chat to confirm pt's appetite and N/V/D status, RN stated "He does not have a good appetite but he is not experiencing any N/V/D", "Im not sure how good his appetite was on " "yesterday but he's not really feeling well on today ", confirmed PO intake of meals < 50%, informed RN will recommend Boost. Visited pt at bedside, pt sleeping. Reviewed chart: LBM: 6/28; Skin remains unchanged; Boris score increased to 20 (no risk); No edema continues. Labs, meds, weight reviewed. Labs 6/28: Platelets (L) 54. Note Lasix, PRN ondansetron. Weight charted 6/23 250 lbs (BMI 33.01, Obese), no updated weights, update for accuracy. RD will continue to monitor.     Nutrition Risk Screen    Nutrition Risk Screen: no indicators present    Nutrition/Diet History    Spiritual, Cultural Beliefs, Mu-ism Practices, Values that Affect Care: no  Food Allergies: NKFA  Factors Affecting Nutritional Intake: pain    Anthropometrics    Temp: 98.5 °F (36.9 °C)  Height Method: Stated  Height: 6' 1" (185.4 cm)  Height (inches): 73 in  Weight Method: Bed Scale  Weight: 113.5 kg (250 lb 3.6 oz)  Weight (lb): 250.22 lb  Ideal Body Weight (IBW), Male: 184 lb  % Ideal Body Weight, Male (lb): 135.99 %  BMI (Calculated): 33  BMI Grade: 30 - 34.9- obesity - grade I     Wt Readings from Last 15 Encounters:   06/23/23 113.5 kg (250 lb 3.6 oz)   05/16/23 108.9 kg (240 lb)   04/05/23 107 kg (236 lb)   01/20/23 101.6 kg (224 lb)   11/30/22 86.2 kg (190 lb 0.6 oz)   11/21/22 97.5 kg (215 lb)   11/14/22 97.1 kg (214 lb)     Lab/Procedures/Meds    Pertinent Labs Reviewed: reviewed  Pertinent Labs Comments: Calcium (L), Glu (H), BUN (H), Cr (H), eGFR 45  Pertinent Medications Reviewed: reviewed  Pertinent Medications Comments: pravastatin, potassium chloride, polyethylene glycol, pantoprazole, Lopressor, magnesium hydroxide, insulin Levemir, furosemide, folic acid, docusate, asprin, vitamin C, amitriptyline, acetaminophen  BMP  Lab Results   Component Value Date     (L) 06/28/2023    K 4.4 06/28/2023     06/28/2023    CO2 21 (L) 06/28/2023    BUN 22 06/28/2023    CREATININE 1.3 06/28/2023    CALCIUM 8.7 06/28/2023    " ANIONGAP 14 06/28/2023    EGFRNORACEVR >60 06/28/2023     Lab Results   Component Value Date    ALT 13 06/28/2023    AST 28 06/28/2023    ALKPHOS 53 (L) 06/28/2023    BILITOT 1.1 (H) 06/28/2023     Recent Labs   Lab 06/28/23  1221   POCTGLUCOSE 100     Scheduled Meds:   amitriptyline  50 mg Oral QHS    apixaban  5 mg Oral BID    ascorbic acid (vitamin C)  500 mg Oral BID    aspirin  81 mg Oral Daily    ceFEPime (MAXIPIME) IVPB  2 g Intravenous Q8H    cyanocobalamin  1,000 mcg Oral Daily    docusate sodium  100 mg Oral BID    empagliflozin  10 mg Oral Daily    ferrous sulfate  1 tablet Oral Daily    folic acid  2 mg Oral Daily    furosemide (LASIX) injection  40 mg Intravenous BID    latanoprost  1 drop Both Eyes Nightly    magnesium hydroxide 400 mg/5 ml  5 mL Oral BID    metoprolol tartrate  50 mg Oral BID    metroNIDAZOLE  500 mg Oral Q8H    pantoprazole  40 mg Oral Daily    polyethylene glycol  17 g Oral Daily    potassium chloride  20 mEq Oral Q12H    pravastatin  20 mg Oral Daily    sacubitriL-valsartan  1 tablet Oral BID    vancomycin (VANCOCIN) IV (PEDS and ADULTS)  1,250 mg Intravenous Q12H     Continuous Infusions:  PRN Meds:.sodium chloride, sodium chloride, sodium chloride 0.9%, albumin human 5%, calcium gluconate IVPB, calcium gluconate IVPB, calcium gluconate IVPB, dextrose 10%, dextrose 10%, glucagon (human recombinant), glucose, glucose, HYDROmorphone, insulin aspart U-100, lactated ringers, magnesium sulfate IVPB, metoclopramide HCl, ondansetron, pneumoc 20-mary conj-dip cr(PF), potassium chloride in water, potassium chloride in water, potassium chloride in water, sodium chloride 0.9%, Pharmacy to dose Vancomycin consult **AND** vancomycin - pharmacy to dose    Physical Findings/Assessment         Estimated/Assessed Needs    Weight Used For Calorie Calculations: 113.5 kg (250 lb 3.6 oz)  Energy Calorie Requirements (kcal): 1989 kcals (MSJ (CABG, Obese BMI 33.01)  Energy Need Method: [facility]  Protein Requirements: 54-73 g (0.6-0.8 g/kg Adj BW (CKD 3, no dialysis, Diabetic, Obese 135.99% IBW)  Weight Used For Protein Calculations: 91.1 kg (200 lb 13.4 oz) (Adj BW)  Fluid Requirements (mL): 1989 mL (1 mL/kcal)  Estimated Fluid Requirement Method: RDA Method  RDA Method (mL): 1989  CHO Requirement: 249 (1989 kcals/8)      Nutrition Prescription Ordered    Current Diet Order: Cardiac diet    Evaluation of Received Nutrient/Fluid Intake  I/O: (Net since admit)  6/23: +3068.4 mL  6/28: +2754.8 mL    Energy Calories Required: not meeting needs  Protein Required: not meeting needs  Fluid Required: not meeting needs  Tolerance: tolerating  % Intake of Estimated Energy Needs: 25 - 50 %  % Meal Intake: 25 - 50 %    Nutrition Risk    Level of Risk/Frequency of Follow-up: high (F/u x 2 weekly)     Monitor and Evaluation    Food and Nutrient Intake: energy intake, food and beverage intake  Food and Nutrient Adminstration: diet order  Knowledge/Beliefs/Attitudes: food and nutrition knowledge/skill, beliefs and attitudes  Anthropometric Measurements: weight, weight change, body mass index  Biochemical Data, Medical Tests and Procedures: glucose/endocrine profile, electrolyte and renal panel, inflammatory profile     Nutrition Follow-Up    RD Follow-up?: Yes  Esther Lawrence, Registration Eligible, Provisional LDN

## 2023-06-28 NOTE — SUBJECTIVE & OBJECTIVE
Interval History:  The patient is postop day 5 status post coronary artery bypass grafting times 3 and [place] [facility]    ROS  Medications:  Continuous Infusions:  Scheduled Meds:   amitriptyline  50 mg Oral QHS    apixaban  2.5 mg Oral BID    ascorbic acid (vitamin C)  500 mg Oral BID    aspirin  81 mg Oral Daily    ceFEPime (MAXIPIME) IVPB  2 g Intravenous Q8H    cyanocobalamin  1,000 mcg Oral Daily    docusate sodium  100 mg Oral BID    empagliflozin  10 mg Oral Daily    ferrous sulfate  1 tablet Oral Daily    folic acid  2 mg Oral Daily    furosemide (LASIX) injection  40 mg Intravenous BID    latanoprost  1 drop Both Eyes Nightly    magnesium hydroxide 400 mg/5 ml  5 mL Oral BID    metoprolol tartrate  50 mg Oral BID    metronidazole  500 mg Intravenous Q8H    pantoprazole  40 mg Oral Daily    polyethylene glycol  17 g Oral Daily    potassium chloride  20 mEq Oral Q12H    pravastatin  20 mg Oral Daily    sacubitriL-valsartan  1 tablet Oral BID     PRN Meds:sodium chloride, sodium chloride, sodium chloride 0.9%, albumin human 5%, calcium gluconate IVPB, calcium gluconate IVPB, calcium gluconate IVPB, dextrose 10%, dextrose 10%, glucagon (human recombinant), glucose, glucose, HYDROmorphone, insulin aspart U-100, lactated ringers, magnesium sulfate IVPB, metoclopramide HCl, ondansetron, pneumoc 20-mary conj-dip cr(PF), potassium chloride in water, potassium chloride in water, potassium chloride in water, sodium chloride 0.9%, Pharmacy to dose Vancomycin consult **AND** vancomycin - pharmacy to dose     Objective:     Vital Signs (Most Recent):  Temp: 97.8 °F (36.6 °C) (06/27/23 1938)  Pulse: 93 (06/27/23 1938)  Resp: 18 (06/27/23 1938)  BP: 134/68 (06/27/23 1938)  SpO2: 98 % (06/27/23 1938) Vital Signs (24h Range):  Temp:  [97.8 °F (36.6 °C)-98.8 °F (37.1 °C)] 97.8 °F (36.6 °C)  Pulse:  [84-95] 93  Resp:  [16-18] 18  SpO2:  [93 %-98 %] 98 %  BP: ()/(53-75) 134/68     Weight: 113.5 kg (250 lb 3.6 oz)  Body mass index  is 33.01 kg/m².    SpO2: 98 %       Intake/Output - Last 3 Shifts         06/25 0700  06/26 0659 06/26 0700 06/27 0659 06/27 0700 06/28 0659    P.O. 480 320 960    I.V. (mL/kg)   195.6 (1.7)    Blood 1245      IV Piggyback  962.5 711.1    Total Intake(mL/kg) 1725 (15.2) 1282.5 (11.3) 1866.7 (16.4)    Urine (mL/kg/hr) 1300 (0.5) 1550 (0.6) 650 (0.4)    Stool 0 0 0    Chest Tube       Total Output 1300 1550 650    Net +425 -267.5 +1216.7           Stool Occurrence 1 x 1 x 2 x            Lines/Drains/Airways       Peripherally Inserted Central Catheter Line  Duration             PICC Double Lumen 06/23/23 1222 right basilic 4 days              Peripheral Intravenous Line  Duration                  Peripheral IV - Single Lumen 06/25/23 1058 20 G Anterior;Distal;Left Upper Arm 2 days                     Physical Exam  Constitutional:       Appearance: Normal appearance.   HENT:      Head: Normocephalic and atraumatic.   Cardiovascular:      Rate and Rhythm: Normal rate and regular rhythm.      Heart sounds: Normal heart sounds.   Pulmonary:      Effort: Pulmonary effort is normal.      Breath sounds: Normal breath sounds.   Abdominal:      General: Abdomen is flat. Bowel sounds are normal.      Palpations: Abdomen is soft.   Musculoskeletal:      Right lower leg: No edema.      Left lower leg: No edema.   Skin:     General: Skin is warm and dry.   Neurological:      Mental Status: He is alert and oriented to person, place, and time.   Psychiatric:         Behavior: Behavior normal.          Significant Labs:  All pertinent labs from the last 24 hours have been reviewed.    Significant Diagnostics:  I have reviewed all pertinent imaging results/findings within the past 24 hours.

## 2023-06-28 NOTE — PROGRESS NOTES
Pharmacist Intervention IV to PO Note    Gustavo Tracey Jr. is a 63 y.o. male being treated with IV medication metronidazole    Patient Data:    Vital Signs (Most Recent):  Temp: 98.2 °F (36.8 °C) (06/28/23 0009)  Pulse: 82 (06/28/23 0100)  Resp: 18 (06/28/23 0100)  BP: 117/64 (06/28/23 0009)  SpO2: 98 % (06/28/23 0100) Vital Signs (72h Range):  Temp:  [97.5 °F (36.4 °C)-98.9 °F (37.2 °C)]   Pulse:  [82-99]   Resp:  [16-20]   BP: ()/(53-75)   SpO2:  [92 %-99 %]      CBC:  Recent Labs   Lab 06/25/23  0522 06/26/23  0609 06/27/23  0522   WBC 19.42* 18.30* 12.26   RBC 2.49* 3.21* 3.43*   HGB 6.9* 9.0* 9.7*   HCT 21.5* 27.7* 29.9*   PLT 61* 62* 65*   MCV 86 86 87   MCH 27.7 28.0 28.3   MCHC 32.1 32.5 32.4     CMP:     Recent Labs   Lab 06/21/23  1113 06/22/23  1456 06/27/23  0744 06/27/23  1626 06/27/23  2129   *   < > 89 109 110   CALCIUM 9.8   < > 8.2* 8.2* 8.4*   ALBUMIN 3.8  --   --   --   --    PROT 7.8  --   --   --   --       < > 136 135* 135*   K 3.6   < > 4.0 3.8 3.9   CO2 27   < > 23 22* 21*      < > 101 100 101   BUN 24*   < > 28* 25* 22   CREATININE 1.6*   < > 1.2 1.3 1.2   ALKPHOS 64  --   --   --   --    ALT 8*  --   --   --   --    AST 19  --   --   --   --    BILITOT 1.0  --   --   --   --     < > = values in this interval not displayed.       Dietary Orders:  Diet Orders            Diet Cardiac: Cardiac starting at 06/23 1310            Based on the following criteria, this patient qualifies for intravenous to oral conversion:  [x] The patients gastrointestinal tract is functioning (tolerating medications via oral or enteral route for 24 hours and tolerating food or enteral feeds for 24 hours).  [x] The patient is hemodynamically stable for 24 hours (heart rate <100 beats per minute, systolic blood pressure >99 mm Hg, and respiratory rate <20 breaths per minute).  [x] The patient shows clinical improvement (afebrile for at least 24 hours and white blood cell count downtrending  or normalized). Additionally, the patient must be non-neutropenic (absolute neutrophil count >500 cells/mm3).  [x] For antimicrobials, the patient has received IV therapy for at least 24 hours.    IV medication (metronidazole 500 mg every 8 hours) will be changed to oral medication (metronidazole 500 mg every 8 hours)    Pharmacist's Name: Nika Garber  Pharmacist's Extension: [phone]

## 2023-06-28 NOTE — PLAN OF CARE
06/28/23 0837   Post-Acute Status   Post-Acute Authorization Home Health   Home Health Status Referrals Sent   Coverage Medicare A & B   Discharge Delays None known at this time   Discharge Plan   Discharge Plan A Home Health       SW sent referral to [facility].

## 2023-06-28 NOTE — CONSULTS
Pharmacokinetic Assessment Follow Up: IV Vancomycin    Vancomycin serum concentration assessment(s):    The random level was drawn correctly and can be used to guide therapy at this time. The measurement is below the desired definitive target range of 15 to 20 mcg/mL.    Vancomycin Regimen Plan:    Change regimen to Vancomycin 1250 mg IV every 12 hours with next serum trough concentration measured at 1245 prior to 3rd dose on 6/29.    Drug levels (last 3 results):  Recent Labs   Lab Result Units 06/26/23  0609 06/27/23  0744 06/28/23  1110   Vancomycin, Random ug/mL 13.1 13.4 11.7       Pharmacy will continue to follow and monitor vancomycin.    Please contact pharmacy at extension [phone]   for questions regarding this assessment.     Patient brief summary:  Gustavo Tracey Jr. is a 63 y.o. male initiated on antimicrobial therapy with IV Vancomycin for treatment of  empiric coverage post CABG    The patient's current regimen is 1250 mg IV q12h    Drug Allergies:   Review of patient's allergies indicates:   Allergen Reactions    Heparin analogues        Actual Body Weight:   113.5 kg    Renal Function:   Estimated Creatinine Clearance: 77.8 mL/min (based on SCr of 1.3 mg/dL).,     Dialysis Method (if applicable):  N/A    CBC (last 72 hours):  Recent Labs   Lab Result Units 06/26/23  0609 06/27/23  0522 06/28/23  0442   WBC K/uL 18.30* 12.26 8.15   Hemoglobin g/dL 9.0* 9.7* 9.5*   Hematocrit % 27.7* 29.9* 29.8*   Platelets K/uL 62* 65* 54*   Gran % % 30.0* 22.0* 40.0   Lymph % % 41.0 38.0 21.0   Mono % % 0.0* 5.0 4.0   Eosinophil % % 0.0 0.0 0.0   Basophil % % 0.0 0.0 0.0   Differential Method  Manual Manual Manual       Metabolic Panel (last 72 hours):  Recent Labs   Lab Result Units 06/25/23  1639 06/26/23  0609 06/26/23  1637 06/26/23  2159 06/27/23  0744 06/27/23  1626 06/27/23  2129 06/28/23  1110   Sodium mmol/L 135* 135* 135* 136 136 135* 135* 135*   Potassium mmol/L 4.1 3.8 4.2 3.7 4.0 3.8 3.9 4.4    Chloride mmol/L 100 101 101 102 101 100 101 100   CO2 mmol/L 25 24 26 22* 23 22* 21* 21*   Glucose mg/dL 94 100 108 104 89 109 110 112*   BUN mg/dL 34* 34* 30* 27* 28* 25* 22 22   Creatinine mg/dL 1.6* 1.5* 1.4 1.3 1.2 1.3 1.2 1.3   Albumin g/dL  --   --   --   --   --   --   --  3.5   Total Bilirubin mg/dL  --   --   --   --   --   --   --  1.1*   Alkaline Phosphatase U/L  --   --   --   --   --   --   --  53*   AST U/L  --   --   --   --   --   --   --  28   ALT U/L  --   --   --   --   --   --   --  13   Magnesium mg/dL 3.3* 2.9* 2.7* 2.3 2.4 2.6 2.7* 2.4       Vancomycin Administrations:  vancomycin given in the last 96 hours                     vancomycin 1.75 g in 5 % dextrose 500 mL IVPB (mg) 1,750 mg New Bag 06/27/23 1038    vancomycin 1.75 g in 5 % dextrose 500 mL IVPB (mg) 1,750 mg New Bag 06/26/23 0846    vancomycin 2 g in dextrose 5 % 500 mL IVPB (mg) 2,000 mg New Bag 06/25/23 1213                    Microbiologic Results:  Microbiology Results (last 7 days)       Procedure Component Value Units Date/Time    Culture, Respiratory with Gram Stain [[id number]] Collected: 06/26/23 2158    Order Status: Completed Specimen: Respiratory from Sputum, Expectorated Updated: 06/28/23 0847     Respiratory Culture Normal respiratory darren     Gram Stain (Respiratory) <10 epithelial cells per low power field.     Gram Stain (Respiratory) Few WBC's     Gram Stain (Respiratory) Moderate Gram positive cocci     Gram Stain (Respiratory) Rare Gram negative rods    Blood culture [[id number]] Collected: 06/25/23 1134    Order Status: Completed Specimen: Blood Updated: 06/27/23 2012     Blood Culture, Routine No Growth to date      No Growth to date      No Growth to date    Blood culture [[id number]] Collected: 06/23/23 0913    Order Status: Completed Specimen: Blood from Antecubital, Left Arm Updated: 06/27/23 1812     Blood Culture, Routine No Growth to date      No Growth to date      No Growth to date      No Growth to  date      No Growth to date    Blood culture [[id number]] Collected: 06/23/23 0913    Order Status: Completed Specimen: Blood from Antecubital, Right Arm Updated: 06/27/23 1812     Blood Culture, Routine No Growth to date      No Growth to date      No Growth to date      No Growth to date      No Growth to date

## 2023-06-28 NOTE — PT/OT/SLP PROGRESS
"Occupational Therapy   Treatment    Name: Gustavo Tracey Jr.  MRN: [id number]  Admitting Diagnosis:  Abnormal stress test  6 Days Post-Op    Recommendations:     Discharge Recommendations: home health OT  Discharge Equipment Recommendations:  shower chair  Barriers to discharge:  None    Assessment:     Gustavo Tracey Jr. is a 63 y.o. male with a medical diagnosis of Abnormal stress test.  He presents with the following performance deficits affecting function are weakness, impaired endurance, impaired self care skills, impaired functional mobility, impaired cardiopulmonary response to activity, impaired balance.     Rehab Prognosis:  Good; patient would benefit from acute skilled OT services to address these deficits and reach maximum level of function.       Plan:     Patient to be seen 2 x/week to address the above listed problems via self-care/home management, therapeutic activities, therapeutic exercises  Plan of Care Expires: 07/07/23  Plan of Care Reviewed with: patient    Subjective     Chief Complaint: Reported "I am much better today."  Patient/Family Comments/goals: get better  Pain/Comfort:  Pain Rating 1: 0/10    Objective:     Communicated with: NurseArlen, prior to session.  Patient found supine with peripheral IV, telemetry upon OT entry to room.    General Precautions: Standard, fall, sternal    Orthopedic Precautions:N/A  Braces: N/A  Respiratory Status: Room air     Occupational Performance:     Bed Mobility:    Patient completed Supine to Sit with stand by assistance     Functional Mobility/Transfers:  Patient completed Sit <> Stand Transfer with contact guard assistance  with  no assistive device   Patient completed Bed <> Chair Transfer using Step Transfer technique with contact guard assistance with no assistive device  Functional Mobility: Patient completed x400ft functional mobility without AD and CGA to increase dynamic standing balance and activity tolerance needed for ADL " completion.  Required seated rest break after x100ft.    [facility] 6 Click ADL: 21    Treatment & Education:  Patient tolerated intervention well this date. Returned to baseline facial symmetry. Improved compliance with sternal precautions. Patient provided with additional education re: need for Q hour ambulation upon d/c home. Educated on modified bathing including seated showers on shower chair. Patient stated understanding and in agreement with POC.    In agreement to call for A to transfer back to bed.    Patient left up in chair with all lines intact, call button in reach, and chair alarm on    GOALS:   Multidisciplinary Problems       Occupational Therapy Goals          Problem: Occupational Therapy    Goal Priority Disciplines Outcome Interventions   Occupational Therapy Goal     OT, PT/OT Ongoing, Progressing    Description: Goals to be met by: 7/7/23     Patient will increase functional independence with ADLs by performing:    Toileting from toilet with Minimal Assistance for hygiene and clothing management.   Toilet transfer to toilet with Contact Guard Assistance.  Demonstrates 100% functional compliance with sternal precautions.                         Time Tracking:     OT Date of Treatment: 06/28/23  OT Start Time: 0715  OT Stop Time: 0740  OT Total Time (min): 25 min    Billable Minutes:Therapeutic Activity 25 6/28/2023

## 2023-06-29 LAB
ALBUMIN SERPL BCP-MCNC: 3.2 G/DL (ref 3.5–5.2)
ALP SERPL-CCNC: 52 U/L (ref 55–135)
ALT SERPL W/O P-5'-P-CCNC: 13 U/L (ref 10–44)
ANION GAP SERPL CALC-SCNC: 14 MMOL/L (ref 8–16)
ANION GAP SERPL CALC-SCNC: 15 MMOL/L (ref 8–16)
ANISOCYTOSIS BLD QL SMEAR: SLIGHT
AST SERPL-CCNC: 24 U/L (ref 10–40)
BACTERIA SPEC AEROBE CULT: NORMAL
BACTERIA SPEC AEROBE CULT: NORMAL
BASO STIPL BLD QL SMEAR: ABNORMAL
BASOPHILS NFR BLD: 0 % (ref 0–1.9)
BILIRUB DIRECT SERPL-MCNC: 0.4 MG/DL (ref 0.1–0.3)
BILIRUB SERPL-MCNC: 0.8 MG/DL (ref 0.1–1)
BLASTS NFR BLD MANUAL: 18 %
BUN SERPL-MCNC: 21 MG/DL (ref 8–23)
BUN SERPL-MCNC: 25 MG/DL (ref 8–23)
CALCIUM SERPL-MCNC: 8.4 MG/DL (ref 8.7–10.5)
CALCIUM SERPL-MCNC: 8.8 MG/DL (ref 8.7–10.5)
CHLORIDE SERPL-SCNC: 100 MMOL/L (ref 95–110)
CHLORIDE SERPL-SCNC: 101 MMOL/L (ref 95–110)
CO2 SERPL-SCNC: 20 MMOL/L (ref 23–29)
CO2 SERPL-SCNC: 21 MMOL/L (ref 23–29)
CREAT SERPL-MCNC: 1.2 MG/DL (ref 0.5–1.4)
CREAT SERPL-MCNC: 1.4 MG/DL (ref 0.5–1.4)
DIFFERENTIAL METHOD: ABNORMAL
EOSINOPHIL NFR BLD: 0 % (ref 0–8)
ERYTHROCYTE [DISTWIDTH] IN BLOOD BY AUTOMATED COUNT: 16.5 % (ref 11.5–14.5)
EST. GFR  (NO RACE VARIABLE): 57 ML/MIN/1.73 M^2
EST. GFR  (NO RACE VARIABLE): >60 ML/MIN/1.73 M^2
GLUCOSE SERPL-MCNC: 102 MG/DL (ref 70–110)
GLUCOSE SERPL-MCNC: 94 MG/DL (ref 70–110)
GRAM STN SPEC: NORMAL
HCT VFR BLD AUTO: 30.3 % (ref 40–54)
HGB BLD-MCNC: 9.9 G/DL (ref 14–18)
IMM GRANULOCYTES # BLD AUTO: ABNORMAL K/UL (ref 0–0.04)
IMM GRANULOCYTES NFR BLD AUTO: ABNORMAL % (ref 0–0.5)
LYMPHOCYTES NFR BLD: 26 % (ref 18–48)
MAGNESIUM SERPL-MCNC: 2.2 MG/DL (ref 1.6–2.6)
MAGNESIUM SERPL-MCNC: 2.2 MG/DL (ref 1.6–2.6)
MCH RBC QN AUTO: 28 PG (ref 27–31)
MCHC RBC AUTO-ENTMCNC: 32.7 G/DL (ref 32–36)
MCV RBC AUTO: 86 FL (ref 82–98)
METAMYELOCYTES NFR BLD MANUAL: 1 %
MONOCYTES NFR BLD: 4 % (ref 4–15)
NEUTROPHILS NFR BLD: 47 % (ref 38–73)
NEUTS BAND NFR BLD MANUAL: 4 %
NRBC BLD-RTO: 1 /100 WBC
PLATELET # BLD AUTO: 68 K/UL (ref 150–450)
PLATELET BLD QL SMEAR: ABNORMAL
PMV BLD AUTO: 9.8 FL (ref 9.2–12.9)
POCT GLUCOSE: 103 MG/DL (ref 70–110)
POCT GLUCOSE: 118 MG/DL (ref 70–110)
POCT GLUCOSE: 83 MG/DL (ref 70–110)
POLYCHROMASIA BLD QL SMEAR: ABNORMAL
POTASSIUM SERPL-SCNC: 3.9 MMOL/L (ref 3.5–5.1)
POTASSIUM SERPL-SCNC: 4.4 MMOL/L (ref 3.5–5.1)
PROT SERPL-MCNC: 7.4 G/DL (ref 6–8.4)
RBC # BLD AUTO: 3.53 M/UL (ref 4.6–6.2)
SODIUM SERPL-SCNC: 134 MMOL/L (ref 136–145)
SODIUM SERPL-SCNC: 137 MMOL/L (ref 136–145)
WBC # BLD AUTO: 6.34 K/UL (ref 3.9–12.7)

## 2023-06-29 PROCEDURE — 99232 SBSQ HOSP IP/OBS MODERATE 35: CPT | Mod: ,,, | Performed by: INTERNAL MEDICINE

## 2023-06-29 PROCEDURE — 80048 BASIC METABOLIC PNL TOTAL CA: CPT | Mod: 91 | Performed by: THORACIC SURGERY (CARDIOTHORACIC VASCULAR SURGERY)

## 2023-06-29 PROCEDURE — 25000003 PHARM REV CODE 250: Performed by: THORACIC SURGERY (CARDIOTHORACIC VASCULAR SURGERY)

## 2023-06-29 PROCEDURE — 83735 ASSAY OF MAGNESIUM: CPT | Mod: 91 | Performed by: THORACIC SURGERY (CARDIOTHORACIC VASCULAR SURGERY)

## 2023-06-29 PROCEDURE — 99232 PR SUBSEQUENT HOSPITAL CARE,LEVL II: ICD-10-PCS | Mod: ,,, | Performed by: INTERNAL MEDICINE

## 2023-06-29 PROCEDURE — 85027 COMPLETE CBC AUTOMATED: CPT | Performed by: THORACIC SURGERY (CARDIOTHORACIC VASCULAR SURGERY)

## 2023-06-29 PROCEDURE — 25000003 PHARM REV CODE 250: Performed by: INTERNAL MEDICINE

## 2023-06-29 PROCEDURE — 80076 HEPATIC FUNCTION PANEL: CPT | Performed by: THORACIC SURGERY (CARDIOTHORACIC VASCULAR SURGERY)

## 2023-06-29 PROCEDURE — 63600175 PHARM REV CODE 636 W HCPCS: Performed by: THORACIC SURGERY (CARDIOTHORACIC VASCULAR SURGERY)

## 2023-06-29 PROCEDURE — 85007 BL SMEAR W/DIFF WBC COUNT: CPT | Performed by: THORACIC SURGERY (CARDIOTHORACIC VASCULAR SURGERY)

## 2023-06-29 PROCEDURE — 36415 COLL VENOUS BLD VENIPUNCTURE: CPT | Performed by: THORACIC SURGERY (CARDIOTHORACIC VASCULAR SURGERY)

## 2023-06-29 PROCEDURE — 21400001 HC TELEMETRY ROOM

## 2023-06-29 PROCEDURE — 25000242 PHARM REV CODE 250 ALT 637 W/ HCPCS: Performed by: THORACIC SURGERY (CARDIOTHORACIC VASCULAR SURGERY)

## 2023-06-29 PROCEDURE — 97116 GAIT TRAINING THERAPY: CPT

## 2023-06-29 PROCEDURE — 97530 THERAPEUTIC ACTIVITIES: CPT

## 2023-06-29 RX ORDER — LOPERAMIDE HYDROCHLORIDE 2 MG/1
2 CAPSULE ORAL 4 TIMES DAILY PRN
Status: DISCONTINUED | OUTPATIENT
Start: 2023-06-29 | End: 2023-07-05 | Stop reason: HOSPADM

## 2023-06-29 RX ADMIN — PRAVASTATIN SODIUM 20 MG: 20 TABLET ORAL at 09:06

## 2023-06-29 RX ADMIN — OXYCODONE HYDROCHLORIDE AND ACETAMINOPHEN 500 MG: 500 TABLET ORAL at 09:06

## 2023-06-29 RX ADMIN — FUROSEMIDE 40 MG: 10 INJECTION, SOLUTION INTRAMUSCULAR; INTRAVENOUS at 09:06

## 2023-06-29 RX ADMIN — HYDROMORPHONE HYDROCHLORIDE 2 MG: 2 TABLET ORAL at 11:06

## 2023-06-29 RX ADMIN — METOPROLOL TARTRATE 50 MG: 50 TABLET, FILM COATED ORAL at 09:06

## 2023-06-29 RX ADMIN — POTASSIUM CHLORIDE 20 MEQ: 1500 TABLET, EXTENDED RELEASE ORAL at 09:06

## 2023-06-29 RX ADMIN — LOPERAMIDE HYDROCHLORIDE 2 MG: 2 CAPSULE ORAL at 11:06

## 2023-06-29 RX ADMIN — VANCOMYCIN HYDROCHLORIDE 1250 MG: 1.25 INJECTION, POWDER, LYOPHILIZED, FOR SOLUTION INTRAVENOUS at 01:06

## 2023-06-29 RX ADMIN — METRONIDAZOLE 500 MG: 500 TABLET ORAL at 06:06

## 2023-06-29 RX ADMIN — AMITRIPTYLINE HYDROCHLORIDE 50 MG: 50 TABLET, FILM COATED ORAL at 09:06

## 2023-06-29 RX ADMIN — PANTOPRAZOLE SODIUM 40 MG: 40 TABLET, DELAYED RELEASE ORAL at 09:06

## 2023-06-29 RX ADMIN — CYANOCOBALAMIN TAB 1000 MCG 1000 MCG: 1000 TAB at 09:06

## 2023-06-29 RX ADMIN — APIXABAN 5 MG: 2.5 TABLET, FILM COATED ORAL at 09:06

## 2023-06-29 RX ADMIN — HYDROMORPHONE HYDROCHLORIDE 2 MG: 2 TABLET ORAL at 09:06

## 2023-06-29 RX ADMIN — LOPERAMIDE HYDROCHLORIDE 2 MG: 2 CAPSULE ORAL at 09:06

## 2023-06-29 RX ADMIN — CEFEPIME 2 G: 2 INJECTION, POWDER, FOR SOLUTION INTRAVENOUS at 12:06

## 2023-06-29 RX ADMIN — CEFEPIME 2 G: 2 INJECTION, POWDER, FOR SOLUTION INTRAVENOUS at 09:06

## 2023-06-29 RX ADMIN — FERROUS SULFATE TAB 325 MG (65 MG ELEMENTAL FE) 1 EACH: 325 (65 FE) TAB at 09:06

## 2023-06-29 RX ADMIN — EMPAGLIFLOZIN 10 MG: 10 TABLET, FILM COATED ORAL at 05:06

## 2023-06-29 RX ADMIN — MAGNESIUM HYDROXIDE 400 MG: 400 SUSPENSION ORAL at 09:06

## 2023-06-29 RX ADMIN — CEFEPIME 2 G: 2 INJECTION, POWDER, FOR SOLUTION INTRAVENOUS at 05:06

## 2023-06-29 RX ADMIN — FOLIC ACID 2 MG: 1 TABLET ORAL at 09:06

## 2023-06-29 NOTE — PROGRESS NOTES
[facility] - Telemetry ([facility])  Hematology/Oncology  Progress Note    Patient Name: Gustavo Tracey Jr.  Admission Date: 6/20/2023  Hospital Length of Stay: 8 days  Code Status: Full Code     Subjective:     HPI:  63-year-old male history of AC bypass on 06/22/2023 platelet count prior to surgery lower end of normal of 150.  Patient now is 6 days postop with declining platelet count I was asked to see the patient for evaluation of heparin induced thrombocytopenia patient is sitting at bedside no active bleeding no active thrombosis or necrosis of skin      Interval History:  Patient is resting comfortably    Oncology Treatment Plan:   [No matching plan found]    Medications:  Continuous Infusions:  Scheduled Meds:   amitriptyline  50 mg Oral QHS    apixaban  5 mg Oral BID    ascorbic acid (vitamin C)  500 mg Oral BID    aspirin  81 mg Oral Daily    ceFEPime (MAXIPIME) IVPB  2 g Intravenous Q8H    cyanocobalamin  1,000 mcg Oral Daily    docusate sodium  100 mg Oral BID    empagliflozin  10 mg Oral Daily    ferrous sulfate  1 tablet Oral Daily    folic acid  2 mg Oral Daily    furosemide (LASIX) injection  40 mg Intravenous BID    latanoprost  1 drop Both Eyes Nightly    magnesium hydroxide 400 mg/5 ml  5 mL Oral BID    metoprolol tartrate  50 mg Oral BID    metroNIDAZOLE  500 mg Oral Q8H    pantoprazole  40 mg Oral Daily    polyethylene glycol  17 g Oral Daily    potassium chloride  20 mEq Oral Q12H    pravastatin  20 mg Oral Daily    sacubitriL-valsartan  1 tablet Oral BID    vancomycin (VANCOCIN) IV (PEDS and ADULTS)  1,250 mg Intravenous Q12H     PRN Meds:sodium chloride, sodium chloride 0.9%, acetaminophen, albumin human 5%, calcium gluconate IVPB, calcium gluconate IVPB, calcium gluconate IVPB, dextrose 10%, dextrose 10%, glucagon (human recombinant), glucose, glucose, HYDROmorphone, insulin aspart U-100, lactated ringers, magnesium sulfate IVPB, metoclopramide HCl, ondansetron, pneumoc  20-mary conj-dip cr(PF), potassium chloride in water, potassium chloride in water, potassium chloride in water, sodium chloride 0.9%, Pharmacy to dose Vancomycin consult **AND** vancomycin - pharmacy to dose     Review of Systems   Constitutional:  Positive for fatigue. Negative for activity change, appetite change, chills, diaphoresis, fever and unexpected weight change.   HENT:  Negative for congestion, dental problem, drooling, ear discharge, ear pain, facial swelling, hearing loss, mouth sores, nosebleeds, postnasal drip, rhinorrhea, sinus pressure, sneezing, sore throat, tinnitus, trouble swallowing and voice change.    Eyes:  Negative for photophobia, pain, discharge, redness, itching and visual disturbance.   Respiratory:  Negative for apnea, cough, choking, chest tightness, shortness of breath, wheezing and stridor.    Cardiovascular:  Positive for chest pain. Negative for palpitations and leg swelling.   Gastrointestinal:  Negative for abdominal distention, abdominal pain, anal bleeding, blood in stool, constipation, diarrhea, nausea, rectal pain and vomiting.   Endocrine: Negative for cold intolerance, heat intolerance, polydipsia, polyphagia and polyuria.   Genitourinary:  Negative for decreased urine volume, difficulty urinating, dysuria, enuresis, flank pain, frequency, genital sores, hematuria, penile discharge, penile pain, penile swelling, scrotal swelling, testicular pain and urgency.   Musculoskeletal:  Negative for arthralgias, back pain, gait problem, joint swelling, myalgias, neck pain and neck stiffness.   Skin:  Negative for color change, pallor, rash and wound.   Allergic/Immunologic: Negative for environmental allergies, food allergies and immunocompromised state.   Neurological:  Positive for weakness. Negative for dizziness, tremors, seizures, syncope, facial asymmetry, speech difficulty, light-headedness, numbness and headaches.   Hematological:  Negative for adenopathy. Does not  bruise/bleed easily.   Psychiatric/Behavioral:  Positive for dysphoric mood. Negative for agitation, behavioral problems, confusion, decreased concentration, hallucinations, self-injury, sleep disturbance and suicidal ideas. The patient is nervous/anxious. The patient is not hyperactive.    Objective:     Vital Signs (Most Recent):  Temp: 97.6 °F (36.4 °C) (06/29/23 0408)  Pulse: 94 (06/29/23 0500)  Resp: 18 (06/29/23 0408)  BP: (!) 94/55 (06/29/23 0408)  SpO2: 98 % (06/29/23 0408) Vital Signs (24h Range):  Temp:  [97.6 °F (36.4 °C)-99.1 °F (37.3 °C)] 97.6 °F (36.4 °C)  Pulse:  [] 94  Resp:  [17-18] 18  SpO2:  [94 %-100 %] 98 %  BP: ()/(55-63) 94/55     Weight: 113.5 kg (250 lb 3.6 oz)  Body mass index is 33.01 kg/m².  Body surface area is 2.42 meters squared.      Intake/Output Summary (Last 24 hours) at 6/29/2023 0716  Last data filed at 6/29/2023 0623  Gross per 24 hour   Intake 891.52 ml   Output 1025 ml   Net -133.48 ml        Physical Exam  Vitals reviewed.   Constitutional:       General: He is not in acute distress.     Appearance: He is well-developed. He is ill-appearing. He is not diaphoretic.   HENT:      Head: Normocephalic.      Right Ear: External ear normal.      Left Ear: External ear normal.      Nose: Nose normal.      Right Sinus: No maxillary sinus tenderness or frontal sinus tenderness.      Left Sinus: No maxillary sinus tenderness or frontal sinus tenderness.      Mouth/Throat:      Pharynx: No oropharyngeal exudate.   Eyes:      General: Lids are normal. No scleral icterus.        Right eye: No discharge.         Left eye: No discharge.      Extraocular Movements:      Right eye: Normal extraocular motion.      Left eye: Normal extraocular motion.      Conjunctiva/sclera:      Right eye: Right conjunctiva is not injected. No hemorrhage.     Left eye: Left conjunctiva is not injected. No hemorrhage.     Pupils: Pupils are equal, round, and reactive to light.   Neck:      Thyroid:  No thyromegaly.      Vascular: No JVD.      Trachea: No tracheal deviation.   Cardiovascular:      Rate and Rhythm: Normal rate.   Pulmonary:      Effort: Pulmonary effort is normal. No respiratory distress.      Breath sounds: No stridor.   Abdominal:      General: Bowel sounds are normal.      Palpations: Abdomen is soft. There is no hepatomegaly, splenomegaly or mass.      Tenderness: There is no abdominal tenderness.   Musculoskeletal:         General: No tenderness. Normal range of motion.      Cervical back: Normal range of motion and neck supple.   Lymphadenopathy:      Head:      Right side of head: No posterior auricular or occipital adenopathy.      Left side of head: No posterior auricular or occipital adenopathy.      Cervical: No cervical adenopathy.      Right cervical: No superficial, deep or posterior cervical adenopathy.     Left cervical: No superficial, deep or posterior cervical adenopathy.      Upper Body:      Right upper body: No supraclavicular adenopathy.      Left upper body: No supraclavicular adenopathy.   Skin:     General: Skin is dry.      Findings: No erythema or rash.      Nails: There is no clubbing.   Neurological:      Mental Status: He is alert and oriented to person, place, and time.      Cranial Nerves: No cranial nerve deficit.      Motor: Weakness present.      Coordination: Coordination normal.      Gait: Gait abnormal.   Psychiatric:         Behavior: Behavior normal.         Thought Content: Thought content normal.         Judgment: Judgment normal.        Significant Labs:   BMP:   Recent Labs   Lab 06/27/23 2129 06/28/23  1110 06/28/23 1958    112* 127*   * 135* 129*   K 3.9 4.4 4.1    100 100   CO2 21* 21* 19*   BUN 22 22 22   CREATININE 1.2 1.3 1.3   CALCIUM 8.4* 8.7 8.6*   MG 2.7* 2.4 2.4   , CBC:   Recent Labs   Lab 06/28/23  0442 06/29/23  0437   WBC 8.15 6.34   HGB 9.5* 9.9*   HCT 29.8* 30.3*   PLT 54* 68*   , CMP:   Recent Labs   Lab  06/27/23 2129 06/28/23  1110 06/28/23 1958   * 135* 129*   K 3.9 4.4 4.1    100 100   CO2 21* 21* 19*    112* 127*   BUN 22 22 22   CREATININE 1.2 1.3 1.3   CALCIUM 8.4* 8.7 8.6*   PROT  --  7.7  --    ALBUMIN  --  3.5  --    BILITOT  --  1.1*  --    ALKPHOS  --  53*  --    AST  --  28  --    ALT  --  13  --    ANIONGAP 13 14 10   , Coagulation:   Recent Labs   Lab 06/28/23  1110   APTT 28.7   , Haptoglobin: No results for input(s): HAPTOGLOBIN in the last 48 hours., Immunology: No results for input(s): SPEP, TANO, FLOR, FREELAMBDALI in the last 48 hours., LDH: No results for input(s): LDHCSF, BFSOURCE in the last 48 hours., LFTs:   Recent Labs   Lab 06/28/23  1110   ALT 13   AST 28   ALKPHOS 53*   BILITOT 1.1*   PROT 7.7   ALBUMIN 3.5   , Reticulocytes: No results for input(s): RETIC in the last 48 hours., Tumor Markers: No results for input(s): PSA, CEA, , AFPTM, [id number],  in the last 48 hours.    Invalid input(s): ALGTM, Uric Acid No results for input(s): URICACID in the last 48 hours., and Urine Studies: No results for input(s): COLORU, APPEARANCEUA, PHUR, SPECGRAV, PROTEINUA, GLUCUA, KETONESU, BILIRUBINUA, OCCULTUA, NITRITE, UROBILINOGEN, LEUKOCYTESUR, RBCUA, WBCUA, BACTERIA, SQUAMEPITHEL, HYALINECASTS in the last 48 hours.    Invalid input(s): WRIGHTSUR    Diagnostic Results:  I have reviewed all pertinent imaging results/findings within the past 24 hours.    Assessment/Plan:     Heparin induced thrombocytopenia  06/28/2023 Patient is 6 days postop with declining platelet count.  Patient has had hit score is between 3 and 4.  Intermediate in nature.  I will discuss with operative surgeon whether he wishes to wait for hit panel to return or begin patient on Eliquis 10 mg p.o. b.i.d. x7 days followed by 5 mg p.o. b.i.d. until patient is hit antibody test returns.  Discussed implications with patient  06/29/2023 patient is resting comfortably results of testing pending.  Agree with  Eliquis dosing.  Talked with CBC surgery.  Platelet count slightly increased to 68,000 continue with current treatment until has return    S/P CABG x 3  Cared for and noted by primary care team in operative surgeon    Left main coronary artery disease  Cared for by primary care team in operative surgeon    Primary hypertension  Cared for by primary care team        Thank you for your consult. I will follow-up with patient. Please contact us if you have any additional questions.     Elan Nichole MD  Hematology/Oncology  [facility] - Telemetry ([facility])

## 2023-06-29 NOTE — PLAN OF CARE
Plan of care reviewed with patient with verbal understanding. Chart and orders reviewed.  Pt remains free from falls this shift, Safety precautions in place.   Pt currently resting comfortably in bed. Pain controlled with po pain med (see emar for pain admin).  Hourly rounding with bed in lowest position, side rails up, call light in reach.  Will continue to monitor until end of shift.       Problem: Adult Inpatient Plan of Care  Goal: Plan of Care Review  Flowsheets (Taken 6/29/2023 0131)  Plan of Care Reviewed With: patient  Goal: Patient-Specific Goal (Individualized)  Flowsheets (Taken 6/29/2023 0131)  Anxieties, Fears or Concerns: none  Individualized Care Needs: none     Problem: Fall Injury Risk  Goal: Absence of Fall and Fall-Related Injury  Outcome: Ongoing, Progressing     Problem: Pain Acute  Goal: Acceptable Pain Control and Functional Ability  Outcome: Ongoing, Progressing     Problem: Fatigue  Goal: Improved Activity Tolerance  Outcome: Ongoing, Progressing

## 2023-06-29 NOTE — PLAN OF CARE
Problem: Adult Inpatient Plan of Care  Goal: Plan of Care Review  Outcome: Ongoing, Progressing  Goal: Patient-Specific Goal (Individualized)  Outcome: Ongoing, Progressing  Goal: Absence of Hospital-Acquired Illness or Injury  Outcome: Ongoing, Progressing  Goal: Optimal Comfort and Wellbeing  Outcome: Ongoing, Progressing  Goal: Readiness for Transition of Care  Outcome: Ongoing, Progressing     Problem: Infection  Goal: Absence of Infection Signs and Symptoms  Outcome: Ongoing, Progressing     Problem: Skin Injury Risk Increased  Goal: Skin Health and Integrity  Outcome: Ongoing, Progressing     Problem: Fall Injury Risk  Goal: Absence of Fall and Fall-Related Injury  Outcome: Ongoing, Progressing     Problem: Pain (Cardiovascular Surgery)  Goal: Acceptable Pain Control  Outcome: Ongoing, Progressing     Problem: Pain Acute  Goal: Acceptable Pain Control and Functional Ability  Outcome: Ongoing, Progressing

## 2023-06-29 NOTE — ASSESSMENT & PLAN NOTE
06/28/2023 Patient is 6 days postop with declining platelet count.  Patient has had hit score is between 3 and 4.  Intermediate in nature.  I will discuss with operative surgeon whether he wishes to wait for hit panel to return or begin patient on Eliquis 10 mg p.o. b.i.d. x7 days followed by 5 mg p.o. b.i.d. until patient is hit antibody test returns.  Discussed implications with patient  06/29/2023 patient is resting comfortably results of testing pending.  Agree with Eliquis dosing.  Talked with CBC surgery.  Platelet count slightly increased to 68,000 continue with current treatment until has return

## 2023-06-29 NOTE — PT/OT/SLP PROGRESS
Physical Therapy Treatment    Patient Name:  Gustavo Tracey Jr.   MRN:  [id number]    Recommendations:     Discharge Recommendations: home health PT  Discharge Equipment Recommendations: shower chair  Barriers to discharge: None    Assessment:     Gustavo Tracey Jr. is a 63 y.o. male admitted with a medical diagnosis of Abnormal stress test.  He presents with the following impairments/functional limitations: weakness, impaired endurance, impaired functional mobility, gait instability, impaired balance, decreased safety awareness, decreased lower extremity function, decreased coordination, decreased upper extremity function.    Rehab Prognosis: Good; patient would benefit from acute skilled PT services to address these deficits and reach maximum level of function.    Recent Surgery: Procedure(s) (LRB):  CORONARY ARTERY BYPASS GRAFT (CABG) (N/A)  PURDY MAZE PROCEDURE (N/A)  SURGICAL PROCUREMENT, VEIN, ENDOSCOPIC (Left)  ECHOCARDIOGRAM,TRANSESOPHAGEAL (N/A)  BLOCK, NERVE, INTERCOSTAL, 2 OR MORE (N/A)  CLOSURE, LEFT ATRIAL APPENDAGE, USING DEVICE (Left) 7 Days Post-Op    Plan:     During this hospitalization, patient to be seen 3 x/week to address the identified rehab impairments via gait training, therapeutic activities, therapeutic exercises and progress toward the following goals:    Plan of Care Expires:  07/07/23    Subjective     Chief Complaint: C/O INCREASED WEAKNESS, REPORTS LOW PLATLETS PER MD  Patient/Family Comments/goals:   Pain/Comfort:  Pain Rating 1: 0/10 (C/O CHEST SORENESS)      Objective:     Communicated with NURSE KATZ prior to session.  Patient found up in chair with telemetry, peripheral IV upon PT entry to room.     General Precautions: Standard, fall, sternal  Orthopedic Precautions: N/A  Braces: N/A  Respiratory Status: Room air     Functional Mobility:  Bed Mobility:     Scooting: stand by assistance  Transfers:     Sit to Stand:  contact guard assistance with no AD  Gait: PT AMB 50' X 2 TRIALS  "NO AD MIN/CGA, SLOW, MILDLY UNSTEADY, QUICK TO FATIGUE, C/O INCREASE WEAKNESS SO RETURN TO ROOM  Balance: GOOD SITTING BALANCE, POOR+ DYNAMIC BALANCE DURING GAIT    AM-PAC 6 CLICK MOBILITY  Turning over in bed (including adjusting bedclothes, sheets and blankets)?: 1  Sitting down on and standing up from a chair with arms (e.g., wheelchair, bedside commode, etc.): 3  Moving from lying on back to sitting on the side of the bed?: 1  Moving to and from a bed to a chair (including a wheelchair)?: 3  Need to walk in hospital room?: 3  Climbing 3-5 steps with a railing?: 1  Basic Mobility Total Score: 12     Treatment & Education:  PT EDUCATION:  - ROLE OF P.T. AND POC IN ACUTE CARE HOSPITAL SETTING  - REVIEW STERNAL PRECAUTIONS AND ACTIVITY PACING  - ENCOURAGED TO INCREASE TIME OOB IN CHAIR TO TOLERANCE   - TO CONTINUE THERAPUETIC EXERCISES THROUGHOUT THE DAY TO INCREASE ACTIVITY TOLERANCE AND DECREASE RISK FOR PNEUMONIA AND BLOOD CLOTS: HIP FLEX/EXT, HIP ABD/ADD, QUAD SET, HEEL SLIDE, AP  - RISK FOR FALLS DUE TO GENERALIZED WEAKNESS, EDUCATED ON "CALL DON'T FALL", ENCOURAGED TO CALL FOR ASSISTANCE WITH ALL NEEDS SUCH AS BED<>CHAIR TRANSFERS OR TRIPS TO BATHROOM, PT AGREEABLE TO SAFETY PRECAUTIONS    Patient left up in chair with all lines intact, call button in reach, and NURSE notified..    GOALS:   Multidisciplinary Problems       Physical Therapy Goals          Problem: Physical Therapy    Goal Priority Disciplines Outcome Goal Variances Interventions   Physical Therapy Goal     PT, PT/OT Ongoing, Progressing     Description: LTG'S TO BE MET IN 14 DAYS (7-7-23)  PT WILL REQUIRE CGA FOR BED MOBILITY  PT WILL REQUIRE SPV FOR BED<>CHAIR TF'S  PT WILL  FEET NO AD SPV                         Time Tracking:     PT Received On: 06/29/23  PT Start Time: 0705     PT Stop Time: 0730  PT Total Time (min): 25 min     Billable Minutes: Gait Training 10 and Therapeutic Activity 15    Treatment Type: Treatment  PT/PTA: PT   "   Number of PTA visits since last PT visit: 0     06/29/2023

## 2023-06-29 NOTE — PROGRESS NOTES
Subjective:      Patient ID: Gustavo Tracey Jr. is a 63 y.o. male.    Chief Complaint: No chief complaint on file.    HPI: Left main coronary artery disease  The patient is a 63-year-old male with CHF, history of AFib, status post CVA, hyperlipidemia, hypertension, diabetes and cardiomyopathy who had a normal Cardiolite test on workup.  The patient was brought to the operating room and cardiac catheterization shows significant multivessel coronary artery disease with a 70% ostial left main disease and a 70% proximal LAD disease.     The patient is a candidate for coronary artery bypass grafting and Purdy Maze 4 procedure.  Preop workup is in progress.  The risks and benefits of the surgery were explained to the patient.  The patient understands the risks and benefits of surgery and has to proceed with surgery which has been scheduled for 06/22/2023.  Post-Op Info:  Procedure(s) (LRB):  CORONARY ARTERY BYPASS GRAFT (CABG) (N/A)  PURDY MAZE PROCEDURE (N/A)  SURGICAL PROCUREMENT, VEIN, ENDOSCOPIC (Left)  ECHOCARDIOGRAM,TRANSESOPHAGEAL (N/A)  BLOCK, NERVE, INTERCOSTAL, 2 OR MORE (N/A)  CLOSURE, LEFT ATRIAL APPENDAGE, USING DEVICE (Left)   S/P CABG x 3  06/23/2023   The patient is postop day 1 status post coronary artery bypass grafting x3 and Purdy Maze 4 procedure.  Overall the patient is doing well.    Neuro:  Patient is awake alert and oriented x3.  Neuro exam is nonfocal.  Patient moves all 4.  Patient's pain is being controlled with current pain regimen.    Cardiac:  Patient has been hemodynamically stable cardiac index has been about 2.2.  Patient is on low-dose epi and vasopressin.  Wean vasopressin and epi to off.    Respiratory:  Patient was extubated yesterday.  Patient has good sats on nasal cannula.  Continue pulmonary toileting.  Continue incentive spirometer.  GI:  Will advance patient's to regular diet as tolerated.    Renal: Patient has good urine output.  Creatinine is 1.7.  Will start diuresis.  Endocrine:   Patient's glucose is controlled with an insulin drip.  Will convert to sliding scale and long-acting insulin.  Heme:  Hematocrit is 28.2 and platelet count is 116.  Will start patient on a NOAC for anticoagulation once patient has chest tubes have been discontinued.  Patient requires anticoagulation since he is status post Woodruff Maze 4 for atrial fibrillation.  Id:  White count is 4.  Will continue to follow.  Patient is on venu op antibiotics.    Activities:  Patient is out of bed to chair.  Will advance activities as tolerated.    Line tubes and drains:  Patient has a right IJ [place] and [place], chest tubes, pacer wires, A-line, Rankin catheter and saphenectomy site ANDERS drains.     06/24/2023   The patient is postop day 2 status post coronary artery bypass grafting x3 and Woodruff Maze 4 procedure.  Overall the patient is doing well.    Neuro:  Patient is awake alert and oriented x3.  Neuro exam is nonfocal.  Patient moves all 4.  Pain is controlled current pain med main.  Cardiac:  Patient is hemodynamically stable.  Patient is off all pressors.  Continue metoprolol.    Respiratory:  Patient has good sats continue pulmonary toileting.  Continue incentive spirometer.    GI:  Patient is tolerating p.o. intake.  Continue advance as tolerated.    Renal:  Patient has good urine output.  Creatinine is 1.7.  Continue diuresis.  Endocrine:  Glucose is controlled with sliding scale.  Heme:  Hematocrit is 23.7 and platelet count is 62.  Will start patient on apixaban.  While patient is platelet count is low will start patient on 2.5 mg p.o. b.i.d. of apixaban.  Will increase dose once platelet count recovers.  Id:  White count is 8.92.  Will follow continue to follow white count.  Activities patient is out of bed to chair continue to advance as tolerated.    Line tubes and drains:  Patient has pacer wires, PICC line and Rankin catheter.  Chest tubes have been discontinued.  Will discontinue Rankin catheter.     06/25/2023   The patient  is postop day 3 status post coronary artery bypass grafting x3 and Woodruff Maze 4 procedure.    Neuro:  Patient is awake alert and oriented x3.  Neuro exam is nonfocal.  Pain is well controlled.    Cardiac:  Patient is hemodynamically stable.  Continue metoprolol.  Respiratory: Patient has good sats on room air continue pulmonary toileting.  GI:  Patient is tolerating a regular diet.  Patient has not had a bowel movement.  Renal: Patient has good urine output.  Creatinine is 1.6.  Heme:  Hematocrit is 21.5 and platelet count is 61.  Will transfuse 2 units of plaque probe blood cells.  Continue apixaban 2.5 mg for anticoagulation.  Id:  White count is increased to 19.  Will panculture patient.  Will start on empiric broad-spectrum antibiotics.  Activities:  Patient is ambulating in the hallways.  Advance as tolerated.  Line tubes and drains:  Patient has a PICC line, and pacer wires.     06/26/2023   The patient is postop day 4 status post coronary artery bypass grafting x3 and Woodruff Maze 4 procedure.  Overall the patient is doing well.  Anticipate discharge in the next 48-72 hours.  Neuro:  Patient is awake alert and oriented x3.  Neuro exam is nonfocal.  Patient's pain is well controlled.  Cardiac:  Patient is tolerating metoprolol.  Patient has preop low ejection fraction.  Will add Entresto.  In light of severely depressed ejection fraction preoperatively,  patient will need LifeVest prior to discharge.  Will start patient on his preop Entresto and dapagliflozin  Respiratory:  Patient has good sats on room air.  Continue pulmonary toileting.  Continue incentive spirometer.  GI:  Patient is tolerating regular diet.  Patient has had bowel movements.    Renal:  Patient has good urine output.  Creatinine is 1.5.  Will continue diuresis.  Patient appears to still be fluid overloaded.    Heme:  Hematocrit is 27.  Status post 2 units packed red cells.  Platelet count this 62.  Continue apixaban 2.5 mg for anticoagulation.     Id: Patient's white count is down to 18.  Patient is on broad-spectrum antibiotics.  Cultures are pending.    Heme:  Patient's glucose is controlled with sliding scale.  Activities:  Patient is ambulating in the hallways.    Line tubes and drains:  Patient has a PICC line and pacer wires.     06/27/2023   The patient is postop day 5 status post coronary artery bypass grafting x3 and Woodruff Maze 4 procedure.  Overall the patient is doing well.  Anticipate discharge in the next 24 hours.    Neuro:  Patient is awake alert and oriented x3.  Neuro exam is nonfocal.  Pain is well controlled.    Cardiac:  Patient is tolerating metoprolol.  Will increase metoprolol.  Continue Entresto.  Patient will be fitted with a LifeVest prior to discharge.  Patient is currently on empagliflozin.  Patient will be on his home dapagliflozin on discharge.    Respiratory: Patient has good sats on room air.  Continue pulmonary toileting.  Continue incentive spirometer.    GI:  Patient is tolerating a regular diet.  And patient has had bowel movements.    Renal:  Patient has good urine output.  Creatinine is 1.2.  Will place patient on home diuretic doses.  Patient's appears relatively euvolemic.    Heme:  Hematocrit is 29.  And platelet count is 65.  Continue apixaban 2.5 mg for anticoagulation.  Patient has anemia and thrombocytopenia.  Will refer to heme Onc as outpatient.    Id:  Patient's white count is down to 12.  Patient is on broad-spectrum antibiotics.  Cultures are no growth to date.  Will discharge patient on p.o. Augmentin.    Endocrine: Glucose is controlled with a sliding scale.    Activities:  Patient is ambulating in the hallways.    Line tubes and drains:  Patient has a PICC line which will be discontinued prior to discharge.    6/28/23The patient is postop day 6 status post coronary artery bypass grafting x3 and Woodruff Maze 4 procedure.    Neuro:  Patient is awake alert and oriented x3.  Neuro exam is nonfocal.  Pain is well  controlled.    Cardiac:  Metoprolol.  Continue Entresto.  Patient will be fitted with a LifeVest prior to discharge.  Patient is currently on empagliflozin.  Patient will be on his home dapagliflozin on discharge.    Respiratory:  Aggressive pulmonary toilet  Continue incentive spirometer.    GI:  Cardiac diet    Renal:  Creatinine back to baseline   Will place patient on home diuretic doses.    Heme:  Hematocrit is 29.  And platelet count is 55 consult Heme-Onc for possible hit  Continue apixaban 5 mg 5 mg for anticoagulation.     d:  Patient's white count is down   Patient is on broad-spectrum antibiotics.  Cultures are no growth to date.  Will discharge patient on p.o. Augmentin.    Endocrine: Glucose is controlled with a sliding scale.    Activities:  Patient is ambulating in the hallways.           Review of patient's allergies indicates:   Allergen Reactions    Heparin analogues        Past Medical History:   Diagnosis Date    Abnormal nuclear stress test 11/26/2022    Cervical radiculopathy     to rt arm    CHF (congestive heart failure)     Chronic systolic congestive heart failure 11/28/2022    Dilated cardiomyopathy 11/26/2022    Hyperlipidemia     Hypertension     Obesity, unspecified     PAF (paroxysmal atrial fibrillation) 11/26/2022    Pulmonary HTN 11/28/2022    Stroke        Family History   Problem Relation Age of Onset    Hypertension Mother     Diabetes Father     Hyperlipidemia Father     Hypertension Father     Heart attack Father     Coronary artery disease Father        Social History     Socioeconomic History    Marital status:    Tobacco Use    Smoking status: Never    Smokeless tobacco: Never   Substance and Sexual Activity    Alcohol use: Yes    Drug use: Never    Sexual activity: Not Currently       Past Surgical History:   Procedure Laterality Date    CLOSURE OF LEFT ATRIAL APPENDAGE USING DEVICE Left 6/22/2023    Procedure: CLOSURE, LEFT ATRIAL APPENDAGE, USING DEVICE;  Surgeon:  Rustam Dave MD;  Location: [facility] OR;  Service: Cardiovascular;  Laterality: Left;  LIGATION OF LEFT ATRIAL APPENDAGE WITH OTILIA EXCLUSION SYSTEM    CORONARY ARTERY BYPASS GRAFT (CABG) N/A 6/22/2023    Procedure: CORONARY ARTERY BYPASS GRAFT (CABG);  Surgeon: Rustam Dave MD;  Location: [facility] OR;  Service: Cardiovascular;  Laterality: N/A;  3-VESSEL WITH EPI-AORTIC ULTRASOUND    PURDY MAZE PROCEDURE N/A 6/22/2023    Procedure: PURDY MAZE PROCEDURE;  Surgeon: Rustam Dave MD;  Location: [facility] OR;  Service: Cardiovascular;  Laterality: N/A;    ECHOCARDIOGRAM,TRANSESOPHAGEAL N/A 6/22/2023    Procedure: ECHOCARDIOGRAM,TRANSESOPHAGEAL;  Surgeon: Rustam Dave MD;  Location: [facility] OR;  Service: Cardiovascular;  Laterality: N/A;    ENDOSCOPIC HARVEST OF VEIN Left 6/22/2023    Procedure: SURGICAL PROCUREMENT, VEIN, ENDOSCOPIC;  Surgeon: Rustam Dave MD;  Location: [facility] OR;  Service: Cardiovascular;  Laterality: Left;    INJECTION OF ANESTHETIC AGENT AROUND MULTIPLE INTERCOSTAL NERVES N/A 6/22/2023    Procedure: BLOCK, NERVE, INTERCOSTAL, 2 OR MORE;  Surgeon: Rustam Dave MD;  Location: [facility] OR;  Service: Cardiovascular;  Laterality: N/A;  PARASTERNAL NERVE BLOCK    INSTANTANEOUS WAVE-FREE RATIO  6/20/2023    Procedure: Instantaneous Wave-Free Ratio;  Surgeon: Zion Ortega MD;  Location: [facility] CATH LAB;  Service: Cardiology;;    IVUS, CORONARY  6/20/2023    Procedure: IVUS, Coronary;  Surgeon: Zion Ortega MD;  Location: [facility] CATH LAB;  Service: Cardiology;;    LEFT HEART CATHETERIZATION Left 11/28/2022    Procedure: CATHETERIZATION, HEART, LEFT;  Surgeon: Zion Ortega MD;  Location: [facility] CATH LAB;  Service: Cardiology;  Laterality: Left;    LEFT HEART CATHETERIZATION Left 6/20/2023    Procedure: Left heart cath;  Surgeon: Zion Ortega MD;  Location: [facility] CATH LAB;  Service: Cardiology;  Laterality: Left;    PERCUTANEOUS TRANSLUMINAL BALLOON ANGIOPLASTY OF CORONARY ARTERY  6/20/2023    Procedure:  "Angioplasty-coronary;  Surgeon: Zion Ortega MD;  Location: [facility] CATH LAB;  Service: Cardiology;;    RIGHT HEART CATHETERIZATION N/A 11/28/2022    Procedure: INSERTION, CATHETER, RIGHT HEART;  Surgeon: Zion Ortega MD;  Location: [facility] CATH LAB;  Service: Cardiology;  Laterality: N/A;  Congestive heart failure       Review of Systems   Constitutional:  Negative for activity change and appetite change.   HENT:  Negative for dental problem, nosebleeds and sore throat.    Eyes:  Negative for discharge and visual disturbance.   Respiratory:  Positive for chest tightness. Negative for cough and stridor.    Cardiovascular:  Negative for leg swelling.   Gastrointestinal:  Negative for abdominal distention and abdominal pain.   Genitourinary:  Negative for difficulty urinating and dysuria.   Musculoskeletal:  Negative for arthralgias, back pain and joint swelling.   Allergic/Immunologic: Negative for environmental allergies.   Neurological:  Negative for dizziness, syncope and headaches.   Hematological:  Does not bruise/bleed easily.   Psychiatric/Behavioral:  Negative for behavioral problems.         Objective:   BP 99/65 (BP Location: Left arm, Patient Position: Sitting)   Pulse 98   Temp 97.6 °F (36.4 °C) (Oral)   Resp 20   Ht 6' 1" (1.854 m)   Wt 113.5 kg (250 lb 3.6 oz)   SpO2 (!) 94%   BMI 33.01 kg/m²     X-Ray Chest AP Portable  Narrative: EXAMINATION:  XR CHEST AP PORTABLE    CLINICAL HISTORY:  Post-op; lines and tubes in place, subsequent encounter    COMPARISON:  June 23, 2023    FINDINGS:  EKG leads and other life-saving devices continue to overlie the chest.  Stable right arm PICC line, left-sided chest tube and large-bore mediastinal drain.  Negative for pneumothorax.  Stable bilateral lower lobe atelectasis/consolidation without new pulmonary opacities.  Interval decrease in the amount of air within loops of bowel underneath the left hemidiaphragm.  The hilar and mediastinal contours and osseous " structures are unchanged.  Impression: 1.  Overall, no significant interval change has occurred.    Electronically signed by: Selvin Rowe MD  Date:    06/24/2023  Time:    07:00         Physical Exam  Vitals and nursing note reviewed.   Constitutional:       Appearance: He is obese.   HENT:      Head: Normocephalic.      Mouth/Throat:      Mouth: Mucous membranes are moist.   Eyes:      Extraocular Movements: Extraocular movements intact.      Pupils: Pupils are equal, round, and reactive to light.   Cardiovascular:      Rate and Rhythm: Normal rate and regular rhythm.      Pulses: Normal pulses.      Heart sounds: Normal heart sounds.      Comments: Midline incision healing well  Leg incisions also healing well  Pulmonary:      Effort: Pulmonary effort is normal.      Breath sounds: Normal breath sounds.   Abdominal:      Palpations: Abdomen is soft.   Musculoskeletal:         General: Normal range of motion.      Cervical back: Normal range of motion and neck supple.   Skin:     General: Skin is warm.      Capillary Refill: Capillary refill takes less than 2 seconds.   Neurological:      General: No focal deficit present.      Mental Status: He is alert and oriented to person, place, and time.   Psychiatric:         Mood and Affect: Mood normal.       Assessment:     1. Coronary artery disease without angina pectoris    2. Abnormal stress test    3. CAD, multiple vessel    4. DCM (dilated cardiomyopathy)    5. SOB (shortness of breath)    6. PAF (paroxysmal atrial fibrillation)    7. History of CVA (cerebrovascular accident)    8. Pre-operative cardiovascular examination    9. CAD (coronary artery disease)    10. Post-operative state    11. S/P CABG x 3    12. Anemia, unspecified type          Plan   The patient is postop day 7 status post coronary artery bypass grafting x3 and Woodruff Maze 4 procedure.    Neuro:  Patient is awake alert and oriented x3.  Neuro exam is nonfocal.  Pain is well controlled.    Cardiac:   Metoprolol.  Continue Entresto.  Patient will be fitted with a LifeVest prior to discharge.  Patient is currently on empagliflozin.  Patient will be on his home dapagliflozin on discharge.    Respiratory:  Aggressive pulmonary toilet  Continue incentive spirometer.    GI:  Cardiac diet    Renal:  Creatinine back to baseline   Will place patient on home diuretic doses.    Heme:  Hematocrit stable.  And platelet count is 68 consult Heme-Onc for possible hit  Continue apixaban 5 mg 5 mg for anticoagulation.     d:  Patient's white count is down   Patient is on broad-spectrum antibiotics.  Cultures are no growth to date.  Will discharge patient on p.o. Augmentin.    Endocrine: Glucose is controlled with a sliding scale.    Activities:  Patient is ambulating in the hallways.    Line tubes and drains:  Patient has a PICC line which will be discontinued prior to discharge.    Waiting for up trend in the platelet count before discharge          Malissa Graham MD  [facility]  [place]

## 2023-06-29 NOTE — SUBJECTIVE & OBJECTIVE
Interval History:  Patient is resting comfortably    Oncology Treatment Plan:   [No matching plan found]    Medications:  Continuous Infusions:  Scheduled Meds:   amitriptyline  50 mg Oral QHS    apixaban  5 mg Oral BID    ascorbic acid (vitamin C)  500 mg Oral BID    aspirin  81 mg Oral Daily    ceFEPime (MAXIPIME) IVPB  2 g Intravenous Q8H    cyanocobalamin  1,000 mcg Oral Daily    docusate sodium  100 mg Oral BID    empagliflozin  10 mg Oral Daily    ferrous sulfate  1 tablet Oral Daily    folic acid  2 mg Oral Daily    furosemide (LASIX) injection  40 mg Intravenous BID    latanoprost  1 drop Both Eyes Nightly    magnesium hydroxide 400 mg/5 ml  5 mL Oral BID    metoprolol tartrate  50 mg Oral BID    metroNIDAZOLE  500 mg Oral Q8H    pantoprazole  40 mg Oral Daily    polyethylene glycol  17 g Oral Daily    potassium chloride  20 mEq Oral Q12H    pravastatin  20 mg Oral Daily    sacubitriL-valsartan  1 tablet Oral BID    vancomycin (VANCOCIN) IV (PEDS and ADULTS)  1,250 mg Intravenous Q12H     PRN Meds:sodium chloride, sodium chloride 0.9%, acetaminophen, albumin human 5%, calcium gluconate IVPB, calcium gluconate IVPB, calcium gluconate IVPB, dextrose 10%, dextrose 10%, glucagon (human recombinant), glucose, glucose, HYDROmorphone, insulin aspart U-100, lactated ringers, magnesium sulfate IVPB, metoclopramide HCl, ondansetron, pneumoc 20-mary conj-dip cr(PF), potassium chloride in water, potassium chloride in water, potassium chloride in water, sodium chloride 0.9%, Pharmacy to dose Vancomycin consult **AND** vancomycin - pharmacy to dose     Review of Systems   Constitutional:  Positive for fatigue. Negative for activity change, appetite change, chills, diaphoresis, fever and unexpected weight change.   HENT:  Negative for congestion, dental problem, drooling, ear discharge, ear pain, facial swelling, hearing loss, mouth sores, nosebleeds, postnasal drip, rhinorrhea, sinus pressure, sneezing, sore throat,  tinnitus, trouble swallowing and voice change.    Eyes:  Negative for photophobia, pain, discharge, redness, itching and visual disturbance.   Respiratory:  Negative for apnea, cough, choking, chest tightness, shortness of breath, wheezing and stridor.    Cardiovascular:  Positive for chest pain. Negative for palpitations and leg swelling.   Gastrointestinal:  Negative for abdominal distention, abdominal pain, anal bleeding, blood in stool, constipation, diarrhea, nausea, rectal pain and vomiting.   Endocrine: Negative for cold intolerance, heat intolerance, polydipsia, polyphagia and polyuria.   Genitourinary:  Negative for decreased urine volume, difficulty urinating, dysuria, enuresis, flank pain, frequency, genital sores, hematuria, penile discharge, penile pain, penile swelling, scrotal swelling, testicular pain and urgency.   Musculoskeletal:  Negative for arthralgias, back pain, gait problem, joint swelling, myalgias, neck pain and neck stiffness.   Skin:  Negative for color change, pallor, rash and wound.   Allergic/Immunologic: Negative for environmental allergies, food allergies and immunocompromised state.   Neurological:  Positive for weakness. Negative for dizziness, tremors, seizures, syncope, facial asymmetry, speech difficulty, light-headedness, numbness and headaches.   Hematological:  Negative for adenopathy. Does not bruise/bleed easily.   Psychiatric/Behavioral:  Positive for dysphoric mood. Negative for agitation, behavioral problems, confusion, decreased concentration, hallucinations, self-injury, sleep disturbance and suicidal ideas. The patient is nervous/anxious. The patient is not hyperactive.    Objective:     Vital Signs (Most Recent):  Temp: 97.6 °F (36.4 °C) (06/29/23 0408)  Pulse: 94 (06/29/23 0500)  Resp: 18 (06/29/23 0408)  BP: (!) 94/55 (06/29/23 0408)  SpO2: 98 % (06/29/23 0408) Vital Signs (24h Range):  Temp:  [97.6 °F (36.4 °C)-99.1 °F (37.3 °C)] 97.6 °F (36.4 °C)  Pulse:   [] 94  Resp:  [17-18] 18  SpO2:  [94 %-100 %] 98 %  BP: ()/(55-63) 94/55     Weight: 113.5 kg (250 lb 3.6 oz)  Body mass index is 33.01 kg/m².  Body surface area is 2.42 meters squared.      Intake/Output Summary (Last 24 hours) at 6/29/2023 0716  Last data filed at 6/29/2023 0623  Gross per 24 hour   Intake 891.52 ml   Output 1025 ml   Net -133.48 ml        Physical Exam  Vitals reviewed.   Constitutional:       General: He is not in acute distress.     Appearance: He is well-developed. He is ill-appearing. He is not diaphoretic.   HENT:      Head: Normocephalic.      Right Ear: External ear normal.      Left Ear: External ear normal.      Nose: Nose normal.      Right Sinus: No maxillary sinus tenderness or frontal sinus tenderness.      Left Sinus: No maxillary sinus tenderness or frontal sinus tenderness.      Mouth/Throat:      Pharynx: No oropharyngeal exudate.   Eyes:      General: Lids are normal. No scleral icterus.        Right eye: No discharge.         Left eye: No discharge.      Extraocular Movements:      Right eye: Normal extraocular motion.      Left eye: Normal extraocular motion.      Conjunctiva/sclera:      Right eye: Right conjunctiva is not injected. No hemorrhage.     Left eye: Left conjunctiva is not injected. No hemorrhage.     Pupils: Pupils are equal, round, and reactive to light.   Neck:      Thyroid: No thyromegaly.      Vascular: No JVD.      Trachea: No tracheal deviation.   Cardiovascular:      Rate and Rhythm: Normal rate.   Pulmonary:      Effort: Pulmonary effort is normal. No respiratory distress.      Breath sounds: No stridor.   Abdominal:      General: Bowel sounds are normal.      Palpations: Abdomen is soft. There is no hepatomegaly, splenomegaly or mass.      Tenderness: There is no abdominal tenderness.   Musculoskeletal:         General: No tenderness. Normal range of motion.      Cervical back: Normal range of motion and neck supple.   Lymphadenopathy:       Head:      Right side of head: No posterior auricular or occipital adenopathy.      Left side of head: No posterior auricular or occipital adenopathy.      Cervical: No cervical adenopathy.      Right cervical: No superficial, deep or posterior cervical adenopathy.     Left cervical: No superficial, deep or posterior cervical adenopathy.      Upper Body:      Right upper body: No supraclavicular adenopathy.      Left upper body: No supraclavicular adenopathy.   Skin:     General: Skin is dry.      Findings: No erythema or rash.      Nails: There is no clubbing.   Neurological:      Mental Status: He is alert and oriented to person, place, and time.      Cranial Nerves: No cranial nerve deficit.      Motor: Weakness present.      Coordination: Coordination normal.      Gait: Gait abnormal.   Psychiatric:         Behavior: Behavior normal.         Thought Content: Thought content normal.         Judgment: Judgment normal.        Significant Labs:   BMP:   Recent Labs   Lab 06/27/23 2129 06/28/23 1110 06/28/23 1958    112* 127*   * 135* 129*   K 3.9 4.4 4.1    100 100   CO2 21* 21* 19*   BUN 22 22 22   CREATININE 1.2 1.3 1.3   CALCIUM 8.4* 8.7 8.6*   MG 2.7* 2.4 2.4   , CBC:   Recent Labs   Lab 06/28/23  0442 06/29/23  0437   WBC 8.15 6.34   HGB 9.5* 9.9*   HCT 29.8* 30.3*   PLT 54* 68*   , CMP:   Recent Labs   Lab 06/27/23 2129 06/28/23 1110 06/28/23 1958   * 135* 129*   K 3.9 4.4 4.1    100 100   CO2 21* 21* 19*    112* 127*   BUN 22 22 22   CREATININE 1.2 1.3 1.3   CALCIUM 8.4* 8.7 8.6*   PROT  --  7.7  --    ALBUMIN  --  3.5  --    BILITOT  --  1.1*  --    ALKPHOS  --  53*  --    AST  --  28  --    ALT  --  13  --    ANIONGAP 13 14 10   , Coagulation:   Recent Labs   Lab 06/28/23 1110   APTT 28.7   , Haptoglobin: No results for input(s): HAPTOGLOBIN in the last 48 hours., Immunology: No results for input(s): SPEP, TANO, FLOR, FREELAMBDALI in the last 48 hours., LDH: No  results for input(s): LDHCSF, BFSOURCE in the last 48 hours., LFTs:   Recent Labs   Lab 06/28/23  1110   ALT 13   AST 28   ALKPHOS 53*   BILITOT 1.1*   PROT 7.7   ALBUMIN 3.5   , Reticulocytes: No results for input(s): RETIC in the last 48 hours., Tumor Markers: No results for input(s): PSA, CEA, , AFPTM, [id number],  in the last 48 hours.    Invalid input(s): ALGTM, Uric Acid No results for input(s): URICACID in the last 48 hours., and Urine Studies: No results for input(s): COLORU, APPEARANCEUA, PHUR, SPECGRAV, PROTEINUA, GLUCUA, KETONESU, BILIRUBINUA, OCCULTUA, NITRITE, UROBILINOGEN, LEUKOCYTESUR, RBCUA, WBCUA, BACTERIA, SQUAMEPITHEL, HYALINECASTS in the last 48 hours.    Invalid input(s): MattesonSUR    Diagnostic Results:  I have reviewed all pertinent imaging results/findings within the past 24 hours.

## 2023-06-30 DIAGNOSIS — D75.829 HEPARIN INDUCED THROMBOCYTOPENIA: ICD-10-CM

## 2023-06-30 DIAGNOSIS — D75.829 HIT (HEPARIN-INDUCED THROMBOCYTOPENIA): Primary | ICD-10-CM

## 2023-06-30 LAB
ALBUMIN SERPL BCP-MCNC: 3.2 G/DL (ref 3.5–5.2)
ALP SERPL-CCNC: 49 U/L (ref 55–135)
ALT SERPL W/O P-5'-P-CCNC: 12 U/L (ref 10–44)
ANION GAP SERPL CALC-SCNC: 14 MMOL/L (ref 8–16)
ANION GAP SERPL CALC-SCNC: 17 MMOL/L (ref 8–16)
ANISOCYTOSIS BLD QL SMEAR: SLIGHT
AST SERPL-CCNC: 19 U/L (ref 10–40)
BACTERIA BLD CULT: NORMAL
BASOPHILS NFR BLD: 0 % (ref 0–1.9)
BILIRUB DIRECT SERPL-MCNC: 0.4 MG/DL (ref 0.1–0.3)
BILIRUB SERPL-MCNC: 0.7 MG/DL (ref 0.1–1)
BLASTS NFR BLD MANUAL: 9 %
BUN SERPL-MCNC: 24 MG/DL (ref 8–23)
BUN SERPL-MCNC: 26 MG/DL (ref 8–23)
CALCIUM SERPL-MCNC: 8.4 MG/DL (ref 8.7–10.5)
CALCIUM SERPL-MCNC: 9.3 MG/DL (ref 8.7–10.5)
CHLORIDE SERPL-SCNC: 100 MMOL/L (ref 95–110)
CHLORIDE SERPL-SCNC: 102 MMOL/L (ref 95–110)
CO2 SERPL-SCNC: 20 MMOL/L (ref 23–29)
CO2 SERPL-SCNC: 21 MMOL/L (ref 23–29)
CREAT SERPL-MCNC: 1.3 MG/DL (ref 0.5–1.4)
CREAT SERPL-MCNC: 1.4 MG/DL (ref 0.5–1.4)
DIFFERENTIAL METHOD: ABNORMAL
EOSINOPHIL NFR BLD: 0 % (ref 0–8)
ERYTHROCYTE [DISTWIDTH] IN BLOOD BY AUTOMATED COUNT: 16.1 % (ref 11.5–14.5)
EST. GFR  (NO RACE VARIABLE): 57 ML/MIN/1.73 M^2
EST. GFR  (NO RACE VARIABLE): >60 ML/MIN/1.73 M^2
GLUCOSE SERPL-MCNC: 100 MG/DL (ref 70–110)
GLUCOSE SERPL-MCNC: 92 MG/DL (ref 70–110)
HCT VFR BLD AUTO: 31 % (ref 40–54)
HGB BLD-MCNC: 9.9 G/DL (ref 14–18)
IMM GRANULOCYTES # BLD AUTO: ABNORMAL K/UL (ref 0–0.04)
IMM GRANULOCYTES NFR BLD AUTO: ABNORMAL % (ref 0–0.5)
LYMPHOCYTES NFR BLD: 39 % (ref 18–48)
MAGNESIUM SERPL-MCNC: 2.2 MG/DL (ref 1.6–2.6)
MAGNESIUM SERPL-MCNC: 2.2 MG/DL (ref 1.6–2.6)
MCH RBC QN AUTO: 27.8 PG (ref 27–31)
MCHC RBC AUTO-ENTMCNC: 31.9 G/DL (ref 32–36)
MCV RBC AUTO: 87 FL (ref 82–98)
MONOCYTES NFR BLD: 1 % (ref 4–15)
MYELOCYTES NFR BLD MANUAL: 1 %
NEUTROPHILS NFR BLD: 49 % (ref 38–73)
NEUTS BAND NFR BLD MANUAL: 1 %
NRBC BLD-RTO: 0 /100 WBC
PF4 HEPARIN CMPLX AB SER QL: 1.87 OD (ref 0–0.4)
PLATELET # BLD AUTO: 70 K/UL (ref 150–450)
PLATELET BLD QL SMEAR: ABNORMAL
PMV BLD AUTO: 10.6 FL (ref 9.2–12.9)
POCT GLUCOSE: 101 MG/DL (ref 70–110)
POCT GLUCOSE: 102 MG/DL (ref 70–110)
POCT GLUCOSE: 122 MG/DL (ref 70–110)
POCT GLUCOSE: 87 MG/DL (ref 70–110)
POCT GLUCOSE: 98 MG/DL (ref 70–110)
POLYCHROMASIA BLD QL SMEAR: ABNORMAL
POTASSIUM SERPL-SCNC: 4.4 MMOL/L (ref 3.5–5.1)
POTASSIUM SERPL-SCNC: 4.5 MMOL/L (ref 3.5–5.1)
PROT SERPL-MCNC: 7.6 G/DL (ref 6–8.4)
RBC # BLD AUTO: 3.56 M/UL (ref 4.6–6.2)
SMUDGE CELLS BLD QL SMEAR: PRESENT
SODIUM SERPL-SCNC: 135 MMOL/L (ref 136–145)
SODIUM SERPL-SCNC: 139 MMOL/L (ref 136–145)
WBC # BLD AUTO: 4.97 K/UL (ref 3.9–12.7)

## 2023-06-30 PROCEDURE — 25000003 PHARM REV CODE 250: Performed by: INTERNAL MEDICINE

## 2023-06-30 PROCEDURE — 97530 THERAPEUTIC ACTIVITIES: CPT | Mod: CQ

## 2023-06-30 PROCEDURE — 85007 BL SMEAR W/DIFF WBC COUNT: CPT | Performed by: THORACIC SURGERY (CARDIOTHORACIC VASCULAR SURGERY)

## 2023-06-30 PROCEDURE — 99232 PR SUBSEQUENT HOSPITAL CARE,LEVL II: ICD-10-PCS | Mod: ,,, | Performed by: INTERNAL MEDICINE

## 2023-06-30 PROCEDURE — 85027 COMPLETE CBC AUTOMATED: CPT | Performed by: THORACIC SURGERY (CARDIOTHORACIC VASCULAR SURGERY)

## 2023-06-30 PROCEDURE — 97535 SELF CARE MNGMENT TRAINING: CPT

## 2023-06-30 PROCEDURE — 80048 BASIC METABOLIC PNL TOTAL CA: CPT | Mod: 91 | Performed by: THORACIC SURGERY (CARDIOTHORACIC VASCULAR SURGERY)

## 2023-06-30 PROCEDURE — 21400001 HC TELEMETRY ROOM

## 2023-06-30 PROCEDURE — 97530 THERAPEUTIC ACTIVITIES: CPT

## 2023-06-30 PROCEDURE — 25000242 PHARM REV CODE 250 ALT 637 W/ HCPCS: Performed by: THORACIC SURGERY (CARDIOTHORACIC VASCULAR SURGERY)

## 2023-06-30 PROCEDURE — 80076 HEPATIC FUNCTION PANEL: CPT | Performed by: THORACIC SURGERY (CARDIOTHORACIC VASCULAR SURGERY)

## 2023-06-30 PROCEDURE — 97116 GAIT TRAINING THERAPY: CPT | Mod: CQ

## 2023-06-30 PROCEDURE — 99232 SBSQ HOSP IP/OBS MODERATE 35: CPT | Mod: ,,, | Performed by: INTERNAL MEDICINE

## 2023-06-30 PROCEDURE — 83735 ASSAY OF MAGNESIUM: CPT | Performed by: THORACIC SURGERY (CARDIOTHORACIC VASCULAR SURGERY)

## 2023-06-30 PROCEDURE — 36415 COLL VENOUS BLD VENIPUNCTURE: CPT | Performed by: THORACIC SURGERY (CARDIOTHORACIC VASCULAR SURGERY)

## 2023-06-30 PROCEDURE — 25000003 PHARM REV CODE 250: Performed by: THORACIC SURGERY (CARDIOTHORACIC VASCULAR SURGERY)

## 2023-06-30 PROCEDURE — 63600175 PHARM REV CODE 636 W HCPCS: Performed by: THORACIC SURGERY (CARDIOTHORACIC VASCULAR SURGERY)

## 2023-06-30 RX ORDER — ACETAMINOPHEN 325 MG/1
650 TABLET ORAL EVERY 6 HOURS PRN
Status: ACTIVE | OUTPATIENT
Start: 2023-06-30 | End: 2023-07-01

## 2023-06-30 RX ADMIN — METOPROLOL TARTRATE 50 MG: 50 TABLET, FILM COATED ORAL at 08:06

## 2023-06-30 RX ADMIN — LOPERAMIDE HYDROCHLORIDE 2 MG: 2 CAPSULE ORAL at 10:06

## 2023-06-30 RX ADMIN — FUROSEMIDE 40 MG: 10 INJECTION, SOLUTION INTRAMUSCULAR; INTRAVENOUS at 09:06

## 2023-06-30 RX ADMIN — LATANOPROST 1 DROP: 50 SOLUTION OPHTHALMIC at 08:06

## 2023-06-30 RX ADMIN — OXYCODONE HYDROCHLORIDE AND ACETAMINOPHEN 500 MG: 500 TABLET ORAL at 09:06

## 2023-06-30 RX ADMIN — PANTOPRAZOLE SODIUM 40 MG: 40 TABLET, DELAYED RELEASE ORAL at 09:06

## 2023-06-30 RX ADMIN — EMPAGLIFLOZIN 10 MG: 10 TABLET, FILM COATED ORAL at 04:06

## 2023-06-30 RX ADMIN — CYANOCOBALAMIN TAB 1000 MCG 1000 MCG: 1000 TAB at 09:06

## 2023-06-30 RX ADMIN — APIXABAN 5 MG: 2.5 TABLET, FILM COATED ORAL at 08:06

## 2023-06-30 RX ADMIN — POTASSIUM CHLORIDE 20 MEQ: 1500 TABLET, EXTENDED RELEASE ORAL at 09:06

## 2023-06-30 RX ADMIN — APIXABAN 5 MG: 2.5 TABLET, FILM COATED ORAL at 09:06

## 2023-06-30 RX ADMIN — FUROSEMIDE 40 MG: 10 INJECTION, SOLUTION INTRAMUSCULAR; INTRAVENOUS at 08:06

## 2023-06-30 RX ADMIN — PRAVASTATIN SODIUM 20 MG: 20 TABLET ORAL at 09:06

## 2023-06-30 RX ADMIN — HYDROMORPHONE HYDROCHLORIDE 2 MG: 2 TABLET ORAL at 01:06

## 2023-06-30 RX ADMIN — POTASSIUM CHLORIDE 20 MEQ: 1500 TABLET, EXTENDED RELEASE ORAL at 08:06

## 2023-06-30 RX ADMIN — METOPROLOL TARTRATE 50 MG: 50 TABLET, FILM COATED ORAL at 09:06

## 2023-06-30 RX ADMIN — CEFEPIME 2 G: 2 INJECTION, POWDER, FOR SOLUTION INTRAVENOUS at 01:06

## 2023-06-30 RX ADMIN — FERROUS SULFATE TAB 325 MG (65 MG ELEMENTAL FE) 1 EACH: 325 (65 FE) TAB at 09:06

## 2023-06-30 RX ADMIN — AMITRIPTYLINE HYDROCHLORIDE 50 MG: 50 TABLET, FILM COATED ORAL at 08:06

## 2023-06-30 RX ADMIN — FOLIC ACID 2 MG: 1 TABLET ORAL at 09:06

## 2023-06-30 NOTE — PLAN OF CARE
Problem: Adult Inpatient Plan of Care  Goal: Plan of Care Review  Outcome: Ongoing, Progressing  Goal: Patient-Specific Goal (Individualized)  Outcome: Ongoing, Progressing  Goal: Absence of Hospital-Acquired Illness or Injury  Outcome: Ongoing, Progressing  Goal: Optimal Comfort and Wellbeing  Outcome: Ongoing, Progressing  Goal: Readiness for Transition of Care  Outcome: Ongoing, Progressing     Problem: Skin Injury Risk Increased  Goal: Skin Health and Integrity  Outcome: Ongoing, Progressing     Problem: Fall Injury Risk  Goal: Absence of Fall and Fall-Related Injury  Outcome: Ongoing, Progressing     Problem: Bowel Motility Impaired (Cardiovascular Surgery)  Goal: Effective Bowel Elimination (Cardiovascular Surgery)  Outcome: Ongoing, Progressing     Problem: Glycemic Control Impaired (Cardiovascular Surgery)  Goal: Blood Glucose Level Within Targeted Range (Cardiovascular Surgery)  Outcome: Ongoing, Progressing     Problem: Pain (Cardiovascular Surgery)  Goal: Acceptable Pain Control  Outcome: Ongoing, Progressing

## 2023-06-30 NOTE — SUBJECTIVE & OBJECTIVE
Interval History:  Resting comfortably patient states that he feels better resting comfortably     Oncology Treatment Plan:   [No matching plan found]    Medications:  Continuous Infusions:  Scheduled Meds:   amitriptyline  50 mg Oral QHS    apixaban  5 mg Oral BID    ascorbic acid (vitamin C)  500 mg Oral BID    ceFEPime (MAXIPIME) IVPB  2 g Intravenous Q8H    cyanocobalamin  1,000 mcg Oral Daily    docusate sodium  100 mg Oral BID    empagliflozin  10 mg Oral Daily    ferrous sulfate  1 tablet Oral Daily    folic acid  2 mg Oral Daily    furosemide (LASIX) injection  40 mg Intravenous BID    latanoprost  1 drop Both Eyes Nightly    magnesium hydroxide 400 mg/5 ml  5 mL Oral BID    metoprolol tartrate  50 mg Oral BID    pantoprazole  40 mg Oral Daily    polyethylene glycol  17 g Oral Daily    potassium chloride  20 mEq Oral Q12H    pravastatin  20 mg Oral Daily     PRN Meds:sodium chloride, sodium chloride 0.9%, acetaminophen, albumin human 5%, calcium gluconate IVPB, calcium gluconate IVPB, calcium gluconate IVPB, dextrose 10%, dextrose 10%, glucagon (human recombinant), glucose, glucose, HYDROmorphone, insulin aspart U-100, lactated ringers, loperamide, magnesium sulfate IVPB, metoclopramide HCl, ondansetron, pneumoc 20-mary conj-dip cr(PF), potassium chloride in water, potassium chloride in water, potassium chloride in water, sodium chloride 0.9%     Review of Systems   Constitutional:  Positive for fatigue. Negative for activity change, appetite change, chills, diaphoresis, fever and unexpected weight change.   HENT:  Negative for congestion, dental problem, drooling, ear discharge, ear pain, facial swelling, hearing loss, mouth sores, nosebleeds, postnasal drip, rhinorrhea, sinus pressure, sneezing, sore throat, tinnitus, trouble swallowing and voice change.    Eyes:  Negative for photophobia, pain, discharge, redness, itching and visual disturbance.   Respiratory:  Negative for apnea, cough, choking, chest  tightness, shortness of breath, wheezing and stridor.    Cardiovascular:  Negative for chest pain, palpitations and leg swelling.   Gastrointestinal:  Negative for abdominal distention, abdominal pain, anal bleeding, blood in stool, constipation, diarrhea, nausea, rectal pain and vomiting.   Endocrine: Negative for cold intolerance, heat intolerance, polydipsia, polyphagia and polyuria.   Genitourinary:  Negative for decreased urine volume, difficulty urinating, dysuria, enuresis, flank pain, frequency, genital sores, hematuria, penile discharge, penile pain, penile swelling, scrotal swelling, testicular pain and urgency.   Musculoskeletal:  Negative for arthralgias, back pain, gait problem, joint swelling, myalgias, neck pain and neck stiffness.   Skin:  Negative for color change, pallor, rash and wound.   Allergic/Immunologic: Negative for environmental allergies, food allergies and immunocompromised state.   Neurological:  Positive for weakness. Negative for dizziness, tremors, seizures, syncope, facial asymmetry, speech difficulty, light-headedness, numbness and headaches.   Hematological:  Negative for adenopathy. Does not bruise/bleed easily.   Psychiatric/Behavioral:  Positive for dysphoric mood. Negative for agitation, behavioral problems, confusion, decreased concentration, hallucinations, self-injury, sleep disturbance and suicidal ideas. The patient is nervous/anxious. The patient is not hyperactive.    Objective:     Vital Signs (Most Recent):  Temp: 98.3 °F (36.8 °C) (06/30/23 0444)  Pulse: 96 (06/30/23 0500)  Resp: 18 (06/30/23 0444)  BP: (!) 100/58 (06/30/23 0444)  SpO2: 98 % (06/30/23 0444) Vital Signs (24h Range):  Temp:  [97.2 °F (36.2 °C)-98.3 °F (36.8 °C)] 98.3 °F (36.8 °C)  Pulse:  [] 96  Resp:  [16-18] 18  SpO2:  [97 %-100 %] 98 %  BP: ()/(56-70) 100/58     Weight: 113.5 kg (250 lb 3.6 oz)  Body mass index is 33.01 kg/m².  Body surface area is 2.42 meters squared.      Intake/Output  Summary (Last 24 hours) at 6/30/2023 0721  Last data filed at 6/30/2023 0400  Gross per 24 hour   Intake --   Output 1200 ml   Net -1200 ml        Physical Exam  Vitals reviewed.   Constitutional:       General: He is not in acute distress.     Appearance: He is well-developed. He is ill-appearing. He is not diaphoretic.   HENT:      Head: Normocephalic.      Right Ear: External ear normal.      Left Ear: External ear normal.      Nose: Nose normal.      Right Sinus: No maxillary sinus tenderness or frontal sinus tenderness.      Left Sinus: No maxillary sinus tenderness or frontal sinus tenderness.      Mouth/Throat:      Pharynx: No oropharyngeal exudate.   Eyes:      General: Lids are normal. No scleral icterus.        Right eye: No discharge.         Left eye: No discharge.      Extraocular Movements:      Right eye: Normal extraocular motion.      Left eye: Normal extraocular motion.      Conjunctiva/sclera:      Right eye: Right conjunctiva is not injected. No hemorrhage.     Left eye: Left conjunctiva is not injected. No hemorrhage.     Pupils: Pupils are equal, round, and reactive to light.   Neck:      Thyroid: No thyromegaly.      Vascular: No JVD.      Trachea: No tracheal deviation.   Cardiovascular:      Rate and Rhythm: Normal rate.   Pulmonary:      Effort: Pulmonary effort is normal. No respiratory distress.      Breath sounds: No stridor.   Abdominal:      General: Bowel sounds are normal.      Palpations: Abdomen is soft. There is no hepatomegaly, splenomegaly or mass.      Tenderness: There is no abdominal tenderness.   Musculoskeletal:         General: No tenderness. Normal range of motion.      Cervical back: Normal range of motion and neck supple.   Lymphadenopathy:      Head:      Right side of head: No posterior auricular or occipital adenopathy.      Left side of head: No posterior auricular or occipital adenopathy.      Cervical: No cervical adenopathy.      Right cervical: No superficial,  deep or posterior cervical adenopathy.     Left cervical: No superficial, deep or posterior cervical adenopathy.      Upper Body:      Right upper body: No supraclavicular adenopathy.      Left upper body: No supraclavicular adenopathy.   Skin:     General: Skin is dry.      Findings: No erythema or rash.      Nails: There is no clubbing.   Neurological:      Mental Status: He is alert and oriented to person, place, and time.      Cranial Nerves: No cranial nerve deficit.      Coordination: Coordination normal.   Psychiatric:         Behavior: Behavior normal.         Thought Content: Thought content normal.         Judgment: Judgment normal.        Significant Labs:   BMP:   Recent Labs   Lab 06/28/23 1958 06/29/23  0752 06/29/23 1954   * 102 94   * 134* 137   K 4.1 3.9 4.4    100 101   CO2 19* 20* 21*   BUN 22 21 25*   CREATININE 1.3 1.2 1.4   CALCIUM 8.6* 8.4* 8.8   MG 2.4 2.2 2.2   , CBC:   Recent Labs   Lab 06/29/23  0437 06/30/23  0429   WBC 6.34 4.97   HGB 9.9* 9.9*   HCT 30.3* 31.0*   PLT 68* 70*   , CMP:   Recent Labs   Lab 06/28/23  1110 06/28/23 1958 06/29/23  0751 06/29/23  0752 06/29/23 1954   * 129*  --  134* 137   K 4.4 4.1  --  3.9 4.4    100  --  100 101   CO2 21* 19*  --  20* 21*   * 127*  --  102 94   BUN 22 22  --  21 25*   CREATININE 1.3 1.3  --  1.2 1.4   CALCIUM 8.7 8.6*  --  8.4* 8.8   PROT 7.7  --  7.4  --   --    ALBUMIN 3.5  --  3.2*  --   --    BILITOT 1.1*  --  0.8  --   --    ALKPHOS 53*  --  52*  --   --    AST 28  --  24  --   --    ALT 13  --  13  --   --    ANIONGAP 14 10  --  14 15   , Coagulation:   Recent Labs   Lab 06/28/23  1110   APTT 28.7   , Haptoglobin: No results for input(s): HAPTOGLOBIN in the last 48 hours., Immunology: No results for input(s): SPEP, TANO, FLOR, FREELAMBDALI in the last 48 hours., LDH: No results for input(s): LDHCSF, BFSOURCE in the last 48 hours., LFTs:   Recent Labs   Lab 06/28/23  1110 06/29/23  0751   ALT 13  13   AST 28 24   ALKPHOS 53* 52*   BILITOT 1.1* 0.8   PROT 7.7 7.4   ALBUMIN 3.5 3.2*   , Reticulocytes: No results for input(s): RETIC in the last 48 hours., Tumor Markers: No results for input(s): PSA, CEA, , AFPTM, [id number],  in the last 48 hours.    Invalid input(s): ALGTM, Uric Acid No results for input(s): URICACID in the last 48 hours., and Urine Studies: No results for input(s): COLORU, APPEARANCEUA, PHUR, SPECGRAV, PROTEINUA, GLUCUA, KETONESU, BILIRUBINUA, OCCULTUA, NITRITE, UROBILINOGEN, LEUKOCYTESUR, RBCUA, WBCUA, BACTERIA, SQUAMEPITHEL, HYALINECASTS in the last 48 hours.    Invalid input(s): WRIGHTSUR    Diagnostic Results:  I have reviewed all pertinent imaging results/findings within the past 24 hours.

## 2023-06-30 NOTE — PLAN OF CARE
DOCUMENTARY:  Eating you alive      READING:  Anh Ordoñez MD  Ghassan MD Ashley   Patient tolerated intervention well. Recommending HHOT with 24/7 SPV and A.

## 2023-06-30 NOTE — PT/OT/SLP PROGRESS
Physical Therapy  Treatment    Gustavo Tracey Jr.   MRN: [id number]   Admitting Diagnosis: Abnormal stress test    PT Received On: 06/30/23  PT Start Time: 0805     PT Stop Time: 0835    PT Total Time (min): 30 min       Billable Minutes:  Gait Training 15 and Therapeutic Activity 15    Treatment Type: Treatment  PT/PTA: PTA     Number of PTA visits since last PT visit: 1       General Precautions: Standard, fall, sternal  Orthopedic Precautions: N/A  Braces: N/A  Respiratory Status: Room air    Spiritual, Cultural Beliefs, Jewish Practices, Values that Affect Care: no    Subjective:  Communicated with patient's nurse, Arlen, and completed Epic chart review prior to session.  Patient agreed to PT session.     Pain/Comfort  Pain Rating 1: 0/10  Pain Rating Post-Intervention 1: 0/10    Objective:   Patient found with: telemetry, peripheral IV    Patient found sitting up in chair. Reviewed sternal precautions and re enforced importance of compliance.    STS from chair No AD: SBA    400ft CGA-Min A No AD (intermittent narrow AURE and scissoring but responsive to cues)    Stand pivot T/F to chair No AD: CGA    Completed x10 reps AROM TE to BLE: LAQ, Hip Flex, AP   Intermittent cues given as needed to maintain correct form during repetitions      Educated patient on importance of increased tolerance to upright position and direct impact on CV endurance and strength. Patient encouraged to sit up in chair/ EOB, for a minimum of 2 consecutive hours, 3x per day. Encouraged patient to perform AROM TE to BLE throughout the day within all available planes of motion. Also encouraged patient to request assistance from nursing staff to ambulate 2x more throughout the day in order to promote recovery of CV endurance. Re enforced importance of utilizing call light to meet needs in room and not attempt to get up without staff assistance. Patient verbalized understanding and agreed to comply.      AM-PAC 6 CLICK MOBILITY  How much  help from another person does this patient currently need?   1 = Unable, Total/Dependent Assistance  2 = A lot, Maximum/Moderate Assistance  3 = A little, Minimum/Contact Guard/Supervision  4 = None, Modified [place]/Independent    Turning over in bed (including adjusting bedclothes, sheets and blankets)?: 1 (NT)  Sitting down on and standing up from a chair with arms (e.g., wheelchair, bedside commode, etc.): 4  Moving from lying on back to sitting on the side of the bed?: 1 (NT)  Moving to and from a bed to a chair (including a wheelchair)?: 3  Need to walk in hospital room?: 3  Climbing 3-5 steps with a railing?: 1 (NT)  Basic Mobility Total Score: 13    AM-PAC Raw Score CMS G-Code Modifier Level of Impairment Assistance   6 % Total / Unable   7 - 9 CM 80 - 100% Maximal Assist   10 - 14 CL 60 - 80% Moderate Assist   15 - 19 CK 40 - 60% Moderate Assist   20 - 22 CJ 20 - 40% Minimal Assist   23 CI 1-20% SBA / CGA   24 CH 0% Independent/ Mod I     Patient left up in chair with call button in reach.    Assessment:  Gustavo Tracey Jr. is a 63 y.o. male with a medical diagnosis of Abnormal stress test and presents with overall decline in functional mobility. Patient would continue to benefit from skilled PT to address functional limitations listed below in order to return to PLOF/decrease caregiver burden. Patient demonstrated an overall improvement in activity tolerance during today's session which was made evident by increase in gait distance. Patient was also able to perform a sit to stand transfer with less physical assistance.       Rehab identified problem list/impairments: weakness, impaired endurance, impaired functional mobility, gait instability, impaired balance, decreased safety awareness, decreased lower extremity function, decreased coordination, decreased upper extremity function, impaired cardiopulmonary response to activity    Rehab potential is good.    Activity tolerance:  Fair    Discharge recommendations: home health PT      Barriers to discharge:      Equipment recommendations: shower chair     GOALS:   Multidisciplinary Problems       Physical Therapy Goals          Problem: Physical Therapy    Goal Priority Disciplines Outcome Goal Variances Interventions   Physical Therapy Goal     PT, PT/OT Ongoing, Progressing     Description: LTG'S TO BE MET IN 14 DAYS (7-7-23)  PT WILL REQUIRE CGA FOR BED MOBILITY  PT WILL REQUIRE SPV FOR BED<>CHAIR TF'S  PT WILL  FEET NO AD SPV                         PLAN:    Patient to be seen 3 x/week to address the above listed problems via gait training, therapeutic activities, therapeutic exercises  Plan of Care expires: 07/07/23  Plan of Care reviewed with: patient         06/30/2023

## 2023-06-30 NOTE — PT/OT/SLP PROGRESS
Physical Therapy Treatment    Patient Name:  Gustavo Tracey Jr.   MRN:  [id number]    Recommendations:     Discharge Recommendations: home health PT (with 24/7 care)  Discharge Equipment Recommendations: shower chair  Barriers to discharge: None    Assessment:     Gustavo Tracey Jr. is a 63 y.o. male admitted with a medical diagnosis of Abnormal stress test.  He presents with the following impairments/functional limitations: weakness, impaired endurance, impaired functional mobility, gait instability, impaired balance, decreased safety awareness, impaired cardiopulmonary response to activity, decreased lower extremity function, decreased coordination.    Rehab Prognosis: Good; patient would benefit from acute skilled PT services to address these deficits and reach maximum level of function.    Recent Surgery: Procedure(s) (LRB):  CORONARY ARTERY BYPASS GRAFT (CABG) (N/A)  PURDY MAZE PROCEDURE (N/A)  SURGICAL PROCUREMENT, VEIN, ENDOSCOPIC (Left)  ECHOCARDIOGRAM,TRANSESOPHAGEAL (N/A)  BLOCK, NERVE, INTERCOSTAL, 2 OR MORE (N/A)  CLOSURE, LEFT ATRIAL APPENDAGE, USING DEVICE (Left) 8 Days Post-Op    Plan:     During this hospitalization, patient to be seen 3 x/week to address the identified rehab impairments via gait training, therapeutic activities, therapeutic exercises and progress toward the following goals:    Plan of Care Expires:  07/07/23    Subjective     Chief Complaint: No complaints  Patient/Family Comments/goals: None stated  Pain/Comfort:  Pain Rating 1: 0/10      Objective:     Communicated with nurse Mckinley prior to session.  Patient found sitting edge of bed with peripheral IV, telemetry upon PT entry to room.     General Precautions: Standard, fall, sternal  Orthopedic Precautions: N/A  Braces: N/A  Respiratory Status: Room air     Functional Mobility:  Transfers:     Sit to Stand:  stand by assistance with no AD  Bed to Chair: contact guard assistance with  no AD  using  Step Transfer  Gait: Ambulated  420ft CGA, no AD, intermittent scissoring gait, verbal cuing to correct, required 1 standing rest break, mild unsteadiness, experienced SOB, educated on proper breathing techniques  Balance: Demonstrated good sitting balance, fair dynamic balance during gait    AM-PAC 6 CLICK MOBILITY  Turning over in bed (including adjusting bedclothes, sheets and blankets)?: 1 (NT)  Sitting down on and standing up from a chair with arms (e.g., wheelchair, bedside commode, etc.): 4  Moving from lying on back to sitting on the side of the bed?: 1 (NT)  Moving to and from a bed to a chair (including a wheelchair)?: 3  Need to walk in hospital room?: 3  Climbing 3-5 steps with a railing?: 1 (NT)  Basic Mobility Total Score: 13     Treatment & Education:  Pt tolerated interventions well. Reviewed sternal precautions. Reviewed importance of OOB activities and HEP (hip flex, LAQ, ham curls, ankle pumps) in order to maintain/regain strength. Reviewed increased risk of falling due to weakness, instructed to utilize call bell for assistance for all transfers. Pt agreeable to all requests.    Patient left up in chair with all lines intact and call button in reach.    GOALS:   Multidisciplinary Problems       Physical Therapy Goals          Problem: Physical Therapy    Goal Priority Disciplines Outcome Goal Variances Interventions   Physical Therapy Goal     PT, PT/OT Ongoing, Progressing     Description: LTG'S TO BE MET IN 14 DAYS (7-7-23)  PT WILL REQUIRE CGA FOR BED MOBILITY  PT WILL REQUIRE SPV FOR BED<>CHAIR TF'S  PT WILL  FEET NO AD SPV                         Time Tracking:     PT Received On: 06/30/23  PT Start Time: 1435     PT Stop Time: 1450  PT Total Time (min): 15 min     Billable Minutes: Gait Training 15min    Treatment Type: Treatment  PT/PTA: PT     Number of PTA visits since last PT visit: 0     06/30/2023

## 2023-06-30 NOTE — PLAN OF CARE
Problem: Adult Inpatient Plan of Care  Goal: Plan of Care Review  Outcome: Ongoing, Progressing  Goal: Absence of Hospital-Acquired Illness or Injury  Outcome: Ongoing, Progressing  Goal: Optimal Comfort and Wellbeing  Outcome: Ongoing, Progressing     Problem: Infection  Goal: Absence of Infection Signs and Symptoms  Outcome: Ongoing, Progressing     Problem: Skin Injury Risk Increased  Goal: Skin Health and Integrity  Outcome: Ongoing, Progressing     Problem: Bowel Motility Impaired (Cardiovascular Surgery)  Goal: Effective Bowel Elimination (Cardiovascular Surgery)  Outcome: Ongoing, Progressing     Problem: Glycemic Control Impaired (Cardiovascular Surgery)  Goal: Blood Glucose Level Within Targeted Range (Cardiovascular Surgery)  Outcome: Ongoing, Progressing     Problem: Postoperative Nausea and Vomiting (Cardiovascular Surgery)  Goal: Nausea and Vomiting Relief (Cardiovascular Surgery)  Outcome: Ongoing, Progressing     Problem: Pain Acute  Goal: Acceptable Pain Control and Functional Ability  Outcome: Ongoing, Progressing

## 2023-06-30 NOTE — PROGRESS NOTES
[facility] - Telemetry ([facility])  Hematology/Oncology  Progress Note    Patient Name: Gustavo Tracey Jr.  Admission Date: 6/20/2023  Hospital Length of Stay: 9 days  Code Status: Full Code     Subjective:     HPI:  63-year-old male history of AC bypass on 06/22/2023 platelet count prior to surgery lower end of normal of 150.  Patient now is 6 days postop with declining platelet count I was asked to see the patient for evaluation of heparin induced thrombocytopenia patient is sitting at bedside no active bleeding no active thrombosis or necrosis of skin      Interval History:  Resting comfortably patient states that he feels better resting comfortably     Oncology Treatment Plan:   [No matching plan found]    Medications:  Continuous Infusions:  Scheduled Meds:   amitriptyline  50 mg Oral QHS    apixaban  5 mg Oral BID    ascorbic acid (vitamin C)  500 mg Oral BID    ceFEPime (MAXIPIME) IVPB  2 g Intravenous Q8H    cyanocobalamin  1,000 mcg Oral Daily    docusate sodium  100 mg Oral BID    empagliflozin  10 mg Oral Daily    ferrous sulfate  1 tablet Oral Daily    folic acid  2 mg Oral Daily    furosemide (LASIX) injection  40 mg Intravenous BID    latanoprost  1 drop Both Eyes Nightly    magnesium hydroxide 400 mg/5 ml  5 mL Oral BID    metoprolol tartrate  50 mg Oral BID    pantoprazole  40 mg Oral Daily    polyethylene glycol  17 g Oral Daily    potassium chloride  20 mEq Oral Q12H    pravastatin  20 mg Oral Daily     PRN Meds:sodium chloride, sodium chloride 0.9%, acetaminophen, albumin human 5%, calcium gluconate IVPB, calcium gluconate IVPB, calcium gluconate IVPB, dextrose 10%, dextrose 10%, glucagon (human recombinant), glucose, glucose, HYDROmorphone, insulin aspart U-100, lactated ringers, loperamide, magnesium sulfate IVPB, metoclopramide HCl, ondansetron, pneumoc 20-mary conj-dip cr(PF), potassium chloride in water, potassium chloride in water, potassium chloride in water, sodium chloride 0.9%     Review of  Systems   Constitutional:  Positive for fatigue. Negative for activity change, appetite change, chills, diaphoresis, fever and unexpected weight change.   HENT:  Negative for congestion, dental problem, drooling, ear discharge, ear pain, facial swelling, hearing loss, mouth sores, nosebleeds, postnasal drip, rhinorrhea, sinus pressure, sneezing, sore throat, tinnitus, trouble swallowing and voice change.    Eyes:  Negative for photophobia, pain, discharge, redness, itching and visual disturbance.   Respiratory:  Negative for apnea, cough, choking, chest tightness, shortness of breath, wheezing and stridor.    Cardiovascular:  Negative for chest pain, palpitations and leg swelling.   Gastrointestinal:  Negative for abdominal distention, abdominal pain, anal bleeding, blood in stool, constipation, diarrhea, nausea, rectal pain and vomiting.   Endocrine: Negative for cold intolerance, heat intolerance, polydipsia, polyphagia and polyuria.   Genitourinary:  Negative for decreased urine volume, difficulty urinating, dysuria, enuresis, flank pain, frequency, genital sores, hematuria, penile discharge, penile pain, penile swelling, scrotal swelling, testicular pain and urgency.   Musculoskeletal:  Negative for arthralgias, back pain, gait problem, joint swelling, myalgias, neck pain and neck stiffness.   Skin:  Negative for color change, pallor, rash and wound.   Allergic/Immunologic: Negative for environmental allergies, food allergies and immunocompromised state.   Neurological:  Positive for weakness. Negative for dizziness, tremors, seizures, syncope, facial asymmetry, speech difficulty, light-headedness, numbness and headaches.   Hematological:  Negative for adenopathy. Does not bruise/bleed easily.   Psychiatric/Behavioral:  Positive for dysphoric mood. Negative for agitation, behavioral problems, confusion, decreased concentration, hallucinations, self-injury, sleep disturbance and suicidal ideas. The patient is  nervous/anxious. The patient is not hyperactive.    Objective:     Vital Signs (Most Recent):  Temp: 98.3 °F (36.8 °C) (06/30/23 0444)  Pulse: 96 (06/30/23 0500)  Resp: 18 (06/30/23 0444)  BP: (!) 100/58 (06/30/23 0444)  SpO2: 98 % (06/30/23 0444) Vital Signs (24h Range):  Temp:  [97.2 °F (36.2 °C)-98.3 °F (36.8 °C)] 98.3 °F (36.8 °C)  Pulse:  [] 96  Resp:  [16-18] 18  SpO2:  [97 %-100 %] 98 %  BP: ()/(56-70) 100/58     Weight: 113.5 kg (250 lb 3.6 oz)  Body mass index is 33.01 kg/m².  Body surface area is 2.42 meters squared.      Intake/Output Summary (Last 24 hours) at 6/30/2023 0721  Last data filed at 6/30/2023 0400  Gross per 24 hour   Intake --   Output 1200 ml   Net -1200 ml        Physical Exam  Vitals reviewed.   Constitutional:       General: He is not in acute distress.     Appearance: He is well-developed. He is ill-appearing. He is not diaphoretic.   HENT:      Head: Normocephalic.      Right Ear: External ear normal.      Left Ear: External ear normal.      Nose: Nose normal.      Right Sinus: No maxillary sinus tenderness or frontal sinus tenderness.      Left Sinus: No maxillary sinus tenderness or frontal sinus tenderness.      Mouth/Throat:      Pharynx: No oropharyngeal exudate.   Eyes:      General: Lids are normal. No scleral icterus.        Right eye: No discharge.         Left eye: No discharge.      Extraocular Movements:      Right eye: Normal extraocular motion.      Left eye: Normal extraocular motion.      Conjunctiva/sclera:      Right eye: Right conjunctiva is not injected. No hemorrhage.     Left eye: Left conjunctiva is not injected. No hemorrhage.     Pupils: Pupils are equal, round, and reactive to light.   Neck:      Thyroid: No thyromegaly.      Vascular: No JVD.      Trachea: No tracheal deviation.   Cardiovascular:      Rate and Rhythm: Normal rate.   Pulmonary:      Effort: Pulmonary effort is normal. No respiratory distress.      Breath sounds: No stridor.    Abdominal:      General: Bowel sounds are normal.      Palpations: Abdomen is soft. There is no hepatomegaly, splenomegaly or mass.      Tenderness: There is no abdominal tenderness.   Musculoskeletal:         General: No tenderness. Normal range of motion.      Cervical back: Normal range of motion and neck supple.   Lymphadenopathy:      Head:      Right side of head: No posterior auricular or occipital adenopathy.      Left side of head: No posterior auricular or occipital adenopathy.      Cervical: No cervical adenopathy.      Right cervical: No superficial, deep or posterior cervical adenopathy.     Left cervical: No superficial, deep or posterior cervical adenopathy.      Upper Body:      Right upper body: No supraclavicular adenopathy.      Left upper body: No supraclavicular adenopathy.   Skin:     General: Skin is dry.      Findings: No erythema or rash.      Nails: There is no clubbing.   Neurological:      Mental Status: He is alert and oriented to person, place, and time.      Cranial Nerves: No cranial nerve deficit.      Coordination: Coordination normal.   Psychiatric:         Behavior: Behavior normal.         Thought Content: Thought content normal.         Judgment: Judgment normal.        Significant Labs:   BMP:   Recent Labs   Lab 06/28/23 1958 06/29/23  0752 06/29/23 1954   * 102 94   * 134* 137   K 4.1 3.9 4.4    100 101   CO2 19* 20* 21*   BUN 22 21 25*   CREATININE 1.3 1.2 1.4   CALCIUM 8.6* 8.4* 8.8   MG 2.4 2.2 2.2   , CBC:   Recent Labs   Lab 06/29/23  0437 06/30/23  0429   WBC 6.34 4.97   HGB 9.9* 9.9*   HCT 30.3* 31.0*   PLT 68* 70*   , CMP:   Recent Labs   Lab 06/28/23  1110 06/28/23 1958 06/29/23  0751 06/29/23  0752 06/29/23 1954   * 129*  --  134* 137   K 4.4 4.1  --  3.9 4.4    100  --  100 101   CO2 21* 19*  --  20* 21*   * 127*  --  102 94   BUN 22 22  --  21 25*   CREATININE 1.3 1.3  --  1.2 1.4   CALCIUM 8.7 8.6*  --  8.4* 8.8   PROT  7.7  --  7.4  --   --    ALBUMIN 3.5  --  3.2*  --   --    BILITOT 1.1*  --  0.8  --   --    ALKPHOS 53*  --  52*  --   --    AST 28  --  24  --   --    ALT 13  --  13  --   --    ANIONGAP 14 10  --  14 15   , Coagulation:   Recent Labs   Lab 06/28/23  1110   APTT 28.7   , Haptoglobin: No results for input(s): HAPTOGLOBIN in the last 48 hours., Immunology: No results for input(s): SPEP, TANO, FLOR, FREELAMBDALI in the last 48 hours., LDH: No results for input(s): LDHCSF, BFSOURCE in the last 48 hours., LFTs:   Recent Labs   Lab 06/28/23  1110 06/29/23  0751   ALT 13 13   AST 28 24   ALKPHOS 53* 52*   BILITOT 1.1* 0.8   PROT 7.7 7.4   ALBUMIN 3.5 3.2*   , Reticulocytes: No results for input(s): RETIC in the last 48 hours., Tumor Markers: No results for input(s): PSA, CEA, , AFPTM, [id number],  in the last 48 hours.    Invalid input(s): ALGTM, Uric Acid No results for input(s): URICACID in the last 48 hours., and Urine Studies: No results for input(s): COLORU, APPEARANCEUA, PHUR, SPECGRAV, PROTEINUA, GLUCUA, KETONESU, BILIRUBINUA, OCCULTUA, NITRITE, UROBILINOGEN, LEUKOCYTESUR, RBCUA, WBCUA, BACTERIA, SQUAMEPITHEL, HYALINECASTS in the last 48 hours.    Invalid input(s): WRIGHTSUR    Diagnostic Results:  I have reviewed all pertinent imaging results/findings within the past 24 hours.    Assessment/Plan:     Heparin induced thrombocytopenia  06/28/2023 Patient is 6 days postop with declining platelet count.  Patient has had hit score is between 3 and 4.  Intermediate in nature.  I will discuss with operative surgeon whether he wishes to wait for hit panel to return or begin patient on Eliquis 10 mg p.o. b.i.d. x7 days followed by 5 mg p.o. b.i.d. until patient is hit antibody test returns.  Discussed implications with patient  06/29/2023 patient is resting comfortably results of testing pending.  Agree with Eliquis dosing.  Talked with CBC surgery.  Platelet count slightly increased to 68,000 continue with current  treatment until has return  06/30/2023.  Patient's platelet count 23998.  Results of heparin induced antibody titer pending continue on current dosing of Eliquis until results are available 1300 hit score positive.  Would recommend that patient take Eliquis 5 mg p.o. b.i.d. for a minimum of 3 months.  Would like to see back in the office with a repeat CBC in approximately 2-3 weeks    S/P CABG x 3  Cared for and noted by primary care team in operative surgeon    Left main coronary artery disease  Cared for by primary care team in operative surgeon    Primary hypertension  Cared for by primary care team        Thank you for your consult. I will follow-up with patient. Please contact us if you have any additional questions.     Elan Nichole MD  Hematology/Oncology  [facility] - Telemetry ([facility])

## 2023-06-30 NOTE — ASSESSMENT & PLAN NOTE
06/28/2023 Patient is 6 days postop with declining platelet count.  Patient has had hit score is between 3 and 4.  Intermediate in nature.  I will discuss with operative surgeon whether he wishes to wait for hit panel to return or begin patient on Eliquis 10 mg p.o. b.i.d. x7 days followed by 5 mg p.o. b.i.d. until patient is hit antibody test returns.  Discussed implications with patient  06/29/2023 patient is resting comfortably results of testing pending.  Agree with Eliquis dosing.  Talked with CBC surgery.  Platelet count slightly increased to 68,000 continue with current treatment until has return  06/30/2023.  Patient's platelet count 10164.  Results of heparin induced antibody titer pending continue on current dosing of Eliquis until results are available

## 2023-06-30 NOTE — PROGRESS NOTES
Subjective:      Patient ID: Gustavo Tracey Jr. is a 63 y.o. male.    Chief Complaint: No chief complaint on file.    HPI: Left main coronary artery disease  The patient is a 63-year-old male with CHF, history of AFib, status post CVA, hyperlipidemia, hypertension, diabetes and cardiomyopathy who had a normal Cardiolite test on workup.  The patient was brought to the operating room and cardiac catheterization shows significant multivessel coronary artery disease with a 70% ostial left main disease and a 70% proximal LAD disease.     The patient is a candidate for coronary artery bypass grafting and Purdy Maze 4 procedure.  Preop workup is in progress.  The risks and benefits of the surgery were explained to the patient.  The patient understands the risks and benefits of surgery and has to proceed with surgery which has been scheduled for 06/22/2023.  Post-Op Info:  Procedure(s) (LRB):  CORONARY ARTERY BYPASS GRAFT (CABG) (N/A)  PURDY MAZE PROCEDURE (N/A)  SURGICAL PROCUREMENT, VEIN, ENDOSCOPIC (Left)  ECHOCARDIOGRAM,TRANSESOPHAGEAL (N/A)  BLOCK, NERVE, INTERCOSTAL, 2 OR MORE (N/A)  CLOSURE, LEFT ATRIAL APPENDAGE, USING DEVICE (Left)   S/P CABG x 3  06/23/2023   The patient is postop day 1 status post coronary artery bypass grafting x3 and Purdy Maze 4 procedure.  Overall the patient is doing well.    Neuro:  Patient is awake alert and oriented x3.  Neuro exam is nonfocal.  Patient moves all 4.  Patient's pain is being controlled with current pain regimen.    Cardiac:  Patient has been hemodynamically stable cardiac index has been about 2.2.  Patient is on low-dose epi and vasopressin.  Wean vasopressin and epi to off.    Respiratory:  Patient was extubated yesterday.  Patient has good sats on nasal cannula.  Continue pulmonary toileting.  Continue incentive spirometer.  GI:  Will advance patient's to regular diet as tolerated.    Renal: Patient has good urine output.  Creatinine is 1.7.  Will start diuresis.  Endocrine:   Patient's glucose is controlled with an insulin drip.  Will convert to sliding scale and long-acting insulin.  Heme:  Hematocrit is 28.2 and platelet count is 116.  Will start patient on a NOAC for anticoagulation once patient has chest tubes have been discontinued.  Patient requires anticoagulation since he is status post Woodruff Maze 4 for atrial fibrillation.  Id:  White count is 4.  Will continue to follow.  Patient is on venu op antibiotics.    Activities:  Patient is out of bed to chair.  Will advance activities as tolerated.    Line tubes and drains:  Patient has a right IJ Denmark and Cordis, chest tubes, pacer wires, A-line, Rankin catheter and saphenectomy site ANDERS drains.     06/24/2023   The patient is postop day 2 status post coronary artery bypass grafting x3 and Woodruff Maze 4 procedure.  Overall the patient is doing well.    Neuro:  Patient is awake alert and oriented x3.  Neuro exam is nonfocal.  Patient moves all 4.  Pain is controlled current pain med main.  Cardiac:  Patient is hemodynamically stable.  Patient is off all pressors.  Continue metoprolol.    Respiratory:  Patient has good sats continue pulmonary toileting.  Continue incentive spirometer.    GI:  Patient is tolerating p.o. intake.  Continue advance as tolerated.    Renal:  Patient has good urine output.  Creatinine is 1.7.  Continue diuresis.  Endocrine:  Glucose is controlled with sliding scale.  Heme:  Hematocrit is 23.7 and platelet count is 62.  Will start patient on apixaban.  While patient is platelet count is low will start patient on 2.5 mg p.o. b.i.d. of apixaban.  Will increase dose once platelet count recovers.  Id:  White count is 8.92.  Will follow continue to follow white count.  Activities patient is out of bed to chair continue to advance as tolerated.    Line tubes and drains:  Patient has pacer wires, PICC line and Rankin catheter.  Chest tubes have been discontinued.  Will discontinue Rankin catheter.     06/25/2023   The patient  is postop day 3 status post coronary artery bypass grafting x3 and Woodruff Maze 4 procedure.    Neuro:  Patient is awake alert and oriented x3.  Neuro exam is nonfocal.  Pain is well controlled.    Cardiac:  Patient is hemodynamically stable.  Continue metoprolol.  Respiratory: Patient has good sats on room air continue pulmonary toileting.  GI:  Patient is tolerating a regular diet.  Patient has not had a bowel movement.  Renal: Patient has good urine output.  Creatinine is 1.6.  Heme:  Hematocrit is 21.5 and platelet count is 61.  Will transfuse 2 units of plaque probe blood cells.  Continue apixaban 2.5 mg for anticoagulation.  Id:  White count is increased to 19.  Will panculture patient.  Will start on empiric broad-spectrum antibiotics.  Activities:  Patient is ambulating in the hallways.  Advance as tolerated.  Line tubes and drains:  Patient has a PICC line, and pacer wires.     06/26/2023   The patient is postop day 4 status post coronary artery bypass grafting x3 and Woodruff Maze 4 procedure.  Overall the patient is doing well.  Anticipate discharge in the next 48-72 hours.  Neuro:  Patient is awake alert and oriented x3.  Neuro exam is nonfocal.  Patient's pain is well controlled.  Cardiac:  Patient is tolerating metoprolol.  Patient has preop low ejection fraction.  Will add Entresto.  In light of severely depressed ejection fraction preoperatively,  patient will need LifeVest prior to discharge.  Will start patient on his preop Entresto and dapagliflozin  Respiratory:  Patient has good sats on room air.  Continue pulmonary toileting.  Continue incentive spirometer.  GI:  Patient is tolerating regular diet.  Patient has had bowel movements.    Renal:  Patient has good urine output.  Creatinine is 1.5.  Will continue diuresis.  Patient appears to still be fluid overloaded.    Heme:  Hematocrit is 27.  Status post 2 units packed red cells.  Platelet count this 62.  Continue apixaban 2.5 mg for anticoagulation.     Id: Patient's white count is down to 18.  Patient is on broad-spectrum antibiotics.  Cultures are pending.    Heme:  Patient's glucose is controlled with sliding scale.  Activities:  Patient is ambulating in the hallways.    Line tubes and drains:  Patient has a PICC line and pacer wires.     06/27/2023   The patient is postop day 5 status post coronary artery bypass grafting x3 and Woodruff Maze 4 procedure.  Overall the patient is doing well.  Anticipate discharge in the next 24 hours.    Neuro:  Patient is awake alert and oriented x3.  Neuro exam is nonfocal.  Pain is well controlled.    Cardiac:  Patient is tolerating metoprolol.  Will increase metoprolol.  Continue Entresto.  Patient will be fitted with a LifeVest prior to discharge.  Patient is currently on empagliflozin.  Patient will be on his home dapagliflozin on discharge.    Respiratory: Patient has good sats on room air.  Continue pulmonary toileting.  Continue incentive spirometer.    GI:  Patient is tolerating a regular diet.  And patient has had bowel movements.    Renal:  Patient has good urine output.  Creatinine is 1.2.  Will place patient on home diuretic doses.  Patient's appears relatively euvolemic.    Heme:  Hematocrit is 29.  And platelet count is 65.  Continue apixaban 2.5 mg for anticoagulation.  Patient has anemia and thrombocytopenia.  Will refer to heme Onc as outpatient.    Id:  Patient's white count is down to 12.  Patient is on broad-spectrum antibiotics.  Cultures are no growth to date.  Will discharge patient on p.o. Augmentin.    Endocrine: Glucose is controlled with a sliding scale.    Activities:  Patient is ambulating in the hallways.    Line tubes and drains:  Patient has a PICC line which will be discontinued prior to discharge.    6/28/23The patient is postop day 6 status post coronary artery bypass grafting x3 and Woodruff Maze 4 procedure.    Neuro:  Patient is awake alert and oriented x3.  Neuro exam is nonfocal.  Pain is well  controlled.    Cardiac:  Metoprolol.  Continue Entresto.  Patient will be fitted with a LifeVest prior to discharge.  Patient is currently on empagliflozin.  Patient will be on his home dapagliflozin on discharge.    Respiratory:  Aggressive pulmonary toilet  Continue incentive spirometer.    GI:  Cardiac diet    Renal:  Creatinine back to baseline   Will place patient on home diuretic doses.    Heme:  Hematocrit is 29.  And platelet count is 55 consult Heme-Onc for possible hit  Continue apixaban 5 mg 5 mg for anticoagulation.     d:  Patient's white count is down   Patient is on broad-spectrum antibiotics.  Cultures are no growth to date.  Will discharge patient on p.o. Augmentin.    Endocrine: Glucose is controlled with a sliding scale.    Activities:  Patient is ambulating in the hallways.    6/29/23  The patient is postop day 7 status post coronary artery bypass grafting x3 and Woodruff Maze 4 procedure.    Neuro:  Patient is awake alert and oriented x3.  Neuro exam is nonfocal.  Pain is well controlled.    Cardiac:  Metoprolol.  Continue Entresto.  Patient will be fitted with a LifeVest prior to discharge.  Patient is currently on empagliflozin.  Patient will be on his home dapagliflozin on discharge.    Respiratory:  Aggressive pulmonary toilet  Continue incentive spirometer.    GI:  Cardiac diet    Renal:  Creatinine back to baseline   Will place patient on home diuretic doses.    Heme:  Hematocrit stable.  And platelet count is 68 consult Heme-Onc for possible hit  Continue apixaban 5 mg 5 mg for anticoagulation.     d:  Patient's white count is down   Patient is on broad-spectrum antibiotics.  Cultures are no growth to date.  Will discharge patient on p.o. Augmentin.    Endocrine: Glucose is controlled with a sliding scale.    Activities:  Patient is ambulating in the hallways.    Line tubes and drains:  Patient has a PICC line which will be discontinued prior to discharge.    Waiting for up trend in the  platelet count before discharge         Review of patient's allergies indicates:   Allergen Reactions    Heparin analogues        Past Medical History:   Diagnosis Date    Abnormal nuclear stress test 11/26/2022    Cervical radiculopathy     to rt arm    CHF (congestive heart failure)     Chronic systolic congestive heart failure 11/28/2022    Dilated cardiomyopathy 11/26/2022    Hyperlipidemia     Hypertension     Obesity, unspecified     PAF (paroxysmal atrial fibrillation) 11/26/2022    Pulmonary HTN 11/28/2022    Stroke        Family History   Problem Relation Age of Onset    Hypertension Mother     Diabetes Father     Hyperlipidemia Father     Hypertension Father     Heart attack Father     Coronary artery disease Father        Social History     Socioeconomic History    Marital status:    Tobacco Use    Smoking status: Never    Smokeless tobacco: Never   Substance and Sexual Activity    Alcohol use: Yes    Drug use: Never    Sexual activity: Not Currently       Past Surgical History:   Procedure Laterality Date    CLOSURE OF LEFT ATRIAL APPENDAGE USING DEVICE Left 6/22/2023    Procedure: CLOSURE, LEFT ATRIAL APPENDAGE, USING DEVICE;  Surgeon: Rustam Dave MD;  Location: [facility] OR;  Service: Cardiovascular;  Laterality: Left;  LIGATION OF LEFT ATRIAL APPENDAGE WITH OTILIA EXCLUSION SYSTEM    CORONARY ARTERY BYPASS GRAFT (CABG) N/A 6/22/2023    Procedure: CORONARY ARTERY BYPASS GRAFT (CABG);  Surgeon: Rustam Dave MD;  Location: [facility] OR;  Service: Cardiovascular;  Laterality: N/A;  3-VESSEL WITH EPI-AORTIC ULTRASOUND    PURDY MAZE PROCEDURE N/A 6/22/2023    Procedure: PURDY MAZE PROCEDURE;  Surgeon: Rustam Dave MD;  Location: [facility] OR;  Service: Cardiovascular;  Laterality: N/A;    ECHOCARDIOGRAM,TRANSESOPHAGEAL N/A 6/22/2023    Procedure: ECHOCARDIOGRAM,TRANSESOPHAGEAL;  Surgeon: Rustam Dave MD;  Location: [facility] OR;  Service: Cardiovascular;  Laterality: N/A;    ENDOSCOPIC HARVEST OF VEIN Left  6/22/2023    Procedure: SURGICAL PROCUREMENT, VEIN, ENDOSCOPIC;  Surgeon: Rustam Dave MD;  Location: [facility] OR;  Service: Cardiovascular;  Laterality: Left;    INJECTION OF ANESTHETIC AGENT AROUND MULTIPLE INTERCOSTAL NERVES N/A 6/22/2023    Procedure: BLOCK, NERVE, INTERCOSTAL, 2 OR MORE;  Surgeon: Rustam Dave MD;  Location: [facility] OR;  Service: Cardiovascular;  Laterality: N/A;  PARASTERNAL NERVE BLOCK    INSTANTANEOUS WAVE-FREE RATIO  6/20/2023    Procedure: Instantaneous Wave-Free Ratio;  Surgeon: Zion Ortega MD;  Location: [facility] CATH LAB;  Service: Cardiology;;    IVUS, CORONARY  6/20/2023    Procedure: IVUS, Coronary;  Surgeon: Zion Ortega MD;  Location: [facility] CATH LAB;  Service: Cardiology;;    LEFT HEART CATHETERIZATION Left 11/28/2022    Procedure: CATHETERIZATION, HEART, LEFT;  Surgeon: Zion Ortega MD;  Location: [facility] CATH LAB;  Service: Cardiology;  Laterality: Left;    LEFT HEART CATHETERIZATION Left 6/20/2023    Procedure: Left heart cath;  Surgeon: Zion Ortega MD;  Location: [facility] CATH LAB;  Service: Cardiology;  Laterality: Left;    PERCUTANEOUS TRANSLUMINAL BALLOON ANGIOPLASTY OF CORONARY ARTERY  6/20/2023    Procedure: Angioplasty-coronary;  Surgeon: Zion Ortega MD;  Location: [facility] CATH LAB;  Service: Cardiology;;    RIGHT HEART CATHETERIZATION N/A 11/28/2022    Procedure: INSERTION, CATHETER, RIGHT HEART;  Surgeon: Zion Ortega MD;  Location: [facility] CATH LAB;  Service: Cardiology;  Laterality: N/A;  Congestive heart failure       Review of Systems   Constitutional:  Negative for activity change and appetite change.   HENT:  Negative for dental problem, nosebleeds and sore throat.    Eyes:  Negative for discharge and visual disturbance.   Respiratory:  Positive for chest tightness. Negative for cough and stridor.    Cardiovascular:  Negative for leg swelling.   Gastrointestinal:  Negative for abdominal distention and abdominal pain.   Genitourinary:  Negative for difficulty  "urinating and dysuria.   Musculoskeletal:  Negative for arthralgias, back pain and joint swelling.   Allergic/Immunologic: Negative for environmental allergies.   Neurological:  Negative for dizziness, syncope and headaches.   Hematological:  Does not bruise/bleed easily.   Psychiatric/Behavioral:  Negative for behavioral problems.         Objective:   /64 (BP Location: Left arm, Patient Position: Sitting)   Pulse 101   Temp 97.9 °F (36.6 °C) (Axillary)   Resp 18   Ht 6' 1" (1.854 m)   Wt 113.5 kg (250 lb 3.6 oz)   SpO2 97%   BMI 33.01 kg/m²     X-Ray Chest AP Portable  Narrative: EXAMINATION:  XR CHEST AP PORTABLE    CLINICAL HISTORY:  Post-op; lines and tubes in place, subsequent encounter    COMPARISON:  June 23, 2023    FINDINGS:  EKG leads and other life-saving devices continue to overlie the chest.  Stable right arm PICC line, left-sided chest tube and large-bore mediastinal drain.  Negative for pneumothorax.  Stable bilateral lower lobe atelectasis/consolidation without new pulmonary opacities.  Interval decrease in the amount of air within loops of bowel underneath the left hemidiaphragm.  The hilar and mediastinal contours and osseous structures are unchanged.  Impression: 1.  Overall, no significant interval change has occurred.    Electronically signed by: Selvin Rowe MD  Date:    06/24/2023  Time:    07:00         Physical Exam  Vitals and nursing note reviewed.   Constitutional:       Appearance: He is obese.   HENT:      Head: Normocephalic.      Mouth/Throat:      Mouth: Mucous membranes are moist.   Eyes:      Extraocular Movements: Extraocular movements intact.      Pupils: Pupils are equal, round, and reactive to light.   Cardiovascular:      Rate and Rhythm: Normal rate and regular rhythm.      Pulses: Normal pulses.      Heart sounds: Normal heart sounds.      Comments: Midline incision healing well  Leg incisions also healing well  Pulmonary:      Effort: Pulmonary effort is " normal.      Breath sounds: Normal breath sounds.   Abdominal:      Palpations: Abdomen is soft.   Musculoskeletal:         General: Normal range of motion.      Cervical back: Normal range of motion and neck supple.   Skin:     General: Skin is warm.      Capillary Refill: Capillary refill takes less than 2 seconds.   Neurological:      General: No focal deficit present.      Mental Status: He is alert and oriented to person, place, and time.   Psychiatric:         Mood and Affect: Mood normal.       Assessment:     1. Coronary artery disease without angina pectoris    2. Abnormal stress test    3. CAD, multiple vessel    4. DCM (dilated cardiomyopathy)    5. SOB (shortness of breath)    6. PAF (paroxysmal atrial fibrillation)    7. History of CVA (cerebrovascular accident)    8. Pre-operative cardiovascular examination    9. CAD (coronary artery disease)    10. Post-operative state    11. S/P CABG x 3    12. Anemia, unspecified type          Plan   The patient is postop day 8 status post coronary artery bypass grafting x3 and Woodruff Maze 4 procedure.    Neuro:  Patient is awake alert and oriented x3.  Neuro exam is nonfocal.  Pain is well controlled.    Cardiac:  Metoprolol.  Continue Entresto.  Patient will be fitted with a LifeVest prior to discharge.  Patient is currently on empagliflozin.  Patient will be on his home dapagliflozin on discharge.    Respiratory:  Aggressive pulmonary toilet  Continue incentive spirometer.    GI:  Cardiac diet    Renal:  Creatinine back to baseline  Heme:  Hematocrit stable.  And platelet count is improving   Heme-Onc for possible hit  Continue apixaban 5 mg 5 mg for anticoagulation.     Id:  Patient's white count is down     Endocrine: Glucose is controlled with a sliding scale.    Activities:  Patient is ambulating in the hallways.    Line tubes and drains:  Patient has a PICC line which will be discontinued prior to discharge.    Waiting for up trend in the platelet count before  discharge  Dc with home health once stable          Malissa Graham MD  [facility]  [place]

## 2023-06-30 NOTE — PT/OT/SLP PROGRESS
"Occupational Therapy   Treatment    Name: Gsutavo Tracey Jr.  MRN: [id number]  Admitting Diagnosis:  Abnormal stress test  8 Days Post-Op    Recommendations:     Discharge Recommendations: home health OT (24/7 SPV and A)  Discharge Equipment Recommendations:  shower chair  Barriers to discharge:  None    Assessment:     Gustavo Tracey Jr. is a 63 y.o. male with a medical diagnosis of Abnormal stress test.  He presents with the following performance deficits affecting function are weakness, impaired endurance, impaired self care skills, impaired functional mobility, impaired balance, impaired cardiopulmonary response to activity (sternal precautions).     Rehab Prognosis:  Good; patient would benefit from acute skilled OT services to address these deficits and reach maximum level of function.       Plan:     Patient to be seen 2 x/week to address the above listed problems via self-care/home management, therapeutic exercises, therapeutic activities  Plan of Care Expires: 07/07/23  Plan of Care Reviewed with: patient    Subjective     Chief Complaint: Reported "I am doing well today."  Patient/Family Comments/goals: get home stronger  Pain/Comfort:  Pain Rating 1: 0/10    Objective:     Communicated with: NurseArlen, prior to session.  Patient found up in chair with peripheral IV, telemetry upon OT entry to room.    General Precautions: Standard, fall, sternal    Orthopedic Precautions:N/A  Braces: N/A  Respiratory Status: Room air     Occupational Performance:     Functional Mobility/Transfers:  Patient completed Sit <> Stand Transfer with stand by assistance  with  no assistive device   Patient completed Bed <> Chair Transfer using Step Transfer technique with contact guard assistance with no assistive device  Functional Mobility: Patient completed x400ft functional mobility without AD and CGA to min A to increase dynamic standing balance and activity tolerance needed for ADL completion.  With fatigue patient noted " to have scissoring, improved with standing rest break and v/c for correction.    Activities of Daily Living:  Grooming: setup completed grooming from bedside chair and sink side. Setup for item retrieval only. Good quality throughout. Completed with R hand.    [facility] 6 Click ADL: 21    Treatment & Education:  Patient tolerated intervention well. Subjectively voicing shortness of breath with fatigue, no significant dyspnea elicited. Continued to educate on energy conservation/ activity pacing with functional activities. Reinforced education on B UE AROM HEP, with visual demonstration on completion within sternal precautions. Patient stated understanding, in agreement with POC, and in agreement to call for A to transfer in room. Will continue to progress as able.    Patient left up in chair with all lines intact, call button in reach, and chair alarm on    GOALS:   Multidisciplinary Problems       Occupational Therapy Goals          Problem: Occupational Therapy    Goal Priority Disciplines Outcome Interventions   Occupational Therapy Goal     OT, PT/OT Ongoing, Progressing    Description: Goals to be met by: 7/7/23     Patient will increase functional independence with ADLs by performing:    Toileting from toilet with Minimal Assistance for hygiene and clothing management.   Toilet transfer to toilet with Contact Guard Assistance.  Demonstrates 100% functional compliance with sternal precautions.                         Time Tracking:     OT Date of Treatment: 06/30/23  OT Start Time: 0800  OT Stop Time: 0825  OT Total Time (min): 25 min    Billable Minutes:Self Care/Home Management 10  Therapeutic Activity 15    6/30/2023

## 2023-07-01 LAB
ALBUMIN SERPL BCP-MCNC: 3.3 G/DL (ref 3.5–5.2)
ALP SERPL-CCNC: 50 U/L (ref 55–135)
ALT SERPL W/O P-5'-P-CCNC: 10 U/L (ref 10–44)
ANION GAP SERPL CALC-SCNC: 12 MMOL/L (ref 8–16)
ANION GAP SERPL CALC-SCNC: 14 MMOL/L (ref 8–16)
AST SERPL-CCNC: 18 U/L (ref 10–40)
BASOPHILS # BLD AUTO: 0 K/UL (ref 0–0.2)
BASOPHILS NFR BLD: 0 % (ref 0–1.9)
BILIRUB DIRECT SERPL-MCNC: 0.4 MG/DL (ref 0.1–0.3)
BILIRUB SERPL-MCNC: 0.8 MG/DL (ref 0.1–1)
BUN SERPL-MCNC: 23 MG/DL (ref 8–23)
BUN SERPL-MCNC: 24 MG/DL (ref 8–23)
CALCIUM SERPL-MCNC: 8.8 MG/DL (ref 8.7–10.5)
CALCIUM SERPL-MCNC: 9.1 MG/DL (ref 8.7–10.5)
CHLORIDE SERPL-SCNC: 100 MMOL/L (ref 95–110)
CHLORIDE SERPL-SCNC: 99 MMOL/L (ref 95–110)
CO2 SERPL-SCNC: 22 MMOL/L (ref 23–29)
CO2 SERPL-SCNC: 24 MMOL/L (ref 23–29)
CREAT SERPL-MCNC: 1.3 MG/DL (ref 0.5–1.4)
CREAT SERPL-MCNC: 1.3 MG/DL (ref 0.5–1.4)
DIFFERENTIAL METHOD: ABNORMAL
EOSINOPHIL # BLD AUTO: 0 K/UL (ref 0–0.5)
EOSINOPHIL NFR BLD: 0 % (ref 0–8)
ERYTHROCYTE [DISTWIDTH] IN BLOOD BY AUTOMATED COUNT: 15.9 % (ref 11.5–14.5)
EST. GFR  (NO RACE VARIABLE): >60 ML/MIN/1.73 M^2
EST. GFR  (NO RACE VARIABLE): >60 ML/MIN/1.73 M^2
GLUCOSE SERPL-MCNC: 98 MG/DL (ref 70–110)
GLUCOSE SERPL-MCNC: 99 MG/DL (ref 70–110)
HCT VFR BLD AUTO: 29.3 % (ref 40–54)
HGB BLD-MCNC: 9.6 G/DL (ref 14–18)
IMM GRANULOCYTES # BLD AUTO: 0.08 K/UL (ref 0–0.04)
IMM GRANULOCYTES NFR BLD AUTO: 1.9 % (ref 0–0.5)
LYMPHOCYTES # BLD AUTO: 1.3 K/UL (ref 1–4.8)
LYMPHOCYTES NFR BLD: 31.7 % (ref 18–48)
MAGNESIUM SERPL-MCNC: 2.2 MG/DL (ref 1.6–2.6)
MAGNESIUM SERPL-MCNC: 2.5 MG/DL (ref 1.6–2.6)
MCH RBC QN AUTO: 28 PG (ref 27–31)
MCHC RBC AUTO-ENTMCNC: 32.8 G/DL (ref 32–36)
MCV RBC AUTO: 85 FL (ref 82–98)
MONOCYTES # BLD AUTO: 0.8 K/UL (ref 0.3–1)
MONOCYTES NFR BLD: 19.4 % (ref 4–15)
NEUTROPHILS # BLD AUTO: 2 K/UL (ref 1.8–7.7)
NEUTROPHILS NFR BLD: 47 % (ref 38–73)
NRBC BLD-RTO: 0 /100 WBC
PLATELET # BLD AUTO: 64 K/UL (ref 150–450)
PMV BLD AUTO: 9.4 FL (ref 9.2–12.9)
POCT GLUCOSE: 103 MG/DL (ref 70–110)
POCT GLUCOSE: 104 MG/DL (ref 70–110)
POCT GLUCOSE: 105 MG/DL (ref 70–110)
POCT GLUCOSE: 155 MG/DL (ref 70–110)
POCT GLUCOSE: 98 MG/DL (ref 70–110)
POTASSIUM SERPL-SCNC: 4.2 MMOL/L (ref 3.5–5.1)
POTASSIUM SERPL-SCNC: 4.3 MMOL/L (ref 3.5–5.1)
PROT SERPL-MCNC: 7.7 G/DL (ref 6–8.4)
RBC # BLD AUTO: 3.43 M/UL (ref 4.6–6.2)
SODIUM SERPL-SCNC: 135 MMOL/L (ref 136–145)
SODIUM SERPL-SCNC: 136 MMOL/L (ref 136–145)
WBC # BLD AUTO: 4.17 K/UL (ref 3.9–12.7)

## 2023-07-01 PROCEDURE — 63600175 PHARM REV CODE 636 W HCPCS: Performed by: THORACIC SURGERY (CARDIOTHORACIC VASCULAR SURGERY)

## 2023-07-01 PROCEDURE — 85025 COMPLETE CBC W/AUTO DIFF WBC: CPT | Performed by: THORACIC SURGERY (CARDIOTHORACIC VASCULAR SURGERY)

## 2023-07-01 PROCEDURE — 25000242 PHARM REV CODE 250 ALT 637 W/ HCPCS: Performed by: THORACIC SURGERY (CARDIOTHORACIC VASCULAR SURGERY)

## 2023-07-01 PROCEDURE — 25000003 PHARM REV CODE 250: Performed by: INTERNAL MEDICINE

## 2023-07-01 PROCEDURE — 97116 GAIT TRAINING THERAPY: CPT | Mod: CQ

## 2023-07-01 PROCEDURE — 83735 ASSAY OF MAGNESIUM: CPT | Mod: 91 | Performed by: THORACIC SURGERY (CARDIOTHORACIC VASCULAR SURGERY)

## 2023-07-01 PROCEDURE — 36415 COLL VENOUS BLD VENIPUNCTURE: CPT | Performed by: THORACIC SURGERY (CARDIOTHORACIC VASCULAR SURGERY)

## 2023-07-01 PROCEDURE — 25000003 PHARM REV CODE 250: Performed by: THORACIC SURGERY (CARDIOTHORACIC VASCULAR SURGERY)

## 2023-07-01 PROCEDURE — 80048 BASIC METABOLIC PNL TOTAL CA: CPT | Mod: 91 | Performed by: THORACIC SURGERY (CARDIOTHORACIC VASCULAR SURGERY)

## 2023-07-01 PROCEDURE — 21400001 HC TELEMETRY ROOM

## 2023-07-01 PROCEDURE — 80076 HEPATIC FUNCTION PANEL: CPT | Performed by: THORACIC SURGERY (CARDIOTHORACIC VASCULAR SURGERY)

## 2023-07-01 RX ADMIN — POTASSIUM CHLORIDE 20 MEQ: 1500 TABLET, EXTENDED RELEASE ORAL at 09:07

## 2023-07-01 RX ADMIN — AMITRIPTYLINE HYDROCHLORIDE 50 MG: 50 TABLET, FILM COATED ORAL at 08:07

## 2023-07-01 RX ADMIN — CYANOCOBALAMIN TAB 1000 MCG 1000 MCG: 1000 TAB at 09:07

## 2023-07-01 RX ADMIN — POTASSIUM CHLORIDE 20 MEQ: 1500 TABLET, EXTENDED RELEASE ORAL at 08:07

## 2023-07-01 RX ADMIN — METOPROLOL TARTRATE 50 MG: 50 TABLET, FILM COATED ORAL at 08:07

## 2023-07-01 RX ADMIN — INSULIN ASPART 2 UNITS: 100 INJECTION, SOLUTION INTRAVENOUS; SUBCUTANEOUS at 05:07

## 2023-07-01 RX ADMIN — APIXABAN 5 MG: 2.5 TABLET, FILM COATED ORAL at 08:07

## 2023-07-01 RX ADMIN — APIXABAN 5 MG: 2.5 TABLET, FILM COATED ORAL at 09:07

## 2023-07-01 RX ADMIN — OXYCODONE HYDROCHLORIDE AND ACETAMINOPHEN 500 MG: 500 TABLET ORAL at 09:07

## 2023-07-01 RX ADMIN — PANTOPRAZOLE SODIUM 40 MG: 40 TABLET, DELAYED RELEASE ORAL at 09:07

## 2023-07-01 RX ADMIN — FUROSEMIDE 40 MG: 10 INJECTION, SOLUTION INTRAMUSCULAR; INTRAVENOUS at 09:07

## 2023-07-01 RX ADMIN — EMPAGLIFLOZIN 10 MG: 10 TABLET, FILM COATED ORAL at 05:07

## 2023-07-01 RX ADMIN — FOLIC ACID 2 MG: 1 TABLET ORAL at 09:07

## 2023-07-01 RX ADMIN — PRAVASTATIN SODIUM 20 MG: 20 TABLET ORAL at 09:07

## 2023-07-01 RX ADMIN — FUROSEMIDE 40 MG: 10 INJECTION, SOLUTION INTRAMUSCULAR; INTRAVENOUS at 08:07

## 2023-07-01 RX ADMIN — LATANOPROST 1 DROP: 50 SOLUTION OPHTHALMIC at 08:07

## 2023-07-01 RX ADMIN — HYDROMORPHONE HYDROCHLORIDE 2 MG: 2 TABLET ORAL at 08:07

## 2023-07-01 RX ADMIN — OXYCODONE HYDROCHLORIDE AND ACETAMINOPHEN 500 MG: 500 TABLET ORAL at 08:07

## 2023-07-01 RX ADMIN — METOPROLOL TARTRATE 50 MG: 50 TABLET, FILM COATED ORAL at 09:07

## 2023-07-01 RX ADMIN — HYDROMORPHONE HYDROCHLORIDE 2 MG: 2 TABLET ORAL at 09:07

## 2023-07-01 RX ADMIN — FERROUS SULFATE TAB 325 MG (65 MG ELEMENTAL FE) 1 EACH: 325 (65 FE) TAB at 09:07

## 2023-07-01 NOTE — PLAN OF CARE
Problem: Adult Inpatient Plan of Care  Goal: Plan of Care Review  Outcome: Ongoing, Progressing     Problem: Skin Injury Risk Increased  Goal: Skin Health and Integrity  Outcome: Ongoing, Progressing  Intervention: Optimize Skin Protection  Flowsheets (Taken 7/1/2023 0313)  Pressure Reduction Techniques: frequent weight shift encouraged  Skin Protection: adhesive use limited  Head of Bed (HOB) Positioning: HOB elevated     Problem: Fall Injury Risk  Goal: Absence of Fall and Fall-Related Injury  Outcome: Ongoing, Progressing  Intervention: Identify and Manage Contributors  Flowsheets (Taken 7/1/2023 0313)  Medication Review/Management: medications reviewed

## 2023-07-01 NOTE — PLAN OF CARE
[facility] - Telemetry (Hospital)  Discharge Reassessment    Primary Care Provider: Braxton Guzman Jr, MD    Expected Discharge Date: 6/28/2023    Reassessment (most recent)       Discharge Reassessment - 07/01/23 0919          Discharge Reassessment    Assessment Type Discharge Planning Reassessment     Did the patient's condition or plan change since previous assessment? Yes     Communicated MARA with patient/caregiver Date not available/Unable to determine     Discharge Plan A Home Health   [facility]    Discharge Plan B Home with family     DME Needed Upon Discharge  other (see comments)   Life vest (delivered at bedside)    Transition of Care Barriers None     Why the patient remains in the hospital Requires continued medical care;Other (see comment)   Declining platelet count       Post-Acute Status    Post-Acute Authorization Home Health   [facility]     Home Health Status Set-up Complete/Auth obtained     Coverage MEDICARE PART A & B     Discharge Delays None known at this time                     Dai Holcomb LMSW 7/1/2023 9:35 AM

## 2023-07-01 NOTE — PLAN OF CARE
Problem: Adult Inpatient Plan of Care  Goal: Plan of Care Review  Outcome: Ongoing, Progressing  Goal: Patient-Specific Goal (Individualized)  Outcome: Ongoing, Progressing  Goal: Absence of Hospital-Acquired Illness or Injury  Outcome: Ongoing, Progressing  Goal: Optimal Comfort and Wellbeing  Outcome: Ongoing, Progressing  Goal: Readiness for Transition of Care  Outcome: Ongoing, Progressing     Problem: Infection  Goal: Absence of Infection Signs and Symptoms  Outcome: Ongoing, Progressing     Problem: Skin Injury Risk Increased  Goal: Skin Health and Integrity  Outcome: Ongoing, Progressing     Problem: Fall Injury Risk  Goal: Absence of Fall and Fall-Related Injury  Outcome: Ongoing, Progressing     Problem: Bowel Motility Impaired (Cardiovascular Surgery)  Goal: Effective Bowel Elimination (Cardiovascular Surgery)  Outcome: Ongoing, Progressing     Problem: Glycemic Control Impaired (Cardiovascular Surgery)  Goal: Blood Glucose Level Within Targeted Range (Cardiovascular Surgery)  Outcome: Ongoing, Progressing     Problem: Pain (Cardiovascular Surgery)  Goal: Acceptable Pain Control  Outcome: Ongoing, Progressing     Problem: Postoperative Nausea and Vomiting (Cardiovascular Surgery)  Goal: Nausea and Vomiting Relief (Cardiovascular Surgery)  Outcome: Ongoing, Progressing     Problem: Pain Acute  Goal: Acceptable Pain Control and Functional Ability  Outcome: Ongoing, Progressing     Problem: Fatigue  Goal: Improved Activity Tolerance  Outcome: Ongoing, Progressing

## 2023-07-01 NOTE — PT/OT/SLP PROGRESS
Physical Therapy  Treatment    Gustavo Tracey Jr.   MRN: [id number]   Admitting Diagnosis: Abnormal stress test    PT Received On: 07/01/23  PT Start Time: 0833     PT Stop Time: 0848    PT Total Time (min): 15 min       Billable Minutes:  Gait Training 15 and Therapeutic Exercise 0    Treatment Type: Treatment  PT/PTA: PTA     Number of PTA visits since last PT visit: 1       General Precautions: Standard, fall  Orthopedic Precautions: N/A  Braces: N/A  Respiratory Status: Room air    Spiritual, Cultural Beliefs, Sabianism Practices, Values that Affect Care: no    Subjective:  Communicated with [facility] and the nurse Ariana,  prior to session.  Pt agreed to tx. ( Found sitting doing LE exercises on his own)     Pain/Comfort  Pain Rating 1: 2/10  Location 1:  (sternal incision tenderness)    Objective:   Patient found with: peripheral IV, telemetry    Functional Mobility:  Bed Mobility:     Nt    Transfers:    Sit to stand: MOD I , slow and maintained sternal precautions.    Stand to sit: MOD I    Gait:     450 feet no device , on RA. Slow pace  and with mild sob.    Up and down 3 steps with one rail, step to gait. CGA for safety. SLOW and cautious.         Treatment and Education:  Pt was performing LE exercises on his own. Maintained Sternal precautions through out the session.      AM-PAC 6 CLICK MOBILITY  How much help from another person does this patient currently need?   1 = Unable, Total/Dependent Assistance  2 = A lot, Maximum/Moderate Assistance  3 = A little, Minimum/Contact Guard/Supervision  4 = None, Modified [place]/Independent    Turning over in bed (including adjusting bedclothes, sheets and blankets)?: 4  Sitting down on and standing up from a chair with arms (e.g., wheelchair, bedside commode, etc.): 4  Moving from lying on back to sitting on the side of the bed?: 4  Moving to and from a bed to a chair (including a wheelchair)?: 4  Need to walk in hospital room?: 4  Climbing 3-5 steps with a  railing?: 3  Basic Mobility Total Score: 23    AM-PAC Raw Score CMS G-Code Modifier Level of Impairment Assistance   6 % Total / Unable   7 - 9 CM 80 - 100% Maximal Assist   10 - 14 CL 60 - 80% Moderate Assist   15 - 19 CK 40 - 60% Moderate Assist   20 - 22 CJ 20 - 40% Minimal Assist   23 CI 1-20% SBA / CGA   24 CH 0% Independent/ Mod I     Patient left up in chair with all lines intact and call button in reach.    Assessment:  Gustavo Tracey Jr. is a 63 y.o. male with a medical diagnosis of Abnormal stress test and presents with improving mobilty. Per nsg., he continues to need his platlets monitored. .    Rehab identified problem list/impairments: weakness    Rehab potential is excellent.    Activity tolerance: Good    Discharge recommendations: home health PT      Barriers to discharge:      Equipment recommendations: shower chair     GOALS:   Multidisciplinary Problems       Physical Therapy Goals          Problem: Physical Therapy    Goal Priority Disciplines Outcome Goal Variances Interventions   Physical Therapy Goal     PT, PT/OT Ongoing, Progressing     Description: LTG'S TO BE MET IN 14 DAYS (7-7-23)  PT WILL REQUIRE CGA FOR BED MOBILITY  PT WILL REQUIRE SPV FOR BED<>CHAIR TF'S  PT WILL  FEET NO AD SPV                         PLAN:    Patient to be seen 3 x/week to address the above listed problems via gait training, therapeutic activities, therapeutic exercises  Plan of Care expires: 07/07/23  Plan of Care reviewed with: patient         07/01/2023

## 2023-07-01 NOTE — PROGRESS NOTES
Subjective:      Patient ID: Gustavo Tracey Jr. is a 63 y.o. male.    Chief Complaint: No chief complaint on file.    HPI: Left main coronary artery disease  The patient is a 63-year-old male with CHF, history of AFib, status post CVA, hyperlipidemia, hypertension, diabetes and cardiomyopathy who had a normal Cardiolite test on workup.  The patient was brought to the operating room and cardiac catheterization shows significant multivessel coronary artery disease with a 70% ostial left main disease and a 70% proximal LAD disease.     The patient is a candidate for coronary artery bypass grafting and Purdy Maze 4 procedure.  Preop workup is in progress.  The risks and benefits of the surgery were explained to the patient.  The patient understands the risks and benefits of surgery and has to proceed with surgery which has been scheduled for 06/22/2023.  Post-Op Info:  Procedure(s) (LRB):  CORONARY ARTERY BYPASS GRAFT (CABG) (N/A)  PURDY MAZE PROCEDURE (N/A)  SURGICAL PROCUREMENT, VEIN, ENDOSCOPIC (Left)  ECHOCARDIOGRAM,TRANSESOPHAGEAL (N/A)  BLOCK, NERVE, INTERCOSTAL, 2 OR MORE (N/A)  CLOSURE, LEFT ATRIAL APPENDAGE, USING DEVICE (Left)   S/P CABG x 3  06/23/2023   The patient is postop day 1 status post coronary artery bypass grafting x3 and Puryd Maze 4 procedure.  Overall the patient is doing well.    Neuro:  Patient is awake alert and oriented x3.  Neuro exam is nonfocal.  Patient moves all 4.  Patient's pain is being controlled with current pain regimen.    Cardiac:  Patient has been hemodynamically stable cardiac index has been about 2.2.  Patient is on low-dose epi and vasopressin.  Wean vasopressin and epi to off.    Respiratory:  Patient was extubated yesterday.  Patient has good sats on nasal cannula.  Continue pulmonary toileting.  Continue incentive spirometer.  GI:  Will advance patient's to regular diet as tolerated.    Renal: Patient has good urine output.  Creatinine is 1.7.  Will start diuresis.  Endocrine:   Patient's glucose is controlled with an insulin drip.  Will convert to sliding scale and long-acting insulin.  Heme:  Hematocrit is 28.2 and platelet count is 116.  Will start patient on a NOAC for anticoagulation once patient has chest tubes have been discontinued.  Patient requires anticoagulation since he is status post Woodruff Maze 4 for atrial fibrillation.  Id:  White count is 4.  Will continue to follow.  Patient is on venu op antibiotics.    Activities:  Patient is out of bed to chair.  Will advance activities as tolerated.    Line tubes and drains:  Patient has a right IJ [place] and [place], chest tubes, pacer wires, A-line, Rankin catheter and saphenectomy site ANDERS drains.     06/24/2023   The patient is postop day 2 status post coronary artery bypass grafting x3 and Woodruff Maze 4 procedure.  Overall the patient is doing well.    Neuro:  Patient is awake alert and oriented x3.  Neuro exam is nonfocal.  Patient moves all 4.  Pain is controlled current pain med main.  Cardiac:  Patient is hemodynamically stable.  Patient is off all pressors.  Continue metoprolol.    Respiratory:  Patient has good sats continue pulmonary toileting.  Continue incentive spirometer.    GI:  Patient is tolerating p.o. intake.  Continue advance as tolerated.    Renal:  Patient has good urine output.  Creatinine is 1.7.  Continue diuresis.  Endocrine:  Glucose is controlled with sliding scale.  Heme:  Hematocrit is 23.7 and platelet count is 62.  Will start patient on apixaban.  While patient is platelet count is low will start patient on 2.5 mg p.o. b.i.d. of apixaban.  Will increase dose once platelet count recovers.  Id:  White count is 8.92.  Will follow continue to follow white count.  Activities patient is out of bed to chair continue to advance as tolerated.    Line tubes and drains:  Patient has pacer wires, PICC line and Rankin catheter.  Chest tubes have been discontinued.  Will discontinue Rankin catheter.     06/25/2023   The patient  is postop day 3 status post coronary artery bypass grafting x3 and Woodruff Maze 4 procedure.    Neuro:  Patient is awake alert and oriented x3.  Neuro exam is nonfocal.  Pain is well controlled.    Cardiac:  Patient is hemodynamically stable.  Continue metoprolol.  Respiratory: Patient has good sats on room air continue pulmonary toileting.  GI:  Patient is tolerating a regular diet.  Patient has not had a bowel movement.  Renal: Patient has good urine output.  Creatinine is 1.6.  Heme:  Hematocrit is 21.5 and platelet count is 61.  Will transfuse 2 units of plaque probe blood cells.  Continue apixaban 2.5 mg for anticoagulation.  Id:  White count is increased to 19.  Will panculture patient.  Will start on empiric broad-spectrum antibiotics.  Activities:  Patient is ambulating in the hallways.  Advance as tolerated.  Line tubes and drains:  Patient has a PICC line, and pacer wires.     06/26/2023   The patient is postop day 4 status post coronary artery bypass grafting x3 and Woodruff Maze 4 procedure.  Overall the patient is doing well.  Anticipate discharge in the next 48-72 hours.  Neuro:  Patient is awake alert and oriented x3.  Neuro exam is nonfocal.  Patient's pain is well controlled.  Cardiac:  Patient is tolerating metoprolol.  Patient has preop low ejection fraction.  Will add Entresto.  In light of severely depressed ejection fraction preoperatively,  patient will need LifeVest prior to discharge.  Will start patient on his preop Entresto and dapagliflozin  Respiratory:  Patient has good sats on room air.  Continue pulmonary toileting.  Continue incentive spirometer.  GI:  Patient is tolerating regular diet.  Patient has had bowel movements.    Renal:  Patient has good urine output.  Creatinine is 1.5.  Will continue diuresis.  Patient appears to still be fluid overloaded.    Heme:  Hematocrit is 27.  Status post 2 units packed red cells.  Platelet count this 62.  Continue apixaban 2.5 mg for anticoagulation.     Id: Patient's white count is down to 18.  Patient is on broad-spectrum antibiotics.  Cultures are pending.    Heme:  Patient's glucose is controlled with sliding scale.  Activities:  Patient is ambulating in the hallways.    Line tubes and drains:  Patient has a PICC line and pacer wires.     06/27/2023   The patient is postop day 5 status post coronary artery bypass grafting x3 and Woodruff Maze 4 procedure.  Overall the patient is doing well.  Anticipate discharge in the next 24 hours.    Neuro:  Patient is awake alert and oriented x3.  Neuro exam is nonfocal.  Pain is well controlled.    Cardiac:  Patient is tolerating metoprolol.  Will increase metoprolol.  Continue Entresto.  Patient will be fitted with a LifeVest prior to discharge.  Patient is currently on empagliflozin.  Patient will be on his home dapagliflozin on discharge.    Respiratory: Patient has good sats on room air.  Continue pulmonary toileting.  Continue incentive spirometer.    GI:  Patient is tolerating a regular diet.  And patient has had bowel movements.    Renal:  Patient has good urine output.  Creatinine is 1.2.  Will place patient on home diuretic doses.  Patient's appears relatively euvolemic.    Heme:  Hematocrit is 29.  And platelet count is 65.  Continue apixaban 2.5 mg for anticoagulation.  Patient has anemia and thrombocytopenia.  Will refer to heme Onc as outpatient.    Id:  Patient's white count is down to 12.  Patient is on broad-spectrum antibiotics.  Cultures are no growth to date.  Will discharge patient on p.o. Augmentin.    Endocrine: Glucose is controlled with a sliding scale.    Activities:  Patient is ambulating in the hallways.    Line tubes and drains:  Patient has a PICC line which will be discontinued prior to discharge.    6/28/23The patient is postop day 6 status post coronary artery bypass grafting x3 and Woodruff Maze 4 procedure.    Neuro:  Patient is awake alert and oriented x3.  Neuro exam is nonfocal.  Pain is well  controlled.    Cardiac:  Metoprolol.  Continue Entresto.  Patient will be fitted with a LifeVest prior to discharge.  Patient is currently on empagliflozin.  Patient will be on his home dapagliflozin on discharge.    Respiratory:  Aggressive pulmonary toilet  Continue incentive spirometer.    GI:  Cardiac diet    Renal:  Creatinine back to baseline   Will place patient on home diuretic doses.    Heme:  Hematocrit is 29.  And platelet count is 55 consult Heme-Onc for possible hit  Continue apixaban 5 mg 5 mg for anticoagulation.     d:  Patient's white count is down   Patient is on broad-spectrum antibiotics.  Cultures are no growth to date.  Will discharge patient on p.o. Augmentin.    Endocrine: Glucose is controlled with a sliding scale.    Activities:  Patient is ambulating in the hallways.    6/29/23  The patient is postop day 7 status post coronary artery bypass grafting x3 and Woodruff Maze 4 procedure.    Neuro:  Patient is awake alert and oriented x3.  Neuro exam is nonfocal.  Pain is well controlled.    Cardiac:  Metoprolol.  Continue Entresto.  Patient will be fitted with a LifeVest prior to discharge.  Patient is currently on empagliflozin.  Patient will be on his home dapagliflozin on discharge.    Respiratory:  Aggressive pulmonary toilet  Continue incentive spirometer.    GI:  Cardiac diet    Renal:  Creatinine back to baseline   Will place patient on home diuretic doses.    Heme:  Hematocrit stable.  And platelet count is 68 consult Heme-Onc for possible hit  Continue apixaban 5 mg 5 mg for anticoagulation.     d:  Patient's white count is down   Patient is on broad-spectrum antibiotics.  Cultures are no growth to date.  Will discharge patient on p.o. Augmentin.    Endocrine: Glucose is controlled with a sliding scale.    Activities:  Patient is ambulating in the hallways.    Line tubes and drains:  Patient has a PICC line which will be discontinued prior to discharge.    Waiting for up trend in the  platelet count before discharge  6/30/23  The patient is postop day 8 status post coronary artery bypass grafting x3 and Woodruff Maze 4 procedure.    Neuro:  Patient is awake alert and oriented x3.  Neuro exam is nonfocal.  Pain is well controlled.    Cardiac:  Metoprolol.  Continue Entresto.  Patient will be fitted with a LifeVest prior to discharge.  Patient is currently on empagliflozin.  Patient will be on his home dapagliflozin on discharge.    Respiratory:  Aggressive pulmonary toilet  Continue incentive spirometer.    GI:  Cardiac diet    Renal:  Creatinine back to baseline  Heme:  Hematocrit stable.  And platelet count is improving   Heme-Onc for possible hit  Continue apixaban 5 mg 5 mg for anticoagulation.     Id:  Patient's white count is down     Endocrine: Glucose is controlled with a sliding scale.    Activities:  Patient is ambulating in the hallways.    Line tubes and drains:  Patient has a PICC line which will be discontinued prior to discharge.    Waiting for up trend in the platelet count before discharge  Dc with home health once stable       Review of patient's allergies indicates:   Allergen Reactions    Heparin analogues        Past Medical History:   Diagnosis Date    Abnormal nuclear stress test 11/26/2022    Cervical radiculopathy     to rt arm    CHF (congestive heart failure)     Chronic systolic congestive heart failure 11/28/2022    Dilated cardiomyopathy 11/26/2022    Hyperlipidemia     Hypertension     Obesity, unspecified     PAF (paroxysmal atrial fibrillation) 11/26/2022    Pulmonary HTN 11/28/2022    Stroke        Family History   Problem Relation Age of Onset    Hypertension Mother     Diabetes Father     Hyperlipidemia Father     Hypertension Father     Heart attack Father     Coronary artery disease Father        Social History     Socioeconomic History    Marital status:    Tobacco Use    Smoking status: Never    Smokeless tobacco: Never   Substance and Sexual Activity     Alcohol use: Yes    Drug use: Never    Sexual activity: Not Currently       Past Surgical History:   Procedure Laterality Date    CLOSURE OF LEFT ATRIAL APPENDAGE USING DEVICE Left 6/22/2023    Procedure: CLOSURE, LEFT ATRIAL APPENDAGE, USING DEVICE;  Surgeon: Rustam Dave MD;  Location: [facility] OR;  Service: Cardiovascular;  Laterality: Left;  LIGATION OF LEFT ATRIAL APPENDAGE WITH OTILIA EXCLUSION SYSTEM    CORONARY ARTERY BYPASS GRAFT (CABG) N/A 6/22/2023    Procedure: CORONARY ARTERY BYPASS GRAFT (CABG);  Surgeon: Rustam Dave MD;  Location: [facility] OR;  Service: Cardiovascular;  Laterality: N/A;  3-VESSEL WITH EPI-AORTIC ULTRASOUND    PURDY MAZE PROCEDURE N/A 6/22/2023    Procedure: PURDY MAZE PROCEDURE;  Surgeon: Rustam Dave MD;  Location: [facility] OR;  Service: Cardiovascular;  Laterality: N/A;    ECHOCARDIOGRAM,TRANSESOPHAGEAL N/A 6/22/2023    Procedure: ECHOCARDIOGRAM,TRANSESOPHAGEAL;  Surgeon: Rustam Dave MD;  Location: [facility] OR;  Service: Cardiovascular;  Laterality: N/A;    ENDOSCOPIC HARVEST OF VEIN Left 6/22/2023    Procedure: SURGICAL PROCUREMENT, VEIN, ENDOSCOPIC;  Surgeon: Rustam Dave MD;  Location: [facility] OR;  Service: Cardiovascular;  Laterality: Left;    INJECTION OF ANESTHETIC AGENT AROUND MULTIPLE INTERCOSTAL NERVES N/A 6/22/2023    Procedure: BLOCK, NERVE, INTERCOSTAL, 2 OR MORE;  Surgeon: Rustam Dave MD;  Location: [facility] OR;  Service: Cardiovascular;  Laterality: N/A;  PARASTERNAL NERVE BLOCK    INSTANTANEOUS WAVE-FREE RATIO  6/20/2023    Procedure: Instantaneous Wave-Free Ratio;  Surgeon: Zion Ortega MD;  Location: [facility] CATH LAB;  Service: Cardiology;;    IVUS, CORONARY  6/20/2023    Procedure: IVUS, Coronary;  Surgeon: Zion Ortega MD;  Location: [facility] CATH LAB;  Service: Cardiology;;    LEFT HEART CATHETERIZATION Left 11/28/2022    Procedure: CATHETERIZATION, HEART, LEFT;  Surgeon: Zion Ortega MD;  Location: [facility] CATH LAB;  Service: Cardiology;  Laterality: Left;    LEFT  "HEART CATHETERIZATION Left 6/20/2023    Procedure: Left heart cath;  Surgeon: Zion Ortega MD;  Location: [facility] CATH LAB;  Service: Cardiology;  Laterality: Left;    PERCUTANEOUS TRANSLUMINAL BALLOON ANGIOPLASTY OF CORONARY ARTERY  6/20/2023    Procedure: Angioplasty-coronary;  Surgeon: Zion Ortega MD;  Location: [facility] CATH LAB;  Service: Cardiology;;    RIGHT HEART CATHETERIZATION N/A 11/28/2022    Procedure: INSERTION, CATHETER, RIGHT HEART;  Surgeon: Zion Ortega MD;  Location: [facility] CATH LAB;  Service: Cardiology;  Laterality: N/A;  Congestive heart failure       Review of Systems   Constitutional:  Negative for activity change and appetite change.   HENT:  Negative for dental problem, nosebleeds and sore throat.    Eyes:  Negative for discharge and visual disturbance.   Respiratory:  Positive for chest tightness. Negative for cough and stridor.    Cardiovascular:  Negative for leg swelling.   Gastrointestinal:  Negative for abdominal distention and abdominal pain.   Genitourinary:  Negative for difficulty urinating and dysuria.   Musculoskeletal:  Negative for arthralgias, back pain and joint swelling.   Allergic/Immunologic: Negative for environmental allergies.   Neurological:  Negative for dizziness, syncope and headaches.   Hematological:  Does not bruise/bleed easily.   Psychiatric/Behavioral:  Negative for behavioral problems.         Objective:   /61   Pulse 109   Temp 97.4 °F (36.3 °C) (Oral)   Resp 18   Ht 6' 1" (1.854 m)   Wt 113.5 kg (250 lb 3.6 oz)   SpO2 95%   BMI 33.01 kg/m²     X-Ray Chest AP Portable  Narrative: EXAMINATION:  XR CHEST AP PORTABLE    CLINICAL HISTORY:  Post-op; lines and tubes in place, subsequent encounter    COMPARISON:  June 23, 2023    FINDINGS:  EKG leads and other life-saving devices continue to overlie the chest.  Stable right arm PICC line, left-sided chest tube and large-bore mediastinal drain.  Negative for pneumothorax.  Stable bilateral lower lobe " atelectasis/consolidation without new pulmonary opacities.  Interval decrease in the amount of air within loops of bowel underneath the left hemidiaphragm.  The hilar and mediastinal contours and osseous structures are unchanged.  Impression: 1.  Overall, no significant interval change has occurred.    Electronically signed by: Selvin Rowe MD  Date:    06/24/2023  Time:    07:00         Physical Exam  Vitals and nursing note reviewed.   Constitutional:       Appearance: He is obese.   HENT:      Head: Normocephalic.      Mouth/Throat:      Mouth: Mucous membranes are moist.   Eyes:      Extraocular Movements: Extraocular movements intact.      Pupils: Pupils are equal, round, and reactive to light.   Cardiovascular:      Rate and Rhythm: Normal rate and regular rhythm.      Pulses: Normal pulses.      Heart sounds: Normal heart sounds.      Comments: Midline incision healing well  Leg incisions also healing well  Pulmonary:      Effort: Pulmonary effort is normal.      Breath sounds: Normal breath sounds.   Abdominal:      Palpations: Abdomen is soft.   Musculoskeletal:         General: Normal range of motion.      Cervical back: Normal range of motion and neck supple.   Skin:     General: Skin is warm.      Capillary Refill: Capillary refill takes less than 2 seconds.   Neurological:      General: No focal deficit present.      Mental Status: He is alert and oriented to person, place, and time.   Psychiatric:         Mood and Affect: Mood normal.       Assessment:     1. Coronary artery disease without angina pectoris    2. Abnormal stress test    3. CAD, multiple vessel    4. DCM (dilated cardiomyopathy)    5. SOB (shortness of breath)    6. PAF (paroxysmal atrial fibrillation)    7. History of CVA (cerebrovascular accident)    8. Pre-operative cardiovascular examination    9. CAD (coronary artery disease)    10. Post-operative state    11. S/P CABG x 3    12. Anemia, unspecified type          Plan   The patient  is postop day 9 status post coronary artery bypass grafting x3 and Woodruff Maze 4 procedure.    Neuro:  Patient is awake alert and oriented x3.  Neuro exam is nonfocal.  Pain is well controlled.    Cardiac:  Metoprolol.  Continue Entresto.  Patient will be fitted with a LifeVest prior to discharge.  Patient is currently on empagliflozin.  Patient will be on his home dapagliflozin on discharge.    Respiratory:  Aggressive pulmonary toilet  Continue incentive spirometer.    GI:  Cardiac diet    Renal:  Creatinine back to baseline  Heme:  Hematocrit stable.  And platelet count is improving   Heme-Onc for possible hit  Continue apixaban 5 mg 5 mg for anticoagulation.    Thrombocytopenia: in the 60s fluctuating   Id:  Patient's white count is down     Endocrine: Glucose is controlled with a sliding scale.    Activities:  Patient is ambulating in the hallways.    Line tubes and drains:  Patient has a PICC line which will be discontinued prior to discharge.    Waiting for up trend in the platelet count before discharge  Dc with home health once stable          Malissa Graham MD  [facility]  [place]

## 2023-07-02 LAB
ALBUMIN SERPL BCP-MCNC: 3.4 G/DL (ref 3.5–5.2)
ALP SERPL-CCNC: 54 U/L (ref 55–135)
ALT SERPL W/O P-5'-P-CCNC: 11 U/L (ref 10–44)
ANION GAP SERPL CALC-SCNC: 15 MMOL/L (ref 8–16)
ANION GAP SERPL CALC-SCNC: 15 MMOL/L (ref 8–16)
ANISOCYTOSIS BLD QL SMEAR: SLIGHT
AST SERPL-CCNC: 18 U/L (ref 10–40)
BASOPHILS # BLD AUTO: 0 K/UL (ref 0–0.2)
BASOPHILS NFR BLD: 0 % (ref 0–1.9)
BILIRUB DIRECT SERPL-MCNC: 0.4 MG/DL (ref 0.1–0.3)
BILIRUB SERPL-MCNC: 0.9 MG/DL (ref 0.1–1)
BUN SERPL-MCNC: 25 MG/DL (ref 8–23)
BUN SERPL-MCNC: 27 MG/DL (ref 8–23)
CALCIUM SERPL-MCNC: 9.6 MG/DL (ref 8.7–10.5)
CALCIUM SERPL-MCNC: 9.6 MG/DL (ref 8.7–10.5)
CHLORIDE SERPL-SCNC: 100 MMOL/L (ref 95–110)
CHLORIDE SERPL-SCNC: 99 MMOL/L (ref 95–110)
CO2 SERPL-SCNC: 21 MMOL/L (ref 23–29)
CO2 SERPL-SCNC: 22 MMOL/L (ref 23–29)
CREAT SERPL-MCNC: 1.4 MG/DL (ref 0.5–1.4)
CREAT SERPL-MCNC: 1.5 MG/DL (ref 0.5–1.4)
DACRYOCYTES BLD QL SMEAR: ABNORMAL
DIFFERENTIAL METHOD: ABNORMAL
EOSINOPHIL # BLD AUTO: 0 K/UL (ref 0–0.5)
EOSINOPHIL NFR BLD: 0 % (ref 0–8)
ERYTHROCYTE [DISTWIDTH] IN BLOOD BY AUTOMATED COUNT: 15.7 % (ref 11.5–14.5)
EST. GFR  (NO RACE VARIABLE): 52 ML/MIN/1.73 M^2
EST. GFR  (NO RACE VARIABLE): 57 ML/MIN/1.73 M^2
GIANT PLATELETS BLD QL SMEAR: PRESENT
GLUCOSE SERPL-MCNC: 107 MG/DL (ref 70–110)
GLUCOSE SERPL-MCNC: 96 MG/DL (ref 70–110)
HCT VFR BLD AUTO: 30.4 % (ref 40–54)
HGB BLD-MCNC: 9.7 G/DL (ref 14–18)
IMM GRANULOCYTES # BLD AUTO: 0.06 K/UL (ref 0–0.04)
IMM GRANULOCYTES NFR BLD AUTO: 1.5 % (ref 0–0.5)
LYMPHOCYTES # BLD AUTO: 1.5 K/UL (ref 1–4.8)
LYMPHOCYTES NFR BLD: 37.7 % (ref 18–48)
MAGNESIUM SERPL-MCNC: 2.2 MG/DL (ref 1.6–2.6)
MAGNESIUM SERPL-MCNC: 2.2 MG/DL (ref 1.6–2.6)
MCH RBC QN AUTO: 27.6 PG (ref 27–31)
MCHC RBC AUTO-ENTMCNC: 31.9 G/DL (ref 32–36)
MCV RBC AUTO: 86 FL (ref 82–98)
MONOCYTES # BLD AUTO: 0.4 K/UL (ref 0.3–1)
MONOCYTES NFR BLD: 9.7 % (ref 4–15)
NEUTROPHILS # BLD AUTO: 2.1 K/UL (ref 1.8–7.7)
NEUTROPHILS NFR BLD: 51.1 % (ref 38–73)
NRBC BLD-RTO: 0 /100 WBC
OVALOCYTES BLD QL SMEAR: ABNORMAL
PLATELET # BLD AUTO: 73 K/UL (ref 150–450)
PLATELET BLD QL SMEAR: ABNORMAL
PMV BLD AUTO: 9.4 FL (ref 9.2–12.9)
POCT GLUCOSE: 137 MG/DL (ref 70–110)
POCT GLUCOSE: 94 MG/DL (ref 70–110)
POCT GLUCOSE: 95 MG/DL (ref 70–110)
POIKILOCYTOSIS BLD QL SMEAR: SLIGHT
POLYCHROMASIA BLD QL SMEAR: ABNORMAL
POTASSIUM SERPL-SCNC: 4.1 MMOL/L (ref 3.5–5.1)
POTASSIUM SERPL-SCNC: 4.6 MMOL/L (ref 3.5–5.1)
PROT SERPL-MCNC: 8.1 G/DL (ref 6–8.4)
RBC # BLD AUTO: 3.52 M/UL (ref 4.6–6.2)
SODIUM SERPL-SCNC: 136 MMOL/L (ref 136–145)
SODIUM SERPL-SCNC: 136 MMOL/L (ref 136–145)
WBC # BLD AUTO: 4.01 K/UL (ref 3.9–12.7)

## 2023-07-02 PROCEDURE — 36415 COLL VENOUS BLD VENIPUNCTURE: CPT | Performed by: THORACIC SURGERY (CARDIOTHORACIC VASCULAR SURGERY)

## 2023-07-02 PROCEDURE — 85025 COMPLETE CBC W/AUTO DIFF WBC: CPT | Performed by: THORACIC SURGERY (CARDIOTHORACIC VASCULAR SURGERY)

## 2023-07-02 PROCEDURE — 80048 BASIC METABOLIC PNL TOTAL CA: CPT | Mod: 91 | Performed by: THORACIC SURGERY (CARDIOTHORACIC VASCULAR SURGERY)

## 2023-07-02 PROCEDURE — 21400001 HC TELEMETRY ROOM

## 2023-07-02 PROCEDURE — 63600175 PHARM REV CODE 636 W HCPCS: Performed by: THORACIC SURGERY (CARDIOTHORACIC VASCULAR SURGERY)

## 2023-07-02 PROCEDURE — 25000242 PHARM REV CODE 250 ALT 637 W/ HCPCS: Performed by: THORACIC SURGERY (CARDIOTHORACIC VASCULAR SURGERY)

## 2023-07-02 PROCEDURE — 83735 ASSAY OF MAGNESIUM: CPT | Performed by: THORACIC SURGERY (CARDIOTHORACIC VASCULAR SURGERY)

## 2023-07-02 PROCEDURE — 25000003 PHARM REV CODE 250: Performed by: THORACIC SURGERY (CARDIOTHORACIC VASCULAR SURGERY)

## 2023-07-02 PROCEDURE — 25000003 PHARM REV CODE 250: Performed by: INTERNAL MEDICINE

## 2023-07-02 PROCEDURE — 80076 HEPATIC FUNCTION PANEL: CPT | Performed by: THORACIC SURGERY (CARDIOTHORACIC VASCULAR SURGERY)

## 2023-07-02 RX ADMIN — FUROSEMIDE 40 MG: 10 INJECTION, SOLUTION INTRAMUSCULAR; INTRAVENOUS at 08:07

## 2023-07-02 RX ADMIN — POTASSIUM CHLORIDE 20 MEQ: 1500 TABLET, EXTENDED RELEASE ORAL at 08:07

## 2023-07-02 RX ADMIN — PANTOPRAZOLE SODIUM 40 MG: 40 TABLET, DELAYED RELEASE ORAL at 08:07

## 2023-07-02 RX ADMIN — CYANOCOBALAMIN TAB 1000 MCG 1000 MCG: 1000 TAB at 08:07

## 2023-07-02 RX ADMIN — FERROUS SULFATE TAB 325 MG (65 MG ELEMENTAL FE) 1 EACH: 325 (65 FE) TAB at 08:07

## 2023-07-02 RX ADMIN — FOLIC ACID 2 MG: 1 TABLET ORAL at 08:07

## 2023-07-02 RX ADMIN — APIXABAN 5 MG: 2.5 TABLET, FILM COATED ORAL at 08:07

## 2023-07-02 RX ADMIN — OXYCODONE HYDROCHLORIDE AND ACETAMINOPHEN 500 MG: 500 TABLET ORAL at 08:07

## 2023-07-02 RX ADMIN — LATANOPROST 1 DROP: 50 SOLUTION OPHTHALMIC at 08:07

## 2023-07-02 RX ADMIN — PRAVASTATIN SODIUM 20 MG: 20 TABLET ORAL at 08:07

## 2023-07-02 RX ADMIN — EMPAGLIFLOZIN 10 MG: 10 TABLET, FILM COATED ORAL at 04:07

## 2023-07-02 RX ADMIN — METOPROLOL TARTRATE 50 MG: 50 TABLET, FILM COATED ORAL at 08:07

## 2023-07-02 RX ADMIN — AMITRIPTYLINE HYDROCHLORIDE 50 MG: 50 TABLET, FILM COATED ORAL at 08:07

## 2023-07-02 NOTE — PLAN OF CARE
Problem: Adult Inpatient Plan of Care  Goal: Plan of Care Review  Outcome: Ongoing, Progressing     Problem: Skin Injury Risk Increased  Goal: Skin Health and Integrity  Outcome: Ongoing, Progressing  Intervention: Optimize Skin Protection  Flowsheets (Taken 7/2/2023 0337)  Pressure Reduction Techniques: frequent weight shift encouraged  Skin Protection: adhesive use limited  Head of Bed (HOB) Positioning: HOB elevated     Problem: Fall Injury Risk  Goal: Absence of Fall and Fall-Related Injury  Outcome: Ongoing, Progressing  Intervention: Identify and Manage Contributors  Flowsheets (Taken 7/2/2023 0337)  Medication Review/Management: medications reviewed  Intervention: Promote Injury-Free Environment  Flowsheets (Taken 7/2/2023 0337)  Safety Promotion/Fall Prevention:   assistive device/personal item within reach   side rails raised x 2   medications reviewed   nonskid shoes/socks when out of bed

## 2023-07-02 NOTE — PROGRESS NOTES
Subjective:      Patient ID: Gustavo Tracey Jr. is a 63 y.o. male.    Chief Complaint: No chief complaint on file.    HPI: Left main coronary artery disease  The patient is a 63-year-old male with CHF, history of AFib, status post CVA, hyperlipidemia, hypertension, diabetes and cardiomyopathy who had a normal Cardiolite test on workup.  The patient was brought to the operating room and cardiac catheterization shows significant multivessel coronary artery disease with a 70% ostial left main disease and a 70% proximal LAD disease.     The patient is a candidate for coronary artery bypass grafting and Purdy Maze 4 procedure.  Preop workup is in progress.  The risks and benefits of the surgery were explained to the patient.  The patient understands the risks and benefits of surgery and has to proceed with surgery which has been scheduled for 06/22/2023.  Post-Op Info:  Procedure(s) (LRB):  CORONARY ARTERY BYPASS GRAFT (CABG) (N/A)  PURDY MAZE PROCEDURE (N/A)  SURGICAL PROCUREMENT, VEIN, ENDOSCOPIC (Left)  ECHOCARDIOGRAM,TRANSESOPHAGEAL (N/A)  BLOCK, NERVE, INTERCOSTAL, 2 OR MORE (N/A)  CLOSURE, LEFT ATRIAL APPENDAGE, USING DEVICE (Left)   S/P CABG x 3  06/23/2023   The patient is postop day 1 status post coronary artery bypass grafting x3 and Purdy Maze 4 procedure.  Overall the patient is doing well.    Neuro:  Patient is awake alert and oriented x3.  Neuro exam is nonfocal.  Patient moves all 4.  Patient's pain is being controlled with current pain regimen.    Cardiac:  Patient has been hemodynamically stable cardiac index has been about 2.2.  Patient is on low-dose epi and vasopressin.  Wean vasopressin and epi to off.    Respiratory:  Patient was extubated yesterday.  Patient has good sats on nasal cannula.  Continue pulmonary toileting.  Continue incentive spirometer.  GI:  Will advance patient's to regular diet as tolerated.    Renal: Patient has good urine output.  Creatinine is 1.7.  Will start diuresis.  Endocrine:   Patient's glucose is controlled with an insulin drip.  Will convert to sliding scale and long-acting insulin.  Heme:  Hematocrit is 28.2 and platelet count is 116.  Will start patient on a NOAC for anticoagulation once patient has chest tubes have been discontinued.  Patient requires anticoagulation since he is status post Woodruff Maze 4 for atrial fibrillation.  Id:  White count is 4.  Will continue to follow.  Patient is on venu op antibiotics.    Activities:  Patient is out of bed to chair.  Will advance activities as tolerated.    Line tubes and drains:  Patient has a right IJ [place] and [place], chest tubes, pacer wires, A-line, Rankin catheter and saphenectomy site ANDERS drains.     06/24/2023   The patient is postop day 2 status post coronary artery bypass grafting x3 and Woodruff Maze 4 procedure.  Overall the patient is doing well.    Neuro:  Patient is awake alert and oriented x3.  Neuro exam is nonfocal.  Patient moves all 4.  Pain is controlled current pain med main.  Cardiac:  Patient is hemodynamically stable.  Patient is off all pressors.  Continue metoprolol.    Respiratory:  Patient has good sats continue pulmonary toileting.  Continue incentive spirometer.    GI:  Patient is tolerating p.o. intake.  Continue advance as tolerated.    Renal:  Patient has good urine output.  Creatinine is 1.7.  Continue diuresis.  Endocrine:  Glucose is controlled with sliding scale.  Heme:  Hematocrit is 23.7 and platelet count is 62.  Will start patient on apixaban.  While patient is platelet count is low will start patient on 2.5 mg p.o. b.i.d. of apixaban.  Will increase dose once platelet count recovers.  Id:  White count is 8.92.  Will follow continue to follow white count.  Activities patient is out of bed to chair continue to advance as tolerated.    Line tubes and drains:  Patient has pacer wires, PICC line and Rankin catheter.  Chest tubes have been discontinued.  Will discontinue Rankin catheter.     06/25/2023   The patient  is postop day 3 status post coronary artery bypass grafting x3 and Woodruff Maze 4 procedure.    Neuro:  Patient is awake alert and oriented x3.  Neuro exam is nonfocal.  Pain is well controlled.    Cardiac:  Patient is hemodynamically stable.  Continue metoprolol.  Respiratory: Patient has good sats on room air continue pulmonary toileting.  GI:  Patient is tolerating a regular diet.  Patient has not had a bowel movement.  Renal: Patient has good urine output.  Creatinine is 1.6.  Heme:  Hematocrit is 21.5 and platelet count is 61.  Will transfuse 2 units of plaque probe blood cells.  Continue apixaban 2.5 mg for anticoagulation.  Id:  White count is increased to 19.  Will panculture patient.  Will start on empiric broad-spectrum antibiotics.  Activities:  Patient is ambulating in the hallways.  Advance as tolerated.  Line tubes and drains:  Patient has a PICC line, and pacer wires.     06/26/2023   The patient is postop day 4 status post coronary artery bypass grafting x3 and Woodruff Maze 4 procedure.  Overall the patient is doing well.  Anticipate discharge in the next 48-72 hours.  Neuro:  Patient is awake alert and oriented x3.  Neuro exam is nonfocal.  Patient's pain is well controlled.  Cardiac:  Patient is tolerating metoprolol.  Patient has preop low ejection fraction.  Will add Entresto.  In light of severely depressed ejection fraction preoperatively,  patient will need LifeVest prior to discharge.  Will start patient on his preop Entresto and dapagliflozin  Respiratory:  Patient has good sats on room air.  Continue pulmonary toileting.  Continue incentive spirometer.  GI:  Patient is tolerating regular diet.  Patient has had bowel movements.    Renal:  Patient has good urine output.  Creatinine is 1.5.  Will continue diuresis.  Patient appears to still be fluid overloaded.    Heme:  Hematocrit is 27.  Status post 2 units packed red cells.  Platelet count this 62.  Continue apixaban 2.5 mg for anticoagulation.     Id: Patient's white count is down to 18.  Patient is on broad-spectrum antibiotics.  Cultures are pending.    Heme:  Patient's glucose is controlled with sliding scale.  Activities:  Patient is ambulating in the hallways.    Line tubes and drains:  Patient has a PICC line and pacer wires.     06/27/2023   The patient is postop day 5 status post coronary artery bypass grafting x3 and Woodruff Maze 4 procedure.  Overall the patient is doing well.  Anticipate discharge in the next 24 hours.    Neuro:  Patient is awake alert and oriented x3.  Neuro exam is nonfocal.  Pain is well controlled.    Cardiac:  Patient is tolerating metoprolol.  Will increase metoprolol.  Continue Entresto.  Patient will be fitted with a LifeVest prior to discharge.  Patient is currently on empagliflozin.  Patient will be on his home dapagliflozin on discharge.    Respiratory: Patient has good sats on room air.  Continue pulmonary toileting.  Continue incentive spirometer.    GI:  Patient is tolerating a regular diet.  And patient has had bowel movements.    Renal:  Patient has good urine output.  Creatinine is 1.2.  Will place patient on home diuretic doses.  Patient's appears relatively euvolemic.    Heme:  Hematocrit is 29.  And platelet count is 65.  Continue apixaban 2.5 mg for anticoagulation.  Patient has anemia and thrombocytopenia.  Will refer to heme Onc as outpatient.    Id:  Patient's white count is down to 12.  Patient is on broad-spectrum antibiotics.  Cultures are no growth to date.  Will discharge patient on p.o. Augmentin.    Endocrine: Glucose is controlled with a sliding scale.    Activities:  Patient is ambulating in the hallways.    Line tubes and drains:  Patient has a PICC line which will be discontinued prior to discharge.    6/28/23The patient is postop day 6 status post coronary artery bypass grafting x3 and Woodruff Maze 4 procedure.    Neuro:  Patient is awake alert and oriented x3.  Neuro exam is nonfocal.  Pain is well  controlled.    Cardiac:  Metoprolol.  Continue Entresto.  Patient will be fitted with a LifeVest prior to discharge.  Patient is currently on empagliflozin.  Patient will be on his home dapagliflozin on discharge.    Respiratory:  Aggressive pulmonary toilet  Continue incentive spirometer.    GI:  Cardiac diet    Renal:  Creatinine back to baseline   Will place patient on home diuretic doses.    Heme:  Hematocrit is 29.  And platelet count is 55 consult Heme-Onc for possible hit  Continue apixaban 5 mg 5 mg for anticoagulation.     d:  Patient's white count is down   Patient is on broad-spectrum antibiotics.  Cultures are no growth to date.  Will discharge patient on p.o. Augmentin.    Endocrine: Glucose is controlled with a sliding scale.    Activities:  Patient is ambulating in the hallways.    6/29/23  The patient is postop day 7 status post coronary artery bypass grafting x3 and Woodruff Maze 4 procedure.    Neuro:  Patient is awake alert and oriented x3.  Neuro exam is nonfocal.  Pain is well controlled.    Cardiac:  Metoprolol.  Continue Entresto.  Patient will be fitted with a LifeVest prior to discharge.  Patient is currently on empagliflozin.  Patient will be on his home dapagliflozin on discharge.    Respiratory:  Aggressive pulmonary toilet  Continue incentive spirometer.    GI:  Cardiac diet    Renal:  Creatinine back to baseline   Will place patient on home diuretic doses.    Heme:  Hematocrit stable.  And platelet count is 68 consult Heme-Onc for possible hit  Continue apixaban 5 mg 5 mg for anticoagulation.     d:  Patient's white count is down   Patient is on broad-spectrum antibiotics.  Cultures are no growth to date.  Will discharge patient on p.o. Augmentin.    Endocrine: Glucose is controlled with a sliding scale.    Activities:  Patient is ambulating in the hallways.    Line tubes and drains:  Patient has a PICC line which will be discontinued prior to discharge.    Waiting for up trend in the  platelet count before discharge  6/30/23  The patient is postop day 8 status post coronary artery bypass grafting x3 and Woodruff Maze 4 procedure.    Neuro:  Patient is awake alert and oriented x3.  Neuro exam is nonfocal.  Pain is well controlled.    Cardiac:  Metoprolol.  Continue Entresto.  Patient will be fitted with a LifeVest prior to discharge.  Patient is currently on empagliflozin.  Patient will be on his home dapagliflozin on discharge.    Respiratory:  Aggressive pulmonary toilet  Continue incentive spirometer.    GI:  Cardiac diet    Renal:  Creatinine back to baseline  Heme:  Hematocrit stable.  And platelet count is improving   Heme-Onc for possible hit  Continue apixaban 5 mg 5 mg for anticoagulation.     Id:  Patient's white count is down     Endocrine: Glucose is controlled with a sliding scale.    Activities:  Patient is ambulating in the hallways.    Line tubes and drains:  Patient has a PICC line which will be discontinued prior to discharge.    Waiting for up trend in the platelet count before discharge  Dc with home health once stable   7/1/23.  The patient is postop day 9 status post coronary artery bypass grafting x3 and Woodruff Maze 4 procedure.    Neuro:  Patient is awake alert and oriented x3.  Neuro exam is nonfocal.  Pain is well controlled.    Cardiac:  Metoprolol.  Continue Entresto.  Patient will be fitted with a LifeVest prior to discharge.  Patient is currently on empagliflozin.  Patient will be on his home dapagliflozin on discharge.    Respiratory:  Aggressive pulmonary toilet  Continue incentive spirometer.    GI:  Cardiac diet    Renal:  Creatinine back to baseline  Heme:  Hematocrit stable.  And platelet count is improving   Heme-Onc for possible hit  Continue apixaban 5 mg 5 mg for anticoagulation.    Thrombocytopenia: in the 60s fluctuating   Id:  Patient's white count is down     Endocrine: Glucose is controlled with a sliding scale.    Activities:  Patient is ambulating in the  hallways.    Line tubes and drains:  Patient has a PICC line which will be discontinued prior to discharge.          Review of patient's allergies indicates:   Allergen Reactions    Heparin analogues        Past Medical History:   Diagnosis Date    Abnormal nuclear stress test 11/26/2022    Cervical radiculopathy     to rt arm    CHF (congestive heart failure)     Chronic systolic congestive heart failure 11/28/2022    Dilated cardiomyopathy 11/26/2022    Hyperlipidemia     Hypertension     Obesity, unspecified     PAF (paroxysmal atrial fibrillation) 11/26/2022    Pulmonary HTN 11/28/2022    Stroke        Family History   Problem Relation Age of Onset    Hypertension Mother     Diabetes Father     Hyperlipidemia Father     Hypertension Father     Heart attack Father     Coronary artery disease Father        Social History     Socioeconomic History    Marital status:    Tobacco Use    Smoking status: Never    Smokeless tobacco: Never   Substance and Sexual Activity    Alcohol use: Yes    Drug use: Never    Sexual activity: Not Currently       Past Surgical History:   Procedure Laterality Date    CLOSURE OF LEFT ATRIAL APPENDAGE USING DEVICE Left 6/22/2023    Procedure: CLOSURE, LEFT ATRIAL APPENDAGE, USING DEVICE;  Surgeon: Rustam Dave MD;  Location: [facility] OR;  Service: Cardiovascular;  Laterality: Left;  LIGATION OF LEFT ATRIAL APPENDAGE WITH OTILIA EXCLUSION SYSTEM    CORONARY ARTERY BYPASS GRAFT (CABG) N/A 6/22/2023    Procedure: CORONARY ARTERY BYPASS GRAFT (CABG);  Surgeon: Rustam Dave MD;  Location: [facility] OR;  Service: Cardiovascular;  Laterality: N/A;  3-VESSEL WITH EPI-AORTIC ULTRASOUND    PURDY MAZE PROCEDURE N/A 6/22/2023    Procedure: PURDY MAZE PROCEDURE;  Surgeon: Rustam Dave MD;  Location: [facility] OR;  Service: Cardiovascular;  Laterality: N/A;    ECHOCARDIOGRAM,TRANSESOPHAGEAL N/A 6/22/2023    Procedure: ECHOCARDIOGRAM,TRANSESOPHAGEAL;  Surgeon: Rustam Dave MD;  Location: [facility] OR;   Service: Cardiovascular;  Laterality: N/A;    ENDOSCOPIC HARVEST OF VEIN Left 6/22/2023    Procedure: SURGICAL PROCUREMENT, VEIN, ENDOSCOPIC;  Surgeon: Rustam Dave MD;  Location: [facility] OR;  Service: Cardiovascular;  Laterality: Left;    INJECTION OF ANESTHETIC AGENT AROUND MULTIPLE INTERCOSTAL NERVES N/A 6/22/2023    Procedure: BLOCK, NERVE, INTERCOSTAL, 2 OR MORE;  Surgeon: Rustam Dave MD;  Location: [facility] OR;  Service: Cardiovascular;  Laterality: N/A;  PARASTERNAL NERVE BLOCK    INSTANTANEOUS WAVE-FREE RATIO  6/20/2023    Procedure: Instantaneous Wave-Free Ratio;  Surgeon: Zion Ortega MD;  Location: [facility] CATH LAB;  Service: Cardiology;;    IVUS, CORONARY  6/20/2023    Procedure: IVUS, Coronary;  Surgeon: Zion Ortega MD;  Location: [facility] CATH LAB;  Service: Cardiology;;    LEFT HEART CATHETERIZATION Left 11/28/2022    Procedure: CATHETERIZATION, HEART, LEFT;  Surgeon: Zion Ortega MD;  Location: [facility] CATH LAB;  Service: Cardiology;  Laterality: Left;    LEFT HEART CATHETERIZATION Left 6/20/2023    Procedure: Left heart cath;  Surgeon: Zion Ortega MD;  Location: [facility] CATH LAB;  Service: Cardiology;  Laterality: Left;    PERCUTANEOUS TRANSLUMINAL BALLOON ANGIOPLASTY OF CORONARY ARTERY  6/20/2023    Procedure: Angioplasty-coronary;  Surgeon: Zion Ortega MD;  Location: [facility] CATH LAB;  Service: Cardiology;;    RIGHT HEART CATHETERIZATION N/A 11/28/2022    Procedure: INSERTION, CATHETER, RIGHT HEART;  Surgeon: Zion Ortega MD;  Location: [facility] CATH LAB;  Service: Cardiology;  Laterality: N/A;  Congestive heart failure       Review of Systems   Constitutional:  Negative for activity change and appetite change.   HENT:  Negative for dental problem, nosebleeds and sore throat.    Eyes:  Negative for discharge and visual disturbance.   Respiratory:  Positive for chest tightness. Negative for cough and stridor.    Cardiovascular:  Negative for leg swelling.   Gastrointestinal:  Negative for  "abdominal distention and abdominal pain.   Genitourinary:  Negative for difficulty urinating and dysuria.   Musculoskeletal:  Negative for arthralgias, back pain and joint swelling.   Allergic/Immunologic: Negative for environmental allergies.   Neurological:  Negative for dizziness, syncope and headaches.   Hematological:  Does not bruise/bleed easily.   Psychiatric/Behavioral:  Negative for behavioral problems.         Objective:   /75   Pulse 101   Temp 98.2 °F (36.8 °C)   Resp 16   Ht 6' 1" (1.854 m)   Wt 113.5 kg (250 lb 3.6 oz)   SpO2 98%   BMI 33.01 kg/m²     X-Ray Chest AP Portable  Narrative: EXAMINATION:  XR CHEST AP PORTABLE    CLINICAL HISTORY:  Post-op; lines and tubes in place, subsequent encounter    COMPARISON:  June 23, 2023    FINDINGS:  EKG leads and other life-saving devices continue to overlie the chest.  Stable right arm PICC line, left-sided chest tube and large-bore mediastinal drain.  Negative for pneumothorax.  Stable bilateral lower lobe atelectasis/consolidation without new pulmonary opacities.  Interval decrease in the amount of air within loops of bowel underneath the left hemidiaphragm.  The hilar and mediastinal contours and osseous structures are unchanged.  Impression: 1.  Overall, no significant interval change has occurred.    Electronically signed by: Selvin Rowe MD  Date:    06/24/2023  Time:    07:00         Physical Exam  Vitals and nursing note reviewed.   Constitutional:       Appearance: He is obese.   HENT:      Head: Normocephalic.      Mouth/Throat:      Mouth: Mucous membranes are moist.   Eyes:      Extraocular Movements: Extraocular movements intact.      Pupils: Pupils are equal, round, and reactive to light.   Cardiovascular:      Rate and Rhythm: Normal rate and regular rhythm.      Pulses: Normal pulses.      Heart sounds: Normal heart sounds.      Comments: Midline incision healing well  Leg incisions also healing well  Pulmonary:      Effort: " Pulmonary effort is normal.      Breath sounds: Normal breath sounds.   Abdominal:      Palpations: Abdomen is soft.   Musculoskeletal:         General: Normal range of motion.      Cervical back: Normal range of motion and neck supple.   Skin:     General: Skin is warm.      Capillary Refill: Capillary refill takes less than 2 seconds.   Neurological:      General: No focal deficit present.      Mental Status: He is alert and oriented to person, place, and time.   Psychiatric:         Mood and Affect: Mood normal.       Assessment:     1. Coronary artery disease without angina pectoris    2. Abnormal stress test    3. CAD, multiple vessel    4. DCM (dilated cardiomyopathy)    5. SOB (shortness of breath)    6. PAF (paroxysmal atrial fibrillation)    7. History of CVA (cerebrovascular accident)    8. Pre-operative cardiovascular examination    9. CAD (coronary artery disease)    10. Post-operative state    11. S/P CABG x 3    12. Anemia, unspecified type          Plan   The patient is postop day 10 status post coronary artery bypass grafting x3 and Woodruff Maze 4 procedure.    Neuro:  Patient is awake alert and oriented x3.  Neuro exam is nonfocal.  Pain is well controlled.    Cardiac:  Metoprolol.  Continue Entresto.  Patient will be fitted with a LifeVest prior to discharge.  Patient is currently on empagliflozin.  Patient will be on his home dapagliflozin on discharge.    Respiratory:  Aggressive pulmonary toilet  Continue incentive spirometer.    GI:  Cardiac diet    Renal:  Creatinine back to baseline  Heme:  Hematocrit stable.  And platelet count is improving 74   Heme-Onc fHIT confirmed Continue apixaban 5 mg 5 mg for anticoagulation.    Thrombocytopenia: in the 74   Id:  Patient's white count is down     Endocrine: Glucose is controlled with a sliding scale.    Activities:  Patient is ambulating in the hallways.    Line tubes and drains:  Patient has a PICC line which will be discontinued prior to discharge.     Waiting for up trend in the platelet count before discharge  Dc with home health once stable          Malissa Graham MD  [facility]  [place]

## 2023-07-03 LAB
ALBUMIN SERPL BCP-MCNC: 3.5 G/DL (ref 3.5–5.2)
ALP SERPL-CCNC: 55 U/L (ref 55–135)
ALT SERPL W/O P-5'-P-CCNC: 13 U/L (ref 10–44)
ANION GAP SERPL CALC-SCNC: 14 MMOL/L (ref 8–16)
ANION GAP SERPL CALC-SCNC: 14 MMOL/L (ref 8–16)
AST SERPL-CCNC: 19 U/L (ref 10–40)
BASOPHILS # BLD AUTO: 0.01 K/UL (ref 0–0.2)
BASOPHILS NFR BLD: 0.3 % (ref 0–1.9)
BILIRUB DIRECT SERPL-MCNC: 0.4 MG/DL (ref 0.1–0.3)
BILIRUB SERPL-MCNC: 0.8 MG/DL (ref 0.1–1)
BUN SERPL-MCNC: 26 MG/DL (ref 8–23)
BUN SERPL-MCNC: 27 MG/DL (ref 8–23)
CALCIUM SERPL-MCNC: 9.5 MG/DL (ref 8.7–10.5)
CALCIUM SERPL-MCNC: 9.7 MG/DL (ref 8.7–10.5)
CHLORIDE SERPL-SCNC: 100 MMOL/L (ref 95–110)
CHLORIDE SERPL-SCNC: 99 MMOL/L (ref 95–110)
CO2 SERPL-SCNC: 22 MMOL/L (ref 23–29)
CO2 SERPL-SCNC: 22 MMOL/L (ref 23–29)
CREAT SERPL-MCNC: 1.4 MG/DL (ref 0.5–1.4)
CREAT SERPL-MCNC: 1.5 MG/DL (ref 0.5–1.4)
DIFFERENTIAL METHOD: ABNORMAL
EOSINOPHIL # BLD AUTO: 0 K/UL (ref 0–0.5)
EOSINOPHIL NFR BLD: 0 % (ref 0–8)
ERYTHROCYTE [DISTWIDTH] IN BLOOD BY AUTOMATED COUNT: 15.5 % (ref 11.5–14.5)
EST. GFR  (NO RACE VARIABLE): 52 ML/MIN/1.73 M^2
EST. GFR  (NO RACE VARIABLE): 57 ML/MIN/1.73 M^2
GLUCOSE SERPL-MCNC: 106 MG/DL (ref 70–110)
GLUCOSE SERPL-MCNC: 115 MG/DL (ref 70–110)
HCT VFR BLD AUTO: 29.6 % (ref 40–54)
HGB BLD-MCNC: 9.6 G/DL (ref 14–18)
IMM GRANULOCYTES # BLD AUTO: 0.05 K/UL (ref 0–0.04)
IMM GRANULOCYTES NFR BLD AUTO: 1.3 % (ref 0–0.5)
LYMPHOCYTES # BLD AUTO: 1.3 K/UL (ref 1–4.8)
LYMPHOCYTES NFR BLD: 34.5 % (ref 18–48)
MAGNESIUM SERPL-MCNC: 2.1 MG/DL (ref 1.6–2.6)
MAGNESIUM SERPL-MCNC: 2.1 MG/DL (ref 1.6–2.6)
MCH RBC QN AUTO: 27.9 PG (ref 27–31)
MCHC RBC AUTO-ENTMCNC: 32.4 G/DL (ref 32–36)
MCV RBC AUTO: 86 FL (ref 82–98)
MONOCYTES # BLD AUTO: 0.4 K/UL (ref 0.3–1)
MONOCYTES NFR BLD: 10.6 % (ref 4–15)
NEUTROPHILS # BLD AUTO: 2.1 K/UL (ref 1.8–7.7)
NEUTROPHILS NFR BLD: 53.3 % (ref 38–73)
NRBC BLD-RTO: 0 /100 WBC
PLATELET # BLD AUTO: 92 K/UL (ref 150–450)
PLATELET BLD QL SMEAR: ABNORMAL
PMV BLD AUTO: 10 FL (ref 9.2–12.9)
POCT GLUCOSE: 101 MG/DL (ref 70–110)
POCT GLUCOSE: 114 MG/DL (ref 70–110)
POCT GLUCOSE: 116 MG/DL (ref 70–110)
POCT GLUCOSE: 117 MG/DL (ref 70–110)
POTASSIUM SERPL-SCNC: 4.1 MMOL/L (ref 3.5–5.1)
POTASSIUM SERPL-SCNC: 4.1 MMOL/L (ref 3.5–5.1)
PROT SERPL-MCNC: 8.2 G/DL (ref 6–8.4)
RBC # BLD AUTO: 3.44 M/UL (ref 4.6–6.2)
SODIUM SERPL-SCNC: 135 MMOL/L (ref 136–145)
SODIUM SERPL-SCNC: 136 MMOL/L (ref 136–145)
WBC # BLD AUTO: 3.85 K/UL (ref 3.9–12.7)

## 2023-07-03 PROCEDURE — 63600175 PHARM REV CODE 636 W HCPCS: Performed by: THORACIC SURGERY (CARDIOTHORACIC VASCULAR SURGERY)

## 2023-07-03 PROCEDURE — 21400001 HC TELEMETRY ROOM

## 2023-07-03 PROCEDURE — 80076 HEPATIC FUNCTION PANEL: CPT | Performed by: THORACIC SURGERY (CARDIOTHORACIC VASCULAR SURGERY)

## 2023-07-03 PROCEDURE — 97530 THERAPEUTIC ACTIVITIES: CPT | Mod: CQ

## 2023-07-03 PROCEDURE — 97116 GAIT TRAINING THERAPY: CPT | Mod: CQ

## 2023-07-03 PROCEDURE — 97530 THERAPEUTIC ACTIVITIES: CPT

## 2023-07-03 PROCEDURE — 25000003 PHARM REV CODE 250: Performed by: THORACIC SURGERY (CARDIOTHORACIC VASCULAR SURGERY)

## 2023-07-03 PROCEDURE — 83735 ASSAY OF MAGNESIUM: CPT | Performed by: THORACIC SURGERY (CARDIOTHORACIC VASCULAR SURGERY)

## 2023-07-03 PROCEDURE — 25000242 PHARM REV CODE 250 ALT 637 W/ HCPCS: Performed by: THORACIC SURGERY (CARDIOTHORACIC VASCULAR SURGERY)

## 2023-07-03 PROCEDURE — 36415 COLL VENOUS BLD VENIPUNCTURE: CPT | Performed by: THORACIC SURGERY (CARDIOTHORACIC VASCULAR SURGERY)

## 2023-07-03 PROCEDURE — 25000003 PHARM REV CODE 250: Performed by: INTERNAL MEDICINE

## 2023-07-03 PROCEDURE — 80048 BASIC METABOLIC PNL TOTAL CA: CPT | Performed by: THORACIC SURGERY (CARDIOTHORACIC VASCULAR SURGERY)

## 2023-07-03 PROCEDURE — 85025 COMPLETE CBC W/AUTO DIFF WBC: CPT | Performed by: THORACIC SURGERY (CARDIOTHORACIC VASCULAR SURGERY)

## 2023-07-03 RX ADMIN — APIXABAN 5 MG: 2.5 TABLET, FILM COATED ORAL at 08:07

## 2023-07-03 RX ADMIN — FUROSEMIDE 40 MG: 10 INJECTION, SOLUTION INTRAMUSCULAR; INTRAVENOUS at 08:07

## 2023-07-03 RX ADMIN — METOPROLOL TARTRATE 50 MG: 50 TABLET, FILM COATED ORAL at 08:07

## 2023-07-03 RX ADMIN — HYDROMORPHONE HYDROCHLORIDE 2 MG: 2 TABLET ORAL at 12:07

## 2023-07-03 RX ADMIN — FOLIC ACID 2 MG: 1 TABLET ORAL at 08:07

## 2023-07-03 RX ADMIN — OXYCODONE HYDROCHLORIDE AND ACETAMINOPHEN 500 MG: 500 TABLET ORAL at 08:07

## 2023-07-03 RX ADMIN — PRAVASTATIN SODIUM 20 MG: 20 TABLET ORAL at 08:07

## 2023-07-03 RX ADMIN — EMPAGLIFLOZIN 10 MG: 10 TABLET, FILM COATED ORAL at 05:07

## 2023-07-03 RX ADMIN — CYANOCOBALAMIN TAB 1000 MCG 1000 MCG: 1000 TAB at 08:07

## 2023-07-03 RX ADMIN — LATANOPROST 1 DROP: 50 SOLUTION OPHTHALMIC at 08:07

## 2023-07-03 RX ADMIN — AMITRIPTYLINE HYDROCHLORIDE 50 MG: 50 TABLET, FILM COATED ORAL at 08:07

## 2023-07-03 RX ADMIN — FERROUS SULFATE TAB 325 MG (65 MG ELEMENTAL FE) 1 EACH: 325 (65 FE) TAB at 08:07

## 2023-07-03 RX ADMIN — POTASSIUM CHLORIDE 20 MEQ: 1500 TABLET, EXTENDED RELEASE ORAL at 08:07

## 2023-07-03 RX ADMIN — PANTOPRAZOLE SODIUM 40 MG: 40 TABLET, DELAYED RELEASE ORAL at 08:07

## 2023-07-03 NOTE — PROGRESS NOTES
Subjective:      Patient ID: Gustavo Tracey Jr. is a 63 y.o. male.    Chief Complaint: No chief complaint on file.    HPI: Left main coronary artery disease  The patient is a 63-year-old male with CHF, history of AFib, status post CVA, hyperlipidemia, hypertension, diabetes and cardiomyopathy who had a normal Cardiolite test on workup.  The patient was brought to the operating room and cardiac catheterization shows significant multivessel coronary artery disease with a 70% ostial left main disease and a 70% proximal LAD disease.     The patient is a candidate for coronary artery bypass grafting and Purdy Maze 4 procedure.  Preop workup is in progress.  The risks and benefits of the surgery were explained to the patient.  The patient understands the risks and benefits of surgery and has to proceed with surgery which has been scheduled for 06/22/2023.  Post-Op Info:  Procedure(s) (LRB):  CORONARY ARTERY BYPASS GRAFT (CABG) (N/A)  PURDY MAZE PROCEDURE (N/A)  SURGICAL PROCUREMENT, VEIN, ENDOSCOPIC (Left)  ECHOCARDIOGRAM,TRANSESOPHAGEAL (N/A)  BLOCK, NERVE, INTERCOSTAL, 2 OR MORE (N/A)  CLOSURE, LEFT ATRIAL APPENDAGE, USING DEVICE (Left)   S/P CABG x 3  06/23/2023   The patient is postop day 1 status post coronary artery bypass grafting x3 and Purdy Maze 4 procedure.  Overall the patient is doing well.    Neuro:  Patient is awake alert and oriented x3.  Neuro exam is nonfocal.  Patient moves all 4.  Patient's pain is being controlled with current pain regimen.    Cardiac:  Patient has been hemodynamically stable cardiac index has been about 2.2.  Patient is on low-dose epi and vasopressin.  Wean vasopressin and epi to off.    Respiratory:  Patient was extubated yesterday.  Patient has good sats on nasal cannula.  Continue pulmonary toileting.  Continue incentive spirometer.  GI:  Will advance patient's to regular diet as tolerated.    Renal: Patient has good urine output.  Creatinine is 1.7.  Will start diuresis.  Endocrine:   Patient's glucose is controlled with an insulin drip.  Will convert to sliding scale and long-acting insulin.  Heme:  Hematocrit is 28.2 and platelet count is 116.  Will start patient on a NOAC for anticoagulation once patient has chest tubes have been discontinued.  Patient requires anticoagulation since he is status post Woodruff Maze 4 for atrial fibrillation.  Id:  White count is 4.  Will continue to follow.  Patient is on venu op antibiotics.    Activities:  Patient is out of bed to chair.  Will advance activities as tolerated.    Line tubes and drains:  Patient has a right IJ [place] and [place], chest tubes, pacer wires, A-line, Rankin catheter and saphenectomy site ANDERS drains.     06/24/2023   The patient is postop day 2 status post coronary artery bypass grafting x3 and Woodruff Maze 4 procedure.  Overall the patient is doing well.    Neuro:  Patient is awake alert and oriented x3.  Neuro exam is nonfocal.  Patient moves all 4.  Pain is controlled current pain med main.  Cardiac:  Patient is hemodynamically stable.  Patient is off all pressors.  Continue metoprolol.    Respiratory:  Patient has good sats continue pulmonary toileting.  Continue incentive spirometer.    GI:  Patient is tolerating p.o. intake.  Continue advance as tolerated.    Renal:  Patient has good urine output.  Creatinine is 1.7.  Continue diuresis.  Endocrine:  Glucose is controlled with sliding scale.  Heme:  Hematocrit is 23.7 and platelet count is 62.  Will start patient on apixaban.  While patient is platelet count is low will start patient on 2.5 mg p.o. b.i.d. of apixaban.  Will increase dose once platelet count recovers.  Id:  White count is 8.92.  Will follow continue to follow white count.  Activities patient is out of bed to chair continue to advance as tolerated.    Line tubes and drains:  Patient has pacer wires, PICC line and Rankin catheter.  Chest tubes have been discontinued.  Will discontinue Rankin catheter.     06/25/2023   The patient  is postop day 3 status post coronary artery bypass grafting x3 and Woodruff Maze 4 procedure.    Neuro:  Patient is awake alert and oriented x3.  Neuro exam is nonfocal.  Pain is well controlled.    Cardiac:  Patient is hemodynamically stable.  Continue metoprolol.  Respiratory: Patient has good sats on room air continue pulmonary toileting.  GI:  Patient is tolerating a regular diet.  Patient has not had a bowel movement.  Renal: Patient has good urine output.  Creatinine is 1.6.  Heme:  Hematocrit is 21.5 and platelet count is 61.  Will transfuse 2 units of plaque probe blood cells.  Continue apixaban 2.5 mg for anticoagulation.  Id:  White count is increased to 19.  Will panculture patient.  Will start on empiric broad-spectrum antibiotics.  Activities:  Patient is ambulating in the hallways.  Advance as tolerated.  Line tubes and drains:  Patient has a PICC line, and pacer wires.     06/26/2023   The patient is postop day 4 status post coronary artery bypass grafting x3 and Woodruff Maze 4 procedure.  Overall the patient is doing well.  Anticipate discharge in the next 48-72 hours.  Neuro:  Patient is awake alert and oriented x3.  Neuro exam is nonfocal.  Patient's pain is well controlled.  Cardiac:  Patient is tolerating metoprolol.  Patient has preop low ejection fraction.  Will add Entresto.  In light of severely depressed ejection fraction preoperatively,  patient will need LifeVest prior to discharge.  Will start patient on his preop Entresto and dapagliflozin  Respiratory:  Patient has good sats on room air.  Continue pulmonary toileting.  Continue incentive spirometer.  GI:  Patient is tolerating regular diet.  Patient has had bowel movements.    Renal:  Patient has good urine output.  Creatinine is 1.5.  Will continue diuresis.  Patient appears to still be fluid overloaded.    Heme:  Hematocrit is 27.  Status post 2 units packed red cells.  Platelet count this 62.  Continue apixaban 2.5 mg for anticoagulation.     Id: Patient's white count is down to 18.  Patient is on broad-spectrum antibiotics.  Cultures are pending.    Heme:  Patient's glucose is controlled with sliding scale.  Activities:  Patient is ambulating in the hallways.    Line tubes and drains:  Patient has a PICC line and pacer wires.     06/27/2023   The patient is postop day 5 status post coronary artery bypass grafting x3 and Woodruff Maze 4 procedure.  Overall the patient is doing well.  Anticipate discharge in the next 24 hours.    Neuro:  Patient is awake alert and oriented x3.  Neuro exam is nonfocal.  Pain is well controlled.    Cardiac:  Patient is tolerating metoprolol.  Will increase metoprolol.  Continue Entresto.  Patient will be fitted with a LifeVest prior to discharge.  Patient is currently on empagliflozin.  Patient will be on his home dapagliflozin on discharge.    Respiratory: Patient has good sats on room air.  Continue pulmonary toileting.  Continue incentive spirometer.    GI:  Patient is tolerating a regular diet.  And patient has had bowel movements.    Renal:  Patient has good urine output.  Creatinine is 1.2.  Will place patient on home diuretic doses.  Patient's appears relatively euvolemic.    Heme:  Hematocrit is 29.  And platelet count is 65.  Continue apixaban 2.5 mg for anticoagulation.  Patient has anemia and thrombocytopenia.  Will refer to heme Onc as outpatient.    Id:  Patient's white count is down to 12.  Patient is on broad-spectrum antibiotics.  Cultures are no growth to date.  Will discharge patient on p.o. Augmentin.    Endocrine: Glucose is controlled with a sliding scale.    Activities:  Patient is ambulating in the hallways.    Line tubes and drains:  Patient has a PICC line which will be discontinued prior to discharge.    6/28/23The patient is postop day 6 status post coronary artery bypass grafting x3 and Woodruff Maze 4 procedure.    Neuro:  Patient is awake alert and oriented x3.  Neuro exam is nonfocal.  Pain is well  controlled.    Cardiac:  Metoprolol.  Continue Entresto.  Patient will be fitted with a LifeVest prior to discharge.  Patient is currently on empagliflozin.  Patient will be on his home dapagliflozin on discharge.    Respiratory:  Aggressive pulmonary toilet  Continue incentive spirometer.    GI:  Cardiac diet    Renal:  Creatinine back to baseline   Will place patient on home diuretic doses.    Heme:  Hematocrit is 29.  And platelet count is 55 consult Heme-Onc for possible hit  Continue apixaban 5 mg 5 mg for anticoagulation.     d:  Patient's white count is down   Patient is on broad-spectrum antibiotics.  Cultures are no growth to date.  Will discharge patient on p.o. Augmentin.    Endocrine: Glucose is controlled with a sliding scale.    Activities:  Patient is ambulating in the hallways.    6/29/23  The patient is postop day 7 status post coronary artery bypass grafting x3 and Woodruff Maze 4 procedure.    Neuro:  Patient is awake alert and oriented x3.  Neuro exam is nonfocal.  Pain is well controlled.    Cardiac:  Metoprolol.  Continue Entresto.  Patient will be fitted with a LifeVest prior to discharge.  Patient is currently on empagliflozin.  Patient will be on his home dapagliflozin on discharge.    Respiratory:  Aggressive pulmonary toilet  Continue incentive spirometer.    GI:  Cardiac diet    Renal:  Creatinine back to baseline   Will place patient on home diuretic doses.    Heme:  Hematocrit stable.  And platelet count is 68 consult Heme-Onc for possible hit  Continue apixaban 5 mg 5 mg for anticoagulation.     d:  Patient's white count is down   Patient is on broad-spectrum antibiotics.  Cultures are no growth to date.  Will discharge patient on p.o. Augmentin.    Endocrine: Glucose is controlled with a sliding scale.    Activities:  Patient is ambulating in the hallways.    Line tubes and drains:  Patient has a PICC line which will be discontinued prior to discharge.    Waiting for up trend in the  platelet count before discharge  6/30/23  The patient is postop day 8 status post coronary artery bypass grafting x3 and Woodruff Maze 4 procedure.    Neuro:  Patient is awake alert and oriented x3.  Neuro exam is nonfocal.  Pain is well controlled.    Cardiac:  Metoprolol.  Continue Entresto.  Patient will be fitted with a LifeVest prior to discharge.  Patient is currently on empagliflozin.  Patient will be on his home dapagliflozin on discharge.    Respiratory:  Aggressive pulmonary toilet  Continue incentive spirometer.    GI:  Cardiac diet    Renal:  Creatinine back to baseline  Heme:  Hematocrit stable.  And platelet count is improving   Heme-Onc for possible hit  Continue apixaban 5 mg 5 mg for anticoagulation.     Id:  Patient's white count is down     Endocrine: Glucose is controlled with a sliding scale.    Activities:  Patient is ambulating in the hallways.    Line tubes and drains:  Patient has a PICC line which will be discontinued prior to discharge.    Waiting for up trend in the platelet count before discharge  Dc with home health once stable   7/1/23.  The patient is postop day 9 status post coronary artery bypass grafting x3 and Woodruff Maze 4 procedure.    Neuro:  Patient is awake alert and oriented x3.  Neuro exam is nonfocal.  Pain is well controlled.    Cardiac:  Metoprolol.  Continue Entresto.  Patient will be fitted with a LifeVest prior to discharge.  Patient is currently on empagliflozin.  Patient will be on his home dapagliflozin on discharge.    Respiratory:  Aggressive pulmonary toilet  Continue incentive spirometer.    GI:  Cardiac diet    Renal:  Creatinine back to baseline  Heme:  Hematocrit stable.  And platelet count is improving   Heme-Onc for possible hit  Continue apixaban 5 mg 5 mg for anticoagulation.    Thrombocytopenia: in the 60s fluctuating   Id:  Patient's white count is down     Endocrine: Glucose is controlled with a sliding scale.    Activities:  Patient is ambulating in the  hallways.    Line tubes and drains:  Patient has a PICC line which will be discontinued prior to discharge.   7/3/23  The patient is postop day 10 status post coronary artery bypass grafting x3 and Woodruff Maze 4 procedure.    Neuro:  Patient is awake alert and oriented x3.  Neuro exam is nonfocal.  Pain is well controlled.    Cardiac:  Metoprolol.  Continue Entresto.  Patient will be fitted with a LifeVest prior to discharge.  Patient is currently on empagliflozin.  Patient will be on his home dapagliflozin on discharge.    Respiratory:  Aggressive pulmonary toilet  Continue incentive spirometer.    GI:  Cardiac diet    Renal:  Creatinine back to baseline  Heme:  Hematocrit stable.  And platelet count is improving 74   Heme-Onc fHIT confirmed Continue apixaban 5 mg 5 mg for anticoagulation.    Thrombocytopenia: in the 74   Id:  Patient's white count is down     Endocrine: Glucose is controlled with a sliding scale.    Activities:  Patient is ambulating in the hallways.    Line tubes and drains:  Patient has a PICC line which will be discontinued prior to discharge.    Waiting for up trend in the platelet count before discharge  Dc with home health once stable         Review of patient's allergies indicates:   Allergen Reactions    Heparin analogues        Past Medical History:   Diagnosis Date    Abnormal nuclear stress test 11/26/2022    Cervical radiculopathy     to rt arm    CHF (congestive heart failure)     Chronic systolic congestive heart failure 11/28/2022    Dilated cardiomyopathy 11/26/2022    Hyperlipidemia     Hypertension     Obesity, unspecified     PAF (paroxysmal atrial fibrillation) 11/26/2022    Pulmonary HTN 11/28/2022    Stroke        Family History   Problem Relation Age of Onset    Hypertension Mother     Diabetes Father     Hyperlipidemia Father     Hypertension Father     Heart attack Father     Coronary artery disease Father        Social History     Socioeconomic History    Marital status:     Tobacco Use    Smoking status: Never    Smokeless tobacco: Never   Substance and Sexual Activity    Alcohol use: Yes    Drug use: Never    Sexual activity: Not Currently       Past Surgical History:   Procedure Laterality Date    CLOSURE OF LEFT ATRIAL APPENDAGE USING DEVICE Left 6/22/2023    Procedure: CLOSURE, LEFT ATRIAL APPENDAGE, USING DEVICE;  Surgeon: Rustam Dave MD;  Location: [facility] OR;  Service: Cardiovascular;  Laterality: Left;  LIGATION OF LEFT ATRIAL APPENDAGE WITH OTILIA EXCLUSION SYSTEM    CORONARY ARTERY BYPASS GRAFT (CABG) N/A 6/22/2023    Procedure: CORONARY ARTERY BYPASS GRAFT (CABG);  Surgeon: Rustam Dave MD;  Location: [facility] OR;  Service: Cardiovascular;  Laterality: N/A;  3-VESSEL WITH EPI-AORTIC ULTRASOUND    PURDY MAZE PROCEDURE N/A 6/22/2023    Procedure: PURDY MAZE PROCEDURE;  Surgeon: Rustam Dave MD;  Location: [facility] OR;  Service: Cardiovascular;  Laterality: N/A;    ECHOCARDIOGRAM,TRANSESOPHAGEAL N/A 6/22/2023    Procedure: ECHOCARDIOGRAM,TRANSESOPHAGEAL;  Surgeon: Rustam Dave MD;  Location: [facility] OR;  Service: Cardiovascular;  Laterality: N/A;    ENDOSCOPIC HARVEST OF VEIN Left 6/22/2023    Procedure: SURGICAL PROCUREMENT, VEIN, ENDOSCOPIC;  Surgeon: Rustam Dave MD;  Location: [facility] OR;  Service: Cardiovascular;  Laterality: Left;    INJECTION OF ANESTHETIC AGENT AROUND MULTIPLE INTERCOSTAL NERVES N/A 6/22/2023    Procedure: BLOCK, NERVE, INTERCOSTAL, 2 OR MORE;  Surgeon: Rustam Dave MD;  Location: [facility] OR;  Service: Cardiovascular;  Laterality: N/A;  PARASTERNAL NERVE BLOCK    INSTANTANEOUS WAVE-FREE RATIO  6/20/2023    Procedure: Instantaneous Wave-Free Ratio;  Surgeon: Zion Ortega MD;  Location: [facility] CATH LAB;  Service: Cardiology;;    IVUS, CORONARY  6/20/2023    Procedure: IVUS, Coronary;  Surgeon: Zion Ortega MD;  Location: [facility] CATH LAB;  Service: Cardiology;;    LEFT HEART CATHETERIZATION Left 11/28/2022    Procedure: CATHETERIZATION, HEART,  "LEFT;  Surgeon: Zion Ortega MD;  Location: [facility] CATH LAB;  Service: Cardiology;  Laterality: Left;    LEFT HEART CATHETERIZATION Left 6/20/2023    Procedure: Left heart cath;  Surgeon: Zion Ortega MD;  Location: [facility] CATH LAB;  Service: Cardiology;  Laterality: Left;    PERCUTANEOUS TRANSLUMINAL BALLOON ANGIOPLASTY OF CORONARY ARTERY  6/20/2023    Procedure: Angioplasty-coronary;  Surgeon: Zion Ortega MD;  Location: [facility] CATH LAB;  Service: Cardiology;;    RIGHT HEART CATHETERIZATION N/A 11/28/2022    Procedure: INSERTION, CATHETER, RIGHT HEART;  Surgeon: Zion Ortega MD;  Location: [facility] CATH LAB;  Service: Cardiology;  Laterality: N/A;  Congestive heart failure       Review of Systems   Constitutional:  Negative for activity change and appetite change.   HENT:  Negative for dental problem, nosebleeds and sore throat.    Eyes:  Negative for discharge and visual disturbance.   Respiratory:  Positive for chest tightness. Negative for cough and stridor.    Cardiovascular:  Negative for leg swelling.   Gastrointestinal:  Negative for abdominal distention and abdominal pain.   Genitourinary:  Negative for difficulty urinating and dysuria.   Musculoskeletal:  Negative for arthralgias, back pain and joint swelling.   Allergic/Immunologic: Negative for environmental allergies.   Neurological:  Negative for dizziness, syncope and headaches.   Hematological:  Does not bruise/bleed easily.   Psychiatric/Behavioral:  Negative for behavioral problems.         Objective:   /74 (BP Location: Left arm, Patient Position: Sitting)   Pulse 98   Temp 98.5 °F (36.9 °C) (Oral)   Resp 18   Ht 6' 1" (1.854 m)   Wt 113.5 kg (250 lb 3.6 oz)   SpO2 95%   BMI 33.01 kg/m²     X-Ray Chest AP Portable  Narrative: EXAMINATION:  XR CHEST AP PORTABLE    CLINICAL HISTORY:  Post-op; lines and tubes in place, subsequent encounter    COMPARISON:  June 23, 2023    FINDINGS:  EKG leads and other life-saving devices continue to " overlie the chest.  Stable right arm PICC line, left-sided chest tube and large-bore mediastinal drain.  Negative for pneumothorax.  Stable bilateral lower lobe atelectasis/consolidation without new pulmonary opacities.  Interval decrease in the amount of air within loops of bowel underneath the left hemidiaphragm.  The hilar and mediastinal contours and osseous structures are unchanged.  Impression: 1.  Overall, no significant interval change has occurred.    Electronically signed by: Selvin Rowe MD  Date:    06/24/2023  Time:    07:00         Physical Exam  Vitals and nursing note reviewed.   Constitutional:       Appearance: He is obese.   HENT:      Head: Normocephalic.      Mouth/Throat:      Mouth: Mucous membranes are moist.   Eyes:      Extraocular Movements: Extraocular movements intact.      Pupils: Pupils are equal, round, and reactive to light.   Cardiovascular:      Rate and Rhythm: Normal rate and regular rhythm.      Pulses: Normal pulses.      Heart sounds: Normal heart sounds.      Comments: Midline incision healing well  Leg incisions also healing well  Pulmonary:      Effort: Pulmonary effort is normal.      Breath sounds: Normal breath sounds.   Abdominal:      Palpations: Abdomen is soft.   Musculoskeletal:         General: Normal range of motion.      Cervical back: Normal range of motion and neck supple.   Skin:     General: Skin is warm.      Capillary Refill: Capillary refill takes less than 2 seconds.   Neurological:      General: No focal deficit present.      Mental Status: He is alert and oriented to person, place, and time.   Psychiatric:         Mood and Affect: Mood normal.       Assessment:     1. Coronary artery disease without angina pectoris    2. Abnormal stress test    3. CAD, multiple vessel    4. DCM (dilated cardiomyopathy)    5. SOB (shortness of breath)    6. PAF (paroxysmal atrial fibrillation)    7. History of CVA (cerebrovascular accident)    8. Pre-operative  cardiovascular examination    9. CAD (coronary artery disease)    10. Post-operative state    11. S/P CABG x 3    12. Anemia, unspecified type          Plan   The patient is postop day 11 status post coronary artery bypass grafting x3 and Woodruff Maze 4 procedure.    Neuro:  Patient is awake alert and oriented x3.  Neuro exam is nonfocal.  Pain is well controlled.    Cardiac:  Metoprolol.  Continue Entresto.  Patient will be fitted with a LifeVest prior to discharge.  Patient is currently on empagliflozin.  Patient will be on his home dapagliflozin on discharge.    Respiratory:  Aggressive pulmonary toilet  Continue incentive spirometer.    GI:  Cardiac diet    Renal:  Creatinine back to baseline  Heme:  Hematocrit stable.  And platelet count is improving  Heme-Onc fHIT confirmed Continue apixaban 5 mg 5 mg for anticoagulation.    Thrombocytopenia: in the 92   Id:  Patient's white count is down     Endocrine: Glucose is controlled with a sliding scale.    Activities:  Patient is ambulating in the hallways.    Line tubes and drains:  Patient has a PICC line which will be discontinued prior to discharge.    Waiting for up trend in the platelet count before discharge  Dc with home health once stable          Malissa Graham MD  [facility]  [place]

## 2023-07-03 NOTE — PT/OT/SLP PROGRESS
Physical Therapy  Treatment    Gustavo Tracey Jr.   MRN: [id number]   Admitting Diagnosis: Abnormal stress test    PT Received On: 07/03/23  PT Start Time: 0900     PT Stop Time: 0930    PT Total Time (min): 30 min       Billable Minutes:  Gait Training 15 and Therapeutic Activity 15    Treatment Type: Treatment  PT/PTA: PTA     Number of PTA visits since last PT visit: 2       General Precautions: Standard, fall, sternal  Orthopedic Precautions: N/A  Braces: N/A  Respiratory Status: Room air    Spiritual, Cultural Beliefs, Muslim Practices, Values that Affect Care: no    Subjective:  Communicated with patient's nurse, Suzanne, and completed Epic chart review prior to session.  Patient agreed to PT session.     Pain/Comfort  Pain Rating 1: 0/10    Objective:   Patient found with: telemetry, peripheral IV    Patient found sitting up in chair performing LE TE independently.  Reviewed sternal precautions and re enforced importance of compliance.     STS from chair No AD x2 trials: SBA     1,000 ft No AD (mild intermittent scissoring which caused LOB that patient was able to self correct; mild SOB noted but patient requested to continue)    Stand pivot T/F to chair No AD: SBA    Stand pivot T/F from chair < > EOB No AD: SBA    Sit < > supine: Modified Independent    Educated patient on importance of increased tolerance to upright position and direct impact on CV endurance and strength. Patient encouraged to sit up in chair/ EOB, for a minimum of 2 consecutive hours, 3x per day. Encouraged patient to perform AROM TE to BLE throughout the day within all available planes of motion. Also encouraged patient to request assistance from nursing staff with ambulating a minimum of 2x more today. Emphasized the importance of this for recovery of CV endurance. Re enforced importance of utilizing call light to meet needs in room and not attempt to get up without staff assistance. Patient verbalized understanding and agreed to  comply.       AM-PAC 6 CLICK MOBILITY  How much help from another person does this patient currently need?   1 = Unable, Total/Dependent Assistance  2 = A lot, Maximum/Moderate Assistance  3 = A little, Minimum/Contact Guard/Supervision  4 = None, Modified [place]/Independent    Turning over in bed (including adjusting bedclothes, sheets and blankets)?: 4  Sitting down on and standing up from a chair with arms (e.g., wheelchair, bedside commode, etc.): 4  Moving from lying on back to sitting on the side of the bed?: 4  Moving to and from a bed to a chair (including a wheelchair)?: 4  Need to walk in hospital room?: 3  Climbing 3-5 steps with a railing?: 1 (NT)  Basic Mobility Total Score: 20    AM-PAC Raw Score CMS G-Code Modifier Level of Impairment Assistance   6 % Total / Unable   7 - 9 CM 80 - 100% Maximal Assist   10 - 14 CL 60 - 80% Moderate Assist   15 - 19 CK 40 - 60% Moderate Assist   20 - 22 CJ 20 - 40% Minimal Assist   23 CI 1-20% SBA / CGA   24 CH 0% Independent/ Mod I     Patient left up in chair with call button in reach.    Assessment:  Gustavo Tracey Jr. is a 63 y.o. male with a medical diagnosis of Abnormal stress test and presents with a steadily improving level of functional mobility, strength and CV endurance. Patient would continue to benefit from skilled PT to address functional limitations listed below in order to return to PLOF/decrease caregiver burden. Patient was able to significantly increase gait distance during today's session prior to onset of fatigue. Patient has now let LTG for bed mobility and is progressing well towards transfer and gait goals.     Rehab identified problem list/impairments: gait instability, impaired balance, impaired cardiopulmonary response to activity, other (comment), decreased upper extremity function (STERNAL PRECAUTIONS)    Rehab potential is good.    Activity tolerance: Good    Discharge recommendations: home health PT      Barriers to  discharge:      Equipment recommendations: shower chair     GOALS:   Multidisciplinary Problems       Physical Therapy Goals          Problem: Physical Therapy    Goal Priority Disciplines Outcome Goal Variances Interventions   Physical Therapy Goal     PT, PT/OT Ongoing, Progressing     Description: LTG'S TO BE MET IN 14 DAYS (7-7-23)  PT WILL REQUIRE CGA FOR BED MOBILITY  PT WILL REQUIRE SPV FOR BED<>CHAIR TF'S  PT WILL  FEET NO AD SPV                         PLAN:    Patient to be seen 3 x/week to address the above listed problems via gait training, therapeutic activities, therapeutic exercises  Plan of Care expires: 07/07/23  Plan of Care reviewed with: patient         07/03/2023

## 2023-07-03 NOTE — PLAN OF CARE
Problem: Adult Inpatient Plan of Care  Goal: Plan of Care Review  Outcome: Ongoing, Progressing     Problem: Skin Injury Risk Increased  Goal: Skin Health and Integrity  Outcome: Ongoing, Progressing  Intervention: Optimize Skin Protection  Flowsheets (Taken 7/3/2023 0309)  Pressure Reduction Techniques: frequent weight shift encouraged  Skin Protection: adhesive use limited  Head of Bed (HOB) Positioning: HOB elevated     Problem: Fall Injury Risk  Goal: Absence of Fall and Fall-Related Injury  Intervention: Promote Injury-Free Environment  Flowsheets (Taken 7/3/2023 0309)  Safety Promotion/Fall Prevention:   assistive device/personal item within reach   Fall Risk reviewed with patient/family   side rails raised x 2

## 2023-07-03 NOTE — PT/OT/SLP PROGRESS
Physical Therapy      Patient Name:  Gustavo Tracey Jr.   MRN:  [id number]    08:20 a.m.  Patient unavailable due to receiving medications from nurse at this time.   Will follow up at next available opportunity.

## 2023-07-03 NOTE — PT/OT/SLP PROGRESS
"Occupational Therapy   Treatment    Name: Gustavo Tracey Jr.  MRN: [id number]  Admitting Diagnosis:  Abnormal stress test  11 Days Post-Op    Recommendations:     Discharge Recommendations: home health OT  Discharge Equipment Recommendations:  shower chair  Barriers to discharge:  None    Assessment:     Gustavo Tracey Jr. is a 63 y.o. male with a medical diagnosis of Abnormal stress test.  He presents with the following performance deficits affecting function are weakness, impaired endurance, impaired self care skills, impaired functional mobility, impaired balance, impaired cardiopulmonary response to activity (sternal precautions).     Rehab Prognosis:  Good; patient would benefit from acute skilled OT services to address these deficits and reach maximum level of function.       Plan:     Patient to be seen 2 x/week to address the above listed problems via self-care/home management, therapeutic exercises, therapeutic activities  Plan of Care Expires: 07/07/23  Plan of Care Reviewed with: patient    Subjective     Chief Complaint: Reported "I am doing better!"  Patient/Family Comments/goals: increase independence  Pain/Comfort:  Pain Rating 1: 0/10    Objective:     Communicated with: NurseSuzanne, prior to session.  Patient found up in chair with peripheral IV, telemetry upon OT entry to room.    General Precautions: Standard, fall, sternal    Orthopedic Precautions:N/A  Braces: N/A  Respiratory Status: Room air     Occupational Performance:     Bed Mobility:    Patient completed Supine to Sit with modified independence  Patient completed Sit to Supine with modified independence   Intermittent cueing for sternal precautions    Functional Mobility/Transfers:  Patient completed Sit <> Stand Transfer with stand by assistance  with  no assistive device   Patient completed Bed <> Chair Transfer using Step Transfer technique with stand by assistance with no assistive device  Functional Mobility: Patient completed " [id number] functional mobility without AD and CGA to increase dynamic standing balance and activity tolerance needed for ADL completion.  Continues to have intermittent scissoring of B feet, but noted to self correct this date.   Excellent pacing with little to no dyspnea    Activities of Daily Living:  Feeding:  independence from bedside chair/tray    [facility] 6 Click ADL: 21    Treatment & Education:  Patient tolerated intervention well. Remains highly motivated and continues to progress towards functional goals. Intermittent need for sternal precaution cueing, specifically with sup>sit and sit>stand transfers. Patient encouraged to continue B UE AROM therex, within sternal precautions. Demonstrates good technique and pacing. Patient in agreement to call for A to transfer back to bed.    Patient left up in chair with all lines intact, call button in reach, and nurse notified    GOALS:   Multidisciplinary Problems       Occupational Therapy Goals          Problem: Occupational Therapy    Goal Priority Disciplines Outcome Interventions   Occupational Therapy Goal     OT, PT/OT Ongoing, Progressing    Description: Goals to be met by: 7/7/23     Patient will increase functional independence with ADLs by performing:    Toileting from toilet with Minimal Assistance for hygiene and clothing management.   Toilet transfer to toilet with Contact Guard Assistance.  Demonstrates 100% functional compliance with sternal precautions.                         Time Tracking:     OT Date of Treatment: 07/03/23  OT Start Time: 0900  OT Stop Time: 0925  OT Total Time (min): 25 min    Billable Minutes:Therapeutic Activity 25    Kierra Aiken OT    7/3/2023

## 2023-07-03 NOTE — PLAN OF CARE
Patient tolerated intervention very well. Remains highly motivated and progressing towards functional goals. Recommending HHOT at d/c.

## 2023-07-03 NOTE — PROGRESS NOTES
[facility] - Telemetry ([facility])  Adult Nutrition  Progress Note    SUMMARY       Recommendations    Recommendation/Intervention:   1. Recommend modify pt diet to Consistent carbohydrate, Cardiac diet   2. Recommend Evan BID x 2 weeks for wound healing   3. Recommend weekly weights    Goals:   1. Pt diet will be modified by RD follow up   2. Pt will continue to tolerate and consume 100% est Needs by RD follow up   3. Pt will consume Evan BID prior to RD follow up   Nutrition Goal Status: Continues  Communication of RD Recs: other (comment); (POC, sticky note)    Assessment and Plan    Nutrition Problem  Inadequate protein/energy intake (Resolved)  Increased nutrient needs      Related to (etiology):   Decreased ability to consume sufficient protein/energy   Increased demand for nutrition      Signs and Symptoms (as evidenced by):   Decreased appetite: < 50% PO intake of meals  Diabetic with wound healing needs      Interventions(treatment strategy):  1. Consistent carbohydrate, Sodium and Fat modified diet   2. Commercial beverage   3. Wound healing medical food supplement therapy  4. 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 Male admitted for Abnormal stress test. Pt is postop day 1 S/p CABG x3 and [place] Maze 4 procedure, noted overall pt doing well. Visited pt at bedside, pt working with RN's. Spoke to pt wife outside pt room. Pt wife stated that pt had a good appetite PTA, confirmed 100% PO intake of meals, reported that pt has not consumed meal since admit, noted pt in a lot of pain. Pt wife stated she belives pt weight  "is between 220-250 lbs last weighed on home scale. Provided pt wife with "Cardiac TLC Nutrition Therapy" and "Low-Sodium Nutrition Therapy" (nutritioncaremanual.org) handouts. Discussed the importance of limiting sodium to less than 2,000 mg per day. Recommended salt free seasonings and other herbs and spices in meals to enhance flavor without additional sodium. Recommended a well balanced diet with a variety of fresh foods, fruits and vegetables (5 cups/day), whole grains (3 oz/day), and fat-free or low fat dairy. Discussed reading food packages, food labels, and nutrition facts labels to identify nutrient content of foods. Discussed the importance of fiber (especially soluble fiber), dietary sources, and a goal intake of >20-30g/day. Pt wife expressed appreciation and understanding of nutritional education. All questions/concerns answered at this time. Encouraged pt wife to use RD contact information provided on handouts with any further questions/concerns, informed pt wife will discuss nutrition education with pt at follow up. Pt appeared well nourished, no NFPE warranted at this time. Reviewed chart: Currently no PO intake charted: LBM 6/19 (x 4 days no BM); Skin: dry, incisions chest/ L leg WDL; Boris score: 18 (mild risk); Edema: None. Labs, meds, weight reviewed. Weight charted 5/16 240 lbs, 6/23 250 lbs (BMI 33.01, Obese), +10 lb wt gain x 1+ month. RD will continue to monitor.  Nutrition Discharge Planning: Cardiac diet    Follow up:  6/28: Pt noted to discharge, per Cardiothoracic Physician note 6/28 "Waiting for up trend in the platelet count before discharge". Spoke to RN via secure chat to confirm pt's appetite and N/V/D status, RN stated "He does not have a good appetite but he is not experiencing any N/V/D", "Im not sure how good his appetite was on yesterday but he's not really feeling well on today ", confirmed PO intake of meals < 50%, informed RN will recommend Boost. Visited pt at bedside, pt " "sleeping. Reviewed chart: LBM: 6/28; Skin remains unchanged; Boris score increased to 20 (no risk); No edema continues. Labs, meds, weight reviewed. Labs 6/28: Platelets (L) 54. Note Lasix, PRN ondansetron. Weight charted 6/23 250 lbs (BMI 33.01, Obese), no updated weights, update for accuracy. RD will continue to monitor.     7/3: Pt is postop day 11. Per physician note 7/3: "Waiting for up trend in the platelet count before discharge". Pt prescribed a cardiac diet. Spoke with nurse via secure chat. Nurse stated "Id say 100% majority of meals" regarding PO intake Skin: dry. Boris Score improved to 23 (no risk). LBM: 7/3. No consistency noted. Labs, Meds, Weight reviewed. Labs 7/3: Sodium (L) BUN (H), eGFR 57, Direct Bilirubin (H). Weight charted 6/23: 250 lbs. BMI: 33.01 (Obese). No updated weights, updated for accuracy warranted. RD will continue to monitor.     Nutrition Risk Screen    Nutrition Risk Screen: no indicators present    Nutrition/Diet History    Spiritual, Cultural Beliefs, Anglican Practices, Values that Affect Care: no  Food Allergies: NKFA  Factors Affecting Nutritional Intake: pain    Anthropometrics    Temp: 98.5 °F (36.9 °C)  Height Method: Stated  Height: 6' 1" (185.4 cm)  Height (inches): 73 in  Weight Method: Bed Scale  Weight: 113.5 kg (250 lb 3.6 oz)  Weight (lb): 250.22 lb  Ideal Body Weight (IBW), Male: 184 lb  % Ideal Body Weight, Male (lb): 135.99 %  BMI (Calculated): 33  BMI Grade: 30 - 34.9- obesity - grade I     Wt Readings from Last 15 Encounters:   06/23/23 113.5 kg (250 lb 3.6 oz)   05/16/23 108.9 kg (240 lb)   04/05/23 107 kg (236 lb)   01/20/23 101.6 kg (224 lb)   11/30/22 86.2 kg (190 lb 0.6 oz)   11/21/22 97.5 kg (215 lb)   11/14/22 97.1 kg (214 lb)     Lab/Procedures/Meds    Pertinent Labs Reviewed: reviewed  Pertinent Labs Comments: Calcium (L), Glu (H), BUN (H), Cr (H), eGFR 45  Pertinent Medications Reviewed: reviewed  Pertinent Medications Comments: pravastatin, " potassium chloride, polyethylene glycol, pantoprazole, Lopressor, magnesium hydroxide, insulin Levemir, furosemide, folic acid, docusate, asprin, vitamin C, amitriptyline, acetaminophen  BMP  Lab Results   Component Value Date     (L) 07/03/2023    K 4.1 07/03/2023    CL 99 07/03/2023    CO2 22 (L) 07/03/2023    BUN 26 (H) 07/03/2023    CREATININE 1.4 07/03/2023    CALCIUM 9.7 07/03/2023    ANIONGAP 14 07/03/2023    EGFRNORACEVR 57 (A) 07/03/2023     Lab Results   Component Value Date    ALT 13 07/03/2023    AST 19 07/03/2023    ALKPHOS 55 07/03/2023    BILITOT 0.8 07/03/2023     Recent Labs   Lab 07/03/23  0544   POCTGLUCOSE 101     Scheduled Meds:   amitriptyline  50 mg Oral QHS    apixaban  5 mg Oral BID    ascorbic acid (vitamin C)  500 mg Oral BID    cyanocobalamin  1,000 mcg Oral Daily    docusate sodium  100 mg Oral BID    empagliflozin  10 mg Oral Daily    ferrous sulfate  1 tablet Oral Daily    folic acid  2 mg Oral Daily    furosemide (LASIX) injection  40 mg Intravenous BID    latanoprost  1 drop Both Eyes Nightly    magnesium hydroxide 400 mg/5 ml  5 mL Oral BID    metoprolol tartrate  50 mg Oral BID    pantoprazole  40 mg Oral Daily    polyethylene glycol  17 g Oral Daily    potassium chloride  20 mEq Oral Q12H    pravastatin  20 mg Oral Daily     Continuous Infusions:  PRN Meds:.sodium chloride, sodium chloride 0.9%, albumin human 5%, calcium gluconate IVPB, calcium gluconate IVPB, calcium gluconate IVPB, dextrose 10%, dextrose 10%, glucagon (human recombinant), glucose, glucose, HYDROmorphone, insulin aspart U-100, lactated ringers, loperamide, magnesium sulfate IVPB, metoclopramide HCl, ondansetron, pneumoc 20-mary conj-dip cr(PF), potassium chloride in water, potassium chloride in water, potassium chloride in water, sodium chloride 0.9%    Physical Findings/Assessment         Estimated/Assessed Needs    Weight Used For Calorie Calculations: 113.5 kg (250 lb 3.6 oz)  Energy Calorie Requirements  (kcal): 1989 kcals (MSJ (CABG, Obese BMI 33.01)  Energy Need Method: [place]  Protein Requirements: 54-73 g (0.6-0.8 g/kg Adj BW (CKD 3, no dialysis, Diabetic, Obese 135.99% IBW)  Weight Used For Protein Calculations: 91.1 kg (200 lb 13.4 oz) (Adj BW)  Fluid Requirements (mL): 1989 mL (1 mL/kcal)  Estimated Fluid Requirement Method: RDA Method  RDA Method (mL): 1989  CHO Requirement: 249 (1989 kcals/8)      Nutrition Prescription Ordered    Current Diet Order: Cardiac diet    Evaluation of Received Nutrient/Fluid Intake  I/O: (Net since admit)  6/23: +3068.4 mL  6/28: +2754.8 mL  7/3: -1148.7 mL    Energy Calories Required: meeting needs  Protein Required: meeting needs  Fluid Required: not meeting needs  Tolerance: tolerating  % Intake of Estimated Energy Needs: 100%  % Meal Intake: 100%    Nutrition Risk    Level of Risk/Frequency of Follow-up: low (F/u x 1 weekly)     Monitor and Evaluation    Food and Nutrient Intake: energy intake, food and beverage intake  Food and Nutrient Adminstration: diet order  Knowledge/Beliefs/Attitudes: food and nutrition knowledge/skill, beliefs and attitudes  Anthropometric Measurements: weight, weight change, body mass index  Biochemical Data, Medical Tests and Procedures: glucose/endocrine profile, electrolyte and renal panel, inflammatory profile     Nutrition Follow-Up    RD Follow-up?: Yes  Tonya Barrera, Dietetic Intern

## 2023-07-03 NOTE — PLAN OF CARE
Nutrition Recommendations  1. Recommend modify pt diet to Consistent carbohydrate, Cardiac diet   2. Recommend Evan BID x 2 weeks for wound healing   3. Recommend weekly weights    Goals:   1. Pt diet will be modified by RD follow up   2. Pt will continue to tolerate and consume 100% est Needs by RD follow up   3. Pt will consume Evan BID prior to RD follow up   Nutrition Goal Status: Continues  Communication of RD Recs: other (comment); (POC, sticky note)  Tonya Barrera, Dietetic Intern

## 2023-07-04 LAB
ALBUMIN SERPL BCP-MCNC: 3.5 G/DL (ref 3.5–5.2)
ALP SERPL-CCNC: 61 U/L (ref 55–135)
ALT SERPL W/O P-5'-P-CCNC: 12 U/L (ref 10–44)
ANION GAP SERPL CALC-SCNC: 12 MMOL/L (ref 8–16)
ANION GAP SERPL CALC-SCNC: 13 MMOL/L (ref 8–16)
AST SERPL-CCNC: 17 U/L (ref 10–40)
BASOPHILS # BLD AUTO: 0 K/UL (ref 0–0.2)
BASOPHILS NFR BLD: 0 % (ref 0–1.9)
BILIRUB DIRECT SERPL-MCNC: 0.4 MG/DL (ref 0.1–0.3)
BILIRUB SERPL-MCNC: 0.8 MG/DL (ref 0.1–1)
BUN SERPL-MCNC: 27 MG/DL (ref 8–23)
BUN SERPL-MCNC: 29 MG/DL (ref 8–23)
CALCIUM SERPL-MCNC: 9.9 MG/DL (ref 8.7–10.5)
CALCIUM SERPL-MCNC: 9.9 MG/DL (ref 8.7–10.5)
CHLORIDE SERPL-SCNC: 99 MMOL/L (ref 95–110)
CHLORIDE SERPL-SCNC: 99 MMOL/L (ref 95–110)
CO2 SERPL-SCNC: 23 MMOL/L (ref 23–29)
CO2 SERPL-SCNC: 24 MMOL/L (ref 23–29)
CREAT SERPL-MCNC: 1.5 MG/DL (ref 0.5–1.4)
CREAT SERPL-MCNC: 1.7 MG/DL (ref 0.5–1.4)
DIFFERENTIAL METHOD: ABNORMAL
EOSINOPHIL # BLD AUTO: 0 K/UL (ref 0–0.5)
EOSINOPHIL NFR BLD: 0 % (ref 0–8)
ERYTHROCYTE [DISTWIDTH] IN BLOOD BY AUTOMATED COUNT: 15.4 % (ref 11.5–14.5)
EST. GFR  (NO RACE VARIABLE): 45 ML/MIN/1.73 M^2
EST. GFR  (NO RACE VARIABLE): 52 ML/MIN/1.73 M^2
GLUCOSE SERPL-MCNC: 107 MG/DL (ref 70–110)
GLUCOSE SERPL-MCNC: 113 MG/DL (ref 70–110)
HCT VFR BLD AUTO: 28.3 % (ref 40–54)
HGB BLD-MCNC: 9.3 G/DL (ref 14–18)
IMM GRANULOCYTES # BLD AUTO: 0.03 K/UL (ref 0–0.04)
IMM GRANULOCYTES NFR BLD AUTO: 0.8 % (ref 0–0.5)
LYMPHOCYTES # BLD AUTO: 1.2 K/UL (ref 1–4.8)
LYMPHOCYTES NFR BLD: 31.6 % (ref 18–48)
MAGNESIUM SERPL-MCNC: 2.1 MG/DL (ref 1.6–2.6)
MAGNESIUM SERPL-MCNC: 2.2 MG/DL (ref 1.6–2.6)
MCH RBC QN AUTO: 28 PG (ref 27–31)
MCHC RBC AUTO-ENTMCNC: 32.9 G/DL (ref 32–36)
MCV RBC AUTO: 85 FL (ref 82–98)
MONOCYTES # BLD AUTO: 0.4 K/UL (ref 0.3–1)
MONOCYTES NFR BLD: 10.6 % (ref 4–15)
NEUTROPHILS # BLD AUTO: 2.1 K/UL (ref 1.8–7.7)
NEUTROPHILS NFR BLD: 57 % (ref 38–73)
NRBC BLD-RTO: 0 /100 WBC
PLATELET # BLD AUTO: 88 K/UL (ref 150–450)
PMV BLD AUTO: 9.6 FL (ref 9.2–12.9)
POCT GLUCOSE: 105 MG/DL (ref 70–110)
POCT GLUCOSE: 105 MG/DL (ref 70–110)
POCT GLUCOSE: 113 MG/DL (ref 70–110)
POCT GLUCOSE: 134 MG/DL (ref 70–110)
POTASSIUM SERPL-SCNC: 4.4 MMOL/L (ref 3.5–5.1)
POTASSIUM SERPL-SCNC: 4.5 MMOL/L (ref 3.5–5.1)
PROT SERPL-MCNC: 8.3 G/DL (ref 6–8.4)
RBC # BLD AUTO: 3.32 M/UL (ref 4.6–6.2)
SODIUM SERPL-SCNC: 135 MMOL/L (ref 136–145)
SODIUM SERPL-SCNC: 135 MMOL/L (ref 136–145)
WBC # BLD AUTO: 3.67 K/UL (ref 3.9–12.7)

## 2023-07-04 PROCEDURE — 83735 ASSAY OF MAGNESIUM: CPT | Mod: 91 | Performed by: THORACIC SURGERY (CARDIOTHORACIC VASCULAR SURGERY)

## 2023-07-04 PROCEDURE — 80076 HEPATIC FUNCTION PANEL: CPT | Performed by: THORACIC SURGERY (CARDIOTHORACIC VASCULAR SURGERY)

## 2023-07-04 PROCEDURE — 97530 THERAPEUTIC ACTIVITIES: CPT | Mod: CQ

## 2023-07-04 PROCEDURE — 97116 GAIT TRAINING THERAPY: CPT | Mod: CQ

## 2023-07-04 PROCEDURE — 25000242 PHARM REV CODE 250 ALT 637 W/ HCPCS: Performed by: THORACIC SURGERY (CARDIOTHORACIC VASCULAR SURGERY)

## 2023-07-04 PROCEDURE — 80048 BASIC METABOLIC PNL TOTAL CA: CPT | Mod: 91 | Performed by: THORACIC SURGERY (CARDIOTHORACIC VASCULAR SURGERY)

## 2023-07-04 PROCEDURE — 36415 COLL VENOUS BLD VENIPUNCTURE: CPT | Performed by: THORACIC SURGERY (CARDIOTHORACIC VASCULAR SURGERY)

## 2023-07-04 PROCEDURE — 25000003 PHARM REV CODE 250: Performed by: THORACIC SURGERY (CARDIOTHORACIC VASCULAR SURGERY)

## 2023-07-04 PROCEDURE — 21400001 HC TELEMETRY ROOM

## 2023-07-04 PROCEDURE — 25000003 PHARM REV CODE 250: Performed by: INTERNAL MEDICINE

## 2023-07-04 PROCEDURE — 63600175 PHARM REV CODE 636 W HCPCS: Performed by: THORACIC SURGERY (CARDIOTHORACIC VASCULAR SURGERY)

## 2023-07-04 PROCEDURE — 85025 COMPLETE CBC W/AUTO DIFF WBC: CPT | Performed by: THORACIC SURGERY (CARDIOTHORACIC VASCULAR SURGERY)

## 2023-07-04 RX ADMIN — METOPROLOL TARTRATE 50 MG: 50 TABLET, FILM COATED ORAL at 08:07

## 2023-07-04 RX ADMIN — FUROSEMIDE 40 MG: 10 INJECTION, SOLUTION INTRAMUSCULAR; INTRAVENOUS at 08:07

## 2023-07-04 RX ADMIN — AMITRIPTYLINE HYDROCHLORIDE 50 MG: 50 TABLET, FILM COATED ORAL at 08:07

## 2023-07-04 RX ADMIN — EMPAGLIFLOZIN 10 MG: 10 TABLET, FILM COATED ORAL at 04:07

## 2023-07-04 RX ADMIN — FERROUS SULFATE TAB 325 MG (65 MG ELEMENTAL FE) 1 EACH: 325 (65 FE) TAB at 08:07

## 2023-07-04 RX ADMIN — PANTOPRAZOLE SODIUM 40 MG: 40 TABLET, DELAYED RELEASE ORAL at 08:07

## 2023-07-04 RX ADMIN — FOLIC ACID 2 MG: 1 TABLET ORAL at 08:07

## 2023-07-04 RX ADMIN — APIXABAN 5 MG: 2.5 TABLET, FILM COATED ORAL at 08:07

## 2023-07-04 RX ADMIN — OXYCODONE HYDROCHLORIDE AND ACETAMINOPHEN 500 MG: 500 TABLET ORAL at 09:07

## 2023-07-04 RX ADMIN — CYANOCOBALAMIN TAB 1000 MCG 1000 MCG: 1000 TAB at 08:07

## 2023-07-04 RX ADMIN — POTASSIUM CHLORIDE 20 MEQ: 1500 TABLET, EXTENDED RELEASE ORAL at 08:07

## 2023-07-04 RX ADMIN — OXYCODONE HYDROCHLORIDE AND ACETAMINOPHEN 500 MG: 500 TABLET ORAL at 08:07

## 2023-07-04 RX ADMIN — PRAVASTATIN SODIUM 20 MG: 20 TABLET ORAL at 08:07

## 2023-07-04 RX ADMIN — LATANOPROST 1 DROP: 50 SOLUTION OPHTHALMIC at 08:07

## 2023-07-04 NOTE — PT/OT/SLP PROGRESS
Physical Therapy  Treatment    Gustavo Tracey Jr.   MRN: [id number]   Admitting Diagnosis: Abnormal stress test    PT Received On: 07/04/23  PT Start Time: 0825     PT Stop Time: 0850    PT Total Time (min): 25 min       Billable Minutes:  Gait Training 15 and Therapeutic Activity 10    Treatment Type: Treatment  PT/PTA: PTA     Number of PTA visits since last PT visit: 3       General Precautions: Standard, fall, sternal  Orthopedic Precautions: N/A  Braces: N/A  Respiratory Status: Room air    Spiritual, Cultural Beliefs, Buddhist Practices, Values that Affect Care: no    Subjective:  Communicated with patient's nurse, Michelle WOLF, and completed Epic chart review prior to session.  Patient without complaints and agreed to PT session.     Pain/Comfort  Pain Rating 1: 0/10  Pain Rating Post-Intervention 1: 0/10    Objective:   Patient found with: peripheral IV, telemetry    Patient found sitting up in chair.   Reviewed sternal precautions and re enforced importance of compliance.     STS from chair no AD: SBA (1st attempt unsuccessful)    1,000 ft No AD SBA (slow overall pace; x1 LOB but able to self recover)    Stand pivot T/F to chair No AD: SBA    Completed x15 reps AROM TE to BLE: LAQ, Hip Flex, AP   Intermittent cues given as needed to maintain correct form during repetitions    Educated patient on importance of increased tolerance to upright position and direct impact on CV endurance and strength. Patient encouraged to sit up in chair/ EOB, for a minimum of 2 consecutive hours, 3x per day. Encouraged patient to perform AROM TE to BLE throughout the day within all available planes of motion. Also encouraged patient to request assistance from nursing staff to ambulate 2x more today in order to promote CV endurance. Re enforced importance of utilizing call light to meet needs in room and not attempt to get up without staff assistance. Patient verbalized understanding and agreed to comply.      AM-PAC 6 CLICK  MOBILITY  How much help from another person does this patient currently need?   1 = Unable, Total/Dependent Assistance  2 = A lot, Maximum/Moderate Assistance  3 = A little, Minimum/Contact Guard/Supervision  4 = None, Modified [place]/Independent    Turning over in bed (including adjusting bedclothes, sheets and blankets)?: 1 (NT)  Sitting down on and standing up from a chair with arms (e.g., wheelchair, bedside commode, etc.): 4  Moving from lying on back to sitting on the side of the bed?: 1 (NT)  Moving to and from a bed to a chair (including a wheelchair)?: 1 (NT)  Need to walk in hospital room?: 4  Climbing 3-5 steps with a railing?: 1 (NT)  Basic Mobility Total Score: 12    AM-PAC Raw Score CMS G-Code Modifier Level of Impairment Assistance   6 % Total / Unable   7 - 9 CM 80 - 100% Maximal Assist   10 - 14 CL 60 - 80% Moderate Assist   15 - 19 CK 40 - 60% Moderate Assist   20 - 22 CJ 20 - 40% Minimal Assist   23 CI 1-20% SBA / CGA   24 CH 0% Independent/ Mod I     Patient left up in chair with call button in reach.    Assessment:  Gustavo Tracey Jr. is a 63 y.o. male with a medical diagnosis of Abnormal stress test and presents with a steadily improving level of functional mobility and CV endurance. Patient would continue to benefit from skilled PT to address functional limitations listed below in order to return to PLOF/ decrease caregiver burden. Continuing to progress towards goals established within PT POC. He is highly motivated to recover and return to PLOF.     Rehab identified problem list/impairments: weakness, impaired endurance, impaired self care skills, gait instability, impaired balance, decreased upper extremity function, other (comment) (STERNAL PRECAUTIONS)    Rehab potential is good.    Activity tolerance: Good    Discharge recommendations: home health PT      Barriers to discharge:      Equipment recommendations: shower chair     GOALS:   Multidisciplinary Problems        Physical Therapy Goals          Problem: Physical Therapy    Goal Priority Disciplines Outcome Goal Variances Interventions   Physical Therapy Goal     PT, PT/OT Ongoing, Progressing     Description: LTG'S TO BE MET IN 14 DAYS (7-7-23)  PT WILL REQUIRE CGA FOR BED MOBILITY  PT WILL REQUIRE SPV FOR BED<>CHAIR TF'S  PT WILL  FEET NO AD SPV                         PLAN:    Patient to be seen 3 x/week to address the above listed problems via gait training, therapeutic activities, therapeutic exercises  Plan of Care expires: 07/07/23  Plan of Care reviewed with: patient         07/04/2023

## 2023-07-04 NOTE — PROGRESS NOTES
Subjective:      Patient ID: Gustavo Tracey Jr. is a 63 y.o. male.    Chief Complaint: No chief complaint on file.    HPI: Left main coronary artery disease  The patient is a 63-year-old male with CHF, history of AFib, status post CVA, hyperlipidemia, hypertension, diabetes and cardiomyopathy who had a normal Cardiolite test on workup.  The patient was brought to the operating room and cardiac catheterization shows significant multivessel coronary artery disease with a 70% ostial left main disease and a 70% proximal LAD disease.     The patient is a candidate for coronary artery bypass grafting and Purdy Maze 4 procedure.  Preop workup is in progress.  The risks and benefits of the surgery were explained to the patient.  The patient understands the risks and benefits of surgery and has to proceed with surgery which has been scheduled for 06/22/2023.  Post-Op Info:  Procedure(s) (LRB):  CORONARY ARTERY BYPASS GRAFT (CABG) (N/A)  PURDY MAZE PROCEDURE (N/A)  SURGICAL PROCUREMENT, VEIN, ENDOSCOPIC (Left)  ECHOCARDIOGRAM,TRANSESOPHAGEAL (N/A)  BLOCK, NERVE, INTERCOSTAL, 2 OR MORE (N/A)  CLOSURE, LEFT ATRIAL APPENDAGE, USING DEVICE (Left)   S/P CABG x 3  06/23/2023   The patient is postop day 1 status post coronary artery bypass grafting x3 and Purdy Maze 4 procedure.  Overall the patient is doing well.    Neuro:  Patient is awake alert and oriented x3.  Neuro exam is nonfocal.  Patient moves all 4.  Patient's pain is being controlled with current pain regimen.    Cardiac:  Patient has been hemodynamically stable cardiac index has been about 2.2.  Patient is on low-dose epi and vasopressin.  Wean vasopressin and epi to off.    Respiratory:  Patient was extubated yesterday.  Patient has good sats on nasal cannula.  Continue pulmonary toileting.  Continue incentive spirometer.  GI:  Will advance patient's to regular diet as tolerated.    Renal: Patient has good urine output.  Creatinine is 1.7.  Will start diuresis.  Endocrine:   Patient's glucose is controlled with an insulin drip.  Will convert to sliding scale and long-acting insulin.  Heme:  Hematocrit is 28.2 and platelet count is 116.  Will start patient on a NOAC for anticoagulation once patient has chest tubes have been discontinued.  Patient requires anticoagulation since he is status post Woodruff Maze 4 for atrial fibrillation.  Id:  White count is 4.  Will continue to follow.  Patient is on venu op antibiotics.    Activities:  Patient is out of bed to chair.  Will advance activities as tolerated.    Line tubes and drains:  Patient has a right IJ [place] and [place], chest tubes, pacer wires, A-line, Rankin catheter and saphenectomy site ANDERS drains.     06/24/2023   The patient is postop day 2 status post coronary artery bypass grafting x3 and Woodruff Maze 4 procedure.  Overall the patient is doing well.    Neuro:  Patient is awake alert and oriented x3.  Neuro exam is nonfocal.  Patient moves all 4.  Pain is controlled current pain med main.  Cardiac:  Patient is hemodynamically stable.  Patient is off all pressors.  Continue metoprolol.    Respiratory:  Patient has good sats continue pulmonary toileting.  Continue incentive spirometer.    GI:  Patient is tolerating p.o. intake.  Continue advance as tolerated.    Renal:  Patient has good urine output.  Creatinine is 1.7.  Continue diuresis.  Endocrine:  Glucose is controlled with sliding scale.  Heme:  Hematocrit is 23.7 and platelet count is 62.  Will start patient on apixaban.  While patient is platelet count is low will start patient on 2.5 mg p.o. b.i.d. of apixaban.  Will increase dose once platelet count recovers.  Id:  White count is 8.92.  Will follow continue to follow white count.  Activities patient is out of bed to chair continue to advance as tolerated.    Line tubes and drains:  Patient has pacer wires, PICC line and Rankin catheter.  Chest tubes have been discontinued.  Will discontinue Rankin catheter.     06/25/2023   The patient  is postop day 3 status post coronary artery bypass grafting x3 and Woodruff Maze 4 procedure.    Neuro:  Patient is awake alert and oriented x3.  Neuro exam is nonfocal.  Pain is well controlled.    Cardiac:  Patient is hemodynamically stable.  Continue metoprolol.  Respiratory: Patient has good sats on room air continue pulmonary toileting.  GI:  Patient is tolerating a regular diet.  Patient has not had a bowel movement.  Renal: Patient has good urine output.  Creatinine is 1.6.  Heme:  Hematocrit is 21.5 and platelet count is 61.  Will transfuse 2 units of plaque probe blood cells.  Continue apixaban 2.5 mg for anticoagulation.  Id:  White count is increased to 19.  Will panculture patient.  Will start on empiric broad-spectrum antibiotics.  Activities:  Patient is ambulating in the hallways.  Advance as tolerated.  Line tubes and drains:  Patient has a PICC line, and pacer wires.     06/26/2023   The patient is postop day 4 status post coronary artery bypass grafting x3 and Woodruff Maze 4 procedure.  Overall the patient is doing well.  Anticipate discharge in the next 48-72 hours.  Neuro:  Patient is awake alert and oriented x3.  Neuro exam is nonfocal.  Patient's pain is well controlled.  Cardiac:  Patient is tolerating metoprolol.  Patient has preop low ejection fraction.  Will add Entresto.  In light of severely depressed ejection fraction preoperatively,  patient will need LifeVest prior to discharge.  Will start patient on his preop Entresto and dapagliflozin  Respiratory:  Patient has good sats on room air.  Continue pulmonary toileting.  Continue incentive spirometer.  GI:  Patient is tolerating regular diet.  Patient has had bowel movements.    Renal:  Patient has good urine output.  Creatinine is 1.5.  Will continue diuresis.  Patient appears to still be fluid overloaded.    Heme:  Hematocrit is 27.  Status post 2 units packed red cells.  Platelet count this 62.  Continue apixaban 2.5 mg for anticoagulation.     Id: Patient's white count is down to 18.  Patient is on broad-spectrum antibiotics.  Cultures are pending.    Heme:  Patient's glucose is controlled with sliding scale.  Activities:  Patient is ambulating in the hallways.    Line tubes and drains:  Patient has a PICC line and pacer wires.     06/27/2023   The patient is postop day 5 status post coronary artery bypass grafting x3 and Woodruff Maze 4 procedure.  Overall the patient is doing well.  Anticipate discharge in the next 24 hours.    Neuro:  Patient is awake alert and oriented x3.  Neuro exam is nonfocal.  Pain is well controlled.    Cardiac:  Patient is tolerating metoprolol.  Will increase metoprolol.  Continue Entresto.  Patient will be fitted with a LifeVest prior to discharge.  Patient is currently on empagliflozin.  Patient will be on his home dapagliflozin on discharge.    Respiratory: Patient has good sats on room air.  Continue pulmonary toileting.  Continue incentive spirometer.    GI:  Patient is tolerating a regular diet.  And patient has had bowel movements.    Renal:  Patient has good urine output.  Creatinine is 1.2.  Will place patient on home diuretic doses.  Patient's appears relatively euvolemic.    Heme:  Hematocrit is 29.  And platelet count is 65.  Continue apixaban 2.5 mg for anticoagulation.  Patient has anemia and thrombocytopenia.  Will refer to heme Onc as outpatient.    Id:  Patient's white count is down to 12.  Patient is on broad-spectrum antibiotics.  Cultures are no growth to date.  Will discharge patient on p.o. Augmentin.    Endocrine: Glucose is controlled with a sliding scale.    Activities:  Patient is ambulating in the hallways.    Line tubes and drains:  Patient has a PICC line which will be discontinued prior to discharge.    6/28/23The patient is postop day 6 status post coronary artery bypass grafting x3 and Woodruff Maze 4 procedure.    Neuro:  Patient is awake alert and oriented x3.  Neuro exam is nonfocal.  Pain is well  controlled.    Cardiac:  Metoprolol.  Continue Entresto.  Patient will be fitted with a LifeVest prior to discharge.  Patient is currently on empagliflozin.  Patient will be on his home dapagliflozin on discharge.    Respiratory:  Aggressive pulmonary toilet  Continue incentive spirometer.    GI:  Cardiac diet    Renal:  Creatinine back to baseline   Will place patient on home diuretic doses.    Heme:  Hematocrit is 29.  And platelet count is 55 consult Heme-Onc for possible hit  Continue apixaban 5 mg 5 mg for anticoagulation.     d:  Patient's white count is down   Patient is on broad-spectrum antibiotics.  Cultures are no growth to date.  Will discharge patient on p.o. Augmentin.    Endocrine: Glucose is controlled with a sliding scale.    Activities:  Patient is ambulating in the hallways.    6/29/23  The patient is postop day 7 status post coronary artery bypass grafting x3 and Woodruff Maze 4 procedure.    Neuro:  Patient is awake alert and oriented x3.  Neuro exam is nonfocal.  Pain is well controlled.    Cardiac:  Metoprolol.  Continue Entresto.  Patient will be fitted with a LifeVest prior to discharge.  Patient is currently on empagliflozin.  Patient will be on his home dapagliflozin on discharge.    Respiratory:  Aggressive pulmonary toilet  Continue incentive spirometer.    GI:  Cardiac diet    Renal:  Creatinine back to baseline   Will place patient on home diuretic doses.    Heme:  Hematocrit stable.  And platelet count is 68 consult Heme-Onc for possible hit  Continue apixaban 5 mg 5 mg for anticoagulation.     d:  Patient's white count is down   Patient is on broad-spectrum antibiotics.  Cultures are no growth to date.  Will discharge patient on p.o. Augmentin.    Endocrine: Glucose is controlled with a sliding scale.    Activities:  Patient is ambulating in the hallways.    Line tubes and drains:  Patient has a PICC line which will be discontinued prior to discharge.    Waiting for up trend in the  platelet count before discharge  6/30/23  The patient is postop day 8 status post coronary artery bypass grafting x3 and Woodruff Maze 4 procedure.    Neuro:  Patient is awake alert and oriented x3.  Neuro exam is nonfocal.  Pain is well controlled.    Cardiac:  Metoprolol.  Continue Entresto.  Patient will be fitted with a LifeVest prior to discharge.  Patient is currently on empagliflozin.  Patient will be on his home dapagliflozin on discharge.    Respiratory:  Aggressive pulmonary toilet  Continue incentive spirometer.    GI:  Cardiac diet    Renal:  Creatinine back to baseline  Heme:  Hematocrit stable.  And platelet count is improving   Heme-Onc for possible hit  Continue apixaban 5 mg 5 mg for anticoagulation.     Id:  Patient's white count is down     Endocrine: Glucose is controlled with a sliding scale.    Activities:  Patient is ambulating in the hallways.    Line tubes and drains:  Patient has a PICC line which will be discontinued prior to discharge.    Waiting for up trend in the platelet count before discharge  Dc with home health once stable   7/1/23.  The patient is postop day 9 status post coronary artery bypass grafting x3 and Woodruff Maze 4 procedure.    Neuro:  Patient is awake alert and oriented x3.  Neuro exam is nonfocal.  Pain is well controlled.    Cardiac:  Metoprolol.  Continue Entresto.  Patient will be fitted with a LifeVest prior to discharge.  Patient is currently on empagliflozin.  Patient will be on his home dapagliflozin on discharge.    Respiratory:  Aggressive pulmonary toilet  Continue incentive spirometer.    GI:  Cardiac diet    Renal:  Creatinine back to baseline  Heme:  Hematocrit stable.  And platelet count is improving   Heme-Onc for possible hit  Continue apixaban 5 mg 5 mg for anticoagulation.    Thrombocytopenia: in the 60s fluctuating   Id:  Patient's white count is down     Endocrine: Glucose is controlled with a sliding scale.    Activities:  Patient is ambulating in the  hallways.    Line tubes and drains:  Patient has a PICC line which will be discontinued prior to discharge.   7/3/23  The patient is postop day 10 status post coronary artery bypass grafting x3 and Woodruff Maze 4 procedure.    Neuro:  Patient is awake alert and oriented x3.  Neuro exam is nonfocal.  Pain is well controlled.    Cardiac:  Metoprolol.  Continue Entresto.  Patient will be fitted with a LifeVest prior to discharge.  Patient is currently on empagliflozin.  Patient will be on his home dapagliflozin on discharge.    Respiratory:  Aggressive pulmonary toilet  Continue incentive spirometer.    GI:  Cardiac diet    Renal:  Creatinine back to baseline  Heme:  Hematocrit stable.  And platelet count is improving 74   Heme-Onc fHIT confirmed Continue apixaban 5 mg 5 mg for anticoagulation.    Thrombocytopenia: in the 74   Id:  Patient's white count is down     Endocrine: Glucose is controlled with a sliding scale.    Activities:  Patient is ambulating in the hallways.    Line tubes and drains:  Patient has a PICC line which will be discontinued prior to discharge.    Waiting for up trend in the platelet count before discharge  Dc with home health once stable   7/4/23  The patient is postop day 11 status post coronary artery bypass grafting x3 and Woodruff Maze 4 procedure.    Neuro:  Patient is awake alert and oriented x3.  Neuro exam is nonfocal.  Pain is well controlled.    Cardiac:  Metoprolol.  Continue Entresto.  Patient will be fitted with a LifeVest prior to discharge.  Patient is currently on empagliflozin.  Patient will be on his home dapagliflozin on discharge.    Respiratory:  Aggressive pulmonary toilet  Continue incentive spirometer.    GI:  Cardiac diet    Renal:  Creatinine back to baseline  Heme:  Hematocrit stable.  And platelet count is improving  Heme-Onc fHIT confirmed Continue apixaban 5 mg 5 mg for anticoagulation.    Thrombocytopenia: in the 92   Id:  Patient's white count is down     Endocrine:  Glucose is controlled with a sliding scale.    Activities:  Patient is ambulating in the hallways.    Line tubes and drains:  Patient has a PICC line which will be discontinued prior to discharge.    Waiting for up trend in the platelet count before discharge        Review of patient's allergies indicates:   Allergen Reactions    Heparin analogues        Past Medical History:   Diagnosis Date    Abnormal nuclear stress test 11/26/2022    Cervical radiculopathy     to rt arm    CHF (congestive heart failure)     Chronic systolic congestive heart failure 11/28/2022    Dilated cardiomyopathy 11/26/2022    Hyperlipidemia     Hypertension     Obesity, unspecified     PAF (paroxysmal atrial fibrillation) 11/26/2022    Pulmonary HTN 11/28/2022    Stroke        Family History   Problem Relation Age of Onset    Hypertension Mother     Diabetes Father     Hyperlipidemia Father     Hypertension Father     Heart attack Father     Coronary artery disease Father        Social History     Socioeconomic History    Marital status:    Tobacco Use    Smoking status: Never    Smokeless tobacco: Never   Substance and Sexual Activity    Alcohol use: Yes    Drug use: Never    Sexual activity: Not Currently       Past Surgical History:   Procedure Laterality Date    CLOSURE OF LEFT ATRIAL APPENDAGE USING DEVICE Left 6/22/2023    Procedure: CLOSURE, LEFT ATRIAL APPENDAGE, USING DEVICE;  Surgeon: Rustam Dave MD;  Location: [facility] OR;  Service: Cardiovascular;  Laterality: Left;  LIGATION OF LEFT ATRIAL APPENDAGE WITH OTILIA EXCLUSION SYSTEM    CORONARY ARTERY BYPASS GRAFT (CABG) N/A 6/22/2023    Procedure: CORONARY ARTERY BYPASS GRAFT (CABG);  Surgeon: Rustam Dave MD;  Location: [facility] OR;  Service: Cardiovascular;  Laterality: N/A;  3-VESSEL WITH EPI-AORTIC ULTRASOUND    PURDY MAZE PROCEDURE N/A 6/22/2023    Procedure: PURDY MAZE PROCEDURE;  Surgeon: Rustam Dave MD;  Location: [facility] OR;  Service: Cardiovascular;  Laterality: N/A;     ECHOCARDIOGRAM,TRANSESOPHAGEAL N/A 6/22/2023    Procedure: ECHOCARDIOGRAM,TRANSESOPHAGEAL;  Surgeon: Rustam Dave MD;  Location: [facility] OR;  Service: Cardiovascular;  Laterality: N/A;    ENDOSCOPIC HARVEST OF VEIN Left 6/22/2023    Procedure: SURGICAL PROCUREMENT, VEIN, ENDOSCOPIC;  Surgeon: Rustam Dave MD;  Location: [facility] OR;  Service: Cardiovascular;  Laterality: Left;    INJECTION OF ANESTHETIC AGENT AROUND MULTIPLE INTERCOSTAL NERVES N/A 6/22/2023    Procedure: BLOCK, NERVE, INTERCOSTAL, 2 OR MORE;  Surgeon: Rustam Dave MD;  Location: [facility] OR;  Service: Cardiovascular;  Laterality: N/A;  PARASTERNAL NERVE BLOCK    INSTANTANEOUS WAVE-FREE RATIO  6/20/2023    Procedure: Instantaneous Wave-Free Ratio;  Surgeon: Zion Ortega MD;  Location: [facility] CATH LAB;  Service: Cardiology;;    IVUS, CORONARY  6/20/2023    Procedure: IVUS, Coronary;  Surgeon: Zion Ortega MD;  Location: [facility] CATH LAB;  Service: Cardiology;;    LEFT HEART CATHETERIZATION Left 11/28/2022    Procedure: CATHETERIZATION, HEART, LEFT;  Surgeon: Zion Ortega MD;  Location: [facility] CATH LAB;  Service: Cardiology;  Laterality: Left;    LEFT HEART CATHETERIZATION Left 6/20/2023    Procedure: Left heart cath;  Surgeon: Zion Ortega MD;  Location: [facility] CATH LAB;  Service: Cardiology;  Laterality: Left;    PERCUTANEOUS TRANSLUMINAL BALLOON ANGIOPLASTY OF CORONARY ARTERY  6/20/2023    Procedure: Angioplasty-coronary;  Surgeon: Zion Ortega MD;  Location: [facility] CATH LAB;  Service: Cardiology;;    RIGHT HEART CATHETERIZATION N/A 11/28/2022    Procedure: INSERTION, CATHETER, RIGHT HEART;  Surgeon: Zion Ortega MD;  Location: [facility] CATH LAB;  Service: Cardiology;  Laterality: N/A;  Congestive heart failure       Review of Systems   Constitutional:  Negative for activity change and appetite change.   HENT:  Negative for dental problem, nosebleeds and sore throat.    Eyes:  Negative for discharge and visual disturbance.   Respiratory:   "Positive for chest tightness. Negative for cough and stridor.    Cardiovascular:  Negative for leg swelling.   Gastrointestinal:  Negative for abdominal distention and abdominal pain.   Genitourinary:  Negative for difficulty urinating and dysuria.   Musculoskeletal:  Negative for arthralgias, back pain and joint swelling.   Allergic/Immunologic: Negative for environmental allergies.   Neurological:  Negative for dizziness, syncope and headaches.   Hematological:  Does not bruise/bleed easily.   Psychiatric/Behavioral:  Negative for behavioral problems.         Objective:   /64   Pulse 79   Temp 98.2 °F (36.8 °C)   Resp 18   Ht 6' 1" (1.854 m)   Wt 113.5 kg (250 lb 3.6 oz)   SpO2 98%   BMI 33.01 kg/m²     X-Ray Chest AP Portable  Narrative: EXAMINATION:  XR CHEST AP PORTABLE    CLINICAL HISTORY:  Post-op; lines and tubes in place, subsequent encounter    COMPARISON:  June 23, 2023    FINDINGS:  EKG leads and other life-saving devices continue to overlie the chest.  Stable right arm PICC line, left-sided chest tube and large-bore mediastinal drain.  Negative for pneumothorax.  Stable bilateral lower lobe atelectasis/consolidation without new pulmonary opacities.  Interval decrease in the amount of air within loops of bowel underneath the left hemidiaphragm.  The hilar and mediastinal contours and osseous structures are unchanged.  Impression: 1.  Overall, no significant interval change has occurred.    Electronically signed by: Selvin Rowe MD  Date:    06/24/2023  Time:    07:00         Physical Exam  Vitals and nursing note reviewed.   Constitutional:       Appearance: He is obese.   HENT:      Head: Normocephalic.      Mouth/Throat:      Mouth: Mucous membranes are moist.   Eyes:      Extraocular Movements: Extraocular movements intact.      Pupils: Pupils are equal, round, and reactive to light.   Cardiovascular:      Rate and Rhythm: Normal rate and regular rhythm.      Pulses: Normal pulses.      " Heart sounds: Normal heart sounds.      Comments: Midline incision healing well  Leg incisions also healing well  Pulmonary:      Effort: Pulmonary effort is normal.      Breath sounds: Normal breath sounds.   Abdominal:      Palpations: Abdomen is soft.   Musculoskeletal:         General: Normal range of motion.      Cervical back: Normal range of motion and neck supple.   Skin:     General: Skin is warm.      Capillary Refill: Capillary refill takes less than 2 seconds.   Neurological:      General: No focal deficit present.      Mental Status: He is alert and oriented to person, place, and time.   Psychiatric:         Mood and Affect: Mood normal.       Assessment:     1. Coronary artery disease without angina pectoris    2. Abnormal stress test    3. CAD, multiple vessel    4. DCM (dilated cardiomyopathy)    5. SOB (shortness of breath)    6. PAF (paroxysmal atrial fibrillation)    7. History of CVA (cerebrovascular accident)    8. Pre-operative cardiovascular examination    9. CAD (coronary artery disease)    10. Post-operative state    11. S/P CABG x 3    12. Anemia, unspecified type          Plan   The patient is postop day 12 status post coronary artery bypass grafting x3 and Woodruff Maze 4 procedure.    Neuro:  Patient is awake alert and oriented x3.  Neuro exam is nonfocal.  Pain is well controlled.    Cardiac:  Metoprolol.  Continue Entresto.  Patient will be fitted with a LifeVest prior to discharge.  Patient is currently on empagliflozin.  Patient will be on his home dapagliflozin on discharge.    Respiratory:  Aggressive pulmonary toilet  Continue incentive spirometer.    GI:  Cardiac diet    Renal:  Creatinine back to baseline  Heme:  Hematocrit stable.  And platelet count is improving  Heme-Onc  on board HIT confirmed Continue apixaban 5 mg 5 mg for anticoagulation.    Thrombocytopenia: in the 84    Id:  Patient's white count is down     Endocrine: Glucose is controlled with a sliding scale.     Activities:  Patient is ambulating in the hallways.    Line tubes and drains:  Patient has a PICC line which will be discontinued prior to discharge.    Waiting for up trend in the platelet count before discharge  Dc with home health once stable          Malissa Graham MD  [facility]  [place]    No

## 2023-07-04 NOTE — PLAN OF CARE
Problem: Adult Inpatient Plan of Care  Goal: Plan of Care Review  Outcome: Ongoing, Progressing     Problem: Skin Injury Risk Increased  Goal: Skin Health and Integrity  Outcome: Ongoing, Progressing  Intervention: Optimize Skin Protection  Flowsheets (Taken 7/4/2023 0300)  Pressure Reduction Techniques: frequent weight shift encouraged  Pressure Reduction Devices: positioning supports utilized  Skin Protection: adhesive use limited  Head of Bed (HOB) Positioning: HOB elevated     Problem: Fall Injury Risk  Goal: Absence of Fall and Fall-Related Injury  Outcome: Ongoing, Progressing  Intervention: Identify and Manage Contributors  Flowsheets (Taken 7/4/2023 0300)  Medication Review/Management: medications reviewed  Intervention: Promote Injury-Free Environment  Flowsheets (Taken 7/4/2023 0300)  Safety Promotion/Fall Prevention:   assistive device/personal item within reach   side rails raised x 2   Fall Risk reviewed with patient/family   medications reviewed     Problem: Pain Acute  Goal: Acceptable Pain Control and Functional Ability  Outcome: Ongoing, Progressing  Intervention: Prevent or Manage Pain  Flowsheets (Taken 7/4/2023 0300)  Sleep/Rest Enhancement: awakenings minimized  Medication Review/Management: medications reviewed  Intervention: Optimize Psychosocial Wellbeing  Flowsheets (Taken 7/4/2023 0300)  Supportive Measures: active listening utilized

## 2023-07-04 NOTE — PT/OT/SLP PROGRESS
Physical Therapy      Patient Name:  Gustavo Tracey Jr.   MRN:  [id number]    07:45 A.M.  Patient eating breakfast at this time and requesting to finish prior to participation in PT session.   Will follow up at next available opportunity.

## 2023-07-05 VITALS
HEART RATE: 84 BPM | OXYGEN SATURATION: 98 % | TEMPERATURE: 98 F | WEIGHT: 250.25 LBS | HEIGHT: 73 IN | BODY MASS INDEX: 33.17 KG/M2 | DIASTOLIC BLOOD PRESSURE: 64 MMHG | RESPIRATION RATE: 18 BRPM | SYSTOLIC BLOOD PRESSURE: 116 MMHG

## 2023-07-05 LAB
ALBUMIN SERPL BCP-MCNC: 3.7 G/DL (ref 3.5–5.2)
ALP SERPL-CCNC: 67 U/L (ref 55–135)
ALT SERPL W/O P-5'-P-CCNC: 14 U/L (ref 10–44)
ANION GAP SERPL CALC-SCNC: 12 MMOL/L (ref 8–16)
ANISOCYTOSIS BLD QL SMEAR: SLIGHT
AST SERPL-CCNC: 18 U/L (ref 10–40)
BASOPHILS # BLD AUTO: 0 K/UL (ref 0–0.2)
BASOPHILS NFR BLD: 0 % (ref 0–1.9)
BILIRUB DIRECT SERPL-MCNC: 0.4 MG/DL (ref 0.1–0.3)
BILIRUB SERPL-MCNC: 0.8 MG/DL (ref 0.1–1)
BUN SERPL-MCNC: 29 MG/DL (ref 8–23)
CALCIUM SERPL-MCNC: 10.2 MG/DL (ref 8.7–10.5)
CHLORIDE SERPL-SCNC: 99 MMOL/L (ref 95–110)
CO2 SERPL-SCNC: 24 MMOL/L (ref 23–29)
CREAT SERPL-MCNC: 1.6 MG/DL (ref 0.5–1.4)
DIFFERENTIAL METHOD: ABNORMAL
EOSINOPHIL # BLD AUTO: 0 K/UL (ref 0–0.5)
EOSINOPHIL NFR BLD: 0 % (ref 0–8)
ERYTHROCYTE [DISTWIDTH] IN BLOOD BY AUTOMATED COUNT: 15.3 % (ref 11.5–14.5)
EST. GFR  (NO RACE VARIABLE): 48 ML/MIN/1.73 M^2
GLUCOSE SERPL-MCNC: 111 MG/DL (ref 70–110)
HCT VFR BLD AUTO: 28.2 % (ref 40–54)
HGB BLD-MCNC: 9 G/DL (ref 14–18)
IMM GRANULOCYTES # BLD AUTO: 0.05 K/UL (ref 0–0.04)
IMM GRANULOCYTES NFR BLD AUTO: 1.3 % (ref 0–0.5)
LYMPHOCYTES # BLD AUTO: 1.1 K/UL (ref 1–4.8)
LYMPHOCYTES NFR BLD: 28.1 % (ref 18–48)
MAGNESIUM SERPL-MCNC: 2.2 MG/DL (ref 1.6–2.6)
MCH RBC QN AUTO: 27.9 PG (ref 27–31)
MCHC RBC AUTO-ENTMCNC: 31.9 G/DL (ref 32–36)
MCV RBC AUTO: 87 FL (ref 82–98)
MONOCYTES # BLD AUTO: 0.6 K/UL (ref 0.3–1)
MONOCYTES NFR BLD: 15.7 % (ref 4–15)
NEUTROPHILS # BLD AUTO: 2.1 K/UL (ref 1.8–7.7)
NEUTROPHILS NFR BLD: 54.9 % (ref 38–73)
NRBC BLD-RTO: 0 /100 WBC
PLATELET # BLD AUTO: 107 K/UL (ref 150–450)
PLATELET BLD QL SMEAR: ABNORMAL
PMV BLD AUTO: 9.6 FL (ref 9.2–12.9)
POC ACTIVATED CLOTTING TIME K: 233 SEC (ref 74–137)
POC ACTIVATED CLOTTING TIME K: 233 SEC (ref 74–137)
POC ACTIVATED CLOTTING TIME K: 239 SEC (ref 74–137)
POC ACTIVATED CLOTTING TIME K: 263 SEC (ref 74–137)
POTASSIUM SERPL-SCNC: 4.5 MMOL/L (ref 3.5–5.1)
PROT SERPL-MCNC: 8.7 G/DL (ref 6–8.4)
RBC # BLD AUTO: 3.23 M/UL (ref 4.6–6.2)
SAMPLE: ABNORMAL
SODIUM SERPL-SCNC: 135 MMOL/L (ref 136–145)
WBC # BLD AUTO: 3.88 K/UL (ref 3.9–12.7)

## 2023-07-05 PROCEDURE — 36415 COLL VENOUS BLD VENIPUNCTURE: CPT | Performed by: THORACIC SURGERY (CARDIOTHORACIC VASCULAR SURGERY)

## 2023-07-05 PROCEDURE — 83735 ASSAY OF MAGNESIUM: CPT | Performed by: THORACIC SURGERY (CARDIOTHORACIC VASCULAR SURGERY)

## 2023-07-05 PROCEDURE — 97530 THERAPEUTIC ACTIVITIES: CPT

## 2023-07-05 PROCEDURE — 25000003 PHARM REV CODE 250: Performed by: INTERNAL MEDICINE

## 2023-07-05 PROCEDURE — 97116 GAIT TRAINING THERAPY: CPT

## 2023-07-05 PROCEDURE — 80076 HEPATIC FUNCTION PANEL: CPT | Performed by: THORACIC SURGERY (CARDIOTHORACIC VASCULAR SURGERY)

## 2023-07-05 PROCEDURE — 85025 COMPLETE CBC W/AUTO DIFF WBC: CPT | Performed by: THORACIC SURGERY (CARDIOTHORACIC VASCULAR SURGERY)

## 2023-07-05 PROCEDURE — 25000003 PHARM REV CODE 250: Performed by: THORACIC SURGERY (CARDIOTHORACIC VASCULAR SURGERY)

## 2023-07-05 PROCEDURE — 63600175 PHARM REV CODE 636 W HCPCS: Performed by: THORACIC SURGERY (CARDIOTHORACIC VASCULAR SURGERY)

## 2023-07-05 PROCEDURE — 80048 BASIC METABOLIC PNL TOTAL CA: CPT | Performed by: THORACIC SURGERY (CARDIOTHORACIC VASCULAR SURGERY)

## 2023-07-05 RX ADMIN — PANTOPRAZOLE SODIUM 40 MG: 40 TABLET, DELAYED RELEASE ORAL at 09:07

## 2023-07-05 RX ADMIN — FUROSEMIDE 40 MG: 10 INJECTION, SOLUTION INTRAMUSCULAR; INTRAVENOUS at 09:07

## 2023-07-05 RX ADMIN — OXYCODONE HYDROCHLORIDE AND ACETAMINOPHEN 500 MG: 500 TABLET ORAL at 09:07

## 2023-07-05 RX ADMIN — PRAVASTATIN SODIUM 20 MG: 20 TABLET ORAL at 09:07

## 2023-07-05 RX ADMIN — APIXABAN 5 MG: 2.5 TABLET, FILM COATED ORAL at 09:07

## 2023-07-05 RX ADMIN — METOPROLOL TARTRATE 50 MG: 50 TABLET, FILM COATED ORAL at 09:07

## 2023-07-05 RX ADMIN — CYANOCOBALAMIN TAB 1000 MCG 1000 MCG: 1000 TAB at 09:07

## 2023-07-05 RX ADMIN — POTASSIUM CHLORIDE 20 MEQ: 1500 TABLET, EXTENDED RELEASE ORAL at 09:07

## 2023-07-05 RX ADMIN — FOLIC ACID 2 MG: 1 TABLET ORAL at 09:07

## 2023-07-05 RX ADMIN — FERROUS SULFATE TAB 325 MG (65 MG ELEMENTAL FE) 1 EACH: 325 (65 FE) TAB at 09:07

## 2023-07-05 NOTE — PHYSICIAN QUERY
PT Name: Gustavo Tracey Jr.  MR #: [id number]    DOCUMENTATION CLARIFICATION      CDS/: Raymond Salmon Jr, RN CDDS             Contact information: [email or web address]     This form is a permanent document in the medical record.      Query Date: July 5, 2023    By submitting this query, we are merely seeking further clarification of documentation. Please utilize your independent clinical judgment when addressing the question(s) below.       Indicators Supporting Clinical Findings Location in Medical Record   x Anemia, Thrombocytopenia, Neutropenia, Pancytopenia documented Acute blood loss anemia expected post-operatively     Heparin induced thrombocytopenia  06/28/2023 Patient is 6 days postop with declining platelet count.  Patient has had hit score is between 3 and 4.  Intermediate in nature     Patient has anemia and thrombocytopenia.  Will refer to heme Onc as outpatient.   7/4 Query response         7/5 Discharge Summary    x H&H 10.3/32.2-->8.5/26.6-->7.2/22.4-->9.9/30.2-->7.8/23.7-->6.9/21.5-->9.9/30.3-->9.6/29.6 6/21-7/3 Labs   x WBC 12.26-->6.34-->4.01-->3.85-->3.67 6/27-7/4 Labs   x Neutrophils/ Granulocytes/ ANC Immature Granulocytes=1.9-->1.5-->1.3-->0.8 7/1-7/4 Labs   x Platelets 33-->151-->116-->62-->68-->73-->92 6/22-7/3 labs    x Transfusion(s) Transfuse RBC 2 Units      Completed 06/22/23 1755     Transfuse RBC 2 Units      Completed 06/25/23 1940 6/22 Transfusion Record        6/25 Transfusion Record    x Treatments:  Heme-Onc fHIT confirmed Continue apixaban 5 mg 5 mg for anticoagulation.   7/2 Cardiothoracic Surgery Progress Note   x Acute/ Chronic illness Coronary artery disease without angina pectoris  7/2 Cardiothoracic Surgery Progress Note   x Other: Patient's white count is down   Patient is on broad-spectrum antibiotics.  Cultures are no growth to date.  Will discharge patient on p.o. Augmentin    RBC=3.42-->3.56-->3.43-->3.52-->3.44 7/1 Cardiothoracic Surgery Progress  Note      6/23-7/3 Labs   Pancytopenia is defined by:  Hb < 12g/dL (non-pregnant women) or <13g/dL (men) + ANC < 1800/microL + Platelets < 150,000/microL    The clinical guidelines noted are only a system guideline. It does not replace the providers clinical judgment.      Provider, please specify diagnosis or diagnoses associated with above clinical findings.    [   ] Pancytopenia, unspecified type   [  X ] Other Hematological Diagnosis (please specify): ________   [   ] Clinically Undetermined                 Please document in your progress notes daily for the duration of treatment, until resolved, and include in your discharge summary.    Reference:    BRITTNY Rueda (n.d.). Approach to the adult with pancytopenia. [employer]. Retrieved September 7, 2022, from [email or web address]&source=search_result&selectedTitle=1~150&usage_type=default&display_rank=1    Form No. 79840

## 2023-07-05 NOTE — Clinical Note
Please call pt and let him know that the xrays of his right hip and knee showed severe arthritis. These are likely contributing to his pain    He should follow the instructions that I gave him today and let me know if the pain is not much improved over the next 7-10 days, sooner if much worse   The catheter was removed from the aorta. Over 035J guidewire

## 2023-07-05 NOTE — DISCHARGE SUMMARY
[facility] - Telemetry (Hospital)  Discharge Note      Procedure(s) (LRB):  CORONARY ARTERY BYPASS GRAFT (CABG) (N/A)  PURDY MAZE PROCEDURE (N/A)  SURGICAL PROCUREMENT, VEIN, ENDOSCOPIC (Left)  ECHOCARDIOGRAM,TRANSESOPHAGEAL (N/A)  BLOCK, NERVE, INTERCOSTAL, 2 OR MORE (N/A)  CLOSURE, LEFT ATRIAL APPENDAGE, USING DEVICE (Left)    S/P CABG x 3  06/23/2023   The patient is postop day 1 status post coronary artery bypass grafting x3 and Purdy Maze 4 procedure.  Overall the patient is doing well.    Neuro:  Patient is awake alert and oriented x3.  Neuro exam is nonfocal.  Patient moves all 4.  Patient's pain is being controlled with current pain regimen.    Cardiac:  Patient has been hemodynamically stable cardiac index has been about 2.2.  Patient is on low-dose epi and vasopressin.  Wean vasopressin and epi to off.    Respiratory:  Patient was extubated yesterday.  Patient has good sats on nasal cannula.  Continue pulmonary toileting.  Continue incentive spirometer.  GI:  Will advance patient's to regular diet as tolerated.    Renal: Patient has good urine output.  Creatinine is 1.7.  Will start diuresis.  Endocrine:  Patient's glucose is controlled with an insulin drip.  Will convert to sliding scale and long-acting insulin.  Heme:  Hematocrit is 28.2 and platelet count is 116.  Will start patient on a NOAC for anticoagulation once patient has chest tubes have been discontinued.  Patient requires anticoagulation since he is status post Purdy Maze 4 for atrial fibrillation.  Id:  White count is 4.  Will continue to follow.  Patient is on venu op antibiotics.    Activities:  Patient is out of bed to chair.  Will advance activities as tolerated.    Line tubes and drains:  Patient has a right IJ [place] and [place], chest tubes, pacer wires, A-line, Rankin catheter and saphenectomy site ANDERS drains.     06/24/2023   The patient is postop day 2 status post coronary artery bypass grafting x3 and Purdy Maze 4 procedure.  Overall the patient is  doing well.    Neuro:  Patient is awake alert and oriented x3.  Neuro exam is nonfocal.  Patient moves all 4.  Pain is controlled current pain med main.  Cardiac:  Patient is hemodynamically stable.  Patient is off all pressors.  Continue metoprolol.    Respiratory:  Patient has good sats continue pulmonary toileting.  Continue incentive spirometer.    GI:  Patient is tolerating p.o. intake.  Continue advance as tolerated.    Renal:  Patient has good urine output.  Creatinine is 1.7.  Continue diuresis.  Endocrine:  Glucose is controlled with sliding scale.  Heme:  Hematocrit is 23.7 and platelet count is 62.  Will start patient on apixaban.  While patient is platelet count is low will start patient on 2.5 mg p.o. b.i.d. of apixaban.  Will increase dose once platelet count recovers.  Id:  White count is 8.92.  Will follow continue to follow white count.  Activities patient is out of bed to chair continue to advance as tolerated.    Line tubes and drains:  Patient has pacer wires, PICC line and Rankin catheter.  Chest tubes have been discontinued.  Will discontinue Rankin catheter.     06/25/2023   The patient is postop day 3 status post coronary artery bypass grafting x3 and Woodruff Maze 4 procedure.    Neuro:  Patient is awake alert and oriented x3.  Neuro exam is nonfocal.  Pain is well controlled.    Cardiac:  Patient is hemodynamically stable.  Continue metoprolol.  Respiratory: Patient has good sats on room air continue pulmonary toileting.  GI:  Patient is tolerating a regular diet.  Patient has not had a bowel movement.  Renal: Patient has good urine output.  Creatinine is 1.6.  Heme:  Hematocrit is 21.5 and platelet count is 61.  Will transfuse 2 units of plaque probe blood cells.  Continue apixaban 2.5 mg for anticoagulation.  Id:  White count is increased to 19.  Will panculture patient.  Will start on empiric broad-spectrum antibiotics.  Activities:  Patient is ambulating in the hallways.  Advance as  tolerated.  Line tubes and drains:  Patient has a PICC line, and pacer wires.     06/26/2023   The patient is postop day 4 status post coronary artery bypass grafting x3 and Woodruff Maze 4 procedure.  Overall the patient is doing well.  Anticipate discharge in the next 48-72 hours.  Neuro:  Patient is awake alert and oriented x3.  Neuro exam is nonfocal.  Patient's pain is well controlled.  Cardiac:  Patient is tolerating metoprolol.  Patient has preop low ejection fraction.  Will add Entresto.  In light of severely depressed ejection fraction preoperatively,  patient will need LifeVest prior to discharge.  Will start patient on his preop Entresto and dapagliflozin  Respiratory:  Patient has good sats on room air.  Continue pulmonary toileting.  Continue incentive spirometer.  GI:  Patient is tolerating regular diet.  Patient has had bowel movements.    Renal:  Patient has good urine output.  Creatinine is 1.5.  Will continue diuresis.  Patient appears to still be fluid overloaded.    Heme:  Hematocrit is 27.  Status post 2 units packed red cells.  Platelet count this 62.  Continue apixaban 2.5 mg for anticoagulation.    Id: Patient's white count is down to 18.  Patient is on broad-spectrum antibiotics.  Cultures are pending.    Heme:  Patient's glucose is controlled with sliding scale.  Activities:  Patient is ambulating in the hallways.    Line tubes and drains:  Patient has a PICC line and pacer wires.     06/27/2023   The patient is postop day 5 status post coronary artery bypass grafting x3 and Woodruff Maze 4 procedure.  Overall the patient is doing well.  Anticipate discharge in the next 24 hours.    Neuro:  Patient is awake alert and oriented x3.  Neuro exam is nonfocal.  Pain is well controlled.    Cardiac:  Patient is tolerating metoprolol.  Will increase metoprolol.  Continue Entresto.  Patient will be fitted with a LifeVest prior to discharge.  Patient is currently on empagliflozin.  Patient will be on his home  dapagliflozin on discharge.    Respiratory: Patient has good sats on room air.  Continue pulmonary toileting.  Continue incentive spirometer.    GI:  Patient is tolerating a regular diet.  And patient has had bowel movements.    Renal:  Patient has good urine output.  Creatinine is 1.2.  Will place patient on home diuretic doses.  Patient's appears relatively euvolemic.    Heme:  Hematocrit is 29.  And platelet count is 65.  Continue apixaban 2.5 mg for anticoagulation.  Patient has anemia and thrombocytopenia.  Will refer to heme Onc as outpatient.    Id:  Patient's white count is down to 12.  Patient is on broad-spectrum antibiotics.  Cultures are no growth to date.  Will discharge patient on p.o. Augmentin.    Endocrine: Glucose is controlled with a sliding scale.    Activities:  Patient is ambulating in the hallways.    Line tubes and drains:  Patient has a PICC line which will be discontinued prior to discharge.    6/28/23The patient is postop day 6 status post coronary artery bypass grafting x3 and Woodruff Maze 4 procedure.    Neuro:  Patient is awake alert and oriented x3.  Neuro exam is nonfocal.  Pain is well controlled.    Cardiac:  Metoprolol.  Continue Entresto.  Patient will be fitted with a LifeVest prior to discharge.  Patient is currently on empagliflozin.  Patient will be on his home dapagliflozin on discharge.    Respiratory:  Aggressive pulmonary toilet  Continue incentive spirometer.    GI:  Cardiac diet    Renal:  Creatinine back to baseline   Will place patient on home diuretic doses.    Heme:  Hematocrit is 29.  And platelet count is 55 consult Heme-Onc for possible hit  Continue apixaban 5 mg 5 mg for anticoagulation.     d:  Patient's white count is down   Patient is on broad-spectrum antibiotics.  Cultures are no growth to date.  Will discharge patient on p.o. Augmentin.    Endocrine: Glucose is controlled with a sliding scale.    Activities:  Patient is ambulating in the hallways.     6/29/23  The patient is postop day 7 status post coronary artery bypass grafting x3 and Woodruff Maze 4 procedure.    Neuro:  Patient is awake alert and oriented x3.  Neuro exam is nonfocal.  Pain is well controlled.    Cardiac:  Metoprolol.  Continue Entresto.  Patient will be fitted with a LifeVest prior to discharge.  Patient is currently on empagliflozin.  Patient will be on his home dapagliflozin on discharge.    Respiratory:  Aggressive pulmonary toilet  Continue incentive spirometer.    GI:  Cardiac diet    Renal:  Creatinine back to baseline   Will place patient on home diuretic doses.    Heme:  Hematocrit stable.  And platelet count is 68 consult Heme-Onc for possible hit  Continue apixaban 5 mg 5 mg for anticoagulation.     d:  Patient's white count is down   Patient is on broad-spectrum antibiotics.  Cultures are no growth to date.  Will discharge patient on p.o. Augmentin.    Endocrine: Glucose is controlled with a sliding scale.    Activities:  Patient is ambulating in the hallways.    Line tubes and drains:  Patient has a PICC line which will be discontinued prior to discharge.    Waiting for up trend in the platelet count before discharge  6/30/23  The patient is postop day 8 status post coronary artery bypass grafting x3 and [place] Maze 4 procedure.    Neuro:  Patient is awake alert and oriented x3.  Neuro exam is nonfocal.  Pain is well controlled.    Cardiac:  Metoprolol.  Continue Entresto.  Patient will be fitted with a LifeVest prior to discharge.  Patient is currently on empagliflozin.  Patient will be on his home dapagliflozin on discharge.    Respiratory:  Aggressive pulmonary toilet  Continue incentive spirometer.    GI:  Cardiac diet    Renal:  Creatinine back to baseline  Heme:  Hematocrit stable.  And platelet count is improving   Heme-Onc for possible hit  Continue apixaban 5 mg 5 mg for anticoagulation.     Id:  Patient's white count is down     Endocrine: Glucose is controlled with a sliding  scale.    Activities:  Patient is ambulating in the hallways.    Line tubes and drains:  Patient has a PICC line which will be discontinued prior to discharge.    Waiting for up trend in the platelet count before discharge  Dc with home health once stable   7/1/23.  The patient is postop day 9 status post coronary artery bypass grafting x3 and Woodruff Maze 4 procedure.    Neuro:  Patient is awake alert and oriented x3.  Neuro exam is nonfocal.  Pain is well controlled.    Cardiac:  Metoprolol.  Continue Entresto.  Patient will be fitted with a LifeVest prior to discharge.  Patient is currently on empagliflozin.  Patient will be on his home dapagliflozin on discharge.    Respiratory:  Aggressive pulmonary toilet  Continue incentive spirometer.    GI:  Cardiac diet    Renal:  Creatinine back to baseline  Heme:  Hematocrit stable.  And platelet count is improving   Heme-Onc for possible hit  Continue apixaban 5 mg 5 mg for anticoagulation.    Thrombocytopenia: in the 60s fluctuating   Id:  Patient's white count is down     Endocrine: Glucose is controlled with a sliding scale.    Activities:  Patient is ambulating in the hallways.    Line tubes and drains:  Patient has a PICC line which will be discontinued prior to discharge.   7/3/23  The patient is postop day 10 status post coronary artery bypass grafting x3 and Woodruff Maze 4 procedure.    Neuro:  Patient is awake alert and oriented x3.  Neuro exam is nonfocal.  Pain is well controlled.    Cardiac:  Metoprolol.  Continue Entresto.  Patient will be fitted with a LifeVest prior to discharge.  Patient is currently on empagliflozin.  Patient will be on his home dapagliflozin on discharge.    Respiratory:  Aggressive pulmonary toilet  Continue incentive spirometer.    GI:  Cardiac diet    Renal:  Creatinine back to baseline  Heme:  Hematocrit stable.  And platelet count is improving 74   Heme-Onc fHIT confirmed Continue apixaban 5 mg 5 mg for anticoagulation.     Thrombocytopenia: in the 74   Id:  Patient's white count is down     Endocrine: Glucose is controlled with a sliding scale.    Activities:  Patient is ambulating in the hallways.    Line tubes and drains:  Patient has a PICC line which will be discontinued prior to discharge.    Waiting for up trend in the platelet count before discharge  Dc with home health once stable   7/4/23  The patient is postop day 11 status post coronary artery bypass grafting x3 and Woodruff Maze 4 procedure.    Neuro:  Patient is awake alert and oriented x3.  Neuro exam is nonfocal.  Pain is well controlled.    Cardiac:  Metoprolol.  Continue Entresto.  Patient will be fitted with a LifeVest prior to discharge.  Patient is currently on empagliflozin.  Patient will be on his home dapagliflozin on discharge.    Respiratory:  Aggressive pulmonary toilet  Continue incentive spirometer.    GI:  Cardiac diet    Renal:  Creatinine back to baseline  Heme:  Hematocrit stable.  And platelet count is improving  Heme-Onc fHIT confirmed Continue apixaban 5 mg 5 mg for anticoagulation.    Thrombocytopenia: in the 92   Id:  Patient's white count is down     Endocrine: Glucose is controlled with a sliding scale.    Activities:  Patient is ambulating in the hallways.    Line tubes and drains:  Patient has a PICC line which will be discontinued prior to discharge.    Platelet count today is 108   Patient ready for discharge.      OUTCOME: Patient tolerated treatment/procedure well without complication and is now ready for discharge.    DISPOSITION: Home or Self Care    FINAL DIAGNOSIS:  Abnormal stress test    FOLLOWUP: In clinic    DISCHARGE INSTRUCTIONS:    Discharge Procedure Orders   CBC W/ AUTO DIFFERENTIAL   Standing Status: Future Standing Exp. Date: 08/25/24     BASIC METABOLIC PANEL   Standing Status: Future Standing Exp. Date: 08/25/24     Ambulatory referral/consult to Home Health   Standing Status: Future   Referral Priority: Routine Referral Type:  Home Health   Referral Reason: Specialty Services Required   Requested Specialty: Home Health Services   Number of Visits Requested: 1     Ambulatory referral/consult to Cardiac Rehab   Standing Status: Future   Referral Priority: Routine Referral Type: Consultation   Referral Reason: Specialty Services Required   Requested Specialty: Cardiac Rehabilitation   Number of Visits Requested: 1     Ambulatory referral/consult to Hematology / Oncology   Standing Status: Future   Referral Priority: Routine Referral Type: Consultation   Referral Reason: Specialty Services Required   Requested Specialty: Hematology and Oncology   Number of Visits Requested: 1     Ambulatory referral/consult to Cardiac Rehab   Standing Status: Future   Referral Priority: Routine Referral Type: Consultation   Referral Reason: Specialty Services Required   Requested Specialty: Cardiac Rehabilitation   Number of Visits Requested: 1     Diet diabetic     Diet Cardiac     Lifting restrictions     No driving until:     Notify your health care provider if you experience any of the following:  temperature >100.4     Notify your health care provider if you experience any of the following:  persistent nausea and vomiting or diarrhea     Notify your health care provider if you experience any of the following:  severe uncontrolled pain     Notify your health care provider if you experience any of the following:  redness, tenderness, or signs of infection (pain, swelling, redness, odor or green/yellow discharge around incision site)     Notify your health care provider if you experience any of the following:  difficulty breathing or increased cough     Notify your health care provider if you experience any of the following:  severe persistent headache     Notify your health care provider if you experience any of the following:  worsening rash     Notify your health care provider if you experience any of the following:  persistent dizziness,  light-headedness, or visual disturbances     Notify your health care provider if you experience any of the following:  increased confusion or weakness     No dressing needed     No dressing needed     Activity as tolerated     Activity as tolerated         Clinical Reference Documents Added to Patient Instructions         Document    CARDIAC REHAB INFORMATION (ENGLISH)    CORONARY ARTERY BYPASS GRAFTING (ENGLISH)    CORONARY ARTERY BYPASS GRAFTING DISCHARGE INSTRUCTIONS (ENGLISH)    POSTOP OPEN HEART SURGERY EXERCISES (ENGLISH)    RECOVERY AFTER CORONARY ARTERY BYPASS GRAFT SURGERY (CABG) (ENGLISH)            TIME SPENT ON DISCHARGE: 35 minutes

## 2023-07-05 NOTE — PLAN OF CARE
Problem: Adult Inpatient Plan of Care  Goal: Plan of Care Review  Outcome: Ongoing, Progressing     Problem: Skin Injury Risk Increased  Goal: Skin Health and Integrity  Outcome: Ongoing, Progressing  Intervention: Optimize Skin Protection  Flowsheets (Taken 7/5/2023 0304)  Pressure Reduction Techniques: frequent weight shift encouraged  Pressure Reduction Devices: positioning supports utilized  Skin Protection: adhesive use limited  Head of Bed (HOB) Positioning: HOB elevated

## 2023-07-05 NOTE — PT/OT/SLP PROGRESS
Physical Therapy Treatment    Patient Name:  Gustavo Tracey Jr.   MRN:  [id number]    Recommendations:     Discharge Recommendations: home health PT  Discharge Equipment Recommendations: shower chair  Barriers to discharge: None    Assessment:     Gustavo Tracey Jr. is a 63 y.o. male admitted with a medical diagnosis of Abnormal stress test.  He presents with the following impairments/functional limitations: weakness, impaired endurance, impaired functional mobility, gait instability, impaired balance, decreased safety awareness, decreased coordination, decreased upper extremity function, impaired cardiopulmonary response to activity.    Rehab Prognosis: Good; patient would benefit from acute skilled PT services to address these deficits and reach maximum level of function.    Recent Surgery: Procedure(s) (LRB):  CORONARY ARTERY BYPASS GRAFT (CABG) (N/A)  PURDY MAZE PROCEDURE (N/A)  SURGICAL PROCUREMENT, VEIN, ENDOSCOPIC (Left)  ECHOCARDIOGRAM,TRANSESOPHAGEAL (N/A)  BLOCK, NERVE, INTERCOSTAL, 2 OR MORE (N/A)  CLOSURE, LEFT ATRIAL APPENDAGE, USING DEVICE (Left) 13 Days Post-Op    Plan:     During this hospitalization, patient to be seen 3 x/week to address the identified rehab impairments via gait training, therapeutic activities, therapeutic exercises and progress toward the following goals:    Plan of Care Expires:  07/07/23    Subjective     Chief Complaint: READY TO GO HOME  Patient/Family Comments/goals:   Pain/Comfort:  Pain Rating 1: 0/10      Objective:     Communicated with NURSE RODNEY KING prior to session.  Patient found up in chair with telemetry, peripheral IV upon PT entry to room.     General Precautions: Standard, fall, sternal  Orthopedic Precautions: N/A  Braces: N/A  Respiratory Status: Room air     Functional Mobility:  Bed Mobility:     Scooting: modified independence  Transfers:     Sit to Stand:  modified independence with no AD  Gait: PT ' NO AD APRIL, MILDLY UNSTEADY DUE TO NARROW AURE BUT NO  "GROSS LOB  Balance: GOOD SITTING AND STANDING BALANCE    AM-PAC 6 CLICK MOBILITY  Turning over in bed (including adjusting bedclothes, sheets and blankets)?: 1  Sitting down on and standing up from a chair with arms (e.g., wheelchair, bedside commode, etc.): 4  Moving from lying on back to sitting on the side of the bed?: 1  Moving to and from a bed to a chair (including a wheelchair)?: 4  Need to walk in hospital room?: 4  Climbing 3-5 steps with a railing?: 1  Basic Mobility Total Score: 15     Treatment & Education:  PT EDUCATION:  - ROLE OF P.T. AND POC IN ACUTE CARE HOSPITAL SETTING  - REVIEW STERNAL PRECAUTIONS, ACTIVITY PACING  - ENCOURAGED TO INCREASE TIME OOB IN CHAIR TO TOLERANCE   - TO CONTINUE THERAPUETIC EXERCISES THROUGHOUT THE DAY TO INCREASE ACTIVITY TOLERANCE AND DECREASE RISK FOR PNEUMONIA AND BLOOD CLOTS: HIP FLEX/EXT, HIP ABD/ADD, QUAD SET, HEEL SLIDE, AP  - RISK FOR FALLS DUE TO GENERALIZED WEAKNESS, EDUCATED ON "CALL DON'T FALL", ENCOURAGED TO CALL FOR ASSISTANCE WITH ALL NEEDS SUCH AS BED<>CHAIR TRANSFERS OR TRIPS TO BATHROOM, PT AGREEABLE TO SAFETY PRECAUTIONS    Patient left up in chair with all lines intact, call button in reach, chair alarm on, and NURSE notified..    GOALS:   Multidisciplinary Problems       Physical Therapy Goals          Problem: Physical Therapy    Goal Priority Disciplines Outcome Goal Variances Interventions   Physical Therapy Goal     PT, PT/OT Ongoing, Progressing     Description: LTG'S TO BE MET IN 14 DAYS (7-7-23)  PT WILL REQUIRE CGA FOR BED MOBILITY  PT WILL REQUIRE SPV FOR BED<>CHAIR TF'S  PT WILL  FEET NO AD SPV                         Time Tracking:     PT Received On: 07/05/23  PT Start Time: 0745     PT Stop Time: 0810  PT Total Time (min): 25 min     Billable Minutes: Gait Training 10 and Therapeutic Activity 15    Treatment Type: Treatment  PT/PTA: PT     Number of PTA visits since last PT visit: 0     07/05/2023  "

## 2023-07-05 NOTE — PT/OT/SLP PROGRESS
"Occupational Therapy   Treatment    Name: Gustavo Tracey Jr.  MRN: [id number]  Admitting Diagnosis:  Abnormal stress test  13 Days Post-Op    Recommendations:     Discharge Recommendations: home health OT (24/7 SPV and A)  Discharge Equipment Recommendations:  shower chair  Barriers to discharge:  None    Assessment:     Gustavo Tracey Jr. is a 63 y.o. male with a medical diagnosis of Abnormal stress test.  He presents with the following performance deficits affecting function are weakness, impaired endurance, impaired self care skills, impaired functional mobility, impaired balance, impaired cardiopulmonary response to activity (sternal precautions).     Rehab Prognosis:  Good; patient would benefit from acute skilled OT services to address these deficits and reach maximum level of function.       Plan:     Patient to be seen 2 x/week to address the above listed problems via self-care/home management, therapeutic exercises, therapeutic activities  Plan of Care Expires: 07/07/23  Plan of Care Reviewed with: patient    Subjective     Chief Complaint: Reported "I am going home today."  Patient/Family Comments/goals: maximize independence  Pain/Comfort:  Pain Rating 1: 0/10    Objective:     Communicated with: NurseMedina, prior to session.  Patient found  sitting on sofa  with peripheral IV, telemetry upon OT entry to room.    General Precautions: Standard, fall, sternal    Orthopedic Precautions:N/A  Braces: N/A  Respiratory Status: Room air     Occupational Performance:     Functional Mobility/Transfers:  Patient completed Sit <> Stand Transfer with modified independence  with  no assistive device   Patient completed Bed <> Chair Transfer using Step Transfer technique with modified independence with no assistive device  Functional Mobility: Patient completed x650ft functional mobility without AD, mod I for increased time, to increase dynamic standing balance and activity tolerance needed for ADL " completion.    Activities of Daily Living:  Provided with additional education re: recommendation for seated showers and appropriate energy conservation techniques to use to modify task.    [facility] 6 Click ADL: 21    Treatment & Education:  Patient tolerated intervention well. Demonstrating good understanding of sternal precautions this date. Continued improvements in standing balance and activity tolerance. Patient encouraged to ambulate Q hour upon d/c home for continued cardiovascular recovery. Patient stated understanding, in agreement with POC, and in agreement to call for A to transfer in room.    Patient left up in chair with all lines intact and call button in reach    GOALS:   Multidisciplinary Problems       Occupational Therapy Goals          Problem: Occupational Therapy    Goal Priority Disciplines Outcome Interventions   Occupational Therapy Goal     OT, PT/OT Ongoing, Progressing    Description: Goals to be met by: 7/7/23     Patient will increase functional independence with ADLs by performing:    Toileting from toilet with Minimal Assistance for hygiene and clothing management.   Toilet transfer to toilet with Contact Guard Assistance.  Demonstrates 100% functional compliance with sternal precautions.                         Time Tracking:     OT Date of Treatment: 07/05/23  OT Start Time: 0800  OT Stop Time: 0825  OT Total Time (min): 25 min    Billable Minutes:Therapeutic Activity 25    Kierra Aiken OT    7/5/2023

## 2023-07-06 ENCOUNTER — PATIENT OUTREACH (OUTPATIENT)
Dept: ADMINISTRATIVE | Facility: CLINIC | Age: 63
End: 2023-07-06
Payer: MEDICARE

## 2023-07-06 PROCEDURE — G0180 MD CERTIFICATION HHA PATIENT: HCPCS | Mod: ,,, | Performed by: THORACIC SURGERY (CARDIOTHORACIC VASCULAR SURGERY)

## 2023-07-06 PROCEDURE — G0180 PR HOME HEALTH MD CERTIFICATION: ICD-10-PCS | Mod: ,,, | Performed by: THORACIC SURGERY (CARDIOTHORACIC VASCULAR SURGERY)

## 2023-07-06 NOTE — PROGRESS NOTES
C3 nurse spoke with Gustavo Tracey Jr. for a TCC post hospital discharge follow up call. The patient does not have a scheduled HOSFU appointment with Braxton Guzman Jr, MD within 5-7 days post hospital discharge date 07/05/2023. C3 nurse was unable to schedule HOSFU appointment in [facility].    Message sent to PCP staff requesting they contact patient and schedule follow up appointment.     The patient is not within an NP serviceable area.

## 2023-07-12 ENCOUNTER — OFFICE VISIT (OUTPATIENT)
Dept: HEMATOLOGY/ONCOLOGY | Facility: CLINIC | Age: 63
End: 2023-07-12
Payer: MEDICARE

## 2023-07-12 ENCOUNTER — LAB VISIT (OUTPATIENT)
Dept: LAB | Facility: HOSPITAL | Age: 63
End: 2023-07-12
Attending: INTERNAL MEDICINE
Payer: MEDICARE

## 2023-07-12 VITALS
TEMPERATURE: 98 F | HEIGHT: 73 IN | OXYGEN SATURATION: 98 % | DIASTOLIC BLOOD PRESSURE: 87 MMHG | SYSTOLIC BLOOD PRESSURE: 136 MMHG | WEIGHT: 231.06 LBS | HEART RATE: 82 BPM | BODY MASS INDEX: 30.62 KG/M2

## 2023-07-12 DIAGNOSIS — R79.9 ABNORMAL FINDING OF BLOOD CHEMISTRY, UNSPECIFIED: ICD-10-CM

## 2023-07-12 DIAGNOSIS — D75.829 HEPARIN INDUCED THROMBOCYTOPENIA: Primary | ICD-10-CM

## 2023-07-12 DIAGNOSIS — D75.829 HIT (HEPARIN-INDUCED THROMBOCYTOPENIA): ICD-10-CM

## 2023-07-12 DIAGNOSIS — N18.31 STAGE 3A CHRONIC KIDNEY DISEASE: ICD-10-CM

## 2023-07-12 DIAGNOSIS — I10 PRIMARY HYPERTENSION: ICD-10-CM

## 2023-07-12 DIAGNOSIS — Z95.1 S/P CABG X 3: ICD-10-CM

## 2023-07-12 LAB
ANISOCYTOSIS BLD QL SMEAR: SLIGHT
BASOPHILS # BLD AUTO: 0 K/UL (ref 0–0.2)
BASOPHILS NFR BLD: 0 % (ref 0–1.9)
DACRYOCYTES BLD QL SMEAR: ABNORMAL
DIFFERENTIAL METHOD: ABNORMAL
EOSINOPHIL # BLD AUTO: 0 K/UL (ref 0–0.5)
EOSINOPHIL NFR BLD: 0 % (ref 0–8)
ERYTHROCYTE [DISTWIDTH] IN BLOOD BY AUTOMATED COUNT: 16.1 % (ref 11.5–14.5)
HCT VFR BLD AUTO: 25.6 % (ref 40–54)
HGB BLD-MCNC: 7.8 G/DL (ref 14–18)
HYPOCHROMIA BLD QL SMEAR: ABNORMAL
IMM GRANULOCYTES # BLD AUTO: 0.03 K/UL (ref 0–0.04)
IMM GRANULOCYTES NFR BLD AUTO: 0.9 % (ref 0–0.5)
LYMPHOCYTES # BLD AUTO: 1.1 K/UL (ref 1–4.8)
LYMPHOCYTES NFR BLD: 34.3 % (ref 18–48)
MCH RBC QN AUTO: 27.8 PG (ref 27–31)
MCHC RBC AUTO-ENTMCNC: 30.5 G/DL (ref 32–36)
MCV RBC AUTO: 91 FL (ref 82–98)
MONOCYTES # BLD AUTO: 0.4 K/UL (ref 0.3–1)
MONOCYTES NFR BLD: 13.4 % (ref 4–15)
NEUTROPHILS # BLD AUTO: 1.7 K/UL (ref 1.8–7.7)
NEUTROPHILS NFR BLD: 51.4 % (ref 38–73)
NRBC BLD-RTO: 0 /100 WBC
PLATELET # BLD AUTO: 130 K/UL (ref 150–450)
PLATELET BLD QL SMEAR: ABNORMAL
PMV BLD AUTO: 8.5 FL (ref 9.2–12.9)
POC ACTIVATED CLOTTING TIME K: >1000 SEC (ref 74–137)
POIKILOCYTOSIS BLD QL SMEAR: SLIGHT
POLYCHROMASIA BLD QL SMEAR: ABNORMAL
RBC # BLD AUTO: 2.81 M/UL (ref 4.6–6.2)
SAMPLE: ABNORMAL
TARGETS BLD QL SMEAR: ABNORMAL
WBC # BLD AUTO: 3.21 K/UL (ref 3.9–12.7)

## 2023-07-12 PROCEDURE — 36415 COLL VENOUS BLD VENIPUNCTURE: CPT | Performed by: INTERNAL MEDICINE

## 2023-07-12 PROCEDURE — 99999 PR PBB SHADOW E&M-EST. PATIENT-LVL IV: CPT | Mod: PBBFAC,,, | Performed by: INTERNAL MEDICINE

## 2023-07-12 PROCEDURE — 99214 PR OFFICE/OUTPT VISIT, EST, LEVL IV, 30-39 MIN: ICD-10-PCS | Mod: S$PBB,,, | Performed by: INTERNAL MEDICINE

## 2023-07-12 PROCEDURE — 99214 OFFICE O/P EST MOD 30 MIN: CPT | Mod: S$PBB,,, | Performed by: INTERNAL MEDICINE

## 2023-07-12 PROCEDURE — 99999 PR PBB SHADOW E&M-EST. PATIENT-LVL IV: ICD-10-PCS | Mod: PBBFAC,,, | Performed by: INTERNAL MEDICINE

## 2023-07-12 PROCEDURE — 99214 OFFICE O/P EST MOD 30 MIN: CPT | Mod: PBBFAC | Performed by: INTERNAL MEDICINE

## 2023-07-12 PROCEDURE — 85025 COMPLETE CBC W/AUTO DIFF WBC: CPT | Performed by: INTERNAL MEDICINE

## 2023-07-12 NOTE — PROGRESS NOTES
Subjective:       Patient ID: Gustavo Tracey Jr. is a 63 y.o. male.    Chief Complaint: Results    HPI:  63-year-old male history of heparin induced thrombocytopenia recent cardiac bypass patient has been started on Eliquis 5 mg p.o. b.i.d. with rapid rise in platelet count to near normal.  Patient returns for follow-up ECOG status one    Past Medical History:   Diagnosis Date    Abnormal nuclear stress test 11/26/2022    Cervical radiculopathy     to rt arm    CHF (congestive heart failure)     Chronic systolic congestive heart failure 11/28/2022    Dilated cardiomyopathy 11/26/2022    Hyperlipidemia     Hypertension     Obesity, unspecified     PAF (paroxysmal atrial fibrillation) 11/26/2022    Pulmonary HTN 11/28/2022    Stroke      Family History   Problem Relation Age of Onset    Hypertension Mother     Diabetes Father     Hyperlipidemia Father     Hypertension Father     Heart attack Father     Coronary artery disease Father      Social History     Socioeconomic History    Marital status:    Tobacco Use    Smoking status: Never    Smokeless tobacco: Never   Substance and Sexual Activity    Alcohol use: Yes    Drug use: Never    Sexual activity: Not Currently     Past Surgical History:   Procedure Laterality Date    CLOSURE OF LEFT ATRIAL APPENDAGE USING DEVICE Left 6/22/2023    Procedure: CLOSURE, LEFT ATRIAL APPENDAGE, USING DEVICE;  Surgeon: Rustam Dave MD;  Location: [facility] OR;  Service: Cardiovascular;  Laterality: Left;  LIGATION OF LEFT ATRIAL APPENDAGE WITH OTILIA EXCLUSION SYSTEM    CORONARY ARTERY BYPASS GRAFT (CABG) N/A 6/22/2023    Procedure: CORONARY ARTERY BYPASS GRAFT (CABG);  Surgeon: Rustam Dave MD;  Location: [facility] OR;  Service: Cardiovascular;  Laterality: N/A;  3-VESSEL WITH EPI-AORTIC ULTRASOUND    PURDY MAZE PROCEDURE N/A 6/22/2023    Procedure: PURDY MAZE PROCEDURE;  Surgeon: Rustam Dave MD;  Location: [facility] OR;  Service: Cardiovascular;  Laterality: N/A;     ECHOCARDIOGRAM,TRANSESOPHAGEAL N/A 6/22/2023    Procedure: ECHOCARDIOGRAM,TRANSESOPHAGEAL;  Surgeon: Rustam Dave MD;  Location: [facility] OR;  Service: Cardiovascular;  Laterality: N/A;    ENDOSCOPIC HARVEST OF VEIN Left 6/22/2023    Procedure: SURGICAL PROCUREMENT, VEIN, ENDOSCOPIC;  Surgeon: Rustam Dave MD;  Location: [facility] OR;  Service: Cardiovascular;  Laterality: Left;    INJECTION OF ANESTHETIC AGENT AROUND MULTIPLE INTERCOSTAL NERVES N/A 6/22/2023    Procedure: BLOCK, NERVE, INTERCOSTAL, 2 OR MORE;  Surgeon: Rustam Dave MD;  Location: [facility] OR;  Service: Cardiovascular;  Laterality: N/A;  PARASTERNAL NERVE BLOCK    INSTANTANEOUS WAVE-FREE RATIO  6/20/2023    Procedure: Instantaneous Wave-Free Ratio;  Surgeon: Zion Ortega MD;  Location: [facility] CATH LAB;  Service: Cardiology;;    IVUS, CORONARY  6/20/2023    Procedure: IVUS, Coronary;  Surgeon: Zion Ortega MD;  Location: [facility] CATH LAB;  Service: Cardiology;;    LEFT HEART CATHETERIZATION Left 11/28/2022    Procedure: CATHETERIZATION, HEART, LEFT;  Surgeon: Zion Ortega MD;  Location: [facility] CATH LAB;  Service: Cardiology;  Laterality: Left;    LEFT HEART CATHETERIZATION Left 6/20/2023    Procedure: Left heart cath;  Surgeon: Zion Ortega MD;  Location: [facility] CATH LAB;  Service: Cardiology;  Laterality: Left;    PERCUTANEOUS TRANSLUMINAL BALLOON ANGIOPLASTY OF CORONARY ARTERY  6/20/2023    Procedure: Angioplasty-coronary;  Surgeon: Zion Ortega MD;  Location: [facility] CATH LAB;  Service: Cardiology;;    RIGHT HEART CATHETERIZATION N/A 11/28/2022    Procedure: INSERTION, CATHETER, RIGHT HEART;  Surgeon: Zion Ortega MD;  Location: [facility] CATH LAB;  Service: Cardiology;  Laterality: N/A;  Congestive heart failure       Labs:  Lab Results   Component Value Date    WBC 3.21 (L) 07/12/2023    HGB 7.8 (L) 07/12/2023    HCT 25.6 (L) 07/12/2023    MCV 91 07/12/2023     (L) 07/12/2023     BMP  Lab Results   Component Value Date     (L)  07/05/2023    K 4.5 07/05/2023    CL 99 07/05/2023    CO2 24 07/05/2023    BUN 29 (H) 07/05/2023    CREATININE 1.6 (H) 07/05/2023    CALCIUM 10.2 07/05/2023    ANIONGAP 12 07/05/2023     Lab Results   Component Value Date    ALT 14 07/05/2023    AST 18 07/05/2023    ALKPHOS 67 07/05/2023    BILITOT 0.8 07/05/2023       Lab Results   Component Value Date    FERRITIN 212 06/20/2023     No results found for: [id number]  No results found for: FOLATE  No results found for: TSH      Review of Systems   Constitutional:  Positive for fatigue. Negative for activity change, appetite change, chills, diaphoresis, fever and unexpected weight change.   HENT:  Negative for congestion, dental problem, drooling, ear discharge, ear pain, facial swelling, hearing loss, mouth sores, nosebleeds, postnasal drip, rhinorrhea, sinus pressure, sneezing, sore throat, tinnitus, trouble swallowing and voice change.    Eyes:  Negative for photophobia, pain, discharge, redness, itching and visual disturbance.   Respiratory:  Negative for apnea, cough, choking, chest tightness, shortness of breath, wheezing and stridor.    Cardiovascular:  Negative for chest pain, palpitations and leg swelling.   Gastrointestinal:  Negative for abdominal distention, abdominal pain, anal bleeding, blood in stool, constipation, diarrhea, nausea, rectal pain and vomiting.   Endocrine: Negative for cold intolerance, heat intolerance, polydipsia, polyphagia and polyuria.   Genitourinary:  Negative for decreased urine volume, difficulty urinating, dysuria, enuresis, flank pain, frequency, genital sores, hematuria, penile discharge, penile pain, penile swelling, scrotal swelling, testicular pain and urgency.   Musculoskeletal:  Negative for arthralgias, back pain, gait problem, joint swelling, myalgias, neck pain and neck stiffness.   Skin:  Negative for color change, pallor, rash and wound.   Allergic/Immunologic: Negative for environmental allergies, food allergies and  immunocompromised state.   Neurological:  Positive for weakness. Negative for dizziness, tremors, seizures, syncope, facial asymmetry, speech difficulty, light-headedness, numbness and headaches.   Hematological:  Negative for adenopathy. Does not bruise/bleed easily.   Psychiatric/Behavioral:  Negative for agitation, behavioral problems, confusion, decreased concentration, dysphoric mood, hallucinations, self-injury, sleep disturbance and suicidal ideas. The patient is not nervous/anxious and is not hyperactive.      Objective:      Physical Exam  Vitals reviewed.   Constitutional:       General: He is not in acute distress.     Appearance: He is well-developed. He is not diaphoretic.   HENT:      Head: Normocephalic.      Right Ear: External ear normal.      Left Ear: External ear normal.      Nose: Nose normal.      Right Sinus: No maxillary sinus tenderness or frontal sinus tenderness.      Left Sinus: No maxillary sinus tenderness or frontal sinus tenderness.      Mouth/Throat:      Pharynx: No oropharyngeal exudate.   Eyes:      General: Lids are normal. No scleral icterus.        Right eye: No discharge.         Left eye: No discharge.      Extraocular Movements:      Right eye: Normal extraocular motion.      Left eye: Normal extraocular motion.      Conjunctiva/sclera:      Right eye: Right conjunctiva is not injected. No hemorrhage.     Left eye: Left conjunctiva is not injected. No hemorrhage.     Pupils: Pupils are equal, round, and reactive to light.   Neck:      Thyroid: No thyromegaly.      Vascular: No JVD.      Trachea: No tracheal deviation.   Cardiovascular:      Rate and Rhythm: Normal rate.   Pulmonary:      Effort: Pulmonary effort is normal. No respiratory distress.      Breath sounds: No stridor.   Abdominal:      General: Bowel sounds are normal.      Palpations: Abdomen is soft. There is no hepatomegaly, splenomegaly or mass.      Tenderness: There is no abdominal tenderness.    Musculoskeletal:         General: No tenderness. Normal range of motion.      Cervical back: Normal range of motion and neck supple.   Lymphadenopathy:      Head:      Right side of head: No posterior auricular or occipital adenopathy.      Left side of head: No posterior auricular or occipital adenopathy.      Cervical: No cervical adenopathy.      Right cervical: No superficial, deep or posterior cervical adenopathy.     Left cervical: No superficial, deep or posterior cervical adenopathy.      Upper Body:      Right upper body: No supraclavicular adenopathy.      Left upper body: No supraclavicular adenopathy.   Skin:     General: Skin is dry.      Findings: No erythema or rash.      Nails: There is no clubbing.   Neurological:      Mental Status: He is alert and oriented to person, place, and time.      Cranial Nerves: No cranial nerve deficit.      Coordination: Coordination normal.   Psychiatric:         Behavior: Behavior normal.         Thought Content: Thought content normal.         Judgment: Judgment normal.           Assessment:      1. Heparin induced thrombocytopenia    2. Abnormal finding of blood chemistry, unspecified    3. Stage 3a chronic kidney disease    4. S/P CABG x 3    5. Primary hypertension           Med Onc Chart Routing      Follow up with physician 3 months. Patient to have standing labs done in [facility] lab near [place] 48-72 hours prior to video visit   Follow up with CHRIS    Infusion scheduling note    Injection scheduling note    Labs CBC, ferritin, iron and TIBC and CMP   Scheduling:  Preferred lab:  Lab interval:     Imaging    Pharmacy appointment    Other referrals             Plan:       Platelet count increasing to 130,000 while on Eliquis 5 mg p.o. b.i.d..  Would recommend that patient continue for 3 months.  No evidence of thrombosis but will proceed with follow-up with labs done in the [place] area 48-72 hours prior before discontinuing Eliquis refill prescription  local pharmacy.  Discussed implications of answered questions with him article from up-to-date followed review induced thrombocytopenia        Elan Nichole Jr, MD FACP

## 2023-07-12 NOTE — PHYSICIAN QUERY
PT Name: Gustavo Tracey Jr.  MR #: [id number]    DOCUMENTATION CLARIFICATION      CDS/: Raymond Salmon Jr, RN CDDS             Contact information: [email or web address]     This form is a permanent document in the medical record.      Query Date: July 12, 2023    By submitting this query, we are merely seeking further clarification of documentation. Please utilize your independent clinical judgment when addressing the question(s) below.       Indicators Supporting Clinical Findings Location in Medical Record   x Anemia, Thrombocytopenia, Neutropenia, Pancytopenia documented Acute blood loss anemia expected post-operatively     Heparin induced thrombocytopenia  06/28/2023 Patient is 6 days postop with declining platelet count.  Patient has had hit score is between 3 and 4.  Intermediate in nature     Patient has anemia and thrombocytopenia.  Will refer to heme Onc as outpatient.   7/4 Query response         7/5 Discharge Summary    x H&H 10.3/32.2-->8.5/26.6-->7.2/22.4-->9.9/30.2-->7.8/23.7-->6.9/21.5-->9.9/30.3-->9.6/29.6 6/21-7/3 Labs   x WBC 12.26-->6.34-->4.01-->3.85-->3.67 6/27-7/4 Labs   x Neutrophils/ Granulocytes/ ANC Immature Granulocytes=1.9-->1.5-->1.3-->0.8 7/1-7/4 Labs   x Platelets 33-->151-->116-->62-->68-->73-->92 6/22-7/3 labs    x Transfusion(s) Transfuse RBC 2 Units      Completed 06/22/23 1755     Transfuse RBC 2 Units      Completed 06/25/23 1940 6/22 Transfusion Record        6/25 Transfusion Record    x Treatments:  Heme-Onc fHIT confirmed Continue apixaban 5 mg 5 mg for anticoagulation.   7/2 Cardiothoracic Surgery Progress Note   x Acute/ Chronic illness Coronary artery disease without angina pectoris  7/2 Cardiothoracic Surgery Progress Note   x Other: Patient's white count is down   Patient is on broad-spectrum antibiotics.  Cultures are no growth to date.  Will discharge patient on p.o. Augmentin    RBC=3.42-->3.56-->3.43-->3.52-->3.44 7/1 Cardiothoracic Surgery  Progress Note      6/23-7/3 Labs   Pancytopenia is defined by:  Hb < 12g/dL (non-pregnant women) or <13g/dL (men) + ANC < 1800/microL + Platelets < 150,000/microL    The clinical guidelines noted are only a system guideline. It does not replace the providers clinical judgment.      Provider, please specify diagnosis or diagnoses associated with above clinical findings.    [   ] Pancytopenia, unspecified type   [ X  ] Other Hematological Diagnosis (please specify the Other Hematological diagnosis): ________   [   ] Clinically Undetermined     Heparin Induced thrombocytopenia            Please document in your progress notes daily for the duration of treatment, until resolved, and include in your discharge summary.    Reference:    BRITTNY Rueda (n.fredy.). Approach to the adult with pancytopenia. [employer]. Retrieved September 7, 2022, from [email or web address]-to-the-adult-[email or web address]-pancytopenia?search=pancytopenia&source=search_result&selectedTitle=1~150&usage_type=default&display_rank=1    Form No. 79045

## 2023-07-18 ENCOUNTER — TELEPHONE (OUTPATIENT)
Dept: CARDIAC REHAB | Facility: HOSPITAL | Age: 63
End: 2023-07-18
Payer: MEDICARE

## 2023-07-18 NOTE — TELEPHONE ENCOUNTER
Spoke with pt regarding cardiac rehab. Pt lives in [place], [place]. Offered to send referral out but pt reports no transportation and will reach out to us when transportation becomes available. Pt will call when ready and wants referral sent to [facility] at that time. Contact phone number provided. All questions answered. Pt verbalized understanding.

## 2023-08-01 ENCOUNTER — LAB VISIT (OUTPATIENT)
Dept: LAB | Facility: HOSPITAL | Age: 63
End: 2023-08-01
Attending: INTERNAL MEDICINE
Payer: MEDICARE

## 2023-08-01 DIAGNOSIS — D64.9 ANEMIA, UNSPECIFIED TYPE: ICD-10-CM

## 2023-08-01 DIAGNOSIS — I10 PRIMARY HYPERTENSION: ICD-10-CM

## 2023-08-01 DIAGNOSIS — I25.10 CORONARY ARTERY DISEASE INVOLVING NATIVE HEART WITHOUT ANGINA PECTORIS, UNSPECIFIED VESSEL OR LESION TYPE: ICD-10-CM

## 2023-08-01 DIAGNOSIS — I42.0 DCM (DILATED CARDIOMYOPATHY): ICD-10-CM

## 2023-08-01 DIAGNOSIS — I48.0 PAF (PAROXYSMAL ATRIAL FIBRILLATION): ICD-10-CM

## 2023-08-01 DIAGNOSIS — Z79.899 ENCOUNTER FOR LONG-TERM (CURRENT) USE OF OTHER MEDICATIONS: ICD-10-CM

## 2023-08-01 DIAGNOSIS — R73.01 IFG (IMPAIRED FASTING GLUCOSE): ICD-10-CM

## 2023-08-01 DIAGNOSIS — R79.89 ELEVATED SERUM CREATININE: ICD-10-CM

## 2023-08-01 DIAGNOSIS — N18.31 STAGE 3A CHRONIC KIDNEY DISEASE: ICD-10-CM

## 2023-08-01 DIAGNOSIS — I10 HYPERTENSION, UNSPECIFIED TYPE: ICD-10-CM

## 2023-08-01 DIAGNOSIS — I50.23 ACUTE ON CHRONIC SYSTOLIC CHF (CONGESTIVE HEART FAILURE): ICD-10-CM

## 2023-08-01 LAB
ALBUMIN SERPL BCP-MCNC: 3.9 G/DL (ref 3.5–5.2)
ALBUMIN/CREAT UR: 7.6 UG/MG (ref 0–30)
ALP SERPL-CCNC: 81 U/L (ref 55–135)
ALT SERPL W/O P-5'-P-CCNC: 16 U/L (ref 10–44)
ANION GAP SERPL CALC-SCNC: 8 MMOL/L (ref 8–16)
AST SERPL-CCNC: 14 U/L (ref 10–40)
BASOPHILS # BLD AUTO: 0 K/UL (ref 0–0.2)
BASOPHILS NFR BLD: 0 % (ref 0–1.9)
BILIRUB SERPL-MCNC: 0.7 MG/DL (ref 0.1–1)
BUN SERPL-MCNC: 29 MG/DL (ref 8–23)
CALCIUM SERPL-MCNC: 9.4 MG/DL (ref 8.7–10.5)
CHLORIDE SERPL-SCNC: 102 MMOL/L (ref 95–110)
CHOLEST SERPL-MCNC: 97 MG/DL (ref 120–199)
CHOLEST/HDLC SERPL: 2.3 {RATIO} (ref 2–5)
CO2 SERPL-SCNC: 28 MMOL/L (ref 23–29)
CREAT SERPL-MCNC: 1.4 MG/DL (ref 0.5–1.4)
CREAT UR-MCNC: 221 MG/DL (ref 23–375)
DIFFERENTIAL METHOD: ABNORMAL
EOSINOPHIL # BLD AUTO: 0 K/UL (ref 0–0.5)
EOSINOPHIL NFR BLD: 0.5 % (ref 0–8)
ERYTHROCYTE [DISTWIDTH] IN BLOOD BY AUTOMATED COUNT: 17.2 % (ref 11.5–14.5)
EST. GFR  (NO RACE VARIABLE): 56.8 ML/MIN/1.73 M^2
ESTIMATED AVG GLUCOSE: 108 MG/DL (ref 68–131)
GLUCOSE SERPL-MCNC: 109 MG/DL (ref 70–110)
HBA1C MFR BLD: 5.4 % (ref 4–5.6)
HCT VFR BLD AUTO: 30.8 % (ref 40–54)
HDLC SERPL-MCNC: 43 MG/DL (ref 40–75)
HDLC SERPL: 44.3 % (ref 20–50)
HGB BLD-MCNC: 9.8 G/DL (ref 14–18)
IMM GRANULOCYTES # BLD AUTO: 0.01 K/UL (ref 0–0.04)
IMM GRANULOCYTES NFR BLD AUTO: 0.5 % (ref 0–0.5)
LDLC SERPL CALC-MCNC: 46.8 MG/DL (ref 63–159)
LYMPHOCYTES # BLD AUTO: 1.1 K/UL (ref 1–4.8)
LYMPHOCYTES NFR BLD: 49.1 % (ref 18–48)
MAGNESIUM SERPL-MCNC: 2.1 MG/DL (ref 1.6–2.6)
MCH RBC QN AUTO: 28.6 PG (ref 27–31)
MCHC RBC AUTO-ENTMCNC: 31.8 G/DL (ref 32–36)
MCV RBC AUTO: 90 FL (ref 82–98)
MICROALBUMIN UR DL<=1MG/L-MCNC: 16.9 MG/L
MONOCYTES # BLD AUTO: 0.2 K/UL (ref 0.3–1)
MONOCYTES NFR BLD: 11.2 % (ref 4–15)
NEUTROPHILS # BLD AUTO: 0.8 K/UL (ref 1.8–7.7)
NEUTROPHILS NFR BLD: 38.7 % (ref 38–73)
NONHDLC SERPL-MCNC: 54 MG/DL
NRBC BLD-RTO: 0 /100 WBC
PLATELET # BLD AUTO: 181 K/UL (ref 150–450)
PLATELET BLD QL SMEAR: ABNORMAL
PMV BLD AUTO: 9.4 FL (ref 9.2–12.9)
POTASSIUM SERPL-SCNC: 2.9 MMOL/L (ref 3.5–5.1)
PROT SERPL-MCNC: 9.2 G/DL (ref 6–8.4)
RBC # BLD AUTO: 3.43 M/UL (ref 4.6–6.2)
SODIUM SERPL-SCNC: 138 MMOL/L (ref 136–145)
TRIGL SERPL-MCNC: 36 MG/DL (ref 30–150)
WBC # BLD AUTO: 2.14 K/UL (ref 3.9–12.7)

## 2023-08-01 PROCEDURE — 82570 ASSAY OF URINE CREATININE: CPT | Performed by: INTERNAL MEDICINE

## 2023-08-01 PROCEDURE — 80061 LIPID PANEL: CPT | Performed by: INTERNAL MEDICINE

## 2023-08-01 PROCEDURE — 83036 HEMOGLOBIN GLYCOSYLATED A1C: CPT | Performed by: INTERNAL MEDICINE

## 2023-08-01 PROCEDURE — 36415 COLL VENOUS BLD VENIPUNCTURE: CPT | Performed by: INTERNAL MEDICINE

## 2023-08-01 PROCEDURE — 83735 ASSAY OF MAGNESIUM: CPT | Performed by: INTERNAL MEDICINE

## 2023-08-01 PROCEDURE — 80053 COMPREHEN METABOLIC PANEL: CPT | Performed by: INTERNAL MEDICINE

## 2023-08-01 PROCEDURE — 85025 COMPLETE CBC W/AUTO DIFF WBC: CPT | Performed by: INTERNAL MEDICINE

## 2023-08-03 ENCOUNTER — OFFICE VISIT (OUTPATIENT)
Dept: CARDIOTHORACIC SURGERY | Facility: CLINIC | Age: 63
End: 2023-08-03
Payer: MEDICARE

## 2023-08-03 ENCOUNTER — EXTERNAL HOME HEALTH (OUTPATIENT)
Dept: HOME HEALTH SERVICES | Facility: HOSPITAL | Age: 63
End: 2023-08-03
Payer: MEDICARE

## 2023-08-03 VITALS
HEIGHT: 73 IN | SYSTOLIC BLOOD PRESSURE: 142 MMHG | WEIGHT: 225.5 LBS | BODY MASS INDEX: 29.88 KG/M2 | HEART RATE: 100 BPM | OXYGEN SATURATION: 99 % | DIASTOLIC BLOOD PRESSURE: 100 MMHG

## 2023-08-03 DIAGNOSIS — D75.829 HEPARIN INDUCED THROMBOCYTOPENIA: ICD-10-CM

## 2023-08-03 DIAGNOSIS — Z95.1 S/P CABG X 3: Primary | ICD-10-CM

## 2023-08-03 DIAGNOSIS — I50.23 ACUTE ON CHRONIC SYSTOLIC CHF (CONGESTIVE HEART FAILURE): ICD-10-CM

## 2023-08-03 DIAGNOSIS — I27.20 PULMONARY HTN: ICD-10-CM

## 2023-08-03 DIAGNOSIS — E78.49 OTHER HYPERLIPIDEMIA: ICD-10-CM

## 2023-08-03 DIAGNOSIS — I42.0 DCM (DILATED CARDIOMYOPATHY): ICD-10-CM

## 2023-08-03 DIAGNOSIS — I10 PRIMARY HYPERTENSION: ICD-10-CM

## 2023-08-03 DIAGNOSIS — I48.0 PAF (PAROXYSMAL ATRIAL FIBRILLATION): ICD-10-CM

## 2023-08-03 DIAGNOSIS — Z86.73 HISTORY OF CVA (CEREBROVASCULAR ACCIDENT): ICD-10-CM

## 2023-08-03 PROCEDURE — 99024 PR POST-OP FOLLOW-UP VISIT: ICD-10-PCS | Mod: POP,,, | Performed by: THORACIC SURGERY (CARDIOTHORACIC VASCULAR SURGERY)

## 2023-08-03 PROCEDURE — 99024 POSTOP FOLLOW-UP VISIT: CPT | Mod: POP,,, | Performed by: THORACIC SURGERY (CARDIOTHORACIC VASCULAR SURGERY)

## 2023-08-03 PROCEDURE — 99999 PR PBB SHADOW E&M-EST. PATIENT-LVL III: ICD-10-PCS | Mod: PBBFAC,,, | Performed by: THORACIC SURGERY (CARDIOTHORACIC VASCULAR SURGERY)

## 2023-08-03 PROCEDURE — 99999 PR PBB SHADOW E&M-EST. PATIENT-LVL III: CPT | Mod: PBBFAC,,, | Performed by: THORACIC SURGERY (CARDIOTHORACIC VASCULAR SURGERY)

## 2023-08-03 PROCEDURE — 99213 OFFICE O/P EST LOW 20 MIN: CPT | Mod: PBBFAC | Performed by: THORACIC SURGERY (CARDIOTHORACIC VASCULAR SURGERY)

## 2023-08-03 NOTE — PROGRESS NOTES
Subjective:      Patient ID: Gustavo Tracey Jr. is a 63 y.o. male.    Chief Complaint: No chief complaint on file.    HPI:  63-year-old male with a history of coronary artery bypass grafting x3 patient is here for the follow-up visit the patient also had heparin induced thrombocytopenia and was started on Eliquis when he was leaving the hospital.  No major complaints at this time he is waiting for to start the cardiopulmonary rehabilitation outpatient activities level is been slowly increasing    Review of patient's allergies indicates:   Allergen Reactions    Heparin analogues        Review of Systems   Constitutional:  Negative for activity change and appetite change.   HENT:  Negative for dental problem, nosebleeds and sore throat.    Eyes:  Negative for discharge and visual disturbance.   Respiratory:  Negative for cough, chest tightness and stridor.    Cardiovascular:  Negative for leg swelling.   Gastrointestinal:  Negative for abdominal distention and abdominal pain.   Genitourinary:  Negative for difficulty urinating and dysuria.   Musculoskeletal:  Negative for arthralgias, back pain and joint swelling.   Allergic/Immunologic: Negative for environmental allergies.   Neurological:  Negative for dizziness, syncope and headaches.   Hematological:  Does not bruise/bleed easily.   Psychiatric/Behavioral:  Negative for behavioral problems.           Objective:   There were no vitals taken for this visit.    X-Ray Chest AP Portable  Narrative: EXAMINATION:  XR CHEST AP PORTABLE    CLINICAL HISTORY:  Post-op; lines and tubes in place, subsequent encounter    COMPARISON:  June 23, 2023    FINDINGS:  EKG leads and other life-saving devices continue to overlie the chest.  Stable right arm PICC line, left-sided chest tube and large-bore mediastinal drain.  Negative for pneumothorax.  Stable bilateral lower lobe atelectasis/consolidation without new pulmonary opacities.  Interval decrease in the amount of air within  loops of bowel underneath the left hemidiaphragm.  The hilar and mediastinal contours and osseous structures are unchanged.  Impression: 1.  Overall, no significant interval change has occurred.    Electronically signed by: Selvin Rowe MD  Date:    06/24/2023  Time:    07:00         Physical Exam  Vitals and nursing note reviewed.   Constitutional:       Appearance: He is obese.   HENT:      Head: Normocephalic and atraumatic.      Mouth/Throat:      Mouth: Mucous membranes are moist.   Eyes:      Extraocular Movements: Extraocular movements intact.      Pupils: Pupils are equal, round, and reactive to light.   Cardiovascular:      Rate and Rhythm: Normal rate and regular rhythm.      Pulses: Normal pulses.      Heart sounds: Normal heart sounds.      Comments: Sternal incision healing well stable  Pulmonary:      Effort: Pulmonary effort is normal.      Breath sounds: Normal breath sounds.   Abdominal:      Palpations: Abdomen is soft.   Musculoskeletal:         General: Normal range of motion.      Cervical back: Normal range of motion and neck supple.   Skin:     General: Skin is warm.      Capillary Refill: Capillary refill takes less than 2 seconds.   Neurological:      General: No focal deficit present.      Mental Status: He is alert and oriented to person, place, and time.   Psychiatric:         Mood and Affect: Mood normal.         Assessment:     1. S/P CABG x 3    2. Primary hypertension    3. Other hyperlipidemia    4. DCM (dilated cardiomyopathy)    5. PAF (paroxysmal atrial fibrillation)    6. Pulmonary HTN    7. Acute on chronic systolic CHF (congestive heart failure)    8. History of CVA (cerebrovascular accident)    9. Heparin induced thrombocytopenia          Plan   Continue Eliquis aspirin beta-blocker  Continue outpatient cardiopulmonary rehabilitation  Follow-up with Cardiology  Follow-up in 1 month.          Malissa Graham MD  [facility]  [place]

## 2023-09-04 PROCEDURE — G0179 MD RECERTIFICATION HHA PT: HCPCS | Mod: ,,, | Performed by: THORACIC SURGERY (CARDIOTHORACIC VASCULAR SURGERY)

## 2023-09-04 PROCEDURE — G0179 PR HOME HEALTH MD RECERTIFICATION: ICD-10-PCS | Mod: ,,, | Performed by: THORACIC SURGERY (CARDIOTHORACIC VASCULAR SURGERY)

## 2023-09-06 ENCOUNTER — TELEPHONE (OUTPATIENT)
Dept: CARDIOTHORACIC SURGERY | Facility: CLINIC | Age: 63
End: 2023-09-06
Payer: MEDICARE

## 2023-09-06 NOTE — TELEPHONE ENCOUNTER
[facility] has contacted the office to have an initial home health order signed for the patient. The patient has received home health shortly after discharge.       The home health nurse was informed that the provider will be out the office until further notice.       The home health nurse sated she would get the PCP to sign off on the home health order.    ----- Message from Lily Smiley sent at 9/6/2023  1:44 PM CDT -----  Contact: [phone]  [facility] is calling for the pt she states they have an order in [facility] waiting for the dr signature please advise

## 2023-09-08 ENCOUNTER — LAB VISIT (OUTPATIENT)
Dept: LAB | Facility: HOSPITAL | Age: 63
End: 2023-09-08
Attending: INTERNAL MEDICINE
Payer: MEDICARE

## 2023-09-08 DIAGNOSIS — N18.31 STAGE 3A CHRONIC KIDNEY DISEASE: ICD-10-CM

## 2023-09-08 DIAGNOSIS — D75.829 HEPARIN INDUCED THROMBOCYTOPENIA: ICD-10-CM

## 2023-09-08 DIAGNOSIS — E78.49 OTHER HYPERLIPIDEMIA: ICD-10-CM

## 2023-09-08 DIAGNOSIS — R73.01 IFG (IMPAIRED FASTING GLUCOSE): ICD-10-CM

## 2023-09-08 DIAGNOSIS — D64.9 ANEMIA, UNSPECIFIED TYPE: ICD-10-CM

## 2023-09-08 DIAGNOSIS — I48.0 PAF (PAROXYSMAL ATRIAL FIBRILLATION): ICD-10-CM

## 2023-09-08 DIAGNOSIS — I12.9 BENIGN HYPERTENSION WITH STAGE 3A CHRONIC KIDNEY DISEASE: ICD-10-CM

## 2023-09-08 DIAGNOSIS — N18.31 BENIGN HYPERTENSION WITH STAGE 3A CHRONIC KIDNEY DISEASE: ICD-10-CM

## 2023-09-08 LAB
ALBUMIN SERPL BCP-MCNC: 3.8 G/DL (ref 3.5–5.2)
ALP SERPL-CCNC: 67 U/L (ref 55–135)
ALT SERPL W/O P-5'-P-CCNC: 18 U/L (ref 10–44)
ANION GAP SERPL CALC-SCNC: 3 MMOL/L (ref 8–16)
ANISOCYTOSIS BLD QL SMEAR: SLIGHT
AST SERPL-CCNC: 12 U/L (ref 10–40)
BASOPHILS # BLD AUTO: ABNORMAL K/UL (ref 0–0.2)
BASOPHILS NFR BLD: 0 % (ref 0–1.9)
BILIRUB SERPL-MCNC: 0.8 MG/DL (ref 0.1–1)
BUN SERPL-MCNC: 18 MG/DL (ref 8–23)
CALCIUM SERPL-MCNC: 9.3 MG/DL (ref 8.7–10.5)
CHLORIDE SERPL-SCNC: 108 MMOL/L (ref 95–110)
CO2 SERPL-SCNC: 28 MMOL/L (ref 23–29)
CREAT SERPL-MCNC: 1.3 MG/DL (ref 0.5–1.4)
DIFFERENTIAL METHOD: ABNORMAL
EOSINOPHIL # BLD AUTO: ABNORMAL K/UL (ref 0–0.5)
EOSINOPHIL NFR BLD: 0 % (ref 0–8)
ERYTHROCYTE [DISTWIDTH] IN BLOOD BY AUTOMATED COUNT: 17.4 % (ref 11.5–14.5)
EST. GFR  (NO RACE VARIABLE): >60 ML/MIN/1.73 M^2
GLUCOSE SERPL-MCNC: 103 MG/DL (ref 70–110)
HCT VFR BLD AUTO: 30.6 % (ref 40–54)
HGB BLD-MCNC: 10.1 G/DL (ref 14–18)
IMM GRANULOCYTES # BLD AUTO: ABNORMAL K/UL (ref 0–0.04)
IMM GRANULOCYTES NFR BLD AUTO: ABNORMAL % (ref 0–0.5)
IRON SATN MFR SERPL: 41 % (ref 20–50)
IRON SERPL-MCNC: 93 UG/DL (ref 45–160)
LYMPHOCYTES # BLD AUTO: ABNORMAL K/UL (ref 1–4.8)
LYMPHOCYTES NFR BLD: 53 % (ref 18–48)
MCH RBC QN AUTO: 29.1 PG (ref 27–31)
MCHC RBC AUTO-ENTMCNC: 33 G/DL (ref 32–36)
MCV RBC AUTO: 88 FL (ref 82–98)
METAMYELOCYTES NFR BLD MANUAL: 1 %
MONOCYTES # BLD AUTO: ABNORMAL K/UL (ref 0.3–1)
MONOCYTES NFR BLD: 0 % (ref 4–15)
NEUTROPHILS # BLD AUTO: ABNORMAL K/UL (ref 1.8–7.7)
NEUTROPHILS NFR BLD: 46 % (ref 38–73)
NRBC BLD-RTO: 1 /100 WBC
OVALOCYTES BLD QL SMEAR: ABNORMAL
PLATELET # BLD AUTO: 141 K/UL (ref 150–450)
PLATELET BLD QL SMEAR: ABNORMAL
PMV BLD AUTO: 10.1 FL (ref 9.2–12.9)
POIKILOCYTOSIS BLD QL SMEAR: SLIGHT
POLYCHROMASIA BLD QL SMEAR: ABNORMAL
POTASSIUM SERPL-SCNC: 3.6 MMOL/L (ref 3.5–5.1)
PROT SERPL-MCNC: 8.6 G/DL (ref 6–8.4)
RBC # BLD AUTO: 3.47 M/UL (ref 4.6–6.2)
RETICS/RBC NFR AUTO: 2.3 % (ref 0.4–2)
SODIUM SERPL-SCNC: 139 MMOL/L (ref 136–145)
TOTAL IRON BINDING CAPACITY: 226 UG/DL (ref 250–450)
WBC # BLD AUTO: 1.92 K/UL (ref 3.9–12.7)

## 2023-09-08 PROCEDURE — 83540 ASSAY OF IRON: CPT | Performed by: INTERNAL MEDICINE

## 2023-09-08 PROCEDURE — 80053 COMPREHEN METABOLIC PANEL: CPT | Performed by: INTERNAL MEDICINE

## 2023-09-08 PROCEDURE — 85027 COMPLETE CBC AUTOMATED: CPT | Performed by: INTERNAL MEDICINE

## 2023-09-08 PROCEDURE — 83550 IRON BINDING TEST: CPT | Performed by: INTERNAL MEDICINE

## 2023-09-08 PROCEDURE — 36415 COLL VENOUS BLD VENIPUNCTURE: CPT | Performed by: INTERNAL MEDICINE

## 2023-09-08 PROCEDURE — 85045 AUTOMATED RETICULOCYTE COUNT: CPT | Performed by: INTERNAL MEDICINE

## 2023-09-08 PROCEDURE — 85007 BL SMEAR W/DIFF WBC COUNT: CPT | Performed by: INTERNAL MEDICINE

## 2023-09-21 PROBLEM — E53.8 B12 DEFICIENCY: Status: ACTIVE | Noted: 2023-09-21

## 2023-09-21 PROBLEM — D61.818 OTHER PANCYTOPENIA: Status: ACTIVE | Noted: 2023-09-21

## 2023-09-21 PROBLEM — E78.2 MIXED HYPERLIPIDEMIA: Status: ACTIVE | Noted: 2022-11-14

## 2023-09-21 PROBLEM — R05.1 ACUTE COUGH: Status: RESOLVED | Noted: 2022-11-14 | Resolved: 2023-09-21

## 2023-09-26 ENCOUNTER — LAB VISIT (OUTPATIENT)
Dept: LAB | Facility: HOSPITAL | Age: 63
End: 2023-09-26
Attending: INTERNAL MEDICINE
Payer: MEDICARE

## 2023-09-26 DIAGNOSIS — Z79.899 ENCOUNTER FOR LONG-TERM (CURRENT) USE OF OTHER MEDICATIONS: ICD-10-CM

## 2023-09-26 DIAGNOSIS — I10 PRIMARY HYPERTENSION: ICD-10-CM

## 2023-09-26 DIAGNOSIS — I42.0 DCM (DILATED CARDIOMYOPATHY): ICD-10-CM

## 2023-09-26 DIAGNOSIS — I25.10 CORONARY ARTERY DISEASE INVOLVING NATIVE HEART WITHOUT ANGINA PECTORIS, UNSPECIFIED VESSEL OR LESION TYPE: ICD-10-CM

## 2023-09-26 DIAGNOSIS — I50.23 ACUTE ON CHRONIC SYSTOLIC CHF (CONGESTIVE HEART FAILURE): ICD-10-CM

## 2023-09-26 DIAGNOSIS — I48.0 PAF (PAROXYSMAL ATRIAL FIBRILLATION): ICD-10-CM

## 2023-09-26 DIAGNOSIS — D64.9 ANEMIA, UNSPECIFIED TYPE: ICD-10-CM

## 2023-09-26 DIAGNOSIS — D75.829 HEPARIN INDUCED THROMBOCYTOPENIA: ICD-10-CM

## 2023-09-26 DIAGNOSIS — E78.2 MIXED HYPERLIPIDEMIA: ICD-10-CM

## 2023-09-26 DIAGNOSIS — N18.31 STAGE 3A CHRONIC KIDNEY DISEASE: ICD-10-CM

## 2023-09-26 DIAGNOSIS — E53.8 B12 DEFICIENCY: ICD-10-CM

## 2023-09-26 DIAGNOSIS — R73.01 IFG (IMPAIRED FASTING GLUCOSE): ICD-10-CM

## 2023-09-26 LAB
ALBUMIN SERPL BCP-MCNC: 3.8 G/DL (ref 3.5–5.2)
ALP SERPL-CCNC: 77 U/L (ref 55–135)
ALT SERPL W/O P-5'-P-CCNC: 18 U/L (ref 10–44)
ANION GAP SERPL CALC-SCNC: 10 MMOL/L (ref 3–11)
AST SERPL-CCNC: 19 U/L (ref 10–40)
BASOPHILS # BLD AUTO: 0 K/UL (ref 0–0.2)
BASOPHILS NFR BLD: 0 % (ref 0–1.9)
BILIRUB SERPL-MCNC: 0.5 MG/DL (ref 0.1–1)
BUN SERPL-MCNC: 26 MG/DL (ref 8–23)
CALCIUM SERPL-MCNC: 9.8 MG/DL (ref 8.7–10.5)
CHLORIDE SERPL-SCNC: 97 MMOL/L (ref 95–110)
CHOLEST SERPL-MCNC: 94 MG/DL (ref 120–199)
CHOLEST/HDLC SERPL: 2.8 {RATIO} (ref 2–5)
CO2 SERPL-SCNC: 29 MMOL/L (ref 23–29)
CREAT SERPL-MCNC: 1.4 MG/DL (ref 0.5–1.4)
DIFFERENTIAL METHOD: ABNORMAL
EOSINOPHIL # BLD AUTO: 0 K/UL (ref 0–0.5)
EOSINOPHIL NFR BLD: 0 % (ref 0–8)
ERYTHROCYTE [DISTWIDTH] IN BLOOD BY AUTOMATED COUNT: 17.3 % (ref 11.5–14.5)
EST. GFR  (NO RACE VARIABLE): 56.8 ML/MIN/1.73 M^2
GLUCOSE SERPL-MCNC: 105 MG/DL (ref 70–110)
HCT VFR BLD AUTO: 31.3 % (ref 40–54)
HDLC SERPL-MCNC: 34 MG/DL (ref 40–75)
HDLC SERPL: 36.2 % (ref 20–50)
HGB BLD-MCNC: 10.1 G/DL (ref 14–18)
IMM GRANULOCYTES # BLD AUTO: 0 K/UL (ref 0–0.04)
IMM GRANULOCYTES NFR BLD AUTO: 0 % (ref 0–0.5)
IRON SATN MFR SERPL: 40 % (ref 20–50)
IRON SERPL-MCNC: 98 UG/DL (ref 45–160)
LDLC SERPL CALC-MCNC: 46.6 MG/DL (ref 63–159)
LYMPHOCYTES # BLD AUTO: 1.5 K/UL (ref 1–4.8)
LYMPHOCYTES NFR BLD: 63.4 % (ref 18–48)
MAGNESIUM SERPL-MCNC: 1.9 MG/DL (ref 1.6–2.6)
MCH RBC QN AUTO: 28.1 PG (ref 27–31)
MCHC RBC AUTO-ENTMCNC: 32.3 G/DL (ref 32–36)
MCV RBC AUTO: 87 FL (ref 82–98)
MONOCYTES # BLD AUTO: 0.5 K/UL (ref 0.3–1)
MONOCYTES NFR BLD: 19.8 % (ref 4–15)
NEUTROPHILS # BLD AUTO: 0.4 K/UL (ref 1.8–7.7)
NEUTROPHILS NFR BLD: 16.8 % (ref 38–73)
NONHDLC SERPL-MCNC: 60 MG/DL
NRBC BLD-RTO: 3 /100 WBC
NT-PROBNP SERPL-MCNC: 315 PG/ML (ref 5–900)
PLATELET # BLD AUTO: 134 K/UL (ref 150–450)
PLATELET BLD QL SMEAR: ABNORMAL
PMV BLD AUTO: 9.4 FL (ref 9.2–12.9)
POTASSIUM SERPL-SCNC: 2.9 MMOL/L (ref 3.5–5.1)
PROT SERPL-MCNC: 8.8 G/DL (ref 6–8.4)
RBC # BLD AUTO: 3.6 M/UL (ref 4.6–6.2)
RETICS/RBC NFR AUTO: 1.4 % (ref 0.4–2)
SODIUM SERPL-SCNC: 136 MMOL/L (ref 136–145)
TOTAL IRON BINDING CAPACITY: 246 UG/DL (ref 250–450)
TRIGL SERPL-MCNC: 67 MG/DL (ref 30–150)
WBC # BLD AUTO: 2.32 K/UL (ref 3.9–12.7)

## 2023-09-26 PROCEDURE — 83880 ASSAY OF NATRIURETIC PEPTIDE: CPT | Performed by: INTERNAL MEDICINE

## 2023-09-26 PROCEDURE — 83550 IRON BINDING TEST: CPT | Performed by: INTERNAL MEDICINE

## 2023-09-26 PROCEDURE — 80061 LIPID PANEL: CPT | Performed by: INTERNAL MEDICINE

## 2023-09-26 PROCEDURE — 36415 COLL VENOUS BLD VENIPUNCTURE: CPT | Performed by: INTERNAL MEDICINE

## 2023-09-26 PROCEDURE — 85025 COMPLETE CBC W/AUTO DIFF WBC: CPT | Performed by: INTERNAL MEDICINE

## 2023-09-26 PROCEDURE — 83540 ASSAY OF IRON: CPT | Performed by: INTERNAL MEDICINE

## 2023-09-26 PROCEDURE — 85045 AUTOMATED RETICULOCYTE COUNT: CPT | Performed by: INTERNAL MEDICINE

## 2023-09-26 PROCEDURE — 80053 COMPREHEN METABOLIC PANEL: CPT | Performed by: INTERNAL MEDICINE

## 2023-09-26 PROCEDURE — 83735 ASSAY OF MAGNESIUM: CPT | Performed by: INTERNAL MEDICINE

## 2023-09-27 DIAGNOSIS — I42.9 PRIMARY CARDIOMYOPATHY: Primary | ICD-10-CM

## 2023-10-16 ENCOUNTER — EXTERNAL HOME HEALTH (OUTPATIENT)
Dept: HOME HEALTH SERVICES | Facility: HOSPITAL | Age: 63
End: 2023-10-16
Payer: MEDICARE

## 2023-10-31 ENCOUNTER — LAB VISIT (OUTPATIENT)
Dept: LAB | Facility: HOSPITAL | Age: 63
End: 2023-10-31
Attending: INTERNAL MEDICINE
Payer: MEDICARE

## 2023-10-31 DIAGNOSIS — E87.6 HYPOKALEMIA: ICD-10-CM

## 2023-10-31 DIAGNOSIS — N18.31 STAGE 3A CHRONIC KIDNEY DISEASE: ICD-10-CM

## 2023-10-31 DIAGNOSIS — E78.49 OTHER HYPERLIPIDEMIA: ICD-10-CM

## 2023-10-31 DIAGNOSIS — I48.0 PAF (PAROXYSMAL ATRIAL FIBRILLATION): ICD-10-CM

## 2023-10-31 DIAGNOSIS — D64.9 ANEMIA, UNSPECIFIED TYPE: ICD-10-CM

## 2023-10-31 DIAGNOSIS — I10 HYPERTENSION, UNSPECIFIED TYPE: ICD-10-CM

## 2023-10-31 DIAGNOSIS — I42.0 DCM (DILATED CARDIOMYOPATHY): ICD-10-CM

## 2023-10-31 DIAGNOSIS — Z79.899 ENCOUNTER FOR LONG-TERM (CURRENT) USE OF OTHER MEDICATIONS: ICD-10-CM

## 2023-10-31 DIAGNOSIS — I50.23 ACUTE ON CHRONIC SYSTOLIC CHF (CONGESTIVE HEART FAILURE): ICD-10-CM

## 2023-10-31 DIAGNOSIS — R73.01 IFG (IMPAIRED FASTING GLUCOSE): ICD-10-CM

## 2023-10-31 DIAGNOSIS — R79.89 ELEVATED SERUM CREATININE: ICD-10-CM

## 2023-10-31 DIAGNOSIS — I25.10 CORONARY ARTERY DISEASE INVOLVING NATIVE HEART WITHOUT ANGINA PECTORIS, UNSPECIFIED VESSEL OR LESION TYPE: ICD-10-CM

## 2023-10-31 DIAGNOSIS — Z95.1 S/P CABG X 3: ICD-10-CM

## 2023-10-31 DIAGNOSIS — I10 PRIMARY HYPERTENSION: ICD-10-CM

## 2023-10-31 DIAGNOSIS — E78.2 MIXED HYPERLIPIDEMIA: ICD-10-CM

## 2023-10-31 DIAGNOSIS — E53.8 B12 DEFICIENCY: ICD-10-CM

## 2023-10-31 LAB
ALBUMIN SERPL BCP-MCNC: 4 G/DL (ref 3.5–5.2)
ALP SERPL-CCNC: 73 U/L (ref 55–135)
ALT SERPL W/O P-5'-P-CCNC: 20 U/L (ref 10–44)
ANION GAP SERPL CALC-SCNC: 2 MMOL/L (ref 3–11)
ANISOCYTOSIS BLD QL SMEAR: ABNORMAL
AST SERPL-CCNC: 17 U/L (ref 10–40)
BASOPHILS NFR BLD: 0 % (ref 0–1.9)
BILIRUB SERPL-MCNC: 0.8 MG/DL (ref 0.1–1)
BLASTS NFR BLD MANUAL: 19 %
BUN SERPL-MCNC: 19 MG/DL (ref 8–23)
CALCIUM SERPL-MCNC: 9.1 MG/DL (ref 8.7–10.5)
CHLORIDE SERPL-SCNC: 104 MMOL/L (ref 95–110)
CHOLEST SERPL-MCNC: 84 MG/DL (ref 120–199)
CHOLEST/HDLC SERPL: 2.2 {RATIO} (ref 2–5)
CO2 SERPL-SCNC: 29 MMOL/L (ref 23–29)
CREAT SERPL-MCNC: 1.4 MG/DL (ref 0.5–1.4)
DACRYOCYTES BLD QL SMEAR: ABNORMAL
DIFFERENTIAL METHOD: ABNORMAL
EOSINOPHIL NFR BLD: 0 % (ref 0–8)
ERYTHROCYTE [DISTWIDTH] IN BLOOD BY AUTOMATED COUNT: 19.8 % (ref 11.5–14.5)
EST. GFR  (NO RACE VARIABLE): 56.8 ML/MIN/1.73 M^2
GLUCOSE SERPL-MCNC: 107 MG/DL (ref 70–110)
HCT VFR BLD AUTO: 28.7 % (ref 40–54)
HDLC SERPL-MCNC: 39 MG/DL (ref 40–75)
HDLC SERPL: 46.4 % (ref 20–50)
HGB BLD-MCNC: 9 G/DL (ref 14–18)
IMM GRANULOCYTES # BLD AUTO: ABNORMAL K/UL (ref 0–0.04)
IMM GRANULOCYTES NFR BLD AUTO: ABNORMAL % (ref 0–0.5)
IRON SATN MFR SERPL: 95 % (ref 20–50)
IRON SERPL-MCNC: 236 UG/DL (ref 45–160)
LDLC SERPL CALC-MCNC: 33.8 MG/DL (ref 63–159)
LYMPHOCYTES NFR BLD: 48 % (ref 18–48)
MCH RBC QN AUTO: 26.9 PG (ref 27–31)
MCHC RBC AUTO-ENTMCNC: 31.4 G/DL (ref 32–36)
MCV RBC AUTO: 86 FL (ref 82–98)
MONOCYTES NFR BLD: 4 % (ref 4–15)
NEUTROPHILS NFR BLD: 29 % (ref 38–73)
NONHDLC SERPL-MCNC: 45 MG/DL
NRBC BLD-RTO: 7 /100 WBC
OVALOCYTES BLD QL SMEAR: ABNORMAL
PLATELET # BLD AUTO: 120 K/UL (ref 150–450)
PMV BLD AUTO: 10 FL (ref 9.2–12.9)
POLYCHROMASIA BLD QL SMEAR: ABNORMAL
POTASSIUM SERPL-SCNC: 3.5 MMOL/L (ref 3.5–5.1)
PROT SERPL-MCNC: 8.5 G/DL (ref 6–8.4)
RBC # BLD AUTO: 3.35 M/UL (ref 4.6–6.2)
RETICS/RBC NFR AUTO: 2.4 % (ref 0.4–2)
SODIUM SERPL-SCNC: 135 MMOL/L (ref 136–145)
TOTAL IRON BINDING CAPACITY: 249 UG/DL (ref 250–450)
TRIGL SERPL-MCNC: 56 MG/DL (ref 30–150)
WBC # BLD AUTO: 3.43 K/UL (ref 3.9–12.7)

## 2023-10-31 PROCEDURE — 85007 BL SMEAR W/DIFF WBC COUNT: CPT | Performed by: INTERNAL MEDICINE

## 2023-10-31 PROCEDURE — 80061 LIPID PANEL: CPT | Performed by: INTERNAL MEDICINE

## 2023-10-31 PROCEDURE — 88185 FLOWCYTOMETRY/TC ADD-ON: CPT | Mod: 59 | Performed by: PATHOLOGY

## 2023-10-31 PROCEDURE — 83550 IRON BINDING TEST: CPT | Performed by: INTERNAL MEDICINE

## 2023-10-31 PROCEDURE — 85027 COMPLETE CBC AUTOMATED: CPT | Performed by: INTERNAL MEDICINE

## 2023-10-31 PROCEDURE — 80053 COMPREHEN METABOLIC PANEL: CPT | Performed by: INTERNAL MEDICINE

## 2023-10-31 PROCEDURE — 83540 ASSAY OF IRON: CPT | Performed by: INTERNAL MEDICINE

## 2023-10-31 PROCEDURE — 88189 PR  FLOWCYTOMETRY/READ, 16 & > MARKERS: ICD-10-PCS | Mod: ,,, | Performed by: PATHOLOGY

## 2023-10-31 PROCEDURE — 88184 FLOWCYTOMETRY/ TC 1 MARKER: CPT | Performed by: PATHOLOGY

## 2023-10-31 PROCEDURE — 36415 COLL VENOUS BLD VENIPUNCTURE: CPT | Performed by: INTERNAL MEDICINE

## 2023-10-31 PROCEDURE — 85045 AUTOMATED RETICULOCYTE COUNT: CPT | Performed by: INTERNAL MEDICINE

## 2023-10-31 PROCEDURE — 88189 FLOWCYTOMETRY/READ 16 & >: CPT | Mod: ,,, | Performed by: PATHOLOGY

## 2023-11-02 PROBLEM — Z71.2 ENCOUNTER TO DISCUSS TEST RESULTS: Status: ACTIVE | Noted: 2023-11-02

## 2023-11-02 PROBLEM — Z71.9 ENCOUNTER FOR HEALTH EDUCATION: Status: ACTIVE | Noted: 2023-11-02

## 2023-11-02 PROBLEM — Z71.89 ENCOUNTER TO DISCUSS TREATMENT OPTIONS: Status: ACTIVE | Noted: 2023-11-02

## 2023-11-02 PROBLEM — D68.9 COAGULOPATHY: Status: ACTIVE | Noted: 2023-11-02

## 2023-11-02 PROBLEM — D69.6 THROMBOCYTOPENIA: Status: ACTIVE | Noted: 2023-11-02

## 2023-11-02 LAB — PATHOLOGIST REVIEW: NORMAL

## 2023-11-03 LAB
FLOW CYTOMETRY ANTIBODIES ANALYZED - BLOOD: NORMAL
FLOW CYTOMETRY COMMENT - BLOOD: NORMAL
FLOW CYTOMETRY INTERPRETATION - BLOOD: NORMAL

## 2023-11-03 PROCEDURE — G0179 PR HOME HEALTH MD RECERTIFICATION: ICD-10-PCS | Mod: ,,, | Performed by: THORACIC SURGERY (CARDIOTHORACIC VASCULAR SURGERY)

## 2023-11-03 PROCEDURE — G0179 MD RECERTIFICATION HHA PT: HCPCS | Mod: ,,, | Performed by: THORACIC SURGERY (CARDIOTHORACIC VASCULAR SURGERY)

## 2023-11-06 ENCOUNTER — TELEPHONE (OUTPATIENT)
Dept: HEMATOLOGY/ONCOLOGY | Facility: CLINIC | Age: 63
End: 2023-11-06
Payer: MEDICARE

## 2023-11-06 PROBLEM — C92.00 ACUTE MYELOID LEUKEMIA NOT HAVING ACHIEVED REMISSION: Status: ACTIVE | Noted: 2023-11-06

## 2023-11-06 NOTE — TELEPHONE ENCOUNTER
----- Message from Sonya Harley sent at 11/6/2023  1:57 PM CST -----  Good afternoon,   Dr Oneill has put in a URGENT request for patient to be seen this week, or latest Monday.  Please contact patient to schedule this appointment        Thanks

## 2023-11-06 NOTE — PROGRESS NOTES
Mr Chen is coming in tomorrow, please make a note to make sure he has appt w/ [facility] as we sent referral last week

## 2023-11-07 ENCOUNTER — TELEPHONE (OUTPATIENT)
Dept: CARDIAC REHAB | Facility: CLINIC | Age: 63
End: 2023-11-07
Payer: MEDICARE

## 2023-11-07 NOTE — TELEPHONE ENCOUNTER
Information letter mailed to patient as he lives out of the service area.  Patient lives in [place], contact sent for local cardiac rehab program.    CONCHITA Albrecht RN  Cardiac Rehab Nurse

## 2023-11-08 ENCOUNTER — TELEPHONE (OUTPATIENT)
Dept: HEMATOLOGY/ONCOLOGY | Facility: CLINIC | Age: 63
End: 2023-11-08
Payer: MEDICARE

## 2023-11-08 NOTE — TELEPHONE ENCOUNTER
----- Message from Alli De Leon sent at 11/7/2023  2:38 PM CST -----  Regarding: BMT  New Cancer Patient Intake Documentation    Diagnosis: Acute myeloid leukemia not having achieved remission    Date patient referral received: 11/06/23 (referring office advised their provider would like patient seen this week or next week with Dr. Hinds)    Records collected: in house    Questions asked:  What doctor have you seen for this diagnosis?  Marlene Oneill    What imaging have you had?   Date of imaging:  Where did that occur?    Have you been diagnosed with cancer by a biopsy and/or surgery? No  Date of biopsy:  Date of surgery:  Where did that occur?    Other pertinent info:  cbc cmp ldh beta 2 11/06/23  flow cytometry analysis 10/31/23

## 2023-11-15 ENCOUNTER — TELEPHONE (OUTPATIENT)
Dept: HEMATOLOGY/ONCOLOGY | Facility: CLINIC | Age: 63
End: 2023-11-15
Payer: MEDICARE

## 2023-11-15 DIAGNOSIS — C92.00 ACUTE MYELOID LEUKEMIA NOT HAVING ACHIEVED REMISSION: Primary | ICD-10-CM

## 2023-11-15 DIAGNOSIS — R79.1 ABNORMAL COAGULATION PROFILE: ICD-10-CM

## 2023-11-15 NOTE — TELEPHONE ENCOUNTER
Called pt to remind of appts tomorrow for both labs & to see Dr. Jacobsen. No answer, left detailed voicemail with my number should he need anything.

## 2023-11-15 NOTE — TELEPHONE ENCOUNTER
----- Message from Rui Jacobsen MD sent at 11/14/2023  4:11 PM CST -----  Regarding: RE: CBC  Sure thing - I'd be happy to obtain labs prior to the visit (CBC, CMP, Mg, Phos, LDH, uric acid, T&S, PT/INR, PTT, fibrinogen).     Thanks,  Jarrell Jacobsen     ----- Message -----  From: Marcia Arreguin RN  Sent: 11/14/2023   1:08 PM CST  To: Marlene Oneill MD; #  Subject: CBC                                              Good afternoon,    Dr. Oneill has requested that patient has a CBC drawn in a recheck on 11/16/23 due to a decrease in H/H from today's labs. Mr. Tracey has his appointment with Dr. Jacobsen on 11/16/23 at 1:30 PM, offered to schedule him here for 7:45 AM in the event he needs a blood transfusion, which takes about 4 hours. I informed him he would be finished, if he does get a transfusion, he would finish prior to 12:30 PM and could make his appt, however he informed me that it would take him 1-2 hours to get to [facility] from here.     Dr. Oneill was requesting if the CBC could be ordered/drawn there since it is very important he is seen and does not miss his appointment. Please let us know if this can be arranged.    Thank you, and have a great day!  KONRAD Kennedy

## 2023-11-16 ENCOUNTER — OFFICE VISIT (OUTPATIENT)
Dept: HEMATOLOGY/ONCOLOGY | Facility: CLINIC | Age: 63
End: 2023-11-16
Payer: MEDICARE

## 2023-11-16 ENCOUNTER — LAB VISIT (OUTPATIENT)
Dept: LAB | Facility: HOSPITAL | Age: 63
End: 2023-11-16
Payer: MEDICARE

## 2023-11-16 VITALS
TEMPERATURE: 98 F | WEIGHT: 209.75 LBS | HEART RATE: 83 BPM | DIASTOLIC BLOOD PRESSURE: 72 MMHG | RESPIRATION RATE: 18 BRPM | OXYGEN SATURATION: 99 % | BODY MASS INDEX: 27.8 KG/M2 | SYSTOLIC BLOOD PRESSURE: 137 MMHG | HEIGHT: 73 IN

## 2023-11-16 DIAGNOSIS — R79.1 ABNORMAL COAGULATION PROFILE: ICD-10-CM

## 2023-11-16 DIAGNOSIS — C92.00 ACUTE MYELOID LEUKEMIA NOT HAVING ACHIEVED REMISSION: Primary | ICD-10-CM

## 2023-11-16 DIAGNOSIS — D75.829 HEPARIN INDUCED THROMBOCYTOPENIA: ICD-10-CM

## 2023-11-16 DIAGNOSIS — C92.00 ACUTE MYELOID LEUKEMIA NOT HAVING ACHIEVED REMISSION: ICD-10-CM

## 2023-11-16 DIAGNOSIS — D49.9 IMMUNODEFICIENCY SECONDARY TO NEOPLASM: ICD-10-CM

## 2023-11-16 DIAGNOSIS — D61.818 PANCYTOPENIA: ICD-10-CM

## 2023-11-16 DIAGNOSIS — Z91.89 AT HIGH RISK OF TUMOR LYSIS SYNDROME: ICD-10-CM

## 2023-11-16 DIAGNOSIS — D84.81 IMMUNODEFICIENCY SECONDARY TO NEOPLASM: ICD-10-CM

## 2023-11-16 LAB
ABO + RH BLD: NORMAL
ALBUMIN SERPL BCP-MCNC: 4 G/DL (ref 3.5–5.2)
ALP SERPL-CCNC: 65 U/L (ref 55–135)
ALT SERPL W/O P-5'-P-CCNC: 13 U/L (ref 10–44)
ANION GAP SERPL CALC-SCNC: 11 MMOL/L (ref 8–16)
APTT PPP: 33.9 SEC (ref 21–32)
AST SERPL-CCNC: 20 U/L (ref 10–40)
BILIRUB SERPL-MCNC: 1.2 MG/DL (ref 0.1–1)
BLD GP AB SCN CELLS X3 SERPL QL: NORMAL
BUN SERPL-MCNC: 22 MG/DL (ref 8–23)
CALCIUM SERPL-MCNC: 9.6 MG/DL (ref 8.7–10.5)
CHLORIDE SERPL-SCNC: 101 MMOL/L (ref 95–110)
CO2 SERPL-SCNC: 26 MMOL/L (ref 23–29)
CREAT SERPL-MCNC: 1.2 MG/DL (ref 0.5–1.4)
ERYTHROCYTE [DISTWIDTH] IN BLOOD BY AUTOMATED COUNT: 22.1 % (ref 11.5–14.5)
EST. GFR  (NO RACE VARIABLE): >60 ML/MIN/1.73 M^2
FIBRINOGEN PPP-MCNC: 415 MG/DL (ref 182–400)
GLUCOSE SERPL-MCNC: 152 MG/DL (ref 70–110)
HCT VFR BLD AUTO: 22.6 % (ref 40–54)
HGB BLD-MCNC: 7.1 G/DL (ref 14–18)
IMM GRANULOCYTES # BLD AUTO: 0.03 K/UL (ref 0–0.04)
INR PPP: 2.3 (ref 0.8–1.2)
LDH SERPL L TO P-CCNC: 215 U/L (ref 110–260)
MAGNESIUM SERPL-MCNC: 1.9 MG/DL (ref 1.6–2.6)
MCH RBC QN AUTO: 26.3 PG (ref 27–31)
MCHC RBC AUTO-ENTMCNC: 31.4 G/DL (ref 32–36)
MCV RBC AUTO: 84 FL (ref 82–98)
NEUTROPHILS # BLD AUTO: 0.7 K/UL (ref 1.8–7.7)
PHOSPHATE SERPL-MCNC: 2.9 MG/DL (ref 2.7–4.5)
PLATELET # BLD AUTO: 103 K/UL (ref 150–450)
PMV BLD AUTO: 10.1 FL (ref 9.2–12.9)
POTASSIUM SERPL-SCNC: 3.6 MMOL/L (ref 3.5–5.1)
PROT SERPL-MCNC: 7.9 G/DL (ref 6–8.4)
PROTHROMBIN TIME: 23 SEC (ref 9–12.5)
RBC # BLD AUTO: 2.7 M/UL (ref 4.6–6.2)
SODIUM SERPL-SCNC: 138 MMOL/L (ref 136–145)
SPECIMEN OUTDATE: NORMAL
URATE SERPL-MCNC: 8.4 MG/DL (ref 3.4–7)
WBC # BLD AUTO: 2.7 K/UL (ref 3.9–12.7)

## 2023-11-16 PROCEDURE — 85610 PROTHROMBIN TIME: CPT | Performed by: INTERNAL MEDICINE

## 2023-11-16 PROCEDURE — 83615 LACTATE (LD) (LDH) ENZYME: CPT | Performed by: INTERNAL MEDICINE

## 2023-11-16 PROCEDURE — 84100 ASSAY OF PHOSPHORUS: CPT | Performed by: INTERNAL MEDICINE

## 2023-11-16 PROCEDURE — 99214 OFFICE O/P EST MOD 30 MIN: CPT | Mod: PBBFAC | Performed by: INTERNAL MEDICINE

## 2023-11-16 PROCEDURE — 80053 COMPREHEN METABOLIC PANEL: CPT | Performed by: INTERNAL MEDICINE

## 2023-11-16 PROCEDURE — 85027 COMPLETE CBC AUTOMATED: CPT | Performed by: INTERNAL MEDICINE

## 2023-11-16 PROCEDURE — 99215 PR OFFICE/OUTPT VISIT, EST, LEVL V, 40-54 MIN: ICD-10-PCS | Mod: S$PBB,,, | Performed by: INTERNAL MEDICINE

## 2023-11-16 PROCEDURE — 86901 BLOOD TYPING SEROLOGIC RH(D): CPT | Performed by: INTERNAL MEDICINE

## 2023-11-16 PROCEDURE — 83735 ASSAY OF MAGNESIUM: CPT | Performed by: INTERNAL MEDICINE

## 2023-11-16 PROCEDURE — 99999 PR PBB SHADOW E&M-EST. PATIENT-LVL IV: ICD-10-PCS | Mod: PBBFAC,,, | Performed by: INTERNAL MEDICINE

## 2023-11-16 PROCEDURE — 85384 FIBRINOGEN ACTIVITY: CPT | Performed by: INTERNAL MEDICINE

## 2023-11-16 PROCEDURE — 99999 PR PBB SHADOW E&M-EST. PATIENT-LVL IV: CPT | Mod: PBBFAC,,, | Performed by: INTERNAL MEDICINE

## 2023-11-16 PROCEDURE — 36415 COLL VENOUS BLD VENIPUNCTURE: CPT | Performed by: INTERNAL MEDICINE

## 2023-11-16 PROCEDURE — 99215 OFFICE O/P EST HI 40 MIN: CPT | Mod: S$PBB,,, | Performed by: INTERNAL MEDICINE

## 2023-11-16 PROCEDURE — 85730 THROMBOPLASTIN TIME PARTIAL: CPT | Performed by: INTERNAL MEDICINE

## 2023-11-16 PROCEDURE — 84550 ASSAY OF BLOOD/URIC ACID: CPT | Performed by: INTERNAL MEDICINE

## 2023-11-16 RX ORDER — ALLOPURINOL 300 MG/1
300 TABLET ORAL DAILY
Qty: 30 TABLET | Refills: 11 | Status: SHIPPED | OUTPATIENT
Start: 2023-11-16

## 2023-11-16 RX ORDER — ACYCLOVIR 400 MG/1
400 TABLET ORAL 2 TIMES DAILY
Qty: 60 TABLET | Refills: 11 | Status: SHIPPED | OUTPATIENT
Start: 2023-11-16

## 2023-11-16 RX ORDER — POSACONAZOLE 100 MG/1
300 TABLET, DELAYED RELEASE ORAL DAILY
Qty: 90 TABLET | Refills: 11 | Status: ACTIVE | OUTPATIENT
Start: 2023-11-16 | End: 2024-01-08

## 2023-11-16 RX ORDER — LEVOFLOXACIN 500 MG/1
500 TABLET, FILM COATED ORAL DAILY
Qty: 30 TABLET | Refills: 11 | Status: SHIPPED | OUTPATIENT
Start: 2023-11-16 | End: 2024-01-11

## 2023-11-16 RX ORDER — FLUCONAZOLE 200 MG/1
400 TABLET ORAL DAILY
Qty: 60 TABLET | Refills: 11 | Status: SHIPPED | OUTPATIENT
Start: 2023-11-16

## 2023-11-16 RX ORDER — FLUCONAZOLE 200 MG/1
400 TABLET ORAL DAILY
Qty: 60 TABLET | Refills: 11 | Status: SHIPPED | OUTPATIENT
Start: 2023-11-16 | End: 2023-11-16 | Stop reason: SDUPTHER

## 2023-11-16 NOTE — Clinical Note
Good Evening Dr. Guzman, Mr. Tracey saw me for his newly diagnosed AML. I'm awaiting bone marrow biopsy results. Inevitably he will become more pancytopenic with treatment, so I'm trying to clarify his need for Warfarin. Do you know if he has any other reasons to be on it? From a HIT standpoint, he would have completed adequate treatment (4 weeks if no thrombus, 3 months if thrombus is present with HIT). Thanks for your help.  Regards, Jarrell Jacobsen [phone]

## 2023-11-16 NOTE — Clinical Note
Yvette SHIRLEY, I saw him today! If you hear anything about biopsy results let me know. I'll keep an eye out as well. He won't be an induction candidate given comorbidities. Would you be able to help me with Aza/Adair locally? I would certainly help and make adjustments along the way. And at this point if he can get labs Monday/Thurs as well with you to monitor for transfusion needs?  Thanks so much JONATHAN Vieyra

## 2023-11-17 NOTE — PROGRESS NOTES
Section of Hematology and Stem Cell Transplantation  New Patient Consult     Date of visit: 11/16/23  Visit diagnosis: Acute myeloid leukemia not having achieved remission [C92.00]  Referred by:  Dr. Marlene Le *    Oncologic History:     Primary Oncologic Diagnosis: Acute myeloid leukemia    6/28/23: Diagnosed with heparin-induced thrombocytopenia. HIT Ab 1.87 OD (moderate-strong). ZAHIRA not available.   10/31/23: Peripheral blood flow cytometry sent due to worsening pancytopenia (CBC: WBC 3.43, Hgb 9, Plts 120, 19% blasts) revealed increased blasts (43.1%) concerning for acute myeloid leukemia.   11/9/23: Bone marrow biopsy obtained. Results pending.     History of Present Ilness:   Gustavo Tracey Jr. (Gustavo) is a pleasant 63 y.o.male with past medical history as listed below who presents for initial consultation regarding newly diagnosed acute myeloid leukemia.  His oncologic history is detailed above.  Bone marrow biopsy obtained on 11/09/2023 with results pending.  He states that overall he feels well at this time.  He has been more fatigued overall, although he is unsure if it is related to his heart failure.  No recent fevers or chills.    PAST MEDICAL HISTORY:   Past Medical History:   Diagnosis Date    Abnormal nuclear stress test 11/26/2022    Cervical radiculopathy     to rt arm    CHF (congestive heart failure)     Chronic systolic congestive heart failure 11/28/2022    Dilated cardiomyopathy 11/26/2022    Hyperlipidemia     Hypertension     Obesity, unspecified     PAF (paroxysmal atrial fibrillation) 11/26/2022    Pulmonary HTN 11/28/2022    Stroke        PAST SURGICAL HISTORY:   Past Surgical History:   Procedure Laterality Date    CLOSURE OF LEFT ATRIAL APPENDAGE USING DEVICE Left 6/22/2023    Procedure: CLOSURE, LEFT ATRIAL APPENDAGE, USING DEVICE;  Surgeon: Rustam Dave MD;  Location: [facility];  Service: Cardiovascular;  Laterality: Left;  LIGATION OF LEFT ATRIAL APPENDAGE WITH OTILIA  EXCLUSION SYSTEM    CORONARY ARTERY BYPASS GRAFT (CABG) N/A 6/22/2023    Procedure: CORONARY ARTERY BYPASS GRAFT (CABG);  Surgeon: Rustam Dave MD;  Location: [facility] OR;  Service: Cardiovascular;  Laterality: N/A;  3-VESSEL WITH EPI-AORTIC ULTRASOUND    PURDY MAZE PROCEDURE N/A 6/22/2023    Procedure: PURDY MAZE PROCEDURE;  Surgeon: Rustam Dave MD;  Location: [facility] OR;  Service: Cardiovascular;  Laterality: N/A;    ECHOCARDIOGRAM,TRANSESOPHAGEAL N/A 6/22/2023    Procedure: ECHOCARDIOGRAM,TRANSESOPHAGEAL;  Surgeon: Rustam Dave MD;  Location: [facility] OR;  Service: Cardiovascular;  Laterality: N/A;    ENDOSCOPIC HARVEST OF VEIN Left 6/22/2023    Procedure: SURGICAL PROCUREMENT, VEIN, ENDOSCOPIC;  Surgeon: Rustam Dave MD;  Location: [facility];  Service: Cardiovascular;  Laterality: Left;    INJECTION OF ANESTHETIC AGENT AROUND MULTIPLE INTERCOSTAL NERVES N/A 6/22/2023    Procedure: BLOCK, NERVE, INTERCOSTAL, 2 OR MORE;  Surgeon: Rustam Dave MD;  Location: [facility] OR;  Service: Cardiovascular;  Laterality: N/A;  PARASTERNAL NERVE BLOCK    INSTANTANEOUS WAVE-FREE RATIO  6/20/2023    Procedure: Instantaneous Wave-Free Ratio;  Surgeon: Zion Ortega MD;  Location: [facility] CATH LAB;  Service: Cardiology;;    IVUS, CORONARY  6/20/2023    Procedure: IVUS, Coronary;  Surgeon: Zion Ortega MD;  Location: [facility] CATH LAB;  Service: Cardiology;;    LEFT HEART CATHETERIZATION Left 11/28/2022    Procedure: CATHETERIZATION, HEART, LEFT;  Surgeon: Zion Ortega MD;  Location: [facility] CATH LAB;  Service: Cardiology;  Laterality: Left;    LEFT HEART CATHETERIZATION Left 6/20/2023    Procedure: Left heart cath;  Surgeon: Zion Ortega MD;  Location: [facility] CATH LAB;  Service: Cardiology;  Laterality: Left;    PERCUTANEOUS TRANSLUMINAL BALLOON ANGIOPLASTY OF CORONARY ARTERY  6/20/2023    Procedure: Angioplasty-coronary;  Surgeon: Zion Ortega MD;  Location: [facility] CATH LAB;  Service: Cardiology;;    RIGHT HEART CATHETERIZATION  N/A 11/28/2022    Procedure: INSERTION, CATHETER, RIGHT HEART;  Surgeon: Zion Ortega MD;  Location: [facility] CATH LAB;  Service: Cardiology;  Laterality: N/A;  Congestive heart failure       PAST SOCIAL HISTORY:  Social History     Tobacco Use    Smoking status: Never    Smokeless tobacco: Never   Substance Use Topics    Alcohol use: Yes    Drug use: Never       FAMILY HISTORY:  Family History   Problem Relation Age of Onset    Hypertension Mother     Diabetes Father     Hyperlipidemia Father     Hypertension Father     Heart attack Father     Coronary artery disease Father        CURRENT MEDICATIONS:   Current Outpatient Medications   Medication Sig    amitriptyline (ELAVIL) 50 MG tablet Take 50 mg by mouth every evening.    aspirin (ECOTRIN) 81 MG EC tablet Take 1 tablet (81 mg total) by mouth once daily.    FARXIGA 10 mg tablet TAKE 1 TABLET BY MOUTH EVERY DAY    ferrous sulfate (FEOSOL) 325 mg (65 mg iron) Tab tablet Take 1 tablet by mouth once daily.    folic acid (FOLVITE) 1 MG tablet Take 2 tablets (2 mg total) by mouth once daily.    furosemide (LASIX) 20 MG tablet Take 1 tablet by mouth once daily.    HYDROcodone-acetaminophen (NORCO)  mg per tablet TAKE 1 TABLET BY MOUTH 1 TO 2 TIMES A DAY AS NEEDED    latanoprost 0.005 % ophthalmic solution Place 1 drop into both eyes nightly.    metoprolol succinate (TOPROL-XL) 100 MG 24 hr tablet Take 1 tablet by mouth once daily.    pantoprazole (PROTONIX) 40 MG tablet Take 1 tablet (40 mg total) by mouth once daily.    potassium chloride (K-TAB) 20 mEq TAKE 1 TABLET BY MOUTH 2 TIMES A DAY.    pravastatin (PRAVACHOL) 20 MG tablet Take 20 mg by mouth once daily.    sacubitriL-valsartan (ENTRESTO) 24-26 mg per tablet Take 1 tablet by mouth 2 (two) times daily.    warfarin (COUMADIN) 5 MG tablet Take 1 tablet by mouth every Mon, Wed, Fri.    acyclovir (ZOVIRAX) 400 MG tablet Take 1 tablet (400 mg total) by mouth 2 (two) times daily.    allopurinoL (ZYLOPRIM) 300 MG  tablet Take 1 tablet (300 mg total) by mouth once daily.    fluconazole (DIFLUCAN) 200 MG Tab Take 2 tablets (400 mg total) by mouth once daily. Stop once posaconazole is received.    levoFLOXacin (LEVAQUIN) 500 MG tablet Take 1 tablet (500 mg total) by mouth once daily.    LORazepam (ATIVAN) 0.5 MG tablet Take 1 tablet (0.5 mg total) by mouth once. 1 tablet by mouth 30 minutes before bone marrow biopsy for 1 dose     No current facility-administered medications for this visit.     Facility-Administered Medications Ordered in Other Visits   Medication    sodium chloride 0.9% flush 10 mL       ALLERGIES:   Review of patient's allergies indicates:   Allergen Reactions    Heparin analogues        Review of Systems:     Pertinent positives and negatives included in the HPI. Otherwise a complete review of systems is negative.    Physical Exam:     Vitals:    11/16/23 1314   BP: 137/72   Pulse: 83   Resp: 18   Temp: 98 °F (36.7 °C)     General: Appears well, NAD  Pulmonary: CTAB, no increased work of breathing, no W/R/C  Cardiovascular: S1S2 normal, RRR, no M/R/G  Abdominal: Soft, NT, ND, BS+, no HSM  Extremities: No C/C/E  Neurological: AAOx4, grossly normal, no focal deficits  Dermatologic: No appreciable rashes or lesions  Lymphatic: No cervical, axillary, or inguinal lymphadenopathy     ECOG Performance Status: (foot note - ECOG PS provided by Eastern Cooperative Oncology Group) 1 - Symptomatic but completely ambulatory    Karnofsky Performance Score:  90%- Able to Carry on Normal Activity: Minor Symptoms of Disease    Labs:   Lab Results   Component Value Date    WBC 2.70 (L) 11/16/2023    RBC 2.70 (L) 11/16/2023    HGB 7.1 (L) 11/16/2023    HCT 22.6 (L) 11/16/2023    MCV 84 11/16/2023    MCH 26.3 (L) 11/16/2023    MCHC 31.4 (L) 11/16/2023    RDW 22.1 (H) 11/16/2023     (L) 11/16/2023    MPV 10.1 11/16/2023    GRAN 0.7 (L) 11/16/2023    LYMPH 25.0 11/14/2023    MONO 4.0 11/14/2023    EOS CANCELED 11/06/2023     BASO CANCELED 11/06/2023    EOSINOPHIL 0.0 11/14/2023    BASOPHIL 0.0 11/14/2023       CMP  Sodium   Date Value Ref Range Status   11/16/2023 138 136 - 145 mmol/L Final     Potassium   Date Value Ref Range Status   11/16/2023 3.6 3.5 - 5.1 mmol/L Final     Chloride   Date Value Ref Range Status   11/16/2023 101 95 - 110 mmol/L Final     CO2   Date Value Ref Range Status   11/16/2023 26 23 - 29 mmol/L Final     Glucose   Date Value Ref Range Status   11/16/2023 152 (H) 70 - 110 mg/dL Final     BUN   Date Value Ref Range Status   11/16/2023 22 8 - 23 mg/dL Final     Creatinine   Date Value Ref Range Status   11/16/2023 1.2 0.5 - 1.4 mg/dL Final     Calcium   Date Value Ref Range Status   11/16/2023 9.6 8.7 - 10.5 mg/dL Final     Total Protein   Date Value Ref Range Status   11/16/2023 7.9 6.0 - 8.4 g/dL Final     Albumin   Date Value Ref Range Status   11/16/2023 4.0 3.5 - 5.2 g/dL Final     Total Bilirubin   Date Value Ref Range Status   11/16/2023 1.2 (H) 0.1 - 1.0 mg/dL Final     Comment:     For infants and newborns, interpretation of results should be based  on gestational age, weight and in agreement with clinical  observations.    Premature Infant recommended reference ranges:  Up to 24 hours.............<8.0 mg/dL  Up to 48 hours............<12.0 mg/dL  3-5 days..................<15.0 mg/dL  6-29 days.................<15.0 mg/dL       Alkaline Phosphatase   Date Value Ref Range Status   11/16/2023 65 55 - 135 U/L Final     AST   Date Value Ref Range Status   11/16/2023 20 10 - 40 U/L Final     ALT   Date Value Ref Range Status   11/16/2023 13 10 - 44 U/L Final     Anion Gap   Date Value Ref Range Status   11/16/2023 11 8 - 16 mmol/L Final       Imaging:   Reviewed     Pathology:  Pending     Assessment and Plan:   Gustavo Kyung Lizama (Gustavo) is a pleasant 63 y.o.male who presents for initial consultation regarding acute myeloid leukemia.     Acute myeloid leukemia:  Peripheral blood flow cytometry obtain  10/31/2023 concerning for acute myeloid leukemia.  Bone marrow biopsy obtained on 11/09/2023 with results pending.  Fortunately his counts remain stable, and overall he is fairly asymptomatic.  Will continue monitoring closely with labs twice weekly while awaiting biopsy and CG/NGS results.  We reviewed his underlying diagnosis including natural history, prognosis, risks/complications, and various treatment options.  He would not be an induction candidate given his comorbidities.  Considering Aza/Adair for treatment.    Pancytopenia:  Monitor CBC twice weekly. Transfuse for Hgb <7 or Plts <10.    Immunodeficiency secondary to neoplasm:  Start antimicrobial prophylaxis with levofloxacin, acyclovir, and fluconazole.  Posaconazole send to [facility] which will replace fluconazole when approved.    At high risk for tumor lysis syndrome:  Start prophylaxis with allopurinol 300mg daily.    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  Heparin antibody 1.87 OD (moderate-strong). ZAHIRA not available, which limits the assessment.  Unclear if he has other reasons to continue Warfarin, but he would have completed an adequate duration of prophylactic anticoagulation (4 weeks or 3 months) for HIT.  Will discuss with his PCP regarding anticoagulation needs. He should avoid heparin indefinitely.     Orders/Follow Up:      Orders Placed This Encounter    acyclovir (ZOVIRAX) 400 MG tablet    allopurinoL (ZYLOPRIM) 300 MG tablet    levoFLOXacin (LEVAQUIN) 500 MG tablet    fluconazole (DIFLUCAN) 200 MG Tab       Route Chart for Scheduling    BMT Chart Routing  Urgent    Follow up with physician 1 month.   Follow up with CHRIS    Provider visit type    Infusion scheduling note    Injection scheduling note    Labs    Imaging    Pharmacy appointment    Other referrals                     Advance Care Planning   Date: 11/16/2023  We reviewed his underlying diagnosis including natural history, prognosis, and various treatment options. He  remains interested in pursuing any and all treatment options in an effort to improve his quality and quantity of life. Will continue treatment as recommended above.       Total time of this visit was 65 minutes, including time spent face to face with patient and/or via video/audio, and also in preparing for today's visit for MDM and documentation. (Medical Decision Making, including consideration of possible diagnoses, management options, complex medical record review, review of diagnostic tests and information, consideration and discussion of significant complications based on comorbidities, and discussion with providers involved with the care of the patient). Greater than 50% was spent face to face with the patient counseling and coordinating care.      Jarrell Jacobsen MD  Malignant Hematology, Stem Cell Transplant, and Cellular Therapy  [facility]  [facility]

## 2023-11-22 ENCOUNTER — TELEPHONE (OUTPATIENT)
Dept: PHARMACY | Facility: CLINIC | Age: 63
End: 2023-11-22
Payer: MEDICARE

## 2023-11-22 DIAGNOSIS — C92.00 ACUTE MYELOID LEUKEMIA NOT HAVING ACHIEVED REMISSION: Primary | ICD-10-CM

## 2023-11-24 ENCOUNTER — LAB VISIT (OUTPATIENT)
Dept: LAB | Facility: HOSPITAL | Age: 63
End: 2023-11-24
Attending: INTERNAL MEDICINE
Payer: MEDICARE

## 2023-11-24 DIAGNOSIS — C92.00 ACUTE MYELOID LEUKEMIA NOT HAVING ACHIEVED REMISSION: ICD-10-CM

## 2023-11-24 PROCEDURE — 88325 CONSLTJ COMPRE RVW REC REPRT: CPT | Mod: ,,, | Performed by: PATHOLOGY

## 2023-11-24 PROCEDURE — 88325 CONSLTJ COMPRE RVW REC REPRT: CPT | Performed by: PATHOLOGY

## 2023-11-24 PROCEDURE — 88325 PR  COMPREHENSIVE REVIEW OF DATA: ICD-10-PCS | Mod: ,,, | Performed by: PATHOLOGY

## 2023-11-27 LAB
COMMENT: NORMAL
FINAL PATHOLOGIC DIAGNOSIS: NORMAL
Lab: NORMAL
MICROSCOPIC EXAM: NORMAL

## 2023-11-29 ENCOUNTER — EXTERNAL HOME HEALTH (OUTPATIENT)
Dept: HOME HEALTH SERVICES | Facility: HOSPITAL | Age: 63
End: 2023-11-29
Payer: MEDICARE

## 2023-11-30 ENCOUNTER — TELEPHONE (OUTPATIENT)
Dept: HEMATOLOGY/ONCOLOGY | Facility: CLINIC | Age: 63
End: 2023-11-30
Payer: MEDICARE

## 2023-12-05 NOTE — TELEPHONE ENCOUNTER
Hello,        A Patient Assistance Application for Gustavo Tracey Jr. - MRN  [id number] was faxed to your office @[phone].  Please have MONIK Castillo review the application to ensure the prescription is correct. If correct, sign and fax the application back to the Pharmacy Patient Assistance Team @[phone].       If changes need to be made to this application, please let me know so corrections can be made, and the application will be faxed back to you for approval and signature.

## 2023-12-05 NOTE — TELEPHONE ENCOUNTER
Gustavo Tracey Jr. has provided the necessary documents today to begin the enrollment process into the [employer] program. The prescription portion of the application has been sent to the providers office for approval and signature.

## 2023-12-05 NOTE — TELEPHONE ENCOUNTER
Hello,       A Patient Assistance Application for Gustavo Tracey Jr. - MRN  [id number] was faxed to your office @[phone]}. Please have Dr. Rui Kendrick review the application to ensure the prescription is correct. If correct, sign and fax the application back to the Pharmacy Patient Assistance Team @[phone].      If changes need to be made to this application, please let me know so corrections can be made, and the application will be faxed back to you for approval and signature.

## 2023-12-06 NOTE — TELEPHONE ENCOUNTER
The signed and completed patient assistance application was received from the providers office and  has been submitted to [employer] for consideration of eligibility.

## 2023-12-07 ENCOUNTER — TELEPHONE (OUTPATIENT)
Dept: INFUSION THERAPY | Facility: HOSPITAL | Age: 63
End: 2023-12-07

## 2023-12-11 NOTE — TELEPHONE ENCOUNTER
Gustavo Tracey Jr. has been denied by The [employer] Patient Assistance Program.    Denial Date: 12/08/23  Denial Reason:   Patient is over the income limit 300% of the Federal Poverty level         Patient has been notified of this decision via PHONE

## 2024-01-02 PROCEDURE — G0179 MD RECERTIFICATION HHA PT: HCPCS | Mod: ,,, | Performed by: THORACIC SURGERY (CARDIOTHORACIC VASCULAR SURGERY)

## 2024-01-03 DIAGNOSIS — C92.00 ACUTE MYELOID LEUKEMIA NOT HAVING ACHIEVED REMISSION: ICD-10-CM

## 2024-01-04 ENCOUNTER — TELEPHONE (OUTPATIENT)
Dept: HEMATOLOGY/ONCOLOGY | Facility: CLINIC | Age: 64
End: 2024-01-04
Payer: MEDICARE

## 2024-01-04 DIAGNOSIS — C92.00 ACUTE MYELOID LEUKEMIA NOT HAVING ACHIEVED REMISSION: ICD-10-CM

## 2024-01-04 NOTE — TELEPHONE ENCOUNTER
----- Message from Jodie Davis sent at 1/4/2024  9:14 AM CST -----    Patient Returning Call        Who Called:pt  Does the patient know what this is regarding?:provider cancelled pt asking nurse to call and make a new appt  Would the patient rather a call back or a response via [facility]? call  Best Call Back Number:[phone]  Additional Information: call back

## 2024-01-04 NOTE — TELEPHONE ENCOUNTER
LV for pt in regards to gricel 1/4 at 11am appt due to provider out. Offered 1/8 at 4pm. Wife will return call to confirm.

## 2024-01-05 NOTE — TELEPHONE ENCOUNTER
Spoke to pt regarding status of venclexta, pt stated he has been taking for 2.5 weeks now. Advised pt per MONIK Ghosh to stop taking and bring medication to clinic on Monday 1/8 for vidaza injection at 1pm. Confirmed appt with Ann-Marie at 11 am on 1/8. Pt verbalized understanding.

## 2024-01-08 ENCOUNTER — INFUSION (OUTPATIENT)
Dept: INFUSION THERAPY | Facility: HOSPITAL | Age: 64
End: 2024-01-08
Payer: MEDICARE

## 2024-01-08 ENCOUNTER — OFFICE VISIT (OUTPATIENT)
Dept: HEMATOLOGY/ONCOLOGY | Facility: CLINIC | Age: 64
End: 2024-01-08
Payer: MEDICARE

## 2024-01-08 VITALS
HEIGHT: 73 IN | BODY MASS INDEX: 26.94 KG/M2 | DIASTOLIC BLOOD PRESSURE: 85 MMHG | RESPIRATION RATE: 18 BRPM | HEART RATE: 72 BPM | TEMPERATURE: 98 F | WEIGHT: 203.25 LBS | SYSTOLIC BLOOD PRESSURE: 142 MMHG

## 2024-01-08 VITALS
DIASTOLIC BLOOD PRESSURE: 82 MMHG | BODY MASS INDEX: 26.94 KG/M2 | HEART RATE: 69 BPM | HEIGHT: 73 IN | SYSTOLIC BLOOD PRESSURE: 142 MMHG | OXYGEN SATURATION: 100 % | WEIGHT: 203.25 LBS | TEMPERATURE: 98 F

## 2024-01-08 DIAGNOSIS — C92.00 ACUTE MYELOID LEUKEMIA NOT HAVING ACHIEVED REMISSION: Primary | ICD-10-CM

## 2024-01-08 DIAGNOSIS — D84.81 IMMUNODEFICIENCY SECONDARY TO NEOPLASM: ICD-10-CM

## 2024-01-08 DIAGNOSIS — Z91.89 AT HIGH RISK OF TUMOR LYSIS SYNDROME: ICD-10-CM

## 2024-01-08 DIAGNOSIS — D49.9 IMMUNODEFICIENCY SECONDARY TO NEOPLASM: ICD-10-CM

## 2024-01-08 DIAGNOSIS — D61.818 PANCYTOPENIA: ICD-10-CM

## 2024-01-08 PROCEDURE — 99214 OFFICE O/P EST MOD 30 MIN: CPT | Mod: PBBFAC | Performed by: INTERNAL MEDICINE

## 2024-01-08 PROCEDURE — 25000003 PHARM REV CODE 250: Performed by: INTERNAL MEDICINE

## 2024-01-08 PROCEDURE — 99215 OFFICE O/P EST HI 40 MIN: CPT | Mod: S$PBB,,, | Performed by: INTERNAL MEDICINE

## 2024-01-08 PROCEDURE — 63600175 PHARM REV CODE 636 W HCPCS: Performed by: INTERNAL MEDICINE

## 2024-01-08 PROCEDURE — 96401 CHEMO ANTI-NEOPL SQ/IM: CPT

## 2024-01-08 PROCEDURE — 99999 PR PBB SHADOW E&M-EST. PATIENT-LVL IV: CPT | Mod: PBBFAC,,, | Performed by: INTERNAL MEDICINE

## 2024-01-08 RX ORDER — ONDANSETRON 4 MG/1
16 TABLET, FILM COATED ORAL
Status: COMPLETED | OUTPATIENT
Start: 2024-01-08 | End: 2024-01-08

## 2024-01-08 RX ORDER — ONDANSETRON HYDROCHLORIDE 8 MG/1
16 TABLET, FILM COATED ORAL
Status: CANCELLED | OUTPATIENT
Start: 2024-01-11

## 2024-01-08 RX ORDER — AZACITIDINE 100 MG/1
75 INJECTION, POWDER, LYOPHILIZED, FOR SOLUTION INTRAVENOUS; SUBCUTANEOUS
Status: CANCELLED | OUTPATIENT
Start: 2024-01-10

## 2024-01-08 RX ORDER — LORAZEPAM 1 MG/1
1 TABLET ORAL ONCE AS NEEDED
Qty: 1 TABLET | Refills: 0 | Status: SHIPPED | OUTPATIENT
Start: 2024-01-08 | End: 2024-01-31

## 2024-01-08 RX ORDER — ONDANSETRON HYDROCHLORIDE 8 MG/1
16 TABLET, FILM COATED ORAL
Status: CANCELLED | OUTPATIENT
Start: 2024-01-09

## 2024-01-08 RX ORDER — AZACITIDINE 100 MG/1
75 INJECTION, POWDER, LYOPHILIZED, FOR SOLUTION INTRAVENOUS; SUBCUTANEOUS
Status: CANCELLED | OUTPATIENT
Start: 2024-01-11

## 2024-01-08 RX ORDER — ONDANSETRON HYDROCHLORIDE 8 MG/1
16 TABLET, FILM COATED ORAL
Status: CANCELLED | OUTPATIENT
Start: 2024-01-08

## 2024-01-08 RX ORDER — AZACITIDINE 100 MG/1
75 INJECTION, POWDER, LYOPHILIZED, FOR SOLUTION INTRAVENOUS; SUBCUTANEOUS
Status: CANCELLED | OUTPATIENT
Start: 2024-01-08

## 2024-01-08 RX ORDER — AZACITIDINE 100 MG/1
75 INJECTION, POWDER, LYOPHILIZED, FOR SOLUTION INTRAVENOUS; SUBCUTANEOUS
Status: CANCELLED | OUTPATIENT
Start: 2024-01-09

## 2024-01-08 RX ORDER — AZACITIDINE 100 MG/1
75 INJECTION, POWDER, LYOPHILIZED, FOR SOLUTION INTRAVENOUS; SUBCUTANEOUS
Status: CANCELLED | OUTPATIENT
Start: 2024-01-12

## 2024-01-08 RX ORDER — AZACITIDINE 100 MG/1
75 INJECTION, POWDER, LYOPHILIZED, FOR SOLUTION INTRAVENOUS; SUBCUTANEOUS
Status: COMPLETED | OUTPATIENT
Start: 2024-01-08 | End: 2024-01-08

## 2024-01-08 RX ORDER — CYCLOBENZAPRINE HCL 10 MG
10 TABLET ORAL 2 TIMES DAILY PRN
COMMUNITY
Start: 2024-01-03

## 2024-01-08 RX ORDER — ONDANSETRON HYDROCHLORIDE 8 MG/1
16 TABLET, FILM COATED ORAL
Status: CANCELLED | OUTPATIENT
Start: 2024-01-12

## 2024-01-08 RX ORDER — ONDANSETRON HYDROCHLORIDE 8 MG/1
16 TABLET, FILM COATED ORAL
Status: CANCELLED | OUTPATIENT
Start: 2024-01-10

## 2024-01-08 RX ADMIN — AZACITIDINE 165 MG: 100 INJECTION, POWDER, LYOPHILIZED, FOR SOLUTION INTRAVENOUS; SUBCUTANEOUS at 01:01

## 2024-01-08 RX ADMIN — ONDANSETRON HYDROCHLORIDE 16 MG: 4 TABLET, FILM COATED ORAL at 01:01

## 2024-01-08 NOTE — Clinical Note
He will be coming here for Vidaza (x5 days every 28 days). He lives 2 hours from here. Is it possible to have him stay at [facility] for the week he's here for treatment? Or are there some other resources we can provide? Thanks!

## 2024-01-08 NOTE — H&P (VIEW-ONLY)
Section of Hematology and Stem Cell Transplantation  Follow Up Visit     Date of visit: 1/8/24  Visit diagnosis: Acute myeloid leukemia not having achieved remission [C92.00]  Referred by:  FERN Oneill MD    Oncologic History:     Primary Oncologic Diagnosis: Acute myeloid leukemia, adverse risk.    6/28/23: Diagnosed with heparin-induced thrombocytopenia. HIT Ab 1.87 OD (moderate-strong). ZAHIRA not available.   10/31/23: Peripheral blood flow cytometry sent due to worsening pancytopenia (CBC: WBC 3.43, Hgb 9, Plts 120, 19% blasts) revealed increased blasts (43.1%) concerning for acute myeloid leukemia.   11/9/23: Bone marrow biopsy revealed a hypercellular marrow (80%) with increased blasts consistent with acute myeloid leukemia. Karyotype 46,XY,del(20)(q11.2q13.3)[4]/46,XY[16]. FISH with 20q12 deletion. NGS with IDH2 (40%) and SRSF2 (40%).  12/2023: He started taking venetoclax 200mg daily (did not start azacitidine due to scheduling conflicts).      History of Present Ilness:   Gustavo Tracey Jr. (Gustavo) is a pleasant 63 y.o.male who presents for follow up. He started taking venetoclax after he received it in December. He was not able to make it here for his azacitidine infusions as he had his port placed that week, and he was not feeling well after. He has been feeling great. No side effects from venetoclax.     PAST MEDICAL HISTORY:   Past Medical History:   Diagnosis Date    Abnormal nuclear stress test 11/26/2022    Cervical radiculopathy     to rt arm    CHF (congestive heart failure)     Chronic systolic congestive heart failure 11/28/2022    Dilated cardiomyopathy 11/26/2022    Hyperlipidemia     Hypertension     Obesity, unspecified     PAF (paroxysmal atrial fibrillation) 11/26/2022    Pulmonary HTN 11/28/2022    Stroke        PAST SURGICAL HISTORY:   Past Surgical History:   Procedure Laterality Date    CLOSURE OF LEFT ATRIAL APPENDAGE USING DEVICE Left 6/22/2023    Procedure: CLOSURE, LEFT ATRIAL  APPENDAGE, USING DEVICE;  Surgeon: Rustam Dave MD;  Location: [facility] OR;  Service: Cardiovascular;  Laterality: Left;  LIGATION OF LEFT ATRIAL APPENDAGE WITH OTILIA EXCLUSION SYSTEM    CORONARY ARTERY BYPASS GRAFT (CABG) N/A 6/22/2023    Procedure: CORONARY ARTERY BYPASS GRAFT (CABG);  Surgeon: Rustam Dave MD;  Location: [facility] OR;  Service: Cardiovascular;  Laterality: N/A;  3-VESSEL WITH EPI-AORTIC ULTRASOUND    PURDY MAZE PROCEDURE N/A 6/22/2023    Procedure: PURDY MAZE PROCEDURE;  Surgeon: Rustam Dave MD;  Location: [facility] OR;  Service: Cardiovascular;  Laterality: N/A;    ECHOCARDIOGRAM,TRANSESOPHAGEAL N/A 6/22/2023    Procedure: ECHOCARDIOGRAM,TRANSESOPHAGEAL;  Surgeon: Rustam Dave MD;  Location: [facility] OR;  Service: Cardiovascular;  Laterality: N/A;    ENDOSCOPIC HARVEST OF VEIN Left 6/22/2023    Procedure: SURGICAL PROCUREMENT, VEIN, ENDOSCOPIC;  Surgeon: Rustam Dave MD;  Location: [facility] OR;  Service: Cardiovascular;  Laterality: Left;    INJECTION OF ANESTHETIC AGENT AROUND MULTIPLE INTERCOSTAL NERVES N/A 6/22/2023    Procedure: BLOCK, NERVE, INTERCOSTAL, 2 OR MORE;  Surgeon: Rustam Dave MD;  Location: [facility] OR;  Service: Cardiovascular;  Laterality: N/A;  PARASTERNAL NERVE BLOCK    INSTANTANEOUS WAVE-FREE RATIO  6/20/2023    Procedure: Instantaneous Wave-Free Ratio;  Surgeon: Zion Ortega MD;  Location: [facility] CATH LAB;  Service: Cardiology;;    IVUS, CORONARY  6/20/2023    Procedure: IVUS, Coronary;  Surgeon: Zion Ortega MD;  Location: [facility] CATH LAB;  Service: Cardiology;;    LEFT HEART CATHETERIZATION Left 11/28/2022    Procedure: CATHETERIZATION, HEART, LEFT;  Surgeon: Zion Ortega MD;  Location: [facility] CATH LAB;  Service: Cardiology;  Laterality: Left;    LEFT HEART CATHETERIZATION Left 6/20/2023    Procedure: Left heart cath;  Surgeon: Zion Ortega MD;  Location: [facility] CATH LAB;  Service: Cardiology;  Laterality: Left;    PERCUTANEOUS TRANSLUMINAL BALLOON ANGIOPLASTY OF CORONARY  ARTERY  6/20/2023    Procedure: Angioplasty-coronary;  Surgeon: Zion Ortega MD;  Location: [facility] CATH LAB;  Service: Cardiology;;    RIGHT HEART CATHETERIZATION N/A 11/28/2022    Procedure: INSERTION, CATHETER, RIGHT HEART;  Surgeon: Zion Ortega MD;  Location: [facility] CATH LAB;  Service: Cardiology;  Laterality: N/A;  Congestive heart failure       PAST SOCIAL HISTORY:  Social History     Tobacco Use    Smoking status: Never    Smokeless tobacco: Never   Substance Use Topics    Alcohol use: Yes    Drug use: Never       FAMILY HISTORY:  Family History   Problem Relation Age of Onset    Hypertension Mother     Diabetes Father     Hyperlipidemia Father     Hypertension Father     Heart attack Father     Coronary artery disease Father        CURRENT MEDICATIONS:   Current Outpatient Medications   Medication Sig    acyclovir (ZOVIRAX) 400 MG tablet Take 1 tablet (400 mg total) by mouth 2 (two) times daily.    allopurinoL (ZYLOPRIM) 300 MG tablet Take 1 tablet (300 mg total) by mouth once daily.    amitriptyline (ELAVIL) 50 MG tablet Take 50 mg by mouth every evening.    aspirin (ECOTRIN) 81 MG EC tablet Take 1 tablet (81 mg total) by mouth once daily.    cyclobenzaprine (FLEXERIL) 10 MG tablet Take 10 mg by mouth 2 (two) times daily as needed.    FARXIGA 10 mg tablet TAKE 1 TABLET BY MOUTH EVERY DAY    ferrous sulfate (FEOSOL) 325 mg (65 mg iron) Tab tablet Take 1 tablet by mouth once daily.    fluconazole (DIFLUCAN) 200 MG Tab Take 2 tablets (400 mg total) by mouth once daily. Stop once posaconazole is received.    folic acid (FOLVITE) 1 MG tablet Take 2 tablets (2 mg total) by mouth once daily.    furosemide (LASIX) 20 MG tablet Take 1 tablet by mouth once daily.    HYDROcodone-acetaminophen (NORCO)  mg per tablet TAKE 1 TABLET BY MOUTH 1 TO 2 TIMES A DAY AS NEEDED    latanoprost 0.005 % ophthalmic solution Place 1 drop into both eyes nightly.    levoFLOXacin (LEVAQUIN) 500 MG tablet Take 1 tablet (500 mg  total) by mouth once daily.    metoprolol succinate (TOPROL-XL) 100 MG 24 hr tablet Take 1 tablet by mouth once daily.    pantoprazole (PROTONIX) 40 MG tablet Take 1 tablet (40 mg total) by mouth once daily.    potassium chloride (K-TAB) 20 mEq TAKE 1 TABLET BY MOUTH 2 TIMES A DAY.    pravastatin (PRAVACHOL) 20 MG tablet Take 20 mg by mouth once daily.    sacubitriL-valsartan (ENTRESTO) 24-26 mg per tablet Take 1 tablet by mouth 2 (two) times daily.    LORazepam (ATIVAN) 1 MG tablet Take 1 tablet (1 mg total) by mouth once as needed for Anxiety (Take 60 minutes prior to bone marrow biopsy).    venetoclax (VENCLEXTA) 100 mg Tab Take 200 mg by mouth once daily Take 200mg daily 1-21 of a 28 day cycle. Always start with azacitidine (Vidaza)..     Current Facility-Administered Medications   Medication    0.9%  NaCl infusion (for blood administration)    0.9%  NaCl infusion (for blood administration)    acetaminophen tablet 650 mg    acetaminophen tablet 650 mg    diphenhydrAMINE capsule 25 mg    diphenhydrAMINE capsule 25 mg     Facility-Administered Medications Ordered in Other Visits   Medication    sodium chloride 0.9% flush 10 mL       ALLERGIES:   Review of patient's allergies indicates:   Allergen Reactions    Heparin analogues      Okay to flush vad with heparin (dr stuart- 12/13/23 1432pm)       Review of Systems:     Pertinent positives and negatives included in the HPI. Otherwise a complete review of systems is negative.    Physical Exam:     Vitals:    01/08/24 1008   BP: (!) 142/82   Pulse: 69   Temp: 98 °F (36.7 °C)     General: Appears well, NAD  Pulmonary: CTAB, no increased work of breathing, no W/R/C  Cardiovascular: S1S2 normal, RRR, no M/R/G  Abdominal: Soft, NT, ND, BS+, no HSM  Extremities: No C/C/E  Neurological: AAOx4, grossly normal, no focal deficits  Dermatologic: No appreciable rashes or lesions  Lymphatic: No cervical, axillary, or inguinal lymphadenopathy     ECOG Performance Status: (foot  note - ECOG PS provided by Eastern Cooperative Oncology Group) 1 - Symptomatic but completely ambulatory    Karnofsky Performance Score:  90%- Able to Carry on Normal Activity: Minor Symptoms of Disease    Labs:   Lab Results   Component Value Date    WBC 2.00 (L) 01/03/2024    RBC 3.32 (L) 01/03/2024    HGB 8.7 (L) 01/03/2024    HCT 27.7 (L) 01/03/2024    MCV 83 01/03/2024    MCH 26.3 (L) 01/03/2024    MCHC 31.5 (L) 01/03/2024    RDW 23.2 (H) 01/03/2024     01/03/2024    MPV 8.2 01/03/2024    GRAN 68.0 01/03/2024    LYMPH CANCELED 01/03/2024    LYMPH 26.0 01/03/2024    MONO CANCELED 01/03/2024    MONO 6.0 01/03/2024    EOS CANCELED 01/03/2024    BASO CANCELED 01/03/2024    EOSINOPHIL 0.0 01/03/2024    BASOPHIL 0.0 01/03/2024       CMP  Sodium   Date Value Ref Range Status   01/03/2024 140 136 - 145 mmol/L Final     Potassium   Date Value Ref Range Status   01/03/2024 3.3 (L) 3.5 - 5.1 mmol/L Final     Chloride   Date Value Ref Range Status   01/03/2024 105 95 - 110 mmol/L Final     CO2   Date Value Ref Range Status   01/03/2024 25 23 - 29 mmol/L Final     Glucose   Date Value Ref Range Status   01/03/2024 113 (H) 74 - 106 mg/dL Final     BUN   Date Value Ref Range Status   01/03/2024 18 9 - 20 mg/dL Final     Creatinine   Date Value Ref Range Status   01/03/2024 1.29 0.80 - 1.50 mg/dL Final     Calcium   Date Value Ref Range Status   01/03/2024 10.2 8.4 - 10.2 mg/dL Final     Total Protein   Date Value Ref Range Status   01/03/2024 9.3 (H) 6.3 - 8.2 g/dL Final     Albumin   Date Value Ref Range Status   01/03/2024 5.1 3.5 - 5.2 g/dL Final     Total Bilirubin   Date Value Ref Range Status   01/03/2024 0.8 0.2 - 1.3 mg/dL Final     Alkaline Phosphatase   Date Value Ref Range Status   01/03/2024 71 38 - 145 U/L Final     AST   Date Value Ref Range Status   01/03/2024 29 17 - 59 U/L Final     ALT   Date Value Ref Range Status   01/03/2024 18 10 - 44 U/L Final     Anion Gap   Date Value Ref Range Status    01/03/2024 10 8 - 16 mmol/L Final       Imaging:   Reviewed     Pathology:  Pending     Assessment and Plan:   Gustavo Tracey Jr. (Gustavo) is a pleasant 63 y.o.male who presents for follow up.    Acute myeloid leukemia:  Peripheral blood flow cytometry obtain 10/31/2023 concerning for acute myeloid leukemia.  Bone marrow biopsy obtained on 11/09/2023 revealed acute myeloid leukemia with 20q deletion on FISH and NGS with IDH2 (40%) and SRSF2 (40%). He started venetoclax in 12/2023, but he did not start azacitidine as his port was scheduled at the same time. Surprisingly, his platelets have had a significant improvement possibly indicating an early response to single agent alonzo. He will start azacitidine today, so will continue venetoclax.   - Continue azacitidine 75mg/m2 x5 days plus venetoclax 200mg daily x21 of 28 days for this cycle.  - Repeat bone marrow biopsy at the end of this cycle. Orders placed. Consent signed. Ativan sent.    Pancytopenia:  Monitor CBC twice weekly with Dr. Oneill. Transfuse for Hgb <7 or Plts <10.    Immunodeficiency secondary to neoplasm:  Continue antimicrobial prophylaxis with levofloxacin, acyclovir, and fluconazole.      At high risk for tumor lysis syndrome:  Continue prophylaxis with allopurinol 300mg daily.    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  Heparin antibody 1.87 OD (moderate-strong). ZAHIRA not available, which limits the assessment.  Ok to stop Warfarin at this time.     Stem cell transplant candidate:  We discussed the role for stem cell transplant as a potentially curative treatment option. He is fit and otherwise healthy. If his cardiac function continues to improve, I would recommend allogeneic stem cell transplant in CR1. He has one full sister and 2 children as potential donor options. Will also search for a MUD.      Orders/Follow Up:      Orders Placed This Encounter    Bone marrow    AML FISH, Bone Marrow (Ages over 30 yrs)     Leukemia/Lymphoma Screen - Bone Marrow Left Posterior Iliac Crest    Chromosome Analysis, Bone Marrow Left Posterior Iliac Crest    Heme Disorders DNA/RNA Hold, Bone Marrow    OncoHeme (NGS) Hematologic Neoplasms, BM Diagnosis or Indication for test: Acute myeloid leukemia not having achieved remission    Specimen to Pathology, Bone Marrow Aspiration/Biopsy    venetoclax (VENCLEXTA) 100 mg Tab    LORazepam (ATIVAN) 1 MG tablet       Route Chart for Scheduling    BMT Chart Routing      Follow up with physician . Bone marrow biopsy in clinic the week of 1/29.  Follow up 2/2/24 (virtual ok)   Follow up with CHRIS    Provider visit type    Infusion scheduling note    Injection scheduling note Azacitidine daily x5 days every 28 days (2/5/24, 3/4/24, 4/1/24, 4/29/24, 5/27/24)   Labs CBC, CMP, phosphorus, magnesium and type and screen   Scheduling:  Preferred lab:  Lab interval:  prior to visit   Imaging    Pharmacy appointment    Other referrals     Schedule bone marrow biopsy               Treatment Plan Information   OP AZACITIDINE 5-DAY (SUB-Q) + VENETOCLAX   Rui Jacobsen MD   Upcoming Treatment Dates - OP AZACITIDINE 5-DAY (SUB-Q) + VENETOCLAX    1/9/2024       Pre-Medications       ondansetron tablet 16 mg       Chemotherapy       azaCITIDine (VIDAZA) chemo injection 165 mg  1/10/2024       Pre-Medications       ondansetron tablet 16 mg       Chemotherapy       azaCITIDine (VIDAZA) chemo injection 165 mg  1/11/2024       Pre-Medications       ondansetron tablet 16 mg       Chemotherapy       azaCITIDine (VIDAZA) chemo injection 165 mg  1/12/2024       Pre-Medications       ondansetron tablet 16 mg       Chemotherapy       azaCITIDine (VIDAZA) chemo injection 165 mg    Therapy Plan Information  sodium chloride 0.9% bolus 1,000 mL 1,000 mL  1,000 mL, Intravenous, PRN    Advance Care Planning   Date: 11/16/2023  We reviewed his underlying diagnosis including natural history, prognosis, and various treatment  options. He remains interested in pursuing any and all treatment options in an effort to improve his quality and quantity of life. Will continue treatment as recommended above.       Total time of this visit was 45 minutes, including time spent face to face with patient and/or via video/audio, and also in preparing for today's visit for MDM and documentation. (Medical Decision Making, including consideration of possible diagnoses, management options, complex medical record review, review of diagnostic tests and information, consideration and discussion of significant complications based on comorbidities, and discussion with providers involved with the care of the patient). Greater than 50% was spent face to face with the patient counseling and coordinating care.      Jarrell Jacobsen MD  Malignant Hematology, Stem Cell Transplant, and Cellular Therapy  [facility]  [facility]

## 2024-01-08 NOTE — NURSING
Pt here for C1D1 vidaza injection.  Labs reviewed and VSS.  Reviewed possible side effects with pt and informed him to use desitin on abdominal area if irritation occurs.  Pt verbalized understanding.  Vidaza administered subQ to abdomen x 4 injections.  Pt tolerated.  Ambulated out unassisted by self.

## 2024-01-08 NOTE — Clinical Note
I think he said you were monitoring labs for him? Are you able to continue monitoring twice weekly?   This was interesting... he started taking his venetoclax alone but missed aza. His platelets are starting to rise quickly possibly indicating a response to alonzo alone!   We're starting aza+alonzo today, so I would expect numbers to decline, although he's now starting at a much higher baseline.   Thanks so much for your help.  Jarrell

## 2024-01-08 NOTE — PROGRESS NOTES
Section of Hematology and Stem Cell Transplantation  Follow Up Visit     Date of visit: 1/8/24  Visit diagnosis: Acute myeloid leukemia not having achieved remission [C92.00]  Referred by:  FERN Oneill MD    Oncologic History:     Primary Oncologic Diagnosis: Acute myeloid leukemia, adverse risk.    6/28/23: Diagnosed with heparin-induced thrombocytopenia. HIT Ab 1.87 OD (moderate-strong). ZAHIRA not available.   10/31/23: Peripheral blood flow cytometry sent due to worsening pancytopenia (CBC: WBC 3.43, Hgb 9, Plts 120, 19% blasts) revealed increased blasts (43.1%) concerning for acute myeloid leukemia.   11/9/23: Bone marrow biopsy revealed a hypercellular marrow (80%) with increased blasts consistent with acute myeloid leukemia. Karyotype 46,XY,del(20)(q11.2q13.3)[4]/46,XY[16]. FISH with 20q12 deletion. NGS with IDH2 (40%) and SRSF2 (40%).  12/2023: He started taking venetoclax 200mg daily (did not start azacitidine due to scheduling conflicts).      History of Present Ilness:   Gustavo Tracey Jr. (Gustavo) is a pleasant 63 y.o.male who presents for follow up. He started taking venetoclax after he received it in December. He was not able to make it here for his azacitidine infusions as he had his port placed that week, and he was not feeling well after. He has been feeling great. No side effects from venetoclax.     PAST MEDICAL HISTORY:   Past Medical History:   Diagnosis Date    Abnormal nuclear stress test 11/26/2022    Cervical radiculopathy     to rt arm    CHF (congestive heart failure)     Chronic systolic congestive heart failure 11/28/2022    Dilated cardiomyopathy 11/26/2022    Hyperlipidemia     Hypertension     Obesity, unspecified     PAF (paroxysmal atrial fibrillation) 11/26/2022    Pulmonary HTN 11/28/2022    Stroke        PAST SURGICAL HISTORY:   Past Surgical History:   Procedure Laterality Date    CLOSURE OF LEFT ATRIAL APPENDAGE USING DEVICE Left 6/22/2023    Procedure: CLOSURE, LEFT ATRIAL  APPENDAGE, USING DEVICE;  Surgeon: Rustam Dave MD;  Location: [facility] OR;  Service: Cardiovascular;  Laterality: Left;  LIGATION OF LEFT ATRIAL APPENDAGE WITH OTILIA EXCLUSION SYSTEM    CORONARY ARTERY BYPASS GRAFT (CABG) N/A 6/22/2023    Procedure: CORONARY ARTERY BYPASS GRAFT (CABG);  Surgeon: Rustam Dave MD;  Location: [facility] OR;  Service: Cardiovascular;  Laterality: N/A;  3-VESSEL WITH EPI-AORTIC ULTRASOUND    PURDY MAZE PROCEDURE N/A 6/22/2023    Procedure: PURDY MAZE PROCEDURE;  Surgeon: Rustam Dave MD;  Location: [facility] OR;  Service: Cardiovascular;  Laterality: N/A;    ECHOCARDIOGRAM,TRANSESOPHAGEAL N/A 6/22/2023    Procedure: ECHOCARDIOGRAM,TRANSESOPHAGEAL;  Surgeon: Rustam Dave MD;  Location: [facility] OR;  Service: Cardiovascular;  Laterality: N/A;    ENDOSCOPIC HARVEST OF VEIN Left 6/22/2023    Procedure: SURGICAL PROCUREMENT, VEIN, ENDOSCOPIC;  Surgeon: Rustam Dave MD;  Location: [facility] OR;  Service: Cardiovascular;  Laterality: Left;    INJECTION OF ANESTHETIC AGENT AROUND MULTIPLE INTERCOSTAL NERVES N/A 6/22/2023    Procedure: BLOCK, NERVE, INTERCOSTAL, 2 OR MORE;  Surgeon: Rustam Dave MD;  Location: [facility] OR;  Service: Cardiovascular;  Laterality: N/A;  PARASTERNAL NERVE BLOCK    INSTANTANEOUS WAVE-FREE RATIO  6/20/2023    Procedure: Instantaneous Wave-Free Ratio;  Surgeon: Zion Ortega MD;  Location: [facility] CATH LAB;  Service: Cardiology;;    IVUS, CORONARY  6/20/2023    Procedure: IVUS, Coronary;  Surgeon: Zion Ortega MD;  Location: [facility] CATH LAB;  Service: Cardiology;;    LEFT HEART CATHETERIZATION Left 11/28/2022    Procedure: CATHETERIZATION, HEART, LEFT;  Surgeon: Zion Ortega MD;  Location: [facility] CATH LAB;  Service: Cardiology;  Laterality: Left;    LEFT HEART CATHETERIZATION Left 6/20/2023    Procedure: Left heart cath;  Surgeon: Zion Ortega MD;  Location: [facility] CATH LAB;  Service: Cardiology;  Laterality: Left;    PERCUTANEOUS TRANSLUMINAL BALLOON ANGIOPLASTY OF CORONARY  ARTERY  6/20/2023    Procedure: Angioplasty-coronary;  Surgeon: Zion Ortega MD;  Location: [facility] CATH LAB;  Service: Cardiology;;    RIGHT HEART CATHETERIZATION N/A 11/28/2022    Procedure: INSERTION, CATHETER, RIGHT HEART;  Surgeon: Zion Ortega MD;  Location: [facility] CATH LAB;  Service: Cardiology;  Laterality: N/A;  Congestive heart failure       PAST SOCIAL HISTORY:  Social History     Tobacco Use    Smoking status: Never    Smokeless tobacco: Never   Substance Use Topics    Alcohol use: Yes    Drug use: Never       FAMILY HISTORY:  Family History   Problem Relation Age of Onset    Hypertension Mother     Diabetes Father     Hyperlipidemia Father     Hypertension Father     Heart attack Father     Coronary artery disease Father        CURRENT MEDICATIONS:   Current Outpatient Medications   Medication Sig    acyclovir (ZOVIRAX) 400 MG tablet Take 1 tablet (400 mg total) by mouth 2 (two) times daily.    allopurinoL (ZYLOPRIM) 300 MG tablet Take 1 tablet (300 mg total) by mouth once daily.    amitriptyline (ELAVIL) 50 MG tablet Take 50 mg by mouth every evening.    aspirin (ECOTRIN) 81 MG EC tablet Take 1 tablet (81 mg total) by mouth once daily.    cyclobenzaprine (FLEXERIL) 10 MG tablet Take 10 mg by mouth 2 (two) times daily as needed.    FARXIGA 10 mg tablet TAKE 1 TABLET BY MOUTH EVERY DAY    ferrous sulfate (FEOSOL) 325 mg (65 mg iron) Tab tablet Take 1 tablet by mouth once daily.    fluconazole (DIFLUCAN) 200 MG Tab Take 2 tablets (400 mg total) by mouth once daily. Stop once posaconazole is received.    folic acid (FOLVITE) 1 MG tablet Take 2 tablets (2 mg total) by mouth once daily.    furosemide (LASIX) 20 MG tablet Take 1 tablet by mouth once daily.    HYDROcodone-acetaminophen (NORCO)  mg per tablet TAKE 1 TABLET BY MOUTH 1 TO 2 TIMES A DAY AS NEEDED    latanoprost 0.005 % ophthalmic solution Place 1 drop into both eyes nightly.    levoFLOXacin (LEVAQUIN) 500 MG tablet Take 1 tablet (500 mg  total) by mouth once daily.    metoprolol succinate (TOPROL-XL) 100 MG 24 hr tablet Take 1 tablet by mouth once daily.    pantoprazole (PROTONIX) 40 MG tablet Take 1 tablet (40 mg total) by mouth once daily.    potassium chloride (K-TAB) 20 mEq TAKE 1 TABLET BY MOUTH 2 TIMES A DAY.    pravastatin (PRAVACHOL) 20 MG tablet Take 20 mg by mouth once daily.    sacubitriL-valsartan (ENTRESTO) 24-26 mg per tablet Take 1 tablet by mouth 2 (two) times daily.    LORazepam (ATIVAN) 1 MG tablet Take 1 tablet (1 mg total) by mouth once as needed for Anxiety (Take 60 minutes prior to bone marrow biopsy).    venetoclax (VENCLEXTA) 100 mg Tab Take 200 mg by mouth once daily Take 200mg daily 1-21 of a 28 day cycle. Always start with azacitidine (Vidaza)..     Current Facility-Administered Medications   Medication    0.9%  NaCl infusion (for blood administration)    0.9%  NaCl infusion (for blood administration)    acetaminophen tablet 650 mg    acetaminophen tablet 650 mg    diphenhydrAMINE capsule 25 mg    diphenhydrAMINE capsule 25 mg     Facility-Administered Medications Ordered in Other Visits   Medication    sodium chloride 0.9% flush 10 mL       ALLERGIES:   Review of patient's allergies indicates:   Allergen Reactions    Heparin analogues      Okay to flush vad with heparin (dr stuart- 12/13/23 1432pm)       Review of Systems:     Pertinent positives and negatives included in the HPI. Otherwise a complete review of systems is negative.    Physical Exam:     Vitals:    01/08/24 1008   BP: (!) 142/82   Pulse: 69   Temp: 98 °F (36.7 °C)     General: Appears well, NAD  Pulmonary: CTAB, no increased work of breathing, no W/R/C  Cardiovascular: S1S2 normal, RRR, no M/R/G  Abdominal: Soft, NT, ND, BS+, no HSM  Extremities: No C/C/E  Neurological: AAOx4, grossly normal, no focal deficits  Dermatologic: No appreciable rashes or lesions  Lymphatic: No cervical, axillary, or inguinal lymphadenopathy     ECOG Performance Status: (foot  note - ECOG PS provided by Eastern Cooperative Oncology Group) 1 - Symptomatic but completely ambulatory    Karnofsky Performance Score:  90%- Able to Carry on Normal Activity: Minor Symptoms of Disease    Labs:   Lab Results   Component Value Date    WBC 2.00 (L) 01/03/2024    RBC 3.32 (L) 01/03/2024    HGB 8.7 (L) 01/03/2024    HCT 27.7 (L) 01/03/2024    MCV 83 01/03/2024    MCH 26.3 (L) 01/03/2024    MCHC 31.5 (L) 01/03/2024    RDW 23.2 (H) 01/03/2024     01/03/2024    MPV 8.2 01/03/2024    GRAN 68.0 01/03/2024    LYMPH CANCELED 01/03/2024    LYMPH 26.0 01/03/2024    MONO CANCELED 01/03/2024    MONO 6.0 01/03/2024    EOS CANCELED 01/03/2024    BASO CANCELED 01/03/2024    EOSINOPHIL 0.0 01/03/2024    BASOPHIL 0.0 01/03/2024       CMP  Sodium   Date Value Ref Range Status   01/03/2024 140 136 - 145 mmol/L Final     Potassium   Date Value Ref Range Status   01/03/2024 3.3 (L) 3.5 - 5.1 mmol/L Final     Chloride   Date Value Ref Range Status   01/03/2024 105 95 - 110 mmol/L Final     CO2   Date Value Ref Range Status   01/03/2024 25 23 - 29 mmol/L Final     Glucose   Date Value Ref Range Status   01/03/2024 113 (H) 74 - 106 mg/dL Final     BUN   Date Value Ref Range Status   01/03/2024 18 9 - 20 mg/dL Final     Creatinine   Date Value Ref Range Status   01/03/2024 1.29 0.80 - 1.50 mg/dL Final     Calcium   Date Value Ref Range Status   01/03/2024 10.2 8.4 - 10.2 mg/dL Final     Total Protein   Date Value Ref Range Status   01/03/2024 9.3 (H) 6.3 - 8.2 g/dL Final     Albumin   Date Value Ref Range Status   01/03/2024 5.1 3.5 - 5.2 g/dL Final     Total Bilirubin   Date Value Ref Range Status   01/03/2024 0.8 0.2 - 1.3 mg/dL Final     Alkaline Phosphatase   Date Value Ref Range Status   01/03/2024 71 38 - 145 U/L Final     AST   Date Value Ref Range Status   01/03/2024 29 17 - 59 U/L Final     ALT   Date Value Ref Range Status   01/03/2024 18 10 - 44 U/L Final     Anion Gap   Date Value Ref Range Status    01/03/2024 10 8 - 16 mmol/L Final       Imaging:   Reviewed     Pathology:  Pending     Assessment and Plan:   Gustavo Tracey Jr. (Gustavo) is a pleasant 63 y.o.male who presents for follow up.    Acute myeloid leukemia:  Peripheral blood flow cytometry obtain 10/31/2023 concerning for acute myeloid leukemia.  Bone marrow biopsy obtained on 11/09/2023 revealed acute myeloid leukemia with 20q deletion on FISH and NGS with IDH2 (40%) and SRSF2 (40%). He started venetoclax in 12/2023, but he did not start azacitidine as his port was scheduled at the same time. Surprisingly, his platelets have had a significant improvement possibly indicating an early response to single agent alonzo. He will start azacitidine today, so will continue venetoclax.   - Continue azacitidine 75mg/m2 x5 days plus venetoclax 200mg daily x21 of 28 days for this cycle.  - Repeat bone marrow biopsy at the end of this cycle. Orders placed. Consent signed. Ativan sent.    Pancytopenia:  Monitor CBC twice weekly with Dr. Oneill. Transfuse for Hgb <7 or Plts <10.    Immunodeficiency secondary to neoplasm:  Continue antimicrobial prophylaxis with levofloxacin, acyclovir, and fluconazole.      At high risk for tumor lysis syndrome:  Continue prophylaxis with allopurinol 300mg daily.    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  Heparin antibody 1.87 OD (moderate-strong). ZAHIRA not available, which limits the assessment.  Ok to stop Warfarin at this time.     Stem cell transplant candidate:  We discussed the role for stem cell transplant as a potentially curative treatment option. He is fit and otherwise healthy. If his cardiac function continues to improve, I would recommend allogeneic stem cell transplant in CR1. He has one full sister and 2 children as potential donor options. Will also search for a MUD.      Orders/Follow Up:      Orders Placed This Encounter    Bone marrow    AML FISH, Bone Marrow (Ages over 30 yrs)     Leukemia/Lymphoma Screen - Bone Marrow Left Posterior Iliac Crest    Chromosome Analysis, Bone Marrow Left Posterior Iliac Crest    Heme Disorders DNA/RNA Hold, Bone Marrow    OncoHeme (NGS) Hematologic Neoplasms, BM Diagnosis or Indication for test: Acute myeloid leukemia not having achieved remission    Specimen to Pathology, Bone Marrow Aspiration/Biopsy    venetoclax (VENCLEXTA) 100 mg Tab    LORazepam (ATIVAN) 1 MG tablet       Route Chart for Scheduling    BMT Chart Routing      Follow up with physician . Bone marrow biopsy in clinic the week of 1/29.  Follow up 2/2/24 (virtual ok)   Follow up with CHRIS    Provider visit type    Infusion scheduling note    Injection scheduling note Azacitidine daily x5 days every 28 days (2/5/24, 3/4/24, 4/1/24, 4/29/24, 5/27/24)   Labs CBC, CMP, phosphorus, magnesium and type and screen   Scheduling:  Preferred lab:  Lab interval:  prior to visit   Imaging    Pharmacy appointment    Other referrals     Schedule bone marrow biopsy               Treatment Plan Information   OP AZACITIDINE 5-DAY (SUB-Q) + VENETOCLAX   Rui Jacobsen MD   Upcoming Treatment Dates - OP AZACITIDINE 5-DAY (SUB-Q) + VENETOCLAX    1/9/2024       Pre-Medications       ondansetron tablet 16 mg       Chemotherapy       azaCITIDine (VIDAZA) chemo injection 165 mg  1/10/2024       Pre-Medications       ondansetron tablet 16 mg       Chemotherapy       azaCITIDine (VIDAZA) chemo injection 165 mg  1/11/2024       Pre-Medications       ondansetron tablet 16 mg       Chemotherapy       azaCITIDine (VIDAZA) chemo injection 165 mg  1/12/2024       Pre-Medications       ondansetron tablet 16 mg       Chemotherapy       azaCITIDine (VIDAZA) chemo injection 165 mg    Therapy Plan Information  sodium chloride 0.9% bolus 1,000 mL 1,000 mL  1,000 mL, Intravenous, PRN    Advance Care Planning   Date: 11/16/2023  We reviewed his underlying diagnosis including natural history, prognosis, and various treatment  options. He remains interested in pursuing any and all treatment options in an effort to improve his quality and quantity of life. Will continue treatment as recommended above.       Total time of this visit was 45 minutes, including time spent face to face with patient and/or via video/audio, and also in preparing for today's visit for MDM and documentation. (Medical Decision Making, including consideration of possible diagnoses, management options, complex medical record review, review of diagnostic tests and information, consideration and discussion of significant complications based on comorbidities, and discussion with providers involved with the care of the patient). Greater than 50% was spent face to face with the patient counseling and coordinating care.      Jarrell Jacobsen MD  Malignant Hematology, Stem Cell Transplant, and Cellular Therapy  [facility]  [facility] [address]

## 2024-01-09 ENCOUNTER — TELEPHONE (OUTPATIENT)
Dept: HEMATOLOGY/ONCOLOGY | Facility: CLINIC | Age: 64
End: 2024-01-09
Payer: MEDICARE

## 2024-01-09 ENCOUNTER — DOCUMENTATION ONLY (OUTPATIENT)
Dept: HEMATOLOGY/ONCOLOGY | Facility: CLINIC | Age: 64
End: 2024-01-09
Payer: MEDICARE

## 2024-01-09 ENCOUNTER — INFUSION (OUTPATIENT)
Dept: INFUSION THERAPY | Facility: HOSPITAL | Age: 64
End: 2024-01-09
Payer: MEDICARE

## 2024-01-09 VITALS
TEMPERATURE: 98 F | HEART RATE: 70 BPM | DIASTOLIC BLOOD PRESSURE: 84 MMHG | RESPIRATION RATE: 18 BRPM | SYSTOLIC BLOOD PRESSURE: 154 MMHG

## 2024-01-09 DIAGNOSIS — C92.00 ACUTE MYELOID LEUKEMIA NOT HAVING ACHIEVED REMISSION: Primary | ICD-10-CM

## 2024-01-09 PROCEDURE — 96401 CHEMO ANTI-NEOPL SQ/IM: CPT

## 2024-01-09 PROCEDURE — 25000003 PHARM REV CODE 250: Performed by: INTERNAL MEDICINE

## 2024-01-09 PROCEDURE — 63600175 PHARM REV CODE 636 W HCPCS: Performed by: INTERNAL MEDICINE

## 2024-01-09 RX ORDER — AZACITIDINE 100 MG/1
75 INJECTION, POWDER, LYOPHILIZED, FOR SOLUTION INTRAVENOUS; SUBCUTANEOUS
Status: COMPLETED | OUTPATIENT
Start: 2024-01-09 | End: 2024-01-09

## 2024-01-09 RX ORDER — ONDANSETRON 4 MG/1
16 TABLET, FILM COATED ORAL
Status: COMPLETED | OUTPATIENT
Start: 2024-01-09 | End: 2024-01-09

## 2024-01-09 RX ADMIN — AZACITIDINE 165 MG: 100 INJECTION, POWDER, LYOPHILIZED, FOR SOLUTION INTRAVENOUS; SUBCUTANEOUS at 01:01

## 2024-01-09 RX ADMIN — ONDANSETRON HYDROCHLORIDE 16 MG: 4 TABLET, FILM COATED ORAL at 01:01

## 2024-01-09 NOTE — TELEPHONE ENCOUNTER
----- Message from Zuleima Rand sent at 1/9/2024  2:08 PM CST -----  Contact: Lucinda  Type:  Application     Who Called: Lucinda bashir/ [facility] asst program  Would the patient rather a call back or a response via [facility]? call  Best Call Back Number: [phone]  Additional Information:   Lucinda requesting a call regarding application for noxasil   Reference #: [id number]

## 2024-01-09 NOTE — PROGRESS NOTES
received the request from Dr. Ann-Marie MD regarding the patient needing Lodging.  The  was unable to reach the patient by phone but was successful with speaking to the patient's spouse.  The  was able to secure a room at the [facility] starting 01/09/2023 with check-out on 01/10/2024. At that point the patient will check in to the [facility] with check-out on 01/10/2023 with check-out 01/11/2023 with check back in to the [facility] 01/11/2024 through 01/12/2024.  The  was able to reach the patient by phone.The patient is not sure about the day that he wants to stay over. The patient will call back with updated information tomorrow 01/10/2024.

## 2024-01-09 NOTE — NURSING
Pt here for D2C1 vidaza.  Denies any side effects.  Redness noted to abdomen.  VSS for administration.  Vidaza administered subQ to abdomen x 3 injections.  Pt tolerated.  Ambulated out unassisted by self.

## 2024-01-10 ENCOUNTER — INFUSION (OUTPATIENT)
Dept: INFUSION THERAPY | Facility: HOSPITAL | Age: 64
End: 2024-01-10
Payer: MEDICARE

## 2024-01-10 VITALS
DIASTOLIC BLOOD PRESSURE: 67 MMHG | HEART RATE: 89 BPM | RESPIRATION RATE: 16 BRPM | SYSTOLIC BLOOD PRESSURE: 141 MMHG | TEMPERATURE: 98 F

## 2024-01-10 DIAGNOSIS — C92.00 ACUTE MYELOID LEUKEMIA NOT HAVING ACHIEVED REMISSION: Primary | ICD-10-CM

## 2024-01-10 PROCEDURE — 96401 CHEMO ANTI-NEOPL SQ/IM: CPT

## 2024-01-10 PROCEDURE — 25000003 PHARM REV CODE 250: Performed by: INTERNAL MEDICINE

## 2024-01-10 PROCEDURE — 63600175 PHARM REV CODE 636 W HCPCS: Performed by: INTERNAL MEDICINE

## 2024-01-10 RX ORDER — AZACITIDINE 100 MG/1
75 INJECTION, POWDER, LYOPHILIZED, FOR SOLUTION INTRAVENOUS; SUBCUTANEOUS
Status: COMPLETED | OUTPATIENT
Start: 2024-01-10 | End: 2024-01-10

## 2024-01-10 RX ORDER — ONDANSETRON 4 MG/1
16 TABLET, FILM COATED ORAL
Status: COMPLETED | OUTPATIENT
Start: 2024-01-10 | End: 2024-01-10

## 2024-01-10 RX ADMIN — AZACITIDINE 165 MG: 100 INJECTION, POWDER, LYOPHILIZED, FOR SOLUTION INTRAVENOUS; SUBCUTANEOUS at 01:01

## 2024-01-10 RX ADMIN — ONDANSETRON HYDROCHLORIDE 16 MG: 4 TABLET, FILM COATED ORAL at 01:01

## 2024-01-11 ENCOUNTER — INFUSION (OUTPATIENT)
Dept: INFUSION THERAPY | Facility: HOSPITAL | Age: 64
End: 2024-01-11
Payer: MEDICARE

## 2024-01-11 VITALS
SYSTOLIC BLOOD PRESSURE: 147 MMHG | HEART RATE: 86 BPM | DIASTOLIC BLOOD PRESSURE: 82 MMHG | RESPIRATION RATE: 16 BRPM | TEMPERATURE: 98 F

## 2024-01-11 DIAGNOSIS — C92.00 ACUTE MYELOID LEUKEMIA NOT HAVING ACHIEVED REMISSION: Primary | ICD-10-CM

## 2024-01-11 PROCEDURE — 96401 CHEMO ANTI-NEOPL SQ/IM: CPT

## 2024-01-11 PROCEDURE — 25000003 PHARM REV CODE 250: Performed by: INTERNAL MEDICINE

## 2024-01-11 PROCEDURE — 63600175 PHARM REV CODE 636 W HCPCS: Performed by: INTERNAL MEDICINE

## 2024-01-11 RX ORDER — ONDANSETRON 4 MG/1
16 TABLET, FILM COATED ORAL
Status: COMPLETED | OUTPATIENT
Start: 2024-01-11 | End: 2024-01-11

## 2024-01-11 RX ORDER — AZACITIDINE 100 MG/1
75 INJECTION, POWDER, LYOPHILIZED, FOR SOLUTION INTRAVENOUS; SUBCUTANEOUS
Status: COMPLETED | OUTPATIENT
Start: 2024-01-11 | End: 2024-01-11

## 2024-01-11 RX ADMIN — AZACITIDINE 165 MG: 100 INJECTION, POWDER, LYOPHILIZED, FOR SOLUTION INTRAVENOUS; SUBCUTANEOUS at 01:01

## 2024-01-11 RX ADMIN — ONDANSETRON HYDROCHLORIDE 16 MG: 4 TABLET, FILM COATED ORAL at 01:01

## 2024-01-11 NOTE — TELEPHONE ENCOUNTER
I still haven't received the signed application back from the provider.  It was fax to his office on 12/05/2023.  Once the provider sends the sign application please fax back to me ([phone] will submit the application.

## 2024-01-12 ENCOUNTER — INFUSION (OUTPATIENT)
Dept: INFUSION THERAPY | Facility: HOSPITAL | Age: 64
End: 2024-01-12
Attending: STUDENT IN AN ORGANIZED HEALTH CARE EDUCATION/TRAINING PROGRAM
Payer: MEDICARE

## 2024-01-12 VITALS
RESPIRATION RATE: 16 BRPM | DIASTOLIC BLOOD PRESSURE: 73 MMHG | SYSTOLIC BLOOD PRESSURE: 142 MMHG | HEART RATE: 79 BPM | TEMPERATURE: 98 F

## 2024-01-12 DIAGNOSIS — C92.00 ACUTE MYELOID LEUKEMIA NOT HAVING ACHIEVED REMISSION: Primary | ICD-10-CM

## 2024-01-12 PROCEDURE — 63600175 PHARM REV CODE 636 W HCPCS: Performed by: INTERNAL MEDICINE

## 2024-01-12 PROCEDURE — 25000003 PHARM REV CODE 250: Performed by: INTERNAL MEDICINE

## 2024-01-12 PROCEDURE — 96401 CHEMO ANTI-NEOPL SQ/IM: CPT

## 2024-01-12 RX ORDER — AZACITIDINE 100 MG/1
75 INJECTION, POWDER, LYOPHILIZED, FOR SOLUTION INTRAVENOUS; SUBCUTANEOUS
Status: COMPLETED | OUTPATIENT
Start: 2024-01-12 | End: 2024-01-12

## 2024-01-12 RX ORDER — ONDANSETRON 4 MG/1
16 TABLET, FILM COATED ORAL
Status: COMPLETED | OUTPATIENT
Start: 2024-01-12 | End: 2024-01-12

## 2024-01-12 RX ADMIN — ONDANSETRON HYDROCHLORIDE 16 MG: 4 TABLET, FILM COATED ORAL at 01:01

## 2024-01-12 RX ADMIN — AZACITIDINE 165 MG: 100 INJECTION, POWDER, LYOPHILIZED, FOR SOLUTION INTRAVENOUS; SUBCUTANEOUS at 01:01

## 2024-01-12 NOTE — NURSING
Pt here for D5 Vidaza injections. Administered injections in abdominal tissue. No questions or concerns. Pt ambulated out of unit unassisted.

## 2024-01-17 ENCOUNTER — TELEPHONE (OUTPATIENT)
Dept: HEMATOLOGY/ONCOLOGY | Facility: CLINIC | Age: 64
End: 2024-01-17
Payer: MEDICARE

## 2024-01-17 NOTE — TELEPHONE ENCOUNTER
"----- Message from Gisela Carreon sent at 1/17/2024  1:15 PM CST -----  Regarding: Pt advice  Contact: [employer] access requesting call back in regards to application sent in for pt.  Please call and adv @       Confirmed contact below:   Contact Name: [employer]  Phone Number: [phone] ref # [id number]               Additional Notes:  "Thank you for all that you do for our patients"                                           "
no

## 2024-01-30 ENCOUNTER — EXTERNAL HOME HEALTH (OUTPATIENT)
Dept: HOME HEALTH SERVICES | Facility: HOSPITAL | Age: 64
End: 2024-01-30
Payer: MEDICARE

## 2024-01-31 ENCOUNTER — ANESTHESIA EVENT (OUTPATIENT)
Dept: ENDOSCOPY | Facility: HOSPITAL | Age: 64
End: 2024-01-31
Payer: MEDICARE

## 2024-01-31 ENCOUNTER — HOSPITAL ENCOUNTER (OUTPATIENT)
Facility: HOSPITAL | Age: 64
Discharge: HOME | End: 2024-01-31
Attending: INTERNAL MEDICINE | Admitting: INTERNAL MEDICINE
Payer: MEDICARE

## 2024-01-31 ENCOUNTER — ANESTHESIA (OUTPATIENT)
Dept: ENDOSCOPY | Facility: HOSPITAL | Age: 64
End: 2024-01-31
Payer: MEDICARE

## 2024-01-31 VITALS
HEART RATE: 63 BPM | OXYGEN SATURATION: 100 % | BODY MASS INDEX: 26.37 KG/M2 | WEIGHT: 199 LBS | RESPIRATION RATE: 18 BRPM | TEMPERATURE: 98 F | DIASTOLIC BLOOD PRESSURE: 85 MMHG | HEIGHT: 73 IN | SYSTOLIC BLOOD PRESSURE: 146 MMHG

## 2024-01-31 DIAGNOSIS — C92.00 ACUTE MYELOID LEUKEMIA NOT HAVING ACHIEVED REMISSION: Primary | ICD-10-CM

## 2024-01-31 PROCEDURE — 38222 DX BONE MARROW BX & ASPIR: CPT | Performed by: INTERNAL MEDICINE

## 2024-01-31 PROCEDURE — 85097 BONE MARROW INTERPRETATION: CPT | Mod: ,,, | Performed by: PATHOLOGY

## 2024-01-31 PROCEDURE — 38222 DX BONE MARROW BX & ASPIR: CPT | Mod: LT,,, | Performed by: NURSE PRACTITIONER

## 2024-01-31 PROCEDURE — 88342 IMHCHEM/IMCYTCHM 1ST ANTB: CPT | Mod: 26,59,, | Performed by: PATHOLOGY

## 2024-01-31 PROCEDURE — 37000009 HC ANESTHESIA EA ADD 15 MINS: Performed by: INTERNAL MEDICINE

## 2024-01-31 PROCEDURE — 25000003 PHARM REV CODE 250: Performed by: INTERNAL MEDICINE

## 2024-01-31 PROCEDURE — 88342 IMHCHEM/IMCYTCHM 1ST ANTB: CPT | Mod: 59 | Performed by: PATHOLOGY

## 2024-01-31 PROCEDURE — 81450 HL NEO GSAP 5-50DNA/DNA&RNA: CPT | Performed by: NURSE PRACTITIONER

## 2024-01-31 PROCEDURE — 88341 IMHCHEM/IMCYTCHM EA ADD ANTB: CPT | Performed by: PATHOLOGY

## 2024-01-31 PROCEDURE — 88184 FLOWCYTOMETRY/ TC 1 MARKER: CPT | Performed by: PATHOLOGY

## 2024-01-31 PROCEDURE — 88189 FLOWCYTOMETRY/READ 16 & >: CPT | Mod: ,,, | Performed by: PATHOLOGY

## 2024-01-31 PROCEDURE — 88313 SPECIAL STAINS GROUP 2: CPT | Performed by: PATHOLOGY

## 2024-01-31 PROCEDURE — 88185 FLOWCYTOMETRY/TC ADD-ON: CPT | Performed by: PATHOLOGY

## 2024-01-31 PROCEDURE — 88305 TISSUE EXAM BY PATHOLOGIST: CPT | Mod: 26,,, | Performed by: PATHOLOGY

## 2024-01-31 PROCEDURE — 88341 IMHCHEM/IMCYTCHM EA ADD ANTB: CPT | Mod: 26,,, | Performed by: PATHOLOGY

## 2024-01-31 PROCEDURE — 88299 UNLISTED CYTOGENETIC STUDY: CPT | Performed by: NURSE PRACTITIONER

## 2024-01-31 PROCEDURE — 37000008 HC ANESTHESIA 1ST 15 MINUTES: Performed by: INTERNAL MEDICINE

## 2024-01-31 PROCEDURE — 88264 CHROMOSOME ANALYSIS 20-25: CPT | Performed by: NURSE PRACTITIONER

## 2024-01-31 PROCEDURE — 88237 TISSUE CULTURE BONE MARROW: CPT | Performed by: NURSE PRACTITIONER

## 2024-01-31 PROCEDURE — E9220 PRA ENDO ANESTHESIA: HCPCS | Mod: ,,, | Performed by: NURSE ANESTHETIST, CERTIFIED REGISTERED

## 2024-01-31 PROCEDURE — 88271 CYTOGENETICS DNA PROBE: CPT | Performed by: NURSE PRACTITIONER

## 2024-01-31 PROCEDURE — 88313 SPECIAL STAINS GROUP 2: CPT | Mod: 26,,, | Performed by: PATHOLOGY

## 2024-01-31 PROCEDURE — 88311 DECALCIFY TISSUE: CPT | Performed by: PATHOLOGY

## 2024-01-31 PROCEDURE — 88305 TISSUE EXAM BY PATHOLOGIST: CPT | Mod: 59 | Performed by: PATHOLOGY

## 2024-01-31 RX ORDER — SODIUM CHLORIDE 9 MG/ML
INJECTION, SOLUTION INTRAVENOUS CONTINUOUS
Status: DISCONTINUED | OUTPATIENT
Start: 2024-01-31 | End: 2024-01-31 | Stop reason: HOSPADM

## 2024-01-31 RX ADMIN — SODIUM CHLORIDE: 9 INJECTION, SOLUTION INTRAVENOUS at 09:01

## 2024-01-31 NOTE — PROCEDURES
PROCEDURE NOTE:  Date of Procedure: 01/31/2024  Bone Marrow Biopsy and Aspiration  Indication: AML  Consent: Informed consent was obtained from patient.  Timeout: Done and documented. Allergies reviewed.  Position: right lateral  Site: Left posterior illiac crest.  Prep: Betadine.  Needle used: 11 gauge Jamshidi needle.  Anesthetic: 2% lidocaine 10 cc.  Biopsy: The biopsy needle was introduced into the marrow cavity and an aspirate was obtained without complications and sent for flow cytometry, AML fish, NGS, DNA/RNA hold, and cytogenetics. Core biopsy obtained without difficulty and sent for routine histologic examination.  Complications: None.  Disposition: The patient was placed supine for 15min following procedure. RN to assess bandaid for bleeding prior to discharge home. Patient aware to keep bandaid dry and intact for 24hrs and to call clinic for any bleeding, fevers, pain, or signs of infection.  Blood loss: Minimal.     Kami Solitario NP  Hematology/Oncology/BMT          Patient did not receive anesthesia during procedure due to cardiac history and low EF. Only local anesthetic was used

## 2024-01-31 NOTE — TRANSFER OF CARE
"Anesthesia Transfer of Care Note    Patient: Gustavo Tracey Jr.    Procedure(s) Performed: Procedure(s) (LRB):  Biopsy-bone marrow (Right)    Patient location: GI    Anesthesia Type: general    Transport from OR: Transported from OR on room air with adequate spontaneous ventilation    Post pain: adequate analgesia    Post assessment: no apparent anesthetic complications and tolerated procedure well    Post vital signs: stable    Level of consciousness: awake, alert and oriented    Nausea/Vomiting: no nausea/vomiting    Complications: none    Transfer of care protocol was followed    Last vitals: Visit Vitals  BP (!) 149/90   Pulse 84   Temp 36.5 °C (97.7 °F) (Temporal)   Resp 14   Ht 6' 1" (1.854 m)   Wt 90.3 kg (199 lb)   SpO2 100%   BMI 26.25 kg/m²     "

## 2024-01-31 NOTE — DISCHARGE INSTRUCTIONS
Discharge instructions for having a Bone Marrow Aspiration / Biopsy:    Keep Bandage in place for 24 hours.  - Do not shower or take a tube bath during this time (you may sponge bathe).  - Call the nurse or physician for excessive bleeding or pain at biopsy site.  - You may take Tylenol as needed for pain.  - You may resume other activities as tolerated.    You can call [phone] for any problems during the hours of 8:00 AM-5:00PM.    For an emergency after 5:00 PM you can call [phone] and have the  page the Hematologist / Oncologist on call.

## 2024-01-31 NOTE — ANESTHESIA PREPROCEDURE EVALUATION
01/31/2024  Gustavo Tracey Jr. is a 63 y.o., male.  Past Medical History:   Diagnosis Date    Abnormal nuclear stress test 11/26/2022    Cervical radiculopathy     to rt arm    CHF (congestive heart failure)     Chronic systolic congestive heart failure 11/28/2022    Dilated cardiomyopathy 11/26/2022    Hyperlipidemia     Hypertension     Obesity, unspecified     PAF (paroxysmal atrial fibrillation) 11/26/2022    Pulmonary HTN 11/28/2022    Stroke      Past Surgical History:   Procedure Laterality Date    CLOSURE OF LEFT ATRIAL APPENDAGE USING DEVICE Left 6/22/2023    Procedure: CLOSURE, LEFT ATRIAL APPENDAGE, USING DEVICE;  Surgeon: Rustam Dave MD;  Location: [facility] OR;  Service: Cardiovascular;  Laterality: Left;  LIGATION OF LEFT ATRIAL APPENDAGE WITH OTILIA EXCLUSION SYSTEM    CORONARY ARTERY BYPASS GRAFT (CABG) N/A 6/22/2023    Procedure: CORONARY ARTERY BYPASS GRAFT (CABG);  Surgeon: Rustam Dave MD;  Location: [facility] OR;  Service: Cardiovascular;  Laterality: N/A;  3-VESSEL WITH EPI-AORTIC ULTRASOUND    PURDY MAZE PROCEDURE N/A 6/22/2023    Procedure: PRUDY MAZE PROCEDURE;  Surgeon: Rustam Dave MD;  Location: [facility] OR;  Service: Cardiovascular;  Laterality: N/A;    ECHOCARDIOGRAM,TRANSESOPHAGEAL N/A 6/22/2023    Procedure: ECHOCARDIOGRAM,TRANSESOPHAGEAL;  Surgeon: Rustam Dave MD;  Location: [facility] OR;  Service: Cardiovascular;  Laterality: N/A;    ENDOSCOPIC HARVEST OF VEIN Left 6/22/2023    Procedure: SURGICAL PROCUREMENT, VEIN, ENDOSCOPIC;  Surgeon: Rustam Dave MD;  Location: [facility] OR;  Service: Cardiovascular;  Laterality: Left;    INJECTION OF ANESTHETIC AGENT AROUND MULTIPLE INTERCOSTAL NERVES N/A 6/22/2023    Procedure: BLOCK, NERVE, INTERCOSTAL, 2 OR MORE;  Surgeon: Rustam Dave MD;  Location: [facility] OR;  Service: Cardiovascular;  Laterality: N/A;  PARASTERNAL NERVE  BLOCK    INSTANTANEOUS WAVE-FREE RATIO  6/20/2023    Procedure: Instantaneous Wave-Free Ratio;  Surgeon: Zion Ortega MD;  Location: [facility] CATH LAB;  Service: Cardiology;;    IVUS, CORONARY  6/20/2023    Procedure: IVUS, Coronary;  Surgeon: Zion Ortega MD;  Location: [facility] CATH LAB;  Service: Cardiology;;    LEFT HEART CATHETERIZATION Left 11/28/2022    Procedure: CATHETERIZATION, HEART, LEFT;  Surgeon: Zion Ortega MD;  Location: [facility] CATH LAB;  Service: Cardiology;  Laterality: Left;    LEFT HEART CATHETERIZATION Left 6/20/2023    Procedure: Left heart cath;  Surgeon: Zion Ortega MD;  Location: [facility] CATH LAB;  Service: Cardiology;  Laterality: Left;    PERCUTANEOUS TRANSLUMINAL BALLOON ANGIOPLASTY OF CORONARY ARTERY  6/20/2023    Procedure: Angioplasty-coronary;  Surgeon: Zion Ortega MD;  Location: [facility] CATH LAB;  Service: Cardiology;;    RIGHT HEART CATHETERIZATION N/A 11/28/2022    Procedure: INSERTION, CATHETER, RIGHT HEART;  Surgeon: Zion Ortega MD;  Location: [facility] CATH LAB;  Service: Cardiology;  Laterality: N/A;  Congestive heart failure         Pre-op Assessment    I have reviewed the Patient Summary Reports.    I have reviewed the NPO Status.   I have reviewed the Medications.     Review of Systems  Anesthesia Hx:  No problems with previous Anesthesia   History of prior surgery of interest to airway management or planning:          Denies Family Hx of Anesthesia complications.    Denies Personal Hx of Anesthesia complications.                    Social:  Non-Smoker       Cardiovascular:     Hypertension   CAD       CHF        S/p CABG 6/2023, EF 25% at that time, states much improvement in SOB since CABG. No updated 2DE on file.                         Renal/:  Chronic Renal Disease, CKD                Neurological:   CVA                                    Endocrine:  Endocrine Normal                Physical Exam  General: Well nourished    Airway:  Mallampati: I   Mouth  Opening: Normal  TM Distance: Normal  Tongue: Normal    Chest/Lungs:  Normal Respiratory Rate    Heart:  Rate: Normal    Anesthesia Plan  Type of Anesthesia, risks & benefits discussed:    Anesthesia Type: MAC  Intra-op Monitoring Plan: Standard ASA Monitors  Induction:  IV  Informed Consent: Informed consent signed with the Patient and all parties understand the risks and agree with anesthesia plan.  All questions answered.   ASA Score: 3  Day of Surgery Review of History & Physical: H&P Update referred to the surgeon/provider.  Anesthesia Plan Notes: Plan procedure under LA vs MAC    Ready For Surgery From Anesthesia Perspective.   .

## 2024-01-31 NOTE — DISCHARGE SUMMARY
[facility] 4th Floor  Hematology  Bone Marrow Transplant  Discharge Summary      Patient Name: Gustavo Tracey Jr.  MRN: [id number]  Admission Date: 1/31/2024  Hospital Length of Stay: 0 days  Discharge Date and Time:  01/31/2024 10:28 AM  Attending Physician: Rui Jacobsen MD   Discharging Provider: Kami Solitario NP  Primary Care Provider: Braxton Guzman Jr., MD      Procedure(s) (LRB):  Biopsy-bone marrow (Right)     Hospital Course: Patient admitted to endoscopy suite today for a bone marrow aspiration and biopsy. Pt was consented for a bone marrow biopsy. Bone marrow biopsy and aspiration was performed in the endoscopy suite procedure room (see procedure note) using only local anesthetic. Patient was then transferred to post op recovery area and discharged home after lying supine for 15min with no signs of bleeding at procedure site.      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    Release to patient Immediate        01/31/24 0940                    Pending Diagnostic Studies:       Procedure Component Value Units Date/Time    AML FISH, Bone Marrow (Ages over 30 yrs) [[id number]] Collected: 01/31/24 1003    Order Status: Sent Lab Status: In process Updated: 01/31/24 1003    Specimen: Bone Marrow     Chromosome Analysis, Bone Marrow Left Posterior Iliac Crest [[id number]] Collected: 01/31/24 1003    Order Status: Sent Lab Status: In process Updated: 01/31/24 1003    Specimen: Bone Marrow     Heme Disorders DNA/RNA Hold, Bone Marrow [[id number]] Collected: 01/31/24 1003    Order Status: Sent Lab Status: In process Updated: 01/31/24 1003    Specimen: Bone Marrow      Leukemia/Lymphoma Screen - Bone Marrow Left Posterior Iliac Crest [[id number]] Collected: 01/31/24 1003    Order Status: Sent Lab Status: In process Updated: 01/31/24 1003    Specimen: Bone Marrow     OncoHeme (NGS) Hematologic Neoplasms, BM Diagnosis or Indication for test: AML s/p cycle 1 of aza/alonzo [[id number]] Collected: 01/31/24 1003    Order Status: Sent Lab Status: In process Updated: 01/31/24 1003    Specimen: Bone Marrow     Specimen to Pathology, Bone Marrow Aspiration/Biopsy [[id number]] Collected: 01/31/24 1003    Order Status: Sent Lab Status: In process Updated: 01/31/24 1003    Specimen: Bone Marrow           There are no hospital problems to display for this patient.     Discharged Condition: good    Disposition:     Follow Up:   Future Appointments   Date Time Provider Department Center   2/1/2024  8:30 AM LAB, [facility] JAMES VANCE [facility] [place]    2/5/2024  8:25 AM LAB, [facility] JAMES BIRD [facility] [place] [facility]   2/5/2024  1:30 PM [facility]   2/6/2024  1:45 PM INJECTION, [facility] [place] [facility]   2/7/2024  1:30 PM INJECTION, [facility]   2/8/2024  8:40 AM LAB, [facility] JAMES VANCE [facility] [place] G   2/8/2024  9:30 AM Marlene Orellana MD [facility] [place] [facility]   2/8/2024  1:30 PM INJECTION, [facility]   2/9/2024  1:30 PM INJECTION, [facility]   3/4/2024  1:30 PM INJECTION, [facility]   3/5/2024  1:30 PM INJECTION, [facility]   3/6/2024  1:30 PM INJECTION, [facility]   3/7/2024  1:30 PM INJECTION, [facility]   3/8/2024  1:30 PM INJECTION, [facility]   4/1/2024  1:30 PM INJECTION, [facility]   4/2/2024  1:30 PM INJECTION, [facility]   4/3/2024  1:30 PM INJECTION,  [facility]   4/4/2024  1:30 PM INJECTION, [facility]   4/5/2024  1:30 PM INJECTION, [facility]   4/12/2024  9:30 AM Braxton Guzman Jr., MD [facility]   4/29/2024  1:30 PM INJECTION, [facility] [place] [facility]   4/30/2024  1:30 PM INJECTION, [facility]   5/1/2024  1:30 PM INJECTION, [facility]   5/2/2024  1:30 PM INJECTION, [facility]   5/3/2024  1:30 PM INJECTION, [facility]   5/27/2024  1:30 PM INJECTION, [facility]   5/28/2024  1:30 PM INJECTION, [facility]   5/29/2024  1:30 PM INJECTION, [facility]   5/30/2024  1:30 PM INJECTION, [facility]   5/31/2024  1:30 PM INJECTION, [facility]       Patient Instructions:      Diet Cardiac     Notify your health care provider if you experience any of the following:  temperature >100.4     Notify your health care provider if you experience any of the following:  severe uncontrolled pain     Notify your health care provider if you experience any of the following:  redness, tenderness, or signs of infection (pain, swelling, redness, odor or green/yellow discharge around incision site)     Remove dressing in 24 hours     Activity as tolerated     Medications:  Reconciled Home Medications:      Medication List        ASK your doctor about these medications      acyclovir 400 MG tablet  Commonly known as: ZOVIRAX  Take 1 tablet (400 mg total) by mouth 2 (two) times daily.     allopurinoL 300 MG tablet  Commonly known as: ZYLOPRIM  Take 1 tablet (300 mg total) by mouth once daily.     amitriptyline 50 MG tablet  Commonly known as: ELAVIL  Take 50 mg by mouth every evening.     aspirin 81 MG EC tablet  Commonly known as: ECOTRIN  Take 1 tablet  (81 mg total) by mouth once daily.     cyclobenzaprine 10 MG tablet  Commonly known as: FLEXERIL  Take 10 mg by mouth 2 (two) times daily as needed.     ENTRESTO 24-26 mg per tablet  Generic drug: sacubitriL-valsartan  Take 1 tablet by mouth 2 (two) times daily.     FARXIGA 10 mg tablet  Generic drug: dapagliflozin propanediol  TAKE 1 TABLET BY MOUTH EVERY DAY     ferrous sulfate 325 mg (65 mg iron) Tab tablet  Commonly known as: FEOSOL  Take 1 tablet by mouth once daily.     fluconazole 200 MG Tab  Commonly known as: DIFLUCAN  Take 2 tablets (400 mg total) by mouth once daily. Stop once posaconazole is received.     folic acid 1 MG tablet  Commonly known as: FOLVITE  Take 2 tablets (2 mg total) by mouth once daily.     furosemide 20 MG tablet  Commonly known as: LASIX  Take 1 tablet by mouth once daily.     HYDROcodone-acetaminophen  mg per tablet  Commonly known as: NORCO  TAKE 1 TABLET BY MOUTH 1 TO 2 TIMES A DAY AS NEEDED     latanoprost 0.005 % ophthalmic solution  Place 1 drop into both eyes nightly.     LORazepam 1 MG tablet  Commonly known as: ATIVAN  Take 1 tablet (1 mg total) by mouth once as needed for Anxiety (Take 60 minutes prior to bone marrow biopsy).     metoprolol succinate 100 MG 24 hr tablet  Commonly known as: TOPROL-XL  Take 1 tablet by mouth once daily.     pantoprazole 40 MG tablet  Commonly known as: PROTONIX  Take 1 tablet (40 mg total) by mouth once daily.     potassium chloride 20 mEq  Commonly known as: K-TAB  TAKE 1 TABLET BY MOUTH 2 TIMES A DAY.     pravastatin 20 MG tablet  Commonly known as: PRAVACHOL  Take 20 mg by mouth once daily.     VENCLEXTA 100 mg Tab  Generic drug: venetoclax  Take 2 tablets (200 mg) by mouth once daily on days 1-21 of a 28 day cycle. Always start with azacitidine (Vidaza).              Kami Solitario NP  Bone Marrow Transplant  [facility] 4th Floor

## 2024-02-01 NOTE — ANESTHESIA POSTPROCEDURE EVALUATION
Anesthesia Post Evaluation    Patient: Gustavo Tracey Jr.    Procedure(s) Performed: Procedure(s) (LRB):  Biopsy-bone marrow (Right)    Final Anesthesia Type: general      Patient location during evaluation: GI PACU  Patient participation: Yes- Able to Participate  Level of consciousness: awake and alert  Post-procedure vital signs: reviewed and stable  Pain management: adequate  Airway patency: patent    PONV status at discharge: No PONV  Anesthetic complications: no      Cardiovascular status: hemodynamically stable  Respiratory status: unassisted, spontaneous ventilation and room air  Hydration status: euvolemic  Follow-up not needed.          Vitals Value Taken Time   /85 01/31/24 1029   Temp 36.7 °C (98.1 °F) 01/31/24 1029   Pulse 63 01/31/24 1029   Resp 18 01/31/24 1029   SpO2 100 % 01/31/24 1029         Event Time   Out of Recovery 10:46:54         Pain/Ashwin Score: Ashwin Score: 10 (1/31/2024 10:30 AM)

## 2024-02-01 NOTE — ANESTHESIA RELEASE NOTE
"Anesthesia Release from PACU Note    Patient: Gustavo Tracey Jr.    Procedure(s) Performed: Procedure(s) (LRB):  Biopsy-bone marrow (Right)    Anesthesia type: general    Post pain: Adequate analgesia    Post assessment: no apparent anesthetic complications and tolerated procedure well    Last Vitals: Visit Vitals  BP (!) 146/85   Pulse 63   Temp 36.7 °C (98.1 °F)   Resp 18   Ht 6' 1" (1.854 m)   Wt 90.3 kg (199 lb)   SpO2 100%   BMI 26.25 kg/m²       Post vital signs: stable    Level of consciousness: awake and alert     Nausea/Vomiting: no nausea/no vomiting    Complications: none    Airway Patency: patent    Respiratory: unassisted, spontaneous ventilation, room air    Cardiovascular: stable and blood pressure at baseline    Hydration: euvolemic  "

## 2024-02-02 LAB
DNA/RNA EXTRACT AND HOLD RESULT: NORMAL
DNA/RNA EXTRACTION: NORMAL
EXHR SPECIMEN TYPE: NORMAL

## 2024-02-05 ENCOUNTER — INFUSION (OUTPATIENT)
Dept: INFUSION THERAPY | Facility: HOSPITAL | Age: 64
End: 2024-02-05
Payer: MEDICARE

## 2024-02-05 VITALS
DIASTOLIC BLOOD PRESSURE: 95 MMHG | RESPIRATION RATE: 16 BRPM | HEART RATE: 77 BPM | TEMPERATURE: 99 F | SYSTOLIC BLOOD PRESSURE: 145 MMHG

## 2024-02-05 DIAGNOSIS — C92.00 ACUTE MYELOID LEUKEMIA NOT HAVING ACHIEVED REMISSION: Primary | ICD-10-CM

## 2024-02-05 PROCEDURE — 63600175 PHARM REV CODE 636 W HCPCS: Performed by: INTERNAL MEDICINE

## 2024-02-05 PROCEDURE — 96401 CHEMO ANTI-NEOPL SQ/IM: CPT

## 2024-02-05 PROCEDURE — 25000003 PHARM REV CODE 250: Performed by: INTERNAL MEDICINE

## 2024-02-05 RX ORDER — ONDANSETRON HYDROCHLORIDE 8 MG/1
16 TABLET, FILM COATED ORAL
Status: CANCELLED | OUTPATIENT
Start: 2024-02-09

## 2024-02-05 RX ORDER — AZACITIDINE 100 MG/1
75 INJECTION, POWDER, LYOPHILIZED, FOR SOLUTION INTRAVENOUS; SUBCUTANEOUS
Status: COMPLETED | OUTPATIENT
Start: 2024-02-05 | End: 2024-02-05

## 2024-02-05 RX ORDER — ONDANSETRON HYDROCHLORIDE 8 MG/1
16 TABLET, FILM COATED ORAL
Status: CANCELLED | OUTPATIENT
Start: 2024-02-07

## 2024-02-05 RX ORDER — AZACITIDINE 100 MG/1
75 INJECTION, POWDER, LYOPHILIZED, FOR SOLUTION INTRAVENOUS; SUBCUTANEOUS
Status: CANCELLED | OUTPATIENT
Start: 2024-02-06

## 2024-02-05 RX ORDER — AZACITIDINE 100 MG/1
75 INJECTION, POWDER, LYOPHILIZED, FOR SOLUTION INTRAVENOUS; SUBCUTANEOUS
Status: CANCELLED | OUTPATIENT
Start: 2024-02-05

## 2024-02-05 RX ORDER — AZACITIDINE 100 MG/1
75 INJECTION, POWDER, LYOPHILIZED, FOR SOLUTION INTRAVENOUS; SUBCUTANEOUS
Status: CANCELLED | OUTPATIENT
Start: 2024-02-09

## 2024-02-05 RX ORDER — AZACITIDINE 100 MG/1
75 INJECTION, POWDER, LYOPHILIZED, FOR SOLUTION INTRAVENOUS; SUBCUTANEOUS
Status: CANCELLED | OUTPATIENT
Start: 2024-02-07

## 2024-02-05 RX ORDER — ONDANSETRON HYDROCHLORIDE 8 MG/1
16 TABLET, FILM COATED ORAL
Status: CANCELLED | OUTPATIENT
Start: 2024-02-08

## 2024-02-05 RX ORDER — ONDANSETRON HYDROCHLORIDE 8 MG/1
16 TABLET, FILM COATED ORAL
Status: CANCELLED | OUTPATIENT
Start: 2024-02-06

## 2024-02-05 RX ORDER — AZACITIDINE 100 MG/1
75 INJECTION, POWDER, LYOPHILIZED, FOR SOLUTION INTRAVENOUS; SUBCUTANEOUS
Status: CANCELLED | OUTPATIENT
Start: 2024-02-08

## 2024-02-05 RX ORDER — ONDANSETRON 4 MG/1
16 TABLET, FILM COATED ORAL
Status: COMPLETED | OUTPATIENT
Start: 2024-02-05 | End: 2024-02-05

## 2024-02-05 RX ORDER — ONDANSETRON HYDROCHLORIDE 8 MG/1
16 TABLET, FILM COATED ORAL
Status: CANCELLED | OUTPATIENT
Start: 2024-02-05

## 2024-02-05 RX ADMIN — ONDANSETRON HYDROCHLORIDE 16 MG: 4 TABLET, FILM COATED ORAL at 01:02

## 2024-02-05 RX ADMIN — AZACITIDINE 165 MG: 100 INJECTION, POWDER, LYOPHILIZED, FOR SOLUTION INTRAVENOUS; SUBCUTANEOUS at 01:02

## 2024-02-05 NOTE — NURSING
Pt here for C2D1 Vidaza. Assessment complete and labs reviewed. VSS. Administered injections in abdominal tissue. No questions or concerns. Pt ambulated out of unit unassisted.

## 2024-02-06 ENCOUNTER — INFUSION (OUTPATIENT)
Dept: INFUSION THERAPY | Facility: HOSPITAL | Age: 64
End: 2024-02-06
Payer: MEDICARE

## 2024-02-06 VITALS
HEART RATE: 101 BPM | TEMPERATURE: 99 F | SYSTOLIC BLOOD PRESSURE: 125 MMHG | RESPIRATION RATE: 18 BRPM | DIASTOLIC BLOOD PRESSURE: 74 MMHG

## 2024-02-06 DIAGNOSIS — C92.00 ACUTE MYELOID LEUKEMIA NOT HAVING ACHIEVED REMISSION: Primary | ICD-10-CM

## 2024-02-06 LAB
AML FISH REASON FOR REFERRAL (BM): NORMAL
ANNOTATION COMMENT IMP: NORMAL
BODY SITE - BONE MARROW: NORMAL
CELLS W CYTOGENETIC ABNL BLD/T: NORMAL
CHROM ANALY RESULT (ISCN): NORMAL
CHROM BANDING METHOD: NORMAL
CHROMOSOME ANALYSIS BM ADDITIONAL INFORMATION: NORMAL
CHROMOSOME ANALYSIS BM RELEASED BY: NORMAL
CHROMOSOME ANALYSIS BM RESULT SUMMARY: NORMAL
CLINICAL CYTOGENETICIST REVIEW: NORMAL
CLINICAL CYTOGENETICIST REVIEW: NORMAL
CLINICAL DIAGNOSIS - BONE MARROW: NORMAL
FLOW CYTOMETRY ANTIBODIES ANALYZED - BONE MARROW: NORMAL
FLOW CYTOMETRY COMMENT - BONE MARROW: NORMAL
FLOW CYTOMETRY INTERPRETATION - BONE MARROW: NORMAL
KARYOTYP MAR: NORMAL
LAB TEST METHOD: NORMAL
MOL DX INTERP BLD/T QL: NORMAL
PROVIDER SIGNING NAME: NORMAL
REASON FOR REFERRAL (NARRATIVE): NORMAL
REF LAB TEST METHOD: NORMAL
SPECIMEN SOURCE: NORMAL
SPECIMEN SOURCE: NORMAL
SPECIMEN: NORMAL
TEST PERFORMANCE INFO SPEC: NORMAL

## 2024-02-06 PROCEDURE — 96401 CHEMO ANTI-NEOPL SQ/IM: CPT

## 2024-02-06 PROCEDURE — 63600175 PHARM REV CODE 636 W HCPCS: Performed by: INTERNAL MEDICINE

## 2024-02-06 PROCEDURE — 25000003 PHARM REV CODE 250: Performed by: INTERNAL MEDICINE

## 2024-02-06 RX ORDER — ONDANSETRON 4 MG/1
16 TABLET, FILM COATED ORAL
Status: COMPLETED | OUTPATIENT
Start: 2024-02-06 | End: 2024-02-06

## 2024-02-06 RX ORDER — AZACITIDINE 100 MG/1
75 INJECTION, POWDER, LYOPHILIZED, FOR SOLUTION INTRAVENOUS; SUBCUTANEOUS
Status: COMPLETED | OUTPATIENT
Start: 2024-02-06 | End: 2024-02-06

## 2024-02-06 RX ADMIN — ONDANSETRON HYDROCHLORIDE 16 MG: 4 TABLET, FILM COATED ORAL at 01:02

## 2024-02-06 RX ADMIN — AZACITIDINE 165 MG: 100 INJECTION, POWDER, LYOPHILIZED, FOR SOLUTION INTRAVENOUS; SUBCUTANEOUS at 01:02

## 2024-02-06 NOTE — PLAN OF CARE
Tolerated well.  
Vital Signs Last 24 Hrs  T(C): 36.8 (11 May 2018 09:23), Max: 37 (11 May 2018 02:45)  T(F): 98.2 (11 May 2018 09:23), Max: 98.6 (11 May 2018 02:45)  HR: 89 (11 May 2018 09:23) (62 - 92)  BP: 100/62 (11 May 2018 09:23) (100/62 - 153/68)  BP(mean): --  RR: 18 (11 May 2018 09:23) (10 - 20)  SpO2: 96% (11 May 2018 09:23) (94% - 100%)

## 2024-02-07 ENCOUNTER — INFUSION (OUTPATIENT)
Dept: INFUSION THERAPY | Facility: HOSPITAL | Age: 64
End: 2024-02-07
Payer: MEDICARE

## 2024-02-07 VITALS
DIASTOLIC BLOOD PRESSURE: 75 MMHG | HEART RATE: 79 BPM | RESPIRATION RATE: 18 BRPM | SYSTOLIC BLOOD PRESSURE: 132 MMHG | TEMPERATURE: 98 F | BODY MASS INDEX: 26.36 KG/M2 | HEIGHT: 73 IN | WEIGHT: 198.88 LBS

## 2024-02-07 DIAGNOSIS — C92.00 ACUTE MYELOID LEUKEMIA NOT HAVING ACHIEVED REMISSION: Primary | ICD-10-CM

## 2024-02-07 PROBLEM — R79.1 ABNORMAL COAGULATION PROFILE: Status: ACTIVE | Noted: 2024-02-07

## 2024-02-07 PROBLEM — Z91.89 AT HIGH RISK OF TUMOR LYSIS SYNDROME: Status: ACTIVE | Noted: 2024-02-07

## 2024-02-07 PROBLEM — Z51.11 ENCOUNTER FOR ANTINEOPLASTIC CHEMOTHERAPY: Status: ACTIVE | Noted: 2024-02-07

## 2024-02-07 PROBLEM — D49.9 IMMUNODEFICIENCY SECONDARY TO NEOPLASM: Status: ACTIVE | Noted: 2024-02-07

## 2024-02-07 PROBLEM — D84.81 IMMUNODEFICIENCY SECONDARY TO NEOPLASM: Status: ACTIVE | Noted: 2024-02-07

## 2024-02-07 PROCEDURE — 63600175 PHARM REV CODE 636 W HCPCS: Performed by: INTERNAL MEDICINE

## 2024-02-07 PROCEDURE — 25000003 PHARM REV CODE 250: Performed by: INTERNAL MEDICINE

## 2024-02-07 PROCEDURE — 96401 CHEMO ANTI-NEOPL SQ/IM: CPT

## 2024-02-07 RX ORDER — AZACITIDINE 100 MG/1
75 INJECTION, POWDER, LYOPHILIZED, FOR SOLUTION INTRAVENOUS; SUBCUTANEOUS
Status: COMPLETED | OUTPATIENT
Start: 2024-02-07 | End: 2024-02-07

## 2024-02-07 RX ORDER — ONDANSETRON 4 MG/1
16 TABLET, FILM COATED ORAL
Status: COMPLETED | OUTPATIENT
Start: 2024-02-07 | End: 2024-02-07

## 2024-02-07 RX ADMIN — ONDANSETRON HYDROCHLORIDE 16 MG: 4 TABLET, FILM COATED ORAL at 02:02

## 2024-02-07 RX ADMIN — AZACITIDINE 165 MG: 100 INJECTION, POWDER, LYOPHILIZED, FOR SOLUTION INTRAVENOUS; SUBCUTANEOUS at 02:02

## 2024-02-07 NOTE — NURSING
Pt here for D3 vidaza.  VSS.  Vidaza administered subQ to abdomen x 3 injections.  Pt tolerated.  Ambulated out unassisted by self.

## 2024-02-08 ENCOUNTER — INFUSION (OUTPATIENT)
Dept: INFUSION THERAPY | Facility: HOSPITAL | Age: 64
End: 2024-02-08
Payer: MEDICARE

## 2024-02-08 VITALS
DIASTOLIC BLOOD PRESSURE: 78 MMHG | SYSTOLIC BLOOD PRESSURE: 143 MMHG | TEMPERATURE: 98 F | HEART RATE: 66 BPM | RESPIRATION RATE: 16 BRPM

## 2024-02-08 DIAGNOSIS — C92.00 ACUTE MYELOID LEUKEMIA NOT HAVING ACHIEVED REMISSION: Primary | ICD-10-CM

## 2024-02-08 PROCEDURE — 96401 CHEMO ANTI-NEOPL SQ/IM: CPT

## 2024-02-08 PROCEDURE — 63600175 PHARM REV CODE 636 W HCPCS: Performed by: INTERNAL MEDICINE

## 2024-02-08 PROCEDURE — 25000003 PHARM REV CODE 250: Performed by: INTERNAL MEDICINE

## 2024-02-08 RX ORDER — AZACITIDINE 100 MG/1
75 INJECTION, POWDER, LYOPHILIZED, FOR SOLUTION INTRAVENOUS; SUBCUTANEOUS
Status: COMPLETED | OUTPATIENT
Start: 2024-02-08 | End: 2024-02-08

## 2024-02-08 RX ORDER — ONDANSETRON 4 MG/1
16 TABLET, FILM COATED ORAL
Status: COMPLETED | OUTPATIENT
Start: 2024-02-08 | End: 2024-02-08

## 2024-02-08 RX ADMIN — ONDANSETRON HYDROCHLORIDE 16 MG: 4 TABLET, FILM COATED ORAL at 01:02

## 2024-02-08 RX ADMIN — AZACITIDINE 165 MG: 100 INJECTION, POWDER, LYOPHILIZED, FOR SOLUTION INTRAVENOUS; SUBCUTANEOUS at 01:02

## 2024-02-08 NOTE — NURSING
Pt here for D4 vidaza injection.  Labs reviewed and VSS.  Vidaza administered subQ to abdomen.  Pt tolerated.  Ambulated out unassisted by self.

## 2024-02-09 ENCOUNTER — INFUSION (OUTPATIENT)
Dept: INFUSION THERAPY | Facility: HOSPITAL | Age: 64
End: 2024-02-09
Payer: MEDICARE

## 2024-02-09 VITALS
RESPIRATION RATE: 16 BRPM | DIASTOLIC BLOOD PRESSURE: 82 MMHG | HEART RATE: 71 BPM | TEMPERATURE: 98 F | SYSTOLIC BLOOD PRESSURE: 141 MMHG

## 2024-02-09 DIAGNOSIS — C92.00 ACUTE MYELOID LEUKEMIA NOT HAVING ACHIEVED REMISSION: Primary | ICD-10-CM

## 2024-02-09 PROCEDURE — 96401 CHEMO ANTI-NEOPL SQ/IM: CPT

## 2024-02-09 PROCEDURE — 63600175 PHARM REV CODE 636 W HCPCS: Performed by: INTERNAL MEDICINE

## 2024-02-09 PROCEDURE — 25000003 PHARM REV CODE 250: Performed by: INTERNAL MEDICINE

## 2024-02-09 RX ORDER — ONDANSETRON 4 MG/1
16 TABLET, FILM COATED ORAL
Status: COMPLETED | OUTPATIENT
Start: 2024-02-09 | End: 2024-02-09

## 2024-02-09 RX ORDER — AZACITIDINE 100 MG/1
75 INJECTION, POWDER, LYOPHILIZED, FOR SOLUTION INTRAVENOUS; SUBCUTANEOUS
Status: COMPLETED | OUTPATIENT
Start: 2024-02-09 | End: 2024-02-09

## 2024-02-09 RX ADMIN — AZACITIDINE 165 MG: 100 INJECTION, POWDER, LYOPHILIZED, FOR SOLUTION INTRAVENOUS; SUBCUTANEOUS at 01:02

## 2024-02-09 RX ADMIN — ONDANSETRON HYDROCHLORIDE 16 MG: 4 TABLET, FILM COATED ORAL at 01:02

## 2024-02-09 NOTE — NURSING
Pt here for D5 vidaza injection.  VSS.  Vidaza administered subQ to abdomen x 3 injections.  Pt tolerated.  Ambulated out unassisted by self.

## 2024-02-19 LAB
ANNOTATION COMMENT IMP: NORMAL
NGS CLINCIAL TRIALS: NORMAL
NGS INDICATION OF TEST: NORMAL
NGS INTERPRETATION: NORMAL
NGS ONCOHEME PANEL GENE LIST: NORMAL
NGS PATHOGENIC MUTATIONS DETECTED: NORMAL
NGS REVIEWED BY:: NORMAL
NGS VARIANTS OF UNKNOWN SIGNIFICANCE: NORMAL
NGSHM RESULT, BONE MARROW: NORMAL
REF LAB TEST METHOD: NORMAL
SPECIMEN SOURCE: NORMAL
TEST PERFORMANCE INFO SPEC: NORMAL

## 2024-02-20 LAB
COMMENT: NORMAL
FINAL PATHOLOGIC DIAGNOSIS: NORMAL
GROSS: NORMAL
Lab: NORMAL
MICROSCOPIC EXAM: NORMAL
SUPPLEMENTAL DIAGNOSIS: NORMAL

## 2024-03-01 RX ORDER — AZACITIDINE 100 MG/1
75 INJECTION, POWDER, LYOPHILIZED, FOR SOLUTION INTRAVENOUS; SUBCUTANEOUS
Status: CANCELLED | OUTPATIENT
Start: 2024-03-06

## 2024-03-01 RX ORDER — ONDANSETRON HYDROCHLORIDE 8 MG/1
16 TABLET, FILM COATED ORAL
Status: CANCELLED | OUTPATIENT
Start: 2024-03-06

## 2024-03-01 RX ORDER — AZACITIDINE 100 MG/1
75 INJECTION, POWDER, LYOPHILIZED, FOR SOLUTION INTRAVENOUS; SUBCUTANEOUS
Status: CANCELLED | OUTPATIENT
Start: 2024-03-08

## 2024-03-01 RX ORDER — AZACITIDINE 100 MG/1
75 INJECTION, POWDER, LYOPHILIZED, FOR SOLUTION INTRAVENOUS; SUBCUTANEOUS
Status: CANCELLED | OUTPATIENT
Start: 2024-03-07

## 2024-03-01 RX ORDER — ONDANSETRON HYDROCHLORIDE 8 MG/1
16 TABLET, FILM COATED ORAL
Status: CANCELLED | OUTPATIENT
Start: 2024-03-07

## 2024-03-01 RX ORDER — AZACITIDINE 100 MG/1
75 INJECTION, POWDER, LYOPHILIZED, FOR SOLUTION INTRAVENOUS; SUBCUTANEOUS
Status: CANCELLED | OUTPATIENT
Start: 2024-03-05

## 2024-03-01 RX ORDER — AZACITIDINE 100 MG/1
75 INJECTION, POWDER, LYOPHILIZED, FOR SOLUTION INTRAVENOUS; SUBCUTANEOUS
Status: CANCELLED | OUTPATIENT
Start: 2024-03-04

## 2024-03-01 RX ORDER — ONDANSETRON HYDROCHLORIDE 8 MG/1
16 TABLET, FILM COATED ORAL
Status: CANCELLED | OUTPATIENT
Start: 2024-03-05

## 2024-03-01 RX ORDER — ONDANSETRON HYDROCHLORIDE 8 MG/1
16 TABLET, FILM COATED ORAL
Status: CANCELLED | OUTPATIENT
Start: 2024-03-04

## 2024-03-01 RX ORDER — ONDANSETRON HYDROCHLORIDE 8 MG/1
16 TABLET, FILM COATED ORAL
Status: CANCELLED | OUTPATIENT
Start: 2024-03-08

## 2024-03-04 ENCOUNTER — INFUSION (OUTPATIENT)
Dept: INFUSION THERAPY | Facility: HOSPITAL | Age: 64
End: 2024-03-04
Payer: MEDICARE

## 2024-03-04 VITALS
SYSTOLIC BLOOD PRESSURE: 154 MMHG | WEIGHT: 201.5 LBS | DIASTOLIC BLOOD PRESSURE: 85 MMHG | RESPIRATION RATE: 16 BRPM | TEMPERATURE: 99 F | BODY MASS INDEX: 26.71 KG/M2 | HEIGHT: 73 IN | HEART RATE: 59 BPM

## 2024-03-04 DIAGNOSIS — C92.00 ACUTE MYELOID LEUKEMIA NOT HAVING ACHIEVED REMISSION: Primary | ICD-10-CM

## 2024-03-04 PROCEDURE — 25000003 PHARM REV CODE 250: Performed by: INTERNAL MEDICINE

## 2024-03-04 PROCEDURE — 96401 CHEMO ANTI-NEOPL SQ/IM: CPT

## 2024-03-04 PROCEDURE — 63600175 PHARM REV CODE 636 W HCPCS: Performed by: INTERNAL MEDICINE

## 2024-03-04 RX ORDER — AZACITIDINE 100 MG/1
75 INJECTION, POWDER, LYOPHILIZED, FOR SOLUTION INTRAVENOUS; SUBCUTANEOUS
Status: COMPLETED | OUTPATIENT
Start: 2024-03-04 | End: 2024-03-04

## 2024-03-04 RX ORDER — ONDANSETRON 4 MG/1
16 TABLET, FILM COATED ORAL
Status: COMPLETED | OUTPATIENT
Start: 2024-03-04 | End: 2024-03-04

## 2024-03-04 RX ADMIN — ONDANSETRON HYDROCHLORIDE 16 MG: 4 TABLET, FILM COATED ORAL at 01:03

## 2024-03-04 RX ADMIN — AZACITIDINE 165 MG: 100 INJECTION, POWDER, LYOPHILIZED, FOR SOLUTION INTRAVENOUS; SUBCUTANEOUS at 01:03

## 2024-03-05 ENCOUNTER — INFUSION (OUTPATIENT)
Dept: INFUSION THERAPY | Facility: HOSPITAL | Age: 64
End: 2024-03-05
Payer: MEDICARE

## 2024-03-05 VITALS
RESPIRATION RATE: 16 BRPM | TEMPERATURE: 98 F | SYSTOLIC BLOOD PRESSURE: 154 MMHG | HEART RATE: 63 BPM | DIASTOLIC BLOOD PRESSURE: 84 MMHG

## 2024-03-05 DIAGNOSIS — C92.00 ACUTE MYELOID LEUKEMIA NOT HAVING ACHIEVED REMISSION: Primary | ICD-10-CM

## 2024-03-05 PROCEDURE — 63600175 PHARM REV CODE 636 W HCPCS: Performed by: INTERNAL MEDICINE

## 2024-03-05 PROCEDURE — 25000003 PHARM REV CODE 250: Performed by: INTERNAL MEDICINE

## 2024-03-05 PROCEDURE — 96401 CHEMO ANTI-NEOPL SQ/IM: CPT

## 2024-03-05 RX ORDER — AZACITIDINE 100 MG/1
75 INJECTION, POWDER, LYOPHILIZED, FOR SOLUTION INTRAVENOUS; SUBCUTANEOUS
Status: COMPLETED | OUTPATIENT
Start: 2024-03-05 | End: 2024-03-05

## 2024-03-05 RX ORDER — ONDANSETRON 4 MG/1
16 TABLET, FILM COATED ORAL
Status: COMPLETED | OUTPATIENT
Start: 2024-03-05 | End: 2024-03-05

## 2024-03-05 RX ADMIN — ONDANSETRON HYDROCHLORIDE 16 MG: 4 TABLET, FILM COATED ORAL at 01:03

## 2024-03-05 RX ADMIN — AZACITIDINE 165 MG: 100 INJECTION, POWDER, LYOPHILIZED, FOR SOLUTION INTRAVENOUS; SUBCUTANEOUS at 01:03

## 2024-03-05 NOTE — NURSING
Pt here for D2 vidaza injection.  Labs reviewed and VSS.  Vidaza administered to abdominal tissue x 3 injections.  Pt tolerated.  Ambulated out unassisted by self.

## 2024-03-06 ENCOUNTER — INFUSION (OUTPATIENT)
Dept: INFUSION THERAPY | Facility: HOSPITAL | Age: 64
End: 2024-03-06
Payer: MEDICARE

## 2024-03-06 VITALS
DIASTOLIC BLOOD PRESSURE: 76 MMHG | SYSTOLIC BLOOD PRESSURE: 158 MMHG | TEMPERATURE: 98 F | HEART RATE: 58 BPM | RESPIRATION RATE: 16 BRPM

## 2024-03-06 DIAGNOSIS — C92.00 ACUTE MYELOID LEUKEMIA NOT HAVING ACHIEVED REMISSION: Primary | ICD-10-CM

## 2024-03-06 PROCEDURE — 25000003 PHARM REV CODE 250: Performed by: INTERNAL MEDICINE

## 2024-03-06 PROCEDURE — 63600175 PHARM REV CODE 636 W HCPCS: Performed by: INTERNAL MEDICINE

## 2024-03-06 PROCEDURE — 96401 CHEMO ANTI-NEOPL SQ/IM: CPT

## 2024-03-06 RX ORDER — AZACITIDINE 100 MG/1
75 INJECTION, POWDER, LYOPHILIZED, FOR SOLUTION INTRAVENOUS; SUBCUTANEOUS
Status: COMPLETED | OUTPATIENT
Start: 2024-03-06 | End: 2024-03-06

## 2024-03-06 RX ORDER — ONDANSETRON 4 MG/1
16 TABLET, FILM COATED ORAL
Status: COMPLETED | OUTPATIENT
Start: 2024-03-06 | End: 2024-03-06

## 2024-03-06 RX ADMIN — ONDANSETRON HYDROCHLORIDE 16 MG: 4 TABLET, FILM COATED ORAL at 01:03

## 2024-03-06 RX ADMIN — AZACITIDINE 165 MG: 100 INJECTION, POWDER, LYOPHILIZED, FOR SOLUTION INTRAVENOUS; SUBCUTANEOUS at 01:03

## 2024-03-06 NOTE — NURSING
Pt here for D3 vidaza injection.  VSS.  Vidaza administered to abdominal tissue x 3 injections.  Pt tolerated.  Ambulated out unassisted by self.

## 2024-03-07 ENCOUNTER — INFUSION (OUTPATIENT)
Dept: INFUSION THERAPY | Facility: HOSPITAL | Age: 64
End: 2024-03-07
Payer: MEDICARE

## 2024-03-07 VITALS
HEART RATE: 62 BPM | RESPIRATION RATE: 16 BRPM | DIASTOLIC BLOOD PRESSURE: 70 MMHG | TEMPERATURE: 98 F | SYSTOLIC BLOOD PRESSURE: 148 MMHG

## 2024-03-07 DIAGNOSIS — C92.00 ACUTE MYELOID LEUKEMIA NOT HAVING ACHIEVED REMISSION: Primary | ICD-10-CM

## 2024-03-07 PROCEDURE — 96401 CHEMO ANTI-NEOPL SQ/IM: CPT

## 2024-03-07 PROCEDURE — 25000003 PHARM REV CODE 250: Performed by: INTERNAL MEDICINE

## 2024-03-07 PROCEDURE — 63600175 PHARM REV CODE 636 W HCPCS: Performed by: INTERNAL MEDICINE

## 2024-03-07 RX ORDER — ONDANSETRON 4 MG/1
16 TABLET, FILM COATED ORAL
Status: COMPLETED | OUTPATIENT
Start: 2024-03-07 | End: 2024-03-07

## 2024-03-07 RX ORDER — AZACITIDINE 100 MG/1
75 INJECTION, POWDER, LYOPHILIZED, FOR SOLUTION INTRAVENOUS; SUBCUTANEOUS
Status: COMPLETED | OUTPATIENT
Start: 2024-03-07 | End: 2024-03-07

## 2024-03-07 RX ADMIN — ONDANSETRON HYDROCHLORIDE 16 MG: 4 TABLET, FILM COATED ORAL at 01:03

## 2024-03-07 RX ADMIN — AZACITIDINE 165 MG: 100 INJECTION, POWDER, LYOPHILIZED, FOR SOLUTION INTRAVENOUS; SUBCUTANEOUS at 01:03

## 2024-03-07 NOTE — NURSING
Pt here for Vidaza injections. Assessment complete and VSS. Administered injections in abdominal tissue. No questions ro concerns. Pt ambulated out of unit unassisted.

## 2024-03-08 ENCOUNTER — INFUSION (OUTPATIENT)
Dept: INFUSION THERAPY | Facility: HOSPITAL | Age: 64
End: 2024-03-08
Payer: MEDICARE

## 2024-03-08 VITALS
TEMPERATURE: 98 F | HEART RATE: 60 BPM | DIASTOLIC BLOOD PRESSURE: 86 MMHG | SYSTOLIC BLOOD PRESSURE: 160 MMHG | RESPIRATION RATE: 16 BRPM

## 2024-03-08 DIAGNOSIS — C92.00 ACUTE MYELOID LEUKEMIA NOT HAVING ACHIEVED REMISSION: Primary | ICD-10-CM

## 2024-03-08 PROCEDURE — 25000003 PHARM REV CODE 250: Performed by: INTERNAL MEDICINE

## 2024-03-08 PROCEDURE — 96401 CHEMO ANTI-NEOPL SQ/IM: CPT

## 2024-03-08 PROCEDURE — 63600175 PHARM REV CODE 636 W HCPCS: Performed by: INTERNAL MEDICINE

## 2024-03-08 RX ORDER — AZACITIDINE 100 MG/1
75 INJECTION, POWDER, LYOPHILIZED, FOR SOLUTION INTRAVENOUS; SUBCUTANEOUS
Status: COMPLETED | OUTPATIENT
Start: 2024-03-08 | End: 2024-03-08

## 2024-03-08 RX ORDER — ONDANSETRON 4 MG/1
16 TABLET, FILM COATED ORAL
Status: COMPLETED | OUTPATIENT
Start: 2024-03-08 | End: 2024-03-08

## 2024-03-08 RX ADMIN — AZACITIDINE 165 MG: 100 INJECTION, POWDER, LYOPHILIZED, FOR SOLUTION INTRAVENOUS; SUBCUTANEOUS at 12:03

## 2024-03-08 RX ADMIN — ONDANSETRON HYDROCHLORIDE 16 MG: 4 TABLET, FILM COATED ORAL at 12:03

## 2024-03-08 NOTE — NURSING
Pt here for D5 vidaza.  VSS.  Vidaza administered to abdominal tissue.  Pt tolerated.  Ambulated out unassisted by self.

## 2024-03-28 RX ORDER — AZACITIDINE 100 MG/1
75 INJECTION, POWDER, LYOPHILIZED, FOR SOLUTION INTRAVENOUS; SUBCUTANEOUS
Status: CANCELLED | OUTPATIENT
Start: 2024-04-05

## 2024-03-28 RX ORDER — ONDANSETRON HYDROCHLORIDE 8 MG/1
16 TABLET, FILM COATED ORAL
Status: CANCELLED | OUTPATIENT
Start: 2024-04-05

## 2024-03-28 RX ORDER — ONDANSETRON HYDROCHLORIDE 8 MG/1
16 TABLET, FILM COATED ORAL
Status: CANCELLED | OUTPATIENT
Start: 2024-04-02

## 2024-03-28 RX ORDER — AZACITIDINE 100 MG/1
75 INJECTION, POWDER, LYOPHILIZED, FOR SOLUTION INTRAVENOUS; SUBCUTANEOUS
Status: CANCELLED | OUTPATIENT
Start: 2024-04-04

## 2024-03-28 RX ORDER — ONDANSETRON HYDROCHLORIDE 8 MG/1
16 TABLET, FILM COATED ORAL
Status: CANCELLED | OUTPATIENT
Start: 2024-04-01

## 2024-03-28 RX ORDER — AZACITIDINE 100 MG/1
75 INJECTION, POWDER, LYOPHILIZED, FOR SOLUTION INTRAVENOUS; SUBCUTANEOUS
Status: CANCELLED | OUTPATIENT
Start: 2024-04-01

## 2024-03-28 RX ORDER — AZACITIDINE 100 MG/1
75 INJECTION, POWDER, LYOPHILIZED, FOR SOLUTION INTRAVENOUS; SUBCUTANEOUS
Status: CANCELLED | OUTPATIENT
Start: 2024-04-02

## 2024-03-28 RX ORDER — ONDANSETRON HYDROCHLORIDE 8 MG/1
16 TABLET, FILM COATED ORAL
Status: CANCELLED | OUTPATIENT
Start: 2024-04-03

## 2024-03-28 RX ORDER — AZACITIDINE 100 MG/1
75 INJECTION, POWDER, LYOPHILIZED, FOR SOLUTION INTRAVENOUS; SUBCUTANEOUS
Status: CANCELLED | OUTPATIENT
Start: 2024-04-03

## 2024-03-28 RX ORDER — ONDANSETRON HYDROCHLORIDE 8 MG/1
16 TABLET, FILM COATED ORAL
Status: CANCELLED | OUTPATIENT
Start: 2024-04-04

## 2024-04-01 ENCOUNTER — INFUSION (OUTPATIENT)
Dept: INFUSION THERAPY | Facility: HOSPITAL | Age: 64
End: 2024-04-01
Payer: MEDICARE

## 2024-04-01 VITALS
BODY MASS INDEX: 27.41 KG/M2 | WEIGHT: 206.81 LBS | DIASTOLIC BLOOD PRESSURE: 84 MMHG | RESPIRATION RATE: 16 BRPM | TEMPERATURE: 98 F | SYSTOLIC BLOOD PRESSURE: 167 MMHG | HEIGHT: 73 IN | HEART RATE: 63 BPM

## 2024-04-01 DIAGNOSIS — C92.00 ACUTE MYELOID LEUKEMIA NOT HAVING ACHIEVED REMISSION: Primary | ICD-10-CM

## 2024-04-01 PROCEDURE — 96401 CHEMO ANTI-NEOPL SQ/IM: CPT

## 2024-04-01 PROCEDURE — 63600175 PHARM REV CODE 636 W HCPCS: Mod: NBTX | Performed by: INTERNAL MEDICINE

## 2024-04-01 PROCEDURE — 25000003 PHARM REV CODE 250: Performed by: INTERNAL MEDICINE

## 2024-04-01 RX ORDER — ONDANSETRON 4 MG/1
16 TABLET, FILM COATED ORAL
Status: COMPLETED | OUTPATIENT
Start: 2024-04-01 | End: 2024-04-01

## 2024-04-01 RX ORDER — AZACITIDINE 100 MG/1
75 INJECTION, POWDER, LYOPHILIZED, FOR SOLUTION INTRAVENOUS; SUBCUTANEOUS
Status: COMPLETED | OUTPATIENT
Start: 2024-04-01 | End: 2024-04-01

## 2024-04-01 RX ADMIN — AZACITIDINE 165 MG: 100 INJECTION, POWDER, LYOPHILIZED, FOR SOLUTION INTRAVENOUS; SUBCUTANEOUS at 01:04

## 2024-04-01 RX ADMIN — ONDANSETRON HYDROCHLORIDE 16 MG: 4 TABLET, FILM COATED ORAL at 01:04

## 2024-04-02 ENCOUNTER — LAB VISIT (OUTPATIENT)
Dept: LAB | Facility: HOSPITAL | Age: 64
End: 2024-04-02
Attending: INTERNAL MEDICINE
Payer: MEDICARE

## 2024-04-02 ENCOUNTER — OFFICE VISIT (OUTPATIENT)
Dept: HEMATOLOGY/ONCOLOGY | Facility: CLINIC | Age: 64
End: 2024-04-02
Payer: MEDICARE

## 2024-04-02 ENCOUNTER — INFUSION (OUTPATIENT)
Dept: INFUSION THERAPY | Facility: HOSPITAL | Age: 64
End: 2024-04-02
Payer: MEDICARE

## 2024-04-02 VITALS
HEIGHT: 73 IN | BODY MASS INDEX: 27.57 KG/M2 | SYSTOLIC BLOOD PRESSURE: 159 MMHG | DIASTOLIC BLOOD PRESSURE: 77 MMHG | WEIGHT: 208 LBS | HEART RATE: 47 BPM | OXYGEN SATURATION: 100 % | TEMPERATURE: 98 F

## 2024-04-02 DIAGNOSIS — C92.00 ACUTE MYELOID LEUKEMIA NOT HAVING ACHIEVED REMISSION: ICD-10-CM

## 2024-04-02 DIAGNOSIS — D49.9 IMMUNODEFICIENCY SECONDARY TO NEOPLASM: ICD-10-CM

## 2024-04-02 DIAGNOSIS — D84.81 IMMUNODEFICIENCY SECONDARY TO NEOPLASM: ICD-10-CM

## 2024-04-02 DIAGNOSIS — Z76.82 STEM CELL TRANSPLANT CANDIDATE: Primary | ICD-10-CM

## 2024-04-02 DIAGNOSIS — D61.818 PANCYTOPENIA: ICD-10-CM

## 2024-04-02 DIAGNOSIS — Z76.82 STEM CELL TRANSPLANT CANDIDATE: ICD-10-CM

## 2024-04-02 DIAGNOSIS — C92.00 ACUTE MYELOID LEUKEMIA NOT HAVING ACHIEVED REMISSION: Primary | ICD-10-CM

## 2024-04-02 LAB
ABO + RH BLD: NORMAL
ALBUMIN SERPL BCP-MCNC: 3.8 G/DL (ref 3.5–5.2)
ALP SERPL-CCNC: 59 U/L (ref 55–135)
ALT SERPL W/O P-5'-P-CCNC: 19 U/L (ref 10–44)
ANION GAP SERPL CALC-SCNC: 4 MMOL/L (ref 8–16)
ANISOCYTOSIS BLD QL SMEAR: SLIGHT
AST SERPL-CCNC: 21 U/L (ref 10–40)
BASOPHILS # BLD AUTO: 0.02 K/UL (ref 0–0.2)
BASOPHILS NFR BLD: 0.9 % (ref 0–1.9)
BILIRUB SERPL-MCNC: 0.6 MG/DL (ref 0.1–1)
BLD GP AB SCN CELLS X3 SERPL QL: NORMAL
BUN SERPL-MCNC: 27 MG/DL (ref 8–23)
CALCIUM SERPL-MCNC: 9.5 MG/DL (ref 8.7–10.5)
CHLORIDE SERPL-SCNC: 107 MMOL/L (ref 95–110)
CO2 SERPL-SCNC: 27 MMOL/L (ref 23–29)
CREAT SERPL-MCNC: 1.2 MG/DL (ref 0.5–1.4)
DIFFERENTIAL METHOD BLD: ABNORMAL
EOSINOPHIL # BLD AUTO: 0 K/UL (ref 0–0.5)
EOSINOPHIL NFR BLD: 0.5 % (ref 0–8)
ERYTHROCYTE [DISTWIDTH] IN BLOOD BY AUTOMATED COUNT: 22.3 % (ref 11.5–14.5)
EST. GFR  (NO RACE VARIABLE): >60 ML/MIN/1.73 M^2
GLUCOSE SERPL-MCNC: 83 MG/DL (ref 70–110)
HCT VFR BLD AUTO: 31.4 % (ref 40–54)
HGB BLD-MCNC: 9.6 G/DL (ref 14–18)
IMM GRANULOCYTES # BLD AUTO: 0 K/UL (ref 0–0.04)
IMM GRANULOCYTES NFR BLD AUTO: 0 % (ref 0–0.5)
LYMPHOCYTES # BLD AUTO: 0.9 K/UL (ref 1–4.8)
LYMPHOCYTES NFR BLD: 42.7 % (ref 18–48)
MAGNESIUM SERPL-MCNC: 2 MG/DL (ref 1.6–2.6)
MCH RBC QN AUTO: 25.7 PG (ref 27–31)
MCHC RBC AUTO-ENTMCNC: 30.6 G/DL (ref 32–36)
MCV RBC AUTO: 84 FL (ref 82–98)
MONOCYTES # BLD AUTO: 0.5 K/UL (ref 0.3–1)
MONOCYTES NFR BLD: 22.5 % (ref 4–15)
NEUTROPHILS # BLD AUTO: 0.7 K/UL (ref 1.8–7.7)
NEUTROPHILS NFR BLD: 33.4 % (ref 38–73)
NRBC BLD-RTO: 0 /100 WBC
OVALOCYTES BLD QL SMEAR: ABNORMAL
PHOSPHATE SERPL-MCNC: 3.3 MG/DL (ref 2.7–4.5)
PLATELET # BLD AUTO: 323 K/UL (ref 150–450)
PLATELET BLD QL SMEAR: ABNORMAL
PMV BLD AUTO: 9.4 FL (ref 9.2–12.9)
POIKILOCYTOSIS BLD QL SMEAR: ABNORMAL
POTASSIUM SERPL-SCNC: 4.3 MMOL/L (ref 3.5–5.1)
PROT SERPL-MCNC: 7.4 G/DL (ref 6–8.4)
RBC # BLD AUTO: 3.73 M/UL (ref 4.6–6.2)
SODIUM SERPL-SCNC: 138 MMOL/L (ref 136–145)
SPECIMEN OUTDATE: NORMAL
SPHEROCYTES BLD QL SMEAR: ABNORMAL
WBC # BLD AUTO: 2.18 K/UL (ref 3.9–12.7)

## 2024-04-02 PROCEDURE — 99214 OFFICE O/P EST MOD 30 MIN: CPT | Mod: PBBFAC,25 | Performed by: INTERNAL MEDICINE

## 2024-04-02 PROCEDURE — 63600175 PHARM REV CODE 636 W HCPCS: Mod: NBTX | Performed by: INTERNAL MEDICINE

## 2024-04-02 PROCEDURE — 25000003 PHARM REV CODE 250: Performed by: INTERNAL MEDICINE

## 2024-04-02 PROCEDURE — 96401 CHEMO ANTI-NEOPL SQ/IM: CPT

## 2024-04-02 PROCEDURE — 99999 PR PBB SHADOW E&M-EST. PATIENT-LVL IV: CPT | Mod: PBBFAC,,, | Performed by: INTERNAL MEDICINE

## 2024-04-02 PROCEDURE — 84100 ASSAY OF PHOSPHORUS: CPT | Performed by: INTERNAL MEDICINE

## 2024-04-02 PROCEDURE — 86850 RBC ANTIBODY SCREEN: CPT | Performed by: INTERNAL MEDICINE

## 2024-04-02 PROCEDURE — 80053 COMPREHEN METABOLIC PANEL: CPT | Performed by: INTERNAL MEDICINE

## 2024-04-02 PROCEDURE — 83735 ASSAY OF MAGNESIUM: CPT | Performed by: INTERNAL MEDICINE

## 2024-04-02 PROCEDURE — 99215 OFFICE O/P EST HI 40 MIN: CPT | Mod: S$PBB,,, | Performed by: INTERNAL MEDICINE

## 2024-04-02 PROCEDURE — 85025 COMPLETE CBC W/AUTO DIFF WBC: CPT | Performed by: INTERNAL MEDICINE

## 2024-04-02 RX ORDER — AZACITIDINE 100 MG/1
75 INJECTION, POWDER, LYOPHILIZED, FOR SOLUTION INTRAVENOUS; SUBCUTANEOUS
Status: COMPLETED | OUTPATIENT
Start: 2024-04-02 | End: 2024-04-02

## 2024-04-02 RX ORDER — ONDANSETRON 4 MG/1
16 TABLET, FILM COATED ORAL
Status: COMPLETED | OUTPATIENT
Start: 2024-04-02 | End: 2024-04-02

## 2024-04-02 RX ORDER — LORAZEPAM 1 MG/1
1 TABLET ORAL EVERY 6 HOURS PRN
Qty: 2 TABLET | Refills: 0 | Status: SHIPPED | OUTPATIENT
Start: 2024-04-02 | End: 2024-04-04

## 2024-04-02 RX ADMIN — ONDANSETRON HYDROCHLORIDE 16 MG: 4 TABLET, FILM COATED ORAL at 12:04

## 2024-04-02 RX ADMIN — AZACITIDINE 165 MG: 100 INJECTION, POWDER, LYOPHILIZED, FOR SOLUTION INTRAVENOUS; SUBCUTANEOUS at 12:04

## 2024-04-02 NOTE — PROGRESS NOTES
Section of Hematology and Stem Cell Transplantation  Follow Up Visit     Date of visit: 4/2/24  Visit diagnosis: Acute myeloid leukemia not having achieved remission [C92.00]  Referred by:  FERN Oneill MD    Oncologic History:     Primary Oncologic Diagnosis: Acute myeloid leukemia, adverse risk.    6/28/23: Diagnosed with heparin-induced thrombocytopenia. HIT Ab 1.87 OD (moderate-strong). ZAHIRA not available.   10/31/23: Peripheral blood flow cytometry sent due to worsening pancytopenia (CBC: WBC 3.43, Hgb 9, Plts 120, 19% blasts) revealed increased blasts (43.1%) concerning for acute myeloid leukemia.   11/9/23: Bone marrow biopsy revealed a hypercellular marrow (80%) with increased blasts consistent with acute myeloid leukemia. Karyotype 46,XY,del(20)(q11.2q13.3)[4]/46,XY[16]. FISH with 20q12 deletion. NGS with IDH2 (40%) and SRSF2 (40%).  12/2023: He started taking venetoclax 200mg daily (did not start azacitidine due to scheduling conflicts).    1/8/24: C1D1 of azacitidine x5 days plus venetoclax 21 of 28 days.  1/31/24: Bone marrow biopsy after cycle 1 revealed persistent AML with improvement in blasts to 3-7%. Karyotyping with 7 of 8 available metaphases with complex near-tetraploid karyotype (1 normal). NGS with IDH2 (41%) and SRSF2 (40%).    History of Present Ilness:   Gustavo Tracey  (Gustavo) is a pleasant 63 y.o.male who presents for follow up. He is doing very well. No new symptoms or side effects from treatment. No recent infections or hospitalizations.     PAST MEDICAL HISTORY:   Past Medical History:   Diagnosis Date    Abnormal nuclear stress test 11/26/2022    Cervical radiculopathy     to rt arm    CHF (congestive heart failure)     Chronic systolic congestive heart failure 11/28/2022    Dilated cardiomyopathy 11/26/2022    Hyperlipidemia     Hypertension     Obesity, unspecified     PAF (paroxysmal atrial fibrillation) 11/26/2022    Pulmonary HTN 11/28/2022    Stroke        PAST SURGICAL  HISTORY:   Past Surgical History:   Procedure Laterality Date    BONE MARROW BIOPSY Right 1/31/2024    Procedure: Biopsy-bone marrow;  Surgeon: Rui Jacobsen MD;  Location: [facility] ([address] [place] [address]);  Service: Oncology;  Laterality: Right;  1/24-pt confirmed-MS    CLOSURE OF LEFT ATRIAL APPENDAGE USING DEVICE Left 6/22/2023    Procedure: CLOSURE, LEFT ATRIAL APPENDAGE, USING DEVICE;  Surgeon: Rustam Dave MD;  Location: [facility];  Service: Cardiovascular;  Laterality: Left;  LIGATION OF LEFT ATRIAL APPENDAGE WITH OTILIA EXCLUSION SYSTEM    CORONARY ARTERY BYPASS GRAFT (CABG) N/A 6/22/2023    Procedure: CORONARY ARTERY BYPASS GRAFT (CABG);  Surgeon: Rustam Dave MD;  Location: [facility];  Service: Cardiovascular;  Laterality: N/A;  3-VESSEL WITH EPI-AORTIC ULTRASOUND    PURDY MAZE PROCEDURE N/A 6/22/2023    Procedure: PURDY MAZE PROCEDURE;  Surgeon: Rustam Dave MD;  Location: [facility];  Service: Cardiovascular;  Laterality: N/A;    ECHOCARDIOGRAM,TRANSESOPHAGEAL N/A 6/22/2023    Procedure: ECHOCARDIOGRAM,TRANSESOPHAGEAL;  Surgeon: Rustam Dave MD;  Location: [facility];  Service: Cardiovascular;  Laterality: N/A;    ENDOSCOPIC HARVEST OF VEIN Left 6/22/2023    Procedure: SURGICAL PROCUREMENT, VEIN, ENDOSCOPIC;  Surgeon: Rustam Dave MD;  Location: [facility];  Service: Cardiovascular;  Laterality: Left;    INJECTION OF ANESTHETIC AGENT AROUND MULTIPLE INTERCOSTAL NERVES N/A 6/22/2023    Procedure: BLOCK, NERVE, INTERCOSTAL, 2 OR MORE;  Surgeon: Rustam Dave MD;  Location: [facility];  Service: Cardiovascular;  Laterality: N/A;  PARASTERNAL NERVE BLOCK    INSTANTANEOUS WAVE-FREE RATIO  6/20/2023    Procedure: Instantaneous Wave-Free Ratio;  Surgeon: Zion Ortega MD;  Location: [facility] CATH LAB;  Service: Cardiology;;    IVUS, CORONARY  6/20/2023    Procedure: IVUS, Coronary;  Surgeon: Zion Ortega MD;  Location: [facility] CATH LAB;  Service: Cardiology;;    LEFT HEART CATHETERIZATION Left 11/28/2022    Procedure:  CATHETERIZATION, HEART, LEFT;  Surgeon: Zion Ortega MD;  Location: [facility] CATH LAB;  Service: Cardiology;  Laterality: Left;    LEFT HEART CATHETERIZATION Left 6/20/2023    Procedure: Left heart cath;  Surgeon: Zion Ortega MD;  Location: [facility] CATH LAB;  Service: Cardiology;  Laterality: Left;    PERCUTANEOUS TRANSLUMINAL BALLOON ANGIOPLASTY OF CORONARY ARTERY  6/20/2023    Procedure: Angioplasty-coronary;  Surgeon: Zion Ortega MD;  Location: [facility] CATH LAB;  Service: Cardiology;;    RIGHT HEART CATHETERIZATION N/A 11/28/2022    Procedure: INSERTION, CATHETER, RIGHT HEART;  Surgeon: Zion Ortega MD;  Location: [facility] CATH LAB;  Service: Cardiology;  Laterality: N/A;  Congestive heart failure       PAST SOCIAL HISTORY:  Social History     Tobacco Use    Smoking status: Never    Smokeless tobacco: Never   Substance Use Topics    Alcohol use: Yes    Drug use: Never       FAMILY HISTORY:  Family History   Problem Relation Age of Onset    Hypertension Mother     Diabetes Father     Hyperlipidemia Father     Hypertension Father     Heart attack Father     Coronary artery disease Father        CURRENT MEDICATIONS:   Current Outpatient Medications   Medication Sig    acyclovir (ZOVIRAX) 400 MG tablet Take 1 tablet (400 mg total) by mouth 2 (two) times daily.    allopurinoL (ZYLOPRIM) 300 MG tablet Take 1 tablet (300 mg total) by mouth once daily.    amitriptyline (ELAVIL) 50 MG tablet Take 50 mg by mouth every evening.    aspirin (ECOTRIN) 81 MG EC tablet Take 1 tablet (81 mg total) by mouth once daily.    cyclobenzaprine (FLEXERIL) 10 MG tablet Take 10 mg by mouth 2 (two) times daily as needed.    FARXIGA 10 mg tablet TAKE 1 TABLET BY MOUTH EVERY DAY    ferrous sulfate (FEOSOL) 325 mg (65 mg iron) Tab tablet Take 1 tablet by mouth once daily.    fluconazole (DIFLUCAN) 200 MG Tab Take 2 tablets (400 mg total) by mouth once daily. Stop once posaconazole is received.    folic acid (FOLVITE) 1 MG tablet Take 2 tablets  (2 mg total) by mouth once daily.    furosemide (LASIX) 20 MG tablet Take 1 tablet by mouth once daily.    HYDROcodone-acetaminophen (NORCO)  mg per tablet TAKE 1 TABLET BY MOUTH 1 TO 2 TIMES A DAY AS NEEDED    latanoprost 0.005 % ophthalmic solution Place 1 drop into both eyes nightly.    metoprolol succinate (TOPROL-XL) 100 MG 24 hr tablet Take 1 tablet by mouth once daily.    pantoprazole (PROTONIX) 40 MG tablet Take 1 tablet (40 mg total) by mouth once daily.    potassium chloride (K-TAB) 20 mEq TAKE 1 TABLET BY MOUTH TWICE A DAY    pravastatin (PRAVACHOL) 20 MG tablet Take 20 mg by mouth once daily.    sacubitriL-valsartan (ENTRESTO) 24-26 mg per tablet Take 1 tablet by mouth 2 (two) times daily.    venetoclax (VENCLEXTA) 100 mg Tab Take 2 tablets (200 mg) by mouth once daily on days 1-21 of a 28 day cycle. Always start with azacitidine (Vidaza).    warfarin (COUMADIN) 5 MG tablet TAKE 1 TABLET BY MOUTH ON SUN, MON, WED, FRI, SAT,& 1.5 ON TUES & THURS     Current Facility-Administered Medications   Medication    0.9%  NaCl infusion (for blood administration)    0.9%  NaCl infusion (for blood administration)    acetaminophen tablet 650 mg    acetaminophen tablet 650 mg    diphenhydrAMINE capsule 25 mg    diphenhydrAMINE capsule 25 mg     Facility-Administered Medications Ordered in Other Visits   Medication    sodium chloride 0.9% flush 10 mL       ALLERGIES:   Review of patient's allergies indicates:   Allergen Reactions    Heparin analogues      Okay to flush vad with heparin (dr stuart- 12/13/23 1432pm)       Review of Systems:     Pertinent positives and negatives included in the HPI. Otherwise a complete review of systems is negative.    Physical Exam:     Vitals:    04/02/24 1002   BP: (!) 159/77   Pulse: (!) 47   Temp: 97.7 °F (36.5 °C)     General: Appears well, NAD  Pulmonary: CTAB, no increased work of breathing, no W/R/C  Cardiovascular: S1S2 normal, RRR, no M/R/G  Abdominal: Soft, NT, ND, BS+,  no HSM  Extremities: No C/C/E  Neurological: AAOx4, grossly normal, no focal deficits  Dermatologic: No appreciable rashes or lesions  Lymphatic: No cervical, axillary, or inguinal lymphadenopathy     ECOG Performance Status: (foot note - ECOG PS provided by [employer]cology [employer]) 1 - Symptomatic but completely ambulatory    Karnofsky Performance Score:  90%- Able to Carry on Normal Activity: Minor Symptoms of Disease    Labs:   Lab Results   Component Value Date    WBC 2.18 (L) 04/02/2024    RBC 3.73 (L) 04/02/2024    HGB 9.6 (L) 04/02/2024    HCT 31.4 (L) 04/02/2024    MCV 84 04/02/2024    MCH 25.7 (L) 04/02/2024    MCHC 30.6 (L) 04/02/2024    RDW 22.3 (H) 04/02/2024     04/02/2024    MPV 9.4 04/02/2024    GRAN 0.7 (L) 04/02/2024    GRAN 33.4 (L) 04/02/2024    LYMPH 0.9 (L) 04/02/2024    LYMPH 42.7 04/02/2024    MONO 0.5 04/02/2024    MONO 22.5 (H) 04/02/2024    EOS 0.0 04/02/2024    BASO 0.02 04/02/2024    EOSINOPHIL 0.5 04/02/2024    BASOPHIL 0.9 04/02/2024       CMP  Sodium   Date Value Ref Range Status   04/02/2024 138 136 - 145 mmol/L Final     Potassium   Date Value Ref Range Status   04/02/2024 4.3 3.5 - 5.1 mmol/L Final     Chloride   Date Value Ref Range Status   04/02/2024 107 95 - 110 mmol/L Final     CO2   Date Value Ref Range Status   04/02/2024 27 23 - 29 mmol/L Final     Glucose   Date Value Ref Range Status   04/02/2024 83 70 - 110 mg/dL Final     BUN   Date Value Ref Range Status   04/02/2024 27 (H) 8 - 23 mg/dL Final     Creatinine   Date Value Ref Range Status   04/02/2024 1.2 0.5 - 1.4 mg/dL Final     Calcium   Date Value Ref Range Status   04/02/2024 9.5 8.7 - 10.5 mg/dL Final     Total Protein   Date Value Ref Range Status   04/02/2024 7.4 6.0 - 8.4 g/dL Final     Albumin   Date Value Ref Range Status   04/02/2024 3.8 3.5 - 5.2 g/dL Final     Total Bilirubin   Date Value Ref Range Status   04/02/2024 0.6 0.1 - 1.0 mg/dL Final     Comment:     For infants and newborns,  interpretation of results should be based  on gestational age, weight and in agreement with clinical  observations.    Premature Infant recommended reference ranges:  Up to 24 hours.............<8.0 mg/dL  Up to 48 hours............<12.0 mg/dL  3-5 days..................<15.0 mg/dL  6-29 days.................<15.0 mg/dL       Alkaline Phosphatase   Date Value Ref Range Status   04/02/2024 59 55 - 135 U/L Final     AST   Date Value Ref Range Status   04/02/2024 21 10 - 40 U/L Final     ALT   Date Value Ref Range Status   04/02/2024 19 10 - 44 U/L Final     Anion Gap   Date Value Ref Range Status   04/02/2024 4 (L) 8 - 16 mmol/L Final       Imaging:   Reviewed     Pathology:  Pending     Assessment and Plan:   Gustavo Kyung Lizama (Gustavo) is a pleasant 63 y.o.male who presents for follow up.    Acute myeloid leukemia, adverse risk:  Peripheral blood flow cytometry obtain 10/31/2023 concerning for acute myeloid leukemia.  Bone marrow biopsy obtained on 11/09/2023 revealed acute myeloid leukemia with 20q deletion on FISH and NGS with IDH2 (40%) and SRSF2 (40%). He started aza x7 plus alonzo x21 of 28 days in 1/2024. Bone marrow biopsy at the end of cycle 1 revealed persistent disease but improvement in blasts. CG now showed near-tetraploid complex karyotype in 7 of 8 metaphases. NGS with persistent IDH2 (41%) and SRSF2 (40%).  - Continue azacitidine 75mg/m2 x5 days. Decrease venetoclax 200mg daily x14 of 28 days.  - Repeat bone marrow biopsy at the end of this cycle. Orders placed. Consent signed. Ativan sent.    Pancytopenia:  Monitor CBC twice weekly with Dr. Oenill. Transfuse for Hgb <7 or Plts <10.    Immunodeficiency secondary to neoplasm:  Continue antimicrobial prophylaxis with levofloxacin, acyclovir, and fluconazole.      At high risk for tumor lysis syndrome:  Ok to stop allopurinol.     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  Heparin antibody 1.87 OD (moderate-strong). ZAHIRA not available,  which limits the assessment.  He stopped prophylactic Warfarin in 1/2024.    Stem cell transplant candidate:  We discussed the role for stem cell transplant as a potentially curative treatment option. He is fit and otherwise healthy. If his cardiac function continues to improve, I would recommend allogeneic stem cell transplant in CR1. He has one full sister and 2 children as potential donor options . Will also search for a MUD.   - He will meet with our transplant coordinators today.      Orders/Follow Up:      Orders Placed This Encounter    HLA High Res Sequence Based Typing       Route Chart for Scheduling    BMT Chart Routing  Urgent    Follow up with physician . 1. Bone marrow biopsy in clinic week of 4/22  2. Follow up later that week (around 4/25 or 4/26)   Follow up with CHRIS    Provider visit type    Infusion scheduling note    Injection scheduling note    Labs CBC, CMP, phosphorus, magnesium and type and screen   Scheduling:  Preferred lab:  Lab interval: twice a week  labs twice weekly   Imaging    Pharmacy appointment    Other referrals     Schedule bone marrow biopsy               Treatment Plan Information   OP AZACITIDINE 5-DAY (SUB-Q) + VENETOCLAX   Rui Jacobsen MD   Upcoming Treatment Dates - OP AZACITIDINE 5-DAY (SUB-Q) + VENETOCLAX    4/2/2024       Pre-Medications       ondansetron tablet 16 mg       Chemotherapy       azaCITIDine (VIDAZA) chemo injection 165 mg  4/3/2024       Pre-Medications       ondansetron tablet 16 mg       Chemotherapy       azaCITIDine (VIDAZA) chemo injection 165 mg  4/4/2024       Pre-Medications       ondansetron tablet 16 mg       Chemotherapy       azaCITIDine (VIDAZA) chemo injection 165 mg  4/5/2024       Pre-Medications       ondansetron tablet 16 mg       Chemotherapy       azaCITIDine (VIDAZA) chemo injection 165 mg    Therapy Plan Information  INF FLUIDS  sodium chloride 0.9% bolus 1,000 mL 1,000 mL  1,000 mL, Intravenous, PRN    INF FLUIDS  sodium  chloride 0.9% bolus 1,000 mL 1,000 mL  1,000 mL, Intravenous, Every visit  sodium chloride 0.9% bolus 500 mL 500 mL  500 mL, Intravenous, PRN    Advance Care Planning   Date: 11/16/2023  We reviewed his underlying diagnosis including natural history, prognosis, and various treatment options. He remains interested in pursuing any and all treatment options in an effort to improve his quality and quantity of life. Will continue treatment as recommended above.       Total time of this visit was 45 minutes, including time spent face to face with patient and/or via video/audio, and also in preparing for today's visit for MDM and documentation. (Medical Decision Making, including consideration of possible diagnoses, management options, complex medical record review, review of diagnostic tests and information, consideration and discussion of significant complications based on comorbidities, and discussion with providers involved with the care of the patient). Greater than 50% was spent face to face with the patient counseling and coordinating care.      Jarrell Jacobsen MD  Malignant Hematology, Stem Cell Transplant, and Cellular Therapy  [facility]  [facility]

## 2024-04-03 ENCOUNTER — INFUSION (OUTPATIENT)
Dept: INFUSION THERAPY | Facility: HOSPITAL | Age: 64
End: 2024-04-03
Payer: MEDICARE

## 2024-04-03 VITALS
TEMPERATURE: 98 F | DIASTOLIC BLOOD PRESSURE: 85 MMHG | SYSTOLIC BLOOD PRESSURE: 163 MMHG | HEART RATE: 53 BPM | RESPIRATION RATE: 16 BRPM

## 2024-04-03 DIAGNOSIS — C92.00 ACUTE MYELOID LEUKEMIA NOT HAVING ACHIEVED REMISSION: Primary | ICD-10-CM

## 2024-04-03 PROCEDURE — 25000003 PHARM REV CODE 250: Performed by: INTERNAL MEDICINE

## 2024-04-03 PROCEDURE — 96401 CHEMO ANTI-NEOPL SQ/IM: CPT

## 2024-04-03 PROCEDURE — 63600175 PHARM REV CODE 636 W HCPCS: Performed by: INTERNAL MEDICINE

## 2024-04-03 RX ORDER — AZACITIDINE 100 MG/1
75 INJECTION, POWDER, LYOPHILIZED, FOR SOLUTION INTRAVENOUS; SUBCUTANEOUS
Status: COMPLETED | OUTPATIENT
Start: 2024-04-03 | End: 2024-04-03

## 2024-04-03 RX ORDER — ONDANSETRON 4 MG/1
16 TABLET, FILM COATED ORAL
Status: COMPLETED | OUTPATIENT
Start: 2024-04-03 | End: 2024-04-03

## 2024-04-03 RX ADMIN — ONDANSETRON HYDROCHLORIDE 16 MG: 4 TABLET, FILM COATED ORAL at 01:04

## 2024-04-03 RX ADMIN — AZACITIDINE 165 MG: 100 INJECTION, POWDER, LYOPHILIZED, FOR SOLUTION INTRAVENOUS; SUBCUTANEOUS at 01:04

## 2024-04-03 NOTE — NURSING
Labs reviewed and VSS.  Vidaza administered to abdominal tissue x 3 injections.  Pt tolerated.  Ambulated out unassisted by self.

## 2024-04-04 ENCOUNTER — INFUSION (OUTPATIENT)
Dept: INFUSION THERAPY | Facility: HOSPITAL | Age: 64
End: 2024-04-04
Payer: MEDICARE

## 2024-04-04 VITALS — DIASTOLIC BLOOD PRESSURE: 83 MMHG | SYSTOLIC BLOOD PRESSURE: 174 MMHG | TEMPERATURE: 98 F | HEART RATE: 48 BPM

## 2024-04-04 DIAGNOSIS — C92.00 ACUTE MYELOID LEUKEMIA NOT HAVING ACHIEVED REMISSION: Primary | ICD-10-CM

## 2024-04-04 PROBLEM — E87.8 ELECTROLYTE IMBALANCE: Status: ACTIVE | Noted: 2024-04-04

## 2024-04-04 PROCEDURE — 25000003 PHARM REV CODE 250: Performed by: INTERNAL MEDICINE

## 2024-04-04 PROCEDURE — 63600175 PHARM REV CODE 636 W HCPCS: Performed by: INTERNAL MEDICINE

## 2024-04-04 PROCEDURE — 96401 CHEMO ANTI-NEOPL SQ/IM: CPT | Mod: NBTX

## 2024-04-04 RX ORDER — AZACITIDINE 100 MG/1
75 INJECTION, POWDER, LYOPHILIZED, FOR SOLUTION INTRAVENOUS; SUBCUTANEOUS
Status: COMPLETED | OUTPATIENT
Start: 2024-04-04 | End: 2024-04-04

## 2024-04-04 RX ORDER — ONDANSETRON 4 MG/1
16 TABLET, FILM COATED ORAL
Status: COMPLETED | OUTPATIENT
Start: 2024-04-04 | End: 2024-04-04

## 2024-04-04 RX ADMIN — ONDANSETRON HYDROCHLORIDE 16 MG: 4 TABLET, FILM COATED ORAL at 01:04

## 2024-04-04 RX ADMIN — AZACITIDINE 165 MG: 100 INJECTION, POWDER, LYOPHILIZED, FOR SOLUTION INTRAVENOUS; SUBCUTANEOUS at 01:04

## 2024-04-04 NOTE — NURSING
Pt here for D4 vidaza injeciton.  Labs reviewed.  BP elevated.  Pt saw MD this AM and states that MD is reaching out ot pt's PCP about medication management for elevated BP.  Vidaza administered to abdominal tissue x 3 injections.  Pt tolerated.  Ambulated out unassisted by self.

## 2024-04-05 ENCOUNTER — INFUSION (OUTPATIENT)
Dept: INFUSION THERAPY | Facility: HOSPITAL | Age: 64
End: 2024-04-05
Payer: MEDICARE

## 2024-04-05 VITALS
HEART RATE: 47 BPM | DIASTOLIC BLOOD PRESSURE: 76 MMHG | SYSTOLIC BLOOD PRESSURE: 168 MMHG | TEMPERATURE: 98 F | RESPIRATION RATE: 18 BRPM

## 2024-04-05 DIAGNOSIS — C92.00 ACUTE MYELOID LEUKEMIA NOT HAVING ACHIEVED REMISSION: Primary | ICD-10-CM

## 2024-04-05 DIAGNOSIS — Z76.82 STEM CELL TRANSPLANT CANDIDATE: Primary | ICD-10-CM

## 2024-04-05 DIAGNOSIS — C92.00 ACUTE MYELOID LEUKEMIA NOT HAVING ACHIEVED REMISSION: ICD-10-CM

## 2024-04-05 PROCEDURE — 63600175 PHARM REV CODE 636 W HCPCS: Performed by: INTERNAL MEDICINE

## 2024-04-05 PROCEDURE — 25000003 PHARM REV CODE 250: Performed by: INTERNAL MEDICINE

## 2024-04-05 PROCEDURE — 96401 CHEMO ANTI-NEOPL SQ/IM: CPT

## 2024-04-05 RX ORDER — ONDANSETRON 4 MG/1
16 TABLET, FILM COATED ORAL
Status: COMPLETED | OUTPATIENT
Start: 2024-04-05 | End: 2024-04-05

## 2024-04-05 RX ORDER — AZACITIDINE 100 MG/1
75 INJECTION, POWDER, LYOPHILIZED, FOR SOLUTION INTRAVENOUS; SUBCUTANEOUS
Status: COMPLETED | OUTPATIENT
Start: 2024-04-05 | End: 2024-04-05

## 2024-04-05 RX ADMIN — ONDANSETRON HYDROCHLORIDE 16 MG: 4 TABLET, FILM COATED ORAL at 01:04

## 2024-04-05 RX ADMIN — AZACITIDINE 165 MG: 100 INJECTION, POWDER, LYOPHILIZED, FOR SOLUTION INTRAVENOUS; SUBCUTANEOUS at 01:04

## 2024-04-05 NOTE — NURSING
Vidaza administered to abdominal tissue x 3 injections.  Pt tolerated.  Ambulated out unassisted by self.

## 2024-04-23 ENCOUNTER — PROCEDURE VISIT (OUTPATIENT)
Dept: HEMATOLOGY/ONCOLOGY | Facility: CLINIC | Age: 64
End: 2024-04-23
Payer: MEDICARE

## 2024-04-23 ENCOUNTER — LAB VISIT (OUTPATIENT)
Dept: LAB | Facility: HOSPITAL | Age: 64
End: 2024-04-23
Attending: INTERNAL MEDICINE
Payer: MEDICARE

## 2024-04-23 VITALS
DIASTOLIC BLOOD PRESSURE: 87 MMHG | TEMPERATURE: 98 F | SYSTOLIC BLOOD PRESSURE: 156 MMHG | HEART RATE: 56 BPM | BODY MASS INDEX: 26.78 KG/M2 | HEIGHT: 73 IN | OXYGEN SATURATION: 100 %

## 2024-04-23 DIAGNOSIS — C92.00 ACUTE MYELOID LEUKEMIA NOT HAVING ACHIEVED REMISSION: ICD-10-CM

## 2024-04-23 LAB
ABO + RH BLD: NORMAL
ALBUMIN SERPL BCP-MCNC: 4.2 G/DL (ref 3.5–5.2)
ALP SERPL-CCNC: 59 U/L (ref 55–135)
ALT SERPL W/O P-5'-P-CCNC: 13 U/L (ref 10–44)
ANION GAP SERPL CALC-SCNC: 6 MMOL/L (ref 8–16)
AST SERPL-CCNC: 19 U/L (ref 10–40)
BASOPHILS # BLD AUTO: 0.02 K/UL (ref 0–0.2)
BASOPHILS NFR BLD: 0.9 % (ref 0–1.9)
BILIRUB SERPL-MCNC: 0.8 MG/DL (ref 0.1–1)
BLD GP AB SCN CELLS X3 SERPL QL: NORMAL
BUN SERPL-MCNC: 19 MG/DL (ref 8–23)
CALCIUM SERPL-MCNC: 10.2 MG/DL (ref 8.7–10.5)
CHLORIDE SERPL-SCNC: 103 MMOL/L (ref 95–110)
CO2 SERPL-SCNC: 28 MMOL/L (ref 23–29)
CREAT SERPL-MCNC: 1.3 MG/DL (ref 0.5–1.4)
DIFFERENTIAL METHOD BLD: ABNORMAL
EOSINOPHIL # BLD AUTO: 0 K/UL (ref 0–0.5)
EOSINOPHIL NFR BLD: 0.4 % (ref 0–8)
ERYTHROCYTE [DISTWIDTH] IN BLOOD BY AUTOMATED COUNT: 22.2 % (ref 11.5–14.5)
EST. GFR  (NO RACE VARIABLE): >60 ML/MIN/1.73 M^2
GLUCOSE SERPL-MCNC: 96 MG/DL (ref 70–110)
HCT VFR BLD AUTO: 34.8 % (ref 40–54)
HGB BLD-MCNC: 11.2 G/DL (ref 14–18)
IMM GRANULOCYTES # BLD AUTO: 0.01 K/UL (ref 0–0.04)
IMM GRANULOCYTES NFR BLD AUTO: 0.4 % (ref 0–0.5)
LYMPHOCYTES # BLD AUTO: 1 K/UL (ref 1–4.8)
LYMPHOCYTES NFR BLD: 45.7 % (ref 18–48)
MAGNESIUM SERPL-MCNC: 2.1 MG/DL (ref 1.6–2.6)
MCH RBC QN AUTO: 27 PG (ref 27–31)
MCHC RBC AUTO-ENTMCNC: 32.2 G/DL (ref 32–36)
MCV RBC AUTO: 84 FL (ref 82–98)
MONOCYTES # BLD AUTO: 0.2 K/UL (ref 0.3–1)
MONOCYTES NFR BLD: 6.7 % (ref 4–15)
NEUTROPHILS # BLD AUTO: 1 K/UL (ref 1.8–7.7)
NEUTROPHILS NFR BLD: 45.9 % (ref 38–73)
NRBC BLD-RTO: 0 /100 WBC
PHOSPHATE SERPL-MCNC: 3.9 MG/DL (ref 2.7–4.5)
PLATELET # BLD AUTO: 233 K/UL (ref 150–450)
PMV BLD AUTO: 9.1 FL (ref 9.2–12.9)
POTASSIUM SERPL-SCNC: 4.4 MMOL/L (ref 3.5–5.1)
PROT SERPL-MCNC: 8.5 G/DL (ref 6–8.4)
RBC # BLD AUTO: 4.15 M/UL (ref 4.6–6.2)
SODIUM SERPL-SCNC: 137 MMOL/L (ref 136–145)
SPECIMEN OUTDATE: NORMAL
WBC # BLD AUTO: 2.23 K/UL (ref 3.9–12.7)

## 2024-04-23 PROCEDURE — 88364 INSITU HYBRIDIZATION (FISH): CPT | Performed by: PATHOLOGY

## 2024-04-23 PROCEDURE — 88313 SPECIAL STAINS GROUP 2: CPT | Mod: 26,,, | Performed by: PATHOLOGY

## 2024-04-23 PROCEDURE — 88341 IMHCHEM/IMCYTCHM EA ADD ANTB: CPT | Mod: 26,59,, | Performed by: PATHOLOGY

## 2024-04-23 PROCEDURE — 88305 TISSUE EXAM BY PATHOLOGIST: CPT | Mod: 26,,, | Performed by: PATHOLOGY

## 2024-04-23 PROCEDURE — 88271 CYTOGENETICS DNA PROBE: CPT | Mod: 59 | Performed by: INTERNAL MEDICINE

## 2024-04-23 PROCEDURE — 88184 FLOWCYTOMETRY/ TC 1 MARKER: CPT | Performed by: PATHOLOGY

## 2024-04-23 PROCEDURE — 88264 CHROMOSOME ANALYSIS 20-25: CPT | Performed by: INTERNAL MEDICINE

## 2024-04-23 PROCEDURE — 88305 TISSUE EXAM BY PATHOLOGIST: CPT | Mod: 59 | Performed by: PATHOLOGY

## 2024-04-23 PROCEDURE — 86901 BLOOD TYPING SEROLOGIC RH(D): CPT | Performed by: INTERNAL MEDICINE

## 2024-04-23 PROCEDURE — 88364 INSITU HYBRIDIZATION (FISH): CPT | Mod: 26,,, | Performed by: PATHOLOGY

## 2024-04-23 PROCEDURE — 88311 DECALCIFY TISSUE: CPT | Performed by: PATHOLOGY

## 2024-04-23 PROCEDURE — 84100 ASSAY OF PHOSPHORUS: CPT | Performed by: INTERNAL MEDICINE

## 2024-04-23 PROCEDURE — 80053 COMPREHEN METABOLIC PANEL: CPT | Performed by: INTERNAL MEDICINE

## 2024-04-23 PROCEDURE — 83735 ASSAY OF MAGNESIUM: CPT | Performed by: INTERNAL MEDICINE

## 2024-04-23 PROCEDURE — 99999PBSHW PR PBB SHADOW TECHNICAL ONLY FILED TO HB: Mod: PBBFAC,,,

## 2024-04-23 PROCEDURE — 85025 COMPLETE CBC W/AUTO DIFF WBC: CPT | Performed by: INTERNAL MEDICINE

## 2024-04-23 PROCEDURE — 88311 DECALCIFY TISSUE: CPT | Mod: 26,,, | Performed by: PATHOLOGY

## 2024-04-23 PROCEDURE — 88341 IMHCHEM/IMCYTCHM EA ADD ANTB: CPT | Performed by: PATHOLOGY

## 2024-04-23 PROCEDURE — 88365 INSITU HYBRIDIZATION (FISH): CPT | Performed by: PATHOLOGY

## 2024-04-23 PROCEDURE — 36415 COLL VENOUS BLD VENIPUNCTURE: CPT | Performed by: INTERNAL MEDICINE

## 2024-04-23 PROCEDURE — 85097 BONE MARROW INTERPRETATION: CPT | Mod: ,,, | Performed by: PATHOLOGY

## 2024-04-23 PROCEDURE — 88189 FLOWCYTOMETRY/READ 16 & >: CPT | Mod: ,,, | Performed by: PATHOLOGY

## 2024-04-23 PROCEDURE — 88185 FLOWCYTOMETRY/TC ADD-ON: CPT | Performed by: PATHOLOGY

## 2024-04-23 PROCEDURE — 88342 IMHCHEM/IMCYTCHM 1ST ANTB: CPT | Performed by: PATHOLOGY

## 2024-04-23 PROCEDURE — 88342 IMHCHEM/IMCYTCHM 1ST ANTB: CPT | Mod: 26,59,, | Performed by: PATHOLOGY

## 2024-04-23 PROCEDURE — 38222 DX BONE MARROW BX & ASPIR: CPT | Mod: PBBFAC | Performed by: NURSE PRACTITIONER

## 2024-04-23 PROCEDURE — 88313 SPECIAL STAINS GROUP 2: CPT | Mod: 59 | Performed by: PATHOLOGY

## 2024-04-23 PROCEDURE — 88365 INSITU HYBRIDIZATION (FISH): CPT | Mod: 26,,, | Performed by: PATHOLOGY

## 2024-04-23 RX ORDER — LIDOCAINE HYDROCHLORIDE 20 MG/ML
8 INJECTION, SOLUTION INFILTRATION; PERINEURAL
Status: COMPLETED | OUTPATIENT
Start: 2024-04-23 | End: 2024-04-23

## 2024-04-23 RX ADMIN — LIDOCAINE HYDROCHLORIDE 8 ML: 20 INJECTION, SOLUTION INFILTRATION; PERINEURAL at 01:04

## 2024-04-23 NOTE — PROCEDURES
Bone marrow    Date/Time: 4/23/2024 1:55 PM    Performed by: Kami Solitario NP  Authorized by: Rui Jacobsen MD    Consent Done?: Yes (Written)   Immediately prior to procedure a time out was called to verify the correct patient, procedure, equipment, support staff and site/side marked as required. .      Position: prone  Aspiration?: Yes   Biopsy?: Yes        PROCEDURE NOTE:  Date of Procedure: 04/23/2024  Bone Marrow Biopsy and Aspiration  Indication: AML (receiving aza/aolnzo)  Consent: Informed consent was obtained from patient.  Timeout: Done and documented. Allergies reviewed.  Position: prone  Site: right posterior illiac crest.  Prep: Betadine.  Needle used: 11 gauge Jamshidi needle.  Anesthetic: 2% lidocaine 8 cc.  Biopsy: The biopsy needle was introduced into the marrow cavity and an aspirate was obtained without complications and sent for flow cytometry, AML fish, NGS, DNA/RNA hold, and cytogenetics. Core biopsy obtained without difficulty and sent for routine histologic examination.  Complications: None.  Disposition: The patient was placed supine for 15min following procedure. MA to assess bandaid for bleeding prior to discharge home. Patient aware to keep bandaid dry and intact for 24hrs and to call clinic for any bleeding, fevers, pain, or signs of infection.  Blood loss: Minimal.     Kami Solitario NP  Hematology/Oncology/BMT

## 2024-04-25 LAB
AML FISH REASON FOR REFERRAL (BM): NORMAL
ANNOTATION COMMENT IMP: NORMAL
CELLS W CYTOGENETIC ABNL BLD/T: NORMAL
CHROM ANALY RESULT (ISCN): NORMAL
CLINICAL CYTOGENETICIST REVIEW: NORMAL
DNA/RNA EXTRACT AND HOLD RESULT: NORMAL
DNA/RNA EXTRACTION: NORMAL
EXHR SPECIMEN TYPE: NORMAL
LAB TEST METHOD: NORMAL
MOL DX INTERP BLD/T QL: NORMAL
PROVIDER SIGNING NAME: NORMAL
SPECIMEN SOURCE: NORMAL
TEST PERFORMANCE INFO SPEC: NORMAL

## 2024-04-27 LAB
BODY SITE - BONE MARROW: NORMAL
CLINICAL DIAGNOSIS - BONE MARROW: NORMAL
FLOW CYTOMETRY ANTIBODIES ANALYZED - BONE MARROW: NORMAL
FLOW CYTOMETRY COMMENT - BONE MARROW: NORMAL
FLOW CYTOMETRY INTERPRETATION - BONE MARROW: NORMAL

## 2024-04-29 ENCOUNTER — INFUSION (OUTPATIENT)
Dept: INFUSION THERAPY | Facility: HOSPITAL | Age: 64
End: 2024-04-29
Payer: MEDICARE

## 2024-04-29 VITALS
HEIGHT: 73 IN | DIASTOLIC BLOOD PRESSURE: 84 MMHG | SYSTOLIC BLOOD PRESSURE: 166 MMHG | WEIGHT: 203.69 LBS | BODY MASS INDEX: 26.99 KG/M2 | HEART RATE: 48 BPM | TEMPERATURE: 98 F

## 2024-04-29 DIAGNOSIS — C92.00 ACUTE MYELOID LEUKEMIA NOT HAVING ACHIEVED REMISSION: ICD-10-CM

## 2024-04-29 DIAGNOSIS — C92.00 ACUTE MYELOID LEUKEMIA NOT HAVING ACHIEVED REMISSION: Primary | ICD-10-CM

## 2024-04-29 PROCEDURE — 63600175 PHARM REV CODE 636 W HCPCS: Performed by: INTERNAL MEDICINE

## 2024-04-29 PROCEDURE — 96401 CHEMO ANTI-NEOPL SQ/IM: CPT

## 2024-04-29 PROCEDURE — 25000003 PHARM REV CODE 250: Mod: NBTX | Performed by: INTERNAL MEDICINE

## 2024-04-29 RX ORDER — ONDANSETRON HYDROCHLORIDE 8 MG/1
16 TABLET, FILM COATED ORAL
Status: CANCELLED | OUTPATIENT
Start: 2024-05-02

## 2024-04-29 RX ORDER — ONDANSETRON HYDROCHLORIDE 8 MG/1
16 TABLET, FILM COATED ORAL
Status: CANCELLED | OUTPATIENT
Start: 2024-04-30

## 2024-04-29 RX ORDER — AZACITIDINE 100 MG/1
75 INJECTION, POWDER, LYOPHILIZED, FOR SOLUTION INTRAVENOUS; SUBCUTANEOUS
Status: CANCELLED | OUTPATIENT
Start: 2024-05-01

## 2024-04-29 RX ORDER — ONDANSETRON HYDROCHLORIDE 8 MG/1
16 TABLET, FILM COATED ORAL
Status: CANCELLED | OUTPATIENT
Start: 2024-04-29

## 2024-04-29 RX ORDER — AZACITIDINE 100 MG/1
75 INJECTION, POWDER, LYOPHILIZED, FOR SOLUTION INTRAVENOUS; SUBCUTANEOUS
Status: CANCELLED | OUTPATIENT
Start: 2024-04-29

## 2024-04-29 RX ORDER — ONDANSETRON 4 MG/1
16 TABLET, FILM COATED ORAL
Status: COMPLETED | OUTPATIENT
Start: 2024-04-29 | End: 2024-04-29

## 2024-04-29 RX ORDER — ONDANSETRON HYDROCHLORIDE 8 MG/1
16 TABLET, FILM COATED ORAL
Status: CANCELLED | OUTPATIENT
Start: 2024-05-01

## 2024-04-29 RX ORDER — AZACITIDINE 100 MG/1
75 INJECTION, POWDER, LYOPHILIZED, FOR SOLUTION INTRAVENOUS; SUBCUTANEOUS
Status: CANCELLED | OUTPATIENT
Start: 2024-04-30

## 2024-04-29 RX ORDER — AZACITIDINE 100 MG/1
75 INJECTION, POWDER, LYOPHILIZED, FOR SOLUTION INTRAVENOUS; SUBCUTANEOUS
Status: CANCELLED | OUTPATIENT
Start: 2024-05-02

## 2024-04-29 RX ORDER — ONDANSETRON HYDROCHLORIDE 8 MG/1
16 TABLET, FILM COATED ORAL
Status: CANCELLED | OUTPATIENT
Start: 2024-05-03

## 2024-04-29 RX ORDER — AZACITIDINE 100 MG/1
75 INJECTION, POWDER, LYOPHILIZED, FOR SOLUTION INTRAVENOUS; SUBCUTANEOUS
Status: COMPLETED | OUTPATIENT
Start: 2024-04-29 | End: 2024-04-29

## 2024-04-29 RX ORDER — AZACITIDINE 100 MG/1
75 INJECTION, POWDER, LYOPHILIZED, FOR SOLUTION INTRAVENOUS; SUBCUTANEOUS
Status: CANCELLED | OUTPATIENT
Start: 2024-05-03

## 2024-04-29 RX ADMIN — ONDANSETRON HYDROCHLORIDE 16 MG: 4 TABLET, FILM COATED ORAL at 01:04

## 2024-04-29 RX ADMIN — AZACITIDINE 165 MG: 100 INJECTION, POWDER, LYOPHILIZED, FOR SOLUTION INTRAVENOUS; SUBCUTANEOUS at 01:04

## 2024-04-29 NOTE — Clinical Note
Patient confirms medications and allergies are accurate via patients echeck in completion, and or denies any changes since last reviewed/verified.       Is the patient currently in the state of [place]? YES    Visit mode:VIDEO    If the visit is dropped, the patient can be reconnected by: VIDEO VISIT: Text to cell phone:   Telephone Information:   Mobile [phone]       Will anyone else be joining the visit? Daughter Rosio.   (If patient encounters technical issues they should call [phone])    How would you like to obtain your AVS? MyChart    Are changes needed to the allergy or medication list? No    Are refills needed on medications prescribed by this physician? NO    Reason for visit: RECHECK    Kristina AHUMADAF         The left radial was prepped. The site was prepped with ChloraPrep. The site was clipped. The patient was draped. The patient was positioned supine.

## 2024-04-29 NOTE — NURSING
Vidaza administered to abdominal tissue x 3 injections.  Pt tolerated.  Updated pt to only take 7 days of the venc and 21 days off per Dr. Jacobsen.  Pt verbalized understanding.  Ambulated out unassisted by self.

## 2024-04-30 ENCOUNTER — INFUSION (OUTPATIENT)
Dept: INFUSION THERAPY | Facility: HOSPITAL | Age: 64
End: 2024-04-30
Payer: MEDICARE

## 2024-04-30 VITALS
BODY MASS INDEX: 26.99 KG/M2 | TEMPERATURE: 98 F | SYSTOLIC BLOOD PRESSURE: 154 MMHG | DIASTOLIC BLOOD PRESSURE: 73 MMHG | RESPIRATION RATE: 16 BRPM | HEIGHT: 73 IN | WEIGHT: 203.69 LBS | HEART RATE: 48 BPM

## 2024-04-30 DIAGNOSIS — C92.00 ACUTE MYELOID LEUKEMIA NOT HAVING ACHIEVED REMISSION: Primary | ICD-10-CM

## 2024-04-30 PROCEDURE — 63600175 PHARM REV CODE 636 W HCPCS: Mod: NBTX | Performed by: INTERNAL MEDICINE

## 2024-04-30 PROCEDURE — 96401 CHEMO ANTI-NEOPL SQ/IM: CPT | Mod: NBTX

## 2024-04-30 PROCEDURE — 25000003 PHARM REV CODE 250: Performed by: INTERNAL MEDICINE

## 2024-04-30 RX ORDER — AZACITIDINE 100 MG/1
75 INJECTION, POWDER, LYOPHILIZED, FOR SOLUTION INTRAVENOUS; SUBCUTANEOUS
Status: COMPLETED | OUTPATIENT
Start: 2024-04-30 | End: 2024-04-30

## 2024-04-30 RX ORDER — ONDANSETRON 4 MG/1
16 TABLET, FILM COATED ORAL
Status: COMPLETED | OUTPATIENT
Start: 2024-04-30 | End: 2024-04-30

## 2024-04-30 RX ADMIN — ONDANSETRON HYDROCHLORIDE 16 MG: 4 TABLET, FILM COATED ORAL at 01:04

## 2024-04-30 RX ADMIN — AZACITIDINE 165 MG: 100 INJECTION, POWDER, LYOPHILIZED, FOR SOLUTION INTRAVENOUS; SUBCUTANEOUS at 01:04

## 2024-05-01 ENCOUNTER — OFFICE VISIT (OUTPATIENT)
Dept: HEMATOLOGY/ONCOLOGY | Facility: CLINIC | Age: 64
End: 2024-05-01
Payer: MEDICARE

## 2024-05-01 ENCOUNTER — INFUSION (OUTPATIENT)
Dept: INFUSION THERAPY | Facility: HOSPITAL | Age: 64
End: 2024-05-01
Payer: MEDICARE

## 2024-05-01 VITALS
OXYGEN SATURATION: 100 % | BODY MASS INDEX: 27.29 KG/M2 | SYSTOLIC BLOOD PRESSURE: 153 MMHG | HEART RATE: 48 BPM | WEIGHT: 205.94 LBS | HEIGHT: 73 IN | TEMPERATURE: 98 F | RESPIRATION RATE: 18 BRPM | DIASTOLIC BLOOD PRESSURE: 72 MMHG

## 2024-05-01 DIAGNOSIS — D49.9 IMMUNODEFICIENCY SECONDARY TO NEOPLASM: ICD-10-CM

## 2024-05-01 DIAGNOSIS — D61.818 PANCYTOPENIA: ICD-10-CM

## 2024-05-01 DIAGNOSIS — C92.01 ACUTE MYELOID LEUKEMIA IN REMISSION: Primary | ICD-10-CM

## 2024-05-01 DIAGNOSIS — C92.00 ACUTE MYELOID LEUKEMIA NOT HAVING ACHIEVED REMISSION: Primary | ICD-10-CM

## 2024-05-01 DIAGNOSIS — Z76.82 STEM CELL TRANSPLANT CANDIDATE: ICD-10-CM

## 2024-05-01 DIAGNOSIS — D84.81 IMMUNODEFICIENCY SECONDARY TO NEOPLASM: ICD-10-CM

## 2024-05-01 PROCEDURE — 25000003 PHARM REV CODE 250: Performed by: INTERNAL MEDICINE

## 2024-05-01 PROCEDURE — 99215 OFFICE O/P EST HI 40 MIN: CPT | Mod: S$PBB,,, | Performed by: INTERNAL MEDICINE

## 2024-05-01 PROCEDURE — 99215 OFFICE O/P EST HI 40 MIN: CPT | Mod: PBBFAC,25 | Performed by: INTERNAL MEDICINE

## 2024-05-01 PROCEDURE — 99999 PR PBB SHADOW E&M-EST. PATIENT-LVL V: CPT | Mod: PBBFAC,,, | Performed by: INTERNAL MEDICINE

## 2024-05-01 PROCEDURE — 96401 CHEMO ANTI-NEOPL SQ/IM: CPT

## 2024-05-01 PROCEDURE — 63600175 PHARM REV CODE 636 W HCPCS: Performed by: INTERNAL MEDICINE

## 2024-05-01 RX ORDER — AZACITIDINE 100 MG/1
75 INJECTION, POWDER, LYOPHILIZED, FOR SOLUTION INTRAVENOUS; SUBCUTANEOUS
Status: COMPLETED | OUTPATIENT
Start: 2024-05-01 | End: 2024-05-01

## 2024-05-01 RX ORDER — ONDANSETRON 4 MG/1
16 TABLET, FILM COATED ORAL
Status: COMPLETED | OUTPATIENT
Start: 2024-05-01 | End: 2024-05-01

## 2024-05-01 RX ADMIN — ONDANSETRON HYDROCHLORIDE 16 MG: 4 TABLET, FILM COATED ORAL at 02:05

## 2024-05-01 RX ADMIN — AZACITIDINE 165 MG: 100 INJECTION, POWDER, LYOPHILIZED, FOR SOLUTION INTRAVENOUS; SUBCUTANEOUS at 02:05

## 2024-05-01 NOTE — PROGRESS NOTES
Section of Hematology and Stem Cell Transplantation  Follow Up Visit     Date of visit: 5/1/24  Visit diagnosis: Acute myeloid leukemia in remission [C92.01]  Referred by:  FERN Oneill MD    Oncologic History:     Primary Oncologic Diagnosis: Acute myeloid leukemia, adverse risk.    6/28/23: Diagnosed with heparin-induced thrombocytopenia. HIT Ab 1.87 OD (moderate-strong). ZAHIRA not available.   10/31/23: Peripheral blood flow cytometry sent due to worsening pancytopenia (CBC: WBC 3.43, Hgb 9, Plts 120, 19% blasts) revealed increased blasts (43.1%) concerning for acute myeloid leukemia.   11/9/23: Bone marrow biopsy revealed a hypercellular marrow (80%) with increased blasts consistent with acute myeloid leukemia. Karyotype 46,XY,del(20)(q11.2q13.3)[4]/46,XY[16]. FISH with 20q12 deletion. NGS with IDH2 (40%) and SRSF2 (40%).  12/2023: He started taking venetoclax 200mg daily (did not start azacitidine due to scheduling conflicts).    1/8/24: C1D1 of azacitidine x5 days plus venetoclax 21 of 28 days.  1/31/24: Bone marrow biopsy after cycle 1 revealed persistent AML with improvement in blasts to 3-7%. Karyotyping with 7 of 8 available metaphases with complex near-tetraploid karyotype (1 normal). NGS with IDH2 (41%) and SRSF2 (40%).  4/23/24: Bone marrow biopsy with normocellular marrow with no blasts consistent with complete remission. CG/FISH normal.     History of Present Ilness:   Gustavo Tracey  (Gustavo) is a pleasant 63 y.o.male who presents for follow up. He states he feels well at this time. He has felt much better recently. He saw his local cardiologist earlier this week who stated his cardiac function is normal.     PAST MEDICAL HISTORY:   Past Medical History:   Diagnosis Date    Abnormal nuclear stress test 11/26/2022    Cervical radiculopathy     to rt arm    CHF (congestive heart failure)     Chronic systolic congestive heart failure 11/28/2022    Dilated cardiomyopathy 11/26/2022    Hyperlipidemia      Hypertension     Obesity, unspecified     PAF (paroxysmal atrial fibrillation) 11/26/2022    Pulmonary HTN 11/28/2022    Stroke        PAST SURGICAL HISTORY:   Past Surgical History:   Procedure Laterality Date    BONE MARROW BIOPSY Right 1/31/2024    Procedure: Biopsy-bone marrow;  Surgeon: Rui Jacobsen MD;  Location: [facility] ENDO [address]);  Service: Oncology;  Laterality: Right;  1/24-pt confirmed-MS    CLOSURE OF LEFT ATRIAL APPENDAGE USING DEVICE Left 6/22/2023    Procedure: CLOSURE, LEFT ATRIAL APPENDAGE, USING DEVICE;  Surgeon: Rustam Dave MD;  Location: [facility] OR;  Service: Cardiovascular;  Laterality: Left;  LIGATION OF LEFT ATRIAL APPENDAGE WITH OTILIA EXCLUSION SYSTEM    CORONARY ARTERY BYPASS GRAFT (CABG) N/A 6/22/2023    Procedure: CORONARY ARTERY BYPASS GRAFT (CABG);  Surgeon: Rustam Dave MD;  Location: [facility] OR;  Service: Cardiovascular;  Laterality: N/A;  3-VESSEL WITH EPI-AORTIC ULTRASOUND    PURDY MAZE PROCEDURE N/A 6/22/2023    Procedure: PURDY MAZE PROCEDURE;  Surgeon: Rustam Dave MD;  Location: [facility] OR;  Service: Cardiovascular;  Laterality: N/A;    ECHOCARDIOGRAM,TRANSESOPHAGEAL N/A 6/22/2023    Procedure: ECHOCARDIOGRAM,TRANSESOPHAGEAL;  Surgeon: Rustam Dave MD;  Location: [facility] OR;  Service: Cardiovascular;  Laterality: N/A;    ENDOSCOPIC HARVEST OF VEIN Left 6/22/2023    Procedure: SURGICAL PROCUREMENT, VEIN, ENDOSCOPIC;  Surgeon: Rustam Dave MD;  Location: [facility] OR;  Service: Cardiovascular;  Laterality: Left;    INJECTION OF ANESTHETIC AGENT AROUND MULTIPLE INTERCOSTAL NERVES N/A 6/22/2023    Procedure: BLOCK, NERVE, INTERCOSTAL, 2 OR MORE;  Surgeon: Rustam Dave MD;  Location: [facility] OR;  Service: Cardiovascular;  Laterality: N/A;  PARASTERNAL NERVE BLOCK    INSTANTANEOUS WAVE-FREE RATIO  6/20/2023    Procedure: Instantaneous Wave-Free Ratio;  Surgeon: Zion Ortega MD;  Location: [facility] CATH LAB;  Service: Cardiology;;    IVUS, CORONARY  6/20/2023    Procedure: IVUS,  Coronary;  Surgeon: Zion Ortega MD;  Location: [facility] CATH LAB;  Service: Cardiology;;    LEFT HEART CATHETERIZATION Left 11/28/2022    Procedure: CATHETERIZATION, HEART, LEFT;  Surgeon: Zion Ortega MD;  Location: [facility] CATH LAB;  Service: Cardiology;  Laterality: Left;    LEFT HEART CATHETERIZATION Left 6/20/2023    Procedure: Left heart cath;  Surgeon: Zion Ortega MD;  Location: [facility] CATH LAB;  Service: Cardiology;  Laterality: Left;    PERCUTANEOUS TRANSLUMINAL BALLOON ANGIOPLASTY OF CORONARY ARTERY  6/20/2023    Procedure: Angioplasty-coronary;  Surgeon: Zion Ortega MD;  Location: [facility] CATH LAB;  Service: Cardiology;;    RIGHT HEART CATHETERIZATION N/A 11/28/2022    Procedure: INSERTION, CATHETER, RIGHT HEART;  Surgeon: Zion Ortega MD;  Location: [facility] CATH LAB;  Service: Cardiology;  Laterality: N/A;  Congestive heart failure       PAST SOCIAL HISTORY:  Social History     Tobacco Use    Smoking status: Never    Smokeless tobacco: Never   Substance Use Topics    Alcohol use: Yes    Drug use: Never       FAMILY HISTORY:  Family History   Problem Relation Name Age of Onset    Hypertension Mother      Diabetes Father      Hyperlipidemia Father      Hypertension Father      Heart attack Father      Coronary artery disease Father         CURRENT MEDICATIONS:   Current Outpatient Medications   Medication Sig Dispense Refill    acyclovir (ZOVIRAX) 400 MG tablet Take 1 tablet (400 mg total) by mouth 2 (two) times daily. 60 tablet 11    allopurinoL (ZYLOPRIM) 300 MG tablet Take 1 tablet (300 mg total) by mouth once daily. 30 tablet 11    amitriptyline (ELAVIL) 50 MG tablet Take 50 mg by mouth every evening.      FARXIGA 10 mg tablet TAKE 1 TABLET BY MOUTH EVERY DAY 90 tablet 0    ferrous sulfate (FEOSOL) 325 mg (65 mg iron) Tab tablet Take 1 tablet by mouth once daily.      fluconazole (DIFLUCAN) 200 MG Tab Take 2 tablets (400 mg total) by mouth once daily. Stop once posaconazole is received. 60 tablet 11     furosemide (LASIX) 20 MG tablet Take 1 tablet by mouth once daily.      HYDROcodone-acetaminophen (NORCO)  mg per tablet TAKE 1 TABLET BY MOUTH 1 TO 2 TIMES A DAY AS NEEDED      latanoprost 0.005 % ophthalmic solution Place 1 drop into both eyes nightly.      metoprolol succinate (TOPROL-XL) 100 MG 24 hr tablet Take 1 tablet by mouth once daily.      potassium chloride (K-TAB) 20 mEq TAKE 1 TABLET BY MOUTH TWICE A DAY 60 tablet 5    pravastatin (PRAVACHOL) 20 MG tablet Take 20 mg by mouth once daily.      valsartan (DIOVAN) 40 MG tablet Take 1 tablet (40 mg total) by mouth once daily. 30 tablet 5    venetoclax (VENCLEXTA) 100 mg Tab Take 2 tablets (200 mg total) by mouth once daily Take 2 tablets (200mg) once daily on days 1-7 of a 28 day cycle. Always start with azacitidine (Vidaza) injections.. 56 tablet 11    warfarin (COUMADIN) 5 MG tablet TAKE 1 TABLET BY MOUTH ON SUN, MON, WED, FRI, SAT,& 1.5 ON TUES & THURS      aspirin (ECOTRIN) 81 MG EC tablet Take 1 tablet (81 mg total) by mouth once daily.  0    cyclobenzaprine (FLEXERIL) 10 MG tablet Take 10 mg by mouth 2 (two) times daily as needed. (Patient not taking: Reported on 5/1/2024)      folic acid (FOLVITE) 1 MG tablet Take 2 tablets (2 mg total) by mouth once daily. 60 tablet 0    pantoprazole (PROTONIX) 40 MG tablet Take 1 tablet (40 mg total) by mouth once daily. 30 tablet 0     Current Facility-Administered Medications   Medication Dose Route Frequency Provider Last Rate Last Admin    0.9%  NaCl infusion (for blood administration)   Intravenous Once Marlene Orellana MD        0.9%  NaCl infusion (for blood administration)   Intravenous Once Marlene Orellana MD        acetaminophen tablet 650 mg  650 mg Oral PRN Marlene Orellana MD        acetaminophen tablet 650 mg  650 mg Oral PRN Marlene Orellana MD        diphenhydrAMINE capsule 25 mg  25 mg Oral PRN Marlene Orellana MD         diphenhydrAMINE capsule 25 mg  25 mg Oral PRN Marlene Orellana MD         Facility-Administered Medications Ordered in Other Visits   Medication Dose Route Frequency Provider Last Rate Last Admin    azaCITIDine (VIDAZA) chemo injection 165 mg  75 mg/m2 (Treatment Plan Recorded) Subcutaneous 1 time in Clinic/HOD Rui Jacobsen MD        ondansetron tablet 16 mg  16 mg Oral 1 time in Clinic/HOD Rui Jacobsen MD        sodium chloride 0.9% flush 10 mL  10 mL Intravenous PRN Zion Ortega MD           ALLERGIES:   Review of patient's allergies indicates:   Allergen Reactions    Heparin analogues      Okay to flush vad with heparin (dr stuart- 12/13/23 1432pm)       Review of Systems:     Pertinent positives and negatives included in the HPI. Otherwise a complete review of systems is negative.    Physical Exam:     Vitals:    05/01/24 1310   BP: (!) 153/72   Pulse: (!) 48   Resp: 18   Temp: 97.9 °F (36.6 °C)     General: Appears well, NAD  Pulmonary: CTAB, no increased work of breathing, no W/R/C  Cardiovascular: S1S2 normal, RRR, no M/R/G  Abdominal: Soft, NT, ND, BS+, no HSM  Extremities: No C/C/E  Neurological: AAOx4, grossly normal, no focal deficits  Dermatologic: No appreciable rashes or lesions  Lymphatic: No cervical, axillary, or inguinal lymphadenopathy     ECOG Performance Status: (foot note - ECOG PS provided by [employer]cology [employer]) 1 - Symptomatic but completely ambulatory    Karnofsky Performance Score:  90%- Able to Carry on Normal Activity: Minor Symptoms of Disease    Labs:   Lab Results   Component Value Date    WBC 1.80 (LL) 04/25/2024    WBC 1.80 (LL) 04/25/2024    RBC 4.09 (L) 04/25/2024    RBC 4.09 (L) 04/25/2024    HGB 10.5 (L) 04/25/2024    HGB 10.5 (L) 04/25/2024    HCT 33.6 (L) 04/25/2024    HCT 33.6 (L) 04/25/2024    MCV 82 04/25/2024    MCV 82 04/25/2024    MCH 25.7 (L) 04/25/2024    MCH 25.7 (L) 04/25/2024    MCHC 31.2 (L) 04/25/2024    MCHC 31.2 (L)  04/25/2024    RDW 25.0 (H) 04/25/2024    RDW 25.0 (H) 04/25/2024     04/25/2024     04/25/2024    MPV 7.0 (L) 04/25/2024    MPV 7.0 (L) 04/25/2024    GRAN 36.0 (L) 04/25/2024    GRAN 36.0 (L) 04/25/2024    LYMPH CANCELED 04/25/2024    LYMPH 56.0 (H) 04/25/2024    LYMPH CANCELED 04/25/2024    LYMPH 56.0 (H) 04/25/2024    MONO CANCELED 04/25/2024    MONO 2.0 (L) 04/25/2024    MONO CANCELED 04/25/2024    MONO 2.0 (L) 04/25/2024    EOS CANCELED 04/25/2024    EOS CANCELED 04/25/2024    BASO CANCELED 04/25/2024    BASO CANCELED 04/25/2024    EOSINOPHIL 2.0 04/25/2024    EOSINOPHIL 2.0 04/25/2024    BASOPHIL 4.0 (H) 04/25/2024    BASOPHIL 4.0 (H) 04/25/2024       CMP  Sodium   Date Value Ref Range Status   04/25/2024 143 136 - 145 mmol/L Final   04/25/2024 143 136 - 145 mmol/L Final     Potassium   Date Value Ref Range Status   04/25/2024 4.7 3.5 - 5.1 mmol/L Final   04/25/2024 4.7 3.5 - 5.1 mmol/L Final     Chloride   Date Value Ref Range Status   04/25/2024 106 95 - 110 mmol/L Final   04/25/2024 106 95 - 110 mmol/L Final     CO2   Date Value Ref Range Status   04/25/2024 27 23 - 29 mmol/L Final   04/25/2024 27 23 - 29 mmol/L Final     Glucose   Date Value Ref Range Status   04/25/2024 103 74 - 106 mg/dL Final   04/25/2024 103 74 - 106 mg/dL Final     BUN   Date Value Ref Range Status   04/25/2024 24 (H) 9 - 20 mg/dL Final   04/25/2024 24 (H) 9 - 20 mg/dL Final     Creatinine   Date Value Ref Range Status   04/25/2024 1.36 0.80 - 1.50 mg/dL Final   04/25/2024 1.36 0.80 - 1.50 mg/dL Final     Calcium   Date Value Ref Range Status   04/25/2024 9.9 8.4 - 10.2 mg/dL Final   04/25/2024 9.9 8.4 - 10.2 mg/dL Final     Total Protein   Date Value Ref Range Status   04/25/2024 8.4 (H) 6.3 - 8.2 g/dL Final   04/25/2024 8.4 (H) 6.3 - 8.2 g/dL Final     Albumin   Date Value Ref Range Status   04/25/2024 4.6 3.5 - 5.2 g/dL Final   04/25/2024 4.6 3.5 - 5.2 g/dL Final     Total Bilirubin   Date Value Ref Range Status    04/25/2024 0.4 0.2 - 1.3 mg/dL Final   04/25/2024 0.4 0.2 - 1.3 mg/dL Final     Alkaline Phosphatase   Date Value Ref Range Status   04/25/2024 52 38 - 145 U/L Final   04/25/2024 52 38 - 145 U/L Final     AST   Date Value Ref Range Status   04/25/2024 32 17 - 59 U/L Final   04/25/2024 32 17 - 59 U/L Final     ALT   Date Value Ref Range Status   04/25/2024 21 10 - 44 U/L Final   04/25/2024 21 10 - 44 U/L Final     Anion Gap   Date Value Ref Range Status   04/25/2024 10 8 - 16 mmol/L Final   04/25/2024 10 8 - 16 mmol/L Final       Imaging:   Reviewed     Pathology:  Pending     Assessment and Plan:   Gustavo Tracey Jr. (Gustavo) is a pleasant 63 y.o.male who presents for follow up.    Acute myeloid leukemia, adverse risk:  Peripheral blood flow cytometry obtain 10/31/2023 concerning for acute myeloid leukemia.  Bone marrow biopsy obtained on 11/09/2023 revealed acute myeloid leukemia with 20q deletion on FISH and NGS with IDH2 (40%) and SRSF2 (40%). He started aza x7 plus alonzo x21 of 28 days in 1/2024. Bone marrow biopsy at the end of cycle 1 revealed persistent disease but improvement in blasts. CG now showed near-tetraploid complex karyotype in 7 of 8 metaphases. NGS with persistent IDH2 (41%) and SRSF2 (40%). Bone marrow biopsy in 4/2024 showed complete remission.   - Continue azacitidine 75mg/m2 x5 days. Decrease venetoclax 200mg daily x7 of 28 days.  - ANC >500 and Plts >50 to start each cycle.     Pancytopenia:  Monitor CBC twice weekly with Dr. Oneill. Transfuse for Hgb <7 or Plts <10.    Immunodeficiency secondary to neoplasm:  Continue antimicrobial prophylaxis with levofloxacin, acyclovir, and fluconazole.      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  Heparin antibody 1.87 OD (moderate-strong). ZAHIRA not available, which limits the assessment.  He stopped prophylactic Warfarin in 1/2024.    Stem cell transplant candidate:  We discussed the role for stem cell transplant as a potentially  curative treatment option. He is fit and otherwise healthy. If his cardiac function continues to improve, I would recommend allogeneic stem cell transplant in CR1. He has one full sister and 2 children as potential donor options . Will also search for a MUD.   - He will meet with our transplant coordinators today.    HFrEF:  Cardiac function now back to baseline. Continue follow up with local cardiologist.     Orders/Follow Up:             Route Chart for Scheduling    BMT Chart Routing      Follow up with physician 4 weeks.   Follow up with CHRIS    Provider visit type    Infusion scheduling note    Injection scheduling note    Labs CBC, CMP, phosphorus, magnesium and type and screen   Scheduling:  Preferred lab:  Lab interval: twice a week     Imaging    Pharmacy appointment    Other referrals                Treatment Plan Information   OP AZACITIDINE 5-DAY (SUB-Q) + VENETOCLAX   Rui Jacobsen MD   Upcoming Treatment Dates - OP AZACITIDINE 5-DAY (SUB-Q) + VENETOCLAX    5/2/2024       Pre-Medications       ondansetron tablet 16 mg       Chemotherapy       azaCITIDine (VIDAZA) chemo injection 165 mg  5/3/2024       Pre-Medications       ondansetron tablet 16 mg       Chemotherapy       azaCITIDine (VIDAZA) chemo injection 165 mg  5/27/2024       Pre-Medications       ondansetron tablet 16 mg       Chemotherapy       azaCITIDine (VIDAZA) chemo injection 165 mg  5/28/2024       Pre-Medications       ondansetron tablet 16 mg       Chemotherapy       azaCITIDine (VIDAZA) chemo injection 165 mg    Therapy Plan Information  INF FLUIDS  sodium chloride 0.9% bolus 1,000 mL 1,000 mL  1,000 mL, Intravenous, PRN    INF FLUIDS  sodium chloride 0.9% bolus 1,000 mL 1,000 mL  1,000 mL, Intravenous, Every visit  sodium chloride 0.9% bolus 500 mL 500 mL  500 mL, Intravenous, PRN    Advance Care Planning   Date: 11/16/2023  We reviewed his underlying diagnosis including natural history, prognosis, and various treatment options.  He remains interested in pursuing any and all treatment options in an effort to improve his quality and quantity of life. Will continue treatment as recommended above.       Total time of this visit was 40 minutes, including time spent face to face with patient and/or via video/audio, and also in preparing for today's visit for MDM and documentation. (Medical Decision Making, including consideration of possible diagnoses, management options, complex medical record review, review of diagnostic tests and information, consideration and discussion of significant complications based on comorbidities, and discussion with providers involved with the care of the patient). Greater than 50% was spent face to face with the patient counseling and coordinating care.      Jarrell Jacobsen MD  Malignant Hematology, Stem Cell Transplant, and Cellular Therapy  [facility] [place]  [facility] [place]

## 2024-05-02 ENCOUNTER — INFUSION (OUTPATIENT)
Dept: INFUSION THERAPY | Facility: HOSPITAL | Age: 64
End: 2024-05-02
Payer: MEDICARE

## 2024-05-02 VITALS
TEMPERATURE: 98 F | HEART RATE: 50 BPM | DIASTOLIC BLOOD PRESSURE: 78 MMHG | RESPIRATION RATE: 18 BRPM | SYSTOLIC BLOOD PRESSURE: 160 MMHG

## 2024-05-02 DIAGNOSIS — C92.00 ACUTE MYELOID LEUKEMIA NOT HAVING ACHIEVED REMISSION: Primary | ICD-10-CM

## 2024-05-02 LAB
CHROM BANDING METHOD: NORMAL
CHROMOSOME ANALYSIS BM ADDITIONAL INFORMATION: NORMAL
CHROMOSOME ANALYSIS BM RELEASED BY: NORMAL
CHROMOSOME ANALYSIS BM RESULT SUMMARY: NORMAL
CLINICAL CYTOGENETICIST REVIEW: NORMAL
KARYOTYP MAR: NORMAL
REASON FOR REFERRAL (NARRATIVE): NORMAL
REF LAB TEST METHOD: NORMAL
SPECIMEN SOURCE: NORMAL
SPECIMEN: NORMAL

## 2024-05-02 PROCEDURE — 25000003 PHARM REV CODE 250: Performed by: INTERNAL MEDICINE

## 2024-05-02 PROCEDURE — 63600175 PHARM REV CODE 636 W HCPCS: Performed by: INTERNAL MEDICINE

## 2024-05-02 PROCEDURE — 96401 CHEMO ANTI-NEOPL SQ/IM: CPT

## 2024-05-02 RX ORDER — AZACITIDINE 100 MG/1
75 INJECTION, POWDER, LYOPHILIZED, FOR SOLUTION INTRAVENOUS; SUBCUTANEOUS
Status: COMPLETED | OUTPATIENT
Start: 2024-05-02 | End: 2024-05-02

## 2024-05-02 RX ORDER — ONDANSETRON 4 MG/1
16 TABLET, FILM COATED ORAL
Status: COMPLETED | OUTPATIENT
Start: 2024-05-02 | End: 2024-05-02

## 2024-05-02 RX ADMIN — AZACITIDINE 165 MG: 100 INJECTION, POWDER, LYOPHILIZED, FOR SOLUTION INTRAVENOUS; SUBCUTANEOUS at 02:05

## 2024-05-02 RX ADMIN — ONDANSETRON HYDROCHLORIDE 16 MG: 4 TABLET, FILM COATED ORAL at 02:05

## 2024-05-02 NOTE — NURSING
D4 vidaza administered to abdominal tissue x 3 injections.  Pt tolerated.  Ambulated out unassisted by self.

## 2024-05-03 ENCOUNTER — INFUSION (OUTPATIENT)
Dept: INFUSION THERAPY | Facility: HOSPITAL | Age: 64
End: 2024-05-03
Payer: MEDICARE

## 2024-05-03 VITALS
RESPIRATION RATE: 18 BRPM | DIASTOLIC BLOOD PRESSURE: 84 MMHG | TEMPERATURE: 99 F | SYSTOLIC BLOOD PRESSURE: 180 MMHG | HEART RATE: 48 BPM

## 2024-05-03 DIAGNOSIS — C92.00 ACUTE MYELOID LEUKEMIA NOT HAVING ACHIEVED REMISSION: ICD-10-CM

## 2024-05-03 DIAGNOSIS — C92.00 ACUTE MYELOID LEUKEMIA NOT HAVING ACHIEVED REMISSION: Primary | ICD-10-CM

## 2024-05-03 PROCEDURE — 63600175 PHARM REV CODE 636 W HCPCS: Performed by: INTERNAL MEDICINE

## 2024-05-03 PROCEDURE — 25000003 PHARM REV CODE 250: Performed by: INTERNAL MEDICINE

## 2024-05-03 PROCEDURE — 96401 CHEMO ANTI-NEOPL SQ/IM: CPT

## 2024-05-03 RX ORDER — AZACITIDINE 100 MG/1
75 INJECTION, POWDER, LYOPHILIZED, FOR SOLUTION INTRAVENOUS; SUBCUTANEOUS
Status: COMPLETED | OUTPATIENT
Start: 2024-05-03 | End: 2024-05-03

## 2024-05-03 RX ORDER — ONDANSETRON 4 MG/1
16 TABLET, FILM COATED ORAL
Status: COMPLETED | OUTPATIENT
Start: 2024-05-03 | End: 2024-05-03

## 2024-05-03 RX ADMIN — ONDANSETRON HYDROCHLORIDE 16 MG: 4 TABLET, FILM COATED ORAL at 01:05

## 2024-05-03 RX ADMIN — AZACITIDINE 165 MG: 100 INJECTION, POWDER, LYOPHILIZED, FOR SOLUTION INTRAVENOUS; SUBCUTANEOUS at 01:05

## 2024-05-03 NOTE — PLAN OF CARE
Patient doing well and asymptomatic. Blood pressure continues to be elevated despite last week's blood pressure med adjustment. Patient will discuss this issue with primary care md next week.

## 2024-05-14 DIAGNOSIS — I34.0 MITRAL INCOMPETENCE: Primary | ICD-10-CM

## 2024-05-15 PROBLEM — I12.9 BENIGN HYPERTENSION WITH STAGE 3B CHRONIC KIDNEY DISEASE: Status: ACTIVE | Noted: 2024-05-15

## 2024-05-15 PROBLEM — N18.32 BENIGN HYPERTENSION WITH STAGE 3B CHRONIC KIDNEY DISEASE: Status: ACTIVE | Noted: 2024-05-15

## 2024-05-16 ENCOUNTER — CLINICAL SUPPORT (OUTPATIENT)
Dept: CARDIOLOGY | Facility: HOSPITAL | Age: 64
End: 2024-05-16
Attending: INTERNAL MEDICINE
Payer: MEDICARE

## 2024-05-16 DIAGNOSIS — I34.0 MITRAL INCOMPETENCE: ICD-10-CM

## 2024-05-16 PROCEDURE — 93306 TTE W/DOPPLER COMPLETE: CPT

## 2024-05-20 DIAGNOSIS — C92.00 ACUTE MYELOID LEUKEMIA NOT HAVING ACHIEVED REMISSION: ICD-10-CM

## 2024-05-20 NOTE — TELEPHONE ENCOUNTER
----- Message from Yoni Desai PharmD sent at 5/20/2024 10:58 AM CDT -----  Regarding: RE: Venclexta  I would like to follow up on this, as we never received an RX.    Thanks!  ----- Message -----  From: Braxton Velásquez, RN  Sent: 5/16/2024   2:30 PM CDT  To: Yoni Desai PharmD  Subject: RE: Venclexta                                    Good afternoon. Yoni. Dr. Jacobsen will be sending it over shortly.    Bill  ----- Message -----  From: Yoni Desai PharmD  Sent: 5/3/2024   9:04 AM CDT  To: Rui Jacobsen MD; #  Subject: Venclexta                                        Good morning,    Patient's new Venclexta script was sent to [facility]. Can I reorder the med to go to us?    Thanks!  Yoin Desai, Beto  [facility]  [phone]   [Normal Breath Sounds] : Normal breath sounds [Normal Heart Sounds] : normal heart sounds [Normal Rate and Rhythm] : normal rate and rhythm [No Rash or Lesion] : No rash or lesion [Alert] : alert [Oriented to Person] : oriented to person [Oriented to Place] : oriented to place [Oriented to Time] : oriented to time [Calm] : calm [de-identified] : soft, NT/ND, +BS [de-identified] : Well nourished male [de-identified] : NC/AT [de-identified] : MICHAEL/+ROM [de-identified] : Intact

## 2024-05-23 LAB
AORTIC ROOT ANNULUS: 3.55 CM
AORTIC VALVE CUSP SEPERATION: 1.59 CM
AV INDEX (PROSTH): 0.77
AV MEAN GRADIENT: 5 MMHG
AV PEAK GRADIENT: 9 MMHG
AV VALVE AREA BY VELOCITY RATIO: 2.57 CM²
AV VALVE AREA: 2.52 CM²
AV VELOCITY RATIO: 0.79
CV ECHO LV RWT: 0.41 CM
DOP CALC AO PEAK VEL: 1.54 M/S
DOP CALC AO VTI: 35.1 CM
DOP CALC LVOT AREA: 3.3 CM2
DOP CALC LVOT DIAMETER: 2.04 CM
DOP CALC LVOT PEAK VEL: 1.21 M/S
DOP CALC LVOT STROKE VOLUME: 88.53 CM3
DOP CALC RVOT PEAK VEL: 0.52 M/S
DOP CALC RVOT VTI: 13.2 CM
DOP CALCLVOT PEAK VEL VTI: 27.1 CM
E WAVE DECELERATION TIME: 321.12 MSEC
E/A RATIO: 1.89
E/E' RATIO: 9.89 M/S
ECHO LV POSTERIOR WALL: 1.14 CM (ref 0.6–1.1)
FRACTIONAL SHORTENING: 28 % (ref 28–44)
INTERVENTRICULAR SEPTUM: 1 CM (ref 0.6–1.1)
IVC DIAMETER: 2.02 CM
LA MAJOR: 6 CM
LEFT ATRIUM SIZE: 5.03 CM
LEFT INTERNAL DIMENSION IN SYSTOLE: 3.98 CM (ref 2.1–4)
LEFT VENTRICLE DIASTOLIC VOLUME: 148.5 ML
LEFT VENTRICLE SYSTOLIC VOLUME: 69.1 ML
LEFT VENTRICULAR INTERNAL DIMENSION IN DIASTOLE: 5.52 CM (ref 3.5–6)
LEFT VENTRICULAR MASS: 234.61 G
LV LATERAL E/E' RATIO: 7.42 M/S
LV SEPTAL E/E' RATIO: 14.83 M/S
LVOT MG: 3.32 MMHG
LVOT MV: 0.87 CM/S
MV PEAK A VEL: 0.47 M/S
MV PEAK E VEL: 0.89 M/S
MV STENOSIS PRESSURE HALF TIME: 93.12 MS
MV VALVE AREA P 1/2 METHOD: 2.36 CM2
OHS CV RV/LV RATIO: 0.51 CM
PISA MRMAX VEL: 5.08 M/S
PISA TR MAX VEL: 2.5 M/S
PV MEAN GRADIENT: 1 MMHG
RA MAJOR: 5.57 CM
RIGHT VENTRICULAR END-DIASTOLIC DIMENSION: 2.82 CM
TDI LATERAL: 0.12 M/S
TDI SEPTAL: 0.06 M/S
TDI: 0.09 M/S
TR MAX PG: 25 MMHG

## 2024-05-27 ENCOUNTER — INFUSION (OUTPATIENT)
Dept: INFUSION THERAPY | Facility: HOSPITAL | Age: 64
End: 2024-05-27
Payer: MEDICARE

## 2024-05-27 VITALS
TEMPERATURE: 98 F | RESPIRATION RATE: 16 BRPM | WEIGHT: 201.94 LBS | DIASTOLIC BLOOD PRESSURE: 75 MMHG | HEIGHT: 73 IN | HEART RATE: 53 BPM | SYSTOLIC BLOOD PRESSURE: 153 MMHG | BODY MASS INDEX: 26.76 KG/M2

## 2024-05-27 DIAGNOSIS — C92.00 ACUTE MYELOID LEUKEMIA NOT HAVING ACHIEVED REMISSION: Primary | ICD-10-CM

## 2024-05-27 DIAGNOSIS — C92.01 ACUTE MYELOID LEUKEMIA IN REMISSION: Primary | ICD-10-CM

## 2024-05-27 PROCEDURE — 96401 CHEMO ANTI-NEOPL SQ/IM: CPT

## 2024-05-27 PROCEDURE — 63600175 PHARM REV CODE 636 W HCPCS: Performed by: INTERNAL MEDICINE

## 2024-05-27 PROCEDURE — 25000003 PHARM REV CODE 250: Performed by: INTERNAL MEDICINE

## 2024-05-27 RX ORDER — AZACITIDINE 100 MG/1
75 INJECTION, POWDER, LYOPHILIZED, FOR SOLUTION INTRAVENOUS; SUBCUTANEOUS
Status: CANCELLED | OUTPATIENT
Start: 2024-05-28

## 2024-05-27 RX ORDER — AZACITIDINE 100 MG/1
75 INJECTION, POWDER, LYOPHILIZED, FOR SOLUTION INTRAVENOUS; SUBCUTANEOUS
Status: CANCELLED | OUTPATIENT
Start: 2024-05-31

## 2024-05-27 RX ORDER — AZACITIDINE 100 MG/1
75 INJECTION, POWDER, LYOPHILIZED, FOR SOLUTION INTRAVENOUS; SUBCUTANEOUS
Status: COMPLETED | OUTPATIENT
Start: 2024-05-27 | End: 2024-05-27

## 2024-05-27 RX ORDER — ONDANSETRON HYDROCHLORIDE 8 MG/1
16 TABLET, FILM COATED ORAL
Status: CANCELLED | OUTPATIENT
Start: 2024-05-31

## 2024-05-27 RX ORDER — AZACITIDINE 100 MG/1
75 INJECTION, POWDER, LYOPHILIZED, FOR SOLUTION INTRAVENOUS; SUBCUTANEOUS
Status: CANCELLED | OUTPATIENT
Start: 2024-05-30

## 2024-05-27 RX ORDER — ONDANSETRON 4 MG/1
16 TABLET, FILM COATED ORAL
Status: COMPLETED | OUTPATIENT
Start: 2024-05-27 | End: 2024-05-27

## 2024-05-27 RX ORDER — ONDANSETRON HYDROCHLORIDE 8 MG/1
16 TABLET, FILM COATED ORAL
Status: CANCELLED | OUTPATIENT
Start: 2024-05-30

## 2024-05-27 RX ORDER — AZACITIDINE 100 MG/1
75 INJECTION, POWDER, LYOPHILIZED, FOR SOLUTION INTRAVENOUS; SUBCUTANEOUS
Status: CANCELLED | OUTPATIENT
Start: 2024-05-27

## 2024-05-27 RX ORDER — ONDANSETRON HYDROCHLORIDE 8 MG/1
16 TABLET, FILM COATED ORAL
Status: CANCELLED | OUTPATIENT
Start: 2024-05-27

## 2024-05-27 RX ORDER — AZACITIDINE 100 MG/1
75 INJECTION, POWDER, LYOPHILIZED, FOR SOLUTION INTRAVENOUS; SUBCUTANEOUS
Status: CANCELLED | OUTPATIENT
Start: 2024-05-29

## 2024-05-27 RX ORDER — ONDANSETRON HYDROCHLORIDE 8 MG/1
16 TABLET, FILM COATED ORAL
Status: CANCELLED | OUTPATIENT
Start: 2024-05-29

## 2024-05-27 RX ORDER — ONDANSETRON HYDROCHLORIDE 8 MG/1
16 TABLET, FILM COATED ORAL
Status: CANCELLED | OUTPATIENT
Start: 2024-05-28

## 2024-05-27 RX ADMIN — ONDANSETRON HYDROCHLORIDE 16 MG: 4 TABLET, FILM COATED ORAL at 01:05

## 2024-05-27 RX ADMIN — AZACITIDINE 165 MG: 100 INJECTION, POWDER, LYOPHILIZED, FOR SOLUTION INTRAVENOUS; SUBCUTANEOUS at 01:05

## 2024-05-27 NOTE — NURSING
Administered Vidaza injections in abdominal tissue. No questions or concerns. Pt ambulated out of unit unassisted.

## 2024-05-28 ENCOUNTER — OFFICE VISIT (OUTPATIENT)
Dept: HEMATOLOGY/ONCOLOGY | Facility: CLINIC | Age: 64
End: 2024-05-28
Payer: MEDICARE

## 2024-05-28 ENCOUNTER — INFUSION (OUTPATIENT)
Dept: INFUSION THERAPY | Facility: HOSPITAL | Age: 64
End: 2024-05-28
Payer: MEDICARE

## 2024-05-28 VITALS
WEIGHT: 200.75 LBS | OXYGEN SATURATION: 99 % | SYSTOLIC BLOOD PRESSURE: 133 MMHG | DIASTOLIC BLOOD PRESSURE: 75 MMHG | TEMPERATURE: 99 F | HEIGHT: 73 IN | DIASTOLIC BLOOD PRESSURE: 7 MMHG | SYSTOLIC BLOOD PRESSURE: 133 MMHG | BODY MASS INDEX: 26.61 KG/M2 | HEART RATE: 59 BPM

## 2024-05-28 DIAGNOSIS — C92.01 ACUTE MYELOID LEUKEMIA IN REMISSION: Primary | ICD-10-CM

## 2024-05-28 DIAGNOSIS — D49.9 IMMUNODEFICIENCY SECONDARY TO NEOPLASM: ICD-10-CM

## 2024-05-28 DIAGNOSIS — Z76.82 STEM CELL TRANSPLANT CANDIDATE: ICD-10-CM

## 2024-05-28 DIAGNOSIS — D61.818 PANCYTOPENIA: ICD-10-CM

## 2024-05-28 DIAGNOSIS — C92.00 ACUTE MYELOID LEUKEMIA NOT HAVING ACHIEVED REMISSION: Primary | ICD-10-CM

## 2024-05-28 DIAGNOSIS — D84.81 IMMUNODEFICIENCY SECONDARY TO NEOPLASM: ICD-10-CM

## 2024-05-28 PROCEDURE — 99999 PR PBB SHADOW E&M-EST. PATIENT-LVL IV: CPT | Mod: PBBFAC,,, | Performed by: INTERNAL MEDICINE

## 2024-05-28 PROCEDURE — 99214 OFFICE O/P EST MOD 30 MIN: CPT | Mod: PBBFAC,25 | Performed by: INTERNAL MEDICINE

## 2024-05-28 PROCEDURE — 96401 CHEMO ANTI-NEOPL SQ/IM: CPT

## 2024-05-28 PROCEDURE — 63600175 PHARM REV CODE 636 W HCPCS: Performed by: INTERNAL MEDICINE

## 2024-05-28 PROCEDURE — 25000003 PHARM REV CODE 250: Performed by: INTERNAL MEDICINE

## 2024-05-28 RX ORDER — ONDANSETRON 4 MG/1
16 TABLET, FILM COATED ORAL
Status: COMPLETED | OUTPATIENT
Start: 2024-05-28 | End: 2024-05-28

## 2024-05-28 RX ORDER — AZACITIDINE 100 MG/1
75 INJECTION, POWDER, LYOPHILIZED, FOR SOLUTION INTRAVENOUS; SUBCUTANEOUS
Status: COMPLETED | OUTPATIENT
Start: 2024-05-28 | End: 2024-05-28

## 2024-05-28 RX ADMIN — AZACITIDINE 165 MG: 100 INJECTION, POWDER, LYOPHILIZED, FOR SOLUTION INTRAVENOUS; SUBCUTANEOUS at 02:05

## 2024-05-28 RX ADMIN — ONDANSETRON HYDROCHLORIDE 16 MG: 4 TABLET, FILM COATED ORAL at 02:05

## 2024-05-28 NOTE — NURSING
Administered injection in abdominal tissue. No questions or concerns. Pt ambulated out of unit unassisted.

## 2024-05-29 ENCOUNTER — INFUSION (OUTPATIENT)
Dept: INFUSION THERAPY | Facility: HOSPITAL | Age: 64
End: 2024-05-29
Payer: MEDICARE

## 2024-05-29 VITALS
SYSTOLIC BLOOD PRESSURE: 140 MMHG | DIASTOLIC BLOOD PRESSURE: 83 MMHG | RESPIRATION RATE: 18 BRPM | TEMPERATURE: 98 F | HEART RATE: 67 BPM

## 2024-05-29 DIAGNOSIS — C92.00 ACUTE MYELOID LEUKEMIA NOT HAVING ACHIEVED REMISSION: Primary | ICD-10-CM

## 2024-05-29 PROCEDURE — 96402 CHEMO HORMON ANTINEOPL SQ/IM: CPT

## 2024-05-29 PROCEDURE — 96401 CHEMO ANTI-NEOPL SQ/IM: CPT

## 2024-05-29 PROCEDURE — 25000003 PHARM REV CODE 250: Performed by: INTERNAL MEDICINE

## 2024-05-29 PROCEDURE — 63600175 PHARM REV CODE 636 W HCPCS: Performed by: INTERNAL MEDICINE

## 2024-05-29 RX ORDER — ONDANSETRON 4 MG/1
16 TABLET, FILM COATED ORAL
Status: COMPLETED | OUTPATIENT
Start: 2024-05-29 | End: 2024-05-29

## 2024-05-29 RX ORDER — AZACITIDINE 100 MG/1
75 INJECTION, POWDER, LYOPHILIZED, FOR SOLUTION INTRAVENOUS; SUBCUTANEOUS
Status: COMPLETED | OUTPATIENT
Start: 2024-05-29 | End: 2024-05-29

## 2024-05-29 RX ADMIN — AZACITIDINE 165 MG: 100 INJECTION, POWDER, LYOPHILIZED, FOR SOLUTION INTRAVENOUS; SUBCUTANEOUS at 01:05

## 2024-05-29 RX ADMIN — ONDANSETRON HYDROCHLORIDE 16 MG: 4 TABLET, FILM COATED ORAL at 01:05

## 2024-05-30 ENCOUNTER — INFUSION (OUTPATIENT)
Dept: INFUSION THERAPY | Facility: HOSPITAL | Age: 64
End: 2024-05-30
Payer: MEDICARE

## 2024-05-30 VITALS
TEMPERATURE: 98 F | SYSTOLIC BLOOD PRESSURE: 132 MMHG | HEART RATE: 51 BPM | DIASTOLIC BLOOD PRESSURE: 67 MMHG | RESPIRATION RATE: 16 BRPM

## 2024-05-30 DIAGNOSIS — C92.00 ACUTE MYELOID LEUKEMIA NOT HAVING ACHIEVED REMISSION: Primary | ICD-10-CM

## 2024-05-30 PROCEDURE — 63600175 PHARM REV CODE 636 W HCPCS: Performed by: INTERNAL MEDICINE

## 2024-05-30 PROCEDURE — 25000003 PHARM REV CODE 250: Performed by: INTERNAL MEDICINE

## 2024-05-30 PROCEDURE — 96401 CHEMO ANTI-NEOPL SQ/IM: CPT

## 2024-05-30 RX ORDER — AZACITIDINE 100 MG/1
75 INJECTION, POWDER, LYOPHILIZED, FOR SOLUTION INTRAVENOUS; SUBCUTANEOUS
Status: COMPLETED | OUTPATIENT
Start: 2024-05-30 | End: 2024-05-30

## 2024-05-30 RX ORDER — ONDANSETRON 4 MG/1
16 TABLET, FILM COATED ORAL
Status: COMPLETED | OUTPATIENT
Start: 2024-05-30 | End: 2024-05-30

## 2024-05-30 RX ADMIN — ONDANSETRON HYDROCHLORIDE 16 MG: 4 TABLET, FILM COATED ORAL at 01:05

## 2024-05-30 RX ADMIN — AZACITIDINE 165 MG: 100 INJECTION, POWDER, LYOPHILIZED, FOR SOLUTION INTRAVENOUS; SUBCUTANEOUS at 01:05

## 2024-05-30 NOTE — NURSING
Administered injections in abdominal tissue. No questions or concerns. Pt ambulated out of unit unassisted.

## 2024-05-31 ENCOUNTER — INFUSION (OUTPATIENT)
Dept: INFUSION THERAPY | Facility: HOSPITAL | Age: 64
End: 2024-05-31
Payer: MEDICARE

## 2024-05-31 VITALS
DIASTOLIC BLOOD PRESSURE: 71 MMHG | HEART RATE: 50 BPM | SYSTOLIC BLOOD PRESSURE: 154 MMHG | TEMPERATURE: 98 F | RESPIRATION RATE: 16 BRPM

## 2024-05-31 DIAGNOSIS — C92.00 ACUTE MYELOID LEUKEMIA NOT HAVING ACHIEVED REMISSION: Primary | ICD-10-CM

## 2024-05-31 PROCEDURE — 63600175 PHARM REV CODE 636 W HCPCS: Performed by: INTERNAL MEDICINE

## 2024-05-31 PROCEDURE — 25000003 PHARM REV CODE 250: Performed by: INTERNAL MEDICINE

## 2024-05-31 PROCEDURE — 96401 CHEMO ANTI-NEOPL SQ/IM: CPT

## 2024-05-31 RX ORDER — ONDANSETRON 4 MG/1
16 TABLET, FILM COATED ORAL
Status: COMPLETED | OUTPATIENT
Start: 2024-05-31 | End: 2024-05-31

## 2024-05-31 RX ORDER — AZACITIDINE 100 MG/1
75 INJECTION, POWDER, LYOPHILIZED, FOR SOLUTION INTRAVENOUS; SUBCUTANEOUS
Status: COMPLETED | OUTPATIENT
Start: 2024-05-31 | End: 2024-05-31

## 2024-05-31 RX ADMIN — ONDANSETRON HYDROCHLORIDE 16 MG: 4 TABLET, FILM COATED ORAL at 01:05

## 2024-05-31 RX ADMIN — AZACITIDINE 165 MG: 100 INJECTION, POWDER, LYOPHILIZED, FOR SOLUTION INTRAVENOUS; SUBCUTANEOUS at 01:05

## 2024-05-31 NOTE — PROGRESS NOTES
Section of Hematology and Stem Cell Transplantation  Follow Up Visit     Date of visit: 5/28/24  Visit diagnosis: Acute myeloid leukemia in remission [C92.01]  Referred by:  FERN Oneill MD    Oncologic History:     Primary Oncologic Diagnosis: Acute myeloid leukemia, adverse risk.    6/28/23: Diagnosed with heparin-induced thrombocytopenia. HIT Ab 1.87 OD (moderate-strong). ZAHIRA not available.   10/31/23: Peripheral blood flow cytometry sent due to worsening pancytopenia (CBC: WBC 3.43, Hgb 9, Plts 120, 19% blasts) revealed increased blasts (43.1%) concerning for acute myeloid leukemia.   11/9/23: Bone marrow biopsy revealed a hypercellular marrow (80%) with increased blasts consistent with acute myeloid leukemia. Karyotype 46,XY,del(20)(q11.2q13.3)[4]/46,XY[16]. FISH with 20q12 deletion. NGS with IDH2 (40%) and SRSF2 (40%).  12/2023: He started taking venetoclax 200mg daily (did not start azacitidine due to scheduling conflicts).    1/8/24: C1D1 of azacitidine x5 days plus venetoclax 21 of 28 days.  1/31/24: Bone marrow biopsy after cycle 1 revealed persistent AML with improvement in blasts to 3-7%. Karyotyping with 7 of 8 available metaphases with complex near-tetraploid karyotype (1 normal). NGS with IDH2 (41%) and SRSF2 (40%).  4/23/24: Bone marrow biopsy with normocellular marrow with no blasts consistent with complete remission. CG/FISH normal.     History of Present Ilness:   Gustavo Tracey Jr. (Gustavo) is a pleasant 64 y.o.male who presents for follow up. He is doing very well at this time. He has had more energy lately. No limitations in daily routines.     PAST MEDICAL HISTORY:   Past Medical History:   Diagnosis Date    Abnormal nuclear stress test 11/26/2022    Cervical radiculopathy     to rt arm    CHF (congestive heart failure)     Chronic systolic congestive heart failure 11/28/2022    Dilated cardiomyopathy 11/26/2022    Hyperlipidemia     Hypertension     Obesity, unspecified     PAF (paroxysmal  atrial fibrillation) 11/26/2022    Pulmonary HTN 11/28/2022    Stroke        PAST SURGICAL HISTORY:   Past Surgical History:   Procedure Laterality Date    BONE MARROW BIOPSY Right 1/31/2024    Procedure: Biopsy-bone marrow;  Surgeon: Rui Jacobsen MD;  Location: [address]);  Service: Oncology;  Laterality: Right;  1/24-pt confirmed-MS    CLOSURE OF LEFT ATRIAL APPENDAGE USING DEVICE Left 6/22/2023    Procedure: CLOSURE, LEFT ATRIAL APPENDAGE, USING DEVICE;  Surgeon: Rustam Dave MD;  Location: [facility] OR;  Service: Cardiovascular;  Laterality: Left;  LIGATION OF LEFT ATRIAL APPENDAGE WITH OTILIA EXCLUSION SYSTEM    CORONARY ARTERY BYPASS GRAFT (CABG) N/A 6/22/2023    Procedure: CORONARY ARTERY BYPASS GRAFT (CABG);  Surgeon: Rustam Dave MD;  Location: [facility] OR;  Service: Cardiovascular;  Laterality: N/A;  3-VESSEL WITH EPI-AORTIC ULTRASOUND    PURDY MAZE PROCEDURE N/A 6/22/2023    Procedure: PURDY MAZE PROCEDURE;  Surgeon: Rustam Dave MD;  Location: [facility] OR;  Service: Cardiovascular;  Laterality: N/A;    ECHOCARDIOGRAM,TRANSESOPHAGEAL N/A 6/22/2023    Procedure: ECHOCARDIOGRAM,TRANSESOPHAGEAL;  Surgeon: Rustam Dave MD;  Location: [facility] OR;  Service: Cardiovascular;  Laterality: N/A;    ENDOSCOPIC HARVEST OF VEIN Left 6/22/2023    Procedure: SURGICAL PROCUREMENT, VEIN, ENDOSCOPIC;  Surgeon: Rustam Dave MD;  Location: [facility] OR;  Service: Cardiovascular;  Laterality: Left;    INJECTION OF ANESTHETIC AGENT AROUND MULTIPLE INTERCOSTAL NERVES N/A 6/22/2023    Procedure: BLOCK, NERVE, INTERCOSTAL, 2 OR MORE;  Surgeon: Rustam Dave MD;  Location: [facility] OR;  Service: Cardiovascular;  Laterality: N/A;  PARASTERNAL NERVE BLOCK    INSTANTANEOUS WAVE-FREE RATIO  6/20/2023    Procedure: Instantaneous Wave-Free Ratio;  Surgeon: Zion Ortega MD;  Location: [facility] CATH LAB;  Service: Cardiology;;    IVUS, CORONARY  6/20/2023    Procedure: IVUS, Coronary;  Surgeon: Zion Ortega MD;  Location: [facility]  CATH LAB;  Service: Cardiology;;    LEFT HEART CATHETERIZATION Left 11/28/2022    Procedure: CATHETERIZATION, HEART, LEFT;  Surgeon: Zion Ortega MD;  Location: [facility] CATH LAB;  Service: Cardiology;  Laterality: Left;    LEFT HEART CATHETERIZATION Left 6/20/2023    Procedure: Left heart cath;  Surgeon: Zion Ortega MD;  Location: [facility] CATH LAB;  Service: Cardiology;  Laterality: Left;    PERCUTANEOUS TRANSLUMINAL BALLOON ANGIOPLASTY OF CORONARY ARTERY  6/20/2023    Procedure: Angioplasty-coronary;  Surgeon: Zion Ortega MD;  Location: [facility] CATH LAB;  Service: Cardiology;;    RIGHT HEART CATHETERIZATION N/A 11/28/2022    Procedure: INSERTION, CATHETER, RIGHT HEART;  Surgeon: Zion Ortega MD;  Location: [facility] CATH LAB;  Service: Cardiology;  Laterality: N/A;  Congestive heart failure       PAST SOCIAL HISTORY:  Social History     Tobacco Use    Smoking status: Never    Smokeless tobacco: Never   Substance Use Topics    Alcohol use: Yes    Drug use: Never       FAMILY HISTORY:  Family History   Problem Relation Name Age of Onset    Hypertension Mother      Diabetes Father      Hyperlipidemia Father      Hypertension Father      Heart attack Father      Coronary artery disease Father         CURRENT MEDICATIONS:   Current Outpatient Medications   Medication Sig    acyclovir (ZOVIRAX) 400 MG tablet Take 1 tablet (400 mg total) by mouth 2 (two) times daily.    allopurinoL (ZYLOPRIM) 300 MG tablet Take 1 tablet (300 mg total) by mouth once daily.    amitriptyline (ELAVIL) 50 MG tablet Take 50 mg by mouth every evening.    aspirin (ECOTRIN) 81 MG EC tablet Take 1 tablet (81 mg total) by mouth once daily.    cyclobenzaprine (FLEXERIL) 10 MG tablet Take 10 mg by mouth 2 (two) times daily as needed.    FARXIGA 10 mg tablet TAKE 1 TABLET BY MOUTH EVERY DAY    ferrous sulfate (FEOSOL) 325 mg (65 mg iron) Tab tablet Take 1 tablet by mouth once daily.    fluconazole (DIFLUCAN) 200 MG Tab Take 2 tablets (400 mg total) by  mouth once daily. Stop once posaconazole is received.    folic acid (FOLVITE) 1 MG tablet Take 2 tablets (2 mg total) by mouth once daily.    furosemide (LASIX) 20 MG tablet Take 1 tablet by mouth once daily.    HYDROcodone-acetaminophen (NORCO)  mg per tablet TAKE 1 TABLET BY MOUTH 1 TO 2 TIMES A DAY AS NEEDED    latanoprost 0.005 % ophthalmic solution Place 1 drop into both eyes nightly.    metoprolol succinate (TOPROL-XL) 100 MG 24 hr tablet Take 1 tablet by mouth once daily.    pantoprazole (PROTONIX) 40 MG tablet Take 1 tablet (40 mg total) by mouth once daily.    potassium chloride (K-TAB) 20 mEq TAKE 1 TABLET BY MOUTH TWICE A DAY    pravastatin (PRAVACHOL) 20 MG tablet Take 20 mg by mouth once daily.    valsartan (DIOVAN) 40 MG tablet Take 1 tablet (40 mg total) by mouth once daily.    venetoclax (VENCLEXTA) 100 mg Tab Take 2 tablets (200 mg total) by mouth once daily Take 2 tablets (200mg) once daily on days 1-7 of a 28 day cycle. Always start with azacitidine (Vidaza) injections..    warfarin (COUMADIN) 5 MG tablet TAKE 1 TABLET BY MOUTH ON SUN, MON, WED, FRI, SAT,& 1.5 ON TUES & THURS     Current Facility-Administered Medications   Medication    0.9%  NaCl infusion (for blood administration)    0.9%  NaCl infusion (for blood administration)    acetaminophen tablet 650 mg    acetaminophen tablet 650 mg    diphenhydrAMINE capsule 25 mg    diphenhydrAMINE capsule 25 mg     Facility-Administered Medications Ordered in Other Visits   Medication    sodium chloride 0.9% flush 10 mL       ALLERGIES:   Review of patient's allergies indicates:   Allergen Reactions    Heparin analogues      Okay to flush vad with heparin (dr stuart- 12/13/23 1432pm)       Review of Systems:     Pertinent positives and negatives included in the HPI. Otherwise a complete review of systems is negative.    Physical Exam:     Vitals:    05/28/24 1302   BP: (!) 133/7   Pulse: (!) 59   Temp: 98.7 °F (37.1 °C)     General: Appears  well, NAD  Pulmonary: CTAB, no increased work of breathing, no W/R/C  Cardiovascular: S1S2 normal, RRR, no M/R/G  Abdominal: Soft, NT, ND, BS+, no HSM  Extremities: No C/C/E  Neurological: AAOx4, grossly normal, no focal deficits  Dermatologic: No appreciable rashes or lesions  Lymphatic: No cervical, axillary, or inguinal lymphadenopathy     ECOG Performance Status: (foot note - ECOG PS provided by [employer]cology [employer]) 1 - Symptomatic but completely ambulatory    Karnofsky Performance Score:  90%- Able to Carry on Normal Activity: Minor Symptoms of Disease    Labs:   Lab Results   Component Value Date    WBC 2.20 (L) 05/30/2024    RBC 3.95 (L) 05/30/2024    HGB 10.8 (L) 05/30/2024    HCT 33.2 (L) 05/30/2024    MCV 84 05/30/2024    MCH 27.3 05/30/2024    MCHC 32.6 05/30/2024    RDW 23.5 (H) 05/30/2024     05/30/2024    MPV 7.0 (L) 05/30/2024    GRAN 0.8 (L) 05/30/2024    GRAN 37.1 (L) 05/30/2024    LYMPH 0.9 (L) 05/30/2024    LYMPH 41.4 05/30/2024    MONO 0.4 05/30/2024    MONO 19.8 (H) 05/30/2024    EOS 0.0 05/30/2024    BASO 0.00 05/30/2024    EOSINOPHIL 1.3 05/30/2024    BASOPHIL 0.4 05/30/2024       CMP  Sodium   Date Value Ref Range Status   05/30/2024 136 136 - 145 mmol/L Final     Potassium   Date Value Ref Range Status   05/30/2024 4.0 3.5 - 5.1 mmol/L Final     Chloride   Date Value Ref Range Status   05/30/2024 102 95 - 110 mmol/L Final     CO2   Date Value Ref Range Status   05/30/2024 25 23 - 29 mmol/L Final     Glucose   Date Value Ref Range Status   05/30/2024 105 74 - 106 mg/dL Final     BUN   Date Value Ref Range Status   05/30/2024 33 (H) 9 - 20 mg/dL Final     Creatinine   Date Value Ref Range Status   05/30/2024 1.61 (H) 0.80 - 1.50 mg/dL Final     Calcium   Date Value Ref Range Status   05/30/2024 9.8 8.4 - 10.2 mg/dL Final     Total Protein   Date Value Ref Range Status   05/30/2024 7.7 6.3 - 8.2 g/dL Final     Albumin   Date Value Ref Range Status   05/30/2024 4.4 3.5 -  5.2 g/dL Final     Total Bilirubin   Date Value Ref Range Status   05/30/2024 0.6 0.2 - 1.3 mg/dL Final     Alkaline Phosphatase   Date Value Ref Range Status   05/30/2024 57 38 - 145 U/L Final     AST   Date Value Ref Range Status   05/30/2024 28 17 - 59 U/L Final     ALT   Date Value Ref Range Status   05/30/2024 25 10 - 44 U/L Final     Anion Gap   Date Value Ref Range Status   05/30/2024 9 8 - 16 mmol/L Final       Imaging:   Reviewed     Pathology:  Pending     Assessment and Plan:   Gustavo Tracey Jr. (Gustavo) is a pleasant 64 y.o.male who presents for follow up.    Acute myeloid leukemia, adverse risk:  Peripheral blood flow cytometry obtain 10/31/2023 concerning for acute myeloid leukemia.  Bone marrow biopsy obtained on 11/09/2023 revealed acute myeloid leukemia with 20q deletion on FISH and NGS with IDH2 (40%) and SRSF2 (40%). He started aza x7 plus alonzo x21 of 28 days in 1/2024. Bone marrow biopsy at the end of cycle 1 revealed persistent disease but improvement in blasts. CG now showed near-tetraploid complex karyotype in 7 of 8 metaphases. NGS with persistent IDH2 (41%) and SRSF2 (40%). Bone marrow biopsy in 4/2024 showed complete remission with persistent molecular disease - NGS with IDH2 (11%) and SRSF2 (10%).   - Continue azacitidine 75mg/m2 x5 days plus venetoclax 200mg daily x7 of 28 days.  - ANC >500 and Plts >50 to start each cycle.     Pancytopenia:  Monitor CBC twice weekly with Dr. Oneill. Transfuse for Hgb <7 or Plts <10.    Immunodeficiency secondary to neoplasm:  Continue antimicrobial prophylaxis with levofloxacin, acyclovir, and fluconazole.      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  Heparin antibody 1.87 OD (moderate-strong). ZAHIRA not available, which limits the assessment.  He stopped prophylactic Warfarin in 1/2024.    Stem cell transplant candidate:  We discussed the role for stem cell transplant as a potentially curative treatment option. He is fit and otherwise  healthy. His cardiac function is back to baseline. Therefore, I would recommend transplant ASAP as he is in remission at this time.   - Awaiting financial clearance.  - We discussed pros/cons of transplant again today.     Stem cell donors:  He has one full sister and 2 children as potential donor options . Will also search for a MUD.     HFrEF:  Cardiac function now back to baseline. Continue follow up with local cardiologist.     Orders/Follow Up:             Route Chart for Scheduling    BMT Chart Routing      Follow up with physician 4 weeks. week of 6/17 with Ann-Marie or Barbara   Follow up with CHRIS    Provider visit type    Infusion scheduling note    Injection scheduling note Aza x5 days every 28 days (next 6/24)   Labs CBC, CMP, phosphorus, magnesium and type and screen   Scheduling:  Preferred lab:  Lab interval: twice a week  at [facility]   [facility]    Pharmacy appointment    Other referrals                Treatment Plan Information   OP AZACITIDINE 5-DAY (SUB-Q) + VENETOCLAX   Rui Jacobsen MD   Upcoming Treatment Dates - OP AZACITIDINE 5-DAY (SUB-Q) + VENETOCLAX    6/24/2024       Pre-Medications       ondansetron tablet 16 mg       Chemotherapy       azaCITIDine (VIDAZA) chemo injection 165 mg  6/25/2024       Pre-Medications       ondansetron tablet 16 mg       Chemotherapy       azaCITIDine (VIDAZA) chemo injection 165 mg  6/26/2024       Pre-Medications       ondansetron tablet 16 mg       Chemotherapy       azaCITIDine (VIDAZA) chemo injection 165 mg  6/27/2024       Pre-Medications       ondansetron tablet 16 mg       Chemotherapy       azaCITIDine (VIDAZA) chemo injection 165 mg    Therapy Plan Information  INF FLUIDS  sodium chloride 0.9% bolus 1,000 mL 1,000 mL  1,000 mL, Intravenous, PRN    INF FLUIDS  sodium chloride 0.9% bolus 1,000 mL 1,000 mL  1,000 mL, Intravenous, Every visit  sodium chloride 0.9% bolus 500 mL 500 mL  500 mL, Intravenous, PRN    Advance Care Planning   Date:  11/16/2023  We reviewed his underlying diagnosis including natural history, prognosis, and various treatment options. He remains interested in pursuing any and all treatment options in an effort to improve his quality and quantity of life. Will continue treatment as recommended above.       Total time of this visit was 40 minutes, including time spent face to face with patient and/or via video/audio, and also in preparing for today's visit for MDM and documentation. (Medical Decision Making, including consideration of possible diagnoses, management options, complex medical record review, review of diagnostic tests and information, consideration and discussion of significant complications based on comorbidities, and discussion with providers involved with the care of the patient). Greater than 50% was spent face to face with the patient counseling and coordinating care.  Visit today included increased complexity associated with the care of the episodic problem stem cell transplant candidate addressed and managing the longitudinal care of the patient due to the serious and/or complex managed problem(s) acute myeloid leukemia, pancytopenia, immunodeficiency.      Jarrell Jacobsen MD  Malignant Hematology, Stem Cell Transplant, and Cellular Therapy  [facility]  [facility]

## 2024-06-24 ENCOUNTER — OFFICE VISIT (OUTPATIENT)
Dept: HEMATOLOGY/ONCOLOGY | Facility: CLINIC | Age: 64
End: 2024-06-24
Payer: MEDICARE

## 2024-06-24 ENCOUNTER — LAB VISIT (OUTPATIENT)
Dept: LAB | Facility: HOSPITAL | Age: 64
End: 2024-06-24
Payer: MEDICARE

## 2024-06-24 ENCOUNTER — INFUSION (OUTPATIENT)
Dept: INFUSION THERAPY | Facility: HOSPITAL | Age: 64
End: 2024-06-24
Payer: MEDICARE

## 2024-06-24 ENCOUNTER — DOCUMENTATION ONLY (OUTPATIENT)
Dept: HEMATOLOGY/ONCOLOGY | Facility: CLINIC | Age: 64
End: 2024-06-24
Payer: MEDICARE

## 2024-06-24 VITALS
TEMPERATURE: 98 F | SYSTOLIC BLOOD PRESSURE: 152 MMHG | HEIGHT: 73 IN | WEIGHT: 197.75 LBS | RESPIRATION RATE: 18 BRPM | BODY MASS INDEX: 26.21 KG/M2 | HEART RATE: 52 BPM | DIASTOLIC BLOOD PRESSURE: 73 MMHG

## 2024-06-24 VITALS
WEIGHT: 197.75 LBS | BODY MASS INDEX: 26.21 KG/M2 | OXYGEN SATURATION: 100 % | HEIGHT: 73 IN | SYSTOLIC BLOOD PRESSURE: 152 MMHG | RESPIRATION RATE: 18 BRPM | DIASTOLIC BLOOD PRESSURE: 73 MMHG | HEART RATE: 52 BPM | TEMPERATURE: 98 F

## 2024-06-24 DIAGNOSIS — D75.829 HEPARIN INDUCED THROMBOCYTOPENIA: ICD-10-CM

## 2024-06-24 DIAGNOSIS — C92.00 ACUTE MYELOID LEUKEMIA NOT HAVING ACHIEVED REMISSION: ICD-10-CM

## 2024-06-24 DIAGNOSIS — D61.818 PANCYTOPENIA: ICD-10-CM

## 2024-06-24 DIAGNOSIS — C92.00 ACUTE MYELOID LEUKEMIA NOT HAVING ACHIEVED REMISSION: Primary | ICD-10-CM

## 2024-06-24 DIAGNOSIS — C92.01 ACUTE MYELOID LEUKEMIA IN REMISSION: Primary | ICD-10-CM

## 2024-06-24 DIAGNOSIS — Z76.82 STEM CELL TRANSPLANT CANDIDATE: ICD-10-CM

## 2024-06-24 DIAGNOSIS — D49.9 IMMUNODEFICIENCY SECONDARY TO NEOPLASM: ICD-10-CM

## 2024-06-24 DIAGNOSIS — D84.81 IMMUNODEFICIENCY SECONDARY TO NEOPLASM: ICD-10-CM

## 2024-06-24 LAB
ABO + RH BLD: NORMAL
ALBUMIN SERPL BCP-MCNC: 4.3 G/DL (ref 3.5–5.2)
ALP SERPL-CCNC: 60 U/L (ref 55–135)
ALT SERPL W/O P-5'-P-CCNC: 26 U/L (ref 10–44)
ANION GAP SERPL CALC-SCNC: 11 MMOL/L (ref 8–16)
ANISOCYTOSIS BLD QL SMEAR: SLIGHT
AST SERPL-CCNC: 23 U/L (ref 10–40)
BASOPHILS # BLD AUTO: 0.02 K/UL (ref 0–0.2)
BASOPHILS NFR BLD: 0.9 % (ref 0–1.9)
BILIRUB SERPL-MCNC: 0.6 MG/DL (ref 0.1–1)
BLD GP AB SCN CELLS X3 SERPL QL: NORMAL
BUN SERPL-MCNC: 23 MG/DL (ref 8–23)
CALCIUM SERPL-MCNC: 10.1 MG/DL (ref 8.7–10.5)
CHLORIDE SERPL-SCNC: 104 MMOL/L (ref 95–110)
CO2 SERPL-SCNC: 25 MMOL/L (ref 23–29)
CREAT SERPL-MCNC: 1.4 MG/DL (ref 0.5–1.4)
DACRYOCYTES BLD QL SMEAR: ABNORMAL
DIFFERENTIAL METHOD BLD: ABNORMAL
EOSINOPHIL # BLD AUTO: 0 K/UL (ref 0–0.5)
EOSINOPHIL NFR BLD: 0.9 % (ref 0–8)
ERYTHROCYTE [DISTWIDTH] IN BLOOD BY AUTOMATED COUNT: 19.6 % (ref 11.5–14.5)
EST. GFR  (NO RACE VARIABLE): 56.5 ML/MIN/1.73 M^2
GLUCOSE SERPL-MCNC: 105 MG/DL (ref 70–110)
HCT VFR BLD AUTO: 37.2 % (ref 40–54)
HGB BLD-MCNC: 11.5 G/DL (ref 14–18)
HYPOCHROMIA BLD QL SMEAR: ABNORMAL
IMM GRANULOCYTES # BLD AUTO: 0.01 K/UL (ref 0–0.04)
IMM GRANULOCYTES NFR BLD AUTO: 0.4 % (ref 0–0.5)
LYMPHOCYTES # BLD AUTO: 1.2 K/UL (ref 1–4.8)
LYMPHOCYTES NFR BLD: 51.8 % (ref 18–48)
MAGNESIUM SERPL-MCNC: 2 MG/DL (ref 1.6–2.6)
MCH RBC QN AUTO: 26.8 PG (ref 27–31)
MCHC RBC AUTO-ENTMCNC: 30.9 G/DL (ref 32–36)
MCV RBC AUTO: 87 FL (ref 82–98)
MONOCYTES # BLD AUTO: 0.3 K/UL (ref 0.3–1)
MONOCYTES NFR BLD: 13.4 % (ref 4–15)
NEUTROPHILS # BLD AUTO: 0.7 K/UL (ref 1.8–7.7)
NEUTROPHILS NFR BLD: 32.6 % (ref 38–73)
NRBC BLD-RTO: 0 /100 WBC
OVALOCYTES BLD QL SMEAR: ABNORMAL
PHOSPHATE SERPL-MCNC: 3.3 MG/DL (ref 2.7–4.5)
PLATELET # BLD AUTO: 215 K/UL (ref 150–450)
PLATELET BLD QL SMEAR: ABNORMAL
PMV BLD AUTO: 9.8 FL (ref 9.2–12.9)
POIKILOCYTOSIS BLD QL SMEAR: SLIGHT
POTASSIUM SERPL-SCNC: 3.9 MMOL/L (ref 3.5–5.1)
PROT SERPL-MCNC: 8.5 G/DL (ref 6–8.4)
RBC # BLD AUTO: 4.29 M/UL (ref 4.6–6.2)
SODIUM SERPL-SCNC: 140 MMOL/L (ref 136–145)
SPECIMEN OUTDATE: NORMAL
WBC # BLD AUTO: 2.24 K/UL (ref 3.9–12.7)

## 2024-06-24 PROCEDURE — 84100 ASSAY OF PHOSPHORUS: CPT | Performed by: INTERNAL MEDICINE

## 2024-06-24 PROCEDURE — 36415 COLL VENOUS BLD VENIPUNCTURE: CPT | Performed by: INTERNAL MEDICINE

## 2024-06-24 PROCEDURE — 86850 RBC ANTIBODY SCREEN: CPT | Performed by: INTERNAL MEDICINE

## 2024-06-24 PROCEDURE — 86901 BLOOD TYPING SEROLOGIC RH(D): CPT | Performed by: INTERNAL MEDICINE

## 2024-06-24 PROCEDURE — 96401 CHEMO ANTI-NEOPL SQ/IM: CPT

## 2024-06-24 PROCEDURE — 63600175 PHARM REV CODE 636 W HCPCS: Performed by: INTERNAL MEDICINE

## 2024-06-24 PROCEDURE — 99999 PR PBB SHADOW E&M-EST. PATIENT-LVL V: CPT | Mod: PBBFAC,,,

## 2024-06-24 PROCEDURE — 80053 COMPREHEN METABOLIC PANEL: CPT | Performed by: INTERNAL MEDICINE

## 2024-06-24 PROCEDURE — 25000003 PHARM REV CODE 250: Performed by: INTERNAL MEDICINE

## 2024-06-24 PROCEDURE — 99215 OFFICE O/P EST HI 40 MIN: CPT | Mod: PBBFAC,25

## 2024-06-24 PROCEDURE — 86900 BLOOD TYPING SEROLOGIC ABO: CPT | Performed by: INTERNAL MEDICINE

## 2024-06-24 PROCEDURE — 85025 COMPLETE CBC W/AUTO DIFF WBC: CPT | Performed by: INTERNAL MEDICINE

## 2024-06-24 PROCEDURE — 83735 ASSAY OF MAGNESIUM: CPT | Performed by: INTERNAL MEDICINE

## 2024-06-24 RX ORDER — AZACITIDINE 100 MG/1
75 INJECTION, POWDER, LYOPHILIZED, FOR SOLUTION INTRAVENOUS; SUBCUTANEOUS
Status: CANCELLED | OUTPATIENT
Start: 2024-06-28

## 2024-06-24 RX ORDER — AZACITIDINE 100 MG/1
75 INJECTION, POWDER, LYOPHILIZED, FOR SOLUTION INTRAVENOUS; SUBCUTANEOUS
Status: CANCELLED | OUTPATIENT
Start: 2024-06-26

## 2024-06-24 RX ORDER — ONDANSETRON HYDROCHLORIDE 8 MG/1
16 TABLET, FILM COATED ORAL
Status: CANCELLED | OUTPATIENT
Start: 2024-06-24

## 2024-06-24 RX ORDER — ONDANSETRON HYDROCHLORIDE 8 MG/1
16 TABLET, FILM COATED ORAL
Status: CANCELLED | OUTPATIENT
Start: 2024-06-27

## 2024-06-24 RX ORDER — ONDANSETRON HYDROCHLORIDE 8 MG/1
16 TABLET, FILM COATED ORAL
Status: CANCELLED | OUTPATIENT
Start: 2024-06-28

## 2024-06-24 RX ORDER — AZACITIDINE 100 MG/1
75 INJECTION, POWDER, LYOPHILIZED, FOR SOLUTION INTRAVENOUS; SUBCUTANEOUS
Status: CANCELLED | OUTPATIENT
Start: 2024-06-25

## 2024-06-24 RX ORDER — AZACITIDINE 100 MG/1
75 INJECTION, POWDER, LYOPHILIZED, FOR SOLUTION INTRAVENOUS; SUBCUTANEOUS
Status: COMPLETED | OUTPATIENT
Start: 2024-06-24 | End: 2024-06-24

## 2024-06-24 RX ORDER — ONDANSETRON HYDROCHLORIDE 8 MG/1
16 TABLET, FILM COATED ORAL
Status: CANCELLED | OUTPATIENT
Start: 2024-06-25

## 2024-06-24 RX ORDER — AZACITIDINE 100 MG/1
75 INJECTION, POWDER, LYOPHILIZED, FOR SOLUTION INTRAVENOUS; SUBCUTANEOUS
Status: CANCELLED | OUTPATIENT
Start: 2024-06-27

## 2024-06-24 RX ORDER — ONDANSETRON HYDROCHLORIDE 8 MG/1
16 TABLET, FILM COATED ORAL
Status: CANCELLED | OUTPATIENT
Start: 2024-06-26

## 2024-06-24 RX ORDER — AZACITIDINE 100 MG/1
75 INJECTION, POWDER, LYOPHILIZED, FOR SOLUTION INTRAVENOUS; SUBCUTANEOUS
Status: CANCELLED | OUTPATIENT
Start: 2024-06-24

## 2024-06-24 RX ORDER — ONDANSETRON 4 MG/1
16 TABLET, FILM COATED ORAL
Status: COMPLETED | OUTPATIENT
Start: 2024-06-24 | End: 2024-06-24

## 2024-06-24 RX ADMIN — AZACITIDINE 165 MG: 100 INJECTION, POWDER, LYOPHILIZED, FOR SOLUTION INTRAVENOUS; SUBCUTANEOUS at 03:06

## 2024-06-24 RX ADMIN — ONDANSETRON HYDROCHLORIDE 16 MG: 4 TABLET, FILM COATED ORAL at 03:06

## 2024-06-24 NOTE — NURSING
Pt here for C7D1 vidaza injection.  Labs reviewed and VSS.  Vidaza administered to abdominal tissue x 3 injections.  Pt tolerated.

## 2024-06-24 NOTE — PROGRESS NOTES
Section of Hematology and Stem Cell Transplantation  Follow Up Visit     Date of visit: 6/24/24  Visit diagnosis: Acute myeloid leukemia in remission [C92.01]  Referred by:  FERN Oneill MD    Oncologic History:     Primary Oncologic Diagnosis: Acute myeloid leukemia, adverse risk.    6/28/23: Diagnosed with heparin-induced thrombocytopenia. HIT Ab 1.87 OD (moderate-strong). ZAHIRA not available.   10/31/23: Peripheral blood flow cytometry sent due to worsening pancytopenia (CBC: WBC 3.43, Hgb 9, Plts 120, 19% blasts) revealed increased blasts (43.1%) concerning for acute myeloid leukemia.   11/9/23: Bone marrow biopsy revealed a hypercellular marrow (80%) with increased blasts consistent with acute myeloid leukemia. Karyotype 46,XY,del(20)(q11.2q13.3)[4]/46,XY[16]. FISH with 20q12 deletion. NGS with IDH2 (40%) and SRSF2 (40%).  12/2023: He started taking venetoclax 200mg daily (did not start azacitidine due to scheduling conflicts).    1/8/24: C1D1 of azacitidine x5 days plus venetoclax 21 of 28 days.  1/31/24: Bone marrow biopsy after cycle 1 revealed persistent AML with improvement in blasts to 3-7%. Karyotyping with 7 of 8 available metaphases with complex near-tetraploid karyotype (1 normal). NGS with IDH2 (41%) and SRSF2 (40%).  4/23/24: Bone marrow biopsy with normocellular marrow with no blasts consistent with complete remission. CG/FISH normal. NGS with IDH2 (11%) and SRSF2 (10%).     History of Present Ilness:   Gustavo Tracey Jr. (Gustavo) is a pleasant 64 y.o.male who presents for follow up. He is doing overall well. He experiences intermittent joint pain. Today, the pain is affecting his knees and ankles the most. His oral pain medication helps relieve the discomfort.     PAST MEDICAL HISTORY:   Past Medical History:   Diagnosis Date    Abnormal nuclear stress test 11/26/2022    Cervical radiculopathy     to rt arm    CHF (congestive heart failure)     Chronic systolic congestive heart failure 11/28/2022     Dilated cardiomyopathy 11/26/2022    Hyperlipidemia     Hypertension     Obesity, unspecified     PAF (paroxysmal atrial fibrillation) 11/26/2022    Pulmonary HTN 11/28/2022    Stroke        PAST SURGICAL HISTORY:   Past Surgical History:   Procedure Laterality Date    BONE MARROW BIOPSY Right 1/31/2024    Procedure: Biopsy-bone marrow;  Surgeon: Rui Jacobsen MD;  Location: [facility] ENDO (4TH FLR);  Service: Oncology;  Laterality: Right;  1/24-pt confirmed-MS    CLOSURE OF LEFT ATRIAL APPENDAGE USING DEVICE Left 6/22/2023    Procedure: CLOSURE, LEFT ATRIAL APPENDAGE, USING DEVICE;  Surgeon: Rustam Dave MD;  Location: [facility] OR;  Service: Cardiovascular;  Laterality: Left;  LIGATION OF LEFT ATRIAL APPENDAGE WITH OTILIA EXCLUSION SYSTEM    CORONARY ARTERY BYPASS GRAFT (CABG) N/A 6/22/2023    Procedure: CORONARY ARTERY BYPASS GRAFT (CABG);  Surgeon: Rustam Dave MD;  Location: [facility] OR;  Service: Cardiovascular;  Laterality: N/A;  3-VESSEL WITH EPI-AORTIC ULTRASOUND    PURDY MAZE PROCEDURE N/A 6/22/2023    Procedure: PURDY MAZE PROCEDURE;  Surgeon: Rustam Dave MD;  Location: [facility] OR;  Service: Cardiovascular;  Laterality: N/A;    ECHOCARDIOGRAM,TRANSESOPHAGEAL N/A 6/22/2023    Procedure: ECHOCARDIOGRAM,TRANSESOPHAGEAL;  Surgeon: Rustam Dave MD;  Location: [facility] OR;  Service: Cardiovascular;  Laterality: N/A;    ENDOSCOPIC HARVEST OF VEIN Left 6/22/2023    Procedure: SURGICAL PROCUREMENT, VEIN, ENDOSCOPIC;  Surgeon: Rustam Dave MD;  Location: [facility] OR;  Service: Cardiovascular;  Laterality: Left;    INJECTION OF ANESTHETIC AGENT AROUND MULTIPLE INTERCOSTAL NERVES N/A 6/22/2023    Procedure: BLOCK, NERVE, INTERCOSTAL, 2 OR MORE;  Surgeon: Rustam Dave MD;  Location: [facility] OR;  Service: Cardiovascular;  Laterality: N/A;  PARASTERNAL NERVE BLOCK    INSTANTANEOUS WAVE-FREE RATIO  6/20/2023    Procedure: Instantaneous Wave-Free Ratio;  Surgeon: Zion Ortega MD;  Location: [facility] CATH LAB;  Service:  Cardiology;;    IVUS, CORONARY  6/20/2023    Procedure: IVUS, Coronary;  Surgeon: Zion Ortega MD;  Location: [facility] CATH LAB;  Service: Cardiology;;    LEFT HEART CATHETERIZATION Left 11/28/2022    Procedure: CATHETERIZATION, HEART, LEFT;  Surgeon: Zion Ortega MD;  Location: [facility] CATH LAB;  Service: Cardiology;  Laterality: Left;    LEFT HEART CATHETERIZATION Left 6/20/2023    Procedure: Left heart cath;  Surgeon: Zion Ortega MD;  Location: [facility] CATH LAB;  Service: Cardiology;  Laterality: Left;    PERCUTANEOUS TRANSLUMINAL BALLOON ANGIOPLASTY OF CORONARY ARTERY  6/20/2023    Procedure: Angioplasty-coronary;  Surgeon: Zion Ortega MD;  Location: [facility] CATH LAB;  Service: Cardiology;;    RIGHT HEART CATHETERIZATION N/A 11/28/2022    Procedure: INSERTION, CATHETER, RIGHT HEART;  Surgeon: Zion Ortega MD;  Location: [facility] CATH LAB;  Service: Cardiology;  Laterality: N/A;  Congestive heart failure       PAST SOCIAL HISTORY:  Social History     Tobacco Use    Smoking status: Never    Smokeless tobacco: Never   Substance Use Topics    Alcohol use: Yes    Drug use: Never       FAMILY HISTORY:  Family History   Problem Relation Name Age of Onset    Hypertension Mother      Diabetes Father      Hyperlipidemia Father      Hypertension Father      Heart attack Father      Coronary artery disease Father         CURRENT MEDICATIONS:   Current Outpatient Medications   Medication Sig    acyclovir (ZOVIRAX) 400 MG tablet Take 1 tablet (400 mg total) by mouth 2 (two) times daily.    allopurinoL (ZYLOPRIM) 300 MG tablet Take 1 tablet (300 mg total) by mouth once daily.    amitriptyline (ELAVIL) 50 MG tablet Take 50 mg by mouth every evening.    aspirin (ECOTRIN) 81 MG EC tablet Take 1 tablet (81 mg total) by mouth once daily.    cyclobenzaprine (FLEXERIL) 10 MG tablet Take 10 mg by mouth 2 (two) times daily as needed.    FARXIGA 10 mg tablet TAKE 1 TABLET BY MOUTH EVERY DAY    ferrous sulfate (FEOSOL) 325 mg (65 mg  iron) Tab tablet Take 1 tablet by mouth once daily.    fluconazole (DIFLUCAN) 200 MG Tab Take 2 tablets (400 mg total) by mouth once daily. Stop once posaconazole is received.    folic acid (FOLVITE) 1 MG tablet Take 2 tablets (2 mg total) by mouth once daily.    furosemide (LASIX) 20 MG tablet Take 1 tablet by mouth once daily.    HYDROcodone-acetaminophen (NORCO)  mg per tablet TAKE 1 TABLET BY MOUTH 1 TO 2 TIMES A DAY AS NEEDED    latanoprost 0.005 % ophthalmic solution Place 1 drop into both eyes nightly.    metoprolol succinate (TOPROL-XL) 100 MG 24 hr tablet Take 1 tablet by mouth once daily.    potassium chloride (K-TAB) 20 mEq TAKE 1 TABLET BY MOUTH TWICE A DAY    pravastatin (PRAVACHOL) 20 MG tablet Take 20 mg by mouth once daily.    valsartan (DIOVAN) 40 MG tablet Take 1 tablet (40 mg total) by mouth once daily.    venetoclax (VENCLEXTA) 100 mg Tab Take 2 tablets (200 mg total) by mouth once daily Take 2 tablets (200mg) once daily on days 1-7 of a 28 day cycle. Always start with azacitidine (Vidaza) injections..    warfarin (COUMADIN) 5 MG tablet TAKE 1 TABLET BY MOUTH ON SUN, MON, WED, FRI, SAT,& 1.5 ON TUES & THURS    pantoprazole (PROTONIX) 40 MG tablet Take 1 tablet (40 mg total) by mouth once daily. (Patient not taking: Reported on 6/24/2024)     Current Facility-Administered Medications   Medication    0.9%  NaCl infusion (for blood administration)    0.9%  NaCl infusion (for blood administration)    acetaminophen tablet 650 mg    acetaminophen tablet 650 mg    diphenhydrAMINE capsule 25 mg    diphenhydrAMINE capsule 25 mg     Facility-Administered Medications Ordered in Other Visits   Medication    sodium chloride 0.9% flush 10 mL       ALLERGIES:   Review of patient's allergies indicates:   Allergen Reactions    Heparin analogues      Okay to flush vad with heparin (dr stuart- 12/13/23 1432pm)       Review of Systems:     Pertinent positives and negatives included in the HPI. Otherwise a  complete review of systems is negative.    Physical Exam:     Vitals:    06/24/24 1247   BP: (!) 152/73   Pulse: (!) 52   Resp: 18   Temp: 98.3 °F (36.8 °C)     Physical Exam  Constitutional:       General: He is not in acute distress.  Eyes:      General: No scleral icterus.     Extraocular Movements: Extraocular movements intact.      Conjunctiva/sclera: Conjunctivae normal.   Cardiovascular:      Rate and Rhythm: Normal rate and regular rhythm.      Pulses: Normal pulses.   Pulmonary:      Effort: Pulmonary effort is normal. No respiratory distress.      Breath sounds: Normal breath sounds. No wheezing or rhonchi.   Abdominal:      General: There is no distension.      Palpations: Abdomen is soft.   Musculoskeletal:         General: No swelling or tenderness.      Right lower leg: No edema.      Left lower leg: No edema.   Skin:     General: Skin is warm and dry.      Findings: No bruising or rash.   Neurological:      Mental Status: He is alert and oriented to person, place, and time.      Coordination: Coordination normal.      Gait: Gait normal.   Psychiatric:         Mood and Affect: Mood normal.         Behavior: Behavior normal.          ECOG Performance Status: (foot note - ECOG PS provided by Eastern Cooperative Oncology Group) 1 - Symptomatic but completely ambulatory    Karnofsky Performance Score:  90%- Able to Carry on Normal Activity: Minor Symptoms of Disease    Labs:   Lab Results   Component Value Date    WBC 2.24 (L) 06/24/2024    RBC 4.29 (L) 06/24/2024    HGB 11.5 (L) 06/24/2024    HCT 37.2 (L) 06/24/2024    MCV 87 06/24/2024    MCH 26.8 (L) 06/24/2024    MCHC 30.9 (L) 06/24/2024    RDW 19.6 (H) 06/24/2024     06/24/2024    MPV 9.8 06/24/2024    GRAN 0.7 (L) 06/24/2024    GRAN 32.6 (L) 06/24/2024    LYMPH 1.2 06/24/2024    LYMPH 51.8 (H) 06/24/2024    MONO 0.3 06/24/2024    MONO 13.4 06/24/2024    EOS 0.0 06/24/2024    BASO 0.02 06/24/2024    EOSINOPHIL 0.9 06/24/2024    BASOPHIL 0.9  06/24/2024       CMP  Sodium   Date Value Ref Range Status   06/24/2024 140 136 - 145 mmol/L Final     Potassium   Date Value Ref Range Status   06/24/2024 3.9 3.5 - 5.1 mmol/L Final     Chloride   Date Value Ref Range Status   06/24/2024 104 95 - 110 mmol/L Final     CO2   Date Value Ref Range Status   06/24/2024 25 23 - 29 mmol/L Final     Glucose   Date Value Ref Range Status   06/24/2024 105 70 - 110 mg/dL Final     BUN   Date Value Ref Range Status   06/24/2024 23 8 - 23 mg/dL Final     Creatinine   Date Value Ref Range Status   06/24/2024 1.4 0.5 - 1.4 mg/dL Final     Calcium   Date Value Ref Range Status   06/24/2024 10.1 8.7 - 10.5 mg/dL Final     Total Protein   Date Value Ref Range Status   06/24/2024 8.5 (H) 6.0 - 8.4 g/dL Final     Albumin   Date Value Ref Range Status   06/24/2024 4.3 3.5 - 5.2 g/dL Final     Total Bilirubin   Date Value Ref Range Status   06/24/2024 0.6 0.1 - 1.0 mg/dL Final     Comment:     For infants and newborns, interpretation of results should be based  on gestational age, weight and in agreement with clinical  observations.    Premature Infant recommended reference ranges:  Up to 24 hours.............<8.0 mg/dL  Up to 48 hours............<12.0 mg/dL  3-5 days..................<15.0 mg/dL  6-29 days.................<15.0 mg/dL       Alkaline Phosphatase   Date Value Ref Range Status   06/24/2024 60 55 - 135 U/L Final     AST   Date Value Ref Range Status   06/24/2024 23 10 - 40 U/L Final     ALT   Date Value Ref Range Status   06/24/2024 26 10 - 44 U/L Final     Anion Gap   Date Value Ref Range Status   06/24/2024 11 8 - 16 mmol/L Final       Imaging:   Reviewed     Pathology:  Reviewed     Assessment and Plan:   Gustavo Tracey Jr. (Gustavo) is a pleasant 64 y.o.male who presents for follow up.    Acute myeloid leukemia, adverse risk:  Peripheral blood flow cytometry obtain 10/31/2023 concerning for acute myeloid leukemia.  Bone marrow biopsy obtained on 11/09/2023 revealed  acute myeloid leukemia with 20q deletion on FISH and NGS with IDH2 (40%) and SRSF2 (40%). He started aza x7 plus alonzo x21 of 28 days in 1/2024. Bone marrow biopsy at the end of cycle 1 revealed persistent disease but improvement in blasts. CG now showed near-tetraploid complex karyotype in 7 of 8 metaphases. NGS with persistent IDH2 (41%) and SRSF2 (40%). Bone marrow biopsy in 4/2024 showed complete remission with persistent molecular disease - NGS with IDH2 (11%) and SRSF2 (10%).   - Continue azacitidine 75mg/m2 x5 days plus venetoclax 200mg daily x7 of 28 days.  - ANC >500 and Plts >50 to start each cycle.     Pancytopenia:  Monitor CBC twice weekly with Dr. Oneill. Transfuse 1 unit of PRBC for hgb < 7 or clinically appropriate. Transfuse 1 unit of plts if platelets < 10 or clinically appropriate.      Immunodeficiency secondary to neoplasm:  Continue antimicrobial prophylaxis with levofloxacin, acyclovir, and fluconazole.      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  Heparin antibody 1.87 OD (moderate-strong). ZAHIRA not available, which limits the assessment.  He stopped prophylactic Warfarin in 1/2024.    Stem cell transplant candidate:  We discussed the role for stem cell transplant as a potentially curative treatment option. He is fit and otherwise healthy. His cardiac function is back to baseline. Therefore, I would recommend transplant ASAP as he is in remission at this time.   - Awaiting financial clearance.  - We discussed pros/cons of transplant again today.   - Discussed dental clearance and need for colon cancer screening.     Stem cell donors:  He has one full sister and 2 children as potential donor options . Will also search for a MUD.     HFrEF:  Cardiac function now back to baseline. Continue follow up with local cardiologist.     Orders/Follow Up:      Orders Placed This Encounter    CBC Auto Differential    Comprehensive Metabolic Panel    Magnesium    Phosphorus    Type & Screen          Route Chart for Scheduling    BMT Chart Routing      Follow up with physician . Follow up july 18th   Follow up with [facility]    Provider visit type Malignant hem   Infusion scheduling note    Injection scheduling note azacitidine injections x 5 days here on 7/22   Labs CBC, CMP, magnesium, phosphorus and type and screen   Scheduling:  Preferred lab:  Lab interval:  prior to visit   Imaging    Pharmacy appointment    Other referrals                Treatment Plan Information   OP AZACITIDINE 5-DAY (SUB-Q) + VENETOCLAX   Rui Jacobsen MD   Upcoming Treatment Dates - OP AZACITIDINE 5-DAY (SUB-Q) + VENETOCLAX    6/25/2024       Pre-Medications       ondansetron tablet 16 mg       Chemotherapy       azaCITIDine (VIDAZA) chemo injection 165 mg  6/26/2024       Pre-Medications       ondansetron tablet 16 mg       Chemotherapy       azaCITIDine (VIDAZA) chemo injection 165 mg  6/27/2024       Pre-Medications       ondansetron tablet 16 mg       Chemotherapy       azaCITIDine (VIDAZA) chemo injection 165 mg  6/28/2024       Pre-Medications       ondansetron tablet 16 mg       Chemotherapy       azaCITIDine (VIDAZA) chemo injection 165 mg    Therapy Plan Information  INF FLUIDS  sodium chloride 0.9% bolus 1,000 mL 1,000 mL  1,000 mL, Intravenous, PRN    INF FLUIDS  sodium chloride 0.9% bolus 1,000 mL 1,000 mL  1,000 mL, Intravenous, Every visit  sodium chloride 0.9% bolus 500 mL 500 mL  500 mL, Intravenous, PRN    Advance Care Planning   Date: 11/16/2023  We reviewed his underlying diagnosis including natural history, prognosis, and various treatment options. He remains interested in pursuing any and all treatment options in an effort to improve his quality and quantity of life. Will continue treatment as recommended above.       Total time of this visit was 40 minutes, including time spent face to face with patient and/or via video/audio, and also in preparing for today's visit for MDM and documentation. (Medical  Decision Making, including consideration of possible diagnoses, management options, complex medical record review, review of diagnostic tests and information, consideration and discussion of significant complications based on comorbidities, and discussion with providers involved with the care of the patient). Greater than 50% was spent face to face with the patient counseling and coordinating care.  Visit today included increased complexity associated with the care of the episodic problem stem cell transplant candidate addressed and managing the longitudinal care of the patient due to the serious and/or complex managed problem(s) acute myeloid leukemia, pancytopenia, immunodeficiency.      Davina Jimenez NP  Malignant Hematology, Stem Cell Transplant, and Cellular Therapy  [facility]  [facility] [place]

## 2024-06-25 ENCOUNTER — INFUSION (OUTPATIENT)
Dept: INFUSION THERAPY | Facility: HOSPITAL | Age: 64
End: 2024-06-25
Payer: MEDICARE

## 2024-06-25 VITALS
HEART RATE: 48 BPM | DIASTOLIC BLOOD PRESSURE: 68 MMHG | SYSTOLIC BLOOD PRESSURE: 132 MMHG | TEMPERATURE: 98 F | RESPIRATION RATE: 17 BRPM

## 2024-06-25 DIAGNOSIS — C92.00 ACUTE MYELOID LEUKEMIA NOT HAVING ACHIEVED REMISSION: Primary | ICD-10-CM

## 2024-06-25 PROCEDURE — 96401 CHEMO ANTI-NEOPL SQ/IM: CPT

## 2024-06-25 PROCEDURE — 25000003 PHARM REV CODE 250: Performed by: INTERNAL MEDICINE

## 2024-06-25 PROCEDURE — 63600175 PHARM REV CODE 636 W HCPCS: Performed by: INTERNAL MEDICINE

## 2024-06-25 RX ORDER — AZACITIDINE 100 MG/1
75 INJECTION, POWDER, LYOPHILIZED, FOR SOLUTION INTRAVENOUS; SUBCUTANEOUS
Status: COMPLETED | OUTPATIENT
Start: 2024-06-25 | End: 2024-06-25

## 2024-06-25 RX ORDER — ONDANSETRON 4 MG/1
16 TABLET, FILM COATED ORAL
Status: COMPLETED | OUTPATIENT
Start: 2024-06-25 | End: 2024-06-25

## 2024-06-25 RX ADMIN — ONDANSETRON HYDROCHLORIDE 16 MG: 4 TABLET, FILM COATED ORAL at 01:06

## 2024-06-25 RX ADMIN — AZACITIDINE 165 MG: 100 INJECTION, POWDER, LYOPHILIZED, FOR SOLUTION INTRAVENOUS; SUBCUTANEOUS at 01:06

## 2024-06-25 NOTE — NURSING
Pt here for D2 vidaza injection.  VSS.  Vidaza administered to abdominal tissue x 3 injections.  Pt tolerated.

## 2024-06-26 ENCOUNTER — INFUSION (OUTPATIENT)
Dept: INFUSION THERAPY | Facility: HOSPITAL | Age: 64
End: 2024-06-26
Payer: MEDICARE

## 2024-06-26 VITALS
RESPIRATION RATE: 18 BRPM | SYSTOLIC BLOOD PRESSURE: 116 MMHG | TEMPERATURE: 98 F | HEART RATE: 57 BPM | DIASTOLIC BLOOD PRESSURE: 63 MMHG

## 2024-06-26 DIAGNOSIS — C92.00 ACUTE MYELOID LEUKEMIA NOT HAVING ACHIEVED REMISSION: Primary | ICD-10-CM

## 2024-06-26 PROCEDURE — 25000003 PHARM REV CODE 250: Performed by: INTERNAL MEDICINE

## 2024-06-26 PROCEDURE — 96401 CHEMO ANTI-NEOPL SQ/IM: CPT

## 2024-06-26 PROCEDURE — 63600175 PHARM REV CODE 636 W HCPCS: Performed by: INTERNAL MEDICINE

## 2024-06-26 RX ORDER — ONDANSETRON 4 MG/1
16 TABLET, FILM COATED ORAL
Status: COMPLETED | OUTPATIENT
Start: 2024-06-26 | End: 2024-06-26

## 2024-06-26 RX ORDER — AZACITIDINE 100 MG/1
75 INJECTION, POWDER, LYOPHILIZED, FOR SOLUTION INTRAVENOUS; SUBCUTANEOUS
Status: COMPLETED | OUTPATIENT
Start: 2024-06-26 | End: 2024-06-26

## 2024-06-26 RX ADMIN — AZACITIDINE 165 MG: 100 INJECTION, POWDER, LYOPHILIZED, FOR SOLUTION INTRAVENOUS; SUBCUTANEOUS at 01:06

## 2024-06-26 RX ADMIN — ONDANSETRON HYDROCHLORIDE 16 MG: 4 TABLET, FILM COATED ORAL at 01:06

## 2024-06-26 NOTE — NURSING
Pt here for D3 vidaza injection.  VSS.  Vidaza administered to abdominal tissue x 3 injections.  Pt tolerated.

## 2024-06-27 ENCOUNTER — LAB VISIT (OUTPATIENT)
Dept: LAB | Facility: HOSPITAL | Age: 64
End: 2024-06-27
Payer: MEDICARE

## 2024-06-27 ENCOUNTER — INFUSION (OUTPATIENT)
Dept: INFUSION THERAPY | Facility: HOSPITAL | Age: 64
End: 2024-06-27
Payer: MEDICARE

## 2024-06-27 VITALS
DIASTOLIC BLOOD PRESSURE: 72 MMHG | HEART RATE: 68 BPM | SYSTOLIC BLOOD PRESSURE: 123 MMHG | TEMPERATURE: 98 F | RESPIRATION RATE: 17 BRPM

## 2024-06-27 DIAGNOSIS — C92.01 ACUTE MYELOID LEUKEMIA IN REMISSION: ICD-10-CM

## 2024-06-27 DIAGNOSIS — C92.00 ACUTE MYELOID LEUKEMIA NOT HAVING ACHIEVED REMISSION: Primary | ICD-10-CM

## 2024-06-27 LAB
ALBUMIN SERPL BCP-MCNC: 4.2 G/DL (ref 3.5–5.2)
ALP SERPL-CCNC: 62 U/L (ref 55–135)
ALT SERPL W/O P-5'-P-CCNC: 20 U/L (ref 10–44)
ANION GAP SERPL CALC-SCNC: 9 MMOL/L (ref 8–16)
AST SERPL-CCNC: 22 U/L (ref 10–40)
BASOPHILS # BLD AUTO: 0.01 K/UL (ref 0–0.2)
BASOPHILS NFR BLD: 0.4 % (ref 0–1.9)
BILIRUB SERPL-MCNC: 0.9 MG/DL (ref 0.1–1)
BUN SERPL-MCNC: 26 MG/DL (ref 8–23)
CALCIUM SERPL-MCNC: 10.7 MG/DL (ref 8.7–10.5)
CHLORIDE SERPL-SCNC: 102 MMOL/L (ref 95–110)
CO2 SERPL-SCNC: 25 MMOL/L (ref 23–29)
CREAT SERPL-MCNC: 1.7 MG/DL (ref 0.5–1.4)
DIFFERENTIAL METHOD BLD: ABNORMAL
EOSINOPHIL # BLD AUTO: 0 K/UL (ref 0–0.5)
EOSINOPHIL NFR BLD: 0.4 % (ref 0–8)
ERYTHROCYTE [DISTWIDTH] IN BLOOD BY AUTOMATED COUNT: 19.1 % (ref 11.5–14.5)
EST. GFR  (NO RACE VARIABLE): 44.7 ML/MIN/1.73 M^2
GLUCOSE SERPL-MCNC: 112 MG/DL (ref 70–110)
HCT VFR BLD AUTO: 35 % (ref 40–54)
HGB BLD-MCNC: 11.4 G/DL (ref 14–18)
IMM GRANULOCYTES # BLD AUTO: 0.01 K/UL (ref 0–0.04)
IMM GRANULOCYTES NFR BLD AUTO: 0.4 % (ref 0–0.5)
LDH SERPL L TO P-CCNC: 146 U/L (ref 110–260)
LYMPHOCYTES # BLD AUTO: 1.1 K/UL (ref 1–4.8)
LYMPHOCYTES NFR BLD: 40.8 % (ref 18–48)
MAGNESIUM SERPL-MCNC: 2 MG/DL (ref 1.6–2.6)
MCH RBC QN AUTO: 27.5 PG (ref 27–31)
MCHC RBC AUTO-ENTMCNC: 32.6 G/DL (ref 32–36)
MCV RBC AUTO: 85 FL (ref 82–98)
MONOCYTES # BLD AUTO: 0.5 K/UL (ref 0.3–1)
MONOCYTES NFR BLD: 19.2 % (ref 4–15)
NEUTROPHILS # BLD AUTO: 1 K/UL (ref 1.8–7.7)
NEUTROPHILS NFR BLD: 38.8 % (ref 38–73)
NRBC BLD-RTO: 0 /100 WBC
PLATELET # BLD AUTO: 160 K/UL (ref 150–450)
PMV BLD AUTO: 10.2 FL (ref 9.2–12.9)
POTASSIUM SERPL-SCNC: 3.7 MMOL/L (ref 3.5–5.1)
PROT SERPL-MCNC: 8.4 G/DL (ref 6–8.4)
RBC # BLD AUTO: 4.14 M/UL (ref 4.6–6.2)
SODIUM SERPL-SCNC: 136 MMOL/L (ref 136–145)
URATE SERPL-MCNC: 8 MG/DL (ref 3.4–7)
WBC # BLD AUTO: 2.65 K/UL (ref 3.9–12.7)

## 2024-06-27 PROCEDURE — 80053 COMPREHEN METABOLIC PANEL: CPT | Performed by: STUDENT IN AN ORGANIZED HEALTH CARE EDUCATION/TRAINING PROGRAM

## 2024-06-27 PROCEDURE — 25000003 PHARM REV CODE 250: Performed by: INTERNAL MEDICINE

## 2024-06-27 PROCEDURE — 36415 COLL VENOUS BLD VENIPUNCTURE: CPT | Performed by: STUDENT IN AN ORGANIZED HEALTH CARE EDUCATION/TRAINING PROGRAM

## 2024-06-27 PROCEDURE — 96401 CHEMO ANTI-NEOPL SQ/IM: CPT

## 2024-06-27 PROCEDURE — 85025 COMPLETE CBC W/AUTO DIFF WBC: CPT | Performed by: STUDENT IN AN ORGANIZED HEALTH CARE EDUCATION/TRAINING PROGRAM

## 2024-06-27 PROCEDURE — 84550 ASSAY OF BLOOD/URIC ACID: CPT | Performed by: STUDENT IN AN ORGANIZED HEALTH CARE EDUCATION/TRAINING PROGRAM

## 2024-06-27 PROCEDURE — 63600175 PHARM REV CODE 636 W HCPCS: Performed by: INTERNAL MEDICINE

## 2024-06-27 PROCEDURE — 83615 LACTATE (LD) (LDH) ENZYME: CPT | Performed by: STUDENT IN AN ORGANIZED HEALTH CARE EDUCATION/TRAINING PROGRAM

## 2024-06-27 PROCEDURE — 83735 ASSAY OF MAGNESIUM: CPT | Performed by: STUDENT IN AN ORGANIZED HEALTH CARE EDUCATION/TRAINING PROGRAM

## 2024-06-27 RX ORDER — AZACITIDINE 100 MG/1
75 INJECTION, POWDER, LYOPHILIZED, FOR SOLUTION INTRAVENOUS; SUBCUTANEOUS
Status: COMPLETED | OUTPATIENT
Start: 2024-06-27 | End: 2024-06-27

## 2024-06-27 RX ORDER — ONDANSETRON 4 MG/1
16 TABLET, FILM COATED ORAL
Status: COMPLETED | OUTPATIENT
Start: 2024-06-27 | End: 2024-06-27

## 2024-06-27 RX ADMIN — ONDANSETRON HYDROCHLORIDE 16 MG: 4 TABLET, FILM COATED ORAL at 01:06

## 2024-06-27 RX ADMIN — AZACITIDINE 165 MG: 100 INJECTION, POWDER, LYOPHILIZED, FOR SOLUTION INTRAVENOUS; SUBCUTANEOUS at 01:06

## 2024-06-28 ENCOUNTER — INFUSION (OUTPATIENT)
Dept: INFUSION THERAPY | Facility: HOSPITAL | Age: 64
End: 2024-06-28
Payer: MEDICARE

## 2024-06-28 VITALS
HEART RATE: 50 BPM | RESPIRATION RATE: 18 BRPM | DIASTOLIC BLOOD PRESSURE: 67 MMHG | SYSTOLIC BLOOD PRESSURE: 146 MMHG | TEMPERATURE: 98 F | HEIGHT: 73 IN | WEIGHT: 198.63 LBS | BODY MASS INDEX: 26.33 KG/M2

## 2024-06-28 DIAGNOSIS — C92.00 ACUTE MYELOID LEUKEMIA NOT HAVING ACHIEVED REMISSION: Primary | ICD-10-CM

## 2024-06-28 PROCEDURE — 25000003 PHARM REV CODE 250: Performed by: STUDENT IN AN ORGANIZED HEALTH CARE EDUCATION/TRAINING PROGRAM

## 2024-06-28 PROCEDURE — 63600175 PHARM REV CODE 636 W HCPCS: Performed by: INTERNAL MEDICINE

## 2024-06-28 PROCEDURE — 25000003 PHARM REV CODE 250: Performed by: INTERNAL MEDICINE

## 2024-06-28 PROCEDURE — 96401 CHEMO ANTI-NEOPL SQ/IM: CPT

## 2024-06-28 RX ORDER — ONDANSETRON 4 MG/1
16 TABLET, FILM COATED ORAL
Status: COMPLETED | OUTPATIENT
Start: 2024-06-28 | End: 2024-06-28

## 2024-06-28 RX ORDER — AZACITIDINE 100 MG/1
75 INJECTION, POWDER, LYOPHILIZED, FOR SOLUTION INTRAVENOUS; SUBCUTANEOUS
Status: COMPLETED | OUTPATIENT
Start: 2024-06-28 | End: 2024-06-28

## 2024-06-28 RX ADMIN — SODIUM CHLORIDE 500 ML: 9 INJECTION, SOLUTION INTRAVENOUS at 01:06

## 2024-06-28 RX ADMIN — ONDANSETRON HYDROCHLORIDE 16 MG: 4 TABLET, FILM COATED ORAL at 01:06

## 2024-06-28 RX ADMIN — AZACITIDINE 165 MG: 100 INJECTION, POWDER, LYOPHILIZED, FOR SOLUTION INTRAVENOUS; SUBCUTANEOUS at 01:06

## 2024-06-28 NOTE — PLAN OF CARE
1440  Infusion completed, injections administered, pt tolerated; pt instructed to increase water hydration daily; discussed using diaper rash cream on abdomen r/t Vidaza SQ; discussed when to contact MD, when to report to ED; pt and brother verbalized understanding of all discussed and when to report next

## 2024-06-28 NOTE — NURSING
1304  Pt here for IVFs, Vidaza injections, no new complaints or concerns and reports tolerating treatment; discussed treatment plan for today, all questions answered and pt agrees to proceed

## 2024-07-20 RX ORDER — AZACITIDINE 100 MG/1
75 INJECTION, POWDER, LYOPHILIZED, FOR SOLUTION INTRAVENOUS; SUBCUTANEOUS
OUTPATIENT
Start: 2024-07-24

## 2024-07-20 RX ORDER — ONDANSETRON HYDROCHLORIDE 8 MG/1
16 TABLET, FILM COATED ORAL
OUTPATIENT
Start: 2024-07-22

## 2024-07-20 RX ORDER — ONDANSETRON HYDROCHLORIDE 8 MG/1
16 TABLET, FILM COATED ORAL
OUTPATIENT
Start: 2024-07-25

## 2024-07-20 RX ORDER — AZACITIDINE 100 MG/1
75 INJECTION, POWDER, LYOPHILIZED, FOR SOLUTION INTRAVENOUS; SUBCUTANEOUS
OUTPATIENT
Start: 2024-07-26

## 2024-07-20 RX ORDER — ONDANSETRON HYDROCHLORIDE 8 MG/1
16 TABLET, FILM COATED ORAL
OUTPATIENT
Start: 2024-07-26

## 2024-07-20 RX ORDER — AZACITIDINE 100 MG/1
75 INJECTION, POWDER, LYOPHILIZED, FOR SOLUTION INTRAVENOUS; SUBCUTANEOUS
OUTPATIENT
Start: 2024-07-23

## 2024-07-20 RX ORDER — ONDANSETRON HYDROCHLORIDE 8 MG/1
16 TABLET, FILM COATED ORAL
OUTPATIENT
Start: 2024-07-23

## 2024-07-20 RX ORDER — AZACITIDINE 100 MG/1
75 INJECTION, POWDER, LYOPHILIZED, FOR SOLUTION INTRAVENOUS; SUBCUTANEOUS
OUTPATIENT
Start: 2024-07-22

## 2024-07-20 RX ORDER — ONDANSETRON HYDROCHLORIDE 8 MG/1
16 TABLET, FILM COATED ORAL
OUTPATIENT
Start: 2024-07-24

## 2024-07-20 RX ORDER — AZACITIDINE 100 MG/1
75 INJECTION, POWDER, LYOPHILIZED, FOR SOLUTION INTRAVENOUS; SUBCUTANEOUS
OUTPATIENT
Start: 2024-07-25

## 2024-07-22 ENCOUNTER — INFUSION (OUTPATIENT)
Dept: INFUSION THERAPY | Facility: HOSPITAL | Age: 64
End: 2024-07-22
Payer: MEDICARE

## 2024-07-22 ENCOUNTER — LAB VISIT (OUTPATIENT)
Dept: LAB | Facility: HOSPITAL | Age: 64
End: 2024-07-22
Payer: MEDICARE

## 2024-07-22 DIAGNOSIS — C92.00 ACUTE MYELOID LEUKEMIA NOT HAVING ACHIEVED REMISSION: ICD-10-CM

## 2024-07-22 LAB
ABO + RH BLD: NORMAL
ALBUMIN SERPL BCP-MCNC: 4 G/DL (ref 3.5–5.2)
ALP SERPL-CCNC: 54 U/L (ref 55–135)
ALT SERPL W/O P-5'-P-CCNC: 16 U/L (ref 10–44)
ANION GAP SERPL CALC-SCNC: 10 MMOL/L (ref 8–16)
ANISOCYTOSIS BLD QL SMEAR: SLIGHT
AST SERPL-CCNC: 17 U/L (ref 10–40)
BASO STIPL BLD QL SMEAR: ABNORMAL
BASOPHILS # BLD AUTO: ABNORMAL K/UL (ref 0–0.2)
BASOPHILS NFR BLD: 0 % (ref 0–1.9)
BILIRUB SERPL-MCNC: 0.5 MG/DL (ref 0.1–1)
BLD GP AB SCN CELLS X3 SERPL QL: NORMAL
BUN SERPL-MCNC: 32 MG/DL (ref 8–23)
CALCIUM SERPL-MCNC: 9.8 MG/DL (ref 8.7–10.5)
CHLORIDE SERPL-SCNC: 104 MMOL/L (ref 95–110)
CO2 SERPL-SCNC: 29 MMOL/L (ref 23–29)
CREAT SERPL-MCNC: 1.4 MG/DL (ref 0.5–1.4)
DACRYOCYTES BLD QL SMEAR: ABNORMAL
DIFFERENTIAL METHOD BLD: ABNORMAL
EOSINOPHIL # BLD AUTO: ABNORMAL K/UL (ref 0–0.5)
EOSINOPHIL NFR BLD: 0 % (ref 0–8)
ERYTHROCYTE [DISTWIDTH] IN BLOOD BY AUTOMATED COUNT: 19.6 % (ref 11.5–14.5)
EST. GFR  (NO RACE VARIABLE): 56.5 ML/MIN/1.73 M^2
GLUCOSE SERPL-MCNC: 93 MG/DL (ref 70–110)
HCT VFR BLD AUTO: 34.9 % (ref 40–54)
HGB BLD-MCNC: 10.9 G/DL (ref 14–18)
HYPOCHROMIA BLD QL SMEAR: ABNORMAL
IMM GRANULOCYTES # BLD AUTO: ABNORMAL K/UL (ref 0–0.04)
IMM GRANULOCYTES NFR BLD AUTO: ABNORMAL % (ref 0–0.5)
LYMPHOCYTES # BLD AUTO: ABNORMAL K/UL (ref 1–4.8)
LYMPHOCYTES NFR BLD: 57 % (ref 18–48)
MAGNESIUM SERPL-MCNC: 2.2 MG/DL (ref 1.6–2.6)
MCH RBC QN AUTO: 27.7 PG (ref 27–31)
MCHC RBC AUTO-ENTMCNC: 31.2 G/DL (ref 32–36)
MCV RBC AUTO: 89 FL (ref 82–98)
MONOCYTES # BLD AUTO: ABNORMAL K/UL (ref 0.3–1)
MONOCYTES NFR BLD: 7 % (ref 4–15)
NEUTROPHILS NFR BLD: 36 % (ref 38–73)
NRBC BLD-RTO: 1 /100 WBC
OVALOCYTES BLD QL SMEAR: ABNORMAL
PHOSPHATE SERPL-MCNC: 3.9 MG/DL (ref 2.7–4.5)
PLATELET # BLD AUTO: 147 K/UL (ref 150–450)
PLATELET BLD QL SMEAR: ABNORMAL
PMV BLD AUTO: 9.3 FL (ref 9.2–12.9)
POIKILOCYTOSIS BLD QL SMEAR: SLIGHT
POLYCHROMASIA BLD QL SMEAR: ABNORMAL
POTASSIUM SERPL-SCNC: 3.9 MMOL/L (ref 3.5–5.1)
PROT SERPL-MCNC: 7.8 G/DL (ref 6–8.4)
RBC # BLD AUTO: 3.93 M/UL (ref 4.6–6.2)
SODIUM SERPL-SCNC: 143 MMOL/L (ref 136–145)
SPECIMEN OUTDATE: NORMAL
WBC # BLD AUTO: 1.75 K/UL (ref 3.9–12.7)

## 2024-07-22 PROCEDURE — 80053 COMPREHEN METABOLIC PANEL: CPT | Performed by: INTERNAL MEDICINE

## 2024-07-22 PROCEDURE — 86900 BLOOD TYPING SEROLOGIC ABO: CPT | Performed by: INTERNAL MEDICINE

## 2024-07-22 PROCEDURE — 86901 BLOOD TYPING SEROLOGIC RH(D): CPT | Performed by: INTERNAL MEDICINE

## 2024-07-22 PROCEDURE — 84100 ASSAY OF PHOSPHORUS: CPT | Performed by: INTERNAL MEDICINE

## 2024-07-22 PROCEDURE — 85027 COMPLETE CBC AUTOMATED: CPT | Performed by: INTERNAL MEDICINE

## 2024-07-22 PROCEDURE — 36415 COLL VENOUS BLD VENIPUNCTURE: CPT | Performed by: INTERNAL MEDICINE

## 2024-07-22 PROCEDURE — 83735 ASSAY OF MAGNESIUM: CPT | Performed by: INTERNAL MEDICINE

## 2024-07-22 PROCEDURE — 86850 RBC ANTIBODY SCREEN: CPT | Performed by: INTERNAL MEDICINE

## 2024-07-22 PROCEDURE — 85007 BL SMEAR W/DIFF WBC COUNT: CPT | Performed by: INTERNAL MEDICINE

## 2024-07-29 ENCOUNTER — OFFICE VISIT (OUTPATIENT)
Dept: HEMATOLOGY/ONCOLOGY | Facility: CLINIC | Age: 64
End: 2024-07-29
Payer: MEDICARE

## 2024-07-29 ENCOUNTER — INFUSION (OUTPATIENT)
Dept: INFUSION THERAPY | Facility: HOSPITAL | Age: 64
End: 2024-07-29
Payer: MEDICARE

## 2024-07-29 ENCOUNTER — LAB VISIT (OUTPATIENT)
Dept: LAB | Facility: HOSPITAL | Age: 64
End: 2024-07-29
Attending: STUDENT IN AN ORGANIZED HEALTH CARE EDUCATION/TRAINING PROGRAM
Payer: MEDICARE

## 2024-07-29 VITALS
BODY MASS INDEX: 26.53 KG/M2 | TEMPERATURE: 98 F | SYSTOLIC BLOOD PRESSURE: 141 MMHG | HEART RATE: 51 BPM | DIASTOLIC BLOOD PRESSURE: 70 MMHG | WEIGHT: 200.19 LBS | HEIGHT: 73 IN | OXYGEN SATURATION: 100 %

## 2024-07-29 VITALS
BODY MASS INDEX: 26.53 KG/M2 | RESPIRATION RATE: 18 BRPM | SYSTOLIC BLOOD PRESSURE: 141 MMHG | TEMPERATURE: 98 F | HEIGHT: 73 IN | WEIGHT: 200.19 LBS | DIASTOLIC BLOOD PRESSURE: 70 MMHG | HEART RATE: 51 BPM

## 2024-07-29 DIAGNOSIS — D49.9 IMMUNODEFICIENCY SECONDARY TO NEOPLASM: ICD-10-CM

## 2024-07-29 DIAGNOSIS — Z76.82 STEM CELL TRANSPLANT CANDIDATE: ICD-10-CM

## 2024-07-29 DIAGNOSIS — D69.6 THROMBOCYTOPENIA: ICD-10-CM

## 2024-07-29 DIAGNOSIS — D61.818 PANCYTOPENIA: ICD-10-CM

## 2024-07-29 DIAGNOSIS — C92.01 ACUTE MYELOID LEUKEMIA IN REMISSION: Primary | ICD-10-CM

## 2024-07-29 DIAGNOSIS — R41.3 OTHER AMNESIA: ICD-10-CM

## 2024-07-29 DIAGNOSIS — D84.81 IMMUNODEFICIENCY SECONDARY TO NEOPLASM: ICD-10-CM

## 2024-07-29 DIAGNOSIS — C92.00 ACUTE MYELOID LEUKEMIA NOT HAVING ACHIEVED REMISSION: Primary | ICD-10-CM

## 2024-07-29 DIAGNOSIS — D75.829 HEPARIN INDUCED THROMBOCYTOPENIA: ICD-10-CM

## 2024-07-29 LAB
BASOPHILS # BLD AUTO: 0.01 K/UL (ref 0–0.2)
BASOPHILS NFR BLD: 0.3 % (ref 0–1.9)
DIFFERENTIAL METHOD BLD: ABNORMAL
EOSINOPHIL # BLD AUTO: 0 K/UL (ref 0–0.5)
EOSINOPHIL NFR BLD: 0.3 % (ref 0–8)
ERYTHROCYTE [DISTWIDTH] IN BLOOD BY AUTOMATED COUNT: 19.1 % (ref 11.5–14.5)
HCT VFR BLD AUTO: 34.7 % (ref 40–54)
HGB BLD-MCNC: 10.9 G/DL (ref 14–18)
IMM GRANULOCYTES # BLD AUTO: 0.04 K/UL (ref 0–0.04)
IMM GRANULOCYTES NFR BLD AUTO: 1.1 % (ref 0–0.5)
LYMPHOCYTES # BLD AUTO: 1.2 K/UL (ref 1–4.8)
LYMPHOCYTES NFR BLD: 32.4 % (ref 18–48)
MCH RBC QN AUTO: 27.8 PG (ref 27–31)
MCHC RBC AUTO-ENTMCNC: 31.4 G/DL (ref 32–36)
MCV RBC AUTO: 89 FL (ref 82–98)
MONOCYTES # BLD AUTO: 0.7 K/UL (ref 0.3–1)
MONOCYTES NFR BLD: 17.7 % (ref 4–15)
NEUTROPHILS # BLD AUTO: 1.8 K/UL (ref 1.8–7.7)
NEUTROPHILS NFR BLD: 48.2 % (ref 38–73)
NRBC BLD-RTO: 0 /100 WBC
PLATELET # BLD AUTO: 101 K/UL (ref 150–450)
PMV BLD AUTO: 8.6 FL (ref 9.2–12.9)
RBC # BLD AUTO: 3.92 M/UL (ref 4.6–6.2)
WBC # BLD AUTO: 3.73 K/UL (ref 3.9–12.7)

## 2024-07-29 PROCEDURE — 85025 COMPLETE CBC W/AUTO DIFF WBC: CPT | Mod: NBTX | Performed by: STUDENT IN AN ORGANIZED HEALTH CARE EDUCATION/TRAINING PROGRAM

## 2024-07-29 PROCEDURE — 25000003 PHARM REV CODE 250: Performed by: INTERNAL MEDICINE

## 2024-07-29 PROCEDURE — 63600175 PHARM REV CODE 636 W HCPCS: Performed by: INTERNAL MEDICINE

## 2024-07-29 PROCEDURE — 99215 OFFICE O/P EST HI 40 MIN: CPT | Mod: PBBFAC,25 | Performed by: INTERNAL MEDICINE

## 2024-07-29 PROCEDURE — 99999 PR PBB SHADOW E&M-EST. PATIENT-LVL V: CPT | Mod: PBBFAC,,, | Performed by: INTERNAL MEDICINE

## 2024-07-29 PROCEDURE — 36415 COLL VENOUS BLD VENIPUNCTURE: CPT | Performed by: STUDENT IN AN ORGANIZED HEALTH CARE EDUCATION/TRAINING PROGRAM

## 2024-07-29 PROCEDURE — 96401 CHEMO ANTI-NEOPL SQ/IM: CPT

## 2024-07-29 RX ORDER — ONDANSETRON 4 MG/1
16 TABLET, FILM COATED ORAL
Status: COMPLETED | OUTPATIENT
Start: 2024-07-29 | End: 2024-07-29

## 2024-07-29 RX ORDER — AZACITIDINE 100 MG/1
75 INJECTION, POWDER, LYOPHILIZED, FOR SOLUTION INTRAVENOUS; SUBCUTANEOUS
Status: COMPLETED | OUTPATIENT
Start: 2024-07-29 | End: 2024-07-29

## 2024-07-29 RX ADMIN — AZACITIDINE 165 MG: 100 INJECTION, POWDER, LYOPHILIZED, FOR SOLUTION INTRAVENOUS; SUBCUTANEOUS at 11:07

## 2024-07-29 RX ADMIN — ONDANSETRON HYDROCHLORIDE 16 MG: 4 TABLET, FILM COATED ORAL at 11:07

## 2024-07-30 ENCOUNTER — INFUSION (OUTPATIENT)
Dept: INFUSION THERAPY | Facility: HOSPITAL | Age: 64
End: 2024-07-30
Payer: MEDICARE

## 2024-07-30 VITALS
SYSTOLIC BLOOD PRESSURE: 141 MMHG | HEART RATE: 53 BPM | DIASTOLIC BLOOD PRESSURE: 70 MMHG | TEMPERATURE: 98 F | RESPIRATION RATE: 16 BRPM

## 2024-07-30 DIAGNOSIS — C92.00 ACUTE MYELOID LEUKEMIA NOT HAVING ACHIEVED REMISSION: Primary | ICD-10-CM

## 2024-07-30 PROCEDURE — 96401 CHEMO ANTI-NEOPL SQ/IM: CPT

## 2024-07-30 PROCEDURE — 25000003 PHARM REV CODE 250: Performed by: INTERNAL MEDICINE

## 2024-07-30 PROCEDURE — 63600175 PHARM REV CODE 636 W HCPCS: Performed by: INTERNAL MEDICINE

## 2024-07-30 RX ORDER — AZACITIDINE 100 MG/1
75 INJECTION, POWDER, LYOPHILIZED, FOR SOLUTION INTRAVENOUS; SUBCUTANEOUS
Status: COMPLETED | OUTPATIENT
Start: 2024-07-30 | End: 2024-07-30

## 2024-07-30 RX ORDER — ONDANSETRON 4 MG/1
16 TABLET, FILM COATED ORAL
Status: COMPLETED | OUTPATIENT
Start: 2024-07-30 | End: 2024-07-30

## 2024-07-30 RX ADMIN — ONDANSETRON HYDROCHLORIDE 16 MG: 4 TABLET, FILM COATED ORAL at 02:07

## 2024-07-30 RX ADMIN — AZACITIDINE 165 MG: 100 INJECTION, POWDER, LYOPHILIZED, FOR SOLUTION INTRAVENOUS; SUBCUTANEOUS at 02:07

## 2024-07-30 NOTE — PROGRESS NOTES
Section of Hematology and Stem Cell Transplantation  Follow Up Visit     Date of visit: 7/29/24  Visit diagnosis: Acute myeloid leukemia in remission [C92.01]  Referred by:  FERN Oneill MD    Oncologic History:     Primary Oncologic Diagnosis: Acute myeloid leukemia, adverse risk.    6/28/23: Diagnosed with heparin-induced thrombocytopenia. HIT Ab 1.87 OD (moderate-strong). ZAHIRA not available.   10/31/23: Peripheral blood flow cytometry sent due to worsening pancytopenia (CBC: WBC 3.43, Hgb 9, Plts 120, 19% blasts) revealed increased blasts (43.1%) concerning for acute myeloid leukemia.   11/9/23: Bone marrow biopsy revealed a hypercellular marrow (80%) with increased blasts consistent with acute myeloid leukemia. Karyotype 46,XY,del(20)(q11.2q13.3)[4]/46,XY[16]. FISH with 20q12 deletion. NGS with IDH2 (40%) and SRSF2 (40%).  12/2023: He started taking venetoclax 200mg daily (did not start azacitidine due to scheduling conflicts).    1/8/24: C1D1 of azacitidine x5 days plus venetoclax 21 of 28 days.  1/31/24: Bone marrow biopsy after cycle 1 revealed persistent AML with improvement in blasts to 3-7%. Karyotyping with 7 of 8 available metaphases with complex near-tetraploid karyotype (1 normal). NGS with IDH2 (41%) and SRSF2 (40%).  4/23/24: Bone marrow biopsy with normocellular marrow with no blasts consistent with complete remission. CG/FISH normal. NGS with IDH2 (11%) and SRSF2 (10%).     History of Present Ilness:   Gustavo Tracye Jr. (Gustavo) is a pleasant 64 y.o.male who presents for follow up. He is doing overall well. His biggest concern is increased forgetfulness. He does not recall appointments or other important obligations. He has not had any fevers or chills. Denies new neurologic deficits. No new medications.    PAST MEDICAL HISTORY:   Past Medical History:   Diagnosis Date    Abnormal nuclear stress test 11/26/2022    Acute myeloid leukemia     Cervical radiculopathy     to rt arm    CHF (congestive  heart failure)     Chronic systolic congestive heart failure 11/28/2022    Dilated cardiomyopathy 11/26/2022    Hyperlipidemia     Hypertension     Obesity, unspecified     PAF (paroxysmal atrial fibrillation) 11/26/2022    Pulmonary HTN 11/28/2022    Stroke        PAST SURGICAL HISTORY:   Past Surgical History:   Procedure Laterality Date    BONE MARROW BIOPSY Right 1/31/2024    Procedure: Biopsy-bone marrow;  Surgeon: Rui Jacobsen MD;  Location: [facility] ENDO (4TH FLR);  Service: Oncology;  Laterality: Right;  1/24-pt confirmed-MS    CLOSURE OF LEFT ATRIAL APPENDAGE USING DEVICE Left 6/22/2023    Procedure: CLOSURE, LEFT ATRIAL APPENDAGE, USING DEVICE;  Surgeon: Rustam Dave MD;  Location: [facility] OR;  Service: Cardiovascular;  Laterality: Left;  LIGATION OF LEFT ATRIAL APPENDAGE WITH OTILIA EXCLUSION SYSTEM    CORONARY ARTERY BYPASS GRAFT (CABG) N/A 6/22/2023    Procedure: CORONARY ARTERY BYPASS GRAFT (CABG);  Surgeon: Rustam Dave MD;  Location: [facility] OR;  Service: Cardiovascular;  Laterality: N/A;  3-VESSEL WITH EPI-AORTIC ULTRASOUND    PURDY MAZE PROCEDURE N/A 6/22/2023    Procedure: PURDY MAZE PROCEDURE;  Surgeon: Rustam Dave MD;  Location: [facility] OR;  Service: Cardiovascular;  Laterality: N/A;    ECHOCARDIOGRAM,TRANSESOPHAGEAL N/A 6/22/2023    Procedure: ECHOCARDIOGRAM,TRANSESOPHAGEAL;  Surgeon: Rustam Dave MD;  Location: [facility] OR;  Service: Cardiovascular;  Laterality: N/A;    ENDOSCOPIC HARVEST OF VEIN Left 6/22/2023    Procedure: SURGICAL PROCUREMENT, VEIN, ENDOSCOPIC;  Surgeon: Rustam Dave MD;  Location: [facility] OR;  Service: Cardiovascular;  Laterality: Left;    INJECTION OF ANESTHETIC AGENT AROUND MULTIPLE INTERCOSTAL NERVES N/A 6/22/2023    Procedure: BLOCK, NERVE, INTERCOSTAL, 2 OR MORE;  Surgeon: Rustam Dave MD;  Location: [facility] OR;  Service: Cardiovascular;  Laterality: N/A;  PARASTERNAL NERVE BLOCK    INSTANTANEOUS WAVE-FREE RATIO  6/20/2023    Procedure: Instantaneous Wave-Free  Ratio;  Surgeon: Zion Ortega MD;  Location: [facility] CATH LAB;  Service: Cardiology;;    IVUS, CORONARY  6/20/2023    Procedure: IVUS, Coronary;  Surgeon: Zion Ortega MD;  Location: [facility] CATH LAB;  Service: Cardiology;;    LEFT HEART CATHETERIZATION Left 11/28/2022    Procedure: CATHETERIZATION, HEART, LEFT;  Surgeon: Zion Ortega MD;  Location: [facility] CATH LAB;  Service: Cardiology;  Laterality: Left;    LEFT HEART CATHETERIZATION Left 6/20/2023    Procedure: Left heart cath;  Surgeon: Zion Ortega MD;  Location: [facility] CATH LAB;  Service: Cardiology;  Laterality: Left;    PERCUTANEOUS TRANSLUMINAL BALLOON ANGIOPLASTY OF CORONARY ARTERY  6/20/2023    Procedure: Angioplasty-coronary;  Surgeon: Zion Ortega MD;  Location: [facility] CATH LAB;  Service: Cardiology;;    RIGHT HEART CATHETERIZATION N/A 11/28/2022    Procedure: INSERTION, CATHETER, RIGHT HEART;  Surgeon: Zion Ortega MD;  Location: [facility] CATH LAB;  Service: Cardiology;  Laterality: N/A;  Congestive heart failure       PAST SOCIAL HISTORY:  Social History     Tobacco Use    Smoking status: Never    Smokeless tobacco: Never   Substance Use Topics    Alcohol use: Yes    Drug use: Never       FAMILY HISTORY:  Family History   Problem Relation Name Age of Onset    Hypertension Mother      Diabetes Father      Hyperlipidemia Father      Hypertension Father      Heart attack Father      Coronary artery disease Father         CURRENT MEDICATIONS:   Current Outpatient Medications   Medication Sig    acyclovir (ZOVIRAX) 400 MG tablet Take 1 tablet (400 mg total) by mouth 2 (two) times daily.    allopurinoL (ZYLOPRIM) 300 MG tablet Take 1 tablet (300 mg total) by mouth once daily.    amitriptyline (ELAVIL) 50 MG tablet Take 50 mg by mouth every evening.    aspirin (ECOTRIN) 81 MG EC tablet Take 1 tablet (81 mg total) by mouth once daily.    cyclobenzaprine (FLEXERIL) 10 MG tablet Take 10 mg by mouth 2 (two) times daily as needed.    FARXIGA 10 mg tablet TAKE 1  TABLET BY MOUTH EVERY DAY    ferrous sulfate (FEOSOL) 325 mg (65 mg iron) Tab tablet Take 1 tablet by mouth once daily.    fluconazole (DIFLUCAN) 200 MG Tab Take 2 tablets (400 mg total) by mouth once daily. Stop once posaconazole is received.    folic acid (FOLVITE) 1 MG tablet Take 2 tablets (2 mg total) by mouth once daily.    furosemide (LASIX) 20 MG tablet Take 1 tablet by mouth once daily.    HYDROcodone-acetaminophen (NORCO)  mg per tablet TAKE 1 TABLET BY MOUTH 1 TO 2 TIMES A DAY AS NEEDED    latanoprost 0.005 % ophthalmic solution Place 1 drop into both eyes nightly.    metoprolol succinate (TOPROL-XL) 100 MG 24 hr tablet Take 1 tablet by mouth once daily.    potassium chloride (K-TAB) 20 mEq TAKE 1 TABLET BY MOUTH TWICE A DAY    pravastatin (PRAVACHOL) 20 MG tablet Take 20 mg by mouth once daily.    valsartan (DIOVAN) 40 MG tablet Take 1 tablet (40 mg total) by mouth once daily.    venetoclax (VENCLEXTA) 100 mg Tab Take 2 tablets (200 mg total) by mouth once daily Take 2 tablets (200mg) once daily on days 1-7 of a 28 day cycle. Always start with azacitidine (Vidaza) injections..    warfarin (COUMADIN) 5 MG tablet TAKE 1 TABLET BY MOUTH ON SUN, MON, WED, FRI, SAT,& 1.5 ON TUES & THURS     Current Facility-Administered Medications   Medication    0.9%  NaCl infusion (for blood administration)    0.9%  NaCl infusion (for blood administration)    acetaminophen tablet 650 mg    diphenhydrAMINE capsule 25 mg     Facility-Administered Medications Ordered in Other Visits   Medication    sodium chloride 0.9% flush 10 mL       ALLERGIES:   Review of patient's allergies indicates:  No Known Allergies      Review of Systems:     Pertinent positives and negatives included in the HPI. Otherwise a complete review of systems is negative.    Physical Exam:     Vitals:    07/29/24 0816   BP: (!) 141/70   Pulse: (!) 51   Temp: 97.8 °F (36.6 °C)     Physical Exam  Constitutional:       General: He is not in acute  distress.  Eyes:      General: No scleral icterus.     Extraocular Movements: Extraocular movements intact.      Conjunctiva/sclera: Conjunctivae normal.   Cardiovascular:      Rate and Rhythm: Normal rate and regular rhythm.      Pulses: Normal pulses.   Pulmonary:      Effort: Pulmonary effort is normal. No respiratory distress.      Breath sounds: Normal breath sounds. No wheezing or rhonchi.   Abdominal:      General: There is no distension.      Palpations: Abdomen is soft.   Musculoskeletal:         General: No swelling or tenderness.      Right lower leg: No edema.      Left lower leg: No edema.   Skin:     General: Skin is warm and dry.      Findings: No bruising or rash.   Neurological:      Mental Status: He is alert and oriented to person, place, and time.      Coordination: Coordination normal.      Gait: Gait normal.   Psychiatric:         Mood and Affect: Mood normal.         Behavior: Behavior normal.          ECOG Performance Status: (foot note - ECOG PS provided by [employer] Cooperative [employer]cology Group) 1 - Symptomatic but completely ambulatory    Karnofsky Performance Score:  90%- Able to Carry on Normal Activity: Minor Symptoms of Disease    Labs:   Lab Results   Component Value Date    WBC 3.73 (L) 07/29/2024    RBC 3.92 (L) 07/29/2024    HGB 10.9 (L) 07/29/2024    HCT 34.7 (L) 07/29/2024    MCV 89 07/29/2024    MCH 27.8 07/29/2024    MCHC 31.4 (L) 07/29/2024    RDW 19.1 (H) 07/29/2024     (L) 07/29/2024    MPV 8.6 (L) 07/29/2024    GRAN 1.8 07/29/2024    GRAN 48.2 07/29/2024    LYMPH 1.2 07/29/2024    LYMPH 32.4 07/29/2024    MONO 0.7 07/29/2024    MONO 17.7 (H) 07/29/2024    EOS 0.0 07/29/2024    BASO 0.01 07/29/2024    EOSINOPHIL 0.3 07/29/2024    BASOPHIL 0.3 07/29/2024       CMP  Sodium   Date Value Ref Range Status   07/25/2024 139 136 - 145 mmol/L Final     Potassium   Date Value Ref Range Status   07/25/2024 3.7 3.5 - 5.1 mmol/L Final     Chloride   Date Value Ref Range Status    07/25/2024 99 95 - 110 mmol/L Final     CO2   Date Value Ref Range Status   07/25/2024 30 (H) 23 - 29 mmol/L Final     Glucose   Date Value Ref Range Status   07/25/2024 97 74 - 106 mg/dL Final     BUN   Date Value Ref Range Status   07/25/2024 29 (H) 9 - 20 mg/dL Final     Creatinine   Date Value Ref Range Status   07/25/2024 1.49 0.80 - 1.50 mg/dL Final     Calcium   Date Value Ref Range Status   07/25/2024 9.7 8.4 - 10.2 mg/dL Final     Total Protein   Date Value Ref Range Status   07/25/2024 8.3 (H) 6.3 - 8.2 g/dL Final     Albumin   Date Value Ref Range Status   07/25/2024 4.6 3.5 - 5.2 g/dL Final     Total Bilirubin   Date Value Ref Range Status   07/25/2024 0.7 0.2 - 1.3 mg/dL Final     Alkaline Phosphatase   Date Value Ref Range Status   07/25/2024 55 38 - 145 U/L Final     AST   Date Value Ref Range Status   07/25/2024 32 17 - 59 U/L Final     ALT   Date Value Ref Range Status   07/25/2024 24 10 - 44 U/L Final     Anion Gap   Date Value Ref Range Status   07/25/2024 10 8 - 16 mmol/L Final       Imaging:   Reviewed     Pathology:  Reviewed     Assessment and Plan:   Gustavo Tracey Jr. (Gustavo) is a pleasant 64 y.o.male who presents for follow up.    Acute myeloid leukemia, adverse risk:  Peripheral blood flow cytometry obtain 10/31/2023 concerning for acute myeloid leukemia.  Bone marrow biopsy obtained on 11/09/2023 revealed acute myeloid leukemia with 20q deletion on FISH and NGS with IDH2 (40%) and SRSF2 (40%). He started aza x7 plus alonzo x21 of 28 days in 1/2024. Bone marrow biopsy at the end of cycle 1 revealed persistent disease but improvement in blasts. CG now showed near-tetraploid complex karyotype in 7 of 8 metaphases. NGS with persistent IDH2 (41%) and SRSF2 (40%). Bone marrow biopsy in 4/2024 showed complete remission with persistent molecular disease - NGS with IDH2 (11%) and SRSF2 (10%).   - Continue azacitidine 75mg/m2 x5 days plus venetoclax 200mg daily x7 of 28 days.  - ANC >500 and Plts  >50 to start each cycle.   - Repeat bone marrow biopsy in week 4 of this cycle (8/20/24).    Pancytopenia:  Monitor CBC twice weekly with Dr. Oneill. Transfuse 1 unit of PRBC for hgb < 7 or clinically appropriate. Transfuse 1 unit of plts if platelets < 10 or clinically appropriate.      Immunodeficiency secondary to neoplasm:  Continue antimicrobial prophylaxis with levofloxacin, acyclovir, and fluconazole.      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  Heparin antibody 1.87 OD (moderate-strong). ZAHIRA negative on 12/13/23 which rules out HIT. Heparin allergy removed. Ok to stop Warfarin.   - He reports he is still taking Warfarin. Ok to stop from our standpoint. He would like to ensure no other indications for Warfarin use with his cardiologist, who he will see this month.    Stem cell transplant candidate:  We discussed the role for stem cell transplant as a potentially curative treatment option. He is fit and otherwise healthy. His cardiac function is back to baseline. Therefore, I would recommend transplant ASAP as he is in remission at this time.   - We discussed pros/cons of transplant again today.   - Discussed dental clearance and need for colon cancer screening.     Stem cell donors:  He has one full sister and 2 children as potential donor options . He has one potential MUD.    HFrEF:  Cardiac function now back to baseline. Continue follow up with local cardiologist.     Increased forgetfulness/confusion:  Will obtain MRI brain and refer to neuropsych.     Orders/Follow Up:      Orders Placed This Encounter    MRI Brain W WO Contrast    Ambulatory referral/consult to Adult Neuropsychology         Route Chart for Scheduling    BMT Chart Routing      Follow up with physician 4 weeks. 1. Clinic bone marrow biopsy around 8/20/24   2. Follow up 1 week later   Follow up with [facility]    Provider visit type    Infusion scheduling note    Injection scheduling note azacitidine daily x5 days in 4 weeks  (8/26)   Labs CBC, CMP, phosphorus, magnesium and type and screen   Scheduling:  Preferred lab:  Lab interval: once a week  [facility]ly at [facility]    Pharmacy appointment    Other referrals     Schedule bone marrow biopsy  additional referrals needed  Neuropsych           Treatment Plan Information   OP AZACITIDINE 5-DAY (SUB-Q) + VENETOCLAX   Rui Jacobsen MD   Upcoming Treatment Dates - OP AZACITIDINE 5-DAY (SUB-Q) + VENETOCLAX    7/30/2024       Pre-Medications       ondansetron tablet 16 mg       Chemotherapy       azaCITIDine (VIDAZA) chemo injection 165 mg  7/31/2024       Pre-Medications       ondansetron tablet 16 mg       Chemotherapy       azaCITIDine (VIDAZA) chemo injection 165 mg  8/1/2024       Pre-Medications       ondansetron tablet 16 mg       Chemotherapy       azaCITIDine (VIDAZA) chemo injection 165 mg  8/2/2024       Pre-Medications       ondansetron tablet 16 mg       Chemotherapy       azaCITIDine (VIDAZA) chemo injection 165 mg    Therapy Plan Information  INF FLUIDS  sodium chloride 0.9% bolus 1,000 mL 1,000 mL  1,000 mL, Intravenous, PRN    INF FLUIDS  sodium chloride 0.9% bolus 1,000 mL 1,000 mL  1,000 mL, Intravenous, Every visit  sodium chloride 0.9% bolus 500 mL 500 mL  500 mL, Intravenous, PRN    Total time of this visit was 40 minutes, including time spent face to face with patient and/or via video/audio, and also in preparing for today's visit for MDM and documentation. (Medical Decision Making, including consideration of possible diagnoses, management options, complex medical record review, review of diagnostic tests and information, consideration and discussion of significant complications based on comorbidities, and discussion with providers involved with the care of the patient). Greater than 50% was spent face to face with the patient counseling and coordinating care.  Visit today included increased complexity associated with the care of the episodic problem  confusion/forgetfulness addressed and managing the longitudinal care of the patient due to the serious and/or complex managed problem(s) acute myeloid leukemia, pancytopenia, immunodeficiency.    Jarrell Jacobsen MD  Malignant Hematology, Stem Cell Transplant, and Cellular Therapy  [facility]  [facility]

## 2024-07-31 ENCOUNTER — INFUSION (OUTPATIENT)
Dept: INFUSION THERAPY | Facility: HOSPITAL | Age: 64
End: 2024-07-31
Payer: MEDICARE

## 2024-07-31 VITALS
OXYGEN SATURATION: 100 % | RESPIRATION RATE: 20 BRPM | DIASTOLIC BLOOD PRESSURE: 66 MMHG | HEART RATE: 57 BPM | SYSTOLIC BLOOD PRESSURE: 119 MMHG

## 2024-07-31 DIAGNOSIS — C92.00 ACUTE MYELOID LEUKEMIA NOT HAVING ACHIEVED REMISSION: Primary | ICD-10-CM

## 2024-07-31 PROCEDURE — 63600175 PHARM REV CODE 636 W HCPCS: Performed by: INTERNAL MEDICINE

## 2024-07-31 PROCEDURE — 25000003 PHARM REV CODE 250: Performed by: INTERNAL MEDICINE

## 2024-07-31 PROCEDURE — 96401 CHEMO ANTI-NEOPL SQ/IM: CPT

## 2024-07-31 RX ORDER — ONDANSETRON 4 MG/1
16 TABLET, FILM COATED ORAL
Status: COMPLETED | OUTPATIENT
Start: 2024-07-31 | End: 2024-07-31

## 2024-07-31 RX ORDER — AZACITIDINE 100 MG/1
75 INJECTION, POWDER, LYOPHILIZED, FOR SOLUTION INTRAVENOUS; SUBCUTANEOUS
Status: COMPLETED | OUTPATIENT
Start: 2024-07-31 | End: 2024-07-31

## 2024-07-31 RX ADMIN — ONDANSETRON HYDROCHLORIDE 16 MG: 4 TABLET, FILM COATED ORAL at 02:07

## 2024-07-31 RX ADMIN — AZACITIDINE 165 MG: 100 INJECTION, POWDER, LYOPHILIZED, FOR SOLUTION INTRAVENOUS; SUBCUTANEOUS at 02:07

## 2024-08-01 ENCOUNTER — INFUSION (OUTPATIENT)
Dept: INFUSION THERAPY | Facility: HOSPITAL | Age: 64
End: 2024-08-01
Payer: MEDICARE

## 2024-08-01 VITALS
SYSTOLIC BLOOD PRESSURE: 142 MMHG | RESPIRATION RATE: 16 BRPM | DIASTOLIC BLOOD PRESSURE: 75 MMHG | TEMPERATURE: 99 F | HEART RATE: 49 BPM

## 2024-08-01 DIAGNOSIS — C92.00 ACUTE MYELOID LEUKEMIA NOT HAVING ACHIEVED REMISSION: Primary | ICD-10-CM

## 2024-08-01 PROCEDURE — 25000003 PHARM REV CODE 250: Performed by: INTERNAL MEDICINE

## 2024-08-01 PROCEDURE — 96401 CHEMO ANTI-NEOPL SQ/IM: CPT

## 2024-08-01 PROCEDURE — 63600175 PHARM REV CODE 636 W HCPCS: Performed by: INTERNAL MEDICINE

## 2024-08-01 RX ORDER — ONDANSETRON 4 MG/1
16 TABLET, FILM COATED ORAL
Status: COMPLETED | OUTPATIENT
Start: 2024-08-01 | End: 2024-08-01

## 2024-08-01 RX ORDER — AZACITIDINE 100 MG/1
75 INJECTION, POWDER, LYOPHILIZED, FOR SOLUTION INTRAVENOUS; SUBCUTANEOUS
Status: COMPLETED | OUTPATIENT
Start: 2024-08-01 | End: 2024-08-01

## 2024-08-01 RX ADMIN — AZACITIDINE 165 MG: 100 INJECTION, POWDER, LYOPHILIZED, FOR SOLUTION INTRAVENOUS; SUBCUTANEOUS at 02:08

## 2024-08-01 RX ADMIN — ONDANSETRON HYDROCHLORIDE 16 MG: 4 TABLET, FILM COATED ORAL at 02:08

## 2024-08-02 ENCOUNTER — INFUSION (OUTPATIENT)
Dept: INFUSION THERAPY | Facility: HOSPITAL | Age: 64
End: 2024-08-02
Payer: MEDICARE

## 2024-08-02 VITALS
RESPIRATION RATE: 16 BRPM | HEART RATE: 68 BPM | DIASTOLIC BLOOD PRESSURE: 83 MMHG | SYSTOLIC BLOOD PRESSURE: 135 MMHG | TEMPERATURE: 98 F

## 2024-08-02 DIAGNOSIS — C92.00 ACUTE MYELOID LEUKEMIA NOT HAVING ACHIEVED REMISSION: Primary | ICD-10-CM

## 2024-08-02 PROCEDURE — 63600175 PHARM REV CODE 636 W HCPCS: Performed by: INTERNAL MEDICINE

## 2024-08-02 PROCEDURE — 25000003 PHARM REV CODE 250: Performed by: INTERNAL MEDICINE

## 2024-08-02 PROCEDURE — 96401 CHEMO ANTI-NEOPL SQ/IM: CPT

## 2024-08-02 RX ORDER — AZACITIDINE 100 MG/1
75 INJECTION, POWDER, LYOPHILIZED, FOR SOLUTION INTRAVENOUS; SUBCUTANEOUS
Status: COMPLETED | OUTPATIENT
Start: 2024-08-02 | End: 2024-08-02

## 2024-08-02 RX ORDER — ONDANSETRON 4 MG/1
16 TABLET, FILM COATED ORAL
Status: COMPLETED | OUTPATIENT
Start: 2024-08-02 | End: 2024-08-02

## 2024-08-02 RX ADMIN — ONDANSETRON HYDROCHLORIDE 16 MG: 4 TABLET, FILM COATED ORAL at 02:08

## 2024-08-02 RX ADMIN — AZACITIDINE 165 MG: 100 INJECTION, POWDER, LYOPHILIZED, FOR SOLUTION INTRAVENOUS; SUBCUTANEOUS at 02:08

## 2024-08-05 ENCOUNTER — HOSPITAL ENCOUNTER (OUTPATIENT)
Dept: RADIOLOGY | Facility: HOSPITAL | Age: 64
Discharge: HOME | End: 2024-08-05
Attending: INTERNAL MEDICINE
Payer: MEDICARE

## 2024-08-05 DIAGNOSIS — R41.3 OTHER AMNESIA: ICD-10-CM

## 2024-08-05 PROCEDURE — 25500020 PHARM REV CODE 255: Mod: NBTX | Performed by: INTERNAL MEDICINE

## 2024-08-05 PROCEDURE — 70553 MRI BRAIN STEM W/O & W/DYE: CPT | Mod: TC

## 2024-08-05 PROCEDURE — A9585 GADOBUTROL INJECTION: HCPCS | Mod: NBTX | Performed by: INTERNAL MEDICINE

## 2024-08-05 RX ADMIN — GADOBUTROL 10 ML: 604.72 INJECTION INTRAVENOUS at 11:08

## 2024-08-14 ENCOUNTER — PATIENT MESSAGE (OUTPATIENT)
Dept: HEMATOLOGY/ONCOLOGY | Facility: CLINIC | Age: 64
End: 2024-08-14
Payer: MEDICARE

## 2024-08-21 ENCOUNTER — PATIENT MESSAGE (OUTPATIENT)
Dept: HEMATOLOGY/ONCOLOGY | Facility: CLINIC | Age: 64
End: 2024-08-21
Payer: MEDICARE

## 2024-08-22 ENCOUNTER — PROCEDURE VISIT (OUTPATIENT)
Dept: HEMATOLOGY/ONCOLOGY | Facility: CLINIC | Age: 64
End: 2024-08-22
Payer: MEDICARE

## 2024-08-22 ENCOUNTER — TELEPHONE (OUTPATIENT)
Dept: HEMATOLOGY/ONCOLOGY | Facility: CLINIC | Age: 64
End: 2024-08-22
Payer: MEDICARE

## 2024-08-22 ENCOUNTER — LAB VISIT (OUTPATIENT)
Dept: LAB | Facility: HOSPITAL | Age: 64
End: 2024-08-22
Attending: STUDENT IN AN ORGANIZED HEALTH CARE EDUCATION/TRAINING PROGRAM
Payer: MEDICARE

## 2024-08-22 ENCOUNTER — INFUSION (OUTPATIENT)
Dept: INFUSION THERAPY | Facility: HOSPITAL | Age: 64
End: 2024-08-22
Payer: MEDICARE

## 2024-08-22 VITALS
OXYGEN SATURATION: 100 % | DIASTOLIC BLOOD PRESSURE: 77 MMHG | RESPIRATION RATE: 18 BRPM | SYSTOLIC BLOOD PRESSURE: 163 MMHG | TEMPERATURE: 98 F | HEIGHT: 73 IN | HEART RATE: 55 BPM | BODY MASS INDEX: 25.84 KG/M2 | WEIGHT: 195 LBS

## 2024-08-22 VITALS
SYSTOLIC BLOOD PRESSURE: 146 MMHG | HEART RATE: 45 BPM | TEMPERATURE: 98 F | OXYGEN SATURATION: 100 % | RESPIRATION RATE: 18 BRPM | DIASTOLIC BLOOD PRESSURE: 70 MMHG

## 2024-08-22 DIAGNOSIS — Z76.82 STEM CELL TRANSPLANT CANDIDATE: ICD-10-CM

## 2024-08-22 DIAGNOSIS — C92.00 ACUTE MYELOID LEUKEMIA NOT HAVING ACHIEVED REMISSION: ICD-10-CM

## 2024-08-22 DIAGNOSIS — C92.00 ACUTE MYELOID LEUKEMIA NOT HAVING ACHIEVED REMISSION: Primary | ICD-10-CM

## 2024-08-22 DIAGNOSIS — C92.01 ACUTE MYELOID LEUKEMIA IN REMISSION: ICD-10-CM

## 2024-08-22 DIAGNOSIS — C92.01 ACUTE MYELOID LEUKEMIA IN REMISSION: Primary | ICD-10-CM

## 2024-08-22 LAB
ABO + RH BLD: NORMAL
ALBUMIN SERPL BCP-MCNC: 4.2 G/DL (ref 3.5–5.2)
ALBUMIN SERPL BCP-MCNC: 4.2 G/DL (ref 3.5–5.2)
ALP SERPL-CCNC: 49 U/L (ref 55–135)
ALP SERPL-CCNC: 49 U/L (ref 55–135)
ALT SERPL W/O P-5'-P-CCNC: 18 U/L (ref 10–44)
ALT SERPL W/O P-5'-P-CCNC: 18 U/L (ref 10–44)
ANION GAP SERPL CALC-SCNC: 7 MMOL/L (ref 8–16)
ANION GAP SERPL CALC-SCNC: 7 MMOL/L (ref 8–16)
AST SERPL-CCNC: 18 U/L (ref 10–40)
AST SERPL-CCNC: 18 U/L (ref 10–40)
BASOPHILS NFR BLD: 0 % (ref 0–1.9)
BASOPHILS NFR BLD: 0 % (ref 0–1.9)
BILIRUB SERPL-MCNC: 0.7 MG/DL (ref 0.1–1)
BILIRUB SERPL-MCNC: 0.7 MG/DL (ref 0.1–1)
BLD GP AB SCN CELLS X3 SERPL QL: NORMAL
BUN SERPL-MCNC: 33 MG/DL (ref 8–23)
BUN SERPL-MCNC: 33 MG/DL (ref 8–23)
CALCIUM SERPL-MCNC: 9.9 MG/DL (ref 8.7–10.5)
CALCIUM SERPL-MCNC: 9.9 MG/DL (ref 8.7–10.5)
CHLORIDE SERPL-SCNC: 104 MMOL/L (ref 95–110)
CHLORIDE SERPL-SCNC: 104 MMOL/L (ref 95–110)
CO2 SERPL-SCNC: 29 MMOL/L (ref 23–29)
CO2 SERPL-SCNC: 29 MMOL/L (ref 23–29)
CREAT SERPL-MCNC: 1.6 MG/DL (ref 0.5–1.4)
CREAT SERPL-MCNC: 1.6 MG/DL (ref 0.5–1.4)
DIFFERENTIAL METHOD BLD: ABNORMAL
DIFFERENTIAL METHOD BLD: ABNORMAL
EOSINOPHIL NFR BLD: 1 % (ref 0–8)
EOSINOPHIL NFR BLD: 1 % (ref 0–8)
ERYTHROCYTE [DISTWIDTH] IN BLOOD BY AUTOMATED COUNT: 19 % (ref 11.5–14.5)
ERYTHROCYTE [DISTWIDTH] IN BLOOD BY AUTOMATED COUNT: 19 % (ref 11.5–14.5)
EST. GFR  (NO RACE VARIABLE): 48.1 ML/MIN/1.73 M^2
EST. GFR  (NO RACE VARIABLE): 48.1 ML/MIN/1.73 M^2
GLUCOSE SERPL-MCNC: 94 MG/DL (ref 70–110)
GLUCOSE SERPL-MCNC: 94 MG/DL (ref 70–110)
HCT VFR BLD AUTO: 34.2 % (ref 40–54)
HCT VFR BLD AUTO: 34.2 % (ref 40–54)
HGB BLD-MCNC: 10.6 G/DL (ref 14–18)
HGB BLD-MCNC: 10.6 G/DL (ref 14–18)
IMM GRANULOCYTES # BLD AUTO: ABNORMAL K/UL (ref 0–0.04)
IMM GRANULOCYTES # BLD AUTO: ABNORMAL K/UL (ref 0–0.04)
IMM GRANULOCYTES NFR BLD AUTO: ABNORMAL % (ref 0–0.5)
IMM GRANULOCYTES NFR BLD AUTO: ABNORMAL % (ref 0–0.5)
LDH SERPL L TO P-CCNC: 161 U/L (ref 110–260)
LYMPHOCYTES NFR BLD: 55 % (ref 18–48)
LYMPHOCYTES NFR BLD: 55 % (ref 18–48)
MAGNESIUM SERPL-MCNC: 2.1 MG/DL (ref 1.6–2.6)
MAGNESIUM SERPL-MCNC: 2.1 MG/DL (ref 1.6–2.6)
MCH RBC QN AUTO: 28.1 PG (ref 27–31)
MCH RBC QN AUTO: 28.1 PG (ref 27–31)
MCHC RBC AUTO-ENTMCNC: 31 G/DL (ref 32–36)
MCHC RBC AUTO-ENTMCNC: 31 G/DL (ref 32–36)
MCV RBC AUTO: 91 FL (ref 82–98)
MCV RBC AUTO: 91 FL (ref 82–98)
MONOCYTES NFR BLD: 3 % (ref 4–15)
MONOCYTES NFR BLD: 3 % (ref 4–15)
NEUTROPHILS NFR BLD: 40 % (ref 38–73)
NEUTROPHILS NFR BLD: 40 % (ref 38–73)
NEUTS BAND NFR BLD MANUAL: 1 %
NEUTS BAND NFR BLD MANUAL: 1 %
NRBC BLD-RTO: 0 /100 WBC
NRBC BLD-RTO: 0 /100 WBC
PHOSPHATE SERPL-MCNC: 4 MG/DL (ref 2.7–4.5)
PLATELET # BLD AUTO: 211 K/UL (ref 150–450)
PLATELET # BLD AUTO: 211 K/UL (ref 150–450)
PMV BLD AUTO: 9.8 FL (ref 9.2–12.9)
PMV BLD AUTO: 9.8 FL (ref 9.2–12.9)
POTASSIUM SERPL-SCNC: 4.1 MMOL/L (ref 3.5–5.1)
POTASSIUM SERPL-SCNC: 4.1 MMOL/L (ref 3.5–5.1)
PROT SERPL-MCNC: 8.1 G/DL (ref 6–8.4)
PROT SERPL-MCNC: 8.1 G/DL (ref 6–8.4)
RBC # BLD AUTO: 3.77 M/UL (ref 4.6–6.2)
RBC # BLD AUTO: 3.77 M/UL (ref 4.6–6.2)
REASON FOR REFERRAL (NARRATIVE): NORMAL
SODIUM SERPL-SCNC: 140 MMOL/L (ref 136–145)
SODIUM SERPL-SCNC: 140 MMOL/L (ref 136–145)
SPECIMEN OUTDATE: NORMAL
SPECIMEN SOURCE: NORMAL
URATE SERPL-MCNC: 8.3 MG/DL (ref 3.4–7)
WBC # BLD AUTO: 1.78 K/UL (ref 3.9–12.7)
WBC # BLD AUTO: 1.78 K/UL (ref 3.9–12.7)

## 2024-08-22 PROCEDURE — 88305 TISSUE EXAM BY PATHOLOGIST: CPT | Performed by: PATHOLOGY

## 2024-08-22 PROCEDURE — 85027 COMPLETE CBC AUTOMATED: CPT | Performed by: INTERNAL MEDICINE

## 2024-08-22 PROCEDURE — 88342 IMHCHEM/IMCYTCHM 1ST ANTB: CPT | Performed by: PATHOLOGY

## 2024-08-22 PROCEDURE — 88311 DECALCIFY TISSUE: CPT | Performed by: PATHOLOGY

## 2024-08-22 PROCEDURE — 88184 FLOWCYTOMETRY/ TC 1 MARKER: CPT | Performed by: PATHOLOGY

## 2024-08-22 PROCEDURE — 84100 ASSAY OF PHOSPHORUS: CPT | Performed by: INTERNAL MEDICINE

## 2024-08-22 PROCEDURE — 88341 IMHCHEM/IMCYTCHM EA ADD ANTB: CPT | Mod: 59 | Performed by: PATHOLOGY

## 2024-08-22 PROCEDURE — 80053 COMPREHEN METABOLIC PANEL: CPT | Mod: NBTX | Performed by: INTERNAL MEDICINE

## 2024-08-22 PROCEDURE — 84550 ASSAY OF BLOOD/URIC ACID: CPT | Performed by: STUDENT IN AN ORGANIZED HEALTH CARE EDUCATION/TRAINING PROGRAM

## 2024-08-22 PROCEDURE — 86850 RBC ANTIBODY SCREEN: CPT | Mod: NBTX | Performed by: INTERNAL MEDICINE

## 2024-08-22 PROCEDURE — 83615 LACTATE (LD) (LDH) ENZYME: CPT | Mod: NBTX | Performed by: STUDENT IN AN ORGANIZED HEALTH CARE EDUCATION/TRAINING PROGRAM

## 2024-08-22 PROCEDURE — 88185 FLOWCYTOMETRY/TC ADD-ON: CPT | Mod: 59 | Performed by: PATHOLOGY

## 2024-08-22 PROCEDURE — 25000003 PHARM REV CODE 250: Performed by: STUDENT IN AN ORGANIZED HEALTH CARE EDUCATION/TRAINING PROGRAM

## 2024-08-22 PROCEDURE — 88313 SPECIAL STAINS GROUP 2: CPT | Performed by: PATHOLOGY

## 2024-08-22 PROCEDURE — 36415 COLL VENOUS BLD VENIPUNCTURE: CPT | Performed by: INTERNAL MEDICINE

## 2024-08-22 PROCEDURE — 86901 BLOOD TYPING SEROLOGIC RH(D): CPT | Performed by: INTERNAL MEDICINE

## 2024-08-22 PROCEDURE — 85007 BL SMEAR W/DIFF WBC COUNT: CPT | Performed by: INTERNAL MEDICINE

## 2024-08-22 PROCEDURE — 86900 BLOOD TYPING SEROLOGIC ABO: CPT | Performed by: INTERNAL MEDICINE

## 2024-08-22 PROCEDURE — 96360 HYDRATION IV INFUSION INIT: CPT

## 2024-08-22 PROCEDURE — 83735 ASSAY OF MAGNESIUM: CPT | Performed by: INTERNAL MEDICINE

## 2024-08-22 RX ORDER — LIDOCAINE HYDROCHLORIDE 20 MG/ML
10 INJECTION, SOLUTION EPIDURAL; INFILTRATION; INTRACAUDAL; PERINEURAL ONCE
Status: COMPLETED | OUTPATIENT
Start: 2024-08-22 | End: 2024-08-22

## 2024-08-22 RX ADMIN — SODIUM CHLORIDE 1000 ML: 9 INJECTION, SOLUTION INTRAVENOUS at 10:08

## 2024-08-22 RX ADMIN — LIDOCAINE HYDROCHLORIDE 200 MG: 20 INJECTION, SOLUTION EPIDURAL; INFILTRATION; INTRACAUDAL; PERINEURAL at 09:08

## 2024-08-22 NOTE — PROCEDURES
Bone marrow    Date/Time: 8/22/2024 11:00 AM    Performed by: Pam Lyon NP  Authorized by: Pam Lyon NP    Aspiration?: Yes   Biopsy?: Yes      PROCEDURE NOTE:  Date of Procedure: 08/22/2024   Procedure: Bone Marrow Biopsy and Aspiration  Indication: AML Restaging, Stem Cell Transplant Evaluation  Consent: Informed consent was obtained from patient.  Timeout: Done and documented.  Position: Prone  Site: Left posterior illiac crest.  Prep: Betadine.  Needle used: 11 gauge Jamshidi needle.  Anesthetic: 2% lidocaine 8 mL 1% lidocaine 2 mL.  Biopsy: The biopsy needle was introduced into the marrow cavity and 19 mL of aspirate was obtained without complications and sent for DNA hold, NGS, AML fish, flow cytometry and cytogenetics. Core biopsy obtained without difficulty and sent for routine histologic examination.  Complications: None.  Disposition: The patient was placed supine for 15 min following procedure. RN to assess bandaid for bleeding prior to discharge home.  Blood loss: Minimal.     BONI Sparks  Malignant Hematology & Bone Marrow Transplant

## 2024-08-22 NOTE — PLAN OF CARE
Patient tolerated IVFs today. NAD noted. VSS. Patient discharged home and ambulated independently off unit

## 2024-08-23 RX ORDER — ONDANSETRON HYDROCHLORIDE 8 MG/1
16 TABLET, FILM COATED ORAL
OUTPATIENT
Start: 2024-08-29

## 2024-08-23 RX ORDER — AZACITIDINE 100 MG/1
75 INJECTION, POWDER, LYOPHILIZED, FOR SOLUTION INTRAVENOUS; SUBCUTANEOUS
OUTPATIENT
Start: 2024-08-30

## 2024-08-23 RX ORDER — AZACITIDINE 100 MG/1
75 INJECTION, POWDER, LYOPHILIZED, FOR SOLUTION INTRAVENOUS; SUBCUTANEOUS
OUTPATIENT
Start: 2024-08-29

## 2024-08-23 RX ORDER — ONDANSETRON HYDROCHLORIDE 8 MG/1
16 TABLET, FILM COATED ORAL
OUTPATIENT
Start: 2024-08-28

## 2024-08-23 RX ORDER — ONDANSETRON HYDROCHLORIDE 8 MG/1
16 TABLET, FILM COATED ORAL
OUTPATIENT
Start: 2024-08-30

## 2024-08-23 RX ORDER — AZACITIDINE 100 MG/1
75 INJECTION, POWDER, LYOPHILIZED, FOR SOLUTION INTRAVENOUS; SUBCUTANEOUS
OUTPATIENT
Start: 2024-08-28

## 2024-08-23 RX ORDER — ONDANSETRON HYDROCHLORIDE 8 MG/1
16 TABLET, FILM COATED ORAL
OUTPATIENT
Start: 2024-08-26

## 2024-08-23 RX ORDER — ONDANSETRON HYDROCHLORIDE 8 MG/1
16 TABLET, FILM COATED ORAL
OUTPATIENT
Start: 2024-08-27

## 2024-08-23 RX ORDER — AZACITIDINE 100 MG/1
75 INJECTION, POWDER, LYOPHILIZED, FOR SOLUTION INTRAVENOUS; SUBCUTANEOUS
OUTPATIENT
Start: 2024-08-26

## 2024-08-23 RX ORDER — AZACITIDINE 100 MG/1
75 INJECTION, POWDER, LYOPHILIZED, FOR SOLUTION INTRAVENOUS; SUBCUTANEOUS
OUTPATIENT
Start: 2024-08-27

## 2024-08-24 LAB
BODY SITE - BONE MARROW: NORMAL
CLINICAL DIAGNOSIS - BONE MARROW: NORMAL
COMMENT: NORMAL
DNA/RNA EXTRACT AND HOLD RESULT: NORMAL
DNA/RNA EXTRACTION: NORMAL
EXHR SPECIMEN TYPE: NORMAL
FINAL PATHOLOGIC DIAGNOSIS: NORMAL
FLOW CYTOMETRY ANTIBODIES ANALYZED - BONE MARROW: NORMAL
FLOW CYTOMETRY COMMENT - BONE MARROW: NORMAL
FLOW CYTOMETRY INTERPRETATION - BONE MARROW: NORMAL
GROSS: NORMAL
Lab: NORMAL
MICROSCOPIC EXAM: NORMAL

## 2024-08-26 ENCOUNTER — INFUSION (OUTPATIENT)
Dept: INFUSION THERAPY | Facility: HOSPITAL | Age: 64
End: 2024-08-26
Payer: MEDICARE

## 2024-08-26 VITALS
TEMPERATURE: 98 F | RESPIRATION RATE: 18 BRPM | DIASTOLIC BLOOD PRESSURE: 66 MMHG | SYSTOLIC BLOOD PRESSURE: 128 MMHG | BODY MASS INDEX: 25.86 KG/M2 | HEIGHT: 73 IN | WEIGHT: 195.13 LBS | HEART RATE: 57 BPM

## 2024-08-26 DIAGNOSIS — C92.00 ACUTE MYELOID LEUKEMIA NOT HAVING ACHIEVED REMISSION: Primary | ICD-10-CM

## 2024-08-26 PROCEDURE — 96401 CHEMO ANTI-NEOPL SQ/IM: CPT

## 2024-08-26 PROCEDURE — 63600175 PHARM REV CODE 636 W HCPCS: Performed by: INTERNAL MEDICINE

## 2024-08-26 PROCEDURE — 25000003 PHARM REV CODE 250: Performed by: INTERNAL MEDICINE

## 2024-08-26 RX ORDER — ONDANSETRON 4 MG/1
16 TABLET, FILM COATED ORAL
Status: COMPLETED | OUTPATIENT
Start: 2024-08-26 | End: 2024-08-26

## 2024-08-26 RX ORDER — AZACITIDINE 100 MG/1
75 INJECTION, POWDER, LYOPHILIZED, FOR SOLUTION INTRAVENOUS; SUBCUTANEOUS
Status: COMPLETED | OUTPATIENT
Start: 2024-08-26 | End: 2024-08-26

## 2024-08-26 RX ADMIN — AZACITIDINE 165 MG: 100 INJECTION, POWDER, LYOPHILIZED, FOR SOLUTION INTRAVENOUS; SUBCUTANEOUS at 02:08

## 2024-08-26 RX ADMIN — ONDANSETRON HYDROCHLORIDE 16 MG: 4 TABLET, FILM COATED ORAL at 02:08

## 2024-08-26 NOTE — NURSING
Pt here for D1 vidaza injection.  Labs reviewed and VSS.  Vidaza administered to abdominal tissue x 3 injections.  Pt tolerated.

## 2024-08-27 ENCOUNTER — INFUSION (OUTPATIENT)
Dept: INFUSION THERAPY | Facility: HOSPITAL | Age: 64
End: 2024-08-27
Payer: MEDICARE

## 2024-08-27 VITALS
HEART RATE: 60 BPM | RESPIRATION RATE: 18 BRPM | DIASTOLIC BLOOD PRESSURE: 68 MMHG | OXYGEN SATURATION: 100 % | SYSTOLIC BLOOD PRESSURE: 126 MMHG | TEMPERATURE: 99 F

## 2024-08-27 DIAGNOSIS — C92.00 ACUTE MYELOID LEUKEMIA NOT HAVING ACHIEVED REMISSION: Primary | ICD-10-CM

## 2024-08-27 PROCEDURE — 63600175 PHARM REV CODE 636 W HCPCS: Performed by: INTERNAL MEDICINE

## 2024-08-27 PROCEDURE — 25000003 PHARM REV CODE 250: Performed by: INTERNAL MEDICINE

## 2024-08-27 PROCEDURE — 96401 CHEMO ANTI-NEOPL SQ/IM: CPT

## 2024-08-27 RX ORDER — AZACITIDINE 100 MG/1
75 INJECTION, POWDER, LYOPHILIZED, FOR SOLUTION INTRAVENOUS; SUBCUTANEOUS
Status: COMPLETED | OUTPATIENT
Start: 2024-08-27 | End: 2024-08-27

## 2024-08-27 RX ORDER — ONDANSETRON 4 MG/1
16 TABLET, FILM COATED ORAL
Status: COMPLETED | OUTPATIENT
Start: 2024-08-27 | End: 2024-08-27

## 2024-08-27 RX ADMIN — ONDANSETRON HYDROCHLORIDE 16 MG: 4 TABLET, FILM COATED ORAL at 02:08

## 2024-08-27 RX ADMIN — AZACITIDINE 165 MG: 100 INJECTION, POWDER, LYOPHILIZED, FOR SOLUTION INTRAVENOUS; SUBCUTANEOUS at 02:08

## 2024-08-28 ENCOUNTER — INFUSION (OUTPATIENT)
Dept: INFUSION THERAPY | Facility: HOSPITAL | Age: 64
End: 2024-08-28
Payer: MEDICARE

## 2024-08-28 VITALS
HEART RATE: 61 BPM | BODY MASS INDEX: 25.71 KG/M2 | WEIGHT: 194 LBS | TEMPERATURE: 98 F | DIASTOLIC BLOOD PRESSURE: 71 MMHG | RESPIRATION RATE: 17 BRPM | HEIGHT: 73 IN | SYSTOLIC BLOOD PRESSURE: 129 MMHG

## 2024-08-28 DIAGNOSIS — C92.00 ACUTE MYELOID LEUKEMIA NOT HAVING ACHIEVED REMISSION: Primary | ICD-10-CM

## 2024-08-28 PROCEDURE — 63600175 PHARM REV CODE 636 W HCPCS: Performed by: INTERNAL MEDICINE

## 2024-08-28 PROCEDURE — 25000003 PHARM REV CODE 250: Performed by: INTERNAL MEDICINE

## 2024-08-28 PROCEDURE — 96401 CHEMO ANTI-NEOPL SQ/IM: CPT

## 2024-08-28 RX ORDER — ONDANSETRON 4 MG/1
16 TABLET, FILM COATED ORAL
Status: COMPLETED | OUTPATIENT
Start: 2024-08-28 | End: 2024-08-28

## 2024-08-28 RX ORDER — AZACITIDINE 100 MG/1
75 INJECTION, POWDER, LYOPHILIZED, FOR SOLUTION INTRAVENOUS; SUBCUTANEOUS
Status: COMPLETED | OUTPATIENT
Start: 2024-08-28 | End: 2024-08-28

## 2024-08-28 RX ADMIN — AZACITIDINE 165 MG: 100 INJECTION, POWDER, LYOPHILIZED, FOR SOLUTION INTRAVENOUS; SUBCUTANEOUS at 02:08

## 2024-08-28 RX ADMIN — ONDANSETRON HYDROCHLORIDE 16 MG: 4 TABLET, FILM COATED ORAL at 02:08

## 2024-08-29 ENCOUNTER — INFUSION (OUTPATIENT)
Dept: INFUSION THERAPY | Facility: HOSPITAL | Age: 64
End: 2024-08-29
Payer: MEDICARE

## 2024-08-29 VITALS
TEMPERATURE: 98 F | SYSTOLIC BLOOD PRESSURE: 128 MMHG | RESPIRATION RATE: 16 BRPM | HEART RATE: 63 BPM | DIASTOLIC BLOOD PRESSURE: 74 MMHG

## 2024-08-29 DIAGNOSIS — C92.00 ACUTE MYELOID LEUKEMIA NOT HAVING ACHIEVED REMISSION: Primary | ICD-10-CM

## 2024-08-29 PROCEDURE — 63600175 PHARM REV CODE 636 W HCPCS: Performed by: INTERNAL MEDICINE

## 2024-08-29 PROCEDURE — 25000003 PHARM REV CODE 250: Performed by: INTERNAL MEDICINE

## 2024-08-29 PROCEDURE — 96401 CHEMO ANTI-NEOPL SQ/IM: CPT

## 2024-08-29 RX ORDER — AZACITIDINE 100 MG/1
75 INJECTION, POWDER, LYOPHILIZED, FOR SOLUTION INTRAVENOUS; SUBCUTANEOUS
Status: COMPLETED | OUTPATIENT
Start: 2024-08-29 | End: 2024-08-29

## 2024-08-29 RX ORDER — ONDANSETRON 4 MG/1
16 TABLET, FILM COATED ORAL
Status: COMPLETED | OUTPATIENT
Start: 2024-08-29 | End: 2024-08-29

## 2024-08-29 RX ADMIN — AZACITIDINE 165 MG: 100 INJECTION, POWDER, LYOPHILIZED, FOR SOLUTION INTRAVENOUS; SUBCUTANEOUS at 02:08

## 2024-08-29 RX ADMIN — ONDANSETRON HYDROCHLORIDE 16 MG: 4 TABLET, FILM COATED ORAL at 02:08

## 2024-08-30 ENCOUNTER — INFUSION (OUTPATIENT)
Dept: INFUSION THERAPY | Facility: HOSPITAL | Age: 64
End: 2024-08-30
Payer: MEDICARE

## 2024-08-30 ENCOUNTER — OFFICE VISIT (OUTPATIENT)
Dept: HEMATOLOGY/ONCOLOGY | Facility: CLINIC | Age: 64
End: 2024-08-30
Payer: MEDICARE

## 2024-08-30 VITALS
HEIGHT: 73 IN | RESPIRATION RATE: 20 BRPM | SYSTOLIC BLOOD PRESSURE: 123 MMHG | DIASTOLIC BLOOD PRESSURE: 66 MMHG | WEIGHT: 199.88 LBS | TEMPERATURE: 98 F | OXYGEN SATURATION: 100 % | BODY MASS INDEX: 26.49 KG/M2 | HEART RATE: 60 BPM

## 2024-08-30 DIAGNOSIS — C92.01 ACUTE MYELOID LEUKEMIA IN REMISSION: Primary | ICD-10-CM

## 2024-08-30 DIAGNOSIS — C92.00 ACUTE MYELOID LEUKEMIA NOT HAVING ACHIEVED REMISSION: Primary | ICD-10-CM

## 2024-08-30 DIAGNOSIS — Z76.82 STEM CELL TRANSPLANT CANDIDATE: ICD-10-CM

## 2024-08-30 DIAGNOSIS — R93.89 ABNORMAL MRI: ICD-10-CM

## 2024-08-30 DIAGNOSIS — Z86.73 HISTORY OF CVA (CEREBROVASCULAR ACCIDENT): ICD-10-CM

## 2024-08-30 PROCEDURE — 25000003 PHARM REV CODE 250: Performed by: INTERNAL MEDICINE

## 2024-08-30 PROCEDURE — 96401 CHEMO ANTI-NEOPL SQ/IM: CPT

## 2024-08-30 PROCEDURE — 99215 OFFICE O/P EST HI 40 MIN: CPT | Mod: PBBFAC,25 | Performed by: INTERNAL MEDICINE

## 2024-08-30 PROCEDURE — 99999 PR PBB SHADOW E&M-EST. PATIENT-LVL V: CPT | Mod: PBBFAC,,, | Performed by: INTERNAL MEDICINE

## 2024-08-30 PROCEDURE — 63600175 PHARM REV CODE 636 W HCPCS: Performed by: INTERNAL MEDICINE

## 2024-08-30 RX ORDER — ONDANSETRON 4 MG/1
16 TABLET, FILM COATED ORAL
Status: COMPLETED | OUTPATIENT
Start: 2024-08-30 | End: 2024-08-30

## 2024-08-30 RX ORDER — AZACITIDINE 100 MG/1
75 INJECTION, POWDER, LYOPHILIZED, FOR SOLUTION INTRAVENOUS; SUBCUTANEOUS
Status: COMPLETED | OUTPATIENT
Start: 2024-08-30 | End: 2024-08-30

## 2024-08-30 RX ADMIN — ONDANSETRON HYDROCHLORIDE 16 MG: 4 TABLET, FILM COATED ORAL at 02:08

## 2024-08-30 RX ADMIN — AZACITIDINE 165 MG: 100 INJECTION, POWDER, LYOPHILIZED, FOR SOLUTION INTRAVENOUS; SUBCUTANEOUS at 03:08

## 2024-08-30 NOTE — PROGRESS NOTES
Section of Hematology and Stem Cell Transplantation  Follow Up Visit     Date of visit: 8/30/24  Visit diagnosis: Acute myeloid leukemia in remission [C92.01]  Referred by:  FERN Oneill MD    Oncologic History:     Primary Oncologic Diagnosis: Acute myeloid leukemia, adverse risk.    6/28/23: Diagnosed with heparin-induced thrombocytopenia. HIT Ab 1.87 OD (moderate-strong). ZAHIRA not available.   10/31/23: Peripheral blood flow cytometry sent due to worsening pancytopenia (CBC: WBC 3.43, Hgb 9, Plts 120, 19% blasts) revealed increased blasts (43.1%) concerning for acute myeloid leukemia.   11/9/23: Bone marrow biopsy revealed a hypercellular marrow (80%) with increased blasts consistent with acute myeloid leukemia. Karyotype 46,XY,del(20)(q11.2q13.3)[4]/46,XY[16]. FISH with 20q12 deletion. NGS with IDH2 (40%) and SRSF2 (40%).  12/2023: He started taking venetoclax 200mg daily (did not start azacitidine due to scheduling conflicts).    1/8/24: C1D1 of azacitidine x5 days plus venetoclax 21 of 28 days.  1/31/24: Bone marrow biopsy after cycle 1 revealed persistent AML with improvement in blasts to 3-7%. Karyotyping with 7 of 8 available metaphases with complex near-tetraploid karyotype (1 normal). NGS with IDH2 (41%) and SRSF2 (40%).  4/23/24: Bone marrow biopsy with normocellular marrow with no blasts consistent with complete remission. CG/FISH normal. NGS with IDH2 (11%) and SRSF2 (10%).   8/22/24: Bone marrow biopsy hypocellular (20%) with dysmegakaryopoiesis and marked myeloid hypoplasia.  No increase in blasts.  Diploid karyotype.  NGS pending.    History of Present Ilness:   Gustavo Tracey Jr. (Gustavo) is a pleasant 64 y.o.male who presents for follow up.  He feels well at this time.  He reports his forgetfulness has improved somewhat.  MRI brain reviewed today and will refer to Neurology.  He has been tolerating treatment well without significant side effects.    PAST MEDICAL HISTORY:   Past Medical History:    Diagnosis Date    Abnormal nuclear stress test 11/26/2022    Acute myeloid leukemia     Cervical radiculopathy     to rt arm    CHF (congestive heart failure)     Chronic systolic congestive heart failure 11/28/2022    Dilated cardiomyopathy 11/26/2022    Hyperlipidemia     Hypertension     Obesity, unspecified     PAF (paroxysmal atrial fibrillation) 11/26/2022    Pulmonary HTN 11/28/2022    Stroke        PAST SURGICAL HISTORY:   Past Surgical History:   Procedure Laterality Date    BONE MARROW BIOPSY Right 1/31/2024    Procedure: Biopsy-bone marrow;  Surgeon: Rui Jacobsen MD;  Location: [facility] ENDO ([address] [place] [address]);  Service: Oncology;  Laterality: Right;  1/24-pt confirmed-MS    CLOSURE OF LEFT ATRIAL APPENDAGE USING DEVICE Left 6/22/2023    Procedure: CLOSURE, LEFT ATRIAL APPENDAGE, USING DEVICE;  Surgeon: Rustam Dave MD;  Location: [facility] OR;  Service: Cardiovascular;  Laterality: Left;  LIGATION OF LEFT ATRIAL APPENDAGE WITH TOILIA EXCLUSION SYSTEM    CORONARY ARTERY BYPASS GRAFT (CABG) N/A 6/22/2023    Procedure: CORONARY ARTERY BYPASS GRAFT (CABG);  Surgeon: Rustam Dave MD;  Location: [facility] OR;  Service: Cardiovascular;  Laterality: N/A;  3-VESSEL WITH EPI-AORTIC ULTRASOUND    PURDY MAZE PROCEDURE N/A 6/22/2023    Procedure: PURDY MAZE PROCEDURE;  Surgeon: Rustam Dave MD;  Location: [facility] OR;  Service: Cardiovascular;  Laterality: N/A;    ECHOCARDIOGRAM,TRANSESOPHAGEAL N/A 6/22/2023    Procedure: ECHOCARDIOGRAM,TRANSESOPHAGEAL;  Surgeon: Rustam Dave MD;  Location: [facility] OR;  Service: Cardiovascular;  Laterality: N/A;    ENDOSCOPIC HARVEST OF VEIN Left 6/22/2023    Procedure: SURGICAL PROCUREMENT, VEIN, ENDOSCOPIC;  Surgeon: Rustam Dave MD;  Location: [facility] OR;  Service: Cardiovascular;  Laterality: Left;    INJECTION OF ANESTHETIC AGENT AROUND MULTIPLE INTERCOSTAL NERVES N/A 6/22/2023    Procedure: BLOCK, NERVE, INTERCOSTAL, 2 OR MORE;  Surgeon: Rustam Dave MD;  Location: [facility] OR;  Service:  Cardiovascular;  Laterality: N/A;  PARASTERNAL NERVE BLOCK    INSTANTANEOUS WAVE-FREE RATIO  6/20/2023    Procedure: Instantaneous Wave-Free Ratio;  Surgeon: Zion Ortega MD;  Location: [facility] CATH LAB;  Service: Cardiology;;    IVUS, CORONARY  6/20/2023    Procedure: IVUS, Coronary;  Surgeon: Zion Ortega MD;  Location: [facility] CATH LAB;  Service: Cardiology;;    LEFT HEART CATHETERIZATION Left 11/28/2022    Procedure: CATHETERIZATION, HEART, LEFT;  Surgeon: Zion Ortega MD;  Location: [facility] CATH LAB;  Service: Cardiology;  Laterality: Left;    LEFT HEART CATHETERIZATION Left 6/20/2023    Procedure: Left heart cath;  Surgeon: Zion Ortega MD;  Location: [facility] CATH LAB;  Service: Cardiology;  Laterality: Left;    PERCUTANEOUS TRANSLUMINAL BALLOON ANGIOPLASTY OF CORONARY ARTERY  6/20/2023    Procedure: Angioplasty-coronary;  Surgeon: Zion Ortega MD;  Location: [facility] CATH LAB;  Service: Cardiology;;    RIGHT HEART CATHETERIZATION N/A 11/28/2022    Procedure: INSERTION, CATHETER, RIGHT HEART;  Surgeon: Zion Ortega MD;  Location: [facility] CATH LAB;  Service: Cardiology;  Laterality: N/A;  Congestive heart failure       PAST SOCIAL HISTORY:  Social History     Tobacco Use    Smoking status: Never    Smokeless tobacco: Never   Substance Use Topics    Alcohol use: Yes    Drug use: Never       FAMILY HISTORY:  Family History   Problem Relation Name Age of Onset    Hypertension Mother      Diabetes Father      Hyperlipidemia Father      Hypertension Father      Heart attack Father      Coronary artery disease Father         CURRENT MEDICATIONS:   Current Outpatient Medications   Medication Sig    acyclovir (ZOVIRAX) 400 MG tablet Take 1 tablet (400 mg total) by mouth 2 (two) times daily.    allopurinoL (ZYLOPRIM) 300 MG tablet Take 1 tablet (300 mg total) by mouth once daily.    amitriptyline (ELAVIL) 50 MG tablet Take 50 mg by mouth every evening.    cyclobenzaprine (FLEXERIL) 10 MG tablet Take 10 mg by mouth 2  (two) times daily as needed.    FARXIGA 10 mg tablet TAKE 1 TABLET BY MOUTH EVERY DAY    ferrous sulfate (FEOSOL) 325 mg (65 mg iron) Tab tablet Take 1 tablet by mouth once daily.    fluconazole (DIFLUCAN) 200 MG Tab Take 2 tablets (400 mg total) by mouth once daily. Stop once posaconazole is received.    furosemide (LASIX) 20 MG tablet Take 1 tablet by mouth once daily.    HYDROcodone-acetaminophen (NORCO)  mg per tablet TAKE 1 TABLET BY MOUTH 1 TO 2 TIMES A DAY AS NEEDED    latanoprost 0.005 % ophthalmic solution Place 1 drop into both eyes nightly.    metoprolol succinate (TOPROL-XL) 100 MG 24 hr tablet Take 1 tablet by mouth once daily.    potassium chloride (K-TAB) 20 mEq Take 1 tablet (20 mEq total) by mouth 2 (two) times daily.    pravastatin (PRAVACHOL) 20 MG tablet Take 20 mg by mouth once daily.    valsartan (DIOVAN) 40 MG tablet Take 1 tablet (40 mg total) by mouth once daily.    venetoclax (VENCLEXTA) 100 mg Tab Take 2 tablets (200 mg total) by mouth once daily Take 2 tablets (200mg) once daily on days 1-7 of a 28 day cycle. Always start with azacitidine (Vidaza) injections..    warfarin (COUMADIN) 5 MG tablet TAKE 1 TABLET BY MOUTH ON SUN, MON, WED, FRI, SAT,& 1.5 ON TUES & THURS    aspirin (ECOTRIN) 81 MG EC tablet Take 1 tablet (81 mg total) by mouth once daily.    folic acid (FOLVITE) 1 MG tablet Take 2 tablets (2 mg total) by mouth once daily.     Current Facility-Administered Medications   Medication    0.9%  NaCl infusion (for blood administration)    0.9%  NaCl infusion (for blood administration)    acetaminophen tablet 650 mg    diphenhydrAMINE capsule 25 mg     Facility-Administered Medications Ordered in Other Visits   Medication    sodium chloride 0.9% flush 10 mL       ALLERGIES:   Review of patient's allergies indicates:  No Known Allergies      Review of Systems:     Pertinent positives and negatives included in the HPI. Otherwise a complete review of systems is negative.    Physical  Exam:     Vitals:    08/30/24 1305   BP: 123/66   Pulse: 60   Resp: 20   Temp: 97.6 °F (36.4 °C)     Physical Exam  Constitutional:       General: He is not in acute distress.  Eyes:      General: No scleral icterus.     Extraocular Movements: Extraocular movements intact.      Conjunctiva/sclera: Conjunctivae normal.   Cardiovascular:      Rate and Rhythm: Normal rate and regular rhythm.      Pulses: Normal pulses.   Pulmonary:      Effort: Pulmonary effort is normal. No respiratory distress.      Breath sounds: Normal breath sounds. No wheezing or rhonchi.   Abdominal:      General: There is no distension.      Palpations: Abdomen is soft.   Musculoskeletal:         General: No swelling or tenderness.      Right lower leg: No edema.      Left lower leg: No edema.   Skin:     General: Skin is warm and dry.      Findings: No bruising or rash.   Neurological:      Mental Status: He is alert and oriented to person, place, and time.      Coordination: Coordination normal.      Gait: Gait normal.   Psychiatric:         Mood and Affect: Mood normal.         Behavior: Behavior normal.          ECOG Performance Status: (foot note - ECOG PS provided by [employer] Co[employer] Oncology Group) 1 - Symptomatic but completely ambulatory    Karnofsky Performance Score:  90%- Able to Carry on Normal Activity: Minor Symptoms of Disease    Labs:   Lab Results   Component Value Date    WBC 2.20 (L) 08/29/2024    WBC 2.20 (L) 08/29/2024    RBC 3.45 (L) 08/29/2024    RBC 3.45 (L) 08/29/2024    HGB 10.2 (L) 08/29/2024    HGB 10.2 (L) 08/29/2024    HCT 30.7 (L) 08/29/2024    HCT 30.7 (L) 08/29/2024    MCV 89 08/29/2024    MCV 89 08/29/2024    MCH 29.5 08/29/2024    MCH 29.5 08/29/2024    MCHC 33.2 08/29/2024    MCHC 33.2 08/29/2024    RDW 21.9 (H) 08/29/2024    RDW 21.9 (H) 08/29/2024     (L) 08/29/2024     (L) 08/29/2024    MPV 7.2 (L) 08/29/2024    MPV 7.2 (L) 08/29/2024    GRAN 0.8 (L) 08/29/2024    GRAN 37.2 (L) 08/29/2024     GRAN 0.8 (L) 08/29/2024    GRAN 37.2 (L) 08/29/2024    LYMPH 1.0 08/29/2024    LYMPH 43.2 08/29/2024    LYMPH 1.0 08/29/2024    LYMPH 43.2 08/29/2024    MONO 0.4 08/29/2024    MONO 18.2 (H) 08/29/2024    MONO 0.4 08/29/2024    MONO 18.2 (H) 08/29/2024    EOS 0.0 08/29/2024    EOS 0.0 08/29/2024    BASO 0.00 08/29/2024    BASO 0.00 08/29/2024    EOSINOPHIL 1.0 08/29/2024    EOSINOPHIL 1.0 08/29/2024    BASOPHIL 0.4 08/29/2024    BASOPHIL 0.4 08/29/2024       CMP  Sodium   Date Value Ref Range Status   08/29/2024 136 136 - 145 mmol/L Final     Potassium   Date Value Ref Range Status   08/29/2024 3.8 3.5 - 5.1 mmol/L Final     Chloride   Date Value Ref Range Status   08/29/2024 103 95 - 110 mmol/L Final     CO2   Date Value Ref Range Status   08/29/2024 25 23 - 29 mmol/L Final     Glucose   Date Value Ref Range Status   08/29/2024 101 74 - 106 mg/dL Final     BUN   Date Value Ref Range Status   08/29/2024 32 (H) 9 - 20 mg/dL Final     Creatinine   Date Value Ref Range Status   08/29/2024 1.73 (H) 0.80 - 1.50 mg/dL Final     Calcium   Date Value Ref Range Status   08/29/2024 9.4 8.4 - 10.2 mg/dL Final     Total Protein   Date Value Ref Range Status   08/29/2024 7.6 6.3 - 8.2 g/dL Final     Albumin   Date Value Ref Range Status   08/29/2024 4.3 3.5 - 5.2 g/dL Final     Total Bilirubin   Date Value Ref Range Status   08/29/2024 0.9 0.2 - 1.3 mg/dL Final     Alkaline Phosphatase   Date Value Ref Range Status   08/29/2024 51 38 - 145 U/L Final     AST   Date Value Ref Range Status   08/29/2024 28 17 - 59 U/L Final     ALT   Date Value Ref Range Status   08/29/2024 22 10 - 44 U/L Final     Anion Gap   Date Value Ref Range Status   08/29/2024 8 8 - 16 mmol/L Final       Imaging:   Reviewed     Pathology:  Reviewed     Assessment and Plan:   Gustavo Tracey Jr. (Gustavo) is a pleasant 64 y.o.male who presents for follow up.    Acute myeloid leukemia, adverse risk:  Peripheral blood flow cytometry obtain 10/31/2023  concerning for acute myeloid leukemia.  Bone marrow biopsy obtained on 11/09/2023 revealed acute myeloid leukemia with 20q deletion on FISH and NGS with IDH2 (40%) and SRSF2 (40%). He started aza x7 plus alonzo x21 of 28 days in 1/2024. Bone marrow biopsy at the end of cycle 1 revealed persistent disease but improvement in blasts. CG now showed near-tetraploid complex karyotype in 7 of 8 metaphases. NGS with persistent IDH2 (41%) and SRSF2 (40%). Bone marrow biopsy in 4/2024 showed complete remission with persistent molecular disease - NGS with IDH2 (11%) and SRSF2 (10%).  Bone marrow biopsy on 08/22/2024 showed continued remission with persistent dysplastic changes.  NGS pending at this time.  - Continue azacitidine 75mg/m2 x5 days plus venetoclax 200mg daily x7 of 28 days.  - ANC >500 and Plts >50 to start each cycle.     Stem cell transplant candidate:  We discussed the role for allogeneic stem cell transplantation in this disease process as a potentially curative option. We had an extensive discussion about the rationale, logistics, risks, and benefits. We reviewed the requirement to stay in the [place] area for 100 days with a caregiver at all times. We discussed the risks, including infection, graft failure, organ toxicity, graft versus host disease, relapse of disease, and secondary cancers. We reviewed the need for long-term immunosuppression and need for close monitoring.  His HCT-CI score is 3 (high risk), although he is very fit and active without limitations.  We reviewed this today again, and we discussed that transplant would carry significant risk for both morbidity and mortality (40% 2 year NRM).  He remains undecided on whether or not to proceed with transplant. He will need to complete pre-transplant screening.   - We discussed pros/cons of transplant again today.   - Discussed dental clearance and need for colon cancer screening.     History of CVA:  Repeat MRI brain on 08/05/2024 revealed patchy  gliotic white matter signal change in the subcortical and periventricular regions of bilateral cerebral hemispheres.  This is reportedly change compared to his prior MRI brain in 2012.  Given our potential for proceeding with stem cell transplant, I will refer him to Neurology for further evaluation.    Pancytopenia:  Monitor CBC twice weekly with Dr. Oneill. Transfuse 1 unit of PRBC for hgb < 7 or clinically appropriate. Transfuse 1 unit of plts if platelets < 10 or clinically appropriate.      Immunodeficiency secondary to neoplasm:  Continue antimicrobial prophylaxis with levofloxacin, acyclovir, and fluconazole.      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  Heparin antibody 1.87 OD (moderate-strong). ZAHIRA negative on 12/13/23 which rules out HIT. Heparin allergy removed. Ok to stop Warfarin.   - He reports he is still taking Warfarin. Ok to stop from our standpoint. He would like to ensure no other indications for Warfarin use with his cardiologist, who he will see this month.    Stem cell donors:  He has one full sister and 2 children as potential donor options . He has one potential MUD.    HFrEF:  Cardiac function now back to baseline. Continue follow up with local cardiologist.     Orders/Follow Up:      Orders Placed This Encounter    Ambulatory referral/consult to Neurology         Route Chart for Scheduling    BMT Chart Routing      Follow up with physician . RTC week of 10/14   Follow up with [facility]    Provider visit type    Infusion scheduling note    Injection scheduling note aza x5 days starting 9/23 and 10/21   Labs CBC, CMP, phosphorus, magnesium and type and screen   Scheduling:  Preferred lab:  Lab interval: once a week     Imaging    Pharmacy appointment    Other referrals       Additional referrals needed  neurology           Treatment Plan Information   OP AZACITIDINE 5-DAY (SUB-Q) + VENETOCLAX Rui Jacobsen MD   Associated diagnosis: Acute myeloid leukemia not having  achieved remission   noted on 11/6/2023   Line of treatment: First Line  Treatment Goal: Control     Upcoming Treatment Dates - OP AZACITIDINE 5-DAY (SUB-Q) + VENETOCLAX    9/23/2024       Pre-Medications       ondansetron tablet 16 mg       Chemotherapy       azaCITIDine (VIDAZA) chemo injection 165 mg  9/24/2024       Pre-Medications       ondansetron tablet 16 mg       Chemotherapy       azaCITIDine (VIDAZA) chemo injection 165 mg  9/25/2024       Pre-Medications       ondansetron tablet 16 mg       Chemotherapy       azaCITIDine (VIDAZA) chemo injection 165 mg  9/26/2024       Pre-Medications       ondansetron tablet 16 mg       Chemotherapy       azaCITIDine (VIDAZA) chemo injection 165 mg    Therapy Plan Information  INF FLUIDS for Acute myeloid leukemia not having achieved remission, noted on 11/6/2023  sodium chloride 0.9% bolus 1,000 mL 1,000 mL  1,000 mL, Intravenous, PRN    INF FLUIDS for Acute myeloid leukemia not having achieved remission, noted on 11/6/2023  sodium chloride 0.9% bolus 1,000 mL 1,000 mL  1,000 mL, Intravenous, Every visit  sodium chloride 0.9% bolus 500 mL 500 mL  500 mL, Intravenous, PRN      No therapy plan of the specified type found.    Total time of this visit was 40 minutes, including time spent face to face with patient and/or via video/audio, and also in preparing for today's visit for MDM and documentation. (Medical Decision Making, including consideration of possible diagnoses, management options, complex medical record review, review of diagnostic tests and information, consideration and discussion of significant complications based on comorbidities, and discussion with providers involved with the care of the patient). Greater than 50% was spent face to face with the patient counseling and coordinating care.  Visit today included increased complexity associated with the care of the episodic problem history of CVA addressed and managing the longitudinal care of the patient due  to the serious and/or complex managed problem(s) acute myeloid leukemia, pancytopenia, immunodeficiency.    Jarrell Jacobsen MD  Malignant Hematology, Stem Cell Transplant, and Cellular Therapy  [facility]  [facility] [place]

## 2024-08-30 NOTE — NURSING
1505 pt here for vidaza injections x3, tolerated well to right abdomen area, no distress noted upon d/c to home

## 2024-09-30 ENCOUNTER — INFUSION (OUTPATIENT)
Dept: INFUSION THERAPY | Facility: HOSPITAL | Age: 64
End: 2024-09-30
Payer: MEDICARE

## 2024-09-30 VITALS
TEMPERATURE: 99 F | WEIGHT: 207 LBS | SYSTOLIC BLOOD PRESSURE: 121 MMHG | HEIGHT: 73 IN | BODY MASS INDEX: 27.43 KG/M2 | RESPIRATION RATE: 18 BRPM | HEART RATE: 77 BPM | DIASTOLIC BLOOD PRESSURE: 63 MMHG

## 2024-09-30 DIAGNOSIS — E86.0 DEHYDRATION: Primary | ICD-10-CM

## 2024-09-30 DIAGNOSIS — E86.0 DEHYDRATION: ICD-10-CM

## 2024-09-30 DIAGNOSIS — C92.00 ACUTE MYELOID LEUKEMIA NOT HAVING ACHIEVED REMISSION: Primary | ICD-10-CM

## 2024-09-30 PROCEDURE — 25000003 PHARM REV CODE 250: Performed by: INTERNAL MEDICINE

## 2024-09-30 PROCEDURE — 63600175 PHARM REV CODE 636 W HCPCS: Performed by: INTERNAL MEDICINE

## 2024-09-30 PROCEDURE — 96361 HYDRATE IV INFUSION ADD-ON: CPT

## 2024-09-30 PROCEDURE — 96401 CHEMO ANTI-NEOPL SQ/IM: CPT

## 2024-09-30 RX ORDER — AZACITIDINE 100 MG/1
75 INJECTION, POWDER, LYOPHILIZED, FOR SOLUTION INTRAVENOUS; SUBCUTANEOUS
Status: CANCELLED | OUTPATIENT
Start: 2024-10-04

## 2024-09-30 RX ORDER — ONDANSETRON HYDROCHLORIDE 8 MG/1
16 TABLET, FILM COATED ORAL
Status: CANCELLED | OUTPATIENT
Start: 2024-10-02

## 2024-09-30 RX ORDER — ONDANSETRON HYDROCHLORIDE 8 MG/1
16 TABLET, FILM COATED ORAL
Status: CANCELLED | OUTPATIENT
Start: 2024-10-01

## 2024-09-30 RX ORDER — AZACITIDINE 100 MG/1
75 INJECTION, POWDER, LYOPHILIZED, FOR SOLUTION INTRAVENOUS; SUBCUTANEOUS
Status: CANCELLED | OUTPATIENT
Start: 2024-10-02

## 2024-09-30 RX ORDER — AZACITIDINE 100 MG/1
75 INJECTION, POWDER, LYOPHILIZED, FOR SOLUTION INTRAVENOUS; SUBCUTANEOUS
Status: CANCELLED | OUTPATIENT
Start: 2024-10-03

## 2024-09-30 RX ORDER — ONDANSETRON HYDROCHLORIDE 8 MG/1
16 TABLET, FILM COATED ORAL
Status: CANCELLED | OUTPATIENT
Start: 2024-10-04

## 2024-09-30 RX ORDER — AZACITIDINE 100 MG/1
75 INJECTION, POWDER, LYOPHILIZED, FOR SOLUTION INTRAVENOUS; SUBCUTANEOUS
Status: CANCELLED | OUTPATIENT
Start: 2024-09-30

## 2024-09-30 RX ORDER — ONDANSETRON HYDROCHLORIDE 8 MG/1
16 TABLET, FILM COATED ORAL
Status: CANCELLED | OUTPATIENT
Start: 2024-09-30

## 2024-09-30 RX ORDER — AZACITIDINE 100 MG/1
75 INJECTION, POWDER, LYOPHILIZED, FOR SOLUTION INTRAVENOUS; SUBCUTANEOUS
Status: COMPLETED | OUTPATIENT
Start: 2024-09-30 | End: 2024-09-30

## 2024-09-30 RX ORDER — ONDANSETRON HYDROCHLORIDE 8 MG/1
16 TABLET, FILM COATED ORAL
Status: CANCELLED | OUTPATIENT
Start: 2024-10-03

## 2024-09-30 RX ORDER — SODIUM CHLORIDE 0.9 % (FLUSH) 0.9 %
10 SYRINGE (ML) INJECTION
Status: DISCONTINUED | OUTPATIENT
Start: 2024-09-30 | End: 2024-09-30 | Stop reason: HOSPADM

## 2024-09-30 RX ORDER — SODIUM CHLORIDE 0.9 % (FLUSH) 0.9 %
10 SYRINGE (ML) INJECTION
Status: CANCELLED | OUTPATIENT
Start: 2024-09-30

## 2024-09-30 RX ORDER — ONDANSETRON 4 MG/1
16 TABLET, FILM COATED ORAL
Status: COMPLETED | OUTPATIENT
Start: 2024-09-30 | End: 2024-09-30

## 2024-09-30 RX ORDER — AZACITIDINE 100 MG/1
75 INJECTION, POWDER, LYOPHILIZED, FOR SOLUTION INTRAVENOUS; SUBCUTANEOUS
Status: CANCELLED | OUTPATIENT
Start: 2024-10-01

## 2024-09-30 RX ORDER — HEPARIN 100 UNIT/ML
500 SYRINGE INTRAVENOUS
Status: CANCELLED | OUTPATIENT
Start: 2024-09-30

## 2024-09-30 RX ORDER — HEPARIN 100 UNIT/ML
500 SYRINGE INTRAVENOUS
Status: DISCONTINUED | OUTPATIENT
Start: 2024-09-30 | End: 2024-09-30 | Stop reason: HOSPADM

## 2024-09-30 RX ADMIN — AZACITIDINE 165 MG: 100 INJECTION, POWDER, LYOPHILIZED, FOR SOLUTION INTRAVENOUS; SUBCUTANEOUS at 04:09

## 2024-09-30 RX ADMIN — SODIUM CHLORIDE 500 ML: 9 INJECTION, SOLUTION INTRAVENOUS at 02:09

## 2024-09-30 RX ADMIN — HEPARIN 500 UNITS: 100 SYRINGE at 03:09

## 2024-09-30 RX ADMIN — ONDANSETRON HYDROCHLORIDE 16 MG: 4 TABLET, FILM COATED ORAL at 03:09

## 2024-09-30 NOTE — PLAN OF CARE
1610-Pt tolerated Vidaza and IVFs well today, no complaints or complications. VSS. Pt aware to call provider with any questions or concerns and is aware of upcoming appts. Pt ambulatory from clinic with steady gait, no distress noted.

## 2024-10-01 ENCOUNTER — INFUSION (OUTPATIENT)
Dept: INFUSION THERAPY | Facility: HOSPITAL | Age: 64
End: 2024-10-01
Payer: MEDICARE

## 2024-10-01 VITALS
SYSTOLIC BLOOD PRESSURE: 122 MMHG | RESPIRATION RATE: 20 BRPM | HEART RATE: 66 BPM | OXYGEN SATURATION: 100 % | DIASTOLIC BLOOD PRESSURE: 73 MMHG | TEMPERATURE: 99 F

## 2024-10-01 DIAGNOSIS — C92.00 ACUTE MYELOID LEUKEMIA NOT HAVING ACHIEVED REMISSION: Primary | ICD-10-CM

## 2024-10-01 PROCEDURE — 25000003 PHARM REV CODE 250: Performed by: INTERNAL MEDICINE

## 2024-10-01 PROCEDURE — 63600175 PHARM REV CODE 636 W HCPCS: Performed by: INTERNAL MEDICINE

## 2024-10-01 PROCEDURE — 96401 CHEMO ANTI-NEOPL SQ/IM: CPT

## 2024-10-01 RX ORDER — ONDANSETRON 4 MG/1
16 TABLET, FILM COATED ORAL
Status: COMPLETED | OUTPATIENT
Start: 2024-10-01 | End: 2024-10-01

## 2024-10-01 RX ORDER — AZACITIDINE 100 MG/1
75 INJECTION, POWDER, LYOPHILIZED, FOR SOLUTION INTRAVENOUS; SUBCUTANEOUS
Status: COMPLETED | OUTPATIENT
Start: 2024-10-01 | End: 2024-10-01

## 2024-10-01 RX ADMIN — AZACITIDINE 165 MG: 100 INJECTION, POWDER, LYOPHILIZED, FOR SOLUTION INTRAVENOUS; SUBCUTANEOUS at 03:10

## 2024-10-01 RX ADMIN — ONDANSETRON HYDROCHLORIDE 16 MG: 4 TABLET, FILM COATED ORAL at 03:10

## 2024-10-02 ENCOUNTER — INFUSION (OUTPATIENT)
Dept: INFUSION THERAPY | Facility: HOSPITAL | Age: 64
End: 2024-10-02
Payer: MEDICARE

## 2024-10-02 VITALS
HEART RATE: 70 BPM | SYSTOLIC BLOOD PRESSURE: 110 MMHG | TEMPERATURE: 99 F | RESPIRATION RATE: 18 BRPM | OXYGEN SATURATION: 100 % | DIASTOLIC BLOOD PRESSURE: 61 MMHG

## 2024-10-02 DIAGNOSIS — C92.00 ACUTE MYELOID LEUKEMIA NOT HAVING ACHIEVED REMISSION: Primary | ICD-10-CM

## 2024-10-02 PROCEDURE — 96401 CHEMO ANTI-NEOPL SQ/IM: CPT

## 2024-10-02 PROCEDURE — 25000003 PHARM REV CODE 250: Performed by: INTERNAL MEDICINE

## 2024-10-02 PROCEDURE — 63600175 PHARM REV CODE 636 W HCPCS: Performed by: INTERNAL MEDICINE

## 2024-10-02 RX ORDER — ONDANSETRON 4 MG/1
16 TABLET, FILM COATED ORAL
Status: COMPLETED | OUTPATIENT
Start: 2024-10-02 | End: 2024-10-02

## 2024-10-02 RX ORDER — AZACITIDINE 100 MG/1
75 INJECTION, POWDER, LYOPHILIZED, FOR SOLUTION INTRAVENOUS; SUBCUTANEOUS
Status: COMPLETED | OUTPATIENT
Start: 2024-10-02 | End: 2024-10-02

## 2024-10-02 RX ADMIN — AZACITIDINE 165 MG: 100 INJECTION, POWDER, LYOPHILIZED, FOR SOLUTION INTRAVENOUS; SUBCUTANEOUS at 03:10

## 2024-10-02 RX ADMIN — ONDANSETRON HYDROCHLORIDE 16 MG: 4 TABLET, FILM COATED ORAL at 03:10

## 2024-10-03 ENCOUNTER — INFUSION (OUTPATIENT)
Dept: INFUSION THERAPY | Facility: HOSPITAL | Age: 64
End: 2024-10-03
Payer: MEDICARE

## 2024-10-03 VITALS
DIASTOLIC BLOOD PRESSURE: 64 MMHG | SYSTOLIC BLOOD PRESSURE: 123 MMHG | TEMPERATURE: 98 F | RESPIRATION RATE: 18 BRPM | HEART RATE: 67 BPM

## 2024-10-03 DIAGNOSIS — C92.00 ACUTE MYELOID LEUKEMIA NOT HAVING ACHIEVED REMISSION: Primary | ICD-10-CM

## 2024-10-03 PROCEDURE — 96401 CHEMO ANTI-NEOPL SQ/IM: CPT

## 2024-10-03 PROCEDURE — 25000003 PHARM REV CODE 250: Performed by: INTERNAL MEDICINE

## 2024-10-03 PROCEDURE — 63600175 PHARM REV CODE 636 W HCPCS: Performed by: INTERNAL MEDICINE

## 2024-10-03 RX ORDER — ONDANSETRON 4 MG/1
16 TABLET, FILM COATED ORAL
Status: COMPLETED | OUTPATIENT
Start: 2024-10-03 | End: 2024-10-03

## 2024-10-03 RX ORDER — AZACITIDINE 100 MG/1
75 INJECTION, POWDER, LYOPHILIZED, FOR SOLUTION INTRAVENOUS; SUBCUTANEOUS
Status: COMPLETED | OUTPATIENT
Start: 2024-10-03 | End: 2024-10-03

## 2024-10-03 RX ADMIN — ONDANSETRON HYDROCHLORIDE 16 MG: 4 TABLET, FILM COATED ORAL at 03:10

## 2024-10-03 RX ADMIN — AZACITIDINE 165 MG: 100 INJECTION, POWDER, LYOPHILIZED, FOR SOLUTION INTRAVENOUS; SUBCUTANEOUS at 03:10

## 2024-10-03 NOTE — NURSING
Pt here for D4 vidaza injection.  Labs reviewed and VSS.  Vidaza administered to abdominal tissue x 3 injections.  Pt tolerated.

## 2024-10-04 ENCOUNTER — INFUSION (OUTPATIENT)
Dept: INFUSION THERAPY | Facility: HOSPITAL | Age: 64
End: 2024-10-04
Attending: STUDENT IN AN ORGANIZED HEALTH CARE EDUCATION/TRAINING PROGRAM
Payer: MEDICARE

## 2024-10-04 VITALS
RESPIRATION RATE: 20 BRPM | DIASTOLIC BLOOD PRESSURE: 73 MMHG | OXYGEN SATURATION: 100 % | HEART RATE: 67 BPM | TEMPERATURE: 98 F | SYSTOLIC BLOOD PRESSURE: 135 MMHG

## 2024-10-04 DIAGNOSIS — C92.00 ACUTE MYELOID LEUKEMIA NOT HAVING ACHIEVED REMISSION: Primary | ICD-10-CM

## 2024-10-04 PROCEDURE — 96401 CHEMO ANTI-NEOPL SQ/IM: CPT

## 2024-10-04 PROCEDURE — 25000003 PHARM REV CODE 250: Performed by: INTERNAL MEDICINE

## 2024-10-04 PROCEDURE — 63600175 PHARM REV CODE 636 W HCPCS: Performed by: INTERNAL MEDICINE

## 2024-10-04 RX ORDER — AZACITIDINE 100 MG/1
75 INJECTION, POWDER, LYOPHILIZED, FOR SOLUTION INTRAVENOUS; SUBCUTANEOUS
Status: COMPLETED | OUTPATIENT
Start: 2024-10-04 | End: 2024-10-04

## 2024-10-04 RX ORDER — ONDANSETRON 4 MG/1
16 TABLET, FILM COATED ORAL
Status: COMPLETED | OUTPATIENT
Start: 2024-10-04 | End: 2024-10-04

## 2024-10-04 RX ADMIN — ONDANSETRON HYDROCHLORIDE 16 MG: 4 TABLET, FILM COATED ORAL at 03:10

## 2024-10-04 RX ADMIN — AZACITIDINE 165 MG: 100 INJECTION, POWDER, LYOPHILIZED, FOR SOLUTION INTRAVENOUS; SUBCUTANEOUS at 03:10

## 2024-10-09 ENCOUNTER — TELEPHONE (OUTPATIENT)
Dept: HEMATOLOGY/ONCOLOGY | Facility: CLINIC | Age: 64
End: 2024-10-09
Payer: MEDICARE

## 2024-10-09 NOTE — TELEPHONE ENCOUNTER
Called pt per supervisor to see if he can reschedule his follow up appointment with Dr. Jacobsen on 10/16/24 and see Sherlyn malone prior to his treatment on 10/21/24 at 1:20 pm. Pt stated that he is fine with that date/time, appointment has been rescheduled.

## 2024-10-28 ENCOUNTER — INFUSION (OUTPATIENT)
Dept: INFUSION THERAPY | Facility: HOSPITAL | Age: 64
End: 2024-10-28
Attending: STUDENT IN AN ORGANIZED HEALTH CARE EDUCATION/TRAINING PROGRAM
Payer: MEDICARE

## 2024-10-28 VITALS
HEART RATE: 68 BPM | TEMPERATURE: 98 F | OXYGEN SATURATION: 100 % | SYSTOLIC BLOOD PRESSURE: 145 MMHG | RESPIRATION RATE: 18 BRPM | WEIGHT: 207.88 LBS | DIASTOLIC BLOOD PRESSURE: 74 MMHG | HEIGHT: 73 IN | BODY MASS INDEX: 27.55 KG/M2

## 2024-10-28 DIAGNOSIS — C92.00 ACUTE MYELOID LEUKEMIA NOT HAVING ACHIEVED REMISSION: Primary | ICD-10-CM

## 2024-10-28 PROCEDURE — 63600175 PHARM REV CODE 636 W HCPCS: Performed by: INTERNAL MEDICINE

## 2024-10-28 PROCEDURE — 25000003 PHARM REV CODE 250: Performed by: INTERNAL MEDICINE

## 2024-10-28 RX ORDER — ONDANSETRON 4 MG/1
16 TABLET, FILM COATED ORAL
Status: COMPLETED | OUTPATIENT
Start: 2024-10-28 | End: 2024-10-28

## 2024-10-28 RX ORDER — AZACITIDINE 100 MG/1
75 INJECTION, POWDER, LYOPHILIZED, FOR SOLUTION INTRAVENOUS; SUBCUTANEOUS
Status: COMPLETED | OUTPATIENT
Start: 2024-10-28 | End: 2024-10-28

## 2024-10-28 RX ORDER — AZACITIDINE 100 MG/1
75 INJECTION, POWDER, LYOPHILIZED, FOR SOLUTION INTRAVENOUS; SUBCUTANEOUS
Status: CANCELLED | OUTPATIENT
Start: 2024-10-29

## 2024-10-28 RX ORDER — AZACITIDINE 100 MG/1
75 INJECTION, POWDER, LYOPHILIZED, FOR SOLUTION INTRAVENOUS; SUBCUTANEOUS
Status: CANCELLED | OUTPATIENT
Start: 2024-10-31

## 2024-10-28 RX ORDER — AZACITIDINE 100 MG/1
75 INJECTION, POWDER, LYOPHILIZED, FOR SOLUTION INTRAVENOUS; SUBCUTANEOUS
Status: CANCELLED | OUTPATIENT
Start: 2024-10-28

## 2024-10-28 RX ORDER — ONDANSETRON HYDROCHLORIDE 8 MG/1
16 TABLET, FILM COATED ORAL
Status: CANCELLED | OUTPATIENT
Start: 2024-10-28

## 2024-10-28 RX ORDER — ONDANSETRON HYDROCHLORIDE 8 MG/1
16 TABLET, FILM COATED ORAL
Status: CANCELLED | OUTPATIENT
Start: 2024-11-01

## 2024-10-28 RX ORDER — ONDANSETRON HYDROCHLORIDE 8 MG/1
16 TABLET, FILM COATED ORAL
Status: CANCELLED | OUTPATIENT
Start: 2024-10-31

## 2024-10-28 RX ORDER — AZACITIDINE 100 MG/1
75 INJECTION, POWDER, LYOPHILIZED, FOR SOLUTION INTRAVENOUS; SUBCUTANEOUS
Status: CANCELLED | OUTPATIENT
Start: 2024-10-30

## 2024-10-28 RX ORDER — ONDANSETRON HYDROCHLORIDE 8 MG/1
16 TABLET, FILM COATED ORAL
Status: CANCELLED | OUTPATIENT
Start: 2024-10-29

## 2024-10-28 RX ORDER — AZACITIDINE 100 MG/1
75 INJECTION, POWDER, LYOPHILIZED, FOR SOLUTION INTRAVENOUS; SUBCUTANEOUS
Status: CANCELLED | OUTPATIENT
Start: 2024-11-01

## 2024-10-28 RX ORDER — ONDANSETRON HYDROCHLORIDE 8 MG/1
16 TABLET, FILM COATED ORAL
Status: CANCELLED | OUTPATIENT
Start: 2024-10-30

## 2024-10-28 RX ADMIN — AZACITIDINE 165 MG: 100 INJECTION, POWDER, LYOPHILIZED, FOR SOLUTION INTRAVENOUS; SUBCUTANEOUS at 02:10

## 2024-10-28 RX ADMIN — ONDANSETRON HYDROCHLORIDE 16 MG: 4 TABLET, FILM COATED ORAL at 02:10

## 2024-10-29 ENCOUNTER — INFUSION (OUTPATIENT)
Dept: INFUSION THERAPY | Facility: HOSPITAL | Age: 64
End: 2024-10-29
Attending: STUDENT IN AN ORGANIZED HEALTH CARE EDUCATION/TRAINING PROGRAM
Payer: MEDICARE

## 2024-10-29 VITALS
SYSTOLIC BLOOD PRESSURE: 128 MMHG | HEART RATE: 70 BPM | BODY MASS INDEX: 27.55 KG/M2 | DIASTOLIC BLOOD PRESSURE: 76 MMHG | RESPIRATION RATE: 18 BRPM | OXYGEN SATURATION: 100 % | TEMPERATURE: 98 F | WEIGHT: 207.88 LBS | HEIGHT: 73 IN

## 2024-10-29 DIAGNOSIS — C92.00 ACUTE MYELOID LEUKEMIA NOT HAVING ACHIEVED REMISSION: Primary | ICD-10-CM

## 2024-10-29 PROCEDURE — 63600175 PHARM REV CODE 636 W HCPCS: Performed by: INTERNAL MEDICINE

## 2024-10-29 PROCEDURE — 96401 CHEMO ANTI-NEOPL SQ/IM: CPT

## 2024-10-29 PROCEDURE — 25000003 PHARM REV CODE 250: Performed by: INTERNAL MEDICINE

## 2024-10-29 RX ORDER — ONDANSETRON 4 MG/1
16 TABLET, FILM COATED ORAL
Status: COMPLETED | OUTPATIENT
Start: 2024-10-29 | End: 2024-10-29

## 2024-10-29 RX ORDER — AZACITIDINE 100 MG/1
75 INJECTION, POWDER, LYOPHILIZED, FOR SOLUTION INTRAVENOUS; SUBCUTANEOUS
Status: COMPLETED | OUTPATIENT
Start: 2024-10-29 | End: 2024-10-29

## 2024-10-29 RX ADMIN — AZACITIDINE 165 MG: 100 INJECTION, POWDER, LYOPHILIZED, FOR SOLUTION INTRAVENOUS; SUBCUTANEOUS at 02:10

## 2024-10-29 RX ADMIN — ONDANSETRON HYDROCHLORIDE 16 MG: 4 TABLET, FILM COATED ORAL at 02:10

## 2024-10-30 ENCOUNTER — INFUSION (OUTPATIENT)
Dept: INFUSION THERAPY | Facility: HOSPITAL | Age: 64
End: 2024-10-30
Attending: STUDENT IN AN ORGANIZED HEALTH CARE EDUCATION/TRAINING PROGRAM
Payer: MEDICARE

## 2024-10-30 VITALS
SYSTOLIC BLOOD PRESSURE: 129 MMHG | HEART RATE: 55 BPM | RESPIRATION RATE: 20 BRPM | TEMPERATURE: 98 F | DIASTOLIC BLOOD PRESSURE: 70 MMHG | OXYGEN SATURATION: 100 %

## 2024-10-30 DIAGNOSIS — C92.00 ACUTE MYELOID LEUKEMIA NOT HAVING ACHIEVED REMISSION: Primary | ICD-10-CM

## 2024-10-30 PROCEDURE — 63600175 PHARM REV CODE 636 W HCPCS: Performed by: INTERNAL MEDICINE

## 2024-10-30 PROCEDURE — 25000003 PHARM REV CODE 250: Performed by: INTERNAL MEDICINE

## 2024-10-30 PROCEDURE — 96401 CHEMO ANTI-NEOPL SQ/IM: CPT

## 2024-10-30 RX ORDER — ONDANSETRON 4 MG/1
16 TABLET, FILM COATED ORAL
Status: COMPLETED | OUTPATIENT
Start: 2024-10-30 | End: 2024-10-30

## 2024-10-30 RX ORDER — AZACITIDINE 100 MG/1
75 INJECTION, POWDER, LYOPHILIZED, FOR SOLUTION INTRAVENOUS; SUBCUTANEOUS
Status: COMPLETED | OUTPATIENT
Start: 2024-10-30 | End: 2024-10-30

## 2024-10-30 RX ADMIN — AZACITIDINE 165 MG: 100 INJECTION, POWDER, LYOPHILIZED, FOR SOLUTION INTRAVENOUS; SUBCUTANEOUS at 01:10

## 2024-10-30 RX ADMIN — ONDANSETRON HYDROCHLORIDE 16 MG: 4 TABLET, FILM COATED ORAL at 01:10

## 2024-10-31 ENCOUNTER — INFUSION (OUTPATIENT)
Dept: INFUSION THERAPY | Facility: HOSPITAL | Age: 64
End: 2024-10-31
Attending: STUDENT IN AN ORGANIZED HEALTH CARE EDUCATION/TRAINING PROGRAM
Payer: MEDICARE

## 2024-10-31 DIAGNOSIS — C92.00 ACUTE MYELOID LEUKEMIA NOT HAVING ACHIEVED REMISSION: Primary | ICD-10-CM

## 2024-10-31 PROCEDURE — 25000003 PHARM REV CODE 250: Performed by: INTERNAL MEDICINE

## 2024-10-31 PROCEDURE — 63600175 PHARM REV CODE 636 W HCPCS: Performed by: INTERNAL MEDICINE

## 2024-10-31 PROCEDURE — 96401 CHEMO ANTI-NEOPL SQ/IM: CPT

## 2024-10-31 RX ORDER — ONDANSETRON 4 MG/1
16 TABLET, FILM COATED ORAL
Status: COMPLETED | OUTPATIENT
Start: 2024-10-31 | End: 2024-10-31

## 2024-10-31 RX ORDER — AZACITIDINE 100 MG/1
75 INJECTION, POWDER, LYOPHILIZED, FOR SOLUTION INTRAVENOUS; SUBCUTANEOUS
Status: COMPLETED | OUTPATIENT
Start: 2024-10-31 | End: 2024-10-31

## 2024-10-31 RX ADMIN — AZACITIDINE 165 MG: 100 INJECTION, POWDER, LYOPHILIZED, FOR SOLUTION INTRAVENOUS; SUBCUTANEOUS at 01:10

## 2024-10-31 RX ADMIN — ONDANSETRON HYDROCHLORIDE 16 MG: 4 TABLET, FILM COATED ORAL at 01:10

## 2024-11-01 ENCOUNTER — TELEPHONE (OUTPATIENT)
Dept: HEMATOLOGY/ONCOLOGY | Facility: CLINIC | Age: 64
End: 2024-11-01
Payer: MEDICARE

## 2024-11-02 ENCOUNTER — INFUSION (OUTPATIENT)
Dept: INFUSION THERAPY | Facility: HOSPITAL | Age: 64
End: 2024-11-02
Attending: STUDENT IN AN ORGANIZED HEALTH CARE EDUCATION/TRAINING PROGRAM
Payer: MEDICARE

## 2024-11-02 VITALS
HEIGHT: 73 IN | RESPIRATION RATE: 20 BRPM | TEMPERATURE: 98 F | WEIGHT: 208.19 LBS | BODY MASS INDEX: 27.59 KG/M2 | DIASTOLIC BLOOD PRESSURE: 78 MMHG | HEART RATE: 57 BPM | SYSTOLIC BLOOD PRESSURE: 159 MMHG | OXYGEN SATURATION: 100 %

## 2024-11-02 DIAGNOSIS — C92.00 ACUTE MYELOID LEUKEMIA NOT HAVING ACHIEVED REMISSION: Primary | ICD-10-CM

## 2024-11-02 PROCEDURE — 25000003 PHARM REV CODE 250: Performed by: INTERNAL MEDICINE

## 2024-11-02 PROCEDURE — 96401 CHEMO ANTI-NEOPL SQ/IM: CPT

## 2024-11-02 PROCEDURE — 63600175 PHARM REV CODE 636 W HCPCS: Performed by: INTERNAL MEDICINE

## 2024-11-02 RX ORDER — AZACITIDINE 100 MG/1
75 INJECTION, POWDER, LYOPHILIZED, FOR SOLUTION INTRAVENOUS; SUBCUTANEOUS
Status: COMPLETED | OUTPATIENT
Start: 2024-11-02 | End: 2024-11-02

## 2024-11-02 RX ORDER — ONDANSETRON 4 MG/1
16 TABLET, FILM COATED ORAL
Status: COMPLETED | OUTPATIENT
Start: 2024-11-02 | End: 2024-11-02

## 2024-11-02 RX ADMIN — ONDANSETRON HYDROCHLORIDE 16 MG: 4 TABLET, FILM COATED ORAL at 08:11

## 2024-11-02 RX ADMIN — AZACITIDINE 165 MG: 100 INJECTION, POWDER, LYOPHILIZED, FOR SOLUTION INTRAVENOUS; SUBCUTANEOUS at 09:11

## 2024-11-11 NOTE — PROGRESS NOTES
Section of Hematology and Stem Cell Transplantation  Follow Up Visit     Date of visit: 11/13/24  Visit diagnosis: Acute myeloid leukemia in remission [C92.01]  Referred by:  FERN Oneill MD    Oncologic History:     Primary Oncologic Diagnosis: Acute myeloid leukemia, adverse risk.    6/28/23: Diagnosed with heparin-induced thrombocytopenia. HIT Ab 1.87 OD (moderate-strong). ZAHIRA not available.   10/31/23: Peripheral blood flow cytometry sent due to worsening pancytopenia (CBC: WBC 3.43, Hgb 9, Plts 120, 19% blasts) revealed increased blasts (43.1%) concerning for acute myeloid leukemia.   11/9/23: Bone marrow biopsy revealed a hypercellular marrow (80%) with increased blasts consistent with acute myeloid leukemia. Karyotype 46,XY,del(20)(q11.2q13.3)[4]/46,XY[16]. FISH with 20q12 deletion. NGS with IDH2 (40%) and SRSF2 (40%).  12/2023: He started taking venetoclax 200mg daily (did not start azacitidine due to scheduling conflicts).    1/8/24: C1D1 of azacitidine x5 days plus venetoclax 21 of 28 days.  1/31/24: Bone marrow biopsy after cycle 1 revealed persistent AML with improvement in blasts to 3-7%. Karyotyping with 7 of 8 available metaphases with complex near-tetraploid karyotype (1 normal). NGS with IDH2 (41%) and SRSF2 (40%).  4/23/24: Bone marrow biopsy with normocellular marrow with no blasts consistent with complete remission. CG/FISH normal. NGS with IDH2 (11%) and SRSF2 (10%).   8/22/24: Bone marrow biopsy hypocellular (20%) with dysmegakaryopoiesis and marked myeloid hypoplasia.  No increase in blasts.  Diploid karyotype.  NGS IDH2 (9%) and SRSF2 (9%).    History of Present Ilness:   Gustavo Tracey Jr. (Gustavo) is a pleasant 64 y.o.male who presents for follow up.  He is overall feeling very well recently with very few slow days. He has some minor fatigue and joint pain but says he has more good days than bad. He has no nausea, vomiting, diarrhea, fevers, chest pain, SOB.     PAST MEDICAL HISTORY:   Past  Medical History:   Diagnosis Date    Abnormal nuclear stress test 11/26/2022    Acute myeloid leukemia     Cervical radiculopathy     to rt arm    CHF (congestive heart failure)     Chronic systolic congestive heart failure 11/28/2022    Dilated cardiomyopathy 11/26/2022    Hyperlipidemia     Hypertension     Obesity, unspecified     PAF (paroxysmal atrial fibrillation) 11/26/2022    Pulmonary HTN 11/28/2022    Stroke        PAST SURGICAL HISTORY:   Past Surgical History:   Procedure Laterality Date    BONE MARROW BIOPSY Right 1/31/2024    Procedure: Biopsy-bone marrow;  Surgeon: Rui Jacobsen MD;  Location: [address]);  Service: Oncology;  Laterality: Right;  1/24-pt confirmed-MS    CLOSURE OF LEFT ATRIAL APPENDAGE USING DEVICE Left 6/22/2023    Procedure: CLOSURE, LEFT ATRIAL APPENDAGE, USING DEVICE;  Surgeon: Rustam Dave MD;  Location: [facility];  Service: Cardiovascular;  Laterality: Left;  LIGATION OF LEFT ATRIAL APPENDAGE WITH OTILIA EXCLUSION SYSTEM    CORONARY ARTERY BYPASS GRAFT (CABG) N/A 6/22/2023    Procedure: CORONARY ARTERY BYPASS GRAFT (CABG);  Surgeon: Rustam Dave MD;  Location: [facility] OR;  Service: Cardiovascular;  Laterality: N/A;  3-VESSEL WITH EPI-AORTIC ULTRASOUND    PURDY MAZE PROCEDURE N/A 6/22/2023    Procedure: PURDY MAZE PROCEDURE;  Surgeon: Rustam Dave MD;  Location: [facility] OR;  Service: Cardiovascular;  Laterality: N/A;    ECHOCARDIOGRAM,TRANSESOPHAGEAL N/A 6/22/2023    Procedure: ECHOCARDIOGRAM,TRANSESOPHAGEAL;  Surgeon: Rustam Dave MD;  Location: [facility] OR;  Service: Cardiovascular;  Laterality: N/A;    ENDOSCOPIC HARVEST OF VEIN Left 6/22/2023    Procedure: SURGICAL PROCUREMENT, VEIN, ENDOSCOPIC;  Surgeon: Rustam Dave MD;  Location: [facility] OR;  Service: Cardiovascular;  Laterality: Left;    INJECTION OF ANESTHETIC AGENT AROUND MULTIPLE INTERCOSTAL NERVES N/A 6/22/2023    Procedure: BLOCK, NERVE, INTERCOSTAL, 2 OR MORE;  Surgeon: Rustam Dave MD;  Location:  [facility] OR;  Service: Cardiovascular;  Laterality: N/A;  PARASTERNAL NERVE BLOCK    INSTANTANEOUS WAVE-FREE RATIO  6/20/2023    Procedure: Instantaneous Wave-Free Ratio;  Surgeon: Zion Ortega MD;  Location: [facility] CATH LAB;  Service: Cardiology;;    IVUS, CORONARY  6/20/2023    Procedure: IVUS, Coronary;  Surgeon: Zion Ortega MD;  Location: [facility] CATH LAB;  Service: Cardiology;;    LEFT HEART CATHETERIZATION Left 11/28/2022    Procedure: CATHETERIZATION, HEART, LEFT;  Surgeon: Zion Ortega MD;  Location: [facility] CATH LAB;  Service: Cardiology;  Laterality: Left;    LEFT HEART CATHETERIZATION Left 6/20/2023    Procedure: Left heart cath;  Surgeon: Zion Ortega MD;  Location: [facility] CATH LAB;  Service: Cardiology;  Laterality: Left;    PERCUTANEOUS TRANSLUMINAL BALLOON ANGIOPLASTY OF CORONARY ARTERY  6/20/2023    Procedure: Angioplasty-coronary;  Surgeon: Zion Ortega MD;  Location: [facility] CATH LAB;  Service: Cardiology;;    RIGHT HEART CATHETERIZATION N/A 11/28/2022    Procedure: INSERTION, CATHETER, RIGHT HEART;  Surgeon: Zion Ortega MD;  Location: [facility] CATH LAB;  Service: Cardiology;  Laterality: N/A;  Congestive heart failure       PAST SOCIAL HISTORY:  Social History     Tobacco Use    Smoking status: Never    Smokeless tobacco: Never   Substance Use Topics    Alcohol use: Yes    Drug use: Never       FAMILY HISTORY:  Family History   Problem Relation Name Age of Onset    Hypertension Mother      Diabetes Father      Hyperlipidemia Father      Hypertension Father      Heart attack Father      Coronary artery disease Father         CURRENT MEDICATIONS:   Current Outpatient Medications   Medication Sig    acyclovir (ZOVIRAX) 400 MG tablet Take 1 tablet (400 mg total) by mouth 2 (two) times daily.    allopurinoL (ZYLOPRIM) 300 MG tablet Take 1 tablet (300 mg total) by mouth once daily. (Patient taking differently: Take 300 mg by mouth 2 (two) times a day.)    amitriptyline (ELAVIL) 50 MG tablet Take 50 mg by  mouth every evening.    aspirin (ECOTRIN) 81 MG EC tablet Take 1 tablet (81 mg total) by mouth once daily.    cyclobenzaprine (FLEXERIL) 10 MG tablet Take 10 mg by mouth 2 (two) times daily as needed.    FARXIGA 10 mg tablet TAKE 1 TABLET BY MOUTH EVERY DAY    ferrous sulfate (FEOSOL) 325 mg (65 mg iron) Tab tablet Take 1 tablet by mouth once daily.    folic acid (FOLVITE) 1 MG tablet Take 2 tablets (2 mg total) by mouth once daily.    furosemide (LASIX) 20 MG tablet Take 1 tablet by mouth once daily.    HYDROcodone-acetaminophen (NORCO)  mg per tablet TAKE 1 TABLET BY MOUTH 1 TO 2 TIMES A DAY AS NEEDED    latanoprost 0.005 % ophthalmic solution Place 1 drop into both eyes nightly.    metoprolol succinate (TOPROL-XL) 100 MG 24 hr tablet Take 1 tablet by mouth once daily.    potassium chloride (K-TAB) 20 mEq Take 1 tablet (20 mEq total) by mouth 2 (two) times daily.    pravastatin (PRAVACHOL) 20 MG tablet Take 20 mg by mouth once daily.    valsartan (DIOVAN) 40 MG tablet TAKE 1 TABLET BY MOUTH EVERY DAY    venetoclax (VENCLEXTA) 100 mg Tab Take 2 tablets (200 mg total) by mouth once daily Take 2 tablets (200mg) once daily on days 1-7 of a 28 day cycle. Always start with azacitidine (Vidaza) injections..    warfarin (COUMADIN) 5 MG tablet TAKE 1 TABLET BY MOUTH ON SUN, MON, WED, FRI, SAT,& 1.5 ON TUES & THURS     Current Facility-Administered Medications   Medication    0.9%  NaCl infusion (for blood administration)    0.9%  NaCl infusion (for blood administration)    acetaminophen tablet 650 mg    diphenhydrAMINE capsule 25 mg     Facility-Administered Medications Ordered in Other Visits   Medication    sodium chloride 0.9% flush 10 mL       ALLERGIES:   Review of patient's allergies indicates:  No Known Allergies      Review of Systems:     Pertinent positives and negatives included in the HPI. Otherwise a complete review of systems is negative.    Physical Exam:     Vitals:    11/13/24 0802   BP: (!) 154/73    Pulse: (!) 55   Temp: 97.8 °F (36.6 °C)       Physical Exam  Constitutional:       General: He is not in acute distress.  Eyes:      General: No scleral icterus.     Extraocular Movements: Extraocular movements intact.      Conjunctiva/sclera: Conjunctivae normal.   Cardiovascular:      Rate and Rhythm: Normal rate and regular rhythm.      Pulses: Normal pulses.   Pulmonary:      Effort: Pulmonary effort is normal. No respiratory distress.      Breath sounds: Normal breath sounds. No wheezing or rhonchi.   Abdominal:      General: There is no distension.      Palpations: Abdomen is soft.   Musculoskeletal:         General: No swelling or tenderness.      Right lower leg: No edema.      Left lower leg: No edema.   Skin:     General: Skin is warm and dry.      Findings: No bruising or rash.   Neurological:      Mental Status: He is alert and oriented to person, place, and time.      Coordination: Coordination normal.      Gait: Gait normal.   Psychiatric:         Mood and Affect: Mood normal.         Behavior: Behavior normal.          ECOG Performance Status: (foot note - ECOG PS provided by Eastern Cooperative Oncology Group) 1 - Symptomatic but completely ambulatory    Karnofsky Performance Score:  90%- Able to Carry on Normal Activity: Minor Symptoms of Disease    Labs:   Lab Results   Component Value Date    WBC 3.70 (L) 11/07/2024    RBC 3.22 (L) 11/07/2024    HGB 9.9 (L) 11/07/2024    HCT 29.4 (L) 11/07/2024    MCV 92 11/07/2024    MCH 30.7 11/07/2024    MCHC 33.6 11/07/2024    RDW 19.7 (H) 11/07/2024     (L) 11/07/2024    MPV 7.3 (L) 11/07/2024    GRAN 2.7 11/07/2024    GRAN 73.1 (H) 11/07/2024    LYMPH 0.8 (L) 11/07/2024    LYMPH 21.6 11/07/2024    MONO 0.2 (L) 11/07/2024    MONO 4.8 11/07/2024    EOS 0.0 11/07/2024    BASO 0.00 11/07/2024    EOSINOPHIL 0.3 11/07/2024    BASOPHIL 0.2 11/07/2024       CMP  Sodium   Date Value Ref Range Status   11/07/2024 138 136 - 145 mmol/L Final     Potassium    Date Value Ref Range Status   11/07/2024 4.2 3.5 - 5.1 mmol/L Final     Chloride   Date Value Ref Range Status   11/07/2024 107 95 - 110 mmol/L Final     CO2   Date Value Ref Range Status   11/07/2024 24 23 - 29 mmol/L Final     Glucose   Date Value Ref Range Status   11/07/2024 134 (H) 74 - 106 mg/dL Final     BUN   Date Value Ref Range Status   11/07/2024 21 (H) 9 - 20 mg/dL Final     Creatinine   Date Value Ref Range Status   11/07/2024 1.30 0.80 - 1.50 mg/dL Final     Calcium   Date Value Ref Range Status   11/07/2024 9.0 8.4 - 10.2 mg/dL Final     Total Protein   Date Value Ref Range Status   11/07/2024 7.8 6.3 - 8.2 g/dL Final     Albumin   Date Value Ref Range Status   11/07/2024 4.2 3.5 - 5.2 g/dL Final     Total Bilirubin   Date Value Ref Range Status   11/07/2024 0.4 0.2 - 1.3 mg/dL Final     Alkaline Phosphatase   Date Value Ref Range Status   11/07/2024 55 38 - 145 U/L Final     AST   Date Value Ref Range Status   11/07/2024 29 17 - 59 U/L Final     ALT   Date Value Ref Range Status   11/07/2024 21 10 - 44 U/L Final     Anion Gap   Date Value Ref Range Status   11/07/2024 7 (L) 8 - 16 mmol/L Final       Imaging:   Reviewed     Pathology:  Reviewed     Assessment and Plan:   Gustavo Tracey  (Gustavo) is a pleasant 64 y.o.male who presents for follow up.    Acute myeloid leukemia, adverse risk:  Peripheral blood flow cytometry obtain 10/31/2023 concerning for acute myeloid leukemia.  Bone marrow biopsy obtained on 11/09/2023 revealed acute myeloid leukemia with 20q deletion on FISH and NGS with IDH2 (40%) and SRSF2 (40%). He started aza x7 plus alonzo x21 of 28 days in 1/2024. Bone marrow biopsy at the end of cycle 1 revealed persistent disease but improvement in blasts. CG now showed near-tetraploid complex karyotype in 7 of 8 metaphases. NGS with persistent IDH2 (41%) and SRSF2 (40%). Bone marrow biopsy in 4/2024 showed complete remission with persistent molecular disease - NGS with IDH2 (11%) and  SRSF2 (10%).  Bone marrow biopsy on 08/22/2024 showed continued remission with persistent dysplastic changes.  NGS with IDH2 (9%) and SRSF2 (9%). His neutropenia has been limited with treatment, so will stop prophylactic fluconazole and levofloxacin. Will increase venetoclax to 400mg daily.  - Continue azacitidine 75mg/m2 x5 days and increase venetoclax 400mg daily x7 of 28 days.  - ANC >500 and Plts >50 to start each cycle.   - Repeat bone marrow biopsy in January to assess for clearance of MRD.    Stem cell transplant candidate:  We discussed the role for allogeneic stem cell transplantation in this disease process as a potentially curative option. We had an extensive discussion about the rationale, logistics, risks, and benefits. We reviewed the requirement to stay in the [place] area for 100 days with a caregiver at all times. We discussed the risks, including infection, graft failure, organ toxicity, graft versus host disease, relapse of disease, and secondary cancers. We reviewed the need for long-term immunosuppression and need for close monitoring.  His HCT-CI score is 3 (high risk), although he is very fit and active without limitations.  We reviewed this today again, and we discussed that transplant would carry significant risk for both morbidity and mortality (40% 2 year NRM).  He remains undecided on whether or not to proceed with transplant. We will continue to address but lately with shared decision making we have opted to continue deferring transplant given his excellent response and limited toxicity.   - We discussed pros/cons of transplant again today.   - Discussed dental clearance and need for colon cancer screening.     History of CVA:  Repeat MRI brain on 08/05/2024 revealed patchy gliotic white matter signal change in the subcortical and periventricular regions of bilateral cerebral hemispheres.  This is reportedly changed compared to his prior MRI brain in 2012.  Given our potential for  proceeding with stem cell transplant, I will refer him to Neurology for further evaluation.  - He missed his last appointment. Will place repeat referral.     Pancytopenia:  Monitor CBC weekly with Dr. Oneill. Transfuse 1 unit of PRBC for hgb < 7 or clinically appropriate. Transfuse 1 unit of plts if platelets < 10 or clinically appropriate.      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 ANC has been stable after treatment so will stop fluconazole and levofloxacin.      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  Heparin antibody 1.87 OD (moderate-strong). ZAHIRA negative on 12/13/23 which rules out HIT. Heparin allergy removed. Ok to stop Warfarin.   - Ok to stop Warfarin from our perspective, but his cardiologist recommended continuing. With negative ZAHIRA, HIT would be very unlikely. I recommended stopping Warfarin with a negative ZAHIRA and intermittent thrombocytopenia from treatment. He will discuss with his cardiologist.     Stem cell donors:  He has one full sister and 2 children as potential donor options . He has one potential MUD.    HFrEF:  Cardiac function now back to baseline. Continue follow up with local cardiologist.     Orders/Follow Up:    Weekly labs            Route Chart for Scheduling    BMT Chart Routing      Follow up with physician 4 weeks. RTC week of 12/16-12/20   Follow up with [facility]    Provider visit type    Infusion scheduling note    Injection scheduling note azacitidine daily x5 days week of 12/23   Labs CBC, CMP, type and screen, magnesium and phosphorus   Scheduling:  Preferred lab:  Lab interval:  Has standing orders. Labs weekly   Imaging    Pharmacy appointment    Other referrals                Treatment Plan Information   OP AZACITIDINE 5-DAY (SUB-Q) + VENETOCLAX Rui Jacobsen MD   Associated diagnosis: Acute myeloid leukemia not having achieved remission   noted on 11/6/2023   Line of treatment: First  Line  Treatment Goal: Control     Upcoming Treatment Dates - OP AZACITIDINE 5-DAY (SUB-Q) + VENETOCLAX    11/25/2024       Pre-Medications       ondansetron tablet 16 mg       Chemotherapy       azaCITIDine (VIDAZA) chemo injection 165 mg  11/26/2024       Pre-Medications       ondansetron tablet 16 mg       Chemotherapy       azaCITIDine (VIDAZA) chemo injection 165 mg  11/27/2024       Pre-Medications       ondansetron tablet 16 mg       Chemotherapy       azaCITIDine (VIDAZA) chemo injection 165 mg  11/28/2024       Pre-Medications       ondansetron tablet 16 mg       Chemotherapy       azaCITIDine (VIDAZA) chemo injection 165 mg    Supportive Plan Information  IV FLUIDS AND ELECTROLYTES Rui Jacobsen MD   Associated Diagnosis: Dehydration   noted on 9/30/2024  Associated Diagnosis: Acute myeloid leukemia not having achieved remission   noted on 11/6/2023   Line of treatment: Supportive Care   Treatment goal: Supportive     Upcoming Treatment Dates - IV FLUIDS AND ELECTROLYTES    No upcoming days in selected categories.    Therapy Plan Information  INF FLUIDS for Acute myeloid leukemia not having achieved remission, noted on 11/6/2023  None    INF FLUIDS for Acute myeloid leukemia not having achieved remission, noted on 11/6/2023  sodium chloride 0.9% bolus 500 mL 500 mL  500 mL, Intravenous, PRN      No therapy plan of the specified type found.    Total time of this visit was 40 minutes, including time spent face to face with patient and/or via video/audio, and also in preparing for today's visit for MDM and documentation. (Medical Decision Making, including consideration of possible diagnoses, management options, complex medical record review, review of diagnostic tests and information, consideration and discussion of significant complications based on comorbidities, and discussion with providers involved with the care of the patient). Greater than 50% was spent face to face with the patient counseling  and coordinating care.  Visit today included increased complexity associated with the care of the episodic problem history of CVA and HIT addressed and managing the longitudinal care of the patient due to the serious and/or complex managed problem(s) acute myeloid leukemia, pancytopenia, immunodeficiency.    Jarrell Jacobsen MD  Malignant Hematology, Stem Cell Transplant, and Cellular Therapy  [facility]  [facility]

## 2024-11-13 ENCOUNTER — OFFICE VISIT (OUTPATIENT)
Dept: HEMATOLOGY/ONCOLOGY | Facility: CLINIC | Age: 64
End: 2024-11-13
Payer: MEDICARE

## 2024-11-13 VITALS
SYSTOLIC BLOOD PRESSURE: 154 MMHG | HEIGHT: 73 IN | OXYGEN SATURATION: 99 % | HEART RATE: 55 BPM | TEMPERATURE: 98 F | DIASTOLIC BLOOD PRESSURE: 73 MMHG | BODY MASS INDEX: 27.67 KG/M2 | WEIGHT: 208.75 LBS

## 2024-11-13 DIAGNOSIS — Z86.73 HISTORY OF CVA (CEREBROVASCULAR ACCIDENT): ICD-10-CM

## 2024-11-13 DIAGNOSIS — C92.01 ACUTE MYELOID LEUKEMIA IN REMISSION: Primary | ICD-10-CM

## 2024-11-13 DIAGNOSIS — D84.81 IMMUNODEFICIENCY SECONDARY TO NEOPLASM: ICD-10-CM

## 2024-11-13 DIAGNOSIS — Z76.82 STEM CELL TRANSPLANT CANDIDATE: ICD-10-CM

## 2024-11-13 DIAGNOSIS — I50.20 HEART FAILURE WITH REDUCED EJECTION FRACTION: ICD-10-CM

## 2024-11-13 DIAGNOSIS — D75.829 HIT (HEPARIN-INDUCED THROMBOCYTOPENIA): ICD-10-CM

## 2024-11-13 DIAGNOSIS — D61.818 PANCYTOPENIA: ICD-10-CM

## 2024-11-13 DIAGNOSIS — D49.9 IMMUNODEFICIENCY SECONDARY TO NEOPLASM: ICD-10-CM

## 2024-11-13 DIAGNOSIS — R93.89 ABNORMAL MRI: ICD-10-CM

## 2024-11-13 DIAGNOSIS — C92.00 ACUTE MYELOID LEUKEMIA NOT HAVING ACHIEVED REMISSION: ICD-10-CM

## 2024-11-13 PROCEDURE — 99999 PR PBB SHADOW E&M-EST. PATIENT-LVL IV: CPT | Mod: PBBFAC,,, | Performed by: INTERNAL MEDICINE

## 2024-11-13 PROCEDURE — 99214 OFFICE O/P EST MOD 30 MIN: CPT | Mod: PBBFAC | Performed by: INTERNAL MEDICINE

## 2024-11-25 ENCOUNTER — LAB VISIT (OUTPATIENT)
Dept: LAB | Facility: HOSPITAL | Age: 64
End: 2024-11-25
Attending: STUDENT IN AN ORGANIZED HEALTH CARE EDUCATION/TRAINING PROGRAM
Payer: MEDICARE

## 2024-11-25 DIAGNOSIS — C92.01 ACUTE MYELOID LEUKEMIA IN REMISSION: ICD-10-CM

## 2024-11-25 LAB
ALBUMIN SERPL BCP-MCNC: 4 G/DL (ref 3.5–5.2)
ALP SERPL-CCNC: 66 U/L (ref 40–150)
ALT SERPL W/O P-5'-P-CCNC: 21 U/L (ref 10–44)
ANION GAP SERPL CALC-SCNC: 7 MMOL/L (ref 8–16)
ANISOCYTOSIS BLD QL SMEAR: SLIGHT
AST SERPL-CCNC: 22 U/L (ref 10–40)
BASOPHILS # BLD AUTO: 0.01 K/UL (ref 0–0.2)
BASOPHILS NFR BLD: 0.6 % (ref 0–1.9)
BILIRUB SERPL-MCNC: 0.5 MG/DL (ref 0.1–1)
BUN SERPL-MCNC: 27 MG/DL (ref 8–23)
CALCIUM SERPL-MCNC: 9.8 MG/DL (ref 8.7–10.5)
CHLORIDE SERPL-SCNC: 105 MMOL/L (ref 95–110)
CO2 SERPL-SCNC: 26 MMOL/L (ref 23–29)
CREAT SERPL-MCNC: 1.4 MG/DL (ref 0.5–1.4)
DACRYOCYTES BLD QL SMEAR: ABNORMAL
DIFFERENTIAL METHOD BLD: ABNORMAL
EOSINOPHIL # BLD AUTO: 0 K/UL (ref 0–0.5)
EOSINOPHIL NFR BLD: 1.2 % (ref 0–8)
ERYTHROCYTE [DISTWIDTH] IN BLOOD BY AUTOMATED COUNT: 17.2 % (ref 11.5–14.5)
EST. GFR  (NO RACE VARIABLE): 56.5 ML/MIN/1.73 M^2
GLUCOSE SERPL-MCNC: 96 MG/DL (ref 70–110)
HCT VFR BLD AUTO: 31.5 % (ref 40–54)
HGB BLD-MCNC: 9.8 G/DL (ref 14–18)
IMM GRANULOCYTES # BLD AUTO: 0.01 K/UL (ref 0–0.04)
IMM GRANULOCYTES NFR BLD AUTO: 0.6 % (ref 0–0.5)
LDH SERPL L TO P-CCNC: 264 U/L (ref 110–260)
LYMPHOCYTES # BLD AUTO: 1.1 K/UL (ref 1–4.8)
LYMPHOCYTES NFR BLD: 66.7 % (ref 18–48)
MCH RBC QN AUTO: 29.8 PG (ref 27–31)
MCHC RBC AUTO-ENTMCNC: 31.1 G/DL (ref 32–36)
MCV RBC AUTO: 96 FL (ref 82–98)
MONOCYTES # BLD AUTO: 0.2 K/UL (ref 0.3–1)
MONOCYTES NFR BLD: 13.1 % (ref 4–15)
NEUTROPHILS # BLD AUTO: 0.3 K/UL (ref 1.8–7.7)
NEUTROPHILS NFR BLD: 17.8 % (ref 38–73)
NRBC BLD-RTO: 1 /100 WBC
OVALOCYTES BLD QL SMEAR: ABNORMAL
PLATELET # BLD AUTO: 151 K/UL (ref 150–450)
PLATELET BLD QL SMEAR: ABNORMAL
PMV BLD AUTO: 10 FL (ref 9.2–12.9)
POIKILOCYTOSIS BLD QL SMEAR: SLIGHT
POLYCHROMASIA BLD QL SMEAR: ABNORMAL
POTASSIUM SERPL-SCNC: 4.6 MMOL/L (ref 3.5–5.1)
PROT SERPL-MCNC: 8.1 G/DL (ref 6–8.4)
RBC # BLD AUTO: 3.29 M/UL (ref 4.6–6.2)
SODIUM SERPL-SCNC: 138 MMOL/L (ref 136–145)
URATE SERPL-MCNC: 6.3 MG/DL (ref 3.4–7)
WBC # BLD AUTO: 1.68 K/UL (ref 3.9–12.7)

## 2024-11-25 PROCEDURE — 84550 ASSAY OF BLOOD/URIC ACID: CPT | Performed by: STUDENT IN AN ORGANIZED HEALTH CARE EDUCATION/TRAINING PROGRAM

## 2024-11-25 PROCEDURE — 83615 LACTATE (LD) (LDH) ENZYME: CPT | Performed by: STUDENT IN AN ORGANIZED HEALTH CARE EDUCATION/TRAINING PROGRAM

## 2024-11-25 PROCEDURE — 80053 COMPREHEN METABOLIC PANEL: CPT | Performed by: STUDENT IN AN ORGANIZED HEALTH CARE EDUCATION/TRAINING PROGRAM

## 2024-11-25 PROCEDURE — 85025 COMPLETE CBC W/AUTO DIFF WBC: CPT | Performed by: STUDENT IN AN ORGANIZED HEALTH CARE EDUCATION/TRAINING PROGRAM

## 2024-11-25 PROCEDURE — 36415 COLL VENOUS BLD VENIPUNCTURE: CPT | Performed by: STUDENT IN AN ORGANIZED HEALTH CARE EDUCATION/TRAINING PROGRAM

## 2024-11-27 ENCOUNTER — TELEPHONE (OUTPATIENT)
Dept: HEMATOLOGY/ONCOLOGY | Facility: CLINIC | Age: 64
End: 2024-11-27
Payer: MEDICARE

## 2024-11-27 NOTE — TELEPHONE ENCOUNTER
Called patient to notify him that we are delaying his next round of aza/alonzo to the week of 12/9. Patient verbalized understanding

## 2024-12-09 ENCOUNTER — INFUSION (OUTPATIENT)
Dept: INFUSION THERAPY | Facility: HOSPITAL | Age: 64
End: 2024-12-09
Payer: MEDICARE

## 2024-12-09 VITALS
SYSTOLIC BLOOD PRESSURE: 161 MMHG | RESPIRATION RATE: 18 BRPM | BODY MASS INDEX: 28.28 KG/M2 | TEMPERATURE: 98 F | DIASTOLIC BLOOD PRESSURE: 94 MMHG | HEIGHT: 73 IN | HEART RATE: 58 BPM | WEIGHT: 213.38 LBS

## 2024-12-09 DIAGNOSIS — C92.00 ACUTE MYELOID LEUKEMIA NOT HAVING ACHIEVED REMISSION: Primary | ICD-10-CM

## 2024-12-09 PROCEDURE — 25000003 PHARM REV CODE 250: Performed by: INTERNAL MEDICINE

## 2024-12-09 PROCEDURE — 96401 CHEMO ANTI-NEOPL SQ/IM: CPT

## 2024-12-09 PROCEDURE — 63600175 PHARM REV CODE 636 W HCPCS: Performed by: INTERNAL MEDICINE

## 2024-12-09 RX ORDER — AZACITIDINE 100 MG/1
75 INJECTION, POWDER, LYOPHILIZED, FOR SOLUTION INTRAVENOUS; SUBCUTANEOUS
Status: CANCELLED | OUTPATIENT
Start: 2024-12-09

## 2024-12-09 RX ORDER — AZACITIDINE 100 MG/1
75 INJECTION, POWDER, LYOPHILIZED, FOR SOLUTION INTRAVENOUS; SUBCUTANEOUS
Status: CANCELLED | OUTPATIENT
Start: 2024-12-10

## 2024-12-09 RX ORDER — ONDANSETRON HYDROCHLORIDE 8 MG/1
16 TABLET, FILM COATED ORAL
Status: CANCELLED | OUTPATIENT
Start: 2024-12-11

## 2024-12-09 RX ORDER — ONDANSETRON HYDROCHLORIDE 8 MG/1
16 TABLET, FILM COATED ORAL
Status: CANCELLED | OUTPATIENT
Start: 2024-12-09

## 2024-12-09 RX ORDER — ONDANSETRON HYDROCHLORIDE 8 MG/1
16 TABLET, FILM COATED ORAL
Status: CANCELLED | OUTPATIENT
Start: 2024-12-12

## 2024-12-09 RX ORDER — ONDANSETRON HYDROCHLORIDE 8 MG/1
16 TABLET, FILM COATED ORAL
Status: CANCELLED | OUTPATIENT
Start: 2024-12-10

## 2024-12-09 RX ORDER — AZACITIDINE 100 MG/1
75 INJECTION, POWDER, LYOPHILIZED, FOR SOLUTION INTRAVENOUS; SUBCUTANEOUS
Status: CANCELLED | OUTPATIENT
Start: 2024-12-11

## 2024-12-09 RX ORDER — AZACITIDINE 100 MG/1
75 INJECTION, POWDER, LYOPHILIZED, FOR SOLUTION INTRAVENOUS; SUBCUTANEOUS
Status: CANCELLED | OUTPATIENT
Start: 2024-12-13

## 2024-12-09 RX ORDER — ONDANSETRON HYDROCHLORIDE 8 MG/1
16 TABLET, FILM COATED ORAL
Status: CANCELLED | OUTPATIENT
Start: 2024-12-13

## 2024-12-09 RX ORDER — AZACITIDINE 100 MG/1
75 INJECTION, POWDER, LYOPHILIZED, FOR SOLUTION INTRAVENOUS; SUBCUTANEOUS
Status: CANCELLED | OUTPATIENT
Start: 2024-12-12

## 2024-12-09 RX ORDER — AZACITIDINE 100 MG/1
75 INJECTION, POWDER, LYOPHILIZED, FOR SOLUTION INTRAVENOUS; SUBCUTANEOUS
Status: COMPLETED | OUTPATIENT
Start: 2024-12-09 | End: 2024-12-09

## 2024-12-09 RX ORDER — ONDANSETRON 4 MG/1
16 TABLET, FILM COATED ORAL
Status: COMPLETED | OUTPATIENT
Start: 2024-12-09 | End: 2024-12-09

## 2024-12-09 RX ADMIN — AZACITIDINE 165 MG: 100 INJECTION, POWDER, LYOPHILIZED, FOR SOLUTION INTRAVENOUS; SUBCUTANEOUS at 11:12

## 2024-12-09 RX ADMIN — ONDANSETRON HYDROCHLORIDE 16 MG: 4 TABLET, FILM COATED ORAL at 11:12

## 2024-12-10 ENCOUNTER — INFUSION (OUTPATIENT)
Dept: INFUSION THERAPY | Facility: HOSPITAL | Age: 64
End: 2024-12-10
Payer: MEDICARE

## 2024-12-10 DIAGNOSIS — C92.00 ACUTE MYELOID LEUKEMIA NOT HAVING ACHIEVED REMISSION: Primary | ICD-10-CM

## 2024-12-10 PROCEDURE — 63600175 PHARM REV CODE 636 W HCPCS: Performed by: INTERNAL MEDICINE

## 2024-12-10 PROCEDURE — 96401 CHEMO ANTI-NEOPL SQ/IM: CPT

## 2024-12-10 PROCEDURE — 25000003 PHARM REV CODE 250: Performed by: INTERNAL MEDICINE

## 2024-12-10 RX ORDER — AZACITIDINE 100 MG/1
75 INJECTION, POWDER, LYOPHILIZED, FOR SOLUTION INTRAVENOUS; SUBCUTANEOUS
Status: COMPLETED | OUTPATIENT
Start: 2024-12-10 | End: 2024-12-10

## 2024-12-10 RX ORDER — ONDANSETRON 4 MG/1
16 TABLET, FILM COATED ORAL
Status: COMPLETED | OUTPATIENT
Start: 2024-12-10 | End: 2024-12-10

## 2024-12-10 RX ADMIN — AZACITIDINE 165 MG: 100 INJECTION, POWDER, LYOPHILIZED, FOR SOLUTION INTRAVENOUS; SUBCUTANEOUS at 03:12

## 2024-12-10 RX ADMIN — ONDANSETRON HYDROCHLORIDE 16 MG: 4 TABLET, FILM COATED ORAL at 02:12

## 2024-12-11 ENCOUNTER — INFUSION (OUTPATIENT)
Dept: INFUSION THERAPY | Facility: HOSPITAL | Age: 64
End: 2024-12-11
Payer: MEDICARE

## 2024-12-11 VITALS
TEMPERATURE: 98 F | OXYGEN SATURATION: 100 % | HEART RATE: 46 BPM | SYSTOLIC BLOOD PRESSURE: 149 MMHG | DIASTOLIC BLOOD PRESSURE: 70 MMHG | RESPIRATION RATE: 18 BRPM

## 2024-12-11 VITALS
TEMPERATURE: 98 F | OXYGEN SATURATION: 98 % | DIASTOLIC BLOOD PRESSURE: 86 MMHG | HEART RATE: 58 BPM | RESPIRATION RATE: 20 BRPM | SYSTOLIC BLOOD PRESSURE: 153 MMHG

## 2024-12-11 DIAGNOSIS — C92.00 ACUTE MYELOID LEUKEMIA NOT HAVING ACHIEVED REMISSION: Primary | ICD-10-CM

## 2024-12-11 PROCEDURE — 96401 CHEMO ANTI-NEOPL SQ/IM: CPT

## 2024-12-11 PROCEDURE — 25000003 PHARM REV CODE 250: Performed by: INTERNAL MEDICINE

## 2024-12-11 PROCEDURE — 63600175 PHARM REV CODE 636 W HCPCS: Performed by: INTERNAL MEDICINE

## 2024-12-11 RX ORDER — AZACITIDINE 100 MG/1
75 INJECTION, POWDER, LYOPHILIZED, FOR SOLUTION INTRAVENOUS; SUBCUTANEOUS
Status: COMPLETED | OUTPATIENT
Start: 2024-12-11 | End: 2024-12-11

## 2024-12-11 RX ORDER — ONDANSETRON 4 MG/1
16 TABLET, FILM COATED ORAL
Status: COMPLETED | OUTPATIENT
Start: 2024-12-11 | End: 2024-12-11

## 2024-12-11 RX ADMIN — ONDANSETRON HYDROCHLORIDE 16 MG: 4 TABLET, FILM COATED ORAL at 03:12

## 2024-12-11 RX ADMIN — AZACITIDINE 165 MG: 100 INJECTION, POWDER, LYOPHILIZED, FOR SOLUTION INTRAVENOUS; SUBCUTANEOUS at 03:12

## 2024-12-12 ENCOUNTER — INFUSION (OUTPATIENT)
Dept: INFUSION THERAPY | Facility: HOSPITAL | Age: 64
End: 2024-12-12
Payer: MEDICARE

## 2024-12-12 VITALS
DIASTOLIC BLOOD PRESSURE: 78 MMHG | TEMPERATURE: 98 F | SYSTOLIC BLOOD PRESSURE: 163 MMHG | HEART RATE: 58 BPM | RESPIRATION RATE: 18 BRPM

## 2024-12-12 DIAGNOSIS — C92.00 ACUTE MYELOID LEUKEMIA NOT HAVING ACHIEVED REMISSION: Primary | ICD-10-CM

## 2024-12-12 PROCEDURE — 63600175 PHARM REV CODE 636 W HCPCS: Performed by: INTERNAL MEDICINE

## 2024-12-12 PROCEDURE — 25000003 PHARM REV CODE 250: Performed by: INTERNAL MEDICINE

## 2024-12-12 PROCEDURE — 96401 CHEMO ANTI-NEOPL SQ/IM: CPT

## 2024-12-12 RX ORDER — AZACITIDINE 100 MG/1
75 INJECTION, POWDER, LYOPHILIZED, FOR SOLUTION INTRAVENOUS; SUBCUTANEOUS
Status: COMPLETED | OUTPATIENT
Start: 2024-12-12 | End: 2024-12-12

## 2024-12-12 RX ORDER — ONDANSETRON 4 MG/1
16 TABLET, FILM COATED ORAL
Status: COMPLETED | OUTPATIENT
Start: 2024-12-12 | End: 2024-12-12

## 2024-12-12 RX ADMIN — ONDANSETRON HYDROCHLORIDE 16 MG: 4 TABLET, FILM COATED ORAL at 02:12

## 2024-12-12 RX ADMIN — AZACITIDINE 165 MG: 100 INJECTION, POWDER, LYOPHILIZED, FOR SOLUTION INTRAVENOUS; SUBCUTANEOUS at 02:12

## 2024-12-13 ENCOUNTER — INFUSION (OUTPATIENT)
Dept: INFUSION THERAPY | Facility: HOSPITAL | Age: 64
End: 2024-12-13
Payer: MEDICARE

## 2024-12-13 VITALS
DIASTOLIC BLOOD PRESSURE: 75 MMHG | TEMPERATURE: 98 F | SYSTOLIC BLOOD PRESSURE: 136 MMHG | HEART RATE: 55 BPM | RESPIRATION RATE: 18 BRPM

## 2024-12-13 DIAGNOSIS — C92.00 ACUTE MYELOID LEUKEMIA NOT HAVING ACHIEVED REMISSION: Primary | ICD-10-CM

## 2024-12-13 PROCEDURE — 96401 CHEMO ANTI-NEOPL SQ/IM: CPT

## 2024-12-13 PROCEDURE — 25000003 PHARM REV CODE 250: Performed by: INTERNAL MEDICINE

## 2024-12-13 PROCEDURE — 63600175 PHARM REV CODE 636 W HCPCS: Performed by: INTERNAL MEDICINE

## 2024-12-13 RX ORDER — ONDANSETRON 4 MG/1
16 TABLET, FILM COATED ORAL
Status: COMPLETED | OUTPATIENT
Start: 2024-12-13 | End: 2024-12-13

## 2024-12-13 RX ORDER — AZACITIDINE 100 MG/1
75 INJECTION, POWDER, LYOPHILIZED, FOR SOLUTION INTRAVENOUS; SUBCUTANEOUS
Status: COMPLETED | OUTPATIENT
Start: 2024-12-13 | End: 2024-12-13

## 2024-12-13 RX ADMIN — AZACITIDINE 165 MG: 100 INJECTION, POWDER, LYOPHILIZED, FOR SOLUTION INTRAVENOUS; SUBCUTANEOUS at 02:12

## 2024-12-13 RX ADMIN — ONDANSETRON HYDROCHLORIDE 16 MG: 4 TABLET, FILM COATED ORAL at 02:12

## 2024-12-13 NOTE — NURSING
Pt here for D5 vidaza injection.  VSS. Vidaza administered to abdominal tissue x 3 injections.  Pt tolerated.

## 2024-12-23 ENCOUNTER — LAB VISIT (OUTPATIENT)
Dept: LAB | Facility: HOSPITAL | Age: 64
End: 2024-12-23
Payer: MEDICARE

## 2024-12-23 DIAGNOSIS — C92.00 ACUTE MYELOID LEUKEMIA NOT HAVING ACHIEVED REMISSION: ICD-10-CM

## 2024-12-23 LAB
ABO + RH BLD: NORMAL
ALBUMIN SERPL BCP-MCNC: 4.2 G/DL (ref 3.5–5.2)
ALP SERPL-CCNC: 67 U/L (ref 40–150)
ALT SERPL W/O P-5'-P-CCNC: 12 U/L (ref 10–44)
ANION GAP SERPL CALC-SCNC: 6 MMOL/L (ref 8–16)
AST SERPL-CCNC: 17 U/L (ref 10–40)
BASOPHILS # BLD AUTO: 0.01 K/UL (ref 0–0.2)
BASOPHILS NFR BLD: 0.2 % (ref 0–1.9)
BILIRUB SERPL-MCNC: 0.4 MG/DL (ref 0.1–1)
BLD GP AB SCN CELLS X3 SERPL QL: NORMAL
BUN SERPL-MCNC: 29 MG/DL (ref 8–23)
CALCIUM SERPL-MCNC: 9.5 MG/DL (ref 8.7–10.5)
CHLORIDE SERPL-SCNC: 106 MMOL/L (ref 95–110)
CO2 SERPL-SCNC: 27 MMOL/L (ref 23–29)
CREAT SERPL-MCNC: 1.4 MG/DL (ref 0.5–1.4)
DIFFERENTIAL METHOD BLD: ABNORMAL
EOSINOPHIL # BLD AUTO: 0 K/UL (ref 0–0.5)
EOSINOPHIL NFR BLD: 0 % (ref 0–8)
ERYTHROCYTE [DISTWIDTH] IN BLOOD BY AUTOMATED COUNT: 17.4 % (ref 11.5–14.5)
EST. GFR  (NO RACE VARIABLE): 56.5 ML/MIN/1.73 M^2
GLUCOSE SERPL-MCNC: 102 MG/DL (ref 70–110)
HCT VFR BLD AUTO: 36.4 % (ref 40–54)
HGB BLD-MCNC: 11.7 G/DL (ref 14–18)
IMM GRANULOCYTES # BLD AUTO: 0.07 K/UL (ref 0–0.04)
IMM GRANULOCYTES NFR BLD AUTO: 1.3 % (ref 0–0.5)
LYMPHOCYTES # BLD AUTO: 1.6 K/UL (ref 1–4.8)
LYMPHOCYTES NFR BLD: 29.5 % (ref 18–48)
MAGNESIUM SERPL-MCNC: 2.1 MG/DL (ref 1.6–2.6)
MCH RBC QN AUTO: 29.9 PG (ref 27–31)
MCHC RBC AUTO-ENTMCNC: 32.1 G/DL (ref 32–36)
MCV RBC AUTO: 93 FL (ref 82–98)
MONOCYTES # BLD AUTO: 0.7 K/UL (ref 0.3–1)
MONOCYTES NFR BLD: 14.1 % (ref 4–15)
NEUTROPHILS # BLD AUTO: 2.9 K/UL (ref 1.8–7.7)
NEUTROPHILS NFR BLD: 54.9 % (ref 38–73)
NRBC BLD-RTO: 0 /100 WBC
PHOSPHATE SERPL-MCNC: 2.7 MG/DL (ref 2.7–4.5)
PLATELET # BLD AUTO: 102 K/UL (ref 150–450)
PMV BLD AUTO: 10 FL (ref 9.2–12.9)
POTASSIUM SERPL-SCNC: 4.3 MMOL/L (ref 3.5–5.1)
PROT SERPL-MCNC: 8.4 G/DL (ref 6–8.4)
RBC # BLD AUTO: 3.91 M/UL (ref 4.6–6.2)
SODIUM SERPL-SCNC: 139 MMOL/L (ref 136–145)
SPECIMEN OUTDATE: NORMAL
WBC # BLD AUTO: 5.25 K/UL (ref 3.9–12.7)

## 2024-12-23 PROCEDURE — 83735 ASSAY OF MAGNESIUM: CPT | Performed by: INTERNAL MEDICINE

## 2024-12-23 PROCEDURE — 36415 COLL VENOUS BLD VENIPUNCTURE: CPT | Performed by: INTERNAL MEDICINE

## 2024-12-23 PROCEDURE — 84100 ASSAY OF PHOSPHORUS: CPT | Performed by: INTERNAL MEDICINE

## 2024-12-23 PROCEDURE — 85025 COMPLETE CBC W/AUTO DIFF WBC: CPT | Performed by: INTERNAL MEDICINE

## 2024-12-23 PROCEDURE — 80053 COMPREHEN METABOLIC PANEL: CPT | Performed by: INTERNAL MEDICINE

## 2024-12-23 PROCEDURE — 86901 BLOOD TYPING SEROLOGIC RH(D): CPT | Performed by: INTERNAL MEDICINE

## 2025-02-10 ENCOUNTER — INFUSION (OUTPATIENT)
Dept: INFUSION THERAPY | Facility: HOSPITAL | Age: 65
End: 2025-02-10
Payer: MEDICARE

## 2025-02-10 VITALS
SYSTOLIC BLOOD PRESSURE: 150 MMHG | BODY MASS INDEX: 28.05 KG/M2 | DIASTOLIC BLOOD PRESSURE: 84 MMHG | WEIGHT: 211.63 LBS | OXYGEN SATURATION: 98 % | HEART RATE: 67 BPM | RESPIRATION RATE: 20 BRPM | HEIGHT: 73 IN | TEMPERATURE: 99 F

## 2025-02-10 DIAGNOSIS — C92.00 ACUTE MYELOID LEUKEMIA NOT HAVING ACHIEVED REMISSION: Primary | ICD-10-CM

## 2025-02-10 PROCEDURE — 63600175 PHARM REV CODE 636 W HCPCS: Performed by: INTERNAL MEDICINE

## 2025-02-10 PROCEDURE — 25000003 PHARM REV CODE 250: Performed by: INTERNAL MEDICINE

## 2025-02-10 PROCEDURE — 96401 CHEMO ANTI-NEOPL SQ/IM: CPT

## 2025-02-10 RX ORDER — AZACITIDINE 100 MG/1
75 INJECTION, POWDER, LYOPHILIZED, FOR SOLUTION INTRAVENOUS; SUBCUTANEOUS
Status: CANCELLED | OUTPATIENT
Start: 2025-02-11

## 2025-02-10 RX ORDER — ONDANSETRON HYDROCHLORIDE 8 MG/1
16 TABLET, FILM COATED ORAL
Status: CANCELLED | OUTPATIENT
Start: 2025-02-13

## 2025-02-10 RX ORDER — ONDANSETRON HYDROCHLORIDE 8 MG/1
16 TABLET, FILM COATED ORAL
Status: CANCELLED | OUTPATIENT
Start: 2025-02-10

## 2025-02-10 RX ORDER — ONDANSETRON HYDROCHLORIDE 8 MG/1
16 TABLET, FILM COATED ORAL
Status: CANCELLED | OUTPATIENT
Start: 2025-02-11

## 2025-02-10 RX ORDER — AZACITIDINE 100 MG/1
75 INJECTION, POWDER, LYOPHILIZED, FOR SOLUTION INTRAVENOUS; SUBCUTANEOUS
Status: CANCELLED | OUTPATIENT
Start: 2025-02-13

## 2025-02-10 RX ORDER — AZACITIDINE 100 MG/1
75 INJECTION, POWDER, LYOPHILIZED, FOR SOLUTION INTRAVENOUS; SUBCUTANEOUS
Status: CANCELLED | OUTPATIENT
Start: 2025-02-10

## 2025-02-10 RX ORDER — ONDANSETRON 4 MG/1
16 TABLET, FILM COATED ORAL
Status: COMPLETED | OUTPATIENT
Start: 2025-02-10 | End: 2025-02-10

## 2025-02-10 RX ORDER — AZACITIDINE 100 MG/1
75 INJECTION, POWDER, LYOPHILIZED, FOR SOLUTION INTRAVENOUS; SUBCUTANEOUS
Status: CANCELLED | OUTPATIENT
Start: 2025-02-12

## 2025-02-10 RX ORDER — ONDANSETRON HYDROCHLORIDE 8 MG/1
16 TABLET, FILM COATED ORAL
Status: CANCELLED | OUTPATIENT
Start: 2025-02-12

## 2025-02-10 RX ORDER — ONDANSETRON HYDROCHLORIDE 8 MG/1
16 TABLET, FILM COATED ORAL
Status: CANCELLED | OUTPATIENT
Start: 2025-02-14

## 2025-02-10 RX ORDER — AZACITIDINE 100 MG/1
75 INJECTION, POWDER, LYOPHILIZED, FOR SOLUTION INTRAVENOUS; SUBCUTANEOUS
Status: CANCELLED | OUTPATIENT
Start: 2025-02-14

## 2025-02-10 RX ORDER — AZACITIDINE 100 MG/1
75 INJECTION, POWDER, LYOPHILIZED, FOR SOLUTION INTRAVENOUS; SUBCUTANEOUS
Status: COMPLETED | OUTPATIENT
Start: 2025-02-10 | End: 2025-02-10

## 2025-02-10 RX ADMIN — ONDANSETRON HYDROCHLORIDE 16 MG: 4 TABLET, FILM COATED ORAL at 03:02

## 2025-02-10 RX ADMIN — AZACITIDINE 165 MG: 100 INJECTION, POWDER, LYOPHILIZED, FOR SOLUTION INTRAVENOUS; SUBCUTANEOUS at 03:02

## 2025-02-11 ENCOUNTER — INFUSION (OUTPATIENT)
Dept: INFUSION THERAPY | Facility: HOSPITAL | Age: 65
End: 2025-02-11
Payer: MEDICARE

## 2025-02-11 ENCOUNTER — OFFICE VISIT (OUTPATIENT)
Dept: HEMATOLOGY/ONCOLOGY | Facility: CLINIC | Age: 65
End: 2025-02-11
Payer: MEDICARE

## 2025-02-11 VITALS
TEMPERATURE: 98 F | SYSTOLIC BLOOD PRESSURE: 142 MMHG | RESPIRATION RATE: 18 BRPM | BODY MASS INDEX: 28.59 KG/M2 | OXYGEN SATURATION: 98 % | DIASTOLIC BLOOD PRESSURE: 71 MMHG | HEIGHT: 73 IN | WEIGHT: 215.75 LBS | HEART RATE: 59 BPM

## 2025-02-11 DIAGNOSIS — C92.00 ACUTE MYELOID LEUKEMIA NOT HAVING ACHIEVED REMISSION: Primary | ICD-10-CM

## 2025-02-11 DIAGNOSIS — Z86.73 HISTORY OF CVA (CEREBROVASCULAR ACCIDENT): ICD-10-CM

## 2025-02-11 DIAGNOSIS — C92.01 ACUTE MYELOID LEUKEMIA IN REMISSION: Primary | ICD-10-CM

## 2025-02-11 DIAGNOSIS — Z76.82 STEM CELL TRANSPLANT CANDIDATE: ICD-10-CM

## 2025-02-11 DIAGNOSIS — D84.81 IMMUNODEFICIENCY SECONDARY TO NEOPLASM: ICD-10-CM

## 2025-02-11 DIAGNOSIS — I10 HYPERTENSION, UNSPECIFIED TYPE: ICD-10-CM

## 2025-02-11 DIAGNOSIS — D49.9 IMMUNODEFICIENCY SECONDARY TO NEOPLASM: ICD-10-CM

## 2025-02-11 PROBLEM — D68.9 COAGULOPATHY: Status: RESOLVED | Noted: 2023-11-02 | Resolved: 2025-02-11

## 2025-02-11 PROBLEM — D61.818 PANCYTOPENIA: Status: RESOLVED | Noted: 2023-09-21 | Resolved: 2025-02-11

## 2025-02-11 PROBLEM — D75.829 HEPARIN INDUCED THROMBOCYTOPENIA: Status: RESOLVED | Noted: 2023-06-28 | Resolved: 2025-02-11

## 2025-02-11 PROBLEM — D69.6 THROMBOCYTOPENIA: Status: RESOLVED | Noted: 2023-11-02 | Resolved: 2025-02-11

## 2025-02-11 PROCEDURE — 63600175 PHARM REV CODE 636 W HCPCS: Performed by: INTERNAL MEDICINE

## 2025-02-11 PROCEDURE — 99999 PR PBB SHADOW E&M-EST. PATIENT-LVL IV: CPT | Mod: PBBFAC,,,

## 2025-02-11 PROCEDURE — 96401 CHEMO ANTI-NEOPL SQ/IM: CPT

## 2025-02-11 PROCEDURE — 25000003 PHARM REV CODE 250: Performed by: INTERNAL MEDICINE

## 2025-02-11 PROCEDURE — 99214 OFFICE O/P EST MOD 30 MIN: CPT | Mod: PBBFAC

## 2025-02-11 RX ORDER — ONDANSETRON 4 MG/1
16 TABLET, FILM COATED ORAL
Status: COMPLETED | OUTPATIENT
Start: 2025-02-11 | End: 2025-02-11

## 2025-02-11 RX ORDER — LORAZEPAM 1 MG/1
0.5 TABLET ORAL EVERY 6 HOURS PRN
Qty: 2 TABLET | Refills: 0 | Status: SHIPPED | OUTPATIENT
Start: 2025-02-11

## 2025-02-11 RX ORDER — AZACITIDINE 100 MG/1
75 INJECTION, POWDER, LYOPHILIZED, FOR SOLUTION INTRAVENOUS; SUBCUTANEOUS
Status: COMPLETED | OUTPATIENT
Start: 2025-02-11 | End: 2025-02-11

## 2025-02-11 RX ADMIN — AZACITIDINE 165 MG: 100 INJECTION, POWDER, LYOPHILIZED, FOR SOLUTION INTRAVENOUS; SUBCUTANEOUS at 11:02

## 2025-02-11 RX ADMIN — ONDANSETRON HYDROCHLORIDE 16 MG: 4 TABLET, FILM COATED ORAL at 11:02

## 2025-02-11 NOTE — PROGRESS NOTES
Section of Hematology and Stem Cell Transplantation  Follow Up Visit     Date of visit: 2/11/25  Visit diagnosis: No primary diagnosis found.  Referred by:  FERN Oneill MD    Oncologic History:     Primary Oncologic Diagnosis: Acute myeloid leukemia, adverse risk.    6/28/23: Diagnosed with heparin-induced thrombocytopenia. HIT Ab 1.87 OD (moderate-strong). ZAHIRA not available.   10/31/23: Peripheral blood flow cytometry sent due to worsening pancytopenia (CBC: WBC 3.43, Hgb 9, Plts 120, 19% blasts) revealed increased blasts (43.1%) concerning for acute myeloid leukemia.   11/9/23: Bone marrow biopsy revealed a hypercellular marrow (80%) with increased blasts consistent with acute myeloid leukemia. Karyotype 46,XY,del(20)(q11.2q13.3)[4]/46,XY[16]. FISH with 20q12 deletion. NGS with IDH2 (40%) and SRSF2 (40%).  12/2023: He started taking venetoclax 200mg daily (did not start azacitidine due to scheduling conflicts).    1/8/24: C1D1 of azacitidine x5 days plus venetoclax 21 of 28 days.  1/31/24: Bone marrow biopsy after cycle 1 revealed persistent AML with improvement in blasts to 3-7%. Karyotyping with 7 of 8 available metaphases with complex near-tetraploid karyotype (1 normal). NGS with IDH2 (41%) and SRSF2 (40%).  4/23/24: Bone marrow biopsy with normocellular marrow with no blasts consistent with complete remission. CG/FISH normal. NGS with IDH2 (11%) and SRSF2 (10%).   8/22/24: Bone marrow biopsy hypocellular (20%) with dysmegakaryopoiesis and marked myeloid hypoplasia.  No increase in blasts.  Diploid karyotype.  NGS IDH2 (9%) and SRSF2 (9%).    History of Present Ilness:   Gustavo Tracey Jr. (Gustavo) is a pleasant 64 y.o.male who presents for follow up. He is overall physically feeling okay with some minor aches in his joints controlled by medication. He has some minor fatigue that has been stable. He is struggling a little mentally today due to his mother passing last night. He is eating and drinking well. He  has no nausea, vomiting, diarrhea, fevers, chest pain, SOB.     PAST MEDICAL HISTORY:   Past Medical History:   Diagnosis Date    Abnormal nuclear stress test 11/26/2022    Acute myeloid leukemia     Cervical radiculopathy     to rt arm    CHF (congestive heart failure)     Chronic systolic congestive heart failure 11/28/2022    Dilated cardiomyopathy 11/26/2022    Hyperlipidemia     Hypertension     Obesity, unspecified     PAF (paroxysmal atrial fibrillation) 11/26/2022    Pulmonary HTN 11/28/2022    Stroke        PAST SURGICAL HISTORY:   Past Surgical History:   Procedure Laterality Date    BONE MARROW BIOPSY Right 1/31/2024    Procedure: Biopsy-bone marrow;  Surgeon: Rui Jacobsen MD;  Location: [facility] ENDO ([address] [place] [address]);  Service: Oncology;  Laterality: Right;  1/24-pt confirmed-MS    CLOSURE OF LEFT ATRIAL APPENDAGE USING DEVICE Left 6/22/2023    Procedure: CLOSURE, LEFT ATRIAL APPENDAGE, USING DEVICE;  Surgeon: Rustam Dave MD;  Location: [facility] OR;  Service: Cardiovascular;  Laterality: Left;  LIGATION OF LEFT ATRIAL APPENDAGE WITH OTILIA EXCLUSION SYSTEM    CORONARY ARTERY BYPASS GRAFT (CABG) N/A 6/22/2023    Procedure: CORONARY ARTERY BYPASS GRAFT (CABG);  Surgeon: Rustam Dave MD;  Location: [facility] OR;  Service: Cardiovascular;  Laterality: N/A;  3-VESSEL WITH EPI-AORTIC ULTRASOUND    PURDY MAZE PROCEDURE N/A 6/22/2023    Procedure: PURDY MAZE PROCEDURE;  Surgeon: Rustam Dave MD;  Location: [facility] OR;  Service: Cardiovascular;  Laterality: N/A;    ECHOCARDIOGRAM,TRANSESOPHAGEAL N/A 6/22/2023    Procedure: ECHOCARDIOGRAM,TRANSESOPHAGEAL;  Surgeon: Rustam Dave MD;  Location: [facility] OR;  Service: Cardiovascular;  Laterality: N/A;    ENDOSCOPIC HARVEST OF VEIN Left 6/22/2023    Procedure: SURGICAL PROCUREMENT, VEIN, ENDOSCOPIC;  Surgeon: Rustam Dave MD;  Location: [facility] OR;  Service: Cardiovascular;  Laterality: Left;    INJECTION OF ANESTHETIC AGENT AROUND MULTIPLE INTERCOSTAL NERVES N/A 6/22/2023     Procedure: BLOCK, NERVE, INTERCOSTAL, 2 OR MORE;  Surgeon: Rustam Dave MD;  Location: [facility] OR;  Service: Cardiovascular;  Laterality: N/A;  PARASTERNAL NERVE BLOCK    INSTANTANEOUS WAVE-FREE RATIO  6/20/2023    Procedure: Instantaneous Wave-Free Ratio;  Surgeon: Zion Ortega MD;  Location: [facility] CATH LAB;  Service: Cardiology;;    IVUS, CORONARY  6/20/2023    Procedure: IVUS, Coronary;  Surgeon: Zion Ortega MD;  Location: [facility] CATH LAB;  Service: Cardiology;;    LEFT HEART CATHETERIZATION Left 11/28/2022    Procedure: CATHETERIZATION, HEART, LEFT;  Surgeon: Zion Ortega MD;  Location: [facility] CATH LAB;  Service: Cardiology;  Laterality: Left;    LEFT HEART CATHETERIZATION Left 6/20/2023    Procedure: Left heart cath;  Surgeon: Zion Ortega MD;  Location: [facility] CATH LAB;  Service: Cardiology;  Laterality: Left;    PERCUTANEOUS TRANSLUMINAL BALLOON ANGIOPLASTY OF CORONARY ARTERY  6/20/2023    Procedure: Angioplasty-coronary;  Surgeon: Zion Ortega MD;  Location: [facility] CATH LAB;  Service: Cardiology;;    RIGHT HEART CATHETERIZATION N/A 11/28/2022    Procedure: INSERTION, CATHETER, RIGHT HEART;  Surgeon: Zion Ortega MD;  Location: [facility] CATH LAB;  Service: Cardiology;  Laterality: N/A;  Congestive heart failure       PAST SOCIAL HISTORY:  Social History     Tobacco Use    Smoking status: Never    Smokeless tobacco: Never   Substance Use Topics    Alcohol use: Yes    Drug use: Never       FAMILY HISTORY:  Family History   Problem Relation Name Age of Onset    Hypertension Mother      Diabetes Father      Hyperlipidemia Father      Hypertension Father      Heart attack Father      Coronary artery disease Father         CURRENT MEDICATIONS:   Current Outpatient Medications   Medication Sig    acyclovir (ZOVIRAX) 400 MG tablet Take 1 tablet (400 mg total) by mouth 2 (two) times daily.    allopurinoL (ZYLOPRIM) 300 MG tablet Take 1 tablet (300 mg total) by mouth once daily.    amitriptyline (ELAVIL) 50 MG  tablet Take 50 mg by mouth every evening.    cyclobenzaprine (FLEXERIL) 10 MG tablet Take 10 mg by mouth 2 (two) times daily as needed.    FARXIGA 10 mg tablet TAKE 1 TABLET BY MOUTH EVERY DAY    ferrous sulfate (FEOSOL) 325 mg (65 mg iron) Tab tablet Take 1 tablet by mouth once daily.    furosemide (LASIX) 20 MG tablet Take 1 tablet by mouth once daily.    HYDROcodone-acetaminophen (NORCO)  mg per tablet TAKE 1 TABLET BY MOUTH 1 TO 2 TIMES A DAY AS NEEDED    latanoprost 0.005 % ophthalmic solution Place 1 drop into both eyes nightly.    metoprolol succinate (TOPROL-XL) 100 MG 24 hr tablet Take 1 tablet by mouth once daily.    potassium chloride (K-TAB) 20 mEq Take 1 tablet (20 mEq total) by mouth 2 (two) times daily.    pravastatin (PRAVACHOL) 20 MG tablet Take 20 mg by mouth once daily.    valsartan (DIOVAN) 40 MG tablet TAKE 1 TABLET BY MOUTH EVERY DAY    venetoclax (VENCLEXTA) 100 mg Tab Take 4 tablets (400 mg total) by mouth once daily Take 4 tablets (400mg) once daily on days 1-7 of a 28 day cycle. Always start with azacitidine (Vidaza) injections..    warfarin (COUMADIN) 5 MG tablet TAKE 1 TABLET BY MOUTH ON SUN, MON, WED, FRI, SAT,& 1.5 ON TUES & THURS    aspirin (ECOTRIN) 81 MG EC tablet Take 1 tablet (81 mg total) by mouth once daily.    folic acid (FOLVITE) 1 MG tablet Take 2 tablets (2 mg total) by mouth once daily.     Current Facility-Administered Medications   Medication    0.9%  NaCl infusion (for blood administration)    0.9%  NaCl infusion (for blood administration)    acetaminophen tablet 650 mg    diphenhydrAMINE capsule 25 mg     Facility-Administered Medications Ordered in Other Visits   Medication    sodium chloride 0.9% flush 10 mL       ALLERGIES:   Review of patient's allergies indicates:  No Known Allergies      Review of Systems:     Pertinent positives and negatives included in the HPI. Otherwise a complete review of systems is negative.    Physical Exam:     Vitals:    02/11/25  1001   BP: (!) 142/71   Pulse: (!) 59   Resp: 18   Temp: 97.8 °F (36.6 °C)         Physical Exam  Constitutional:       General: He is not in acute distress.  Eyes:      General: No scleral icterus.     Extraocular Movements: Extraocular movements intact.      Conjunctiva/sclera: Conjunctivae normal.   Cardiovascular:      Rate and Rhythm: Normal rate and regular rhythm.      Pulses: Normal pulses.      Comments: Not bradycardiac on exam  Pulmonary:      Effort: Pulmonary effort is normal. No respiratory distress.      Breath sounds: Normal breath sounds. No wheezing or rhonchi.   Abdominal:      General: There is no distension.      Palpations: Abdomen is soft.   Musculoskeletal:         General: No swelling or tenderness.      Right lower leg: No edema.      Left lower leg: No edema.   Skin:     General: Skin is warm and dry.      Findings: No bruising or rash.   Neurological:      Mental Status: He is alert and oriented to person, place, and time.      Coordination: Coordination normal.      Gait: Gait normal.   Psychiatric:         Mood and Affect: Mood normal.         Behavior: Behavior normal.          ECOG Performance Status: (foot note - ECOG PS provided by [employer] Cooperative Oncology Group) 1 - Symptomatic but completely ambulatory    Karnofsky Performance Score:  90%- Able to Carry on Normal Activity: Minor Symptoms of Disease    Labs:   Lab Results   Component Value Date    WBC 4.30 02/06/2025    RBC 3.90 (L) 02/06/2025    HGB 11.2 (L) 02/06/2025    HCT 34.2 (L) 02/06/2025    MCV 88 02/06/2025    MCH 28.7 02/06/2025    MCHC 32.7 02/06/2025    RDW 19.0 (H) 02/06/2025     02/06/2025    MPV 6.9 (L) 02/06/2025    GRAN 2.7 02/06/2025    GRAN 62.4 02/06/2025    LYMPH 0.8 (L) 02/06/2025    LYMPH 19.2 02/06/2025    MONO 0.7 02/06/2025    MONO 17.0 (H) 02/06/2025    EOS 0.0 02/06/2025    BASO 0.00 02/06/2025    EOSINOPHIL 0.6 02/06/2025    BASOPHIL 0.8 02/06/2025       CMP  Sodium   Date Value Ref Range  Status   02/06/2025 138 136 - 145 mmol/L Final     Potassium   Date Value Ref Range Status   02/06/2025 4.3 3.5 - 5.1 mmol/L Final     Chloride   Date Value Ref Range Status   02/06/2025 104 95 - 110 mmol/L Final     CO2   Date Value Ref Range Status   02/06/2025 28 23 - 29 mmol/L Final     Glucose   Date Value Ref Range Status   02/06/2025 99 74 - 106 mg/dL Final     BUN   Date Value Ref Range Status   02/06/2025 23 (H) 9 - 20 mg/dL Final     Creatinine   Date Value Ref Range Status   02/06/2025 1.36 0.80 - 1.50 mg/dL Final     Calcium   Date Value Ref Range Status   02/06/2025 9.2 8.4 - 10.2 mg/dL Final     Total Protein   Date Value Ref Range Status   02/06/2025 7.6 6.3 - 8.2 g/dL Final     Albumin   Date Value Ref Range Status   02/06/2025 4.2 3.5 - 5.2 g/dL Final     Total Bilirubin   Date Value Ref Range Status   02/06/2025 0.5 0.2 - 1.3 mg/dL Final     Alkaline Phosphatase   Date Value Ref Range Status   02/06/2025 52 38 - 145 U/L Final     AST   Date Value Ref Range Status   02/06/2025 33 17 - 59 U/L Final     ALT   Date Value Ref Range Status   02/06/2025 20 10 - 44 U/L Final     Anion Gap   Date Value Ref Range Status   02/06/2025 6 (L) 8 - 16 mmol/L Final       Imaging:   Reviewed     Pathology:  Reviewed     Assessment and Plan:   Gustavo Kyung Lizama (Gustavo) is a pleasant 64 y.o.male who presents for follow up.    Acute myeloid leukemia, adverse risk:  Peripheral blood flow cytometry obtain 10/31/2023 concerning for acute myeloid leukemia.  Bone marrow biopsy obtained on 11/09/2023 revealed acute myeloid leukemia with 20q deletion on FISH and NGS with IDH2 (40%) and SRSF2 (40%). He started aza x7 plus alonzo x21 of 28 days in 1/2024. Bone marrow biopsy at the end of cycle 1 revealed persistent disease but improvement in blasts. CG now showed near-tetraploid complex karyotype in 7 of 8 metaphases. NGS with persistent IDH2 (41%) and SRSF2 (40%). Bone marrow biopsy in 4/2024 showed complete remission with  persistent molecular disease - NGS with IDH2 (11%) and SRSF2 (10%).  Bone marrow biopsy on 08/22/2024 showed continued remission with persistent dysplastic changes.  NGS with IDH2 (9%) and SRSF2 (9%). Stopped prophylactic fluconazole and levofloxacin and increased venetoclax to 400mg daily 11/13/24.  - Continue azacitidine 75mg/m2 x5 days and venetoclax 400mg daily x7 of 28 days.  - ANC >500 and Plts >50 to start each cycle.   - Plan to do a repeat bone marrow Monday afternoon.     Stem cell transplant candidate:  We discussed the role for allogeneic stem cell transplantation in this disease process as a potentially curative option. We had an extensive discussion about the rationale, logistics, risks, and benefits. We reviewed the requirement to stay in the [place] area for 100 days with a caregiver at all times. We discussed the risks, including infection, graft failure, organ toxicity, graft versus host disease, relapse of disease, and secondary cancers. We reviewed the need for long-term immunosuppression and need for close monitoring.  His HCT-CI score is 3 (high risk), although he is very fit and active without limitations.  We reviewed this today again, and we discussed that transplant would carry significant risk for both morbidity and mortality (40% 2 year NRM).  He remains undecided on whether or not to proceed with transplant. We will continue to address but lately with shared decision making we have opted to continue deferring transplant given his excellent response and limited toxicity.     History of CVA:  Repeat MRI brain on 08/05/2024 revealed patchy gliotic white matter signal change in the subcortical and periventricular regions of bilateral cerebral hemispheres.  This is reportedly changed compared to his prior MRI brain in 2012.  Given our potential for proceeding with stem cell transplant, I will refer him to Neurology for further evaluation.  - He still has not seen neurology. Referral sent  back in today.     Pancytopenia:  Monitor CBC weekly with Dr. Oneill. Transfuse 1 unit of PRBC for hgb < 7 or clinically appropriate. Transfuse 1 unit of plts if platelets < 10 or clinically appropriate.      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 ANC has been stable after treatment so stopped fluconazole and levofloxacin.      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  Heparin antibody 1.87 OD (moderate-strong). ZAHIRA negative on 12/13/23 which rules out HIT. Heparin allergy removed. Ok to stop Warfarin. Dr. Jacobsen said it was okay to stop Warfarin from our perspective, but his cardiologist recommended continuing. With negative ZAHIRA, HIT would be very unlikely. He recommended stopping Warfarin with a negative ZAHIRA and intermittent thrombocytopenia from treatment. Patient discussed with his cardiologist.     Stem cell donors:  He has one full sister and 2 children as potential donor options . He has one potential MUD.    HFrEF:  Cardiac function now back to baseline. Continue follow up with local cardiologist.     Hypertension  Likely from patient being in pain and stress from family death. Advised to follow up with PCP and cardiology.     Orders/Follow Up:        Route Chart for Scheduling    BMT Chart Routing      Follow up with physician . Follow up harvey booth week prior to next cycle of vidaza and for BMBx results   Follow up with [facility]    Provider visit type    Infusion scheduling note    Injection scheduling note Vidaza week of 3/10   Labs   Scheduling:  Preferred lab:  Lab interval:  He gets labs done with local oncologist   Imaging    Pharmacy appointment    Other referrals                Treatment Plan Information   OP AZACITIDINE 5-DAY (SUB-Q) + VENETOCLAX Rui Jacobsen MD   Associated diagnosis: Acute myeloid leukemia not having achieved remission   noted on 11/6/2023   Line of treatment: First Line  Treatment Goal: Control      Upcoming Treatment Dates - OP AZACITIDINE 5-DAY (SUB-Q) + VENETOCLAX    2/11/2025       Pre-Medications       ondansetron tablet 16 mg       Chemotherapy       azaCITIDine (VIDAZA) chemo injection 165 mg  2/12/2025       Pre-Medications       ondansetron tablet 16 mg       Chemotherapy       azaCITIDine (VIDAZA) chemo injection 165 mg  2/13/2025       Pre-Medications       ondansetron tablet 16 mg       Chemotherapy       azaCITIDine (VIDAZA) chemo injection 165 mg  2/14/2025       Pre-Medications       ondansetron tablet 16 mg       Chemotherapy       azaCITIDine (VIDAZA) chemo injection 165 mg    Supportive Plan Information  IV FLUIDS AND ELECTROLYTES Rui Jacobsen MD   Associated Diagnosis: Dehydration   noted on 9/30/2024  Associated Diagnosis: Acute myeloid leukemia not having achieved remission   noted on 11/6/2023   Line of treatment: Supportive Care   Treatment goal: Supportive     Upcoming Treatment Dates - IV FLUIDS AND ELECTROLYTES    No upcoming days in selected categories.    Therapy Plan Information  INF FLUIDS for Acute myeloid leukemia not having achieved remission, noted on 11/6/2023  None    INF FLUIDS for Acute myeloid leukemia not having achieved remission, noted on 11/6/2023  sodium chloride 0.9% bolus 500 mL 500 mL  500 mL, Intravenous, PRN      No therapy plan of the specified type found.    Total time of this visit was 40 minutes, including time spent face to face with patient and/or via video/audio, and also in preparing for today's visit for MDM and documentation. (Medical Decision Making, including consideration of possible diagnoses, management options, complex medical record review, review of diagnostic tests and information, consideration and discussion of significant complications based on comorbidities, and discussion with providers involved with the care of the patient). Greater than 50% was spent face to face with the patient counseling and coordinating care.  Visit today  included increased complexity associated with the care of the episodic problem history of CVA and HIT addressed and managing the longitudinal care of the patient due to the serious and/or complex managed problem(s) acute myeloid leukemia, pancytopenia, immunodeficiency.    Mary Anne Barnett PA-C  Malignant Hematology, Stem Cell Transplant, and Cellular Therapy  [facility]  [facility] [place]

## 2025-02-12 ENCOUNTER — INFUSION (OUTPATIENT)
Dept: INFUSION THERAPY | Facility: HOSPITAL | Age: 65
End: 2025-02-12
Payer: MEDICARE

## 2025-02-12 VITALS
TEMPERATURE: 98 F | RESPIRATION RATE: 19 BRPM | SYSTOLIC BLOOD PRESSURE: 145 MMHG | HEART RATE: 68 BPM | DIASTOLIC BLOOD PRESSURE: 70 MMHG | OXYGEN SATURATION: 99 %

## 2025-02-12 DIAGNOSIS — C92.00 ACUTE MYELOID LEUKEMIA NOT HAVING ACHIEVED REMISSION: Primary | ICD-10-CM

## 2025-02-12 PROCEDURE — 25000003 PHARM REV CODE 250: Performed by: INTERNAL MEDICINE

## 2025-02-12 PROCEDURE — 63600175 PHARM REV CODE 636 W HCPCS: Performed by: INTERNAL MEDICINE

## 2025-02-12 PROCEDURE — 96401 CHEMO ANTI-NEOPL SQ/IM: CPT

## 2025-02-12 RX ORDER — ONDANSETRON 4 MG/1
16 TABLET, FILM COATED ORAL
Status: COMPLETED | OUTPATIENT
Start: 2025-02-12 | End: 2025-02-12

## 2025-02-12 RX ORDER — AZACITIDINE 100 MG/1
75 INJECTION, POWDER, LYOPHILIZED, FOR SOLUTION INTRAVENOUS; SUBCUTANEOUS
Status: COMPLETED | OUTPATIENT
Start: 2025-02-12 | End: 2025-02-12

## 2025-02-12 RX ADMIN — AZACITIDINE 165 MG: 100 INJECTION, POWDER, LYOPHILIZED, FOR SOLUTION INTRAVENOUS; SUBCUTANEOUS at 01:02

## 2025-02-12 RX ADMIN — ONDANSETRON HYDROCHLORIDE 16 MG: 4 TABLET, FILM COATED ORAL at 01:02

## 2025-02-13 ENCOUNTER — INFUSION (OUTPATIENT)
Dept: INFUSION THERAPY | Facility: HOSPITAL | Age: 65
End: 2025-02-13
Payer: MEDICARE

## 2025-02-13 VITALS
SYSTOLIC BLOOD PRESSURE: 159 MMHG | DIASTOLIC BLOOD PRESSURE: 85 MMHG | TEMPERATURE: 98 F | HEART RATE: 55 BPM | RESPIRATION RATE: 18 BRPM

## 2025-02-13 DIAGNOSIS — C92.00 ACUTE MYELOID LEUKEMIA NOT HAVING ACHIEVED REMISSION: Primary | ICD-10-CM

## 2025-02-13 PROCEDURE — 96401 CHEMO ANTI-NEOPL SQ/IM: CPT

## 2025-02-13 PROCEDURE — 63600175 PHARM REV CODE 636 W HCPCS: Performed by: INTERNAL MEDICINE

## 2025-02-13 PROCEDURE — 25000003 PHARM REV CODE 250: Performed by: INTERNAL MEDICINE

## 2025-02-13 RX ORDER — AZACITIDINE 100 MG/1
75 INJECTION, POWDER, LYOPHILIZED, FOR SOLUTION INTRAVENOUS; SUBCUTANEOUS
Status: COMPLETED | OUTPATIENT
Start: 2025-02-13 | End: 2025-02-13

## 2025-02-13 RX ORDER — ONDANSETRON 4 MG/1
16 TABLET, FILM COATED ORAL
Status: COMPLETED | OUTPATIENT
Start: 2025-02-13 | End: 2025-02-13

## 2025-02-13 RX ADMIN — AZACITIDINE 165 MG: 100 INJECTION, POWDER, LYOPHILIZED, FOR SOLUTION INTRAVENOUS; SUBCUTANEOUS at 02:02

## 2025-02-13 RX ADMIN — ONDANSETRON HYDROCHLORIDE 16 MG: 4 TABLET, FILM COATED ORAL at 02:02

## 2025-02-14 ENCOUNTER — INFUSION (OUTPATIENT)
Dept: INFUSION THERAPY | Facility: HOSPITAL | Age: 65
End: 2025-02-14
Payer: MEDICARE

## 2025-02-14 VITALS
RESPIRATION RATE: 16 BRPM | TEMPERATURE: 98 F | OXYGEN SATURATION: 99 % | DIASTOLIC BLOOD PRESSURE: 90 MMHG | SYSTOLIC BLOOD PRESSURE: 161 MMHG | HEART RATE: 61 BPM

## 2025-02-14 DIAGNOSIS — C92.00 ACUTE MYELOID LEUKEMIA NOT HAVING ACHIEVED REMISSION: Primary | ICD-10-CM

## 2025-02-14 PROCEDURE — 96401 CHEMO ANTI-NEOPL SQ/IM: CPT

## 2025-02-14 PROCEDURE — 25000003 PHARM REV CODE 250: Performed by: INTERNAL MEDICINE

## 2025-02-14 PROCEDURE — 63600175 PHARM REV CODE 636 W HCPCS: Performed by: INTERNAL MEDICINE

## 2025-02-14 RX ORDER — AZACITIDINE 100 MG/1
75 INJECTION, POWDER, LYOPHILIZED, FOR SOLUTION INTRAVENOUS; SUBCUTANEOUS
Status: COMPLETED | OUTPATIENT
Start: 2025-02-14 | End: 2025-02-14

## 2025-02-14 RX ORDER — ONDANSETRON 4 MG/1
16 TABLET, FILM COATED ORAL
Status: COMPLETED | OUTPATIENT
Start: 2025-02-14 | End: 2025-02-14

## 2025-02-14 RX ADMIN — ONDANSETRON HYDROCHLORIDE 16 MG: 4 TABLET, FILM COATED ORAL at 01:02

## 2025-02-14 RX ADMIN — AZACITIDINE 165 MG: 100 INJECTION, POWDER, LYOPHILIZED, FOR SOLUTION INTRAVENOUS; SUBCUTANEOUS at 02:02

## 2025-02-17 ENCOUNTER — PROCEDURE VISIT (OUTPATIENT)
Dept: HEMATOLOGY/ONCOLOGY | Facility: CLINIC | Age: 65
End: 2025-02-17
Payer: MEDICARE

## 2025-02-17 VITALS
TEMPERATURE: 98 F | DIASTOLIC BLOOD PRESSURE: 86 MMHG | RESPIRATION RATE: 16 BRPM | HEART RATE: 60 BPM | SYSTOLIC BLOOD PRESSURE: 175 MMHG | OXYGEN SATURATION: 100 %

## 2025-02-17 DIAGNOSIS — C92.01 ACUTE MYELOID LEUKEMIA IN REMISSION: ICD-10-CM

## 2025-02-17 DIAGNOSIS — C92.00 ACUTE MYELOID LEUKEMIA NOT HAVING ACHIEVED REMISSION: Primary | ICD-10-CM

## 2025-02-17 DIAGNOSIS — C92.01 ACUTE MYELOID LEUKEMIA IN REMISSION: Primary | ICD-10-CM

## 2025-02-17 LAB — SPECIMEN SOURCE: NORMAL

## 2025-02-17 PROCEDURE — 88313 SPECIAL STAINS GROUP 2: CPT | Performed by: PATHOLOGY

## 2025-02-17 PROCEDURE — 88305 TISSUE EXAM BY PATHOLOGIST: CPT | Performed by: PATHOLOGY

## 2025-02-17 PROCEDURE — 88185 FLOWCYTOMETRY/TC ADD-ON: CPT | Performed by: PATHOLOGY

## 2025-02-17 PROCEDURE — 88311 DECALCIFY TISSUE: CPT | Performed by: PATHOLOGY

## 2025-02-17 PROCEDURE — 88184 FLOWCYTOMETRY/ TC 1 MARKER: CPT | Performed by: PATHOLOGY

## 2025-02-17 PROCEDURE — 88341 IMHCHEM/IMCYTCHM EA ADD ANTB: CPT | Mod: 59 | Performed by: PATHOLOGY

## 2025-02-17 PROCEDURE — 88342 IMHCHEM/IMCYTCHM 1ST ANTB: CPT | Mod: 59 | Performed by: PATHOLOGY

## 2025-02-17 RX ORDER — LIDOCAINE HYDROCHLORIDE 20 MG/ML
8 INJECTION, SOLUTION INFILTRATION; PERINEURAL
Status: COMPLETED | OUTPATIENT
Start: 2025-02-17 | End: 2025-02-17

## 2025-02-17 RX ADMIN — LIDOCAINE HYDROCHLORIDE 8 ML: 20 INJECTION, SOLUTION INFILTRATION; PERINEURAL at 01:02

## 2025-02-17 NOTE — PROCEDURES
Bone marrow    Date/Time: 2/17/2025 1:45 PM    Performed by: Suad Michel FNP-JODI  Authorized by: Rui Jacobsen MD    Aspiration?: Yes   Biopsy?: Yes      PROCEDURE NOTE:  Bone Marrow aspiration and biopsy  Indication: AML  Consent: Informed consent was obtained from patient.  Timeout: Done and documented.  Position: Prone  Site: Right iliac crest  Prep: Betadine.  Needle used: 11 gauge Jamshidi needle.  Anesthetic: 2% lidocaine 8 cc.  Biopsy: The biopsy needle was introduced into the marrow cavity and 25 mL of  aspirate was obtained without complications and sent for flow, cytogenetics, AML FISH, NGS, and DNA hold. Core biopsy obtained without difficulty and sent for routine histologic examination.  Complications: None.  EBL: minimal  Disposition: The patient was discharged home after 20 minutes.

## 2025-02-20 PROBLEM — G44.201 INTRACTABLE TENSION-TYPE HEADACHE: Status: ACTIVE | Noted: 2025-02-20

## 2025-02-20 LAB
COMMENT: NORMAL
FINAL PATHOLOGIC DIAGNOSIS: NORMAL
GROSS: NORMAL
Lab: NORMAL
MICROSCOPIC EXAM: NORMAL

## 2025-03-07 ENCOUNTER — LAB VISIT (OUTPATIENT)
Dept: LAB | Facility: HOSPITAL | Age: 65
End: 2025-03-07
Attending: INTERNAL MEDICINE
Payer: MEDICARE

## 2025-03-07 ENCOUNTER — OFFICE VISIT (OUTPATIENT)
Dept: HEMATOLOGY/ONCOLOGY | Facility: CLINIC | Age: 65
End: 2025-03-07
Payer: MEDICARE

## 2025-03-07 ENCOUNTER — PATIENT MESSAGE (OUTPATIENT)
Dept: HEMATOLOGY/ONCOLOGY | Facility: CLINIC | Age: 65
End: 2025-03-07

## 2025-03-07 VITALS
TEMPERATURE: 98 F | HEART RATE: 55 BPM | RESPIRATION RATE: 16 BRPM | HEIGHT: 73 IN | DIASTOLIC BLOOD PRESSURE: 87 MMHG | BODY MASS INDEX: 27.89 KG/M2 | SYSTOLIC BLOOD PRESSURE: 172 MMHG | OXYGEN SATURATION: 98 % | WEIGHT: 210.44 LBS

## 2025-03-07 DIAGNOSIS — D49.9 IMMUNODEFICIENCY SECONDARY TO NEOPLASM: ICD-10-CM

## 2025-03-07 DIAGNOSIS — I50.20 HEART FAILURE WITH REDUCED EJECTION FRACTION: ICD-10-CM

## 2025-03-07 DIAGNOSIS — C92.01 ACUTE MYELOID LEUKEMIA IN REMISSION: ICD-10-CM

## 2025-03-07 DIAGNOSIS — D84.81 IMMUNODEFICIENCY SECONDARY TO NEOPLASM: ICD-10-CM

## 2025-03-07 DIAGNOSIS — D61.818 PANCYTOPENIA: ICD-10-CM

## 2025-03-07 DIAGNOSIS — C92.01 ACUTE MYELOID LEUKEMIA IN REMISSION: Primary | ICD-10-CM

## 2025-03-07 DIAGNOSIS — I10 HYPERTENSION, UNSPECIFIED TYPE: ICD-10-CM

## 2025-03-07 DIAGNOSIS — D75.829 HIT (HEPARIN-INDUCED THROMBOCYTOPENIA): ICD-10-CM

## 2025-03-07 DIAGNOSIS — Z86.73 HISTORY OF CVA (CEREBROVASCULAR ACCIDENT): ICD-10-CM

## 2025-03-07 DIAGNOSIS — Z76.82 STEM CELL TRANSPLANT CANDIDATE: ICD-10-CM

## 2025-03-07 LAB
ALBUMIN SERPL BCP-MCNC: 3.8 G/DL (ref 3.5–5.2)
ALP SERPL-CCNC: 54 U/L (ref 40–150)
ALT SERPL W/O P-5'-P-CCNC: 8 U/L (ref 10–44)
ANION GAP SERPL CALC-SCNC: 7 MMOL/L (ref 8–16)
ANISOCYTOSIS BLD QL SMEAR: SLIGHT
AST SERPL-CCNC: 18 U/L (ref 10–40)
BASOPHILS # BLD AUTO: 0.01 K/UL (ref 0–0.2)
BASOPHILS NFR BLD: 0.5 % (ref 0–1.9)
BILIRUB SERPL-MCNC: 0.7 MG/DL (ref 0.1–1)
BUN SERPL-MCNC: 20 MG/DL (ref 8–23)
CALCIUM SERPL-MCNC: 9.1 MG/DL (ref 8.7–10.5)
CHLORIDE SERPL-SCNC: 105 MMOL/L (ref 95–110)
CO2 SERPL-SCNC: 27 MMOL/L (ref 23–29)
CREAT SERPL-MCNC: 1.2 MG/DL (ref 0.5–1.4)
DACRYOCYTES BLD QL SMEAR: ABNORMAL
DIFFERENTIAL METHOD BLD: ABNORMAL
EOSINOPHIL # BLD AUTO: 0 K/UL (ref 0–0.5)
EOSINOPHIL NFR BLD: 0 % (ref 0–8)
ERYTHROCYTE [DISTWIDTH] IN BLOOD BY AUTOMATED COUNT: 18 % (ref 11.5–14.5)
EST. GFR  (NO RACE VARIABLE): >60 ML/MIN/1.73 M^2
GLUCOSE SERPL-MCNC: 84 MG/DL (ref 70–110)
HCT VFR BLD AUTO: 31.7 % (ref 40–54)
HGB BLD-MCNC: 9.8 G/DL (ref 14–18)
IMM GRANULOCYTES # BLD AUTO: 0.02 K/UL (ref 0–0.04)
IMM GRANULOCYTES NFR BLD AUTO: 1 % (ref 0–0.5)
LDH SERPL L TO P-CCNC: 135 U/L (ref 110–260)
LYMPHOCYTES # BLD AUTO: 0.8 K/UL (ref 1–4.8)
LYMPHOCYTES NFR BLD: 38.8 % (ref 18–48)
MAGNESIUM SERPL-MCNC: 2.1 MG/DL (ref 1.6–2.6)
MCH RBC QN AUTO: 28.1 PG (ref 27–31)
MCHC RBC AUTO-ENTMCNC: 30.9 G/DL (ref 32–36)
MCV RBC AUTO: 91 FL (ref 82–98)
MONOCYTES # BLD AUTO: 0 K/UL (ref 0.3–1)
MONOCYTES NFR BLD: 1.9 % (ref 4–15)
NEUTROPHILS # BLD AUTO: 1.2 K/UL (ref 1.8–7.7)
NEUTROPHILS NFR BLD: 57.8 % (ref 38–73)
NRBC BLD-RTO: 0 /100 WBC
OVALOCYTES BLD QL SMEAR: ABNORMAL
PHOSPHATE SERPL-MCNC: 3.4 MG/DL (ref 2.7–4.5)
PLATELET # BLD AUTO: 330 K/UL (ref 150–450)
PLATELET BLD QL SMEAR: ABNORMAL
PMV BLD AUTO: 9.6 FL (ref 9.2–12.9)
POIKILOCYTOSIS BLD QL SMEAR: SLIGHT
POTASSIUM SERPL-SCNC: 4.4 MMOL/L (ref 3.5–5.1)
PROT SERPL-MCNC: 7.4 G/DL (ref 6–8.4)
RBC # BLD AUTO: 3.49 M/UL (ref 4.6–6.2)
SCHISTOCYTES BLD QL SMEAR: ABNORMAL
SODIUM SERPL-SCNC: 139 MMOL/L (ref 136–145)
URATE SERPL-MCNC: 6.4 MG/DL (ref 3.4–7)
WBC # BLD AUTO: 2.06 K/UL (ref 3.9–12.7)

## 2025-03-07 PROCEDURE — 85025 COMPLETE CBC W/AUTO DIFF WBC: CPT | Performed by: INTERNAL MEDICINE

## 2025-03-07 PROCEDURE — 83735 ASSAY OF MAGNESIUM: CPT | Performed by: INTERNAL MEDICINE

## 2025-03-07 PROCEDURE — 99999 PR PBB SHADOW E&M-EST. PATIENT-LVL IV: CPT | Mod: PBBFAC,,, | Performed by: PHYSICIAN ASSISTANT

## 2025-03-07 PROCEDURE — 99214 OFFICE O/P EST MOD 30 MIN: CPT | Mod: PBBFAC | Performed by: PHYSICIAN ASSISTANT

## 2025-03-07 PROCEDURE — 84100 ASSAY OF PHOSPHORUS: CPT | Performed by: INTERNAL MEDICINE

## 2025-03-07 PROCEDURE — 83615 LACTATE (LD) (LDH) ENZYME: CPT | Performed by: INTERNAL MEDICINE

## 2025-03-07 PROCEDURE — 36415 COLL VENOUS BLD VENIPUNCTURE: CPT | Performed by: INTERNAL MEDICINE

## 2025-03-07 PROCEDURE — 84550 ASSAY OF BLOOD/URIC ACID: CPT | Performed by: INTERNAL MEDICINE

## 2025-03-07 PROCEDURE — 80053 COMPREHEN METABOLIC PANEL: CPT | Performed by: INTERNAL MEDICINE

## 2025-03-07 RX ORDER — ONDANSETRON HYDROCHLORIDE 8 MG/1
16 TABLET, FILM COATED ORAL
OUTPATIENT
Start: 2025-03-10

## 2025-03-07 RX ORDER — ONDANSETRON HYDROCHLORIDE 8 MG/1
16 TABLET, FILM COATED ORAL
OUTPATIENT
Start: 2025-03-13

## 2025-03-07 RX ORDER — ONDANSETRON HYDROCHLORIDE 8 MG/1
16 TABLET, FILM COATED ORAL
OUTPATIENT
Start: 2025-03-11

## 2025-03-07 RX ORDER — AZACITIDINE 100 MG/1
75 INJECTION, POWDER, LYOPHILIZED, FOR SOLUTION INTRAVENOUS; SUBCUTANEOUS
OUTPATIENT
Start: 2025-03-11

## 2025-03-07 RX ORDER — AZACITIDINE 100 MG/1
75 INJECTION, POWDER, LYOPHILIZED, FOR SOLUTION INTRAVENOUS; SUBCUTANEOUS
OUTPATIENT
Start: 2025-03-10

## 2025-03-07 RX ORDER — AZACITIDINE 100 MG/1
75 INJECTION, POWDER, LYOPHILIZED, FOR SOLUTION INTRAVENOUS; SUBCUTANEOUS
OUTPATIENT
Start: 2025-03-14

## 2025-03-07 RX ORDER — AZACITIDINE 100 MG/1
75 INJECTION, POWDER, LYOPHILIZED, FOR SOLUTION INTRAVENOUS; SUBCUTANEOUS
OUTPATIENT
Start: 2025-03-13

## 2025-03-07 RX ORDER — AZACITIDINE 100 MG/1
75 INJECTION, POWDER, LYOPHILIZED, FOR SOLUTION INTRAVENOUS; SUBCUTANEOUS
OUTPATIENT
Start: 2025-03-12

## 2025-03-07 RX ORDER — ONDANSETRON HYDROCHLORIDE 8 MG/1
16 TABLET, FILM COATED ORAL
OUTPATIENT
Start: 2025-03-14

## 2025-03-07 RX ORDER — ONDANSETRON HYDROCHLORIDE 8 MG/1
16 TABLET, FILM COATED ORAL
OUTPATIENT
Start: 2025-03-12

## 2025-03-07 NOTE — PROGRESS NOTES
Section of Hematology and Stem Cell Transplantation  Follow Up Visit     Date of visit: 3/7/25  Visit diagnosis: Acute myeloid leukemia in remission [C92.01]  Referred by:  FERN Oneill MD    Oncologic History:     Primary Oncologic Diagnosis: Acute myeloid leukemia, adverse risk.    6/28/23: Diagnosed with heparin-induced thrombocytopenia. HIT Ab 1.87 OD (moderate-strong). ZAHIRA not available.   10/31/23: Peripheral blood flow cytometry sent due to worsening pancytopenia (CBC: WBC 3.43, Hgb 9, Plts 120, 19% blasts) revealed increased blasts (43.1%) concerning for acute myeloid leukemia.   11/9/23: Bone marrow biopsy revealed a hypercellular marrow (80%) with increased blasts consistent with acute myeloid leukemia. Karyotype 46,XY,del(20)(q11.2q13.3)[4]/46,XY[16]. FISH with 20q12 deletion. NGS with IDH2 (40%) and SRSF2 (40%).  12/2023: He started taking venetoclax 200mg daily (did not start azacitidine due to scheduling conflicts).    1/8/24: C1D1 of azacitidine x5 days plus venetoclax 21 of 28 days.  1/31/24: Bone marrow biopsy after cycle 1 revealed persistent AML with improvement in blasts to 3-7%. Karyotyping with 7 of 8 available metaphases with complex near-tetraploid karyotype (1 normal). NGS with IDH2 (41%) and SRSF2 (40%).  4/23/24: Bone marrow biopsy with normocellular marrow with no blasts consistent with complete remission. CG/FISH normal. NGS with IDH2 (11%) and SRSF2 (10%).   8/22/24: Bone marrow biopsy hypocellular (20%) with dysmegakaryopoiesis and marked myeloid hypoplasia.  No increase in blasts.  Diploid karyotype.  NGS IDH2 (9%) and SRSF2 (9%).  2/17/24: Bone marrow biopsy hypocellular (20%) with dysmegakaryopoiesis and marked myeloid hypoplasia.  No increase in blasts.  Diploid karyotype.  NGS IDH2 (42%), SRSF2 (42%), SH2B3 (31%). FISH normal.     History of Present Ilness:   Gustavo Tracey  (Gustavo) is a pleasant 64 y.o.male who presents for follow up. He is overall physically feeling okay  with some minor aches in his joints controlled by medication. Minor fatigue stable. Tolerating therapy well after resuming in February (missed therapy in January).     Reviewed BMBx which continues to show morphologic remission, but worsening dysplastic changes on NGS - he is interested in pursuing SCT candidacy evaluation. Plans to see dentist in coming weeks and addressing need for caregiver.     PAST MEDICAL HISTORY:   Past Medical History:   Diagnosis Date    Abnormal nuclear stress test 11/26/2022    Acute myeloid leukemia     Cervical radiculopathy     to rt arm    CHF (congestive heart failure)     Chronic systolic congestive heart failure 11/28/2022    Dilated cardiomyopathy 11/26/2022    Hyperlipidemia     Hypertension     Obesity, unspecified     PAF (paroxysmal atrial fibrillation) 11/26/2022    Pulmonary HTN 11/28/2022    Stroke        PAST SURGICAL HISTORY:   Past Surgical History:   Procedure Laterality Date    BONE MARROW BIOPSY Right 1/31/2024    Procedure: Biopsy-bone marrow;  Surgeon: Rui Jacobsen MD;  Location: [address]);  Service: Oncology;  Laterality: Right;  1/24-pt confirmed-MS    CLOSURE OF LEFT ATRIAL APPENDAGE USING DEVICE Left 6/22/2023    Procedure: CLOSURE, LEFT ATRIAL APPENDAGE, USING DEVICE;  Surgeon: Rustam Dave MD;  Location: [facility] OR;  Service: Cardiovascular;  Laterality: Left;  LIGATION OF LEFT ATRIAL APPENDAGE WITH OTILIA EXCLUSION SYSTEM    CORONARY ARTERY BYPASS GRAFT (CABG) N/A 6/22/2023    Procedure: CORONARY ARTERY BYPASS GRAFT (CABG);  Surgeon: Rustam Dave MD;  Location: [facility] OR;  Service: Cardiovascular;  Laterality: N/A;  3-VESSEL WITH EPI-AORTIC ULTRASOUND    PURDY MAZE PROCEDURE N/A 6/22/2023    Procedure: PURDY MAZE PROCEDURE;  Surgeon: Rustam Dave MD;  Location: [facility] OR;  Service: Cardiovascular;  Laterality: N/A;    ECHOCARDIOGRAM,TRANSESOPHAGEAL N/A 6/22/2023    Procedure: ECHOCARDIOGRAM,TRANSESOPHAGEAL;  Surgeon: Rustam Dave MD;   Location: [facility] OR;  Service: Cardiovascular;  Laterality: N/A;    ENDOSCOPIC HARVEST OF VEIN Left 6/22/2023    Procedure: SURGICAL PROCUREMENT, VEIN, ENDOSCOPIC;  Surgeon: Rustam Dave MD;  Location: [facility] OR;  Service: Cardiovascular;  Laterality: Left;    INJECTION OF ANESTHETIC AGENT AROUND MULTIPLE INTERCOSTAL NERVES N/A 6/22/2023    Procedure: BLOCK, NERVE, INTERCOSTAL, 2 OR MORE;  Surgeon: Rustam Dave MD;  Location: [facility];  Service: Cardiovascular;  Laterality: N/A;  PARASTERNAL NERVE BLOCK    INSTANTANEOUS WAVE-FREE RATIO  6/20/2023    Procedure: Instantaneous Wave-Free Ratio;  Surgeon: Zion Ortega MD;  Location: [facility] CATH LAB;  Service: Cardiology;;    IVUS, CORONARY  6/20/2023    Procedure: IVUS, Coronary;  Surgeon: Zion Ortega MD;  Location: [facility] CATH LAB;  Service: Cardiology;;    LEFT HEART CATHETERIZATION Left 11/28/2022    Procedure: CATHETERIZATION, HEART, LEFT;  Surgeon: Zion Ortega MD;  Location: [facility] CATH LAB;  Service: Cardiology;  Laterality: Left;    LEFT HEART CATHETERIZATION Left 6/20/2023    Procedure: Left heart cath;  Surgeon: Zion Ortega MD;  Location: [facility] CATH LAB;  Service: Cardiology;  Laterality: Left;    PERCUTANEOUS TRANSLUMINAL BALLOON ANGIOPLASTY OF CORONARY ARTERY  6/20/2023    Procedure: Angioplasty-coronary;  Surgeon: Zion Ortega MD;  Location: [facility] CATH LAB;  Service: Cardiology;;    RIGHT HEART CATHETERIZATION N/A 11/28/2022    Procedure: INSERTION, CATHETER, RIGHT HEART;  Surgeon: Zion Ortega MD;  Location: [facility] CATH LAB;  Service: Cardiology;  Laterality: N/A;  Congestive heart failure       PAST SOCIAL HISTORY:  Social History     Tobacco Use    Smoking status: Never    Smokeless tobacco: Never   Substance Use Topics    Alcohol use: Yes    Drug use: Never       FAMILY HISTORY:  Family History   Problem Relation Name Age of Onset    Hypertension Mother      Diabetes Father      Hyperlipidemia Father      Hypertension Father      Heart  attack Father      Coronary artery disease Father         CURRENT MEDICATIONS:   Current Outpatient Medications   Medication Sig    acyclovir (ZOVIRAX) 400 MG tablet Take 1 tablet (400 mg total) by mouth 2 (two) times daily.    allopurinoL (ZYLOPRIM) 300 MG tablet Take 1 tablet (300 mg total) by mouth once daily.    amitriptyline (ELAVIL) 50 MG tablet Take 50 mg by mouth every evening.    aspirin (ECOTRIN) 81 MG EC tablet Take 1 tablet (81 mg total) by mouth once daily.    cyclobenzaprine (FLEXERIL) 10 MG tablet Take 10 mg by mouth 2 (two) times daily as needed.    FARXIGA 10 mg tablet TAKE 1 TABLET BY MOUTH EVERY DAY    ferrous sulfate (FEOSOL) 325 mg (65 mg iron) Tab tablet Take 1 tablet by mouth once daily.    folic acid (FOLVITE) 1 MG tablet Take 2 tablets (2 mg total) by mouth once daily.    furosemide (LASIX) 20 MG tablet Take 1 tablet by mouth once daily.    HYDROcodone-acetaminophen (NORCO)  mg per tablet TAKE 1 TABLET BY MOUTH 1 TO 2 TIMES A DAY AS NEEDED    latanoprost 0.005 % ophthalmic solution Place 1 drop into both eyes nightly.    LORazepam (ATIVAN) 1 MG tablet Take 0.5 tablets (0.5 mg total) by mouth every 6 (six) hours as needed for Anxiety. Take one hour before bone marrow biopsy.    metoprolol succinate (TOPROL-XL) 100 MG 24 hr tablet Take 1 tablet by mouth once daily.    potassium chloride (K-TAB) 20 mEq Take 1 tablet (20 mEq total) by mouth 2 (two) times daily.    pravastatin (PRAVACHOL) 20 MG tablet Take 20 mg by mouth once daily.    valsartan (DIOVAN) 40 MG tablet TAKE 1 TABLET BY MOUTH EVERY DAY    venetoclax (VENCLEXTA) 100 mg Tab Take 4 tablets (400 mg total) by mouth once daily Take 4 tablets (400mg) once daily on days 1-7 of a 28 day cycle. Always start with azacitidine (Vidaza) injections..    warfarin (COUMADIN) 5 MG tablet TAKE 1 TABLET BY MOUTH ON SUN, MON, WED, FRI, SAT,& 1.5 ON TUES & THURS     Current Facility-Administered Medications   Medication    0.9%  NaCl infusion (for  blood administration)    0.9%  NaCl infusion (for blood administration)    acetaminophen tablet 650 mg    diphenhydrAMINE capsule 25 mg     Facility-Administered Medications Ordered in Other Visits   Medication    sodium chloride 0.9% flush 10 mL       ALLERGIES:   Review of patient's allergies indicates:  No Known Allergies      Review of Systems:     Pertinent positives and negatives included in the HPI. Otherwise a complete review of systems is negative.    Physical Exam:     Vitals:    03/07/25 1124   BP: (!) 172/87   Pulse: (!) 55   Resp: 16   Temp: 97.7 °F (36.5 °C)           Physical Exam  Constitutional:       General: He is not in acute distress.  Eyes:      General: No scleral icterus.     Extraocular Movements: Extraocular movements intact.      Conjunctiva/sclera: Conjunctivae normal.   Cardiovascular:      Rate and Rhythm: Normal rate and regular rhythm.      Pulses: Normal pulses.      Comments: Not bradycardiac on exam  Pulmonary:      Effort: Pulmonary effort is normal. No respiratory distress.      Breath sounds: Normal breath sounds. No wheezing or rhonchi.   Abdominal:      General: There is no distension.      Palpations: Abdomen is soft.   Musculoskeletal:         General: No swelling or tenderness.      Right lower leg: No edema.      Left lower leg: No edema.   Skin:     General: Skin is warm and dry.      Findings: No bruising or rash.   Neurological:      Mental Status: He is alert and oriented to person, place, and time.      Coordination: Coordination normal.      Gait: Gait normal.   Psychiatric:         Mood and Affect: Mood normal.         Behavior: Behavior normal.          ECOG Performance Status: (foot note - ECOG PS provided by Eastern Cooperative Oncology Group) 1 - Symptomatic but completely ambulatory    Karnofsky Performance Score:  90%- Able to Carry on Normal Activity: Minor Symptoms of Disease    Labs:   Lab Results   Component Value Date    WBC 2.06 (L) 03/07/2025    RBC  3.49 (L) 03/07/2025    HGB 9.8 (L) 03/07/2025    HCT 31.7 (L) 03/07/2025    MCV 91 03/07/2025    MCH 28.1 03/07/2025    MCHC 30.9 (L) 03/07/2025    RDW 18.0 (H) 03/07/2025     03/07/2025    MPV 9.6 03/07/2025    GRAN 68.0 03/06/2025    LYMPH 28.0 03/06/2025    MONO 3.0 (L) 03/06/2025    EOS 0.1 02/20/2025    BASO 0.10 02/20/2025    EOSINOPHIL 1.0 03/06/2025    BASOPHIL 0.0 03/06/2025       CMP  Sodium   Date Value Ref Range Status   03/07/2025 139 136 - 145 mmol/L Final     Potassium   Date Value Ref Range Status   03/07/2025 4.4 3.5 - 5.1 mmol/L Final     Chloride   Date Value Ref Range Status   03/07/2025 105 95 - 110 mmol/L Final     CO2   Date Value Ref Range Status   03/07/2025 27 23 - 29 mmol/L Final     Glucose   Date Value Ref Range Status   03/07/2025 84 70 - 110 mg/dL Final     BUN   Date Value Ref Range Status   03/07/2025 20 8 - 23 mg/dL Final     Creatinine   Date Value Ref Range Status   03/07/2025 1.2 0.5 - 1.4 mg/dL Final     Calcium   Date Value Ref Range Status   03/07/2025 9.1 8.7 - 10.5 mg/dL Final     Total Protein   Date Value Ref Range Status   03/07/2025 7.4 6.0 - 8.4 g/dL Final     Albumin   Date Value Ref Range Status   03/07/2025 3.8 3.5 - 5.2 g/dL Final     Total Bilirubin   Date Value Ref Range Status   03/07/2025 0.7 0.1 - 1.0 mg/dL Final     Comment:     For infants and newborns, interpretation of results should be based  on gestational age, weight and in agreement with clinical  observations.    Premature Infant recommended reference ranges:  Up to 24 hours.............<8.0 mg/dL  Up to 48 hours............<12.0 mg/dL  3-5 days..................<15.0 mg/dL  6-29 days.................<15.0 mg/dL       Alkaline Phosphatase   Date Value Ref Range Status   03/07/2025 54 40 - 150 U/L Final     AST   Date Value Ref Range Status   03/07/2025 18 10 - 40 U/L Final     ALT   Date Value Ref Range Status   03/07/2025 8 (L) 10 - 44 U/L Final     Anion Gap   Date Value Ref Range Status    03/07/2025 7 (L) 8 - 16 mmol/L Final       Imaging:   Reviewed     Pathology:  Reviewed     Assessment and Plan:   Gustavo Tracey Jr. (Gustavo) is a pleasant 64 y.o.male who presents for follow up.    Acute myeloid leukemia, adverse risk:  Peripheral blood flow cytometry obtain 10/31/2023 concerning for acute myeloid leukemia.  Bone marrow biopsy obtained on 11/09/2023 revealed acute myeloid leukemia with 20q deletion on FISH and NGS with IDH2 (40%) and SRSF2 (40%). He started aza x7 plus alonzo x21 of 28 days in 1/2024. Bone marrow biopsy at the end of cycle 1 revealed persistent disease but improvement in blasts. CG now showed near-tetraploid complex karyotype in 7 of 8 metaphases. NGS with persistent IDH2 (41%) and SRSF2 (40%). Bone marrow biopsy in 4/2024 showed complete remission with persistent molecular disease - NGS with IDH2 (11%) and SRSF2 (10%).  Bone marrow biopsy on 08/22/2024 showed continued remission with persistent dysplastic changes.  NGS with IDH2 (9%) and SRSF2 (9%). Stopped prophylactic fluconazole and levofloxacin and increased venetoclax to 400mg daily 11/13/24.    Most recent bone marrow biopsy on 2/17/25 shows hypocellularity (20%) with dysmegakaryopoiesis and marked myeloid hypoplasia.  No increase in blasts.  Diploid karyotype.  NGS with progression of dysplastic changes: IDH2 (42%), SRSF2 (42%), SH2B3 (31%). FISH normal.     - Continue azacitidine 75mg/m2 x5 days and venetoclax 400mg daily x7 of 28 days.  - Mr. Tracey is interested in pursuing SCT candidacy evaluation. We will discuss at next transplant meeting and have coordination team reach out.     Stem cell transplant candidate:  We discussed the role for allogeneic stem cell transplantation in this disease process as a potentially curative option. We had an extensive discussion about the rationale, logistics, risks, and benefits. We reviewed the requirement to stay in the [place] area for 100 days with a caregiver at all times.  We discussed the risks, including infection, graft failure, organ toxicity, graft versus host disease, relapse of disease, and secondary cancers. We reviewed the need for long-term immunosuppression and need for close monitoring.  His HCT-CI score is 3 (high risk), although he is very fit and active without limitations.  We reviewed this today again, and we discussed that transplant would carry significant risk for both morbidity and mortality (40% 2 year NRM).  He remains undecided on whether or not to proceed with transplant. We will continue to address but lately with shared decision making we have opted to continue deferring transplant given his excellent response and limited toxicity.     History of CVA:  Repeat MRI brain on 08/05/2024 revealed patchy gliotic white matter signal change in the subcortical and periventricular regions of bilateral cerebral hemispheres.  This is reportedly changed compared to his prior MRI brain in 2012.  Given our potential for proceeding with stem cell transplant, I will refer him to Neurology for further evaluation.  - Request neurology referral to be scheduled     Pancytopenia:  Monitor CBC weekly with Dr. Oneill. Transfuse 1 unit of PRBC for hgb < 7 or clinically appropriate. Transfuse 1 unit of plts if platelets < 10 or clinically appropriate.      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 ANC has been stable after treatment so stopped fluconazole and levofloxacin.      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  Heparin antibody 1.87 OD (moderate-strong). ZAHIRA negative on 12/13/23 which rules out HIT. Heparin allergy removed. Ok to stop Warfarin. Dr. Jacobsen said it was okay to stop Warfarin from our perspective, but his cardiologist recommended continuing. With negative ZAHIRA, HIT would be very unlikely. He recommended stopping Warfarin with a negative ZAHIRA and intermittent thrombocytopenia from treatment.  Patient discussed with his cardiologist.     Stem cell donors:  He has one full sister and 2 children as potential donor options . He has one potential MUD (13/16 match).    HFrEF:  Cardiac function now back to baseline. Continue follow up with local cardiologist.     Hypertension  Asymptomatic. Multifactorial, endorses compliance with anti-HTN regimen. Advised to follow up with PCP and cardiology.     Orders/Follow Up:        Route Chart for Scheduling    BMT Chart Routing      Follow up with physician . F/u with anayeli or moi before next cycle of Vidaza w/ labs   Follow up with CH[facility]IS    Provider visit type    Infusion scheduling note    Injection scheduling note vidaza 4/7-4/11   Labs CBC, CMP, LDH, uric acid, magnesium and phosphorus   Scheduling:  Preferred lab:  Lab interval:     Imaging    Pharmacy appointment    Other referrals       Additional referrals needed  neurology referral placed             Treatment Plan Information   OP AZACITIDINE 5-DAY (SUB-Q) + VENETOCLAX Rui Jacobsen MD   Associated diagnosis: Acute myeloid leukemia not having achieved remission   noted on 11/6/2023   Line of treatment: First Line  Treatment Goal: Control     Upcoming Treatment Dates - OP AZACITIDINE 5-DAY (SUB-Q) + VENETOCLAX    No upcoming days in selected categories.    Supportive Plan Information  IV FLUIDS AND ELECTROLYTES Rui Jacobsen MD   Associated Diagnosis: Dehydration   noted on 9/30/2024  Associated Diagnosis: Acute myeloid leukemia not having achieved remission   noted on 11/6/2023   Line of treatment: Supportive Care   Treatment goal: Supportive     Upcoming Treatment Dates - IV FLUIDS AND ELECTROLYTES    No upcoming days in selected categories.    Therapy Plan Information  INF FLUIDS for Acute myeloid leukemia not having achieved remission, noted on 11/6/2023  None    INF FLUIDS for Acute myeloid leukemia not having achieved remission, noted on 11/6/2023  sodium chloride 0.9% bolus 500 mL 500  mL  500 mL, Intravenous, PRN      No therapy plan of the specified type found.    Total time of this visit was 40 minutes, including time spent face to face with patient and/or via video/audio, and also in preparing for today's visit for MDM and documentation. (Medical Decision Making, including consideration of possible diagnoses, management options, complex medical record review, review of diagnostic tests and information, consideration and discussion of significant complications based on comorbidities, and discussion with providers involved with the care of the patient). Greater than 50% was spent face to face with the patient counseling and coordinating care.  Visit today included increased complexity associated with the care of the episodic problem history of CVA and HIT addressed and managing the longitudinal care of the patient due to the serious and/or complex managed problem(s) acute myeloid leukemia, pancytopenia, immunodeficiency.    Sherlyn Hills PA-C  Malignant Hematology, Stem Cell Transplant, and Cellular Therapy  [facility]  [facility]

## 2025-03-11 ENCOUNTER — INFUSION (OUTPATIENT)
Dept: INFUSION THERAPY | Facility: HOSPITAL | Age: 65
End: 2025-03-11
Payer: MEDICARE

## 2025-03-11 VITALS
SYSTOLIC BLOOD PRESSURE: 145 MMHG | RESPIRATION RATE: 18 BRPM | HEART RATE: 50 BPM | DIASTOLIC BLOOD PRESSURE: 81 MMHG | WEIGHT: 207 LBS | OXYGEN SATURATION: 100 % | BODY MASS INDEX: 27.31 KG/M2

## 2025-03-11 DIAGNOSIS — C92.00 ACUTE MYELOID LEUKEMIA NOT HAVING ACHIEVED REMISSION: Primary | ICD-10-CM

## 2025-03-11 PROCEDURE — 96401 CHEMO ANTI-NEOPL SQ/IM: CPT

## 2025-03-11 PROCEDURE — 25000003 PHARM REV CODE 250: Performed by: PHYSICIAN ASSISTANT

## 2025-03-11 PROCEDURE — 63600175 PHARM REV CODE 636 W HCPCS: Performed by: PHYSICIAN ASSISTANT

## 2025-03-11 RX ORDER — AZACITIDINE 100 MG/1
75 INJECTION, POWDER, LYOPHILIZED, FOR SOLUTION INTRAVENOUS; SUBCUTANEOUS
Status: COMPLETED | OUTPATIENT
Start: 2025-03-11 | End: 2025-03-11

## 2025-03-11 RX ORDER — ONDANSETRON 4 MG/1
16 TABLET, FILM COATED ORAL
Status: COMPLETED | OUTPATIENT
Start: 2025-03-11 | End: 2025-03-11

## 2025-03-11 RX ADMIN — ONDANSETRON HYDROCHLORIDE 16 MG: 4 TABLET, FILM COATED ORAL at 10:03

## 2025-03-11 RX ADMIN — AZACITIDINE 165 MG: 100 INJECTION, POWDER, LYOPHILIZED, FOR SOLUTION INTRAVENOUS; SUBCUTANEOUS at 11:03

## 2025-03-12 ENCOUNTER — INFUSION (OUTPATIENT)
Dept: INFUSION THERAPY | Facility: HOSPITAL | Age: 65
End: 2025-03-12
Payer: MEDICARE

## 2025-03-12 VITALS
DIASTOLIC BLOOD PRESSURE: 69 MMHG | OXYGEN SATURATION: 100 % | SYSTOLIC BLOOD PRESSURE: 137 MMHG | HEART RATE: 67 BPM | RESPIRATION RATE: 18 BRPM | TEMPERATURE: 98 F

## 2025-03-12 DIAGNOSIS — C92.00 ACUTE MYELOID LEUKEMIA NOT HAVING ACHIEVED REMISSION: Primary | ICD-10-CM

## 2025-03-12 PROCEDURE — 25000003 PHARM REV CODE 250: Performed by: PHYSICIAN ASSISTANT

## 2025-03-12 PROCEDURE — 63600175 PHARM REV CODE 636 W HCPCS: Performed by: PHYSICIAN ASSISTANT

## 2025-03-12 PROCEDURE — 96401 CHEMO ANTI-NEOPL SQ/IM: CPT

## 2025-03-12 RX ORDER — AZACITIDINE 100 MG/1
75 INJECTION, POWDER, LYOPHILIZED, FOR SOLUTION INTRAVENOUS; SUBCUTANEOUS
Status: COMPLETED | OUTPATIENT
Start: 2025-03-12 | End: 2025-03-12

## 2025-03-12 RX ORDER — ONDANSETRON 4 MG/1
16 TABLET, FILM COATED ORAL
Status: COMPLETED | OUTPATIENT
Start: 2025-03-12 | End: 2025-03-12

## 2025-03-12 RX ADMIN — AZACITIDINE 165 MG: 100 INJECTION, POWDER, LYOPHILIZED, FOR SOLUTION INTRAVENOUS; SUBCUTANEOUS at 10:03

## 2025-03-12 RX ADMIN — ONDANSETRON HYDROCHLORIDE 16 MG: 4 TABLET, FILM COATED ORAL at 10:03

## 2025-03-12 NOTE — NURSING
Introduced self to pt.  Updated pt on plan of care, tolerated Vidaza injection well.  Pt scheduled for next appointment.

## 2025-03-13 ENCOUNTER — INFUSION (OUTPATIENT)
Dept: INFUSION THERAPY | Facility: HOSPITAL | Age: 65
End: 2025-03-13
Payer: MEDICARE

## 2025-03-13 VITALS — DIASTOLIC BLOOD PRESSURE: 65 MMHG | SYSTOLIC BLOOD PRESSURE: 127 MMHG

## 2025-03-13 DIAGNOSIS — C92.00 ACUTE MYELOID LEUKEMIA NOT HAVING ACHIEVED REMISSION: Primary | ICD-10-CM

## 2025-03-13 PROCEDURE — 25000003 PHARM REV CODE 250: Performed by: PHYSICIAN ASSISTANT

## 2025-03-13 PROCEDURE — 96401 CHEMO ANTI-NEOPL SQ/IM: CPT

## 2025-03-13 PROCEDURE — 63600175 PHARM REV CODE 636 W HCPCS: Performed by: PHYSICIAN ASSISTANT

## 2025-03-13 RX ORDER — ONDANSETRON 4 MG/1
16 TABLET, FILM COATED ORAL
Status: COMPLETED | OUTPATIENT
Start: 2025-03-13 | End: 2025-03-13

## 2025-03-13 RX ORDER — AZACITIDINE 100 MG/1
75 INJECTION, POWDER, LYOPHILIZED, FOR SOLUTION INTRAVENOUS; SUBCUTANEOUS
Status: COMPLETED | OUTPATIENT
Start: 2025-03-13 | End: 2025-03-13

## 2025-03-13 RX ADMIN — ONDANSETRON HYDROCHLORIDE 16 MG: 4 TABLET, FILM COATED ORAL at 10:03

## 2025-03-13 RX ADMIN — AZACITIDINE 165 MG: 100 INJECTION, POWDER, LYOPHILIZED, FOR SOLUTION INTRAVENOUS; SUBCUTANEOUS at 10:03

## 2025-03-13 NOTE — NURSING
Introduced self to pt.  Updated pt on plan of care, tolerated Vidaza injection well.  Pt updated on plan of care.

## 2025-03-14 ENCOUNTER — INFUSION (OUTPATIENT)
Dept: INFUSION THERAPY | Facility: HOSPITAL | Age: 65
End: 2025-03-14
Payer: MEDICARE

## 2025-03-14 VITALS
RESPIRATION RATE: 18 BRPM | TEMPERATURE: 99 F | SYSTOLIC BLOOD PRESSURE: 149 MMHG | DIASTOLIC BLOOD PRESSURE: 84 MMHG | OXYGEN SATURATION: 100 % | HEART RATE: 67 BPM

## 2025-03-14 DIAGNOSIS — C92.00 ACUTE MYELOID LEUKEMIA NOT HAVING ACHIEVED REMISSION: Primary | ICD-10-CM

## 2025-03-14 PROCEDURE — 63600175 PHARM REV CODE 636 W HCPCS: Performed by: PHYSICIAN ASSISTANT

## 2025-03-14 PROCEDURE — 96401 CHEMO ANTI-NEOPL SQ/IM: CPT

## 2025-03-14 PROCEDURE — 25000003 PHARM REV CODE 250: Performed by: PHYSICIAN ASSISTANT

## 2025-03-14 RX ORDER — ONDANSETRON 4 MG/1
16 TABLET, FILM COATED ORAL
Status: COMPLETED | OUTPATIENT
Start: 2025-03-14 | End: 2025-03-14

## 2025-03-14 RX ORDER — AZACITIDINE 100 MG/1
75 INJECTION, POWDER, LYOPHILIZED, FOR SOLUTION INTRAVENOUS; SUBCUTANEOUS
Status: COMPLETED | OUTPATIENT
Start: 2025-03-14 | End: 2025-03-14

## 2025-03-14 RX ADMIN — ONDANSETRON HYDROCHLORIDE 16 MG: 4 TABLET, FILM COATED ORAL at 11:03

## 2025-03-14 RX ADMIN — AZACITIDINE 165 MG: 100 INJECTION, POWDER, LYOPHILIZED, FOR SOLUTION INTRAVENOUS; SUBCUTANEOUS at 11:03

## 2025-03-15 ENCOUNTER — INFUSION (OUTPATIENT)
Dept: INFUSION THERAPY | Facility: HOSPITAL | Age: 65
End: 2025-03-15
Payer: MEDICARE

## 2025-03-15 VITALS
OXYGEN SATURATION: 100 % | RESPIRATION RATE: 18 BRPM | HEART RATE: 55 BPM | SYSTOLIC BLOOD PRESSURE: 156 MMHG | DIASTOLIC BLOOD PRESSURE: 78 MMHG | TEMPERATURE: 98 F

## 2025-03-15 DIAGNOSIS — C92.00 ACUTE MYELOID LEUKEMIA NOT HAVING ACHIEVED REMISSION: Primary | ICD-10-CM

## 2025-03-15 PROCEDURE — 25000003 PHARM REV CODE 250: Performed by: PHYSICIAN ASSISTANT

## 2025-03-15 PROCEDURE — 96401 CHEMO ANTI-NEOPL SQ/IM: CPT

## 2025-03-15 PROCEDURE — 63600175 PHARM REV CODE 636 W HCPCS: Performed by: PHYSICIAN ASSISTANT

## 2025-03-15 RX ORDER — AZACITIDINE 100 MG/1
75 INJECTION, POWDER, LYOPHILIZED, FOR SOLUTION INTRAVENOUS; SUBCUTANEOUS
Status: COMPLETED | OUTPATIENT
Start: 2025-03-15 | End: 2025-03-15

## 2025-03-15 RX ORDER — ONDANSETRON 4 MG/1
16 TABLET, FILM COATED ORAL
Status: COMPLETED | OUTPATIENT
Start: 2025-03-15 | End: 2025-03-15

## 2025-03-15 RX ADMIN — ONDANSETRON HYDROCHLORIDE 16 MG: 4 TABLET, FILM COATED ORAL at 08:03

## 2025-03-15 RX ADMIN — AZACITIDINE 165 MG: 100 INJECTION, POWDER, LYOPHILIZED, FOR SOLUTION INTRAVENOUS; SUBCUTANEOUS at 08:03

## 2025-03-17 ENCOUNTER — TELEPHONE (OUTPATIENT)
Dept: HEMATOLOGY/ONCOLOGY | Facility: CLINIC | Age: 65
End: 2025-03-17
Payer: MEDICARE

## 2025-03-20 PROBLEM — Z00.00 WELLNESS EXAMINATION: Status: ACTIVE | Noted: 2025-03-20

## 2025-04-01 ENCOUNTER — TELEPHONE (OUTPATIENT)
Dept: HEMATOLOGY/ONCOLOGY | Facility: CLINIC | Age: 65
End: 2025-04-01

## 2025-04-01 ENCOUNTER — OFFICE VISIT (OUTPATIENT)
Dept: HEMATOLOGY/ONCOLOGY | Facility: CLINIC | Age: 65
End: 2025-04-01
Payer: MEDICARE

## 2025-04-01 ENCOUNTER — LAB VISIT (OUTPATIENT)
Dept: LAB | Facility: HOSPITAL | Age: 65
End: 2025-04-01
Attending: INTERNAL MEDICINE
Payer: MEDICARE

## 2025-04-01 VITALS
RESPIRATION RATE: 18 BRPM | BODY MASS INDEX: 27.07 KG/M2 | WEIGHT: 204.25 LBS | HEIGHT: 73 IN | TEMPERATURE: 98 F | DIASTOLIC BLOOD PRESSURE: 78 MMHG | SYSTOLIC BLOOD PRESSURE: 147 MMHG | HEART RATE: 51 BPM | OXYGEN SATURATION: 99 %

## 2025-04-01 DIAGNOSIS — I50.20 HEART FAILURE WITH REDUCED EJECTION FRACTION: ICD-10-CM

## 2025-04-01 DIAGNOSIS — D61.818 PANCYTOPENIA: ICD-10-CM

## 2025-04-01 DIAGNOSIS — C92.01 ACUTE MYELOID LEUKEMIA IN REMISSION: ICD-10-CM

## 2025-04-01 DIAGNOSIS — D75.829 HIT (HEPARIN-INDUCED THROMBOCYTOPENIA): ICD-10-CM

## 2025-04-01 DIAGNOSIS — C92.00 ACUTE MYELOID LEUKEMIA NOT HAVING ACHIEVED REMISSION: ICD-10-CM

## 2025-04-01 DIAGNOSIS — Z86.73 HISTORY OF CVA (CEREBROVASCULAR ACCIDENT): ICD-10-CM

## 2025-04-01 DIAGNOSIS — C92.01 ACUTE MYELOID LEUKEMIA IN REMISSION: Primary | ICD-10-CM

## 2025-04-01 DIAGNOSIS — Z76.82 STEM CELL TRANSPLANT CANDIDATE: ICD-10-CM

## 2025-04-01 DIAGNOSIS — D84.81 IMMUNODEFICIENCY SECONDARY TO NEOPLASM: ICD-10-CM

## 2025-04-01 DIAGNOSIS — I10 HYPERTENSION, UNSPECIFIED TYPE: ICD-10-CM

## 2025-04-01 DIAGNOSIS — D49.9 IMMUNODEFICIENCY SECONDARY TO NEOPLASM: ICD-10-CM

## 2025-04-01 LAB
ABSOLUTE NEUTROPHIL MANUAL (OHS): 0.3 K/UL
ALBUMIN SERPL BCP-MCNC: 4.2 G/DL (ref 3.5–5.2)
ALP SERPL-CCNC: 70 UNIT/L (ref 40–150)
ALT SERPL W/O P-5'-P-CCNC: 14 UNIT/L (ref 10–44)
ANION GAP (OHS): 10 MMOL/L (ref 8–16)
ANISOCYTOSIS BLD QL SMEAR: SLIGHT
AST SERPL-CCNC: 17 UNIT/L (ref 11–45)
BILIRUB SERPL-MCNC: 0.7 MG/DL (ref 0.1–1)
BUN SERPL-MCNC: 23 MG/DL (ref 8–23)
CALCIUM SERPL-MCNC: 9.6 MG/DL (ref 8.7–10.5)
CHLORIDE SERPL-SCNC: 103 MMOL/L (ref 95–110)
CO2 SERPL-SCNC: 27 MMOL/L (ref 23–29)
CREAT SERPL-MCNC: 1.1 MG/DL (ref 0.5–1.4)
EOSINOPHIL NFR BLD MANUAL: 2 % (ref 0–8)
ERYTHROCYTE [DISTWIDTH] IN BLOOD BY AUTOMATED COUNT: 18.7 % (ref 11.5–14.5)
GFR SERPLBLD CREATININE-BSD FMLA CKD-EPI: >60 ML/MIN/1.73/M2
GLUCOSE SERPL-MCNC: 96 MG/DL (ref 70–110)
HCT VFR BLD AUTO: 32.7 % (ref 40–54)
HGB BLD-MCNC: 10.1 GM/DL (ref 14–18)
INDIRECT COOMBS: NORMAL
LDH SERPL-CCNC: 136 U/L (ref 110–260)
LYMPHOCYTES NFR BLD MANUAL: 69 % (ref 18–48)
MAGNESIUM SERPL-MCNC: 2.1 MG/DL (ref 1.6–2.6)
MCH RBC QN AUTO: 27.5 PG (ref 27–31)
MCHC RBC AUTO-ENTMCNC: 30.9 G/DL (ref 32–36)
MCV RBC AUTO: 89 FL (ref 82–98)
MONOCYTES NFR BLD MANUAL: 3 % (ref 4–15)
NEUTROPHILS NFR BLD MANUAL: 26 % (ref 38–73)
NUCLEATED RBC (/100WBC) (OHS): 0 /100 WBC
OVALOCYTES BLD QL SMEAR: ABNORMAL
PHOSPHATE SERPL-MCNC: 3.2 MG/DL (ref 2.7–4.5)
PLATELET # BLD AUTO: 184 K/UL (ref 150–450)
PMV BLD AUTO: 10.2 FL (ref 9.2–12.9)
POIKILOCYTOSIS BLD QL SMEAR: SLIGHT
POTASSIUM SERPL-SCNC: 4.1 MMOL/L (ref 3.5–5.1)
PROT SERPL-MCNC: 8.5 GM/DL (ref 6–8.4)
RBC # BLD AUTO: 3.67 M/UL (ref 4.6–6.2)
RH BLD: NORMAL
SODIUM SERPL-SCNC: 140 MMOL/L (ref 136–145)
SPECIMEN OUTDATE: NORMAL
URATE SERPL-MCNC: 6.4 MG/DL (ref 3.4–7)
WBC # BLD AUTO: 1.1 K/UL (ref 3.9–12.7)

## 2025-04-01 PROCEDURE — 36415 COLL VENOUS BLD VENIPUNCTURE: CPT

## 2025-04-01 PROCEDURE — 84550 ASSAY OF BLOOD/URIC ACID: CPT

## 2025-04-01 PROCEDURE — 83735 ASSAY OF MAGNESIUM: CPT

## 2025-04-01 PROCEDURE — 84100 ASSAY OF PHOSPHORUS: CPT

## 2025-04-01 PROCEDURE — 99214 OFFICE O/P EST MOD 30 MIN: CPT | Mod: PBBFAC

## 2025-04-01 PROCEDURE — 82947 ASSAY GLUCOSE BLOOD QUANT: CPT

## 2025-04-01 PROCEDURE — 84295 ASSAY OF SERUM SODIUM: CPT

## 2025-04-01 PROCEDURE — 86900 BLOOD TYPING SEROLOGIC ABO: CPT

## 2025-04-01 PROCEDURE — 99999 PR PBB SHADOW E&M-EST. PATIENT-LVL IV: CPT | Mod: PBBFAC,,,

## 2025-04-01 PROCEDURE — 83615 LACTATE (LD) (LDH) ENZYME: CPT

## 2025-04-01 PROCEDURE — 85027 COMPLETE CBC AUTOMATED: CPT

## 2025-04-01 PROCEDURE — 85007 BL SMEAR W/DIFF WBC COUNT: CPT

## 2025-04-01 NOTE — PROGRESS NOTES
Section of Hematology and Stem Cell Transplantation  Follow Up Visit     Date of visit: 4/1/25  Visit diagnosis: No primary diagnosis found.  Referred by:  FERN Oneill MD    Oncologic History:     Primary Oncologic Diagnosis: Acute myeloid leukemia, adverse risk.    6/28/23: Diagnosed with heparin-induced thrombocytopenia. HIT Ab 1.87 OD (moderate-strong). ZAHIRA not available.   10/31/23: Peripheral blood flow cytometry sent due to worsening pancytopenia (CBC: WBC 3.43, Hgb 9, Plts 120, 19% blasts) revealed increased blasts (43.1%) concerning for acute myeloid leukemia.   11/9/23: Bone marrow biopsy revealed a hypercellular marrow (80%) with increased blasts consistent with acute myeloid leukemia. Karyotype 46,XY,del(20)(q11.2q13.3)[4]/46,XY[16]. FISH with 20q12 deletion. NGS with IDH2 (40%) and SRSF2 (40%).  12/2023: He started taking venetoclax 200mg daily (did not start azacitidine due to scheduling conflicts).    1/8/24: C1D1 of azacitidine x5 days plus venetoclax 21 of 28 days.  1/31/24: Bone marrow biopsy after cycle 1 revealed persistent AML with improvement in blasts to 3-7%. Karyotyping with 7 of 8 available metaphases with complex near-tetraploid karyotype (1 normal). NGS with IDH2 (41%) and SRSF2 (40%).  4/23/24: Bone marrow biopsy with normocellular marrow with no blasts consistent with complete remission. CG/FISH normal. NGS with IDH2 (11%) and SRSF2 (10%).   8/22/24: Bone marrow biopsy hypocellular (20%) with dysmegakaryopoiesis and marked myeloid hypoplasia.  No increase in blasts.  Diploid karyotype.  NGS IDH2 (9%) and SRSF2 (9%).  2/17/24: Bone marrow biopsy hypocellular (20%) with dysmegakaryopoiesis and marked myeloid hypoplasia.  No increase in blasts.  Diploid karyotype.  NGS IDH2 (42%), SRSF2 (42%), SH2B3 (31%). FISH normal.     History of Present Ilness:   Gustavo Tracey  (Gustavo) is a pleasant 64 y.o.male who presents for follow up. He is overall physically feeling okay with some minor  aches in his joints controlled by medication. Minor fatigue stable. Tolerating therapy well but with some minor discomfort in his abdomen from the injections. No n/v/d. His next cycle begin 4/8/25.     He has called the dentist to make an appointment with them.       PAST MEDICAL HISTORY:   Past Medical History:   Diagnosis Date    Abnormal nuclear stress test 11/26/2022    Acute myeloid leukemia     Cervical radiculopathy     to rt arm    CHF (congestive heart failure)     Chronic systolic congestive heart failure 11/28/2022    Dilated cardiomyopathy 11/26/2022    Hyperlipidemia     Hypertension     Obesity, unspecified     PAF (paroxysmal atrial fibrillation) 11/26/2022    Pulmonary HTN 11/28/2022    Stroke        PAST SURGICAL HISTORY:   Past Surgical History:   Procedure Laterality Date    BONE MARROW BIOPSY Right 1/31/2024    Procedure: Biopsy-bone marrow;  Surgeon: Rui Jacobsen MD;  Location: [address]);  Service: Oncology;  Laterality: Right;  1/24-pt confirmed-MS    CLOSURE OF LEFT ATRIAL APPENDAGE USING DEVICE Left 6/22/2023    Procedure: CLOSURE, LEFT ATRIAL APPENDAGE, USING DEVICE;  Surgeon: Rustam Dave MD;  Location: [facility];  Service: Cardiovascular;  Laterality: Left;  LIGATION OF LEFT ATRIAL APPENDAGE WITH OTILIA EXCLUSION SYSTEM    CORONARY ARTERY BYPASS GRAFT (CABG) N/A 6/22/2023    Procedure: CORONARY ARTERY BYPASS GRAFT (CABG);  Surgeon: Rustam Dave MD;  Location: [facility] [place] OR;  Service: Cardiovascular;  Laterality: N/A;  3-VESSEL WITH EPI-AORTIC ULTRASOUND    PURDY MAZE PROCEDURE N/A 6/22/2023    Procedure: PURDY MAZE PROCEDURE;  Surgeon: Rustam Dave MD;  Location: [facility] [place] OR;  Service: Cardiovascular;  Laterality: N/A;    ECHOCARDIOGRAM,TRANSESOPHAGEAL N/A 6/22/2023    Procedure: ECHOCARDIOGRAM,TRANSESOPHAGEAL;  Surgeon: Rustam Dave MD;  Location: [facility] [place] OR;  Service: Cardiovascular;  Laterality: N/A;    ENDOSCOPIC HARVEST OF VEIN Left 6/22/2023     Procedure: SURGICAL PROCUREMENT, VEIN, ENDOSCOPIC;  Surgeon: Rustam Dave MD;  Location: [facility] OR;  Service: Cardiovascular;  Laterality: Left;    INJECTION OF ANESTHETIC AGENT AROUND MULTIPLE INTERCOSTAL NERVES N/A 6/22/2023    Procedure: BLOCK, NERVE, INTERCOSTAL, 2 OR MORE;  Surgeon: Rustam Dave MD;  Location: [facility] OR;  Service: Cardiovascular;  Laterality: N/A;  PARASTERNAL NERVE BLOCK    INSTANTANEOUS WAVE-FREE RATIO  6/20/2023    Procedure: Instantaneous Wave-Free Ratio;  Surgeon: Zion Ortega MD;  Location: [facility] CATH LAB;  Service: Cardiology;;    IVUS, CORONARY  6/20/2023    Procedure: IVUS, Coronary;  Surgeon: Zion Ortega MD;  Location: [facility] CATH LAB;  Service: Cardiology;;    LEFT HEART CATHETERIZATION Left 11/28/2022    Procedure: CATHETERIZATION, HEART, LEFT;  Surgeon: Zino Ortega MD;  Location: [facility] CATH LAB;  Service: Cardiology;  Laterality: Left;    LEFT HEART CATHETERIZATION Left 6/20/2023    Procedure: Left heart cath;  Surgeon: Zion Ortega MD;  Location: [facility] CATH LAB;  Service: Cardiology;  Laterality: Left;    PERCUTANEOUS TRANSLUMINAL BALLOON ANGIOPLASTY OF CORONARY ARTERY  6/20/2023    Procedure: Angioplasty-coronary;  Surgeon: Zion Ortega MD;  Location: [facility] CATH LAB;  Service: Cardiology;;    RIGHT HEART CATHETERIZATION N/A 11/28/2022    Procedure: INSERTION, CATHETER, RIGHT HEART;  Surgeon: Zion Ortega MD;  Location: [facility] CATH LAB;  Service: Cardiology;  Laterality: N/A;  Congestive heart failure       PAST SOCIAL HISTORY:  Social History     Tobacco Use    Smoking status: Never    Smokeless tobacco: Never   Substance Use Topics    Alcohol use: Yes    Drug use: Never       FAMILY HISTORY:  Family History   Problem Relation Name Age of Onset    Hypertension Mother      Diabetes Father      Hyperlipidemia Father      Hypertension Father      Heart attack Father      Coronary artery disease Father         CURRENT MEDICATIONS:   Current Outpatient  Medications   Medication Sig    acyclovir (ZOVIRAX) 400 MG tablet Take 1 tablet (400 mg total) by mouth 2 (two) times daily.    allopurinoL (ZYLOPRIM) 300 MG tablet Take 1 tablet (300 mg total) by mouth once daily.    amitriptyline (ELAVIL) 50 MG tablet Take 50 mg by mouth every evening.    cyclobenzaprine (FLEXERIL) 10 MG tablet Take 10 mg by mouth 2 (two) times daily as needed.    FARXIGA 10 mg tablet TAKE 1 TABLET BY MOUTH EVERY DAY    ferrous sulfate (FEOSOL) 325 mg (65 mg iron) Tab tablet Take 1 tablet by mouth once daily.    furosemide (LASIX) 20 MG tablet Take 1 tablet by mouth once daily.    HYDROcodone-acetaminophen (NORCO)  mg per tablet TAKE 1 TABLET BY MOUTH 1 TO 2 TIMES A DAY AS NEEDED    latanoprost 0.005 % ophthalmic solution Place 1 drop into both eyes nightly.    LORazepam (ATIVAN) 1 MG tablet Take 0.5 tablets (0.5 mg total) by mouth every 6 (six) hours as needed for Anxiety. Take one hour before bone marrow biopsy.    metoprolol succinate (TOPROL-XL) 100 MG 24 hr tablet Take 1 tablet by mouth once daily.    potassium chloride (K-TAB) 20 mEq Take 1 tablet (20 mEq total) by mouth 2 (two) times daily.    pravastatin (PRAVACHOL) 20 MG tablet Take 20 mg by mouth once daily.    valsartan (DIOVAN) 40 MG tablet TAKE 1 TABLET BY MOUTH EVERY DAY    venetoclax (VENCLEXTA) 100 mg Tab Take 4 tablets (400 mg total) by mouth once daily Take 4 tablets (400mg) once daily on days 1-7 of a 28 day cycle. Always start with azacitidine (Vidaza) injections..    warfarin (COUMADIN) 5 MG tablet TAKE 1 TABLET BY MOUTH ON SUN, MON, WED, FRI, SAT,& 1.5 ON TUES & THURS    aspirin (ECOTRIN) 81 MG EC tablet Take 1 tablet (81 mg total) by mouth once daily.    folic acid (FOLVITE) 1 MG tablet Take 2 tablets (2 mg total) by mouth once daily.     Current Facility-Administered Medications   Medication    0.9%  NaCl infusion (for blood administration)    0.9%  NaCl infusion (for blood administration)     acetaminophen tablet 650 mg    diphenhydrAMINE capsule 25 mg     Facility-Administered Medications Ordered in Other Visits   Medication    sodium chloride 0.9% flush 10 mL       ALLERGIES:   Review of patient's allergies indicates:  No Known Allergies      Review of Systems:     Pertinent positives and negatives included in the HPI. Otherwise a complete review of systems is negative.    Physical Exam:     Vitals:    04/01/25 0905   BP: (!) 147/78   Pulse: (!) 51   Resp: 18   Temp: 98.1 °F (36.7 °C)           Physical Exam  Constitutional:       General: He is not in acute distress.  Eyes:      General: No scleral icterus.     Extraocular Movements: Extraocular movements intact.      Conjunctiva/sclera: Conjunctivae normal.   Cardiovascular:      Rate and Rhythm: Normal rate and regular rhythm.      Pulses: Normal pulses.      Comments: Not bradycardiac on exam  Pulmonary:      Effort: Pulmonary effort is normal. No respiratory distress.      Breath sounds: Normal breath sounds. No wheezing or rhonchi.   Abdominal:      General: There is no distension.      Palpations: Abdomen is soft.   Musculoskeletal:         General: No swelling or tenderness.      Right lower leg: No edema.      Left lower leg: No edema.   Skin:     General: Skin is warm and dry.      Findings: No bruising or rash.   Neurological:      Mental Status: He is alert and oriented to person, place, and time.      Coordination: Coordination normal.      Gait: Gait normal.   Psychiatric:         Mood and Affect: Mood normal.         Behavior: Behavior normal.        ECOG Performance Status: (foot note - ECOG PS provided by [employer]cology [employer]) 1 - Symptomatic but completely ambulatory    Karnofsky Performance Score:  90%- Able to Carry on Normal Activity: Minor Symptoms of Disease    Labs:   Lab Results   Component Value Date    WBC 2.80 (L) 03/27/2025    RBC 3.68 (L) 03/27/2025    HGB 10.4 (L) 03/27/2025    HCT 31.4 (L) 03/27/2025     MCV 85 03/27/2025    MCH 28.3 03/27/2025    MCHC 33.1 03/27/2025    RDW 21.0 (H) 03/27/2025    PLT 69 (L) 03/27/2025    MPV 8.1 03/27/2025    GRAN 2.0 03/27/2025    GRAN 71.1 03/27/2025    LYMPH 0.8 (L) 03/27/2025    LYMPH 26.8 03/27/2025    MONO 0.0 (L) 03/27/2025    MONO 1.1 (L) 03/27/2025    EOS 0.0 03/27/2025    BASO 0.00 03/27/2025    EOSINOPHIL 0.0 03/27/2025    BASOPHIL 1.0 03/27/2025       CMP  Sodium   Date Value Ref Range Status   04/01/2025 140 136 - 145 mmol/L Final   03/27/2025 140 136 - 145 mmol/L Final     Potassium   Date Value Ref Range Status   04/01/2025 4.1 3.5 - 5.1 mmol/L Final   03/27/2025 4.1 3.5 - 5.1 mmol/L Final     Chloride   Date Value Ref Range Status   04/01/2025 103 95 - 110 mmol/L Final   03/27/2025 102 95 - 110 mmol/L Final     CO2   Date Value Ref Range Status   04/01/2025 27 23 - 29 mmol/L Final   03/27/2025 27 23 - 29 mmol/L Final     Glucose   Date Value Ref Range Status   03/27/2025 102 74 - 106 mg/dL Final     BUN   Date Value Ref Range Status   04/01/2025 23 8 - 23 mg/dL Final     Creatinine   Date Value Ref Range Status   04/01/2025 1.1 0.5 - 1.4 mg/dL Final     Calcium   Date Value Ref Range Status   04/01/2025 9.6 8.7 - 10.5 mg/dL Final   03/27/2025 9.4 8.4 - 10.2 mg/dL Final     Total Protein   Date Value Ref Range Status   03/27/2025 8.3 (H) 6.3 - 8.2 g/dL Final     Albumin   Date Value Ref Range Status   04/01/2025 4.2 3.5 - 5.2 g/dL Final   03/27/2025 4.8 3.5 - 5.2 g/dL Final     Total Bilirubin   Date Value Ref Range Status   03/27/2025 0.9 0.2 - 1.3 mg/dL Final     Bilirubin Total   Date Value Ref Range Status   04/01/2025 0.7 0.1 - 1.0 mg/dL Final     Comment:     For infants and newborns, interpretation of results should be based   on gestational age, weight and in agreement with clinical   observations.    Premature Infant recommended reference ranges:   0-24 hours:  <8.0 mg/dL   24-48 hours: <12.0 mg/dL   3-5 days:    <15.0 mg/dL   6-29 days:   <15.0 mg/dL      Alkaline Phosphatase   Date Value Ref Range Status   03/27/2025 63 38 - 145 U/L Final     ALP   Date Value Ref Range Status   04/01/2025 70 40 - 150 unit/L Final     AST   Date Value Ref Range Status   04/01/2025 17 11 - 45 unit/L Final   03/27/2025 31 17 - 59 U/L Final     ALT   Date Value Ref Range Status   04/01/2025 14 10 - 44 unit/L Final   03/27/2025 20 10 - 44 U/L Final     Anion Gap   Date Value Ref Range Status   04/01/2025 10 8 - 16 mmol/L Final       Imaging:   Reviewed     Pathology:  Reviewed     Assessment and Plan:   Gustavo Tracey Jr. (Gustavo) is a pleasant 64 y.o.male who presents for follow up.    Acute myeloid leukemia, adverse risk:  Peripheral blood flow cytometry obtain 10/31/2023 concerning for acute myeloid leukemia.  Bone marrow biopsy obtained on 11/09/2023 revealed acute myeloid leukemia with 20q deletion on FISH and NGS with IDH2 (40%) and SRSF2 (40%). He started aza x7 plus alonzo x21 of 28 days in 1/2024. Bone marrow biopsy at the end of cycle 1 revealed persistent disease but improvement in blasts. CG now showed near-tetraploid complex karyotype in 7 of 8 metaphases. NGS with persistent IDH2 (41%) and SRSF2 (40%). Bone marrow biopsy in 4/2024 showed complete remission with persistent molecular disease - NGS with IDH2 (11%) and SRSF2 (10%).  Bone marrow biopsy on 08/22/2024 showed continued remission with persistent dysplastic changes.  NGS with IDH2 (9%) and SRSF2 (9%). Stopped prophylactic fluconazole and levofloxacin and increased venetoclax to 400mg daily 11/13/24.    Most recent bone marrow biopsy on 2/17/25 shows hypocellularity (20%) with dysmegakaryopoiesis and marked myeloid hypoplasia.  No increase in blasts.  Diploid karyotype.  NGS with progression of dysplastic changes: IDH2 (42%), SRSF2 (42%), SH2B3 (31%). FISH normal.     - Continue azacitidine 75mg/m2 x5 days and venetoclax 400mg daily x7 of 28 days.  - Mr. Tracey is interested in pursuing SCT candidacy evaluation.  We will discuss at next transplant meeting and have coordination team reach out.     Stem cell transplant candidate:  We discussed the role for allogeneic stem cell transplantation in this disease process as a potentially curative option. We had an extensive discussion about the rationale, logistics, risks, and benefits. We reviewed the requirement to stay in the [place] area for 100 days with a caregiver at all times. We discussed the risks, including infection, graft failure, organ toxicity, graft versus host disease, relapse of disease, and secondary cancers. We reviewed the need for long-term immunosuppression and need for close monitoring.  His HCT-CI score is 3 (high risk), although he is very fit and active without limitations.  We reviewed this today again, and we discussed that transplant would carry significant risk for both morbidity and mortality (40% 2 year NRM).  He remains undecided on whether or not to proceed with transplant. We will continue to address but lately with shared decision making we have opted to continue deferring transplant given his excellent response and limited toxicity.     History of CVA:  Repeat MRI brain on 08/05/2024 revealed patchy gliotic white matter signal change in the subcortical and periventricular regions of bilateral cerebral hemispheres.  This is reportedly changed compared to his prior MRI brain in 2012.  Given our potential for proceeding with stem cell transplant, I will refer him to Neurology for further evaluation.  - Request neurology referral to be scheduled. He missed their call but plans to call back and reschedule     Pancytopenia:  Monitor CBC weekly with Dr. Oneill. Transfuse 1 unit of PRBC for hgb < 7 or clinically appropriate. Transfuse 1 unit of plts if platelets < 10 or clinically appropriate.      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 ANC has been stable after treatment so stopped fluconazole  and levofloxacin.      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  Heparin antibody 1.87 OD (moderate-strong). ZAHIRA negative on 12/13/23 which rules out HIT. Heparin allergy removed. Ok to stop Warfarin. Dr. Jacobsen said it was okay to stop Warfarin from our perspective, but his cardiologist recommended continuing. With negative ZAHIRA, HIT would be very unlikely. He recommended stopping Warfarin with a negative ZAHIRA and intermittent thrombocytopenia from treatment. Patient discussed with his cardiologist.     Stem cell donors:  He has one full sister and 2 children as potential donor options . He has one potential MUD (13/16 match).    HFrEF:  Cardiac function now back to baseline. Continue follow up with local cardiologist.     Hypertension  Asymptomatic. Multifactorial, endorses compliance with anti-HTN regimen. Continue to follow up with PCP and cardiology.     Orders/Follow Up:        Route Chart for Scheduling    BMT Chart Routing      Follow up with physician . Last week of april with Jarrell   Follow up with [facility]    Provider visit type    Infusion scheduling note    Injection scheduling note Vidaza starting 5/6 x5 days   Labs CBC, CMP, magnesium, phosphorus and type and screen   Scheduling:  Preferred lab:  Lab interval:  prior to appointment- gets labs with local oncologist   Imaging    Pharmacy appointment    Other referrals              Treatment Plan Information   OP AZACITIDINE 5-DAY (SUB-Q) + VENETOCLAX Rui Jacobsen MD   Associated diagnosis: Acute myeloid leukemia not having achieved remission   noted on 11/6/2023   Line of treatment: First Line  Treatment Goal: Control     Upcoming Treatment Dates - OP AZACITIDINE 5-DAY (SUB-Q) + VENETOCLAX    4/8/2025       Pre-Medications       ondansetron tablet 16 mg       Chemotherapy       azaCITIDine (VIDAZA) chemo injection 165 mg  4/9/2025       Pre-Medications       ondansetron tablet 16 mg       Chemotherapy       azaCITIDine (VIDAZA) chemo  injection 165 mg  4/10/2025       Pre-Medications       ondansetron tablet 16 mg       Chemotherapy       azaCITIDine (VIDAZA) chemo injection 165 mg  4/11/2025       Pre-Medications       ondansetron tablet 16 mg       Chemotherapy       azaCITIDine (VIDAZA) chemo injection 165 mg    Supportive Plan Information  IV FLUIDS AND ELECTROLYTES Rui Jacobsen MD   Associated Diagnosis: Dehydration   noted on 9/30/2024  Associated Diagnosis: Acute myeloid leukemia not having achieved remission   noted on 11/6/2023   Line of treatment: Supportive Care   Treatment goal: Supportive     Upcoming Treatment Dates - IV FLUIDS AND ELECTROLYTES    No upcoming days in selected categories.    Therapy Plan Information  INF FLUIDS for Acute myeloid leukemia not having achieved remission, noted on 11/6/2023  None    INF FLUIDS for Acute myeloid leukemia not having achieved remission, noted on 11/6/2023  sodium chloride 0.9% bolus 500 mL 500 mL  500 mL, Intravenous, PRN      No therapy plan of the specified type found.    Total time of this visit was 40 minutes, including time spent face to face with patient and/or via video/audio, and also in preparing for today's visit for MDM and documentation. (Medical Decision Making, including consideration of possible diagnoses, management options, complex medical record review, review of diagnostic tests and information, consideration and discussion of significant complications based on comorbidities, and discussion with providers involved with the care of the patient). Greater than 50% was spent face to face with the patient counseling and coordinating care.  Visit today included increased complexity associated with the care of the episodic problem history of CVA and HIT addressed and managing the longitudinal care of the patient due to the serious and/or complex managed problem(s) acute myeloid leukemia, pancytopenia, immunodeficiency.    Mary Anne Barnett PA-C  Malignant Hematology, Stem  Cell Transplant, and Cellular Therapy  [facility]  [facility] [place]

## 2025-04-02 DIAGNOSIS — C92.01 ACUTE MYELOID LEUKEMIA IN REMISSION: Primary | ICD-10-CM

## 2025-04-03 LAB — HLATY INTERPRETATION: NORMAL

## 2025-04-07 ENCOUNTER — TELEPHONE (OUTPATIENT)
Dept: HEMATOLOGY/ONCOLOGY | Facility: CLINIC | Age: 65
End: 2025-04-07
Payer: MEDICARE

## 2025-04-07 ENCOUNTER — PATIENT MESSAGE (OUTPATIENT)
Dept: HEMATOLOGY/ONCOLOGY | Facility: CLINIC | Age: 65
End: 2025-04-07
Payer: MEDICARE

## 2025-04-07 NOTE — TELEPHONE ENCOUNTER
Left message for patient to get labs at [facility] to see if his counts are good enough to receive Vidaza.

## 2025-04-08 ENCOUNTER — LAB VISIT (OUTPATIENT)
Dept: LAB | Facility: HOSPITAL | Age: 65
End: 2025-04-08
Attending: INTERNAL MEDICINE
Payer: MEDICARE

## 2025-04-08 ENCOUNTER — INFUSION (OUTPATIENT)
Dept: INFUSION THERAPY | Facility: HOSPITAL | Age: 65
End: 2025-04-08
Payer: MEDICARE

## 2025-04-08 VITALS
DIASTOLIC BLOOD PRESSURE: 72 MMHG | SYSTOLIC BLOOD PRESSURE: 155 MMHG | BODY MASS INDEX: 27.47 KG/M2 | OXYGEN SATURATION: 96 % | HEIGHT: 73 IN | RESPIRATION RATE: 16 BRPM | HEART RATE: 76 BPM | WEIGHT: 207.25 LBS | TEMPERATURE: 98 F

## 2025-04-08 DIAGNOSIS — C92.01 ACUTE MYELOID LEUKEMIA IN REMISSION: ICD-10-CM

## 2025-04-08 DIAGNOSIS — C92.00 ACUTE MYELOID LEUKEMIA NOT HAVING ACHIEVED REMISSION: Primary | ICD-10-CM

## 2025-04-08 LAB
ABSOLUTE NEUTROPHIL MANUAL (OHS): 1.1 K/UL
ALBUMIN SERPL BCP-MCNC: 3.7 G/DL (ref 3.5–5.2)
ALP SERPL-CCNC: 61 UNIT/L (ref 40–150)
ALT SERPL W/O P-5'-P-CCNC: 12 UNIT/L (ref 10–44)
ANION GAP (OHS): 10 MMOL/L (ref 8–16)
AST SERPL-CCNC: 16 UNIT/L (ref 11–45)
BILIRUB SERPL-MCNC: 0.6 MG/DL (ref 0.1–1)
BUN SERPL-MCNC: 20 MG/DL (ref 8–23)
CALCIUM SERPL-MCNC: 9.4 MG/DL (ref 8.7–10.5)
CHLORIDE SERPL-SCNC: 101 MMOL/L (ref 95–110)
CO2 SERPL-SCNC: 27 MMOL/L (ref 23–29)
CREAT SERPL-MCNC: 1.3 MG/DL (ref 0.5–1.4)
ERYTHROCYTE [DISTWIDTH] IN BLOOD BY AUTOMATED COUNT: 18.5 % (ref 11.5–14.5)
GFR SERPLBLD CREATININE-BSD FMLA CKD-EPI: >60 ML/MIN/1.73/M2
GLUCOSE SERPL-MCNC: 112 MG/DL (ref 70–110)
HCT VFR BLD AUTO: 28.5 % (ref 40–54)
HGB BLD-MCNC: 8.7 GM/DL (ref 14–18)
LYMPHOCYTES NFR BLD MANUAL: 37 % (ref 18–48)
MAGNESIUM SERPL-MCNC: 2.2 MG/DL (ref 1.6–2.6)
MCH RBC QN AUTO: 27 PG (ref 27–31)
MCHC RBC AUTO-ENTMCNC: 30.5 G/DL (ref 32–36)
MCV RBC AUTO: 89 FL (ref 82–98)
METAMYELOCYTES NFR BLD MANUAL: 1 %
MONOCYTES NFR BLD MANUAL: 8 % (ref 4–15)
NEUTROPHILS NFR BLD MANUAL: 54 % (ref 38–73)
NUCLEATED RBC (/100WBC) (OHS): 0 /100 WBC
PHOSPHATE SERPL-MCNC: 2.9 MG/DL (ref 2.7–4.5)
PLATELET # BLD AUTO: 86 K/UL (ref 150–450)
PMV BLD AUTO: 9.3 FL (ref 9.2–12.9)
POTASSIUM SERPL-SCNC: 3.6 MMOL/L (ref 3.5–5.1)
PROT SERPL-MCNC: 8 GM/DL (ref 6–8.4)
RBC # BLD AUTO: 3.22 M/UL (ref 4.6–6.2)
SODIUM SERPL-SCNC: 138 MMOL/L (ref 136–145)
WBC # BLD AUTO: 2.02 K/UL (ref 3.9–12.7)

## 2025-04-08 PROCEDURE — 83735 ASSAY OF MAGNESIUM: CPT

## 2025-04-08 PROCEDURE — 80053 COMPREHEN METABOLIC PANEL: CPT

## 2025-04-08 PROCEDURE — 84100 ASSAY OF PHOSPHORUS: CPT

## 2025-04-08 PROCEDURE — 96401 CHEMO ANTI-NEOPL SQ/IM: CPT

## 2025-04-08 PROCEDURE — 63600175 PHARM REV CODE 636 W HCPCS: Performed by: INTERNAL MEDICINE

## 2025-04-08 PROCEDURE — 85025 COMPLETE CBC W/AUTO DIFF WBC: CPT

## 2025-04-08 PROCEDURE — 25000003 PHARM REV CODE 250: Performed by: INTERNAL MEDICINE

## 2025-04-08 PROCEDURE — 36415 COLL VENOUS BLD VENIPUNCTURE: CPT

## 2025-04-08 RX ORDER — AZACITIDINE 100 MG/1
75 INJECTION, POWDER, LYOPHILIZED, FOR SOLUTION INTRAVENOUS; SUBCUTANEOUS
Status: CANCELLED | OUTPATIENT
Start: 2025-04-11

## 2025-04-08 RX ORDER — ONDANSETRON HYDROCHLORIDE 8 MG/1
16 TABLET, FILM COATED ORAL
Status: CANCELLED | OUTPATIENT
Start: 2025-04-10

## 2025-04-08 RX ORDER — AZACITIDINE 100 MG/1
75 INJECTION, POWDER, LYOPHILIZED, FOR SOLUTION INTRAVENOUS; SUBCUTANEOUS
Status: CANCELLED | OUTPATIENT
Start: 2025-04-10

## 2025-04-08 RX ORDER — ONDANSETRON HYDROCHLORIDE 8 MG/1
16 TABLET, FILM COATED ORAL
Status: CANCELLED | OUTPATIENT
Start: 2025-04-09

## 2025-04-08 RX ORDER — AZACITIDINE 100 MG/1
75 INJECTION, POWDER, LYOPHILIZED, FOR SOLUTION INTRAVENOUS; SUBCUTANEOUS
Status: CANCELLED | OUTPATIENT
Start: 2025-04-08

## 2025-04-08 RX ORDER — AZACITIDINE 100 MG/1
75 INJECTION, POWDER, LYOPHILIZED, FOR SOLUTION INTRAVENOUS; SUBCUTANEOUS
Status: CANCELLED | OUTPATIENT
Start: 2025-04-09

## 2025-04-08 RX ORDER — ONDANSETRON HYDROCHLORIDE 8 MG/1
16 TABLET, FILM COATED ORAL
Status: CANCELLED | OUTPATIENT
Start: 2025-04-08

## 2025-04-08 RX ORDER — AZACITIDINE 100 MG/1
75 INJECTION, POWDER, LYOPHILIZED, FOR SOLUTION INTRAVENOUS; SUBCUTANEOUS
Status: COMPLETED | OUTPATIENT
Start: 2025-04-08 | End: 2025-04-08

## 2025-04-08 RX ORDER — AZACITIDINE 100 MG/1
75 INJECTION, POWDER, LYOPHILIZED, FOR SOLUTION INTRAVENOUS; SUBCUTANEOUS
Status: CANCELLED | OUTPATIENT
Start: 2025-04-12

## 2025-04-08 RX ORDER — ONDANSETRON HYDROCHLORIDE 8 MG/1
16 TABLET, FILM COATED ORAL
Status: CANCELLED | OUTPATIENT
Start: 2025-04-12

## 2025-04-08 RX ORDER — ONDANSETRON 4 MG/1
16 TABLET, FILM COATED ORAL
Status: COMPLETED | OUTPATIENT
Start: 2025-04-08 | End: 2025-04-08

## 2025-04-08 RX ORDER — ONDANSETRON HYDROCHLORIDE 8 MG/1
16 TABLET, FILM COATED ORAL
Status: CANCELLED | OUTPATIENT
Start: 2025-04-11

## 2025-04-08 RX ADMIN — AZACITIDINE 165 MG: 100 INJECTION, POWDER, LYOPHILIZED, FOR SOLUTION INTRAVENOUS; SUBCUTANEOUS at 01:04

## 2025-04-08 RX ADMIN — ONDANSETRON HYDROCHLORIDE 16 MG: 4 TABLET, FILM COATED ORAL at 01:04

## 2025-04-08 NOTE — NURSING
Labs resulted. OK to proceed per . C15D1 Vidaza x 3 given to ABD tissue. Tolerated well. D/C in stable condition.

## 2025-04-09 ENCOUNTER — INFUSION (OUTPATIENT)
Dept: INFUSION THERAPY | Facility: HOSPITAL | Age: 65
End: 2025-04-09
Payer: MEDICARE

## 2025-04-09 VITALS
SYSTOLIC BLOOD PRESSURE: 129 MMHG | DIASTOLIC BLOOD PRESSURE: 70 MMHG | TEMPERATURE: 98 F | HEART RATE: 54 BPM | RESPIRATION RATE: 16 BRPM | OXYGEN SATURATION: 100 %

## 2025-04-09 DIAGNOSIS — C92.00 ACUTE MYELOID LEUKEMIA NOT HAVING ACHIEVED REMISSION: Primary | ICD-10-CM

## 2025-04-09 PROCEDURE — 96401 CHEMO ANTI-NEOPL SQ/IM: CPT

## 2025-04-09 PROCEDURE — 25000003 PHARM REV CODE 250: Performed by: INTERNAL MEDICINE

## 2025-04-09 PROCEDURE — 63600175 PHARM REV CODE 636 W HCPCS: Performed by: INTERNAL MEDICINE

## 2025-04-09 RX ORDER — ONDANSETRON 4 MG/1
16 TABLET, FILM COATED ORAL
Status: COMPLETED | OUTPATIENT
Start: 2025-04-09 | End: 2025-04-09

## 2025-04-09 RX ORDER — AZACITIDINE 100 MG/1
75 INJECTION, POWDER, LYOPHILIZED, FOR SOLUTION INTRAVENOUS; SUBCUTANEOUS
Status: COMPLETED | OUTPATIENT
Start: 2025-04-09 | End: 2025-04-09

## 2025-04-09 RX ADMIN — ONDANSETRON HYDROCHLORIDE 16 MG: 4 TABLET, FILM COATED ORAL at 11:04

## 2025-04-09 RX ADMIN — AZACITIDINE 165 MG: 100 INJECTION, POWDER, LYOPHILIZED, FOR SOLUTION INTRAVENOUS; SUBCUTANEOUS at 12:04

## 2025-04-09 NOTE — PLAN OF CARE
1215 - Pt tolerated Vidaza injection well today, no complaints or complications. Three SQ injections given in left abdomen without issue. VSS. Pt aware to call provider with any questions or concerns and is aware of upcoming appts. Pt ambulatory from clinic with steady gait, no distress noted.

## 2025-04-10 ENCOUNTER — INFUSION (OUTPATIENT)
Dept: INFUSION THERAPY | Facility: HOSPITAL | Age: 65
End: 2025-04-10
Payer: MEDICARE

## 2025-04-10 VITALS
HEART RATE: 64 BPM | DIASTOLIC BLOOD PRESSURE: 79 MMHG | RESPIRATION RATE: 18 BRPM | SYSTOLIC BLOOD PRESSURE: 165 MMHG | TEMPERATURE: 98 F | OXYGEN SATURATION: 100 %

## 2025-04-10 DIAGNOSIS — C92.00 ACUTE MYELOID LEUKEMIA NOT HAVING ACHIEVED REMISSION: Primary | ICD-10-CM

## 2025-04-10 PROCEDURE — 63600175 PHARM REV CODE 636 W HCPCS: Performed by: INTERNAL MEDICINE

## 2025-04-10 PROCEDURE — 25000003 PHARM REV CODE 250: Performed by: INTERNAL MEDICINE

## 2025-04-10 PROCEDURE — 96401 CHEMO ANTI-NEOPL SQ/IM: CPT

## 2025-04-10 RX ORDER — AZACITIDINE 100 MG/1
75 INJECTION, POWDER, LYOPHILIZED, FOR SOLUTION INTRAVENOUS; SUBCUTANEOUS
Status: COMPLETED | OUTPATIENT
Start: 2025-04-10 | End: 2025-04-10

## 2025-04-10 RX ORDER — ONDANSETRON 4 MG/1
16 TABLET, FILM COATED ORAL
Status: COMPLETED | OUTPATIENT
Start: 2025-04-10 | End: 2025-04-10

## 2025-04-10 RX ADMIN — AZACITIDINE 165 MG: 100 INJECTION, POWDER, LYOPHILIZED, FOR SOLUTION INTRAVENOUS; SUBCUTANEOUS at 10:04

## 2025-04-10 RX ADMIN — ONDANSETRON HYDROCHLORIDE 16 MG: 4 TABLET, FILM COATED ORAL at 10:04

## 2025-04-11 ENCOUNTER — INFUSION (OUTPATIENT)
Dept: INFUSION THERAPY | Facility: HOSPITAL | Age: 65
End: 2025-04-11
Payer: MEDICARE

## 2025-04-11 VITALS
HEART RATE: 59 BPM | OXYGEN SATURATION: 100 % | SYSTOLIC BLOOD PRESSURE: 158 MMHG | DIASTOLIC BLOOD PRESSURE: 89 MMHG | RESPIRATION RATE: 20 BRPM

## 2025-04-11 DIAGNOSIS — C92.00 ACUTE MYELOID LEUKEMIA NOT HAVING ACHIEVED REMISSION: Primary | ICD-10-CM

## 2025-04-11 PROCEDURE — 25000003 PHARM REV CODE 250: Performed by: INTERNAL MEDICINE

## 2025-04-11 PROCEDURE — 63600175 PHARM REV CODE 636 W HCPCS: Performed by: INTERNAL MEDICINE

## 2025-04-11 PROCEDURE — 96401 CHEMO ANTI-NEOPL SQ/IM: CPT

## 2025-04-11 RX ORDER — ONDANSETRON 4 MG/1
16 TABLET, FILM COATED ORAL
Status: COMPLETED | OUTPATIENT
Start: 2025-04-11 | End: 2025-04-11

## 2025-04-11 RX ORDER — AZACITIDINE 100 MG/1
75 INJECTION, POWDER, LYOPHILIZED, FOR SOLUTION INTRAVENOUS; SUBCUTANEOUS
Status: COMPLETED | OUTPATIENT
Start: 2025-04-11 | End: 2025-04-11

## 2025-04-11 RX ADMIN — ONDANSETRON HYDROCHLORIDE 16 MG: 4 TABLET, FILM COATED ORAL at 11:04

## 2025-04-11 RX ADMIN — AZACITIDINE 165 MG: 100 INJECTION, POWDER, LYOPHILIZED, FOR SOLUTION INTRAVENOUS; SUBCUTANEOUS at 11:04

## 2025-04-12 ENCOUNTER — INFUSION (OUTPATIENT)
Dept: INFUSION THERAPY | Facility: HOSPITAL | Age: 65
End: 2025-04-12
Payer: MEDICARE

## 2025-04-12 VITALS
HEART RATE: 70 BPM | SYSTOLIC BLOOD PRESSURE: 177 MMHG | HEIGHT: 73 IN | BODY MASS INDEX: 27.47 KG/M2 | WEIGHT: 207.25 LBS | DIASTOLIC BLOOD PRESSURE: 91 MMHG | TEMPERATURE: 98 F | RESPIRATION RATE: 16 BRPM

## 2025-04-12 DIAGNOSIS — C92.00 ACUTE MYELOID LEUKEMIA NOT HAVING ACHIEVED REMISSION: Primary | ICD-10-CM

## 2025-04-12 PROCEDURE — 63600175 PHARM REV CODE 636 W HCPCS: Performed by: INTERNAL MEDICINE

## 2025-04-12 PROCEDURE — 25000003 PHARM REV CODE 250: Performed by: INTERNAL MEDICINE

## 2025-04-12 PROCEDURE — 96401 CHEMO ANTI-NEOPL SQ/IM: CPT

## 2025-04-12 RX ORDER — AZACITIDINE 100 MG/1
75 INJECTION, POWDER, LYOPHILIZED, FOR SOLUTION INTRAVENOUS; SUBCUTANEOUS
Status: COMPLETED | OUTPATIENT
Start: 2025-04-12 | End: 2025-04-12

## 2025-04-12 RX ORDER — ONDANSETRON 4 MG/1
16 TABLET, FILM COATED ORAL
Status: COMPLETED | OUTPATIENT
Start: 2025-04-12 | End: 2025-04-12

## 2025-04-12 RX ADMIN — ONDANSETRON HYDROCHLORIDE 16 MG: 4 TABLET, FILM COATED ORAL at 11:04

## 2025-04-12 RX ADMIN — AZACITIDINE 165 MG: 100 INJECTION, POWDER, LYOPHILIZED, FOR SOLUTION INTRAVENOUS; SUBCUTANEOUS at 11:04

## 2025-04-21 ENCOUNTER — PATIENT MESSAGE (OUTPATIENT)
Dept: HEMATOLOGY/ONCOLOGY | Facility: CLINIC | Age: 65
End: 2025-04-21
Payer: MEDICARE

## 2025-05-02 NOTE — PROGRESS NOTES
Section of Hematology and Stem Cell Transplantation  Follow Up Visit     Date of visit: 5/6/25  Visit diagnosis: No primary diagnosis found.  Referred by:  FERN Oneill MD    Oncologic History:     Primary Oncologic Diagnosis: Acute myeloid leukemia, adverse risk.    6/28/23: Diagnosed with heparin-induced thrombocytopenia. HIT Ab 1.87 OD (moderate-strong). ZAHIRA not available.   10/31/23: Peripheral blood flow cytometry sent due to worsening pancytopenia (CBC: WBC 3.43, Hgb 9, Plts 120, 19% blasts) revealed increased blasts (43.1%) concerning for acute myeloid leukemia.   11/9/23: Bone marrow biopsy revealed a hypercellular marrow (80%) with increased blasts consistent with acute myeloid leukemia. Karyotype 46,XY,del(20)(q11.2q13.3)[4]/46,XY[16]. FISH with 20q12 deletion. NGS with IDH2 (40%) and SRSF2 (40%).  12/2023: He started taking venetoclax 200mg daily (did not start azacitidine due to scheduling conflicts).    1/8/24: C1D1 of azacitidine x5 days plus venetoclax 21 of 28 days.  1/31/24: Bone marrow biopsy after cycle 1 revealed persistent AML with improvement in blasts to 3-7%. Karyotyping with 7 of 8 available metaphases with complex near-tetraploid karyotype (1 normal). NGS with IDH2 (41%) and SRSF2 (40%).  4/23/24: Bone marrow biopsy with normocellular marrow with no blasts consistent with complete remission. CG/FISH normal. NGS with IDH2 (11%) and SRSF2 (10%).   8/22/24: Bone marrow biopsy hypocellular (20%) with dysmegakaryopoiesis and marked myeloid hypoplasia.  No increase in blasts.  Diploid karyotype.  NGS IDH2 (9%) and SRSF2 (9%).  2/17/25: Bone marrow biopsy hypocellular (20%) with dysmegakaryopoiesis and marked myeloid hypoplasia.  No increase in blasts.  Diploid karyotype.  NGS IDH2 (42%), SRSF2 (42%), SH2B3 (31%). FISH normal.     History of Present Ilness:   Gustavo Tracey  (Gustavo) is a pleasant 64 y.o.male who presents for follow up. He is overall physically feeling okay with some minor  aches in his joints controlled by medication. Minor fatigue stable. He does note some constipation with his last bowel movement being yesterday but states it was not his normal. He continues tolerating therapy well. No n/v/d. His next cycle to begin today.     He has called the dentist to make an appointment with them but has been delayed due to financial toxicities.       PAST MEDICAL HISTORY:   Past Medical History:   Diagnosis Date    Abnormal nuclear stress test 11/26/2022    Acute myeloid leukemia     Cervical radiculopathy     to rt arm    CHF (congestive heart failure)     Chronic systolic congestive heart failure 11/28/2022    Dilated cardiomyopathy 11/26/2022    Hyperlipidemia     Hypertension     Obesity, unspecified     PAF (paroxysmal atrial fibrillation) 11/26/2022    Pulmonary HTN 11/28/2022    Stroke        PAST SURGICAL HISTORY:   Past Surgical History:   Procedure Laterality Date    BONE MARROW BIOPSY Right 1/31/2024    Procedure: Biopsy-bone marrow;  Surgeon: Rui Jacobsen MD;  Location: [address]);  Service: Oncology;  Laterality: Right;  1/24-pt confirmed-MS    CLOSURE OF LEFT ATRIAL APPENDAGE USING DEVICE Left 6/22/2023    Procedure: CLOSURE, LEFT ATRIAL APPENDAGE, USING DEVICE;  Surgeon: Rustam Dave MD;  Location: [facility];  Service: Cardiovascular;  Laterality: Left;  LIGATION OF LEFT ATRIAL APPENDAGE WITH OTILIA EXCLUSION SYSTEM    CORONARY ARTERY BYPASS GRAFT (CABG) N/A 6/22/2023    Procedure: CORONARY ARTERY BYPASS GRAFT (CABG);  Surgeon: Rustam Dave MD;  Location: [facility] OR;  Service: Cardiovascular;  Laterality: N/A;  3-VESSEL WITH EPI-AORTIC ULTRASOUND    PURDY MAZE PROCEDURE N/A 6/22/2023    Procedure: PURDY MAZE PROCEDURE;  Surgeon: Rustam Dave MD;  Location: [facility] OR;  Service: Cardiovascular;  Laterality: N/A;    ECHOCARDIOGRAM,TRANSESOPHAGEAL N/A 6/22/2023    Procedure: ECHOCARDIOGRAM,TRANSESOPHAGEAL;  Surgeon: Rustam Dave MD;  Location: [facility] OR;  Service:  Cardiovascular;  Laterality: N/A;    ENDOSCOPIC HARVEST OF VEIN Left 6/22/2023    Procedure: SURGICAL PROCUREMENT, VEIN, ENDOSCOPIC;  Surgeon: Rustam Dave MD;  Location: [facility] OR;  Service: Cardiovascular;  Laterality: Left;    INJECTION OF ANESTHETIC AGENT AROUND MULTIPLE INTERCOSTAL NERVES N/A 6/22/2023    Procedure: BLOCK, NERVE, INTERCOSTAL, 2 OR MORE;  Surgeon: Rustam Dave MD;  Location: [facility] OR;  Service: Cardiovascular;  Laterality: N/A;  PARASTERNAL NERVE BLOCK    INSTANTANEOUS WAVE-FREE RATIO  6/20/2023    Procedure: Instantaneous Wave-Free Ratio;  Surgeon: Zion Ortega MD;  Location: [facility] CATH LAB;  Service: Cardiology;;    IVUS, CORONARY  6/20/2023    Procedure: IVUS, Coronary;  Surgeon: Zion Ortega MD;  Location: [facility] CATH LAB;  Service: Cardiology;;    LEFT HEART CATHETERIZATION Left 11/28/2022    Procedure: CATHETERIZATION, HEART, LEFT;  Surgeon: Zion Ortega MD;  Location: [facility] CATH LAB;  Service: Cardiology;  Laterality: Left;    LEFT HEART CATHETERIZATION Left 6/20/2023    Procedure: Left heart cath;  Surgeon: Zion Ortega MD;  Location: [facility] CATH LAB;  Service: Cardiology;  Laterality: Left;    PERCUTANEOUS TRANSLUMINAL BALLOON ANGIOPLASTY OF CORONARY ARTERY  6/20/2023    Procedure: Angioplasty-coronary;  Surgeon: Zion Ortega MD;  Location: [facility] CATH LAB;  Service: Cardiology;;    RIGHT HEART CATHETERIZATION N/A 11/28/2022    Procedure: INSERTION, CATHETER, RIGHT HEART;  Surgeon: Zion Ortega MD;  Location: [facility] CATH LAB;  Service: Cardiology;  Laterality: N/A;  Congestive heart failure       PAST SOCIAL HISTORY:  Social History     Tobacco Use    Smoking status: Never    Smokeless tobacco: Never   Substance Use Topics    Alcohol use: Yes    Drug use: Never       FAMILY HISTORY:  Family History   Problem Relation Name Age of Onset    Hypertension Mother      Diabetes Father      Hyperlipidemia Father      Hypertension Father      Heart attack Father      Coronary artery  disease Father         CURRENT MEDICATIONS:   Current Outpatient Medications   Medication Sig    acyclovir (ZOVIRAX) 400 MG tablet Take 1 tablet (400 mg total) by mouth 2 (two) times daily.    allopurinoL (ZYLOPRIM) 300 MG tablet Take 1 tablet (300 mg total) by mouth once daily.    amitriptyline (ELAVIL) 50 MG tablet Take 50 mg by mouth every evening.    cyclobenzaprine (FLEXERIL) 10 MG tablet Take 10 mg by mouth 2 (two) times daily as needed.    FARXIGA 10 mg tablet TAKE 1 TABLET BY MOUTH EVERY DAY    ferrous sulfate (FEOSOL) 325 mg (65 mg iron) Tab tablet Take 1 tablet by mouth once daily.    furosemide (LASIX) 20 MG tablet Take 1 tablet by mouth once daily.    HYDROcodone-acetaminophen (NORCO)  mg per tablet TAKE 1 TABLET BY MOUTH 1 TO 2 TIMES A DAY AS NEEDED    latanoprost 0.005 % ophthalmic solution Place 1 drop into both eyes nightly.    LORazepam (ATIVAN) 1 MG tablet Take 0.5 tablets (0.5 mg total) by mouth every 6 (six) hours as needed for Anxiety. Take one hour before bone marrow biopsy.    metoprolol succinate (TOPROL-XL) 100 MG 24 hr tablet Take 1 tablet by mouth once daily.    potassium chloride (K-TAB) 20 mEq Take 1 tablet (20 mEq total) by mouth 2 (two) times daily.    pravastatin (PRAVACHOL) 20 MG tablet Take 20 mg by mouth once daily.    valsartan (DIOVAN) 40 MG tablet TAKE 1 TABLET BY MOUTH EVERY DAY    venetoclax (VENCLEXTA) 100 mg Tab Take 4 tablets (400 mg total) by mouth once daily Take 4 tablets (400mg) once daily on days 1-7 of a 28 day cycle. Always start with azacitidine (Vidaza) injections..    warfarin (COUMADIN) 5 MG tablet TAKE 1 TABLET BY MOUTH ON SUN, MON, WED, FRI, SAT,& 1.5 ON TUES & THURS    aspirin (ECOTRIN) 81 MG EC tablet Take 1 tablet (81 mg total) by mouth once daily.    folic acid (FOLVITE) 1 MG tablet Take 2 tablets (2 mg total) by mouth once daily. (Patient not taking: Reported on 5/6/2025)     Current Facility-Administered Medications   Medication    0.9%  NaCl  infusion (for blood administration)    0.9%  NaCl infusion (for blood administration)    acetaminophen tablet 650 mg    diphenhydrAMINE capsule 25 mg     Facility-Administered Medications Ordered in Other Visits   Medication    sodium chloride 0.9% flush 10 mL       ALLERGIES:   Review of patient's allergies indicates:  No Known Allergies      Review of Systems:     Pertinent positives and negatives included in the HPI. Otherwise a complete review of systems is negative.    Physical Exam:     Vitals:    05/06/25 0957   BP: (!) 149/74   Pulse: 61   Resp: 18   Temp: 98.3 °F (36.8 °C)             Physical Exam  Constitutional:       General: He is not in acute distress.  Eyes:      General: No scleral icterus.     Extraocular Movements: Extraocular movements intact.      Conjunctiva/sclera: Conjunctivae normal.   Cardiovascular:      Rate and Rhythm: Normal rate and regular rhythm.      Pulses: Normal pulses.   Pulmonary:      Effort: Pulmonary effort is normal. No respiratory distress.      Breath sounds: Normal breath sounds. No wheezing or rhonchi.   Abdominal:      General: There is no distension.      Palpations: Abdomen is soft.   Musculoskeletal:         General: No swelling or tenderness.      Right lower leg: No edema.      Left lower leg: No edema.   Skin:     General: Skin is warm and dry.      Findings: No bruising or rash.   Neurological:      Mental Status: He is alert and oriented to person, place, and time.      Coordination: Coordination normal.      Gait: Gait normal.   Psychiatric:         Mood and Affect: Mood normal.         Behavior: Behavior normal.          ECOG Performance Status: (foot note - ECOG PS provided by [employer]cology [employer]) 1 - Symptomatic but completely ambulatory    Karnofsky Performance Score:  90%- Able to Carry on Normal Activity: Minor Symptoms of Disease    Labs:   Lab Results   Component Value Date    WBC 2.10 (L) 05/06/2025    RBC 2.93 (L) 05/06/2025    HGB 8.2  (L) 05/06/2025    HCT 26.7 (L) 05/06/2025    MCV 91 05/06/2025    MCH 28.0 05/06/2025    MCHC 30.7 (L) 05/06/2025    RDW 20.2 (H) 05/06/2025    PLT 86 (L) 05/06/2025    MPV 9.2 05/06/2025    GRAN 4.0 (L) 05/01/2025    LYMPH CANCELED 05/01/2025    LYMPH 90.0 (H) 05/01/2025    MONO CANCELED 05/01/2025    MONO 2.0 (L) 05/01/2025    EOS CANCELED 05/01/2025    BASO CANCELED 05/01/2025    EOSINOPHIL 2.0 05/01/2025    BASOPHIL 2.0 (H) 05/01/2025       CMP  Sodium   Date Value Ref Range Status   05/06/2025 138 136 - 145 mmol/L Final   05/01/2025 139 136 - 145 mmol/L Final     Potassium   Date Value Ref Range Status   05/06/2025 4.0 3.5 - 5.1 mmol/L Final   05/01/2025 4.3 3.5 - 5.1 mmol/L Final     Chloride   Date Value Ref Range Status   05/06/2025 105 95 - 110 mmol/L Final   05/01/2025 105 95 - 110 mmol/L Final     CO2   Date Value Ref Range Status   05/06/2025 26 23 - 29 mmol/L Final   05/01/2025 26 23 - 29 mmol/L Final     Glucose   Date Value Ref Range Status   05/06/2025 102 70 - 110 mg/dL Final   05/01/2025 101 74 - 106 mg/dL Final     BUN   Date Value Ref Range Status   05/06/2025 19 8 - 23 mg/dL Final     Creatinine   Date Value Ref Range Status   05/06/2025 1.3 0.5 - 1.4 mg/dL Final     Calcium   Date Value Ref Range Status   05/06/2025 9.1 8.7 - 10.5 mg/dL Final   05/01/2025 9.2 8.4 - 10.2 mg/dL Final     Protein Total   Date Value Ref Range Status   05/06/2025 7.9 6.0 - 8.4 gm/dL Final     Total Protein   Date Value Ref Range Status   05/01/2025 8.7 (H) 6.3 - 8.2 g/dL Final     Albumin   Date Value Ref Range Status   05/06/2025 3.7 3.5 - 5.2 g/dL Final   05/01/2025 4.6 3.5 - 5.2 g/dL Final     Total Bilirubin   Date Value Ref Range Status   05/01/2025 0.7 0.2 - 1.3 mg/dL Final     Bilirubin Total   Date Value Ref Range Status   05/06/2025 0.6 0.1 - 1.0 mg/dL Final     Comment:     For infants and newborns, interpretation of results should be based   on gestational age, weight and in agreement with clinical    observations.    Premature Infant recommended reference ranges:   0-24 hours:  <8.0 mg/dL   24-48 hours: <12.0 mg/dL   3-5 days:    <15.0 mg/dL   6-29 days:   <15.0 mg/dL     Alkaline Phosphatase   Date Value Ref Range Status   05/01/2025 62 38 - 145 U/L Final     ALP   Date Value Ref Range Status   05/06/2025 71 40 - 150 unit/L Final     AST   Date Value Ref Range Status   05/06/2025 15 11 - 45 unit/L Final   05/01/2025 25 17 - 59 U/L Final     ALT   Date Value Ref Range Status   05/06/2025 9 (L) 10 - 44 unit/L Final   05/01/2025 14 10 - 44 U/L Final     Anion Gap   Date Value Ref Range Status   05/06/2025 7 (L) 8 - 16 mmol/L Final       Imaging:   Reviewed     Pathology:  Reviewed     Assessment and Plan:   Gustavo Tracey Jr. (Gustavo) is a pleasant 64 y.o.male who presents for follow up.    Acute myeloid leukemia, adverse risk:  Peripheral blood flow cytometry obtain 10/31/2023 concerning for acute myeloid leukemia.  Bone marrow biopsy obtained on 11/09/2023 revealed acute myeloid leukemia with 20q deletion on FISH and NGS with IDH2 (40%) and SRSF2 (40%). He started aza x7 plus alonzo x21 of 28 days in 1/2024. Bone marrow biopsy at the end of cycle 1 revealed persistent disease but improvement in blasts. CG now showed near-tetraploid complex karyotype in 7 of 8 metaphases. NGS with persistent IDH2 (41%) and SRSF2 (40%). Bone marrow biopsy in 4/2024 showed complete remission with persistent molecular disease - NGS with IDH2 (11%) and SRSF2 (10%).  Bone marrow biopsy on 08/22/2024 showed continued remission with persistent dysplastic changes.  NGS with IDH2 (9%) and SRSF2 (9%). Stopped prophylactic fluconazole and levofloxacin and increased venetoclax to 400mg daily 11/13/24.  Most recent bone marrow biopsy on 2/17/25 shows hypocellularity (20%) with dysmegakaryopoiesis and marked myeloid hypoplasia.  No increase in blasts.  Diploid karyotype.  NGS with progression of dysplastic changes: IDH2 (42%), SRSF2 (42%),  SH2B3 (31%). FISH normal.     - Continue azacitidine 75mg/m2 x5 days and venetoclax 400mg daily x7 of 28 days.  - Mr. Tracey is interested in pursuing SCT candidacy evaluation. He is working on completeing pre-transplant appointments  needed such as the dentist.     Stem cell transplant candidate:  We discussed the role for allogeneic stem cell transplantation in this disease process as a potentially curative option. We had an extensive discussion about the rationale, logistics, risks, and benefits. We reviewed the requirement to stay in the [place] area for 100 days with a caregiver at all times. We discussed the risks, including infection, graft failure, organ toxicity, graft versus host disease, relapse of disease, and secondary cancers. We reviewed the need for long-term immunosuppression and need for close monitoring.  His HCT-CI score is 3 (high risk), although he is very fit and active without limitations.  We reviewed this today again, and we discussed that transplant would carry significant risk for both morbidity and mortality (40% 2 year NRM).  He remains undecided on whether or not to proceed with transplant. We will continue to address but lately with shared decision making we have opted to continue deferring transplant given his excellent response and limited toxicity.     History of CVA:  Repeat MRI brain on 08/05/2024 revealed patchy gliotic white matter signal change in the subcortical and periventricular regions of bilateral cerebral hemispheres.  This is reportedly changed compared to his prior MRI brain in 2012.  Given our potential for proceeding with stem cell transplant, I will refer him to Neurology for further evaluation.  - Patient never recalled neurology to schedule appointment. Advised he should call as soon as possible to get in with them.     Pancytopenia:  Monitor CBC weekly with Dr. Oneill. Transfuse 1 unit of PRBC for hgb < 7 or clinically appropriate. Transfuse 1 unit of plts  if platelets < 10 or clinically appropriate.      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 ANC has been stable after treatment so stopped fluconazole and levofloxacin.      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  Heparin antibody 1.87 OD (moderate-strong). ZAHIRA negative on 12/13/23 which rules out HIT. Heparin allergy removed. Ok to stop Warfarin. Dr. Jacobsen said it was okay to stop Warfarin from our perspective, but his cardiologist recommended continuing. With negative ZAHIRA, HIT would be very unlikely. He recommended stopping Warfarin with a negative ZAHIRA and intermittent thrombocytopenia from treatment. Patient discussed with his cardiologist.     Stem cell donors:  He has one full sister and 2 children as potential donor options . He has one potential MUD (13/16 match). His sister has medical issues so cannot be a donor.     HFrEF:  Cardiac function now back to baseline. Continue follow up with local cardiologist.     Hypertension  Asymptomatic. Multifactorial, endorses compliance with anti-HTN regimen. Continue to follow up with PCP and cardiology.     Orders/Follow Up:        Route Chart for Scheduling    BMT Chart Routing      Follow up with physician . 6/3 with Jarrell   Follow up with [facility]    Provider visit type    Infusion scheduling note    Injection scheduling note    Labs CBC, magnesium, phosphorus and type and screen   Scheduling:  Preferred lab:  Lab interval:  CBC, CMP, magnesium, phosphorus and type and screen  Scheduling: Preferred lab: Lab interval: prior to appointment- gets labs with local oncologist   Imaging    Pharmacy appointment    Other referrals                  Treatment Plan Information   OP AZACITIDINE 5-DAY (SUB-Q) + VENETOCLAX Rui Jacobsen MD   Associated diagnosis: Acute myeloid leukemia not having achieved remission   noted on 11/6/2023   Line of treatment: First Line  Treatment Goal: Control     Upcoming  Treatment Dates - OP AZACITIDINE 5-DAY (SUB-Q) + VENETOCLAX    5/6/2025       Pre-Medications       ondansetron tablet 16 mg       Chemotherapy       azaCITIDine (VIDAZA) chemo injection 165 mg  5/7/2025       Pre-Medications       ondansetron tablet 16 mg       Chemotherapy       azaCITIDine (VIDAZA) chemo injection 165 mg  5/8/2025       Pre-Medications       ondansetron tablet 16 mg       Chemotherapy       azaCITIDine (VIDAZA) chemo injection 165 mg  5/9/2025       Pre-Medications       ondansetron tablet 16 mg       Chemotherapy       azaCITIDine (VIDAZA) chemo injection 165 mg    Supportive Plan Information  IV FLUIDS AND ELECTROLYTES Rui Jacobsen MD   Associated Diagnosis: Dehydration   noted on 9/30/2024  Associated Diagnosis: Acute myeloid leukemia not having achieved remission   noted on 11/6/2023   Line of treatment: Supportive Care   Treatment goal: Supportive     Upcoming Treatment Dates - IV FLUIDS AND ELECTROLYTES    No upcoming days in selected categories.    Therapy Plan Information  INF FLUIDS for Acute myeloid leukemia not having achieved remission, noted on 11/6/2023  None    INF FLUIDS for Acute myeloid leukemia not having achieved remission, noted on 11/6/2023  sodium chloride 0.9% bolus 500 mL 500 mL  500 mL, Intravenous, PRN      No therapy plan of the specified type found.    Total time of this visit was 40 minutes, including time spent face to face with patient and/or via video/audio, and also in preparing for today's visit for MDM and documentation. (Medical Decision Making, including consideration of possible diagnoses, management options, complex medical record review, review of diagnostic tests and information, consideration and discussion of significant complications based on comorbidities, and discussion with providers involved with the care of the patient). Greater than 50% was spent face to face with the patient counseling and coordinating care.    Mary Anne Barnett,  KIMBERLY  Malignant Hematology, Stem Cell Transplant, and Cellular Therapy  [facility]  [facility] [place]

## 2025-05-06 ENCOUNTER — LAB VISIT (OUTPATIENT)
Dept: LAB | Facility: HOSPITAL | Age: 65
End: 2025-05-06
Attending: INTERNAL MEDICINE
Payer: MEDICARE

## 2025-05-06 ENCOUNTER — OFFICE VISIT (OUTPATIENT)
Dept: HEMATOLOGY/ONCOLOGY | Facility: CLINIC | Age: 65
End: 2025-05-06
Payer: MEDICARE

## 2025-05-06 ENCOUNTER — INFUSION (OUTPATIENT)
Dept: INFUSION THERAPY | Facility: HOSPITAL | Age: 65
End: 2025-05-06
Payer: MEDICARE

## 2025-05-06 VITALS
BODY MASS INDEX: 27.09 KG/M2 | OXYGEN SATURATION: 99 % | WEIGHT: 204.38 LBS | HEIGHT: 73 IN | TEMPERATURE: 98 F | HEART RATE: 61 BPM | RESPIRATION RATE: 18 BRPM | SYSTOLIC BLOOD PRESSURE: 149 MMHG | DIASTOLIC BLOOD PRESSURE: 74 MMHG

## 2025-05-06 VITALS
DIASTOLIC BLOOD PRESSURE: 74 MMHG | HEIGHT: 73 IN | SYSTOLIC BLOOD PRESSURE: 149 MMHG | HEART RATE: 61 BPM | WEIGHT: 204.38 LBS | BODY MASS INDEX: 27.09 KG/M2 | OXYGEN SATURATION: 99 % | RESPIRATION RATE: 18 BRPM | TEMPERATURE: 98 F

## 2025-05-06 DIAGNOSIS — I50.20 HEART FAILURE WITH REDUCED EJECTION FRACTION: ICD-10-CM

## 2025-05-06 DIAGNOSIS — Z76.82 STEM CELL TRANSPLANT CANDIDATE: ICD-10-CM

## 2025-05-06 DIAGNOSIS — D49.9 IMMUNODEFICIENCY SECONDARY TO NEOPLASM: ICD-10-CM

## 2025-05-06 DIAGNOSIS — C92.00 ACUTE MYELOID LEUKEMIA NOT HAVING ACHIEVED REMISSION: Primary | ICD-10-CM

## 2025-05-06 DIAGNOSIS — D61.818 PANCYTOPENIA: ICD-10-CM

## 2025-05-06 DIAGNOSIS — C92.01 ACUTE MYELOID LEUKEMIA IN REMISSION: Primary | ICD-10-CM

## 2025-05-06 DIAGNOSIS — D84.81 IMMUNODEFICIENCY SECONDARY TO NEOPLASM: ICD-10-CM

## 2025-05-06 DIAGNOSIS — I10 HYPERTENSION, UNSPECIFIED TYPE: ICD-10-CM

## 2025-05-06 DIAGNOSIS — Z86.73 HISTORY OF CVA (CEREBROVASCULAR ACCIDENT): ICD-10-CM

## 2025-05-06 DIAGNOSIS — C92.01 ACUTE MYELOID LEUKEMIA IN REMISSION: ICD-10-CM

## 2025-05-06 LAB
ABSOLUTE EOSINOPHIL (OHS): 0 K/UL
ABSOLUTE MONOCYTE (OHS): 0.23 K/UL (ref 0.3–1)
ABSOLUTE NEUTROPHIL COUNT (OHS): 0.77 K/UL (ref 1.8–7.7)
ALBUMIN SERPL BCP-MCNC: 3.7 G/DL (ref 3.5–5.2)
ALP SERPL-CCNC: 71 UNIT/L (ref 40–150)
ALT SERPL W/O P-5'-P-CCNC: 9 UNIT/L (ref 10–44)
ANION GAP (OHS): 7 MMOL/L (ref 8–16)
AST SERPL-CCNC: 15 UNIT/L (ref 11–45)
BASOPHILS # BLD AUTO: 0 K/UL
BASOPHILS NFR BLD AUTO: 0 %
BILIRUB SERPL-MCNC: 0.6 MG/DL (ref 0.1–1)
BUN SERPL-MCNC: 19 MG/DL (ref 8–23)
CALCIUM SERPL-MCNC: 9.1 MG/DL (ref 8.7–10.5)
CHLORIDE SERPL-SCNC: 105 MMOL/L (ref 95–110)
CO2 SERPL-SCNC: 26 MMOL/L (ref 23–29)
CREAT SERPL-MCNC: 1.3 MG/DL (ref 0.5–1.4)
ERYTHROCYTE [DISTWIDTH] IN BLOOD BY AUTOMATED COUNT: 20.2 % (ref 11.5–14.5)
GFR SERPLBLD CREATININE-BSD FMLA CKD-EPI: >60 ML/MIN/1.73/M2
GLUCOSE SERPL-MCNC: 102 MG/DL (ref 70–110)
HCT VFR BLD AUTO: 26.7 % (ref 40–54)
HGB BLD-MCNC: 8.2 GM/DL (ref 14–18)
IMM GRANULOCYTES # BLD AUTO: 0.1 K/UL (ref 0–0.04)
IMM GRANULOCYTES NFR BLD AUTO: 4.8 % (ref 0–0.5)
LYMPHOCYTES # BLD AUTO: 1 K/UL (ref 1–4.8)
MAGNESIUM SERPL-MCNC: 2.1 MG/DL (ref 1.6–2.6)
MCH RBC QN AUTO: 28 PG (ref 27–31)
MCHC RBC AUTO-ENTMCNC: 30.7 G/DL (ref 32–36)
MCV RBC AUTO: 91 FL (ref 82–98)
NUCLEATED RBC (/100WBC) (OHS): 0 /100 WBC
PHOSPHATE SERPL-MCNC: 3 MG/DL (ref 2.7–4.5)
PLATELET # BLD AUTO: 86 K/UL (ref 150–450)
PMV BLD AUTO: 9.2 FL (ref 9.2–12.9)
POTASSIUM SERPL-SCNC: 4 MMOL/L (ref 3.5–5.1)
PROT SERPL-MCNC: 7.9 GM/DL (ref 6–8.4)
RBC # BLD AUTO: 2.93 M/UL (ref 4.6–6.2)
RELATIVE EOSINOPHIL (OHS): 0 %
RELATIVE LYMPHOCYTE (OHS): 47.6 % (ref 18–48)
RELATIVE MONOCYTE (OHS): 11 % (ref 4–15)
RELATIVE NEUTROPHIL (OHS): 36.6 % (ref 38–73)
SODIUM SERPL-SCNC: 138 MMOL/L (ref 136–145)
WBC # BLD AUTO: 2.1 K/UL (ref 3.9–12.7)

## 2025-05-06 PROCEDURE — 99999 PR PBB SHADOW E&M-EST. PATIENT-LVL IV: CPT | Mod: PBBFAC,,,

## 2025-05-06 PROCEDURE — 83735 ASSAY OF MAGNESIUM: CPT

## 2025-05-06 PROCEDURE — 36415 COLL VENOUS BLD VENIPUNCTURE: CPT

## 2025-05-06 PROCEDURE — 99214 OFFICE O/P EST MOD 30 MIN: CPT | Mod: PBBFAC,25

## 2025-05-06 PROCEDURE — 63600175 PHARM REV CODE 636 W HCPCS

## 2025-05-06 PROCEDURE — 80053 COMPREHEN METABOLIC PANEL: CPT

## 2025-05-06 PROCEDURE — 85025 COMPLETE CBC W/AUTO DIFF WBC: CPT

## 2025-05-06 PROCEDURE — 25000003 PHARM REV CODE 250

## 2025-05-06 PROCEDURE — 84100 ASSAY OF PHOSPHORUS: CPT

## 2025-05-06 PROCEDURE — 96401 CHEMO ANTI-NEOPL SQ/IM: CPT

## 2025-05-06 RX ORDER — AZACITIDINE 100 MG/1
75 INJECTION, POWDER, LYOPHILIZED, FOR SOLUTION INTRAVENOUS; SUBCUTANEOUS
Status: CANCELLED | OUTPATIENT
Start: 2025-05-09

## 2025-05-06 RX ORDER — AZACITIDINE 100 MG/1
75 INJECTION, POWDER, LYOPHILIZED, FOR SOLUTION INTRAVENOUS; SUBCUTANEOUS
Status: CANCELLED | OUTPATIENT
Start: 2025-05-07

## 2025-05-06 RX ORDER — AZACITIDINE 100 MG/1
75 INJECTION, POWDER, LYOPHILIZED, FOR SOLUTION INTRAVENOUS; SUBCUTANEOUS
Status: CANCELLED | OUTPATIENT
Start: 2025-05-06

## 2025-05-06 RX ORDER — ONDANSETRON HYDROCHLORIDE 8 MG/1
16 TABLET, FILM COATED ORAL
Status: CANCELLED | OUTPATIENT
Start: 2025-05-10

## 2025-05-06 RX ORDER — ONDANSETRON HYDROCHLORIDE 8 MG/1
16 TABLET, FILM COATED ORAL
Status: CANCELLED | OUTPATIENT
Start: 2025-05-08

## 2025-05-06 RX ORDER — AZACITIDINE 100 MG/1
75 INJECTION, POWDER, LYOPHILIZED, FOR SOLUTION INTRAVENOUS; SUBCUTANEOUS
Status: CANCELLED | OUTPATIENT
Start: 2025-05-10

## 2025-05-06 RX ORDER — ONDANSETRON HYDROCHLORIDE 8 MG/1
16 TABLET, FILM COATED ORAL
Status: CANCELLED | OUTPATIENT
Start: 2025-05-09

## 2025-05-06 RX ORDER — ONDANSETRON HYDROCHLORIDE 8 MG/1
16 TABLET, FILM COATED ORAL
Status: CANCELLED | OUTPATIENT
Start: 2025-05-07

## 2025-05-06 RX ORDER — ONDANSETRON HYDROCHLORIDE 8 MG/1
16 TABLET, FILM COATED ORAL
Status: CANCELLED | OUTPATIENT
Start: 2025-05-06

## 2025-05-06 RX ORDER — ONDANSETRON 4 MG/1
16 TABLET, FILM COATED ORAL
Status: COMPLETED | OUTPATIENT
Start: 2025-05-06 | End: 2025-05-06

## 2025-05-06 RX ORDER — AZACITIDINE 100 MG/1
75 INJECTION, POWDER, LYOPHILIZED, FOR SOLUTION INTRAVENOUS; SUBCUTANEOUS
Status: COMPLETED | OUTPATIENT
Start: 2025-05-06 | End: 2025-05-06

## 2025-05-06 RX ORDER — AZACITIDINE 100 MG/1
75 INJECTION, POWDER, LYOPHILIZED, FOR SOLUTION INTRAVENOUS; SUBCUTANEOUS
Status: CANCELLED | OUTPATIENT
Start: 2025-05-08

## 2025-05-06 RX ADMIN — AZACITIDINE 165 MG: 100 INJECTION, POWDER, LYOPHILIZED, FOR SOLUTION INTRAVENOUS; SUBCUTANEOUS at 12:05

## 2025-05-06 RX ADMIN — ONDANSETRON HYDROCHLORIDE 16 MG: 4 TABLET, FILM COATED ORAL at 12:05

## 2025-05-07 ENCOUNTER — INFUSION (OUTPATIENT)
Dept: INFUSION THERAPY | Facility: HOSPITAL | Age: 65
End: 2025-05-07
Payer: MEDICARE

## 2025-05-07 VITALS
TEMPERATURE: 98 F | HEART RATE: 56 BPM | DIASTOLIC BLOOD PRESSURE: 70 MMHG | RESPIRATION RATE: 20 BRPM | OXYGEN SATURATION: 99 % | SYSTOLIC BLOOD PRESSURE: 154 MMHG

## 2025-05-07 DIAGNOSIS — C92.00 ACUTE MYELOID LEUKEMIA NOT HAVING ACHIEVED REMISSION: Primary | ICD-10-CM

## 2025-05-07 PROCEDURE — 63600175 PHARM REV CODE 636 W HCPCS

## 2025-05-07 PROCEDURE — 25000003 PHARM REV CODE 250

## 2025-05-07 PROCEDURE — 96401 CHEMO ANTI-NEOPL SQ/IM: CPT

## 2025-05-07 RX ORDER — AZACITIDINE 100 MG/1
75 INJECTION, POWDER, LYOPHILIZED, FOR SOLUTION INTRAVENOUS; SUBCUTANEOUS
Status: COMPLETED | OUTPATIENT
Start: 2025-05-07 | End: 2025-05-07

## 2025-05-07 RX ORDER — ONDANSETRON 4 MG/1
16 TABLET, FILM COATED ORAL
Status: COMPLETED | OUTPATIENT
Start: 2025-05-07 | End: 2025-05-07

## 2025-05-07 RX ADMIN — ONDANSETRON HYDROCHLORIDE 16 MG: 4 TABLET, FILM COATED ORAL at 11:05

## 2025-05-07 RX ADMIN — AZACITIDINE 165 MG: 100 INJECTION, POWDER, LYOPHILIZED, FOR SOLUTION INTRAVENOUS; SUBCUTANEOUS at 11:05

## 2025-05-08 ENCOUNTER — INFUSION (OUTPATIENT)
Dept: INFUSION THERAPY | Facility: HOSPITAL | Age: 65
End: 2025-05-08
Payer: MEDICARE

## 2025-05-08 VITALS
SYSTOLIC BLOOD PRESSURE: 135 MMHG | DIASTOLIC BLOOD PRESSURE: 76 MMHG | HEART RATE: 61 BPM | RESPIRATION RATE: 18 BRPM | OXYGEN SATURATION: 100 %

## 2025-05-08 DIAGNOSIS — C92.00 ACUTE MYELOID LEUKEMIA NOT HAVING ACHIEVED REMISSION: Primary | ICD-10-CM

## 2025-05-08 PROCEDURE — 63600175 PHARM REV CODE 636 W HCPCS

## 2025-05-08 PROCEDURE — 25000003 PHARM REV CODE 250

## 2025-05-08 PROCEDURE — 96401 CHEMO ANTI-NEOPL SQ/IM: CPT

## 2025-05-08 RX ORDER — ONDANSETRON 4 MG/1
16 TABLET, FILM COATED ORAL
Status: COMPLETED | OUTPATIENT
Start: 2025-05-08 | End: 2025-05-08

## 2025-05-08 RX ORDER — AZACITIDINE 100 MG/1
75 INJECTION, POWDER, LYOPHILIZED, FOR SOLUTION INTRAVENOUS; SUBCUTANEOUS
Status: COMPLETED | OUTPATIENT
Start: 2025-05-08 | End: 2025-05-08

## 2025-05-08 RX ADMIN — ONDANSETRON HYDROCHLORIDE 16 MG: 4 TABLET, FILM COATED ORAL at 11:05

## 2025-05-08 RX ADMIN — AZACITIDINE 165 MG: 100 INJECTION, POWDER, LYOPHILIZED, FOR SOLUTION INTRAVENOUS; SUBCUTANEOUS at 11:05

## 2025-05-09 ENCOUNTER — INFUSION (OUTPATIENT)
Dept: INFUSION THERAPY | Facility: HOSPITAL | Age: 65
End: 2025-05-09
Payer: MEDICARE

## 2025-05-09 VITALS
SYSTOLIC BLOOD PRESSURE: 168 MMHG | TEMPERATURE: 98 F | RESPIRATION RATE: 18 BRPM | DIASTOLIC BLOOD PRESSURE: 87 MMHG | HEART RATE: 61 BPM

## 2025-05-09 DIAGNOSIS — C92.00 ACUTE MYELOID LEUKEMIA NOT HAVING ACHIEVED REMISSION: Primary | ICD-10-CM

## 2025-05-09 PROCEDURE — 25000003 PHARM REV CODE 250

## 2025-05-09 PROCEDURE — 96401 CHEMO ANTI-NEOPL SQ/IM: CPT

## 2025-05-09 PROCEDURE — 63600175 PHARM REV CODE 636 W HCPCS

## 2025-05-09 RX ORDER — AZACITIDINE 100 MG/1
75 INJECTION, POWDER, LYOPHILIZED, FOR SOLUTION INTRAVENOUS; SUBCUTANEOUS
Status: COMPLETED | OUTPATIENT
Start: 2025-05-09 | End: 2025-05-09

## 2025-05-09 RX ORDER — ONDANSETRON 4 MG/1
16 TABLET, FILM COATED ORAL
Status: COMPLETED | OUTPATIENT
Start: 2025-05-09 | End: 2025-05-09

## 2025-05-09 RX ADMIN — AZACITIDINE 165 MG: 100 INJECTION, POWDER, LYOPHILIZED, FOR SOLUTION INTRAVENOUS; SUBCUTANEOUS at 12:05

## 2025-05-09 RX ADMIN — ONDANSETRON HYDROCHLORIDE 16 MG: 4 TABLET, FILM COATED ORAL at 12:05

## 2025-05-10 ENCOUNTER — INFUSION (OUTPATIENT)
Dept: INFUSION THERAPY | Facility: HOSPITAL | Age: 65
End: 2025-05-10
Payer: MEDICARE

## 2025-05-10 VITALS
RESPIRATION RATE: 18 BRPM | TEMPERATURE: 98 F | SYSTOLIC BLOOD PRESSURE: 148 MMHG | HEART RATE: 61 BPM | DIASTOLIC BLOOD PRESSURE: 71 MMHG | OXYGEN SATURATION: 100 %

## 2025-05-10 DIAGNOSIS — C92.00 ACUTE MYELOID LEUKEMIA NOT HAVING ACHIEVED REMISSION: Primary | ICD-10-CM

## 2025-05-10 PROCEDURE — 63600175 PHARM REV CODE 636 W HCPCS

## 2025-05-10 PROCEDURE — 96401 CHEMO ANTI-NEOPL SQ/IM: CPT

## 2025-05-10 PROCEDURE — 25000003 PHARM REV CODE 250

## 2025-05-10 RX ORDER — AZACITIDINE 100 MG/1
75 INJECTION, POWDER, LYOPHILIZED, FOR SOLUTION INTRAVENOUS; SUBCUTANEOUS
Status: COMPLETED | OUTPATIENT
Start: 2025-05-10 | End: 2025-05-10

## 2025-05-10 RX ORDER — ONDANSETRON 4 MG/1
16 TABLET, FILM COATED ORAL
Status: COMPLETED | OUTPATIENT
Start: 2025-05-10 | End: 2025-05-10

## 2025-05-10 RX ADMIN — AZACITIDINE 165 MG: 100 INJECTION, POWDER, LYOPHILIZED, FOR SOLUTION INTRAVENOUS; SUBCUTANEOUS at 11:05

## 2025-05-10 RX ADMIN — ONDANSETRON HYDROCHLORIDE 16 MG: 4 TABLET, FILM COATED ORAL at 11:05

## 2025-05-10 NOTE — PLAN OF CARE
Patient tolerated Vidaza SQ to RLQ without incident. Vitals stable. Premedicated per orders. Patient uses [facility] to track appointments. Stable and ambulatory off of unit at d/c accompanied by his wife.

## 2025-06-03 ENCOUNTER — INFUSION (OUTPATIENT)
Dept: INFUSION THERAPY | Facility: HOSPITAL | Age: 65
End: 2025-06-03
Payer: MEDICARE

## 2025-06-03 ENCOUNTER — OFFICE VISIT (OUTPATIENT)
Dept: HEMATOLOGY/ONCOLOGY | Facility: CLINIC | Age: 65
End: 2025-06-03
Payer: MEDICARE

## 2025-06-03 ENCOUNTER — LAB VISIT (OUTPATIENT)
Dept: LAB | Facility: HOSPITAL | Age: 65
End: 2025-06-03
Payer: MEDICARE

## 2025-06-03 VITALS
HEIGHT: 73 IN | BODY MASS INDEX: 27.36 KG/M2 | SYSTOLIC BLOOD PRESSURE: 141 MMHG | HEART RATE: 54 BPM | OXYGEN SATURATION: 100 % | DIASTOLIC BLOOD PRESSURE: 65 MMHG | WEIGHT: 206.44 LBS | TEMPERATURE: 98 F

## 2025-06-03 DIAGNOSIS — Z86.73 HISTORY OF CVA (CEREBROVASCULAR ACCIDENT): ICD-10-CM

## 2025-06-03 DIAGNOSIS — I10 HYPERTENSION, UNSPECIFIED TYPE: ICD-10-CM

## 2025-06-03 DIAGNOSIS — C92.01 ACUTE MYELOID LEUKEMIA IN REMISSION: Primary | ICD-10-CM

## 2025-06-03 DIAGNOSIS — Z76.82 STEM CELL TRANSPLANT CANDIDATE: ICD-10-CM

## 2025-06-03 DIAGNOSIS — C92.00 ACUTE MYELOID LEUKEMIA NOT HAVING ACHIEVED REMISSION: Primary | ICD-10-CM

## 2025-06-03 DIAGNOSIS — D61.818 PANCYTOPENIA: ICD-10-CM

## 2025-06-03 DIAGNOSIS — D84.81 IMMUNODEFICIENCY SECONDARY TO NEOPLASM: ICD-10-CM

## 2025-06-03 DIAGNOSIS — D49.9 IMMUNODEFICIENCY SECONDARY TO NEOPLASM: ICD-10-CM

## 2025-06-03 DIAGNOSIS — I50.20 HEART FAILURE WITH REDUCED EJECTION FRACTION: ICD-10-CM

## 2025-06-03 DIAGNOSIS — C92.01 ACUTE MYELOID LEUKEMIA IN REMISSION: ICD-10-CM

## 2025-06-03 LAB
ABSOLUTE EOSINOPHIL (OHS): 0 K/UL
ABSOLUTE MONOCYTE (OHS): 0.31 K/UL (ref 0.3–1)
ABSOLUTE NEUTROPHIL COUNT (OHS): 0.82 K/UL (ref 1.8–7.7)
ALBUMIN SERPL BCP-MCNC: 4.3 G/DL (ref 3.5–5.2)
ALP SERPL-CCNC: 59 UNIT/L (ref 40–150)
ALT SERPL W/O P-5'-P-CCNC: 8 UNIT/L (ref 10–44)
ANION GAP (OHS): 9 MMOL/L (ref 8–16)
ANISOCYTOSIS BLD QL SMEAR: SLIGHT
AST SERPL-CCNC: 14 UNIT/L (ref 11–45)
BASOPHILS # BLD AUTO: 0.01 K/UL
BASOPHILS NFR BLD AUTO: 0.5 %
BILIRUB SERPL-MCNC: 1 MG/DL (ref 0.1–1)
BUN SERPL-MCNC: 20 MG/DL (ref 8–23)
CALCIUM SERPL-MCNC: 9 MG/DL (ref 8.7–10.5)
CHLORIDE SERPL-SCNC: 104 MMOL/L (ref 95–110)
CO2 SERPL-SCNC: 25 MMOL/L (ref 23–29)
CREAT SERPL-MCNC: 1.2 MG/DL (ref 0.5–1.4)
ERYTHROCYTE [DISTWIDTH] IN BLOOD BY AUTOMATED COUNT: 22.1 % (ref 11.5–14.5)
GFR SERPLBLD CREATININE-BSD FMLA CKD-EPI: >60 ML/MIN/1.73/M2
GLUCOSE SERPL-MCNC: 94 MG/DL (ref 70–110)
HCT VFR BLD AUTO: 26.4 % (ref 40–54)
HGB BLD-MCNC: 8.1 GM/DL (ref 14–18)
IMM GRANULOCYTES # BLD AUTO: 0.01 K/UL (ref 0–0.04)
IMM GRANULOCYTES NFR BLD AUTO: 0.5 % (ref 0–0.5)
LYMPHOCYTES # BLD AUTO: 1.03 K/UL (ref 1–4.8)
MAGNESIUM SERPL-MCNC: 1.9 MG/DL (ref 1.6–2.6)
MCH RBC QN AUTO: 28.7 PG (ref 27–31)
MCHC RBC AUTO-ENTMCNC: 30.7 G/DL (ref 32–36)
MCV RBC AUTO: 94 FL (ref 82–98)
NUCLEATED RBC (/100WBC) (OHS): 0 /100 WBC
PHOSPHATE SERPL-MCNC: 2.9 MG/DL (ref 2.7–4.5)
PLATELET # BLD AUTO: 117 K/UL (ref 150–450)
PLATELET BLD QL SMEAR: ABNORMAL
PMV BLD AUTO: 9.5 FL (ref 9.2–12.9)
POIKILOCYTOSIS BLD QL SMEAR: SLIGHT
POLYCHROMASIA BLD QL SMEAR: ABNORMAL
POTASSIUM SERPL-SCNC: 3.8 MMOL/L (ref 3.5–5.1)
PROT SERPL-MCNC: 7.8 GM/DL (ref 6–8.4)
RBC # BLD AUTO: 2.82 M/UL (ref 4.6–6.2)
RELATIVE EOSINOPHIL (OHS): 0 %
RELATIVE LYMPHOCYTE (OHS): 47.2 % (ref 18–48)
RELATIVE MONOCYTE (OHS): 14.2 % (ref 4–15)
RELATIVE NEUTROPHIL (OHS): 37.6 % (ref 38–73)
SODIUM SERPL-SCNC: 138 MMOL/L (ref 136–145)
WBC # BLD AUTO: 2.18 K/UL (ref 3.9–12.7)

## 2025-06-03 PROCEDURE — 96401 CHEMO ANTI-NEOPL SQ/IM: CPT

## 2025-06-03 PROCEDURE — 36415 COLL VENOUS BLD VENIPUNCTURE: CPT

## 2025-06-03 PROCEDURE — 99999 PR PBB SHADOW E&M-EST. PATIENT-LVL IV: CPT | Mod: PBBFAC,,,

## 2025-06-03 PROCEDURE — 85025 COMPLETE CBC W/AUTO DIFF WBC: CPT

## 2025-06-03 PROCEDURE — 63600175 PHARM REV CODE 636 W HCPCS

## 2025-06-03 PROCEDURE — 84100 ASSAY OF PHOSPHORUS: CPT

## 2025-06-03 PROCEDURE — 83735 ASSAY OF MAGNESIUM: CPT

## 2025-06-03 PROCEDURE — 99214 OFFICE O/P EST MOD 30 MIN: CPT | Mod: PBBFAC

## 2025-06-03 PROCEDURE — 82040 ASSAY OF SERUM ALBUMIN: CPT

## 2025-06-03 PROCEDURE — 25000003 PHARM REV CODE 250

## 2025-06-03 RX ORDER — ONDANSETRON 8 MG/1
16 TABLET, FILM COATED ORAL
Status: CANCELLED | OUTPATIENT
Start: 2025-06-06

## 2025-06-03 RX ORDER — AZACITIDINE 100 MG/1
75 INJECTION, POWDER, LYOPHILIZED, FOR SOLUTION INTRAVENOUS; SUBCUTANEOUS
Status: COMPLETED | OUTPATIENT
Start: 2025-06-03 | End: 2025-06-03

## 2025-06-03 RX ORDER — AZACITIDINE 100 MG/1
75 INJECTION, POWDER, LYOPHILIZED, FOR SOLUTION INTRAVENOUS; SUBCUTANEOUS
Status: CANCELLED | OUTPATIENT
Start: 2025-06-05

## 2025-06-03 RX ORDER — ONDANSETRON 8 MG/1
16 TABLET, FILM COATED ORAL
Status: CANCELLED | OUTPATIENT
Start: 2025-06-04

## 2025-06-03 RX ORDER — AZACITIDINE 100 MG/1
75 INJECTION, POWDER, LYOPHILIZED, FOR SOLUTION INTRAVENOUS; SUBCUTANEOUS
Status: CANCELLED | OUTPATIENT
Start: 2025-06-06

## 2025-06-03 RX ORDER — AZACITIDINE 100 MG/1
75 INJECTION, POWDER, LYOPHILIZED, FOR SOLUTION INTRAVENOUS; SUBCUTANEOUS
Status: CANCELLED | OUTPATIENT
Start: 2025-06-07

## 2025-06-03 RX ORDER — ONDANSETRON 8 MG/1
16 TABLET, FILM COATED ORAL
Status: CANCELLED | OUTPATIENT
Start: 2025-06-07

## 2025-06-03 RX ORDER — AZACITIDINE 100 MG/1
75 INJECTION, POWDER, LYOPHILIZED, FOR SOLUTION INTRAVENOUS; SUBCUTANEOUS
Status: CANCELLED | OUTPATIENT
Start: 2025-06-04

## 2025-06-03 RX ORDER — ONDANSETRON 8 MG/1
16 TABLET, FILM COATED ORAL
Status: CANCELLED | OUTPATIENT
Start: 2025-06-03

## 2025-06-03 RX ORDER — ONDANSETRON 8 MG/1
16 TABLET, FILM COATED ORAL
Status: CANCELLED | OUTPATIENT
Start: 2025-06-05

## 2025-06-03 RX ORDER — ONDANSETRON 4 MG/1
16 TABLET, FILM COATED ORAL
Status: COMPLETED | OUTPATIENT
Start: 2025-06-03 | End: 2025-06-03

## 2025-06-03 RX ORDER — AZACITIDINE 100 MG/1
75 INJECTION, POWDER, LYOPHILIZED, FOR SOLUTION INTRAVENOUS; SUBCUTANEOUS
Status: CANCELLED | OUTPATIENT
Start: 2025-06-03

## 2025-06-03 RX ADMIN — AZACITIDINE 165 MG: 100 INJECTION, POWDER, LYOPHILIZED, FOR SOLUTION INTRAVENOUS; SUBCUTANEOUS at 02:06

## 2025-06-03 RX ADMIN — ONDANSETRON HYDROCHLORIDE 16 MG: 4 TABLET, FILM COATED ORAL at 02:06

## 2025-06-04 ENCOUNTER — INFUSION (OUTPATIENT)
Dept: INFUSION THERAPY | Facility: HOSPITAL | Age: 65
End: 2025-06-04
Payer: MEDICARE

## 2025-06-04 VITALS
RESPIRATION RATE: 18 BRPM | SYSTOLIC BLOOD PRESSURE: 162 MMHG | BODY MASS INDEX: 27.21 KG/M2 | WEIGHT: 206.25 LBS | HEART RATE: 62 BPM | OXYGEN SATURATION: 100 % | DIASTOLIC BLOOD PRESSURE: 79 MMHG

## 2025-06-04 DIAGNOSIS — C92.00 ACUTE MYELOID LEUKEMIA NOT HAVING ACHIEVED REMISSION: Primary | ICD-10-CM

## 2025-06-04 PROCEDURE — 25000003 PHARM REV CODE 250

## 2025-06-04 PROCEDURE — 63600175 PHARM REV CODE 636 W HCPCS

## 2025-06-04 PROCEDURE — 96401 CHEMO ANTI-NEOPL SQ/IM: CPT

## 2025-06-04 RX ORDER — AZACITIDINE 100 MG/1
75 INJECTION, POWDER, LYOPHILIZED, FOR SOLUTION INTRAVENOUS; SUBCUTANEOUS
Status: COMPLETED | OUTPATIENT
Start: 2025-06-04 | End: 2025-06-04

## 2025-06-04 RX ORDER — ONDANSETRON 4 MG/1
16 TABLET, FILM COATED ORAL
Status: COMPLETED | OUTPATIENT
Start: 2025-06-04 | End: 2025-06-04

## 2025-06-04 RX ADMIN — ONDANSETRON HYDROCHLORIDE 16 MG: 4 TABLET, FILM COATED ORAL at 12:06

## 2025-06-04 RX ADMIN — AZACITIDINE 165 MG: 100 INJECTION, POWDER, LYOPHILIZED, FOR SOLUTION INTRAVENOUS; SUBCUTANEOUS at 12:06

## 2025-06-05 ENCOUNTER — INFUSION (OUTPATIENT)
Dept: INFUSION THERAPY | Facility: HOSPITAL | Age: 65
End: 2025-06-05
Payer: MEDICARE

## 2025-06-05 VITALS
SYSTOLIC BLOOD PRESSURE: 162 MMHG | HEART RATE: 61 BPM | DIASTOLIC BLOOD PRESSURE: 77 MMHG | TEMPERATURE: 98 F | RESPIRATION RATE: 18 BRPM | OXYGEN SATURATION: 100 %

## 2025-06-05 DIAGNOSIS — C92.00 ACUTE MYELOID LEUKEMIA NOT HAVING ACHIEVED REMISSION: Primary | ICD-10-CM

## 2025-06-05 PROCEDURE — 25000003 PHARM REV CODE 250

## 2025-06-05 PROCEDURE — 96401 CHEMO ANTI-NEOPL SQ/IM: CPT

## 2025-06-05 PROCEDURE — 63600175 PHARM REV CODE 636 W HCPCS

## 2025-06-05 RX ORDER — AZACITIDINE 100 MG/1
75 INJECTION, POWDER, LYOPHILIZED, FOR SOLUTION INTRAVENOUS; SUBCUTANEOUS
Status: COMPLETED | OUTPATIENT
Start: 2025-06-05 | End: 2025-06-05

## 2025-06-05 RX ORDER — ONDANSETRON 4 MG/1
16 TABLET, FILM COATED ORAL
Status: COMPLETED | OUTPATIENT
Start: 2025-06-05 | End: 2025-06-05

## 2025-06-05 RX ADMIN — ONDANSETRON HYDROCHLORIDE 16 MG: 4 TABLET, FILM COATED ORAL at 11:06

## 2025-06-05 RX ADMIN — AZACITIDINE 165 MG: 100 INJECTION, POWDER, LYOPHILIZED, FOR SOLUTION INTRAVENOUS; SUBCUTANEOUS at 11:06

## 2025-06-06 ENCOUNTER — INFUSION (OUTPATIENT)
Dept: INFUSION THERAPY | Facility: HOSPITAL | Age: 65
End: 2025-06-06
Payer: MEDICARE

## 2025-06-06 VITALS
HEART RATE: 52 BPM | BODY MASS INDEX: 27.21 KG/M2 | TEMPERATURE: 98 F | WEIGHT: 206.25 LBS | OXYGEN SATURATION: 100 % | RESPIRATION RATE: 16 BRPM | SYSTOLIC BLOOD PRESSURE: 170 MMHG | DIASTOLIC BLOOD PRESSURE: 85 MMHG

## 2025-06-06 DIAGNOSIS — C92.00 ACUTE MYELOID LEUKEMIA NOT HAVING ACHIEVED REMISSION: Primary | ICD-10-CM

## 2025-06-06 PROCEDURE — 63600175 PHARM REV CODE 636 W HCPCS

## 2025-06-06 PROCEDURE — 25000003 PHARM REV CODE 250

## 2025-06-06 PROCEDURE — 96401 CHEMO ANTI-NEOPL SQ/IM: CPT

## 2025-06-06 RX ORDER — AZACITIDINE 100 MG/1
75 INJECTION, POWDER, LYOPHILIZED, FOR SOLUTION INTRAVENOUS; SUBCUTANEOUS
Status: COMPLETED | OUTPATIENT
Start: 2025-06-06 | End: 2025-06-06

## 2025-06-06 RX ORDER — ONDANSETRON 4 MG/1
16 TABLET, FILM COATED ORAL
Status: COMPLETED | OUTPATIENT
Start: 2025-06-06 | End: 2025-06-06

## 2025-06-06 RX ADMIN — ONDANSETRON HYDROCHLORIDE 16 MG: 4 TABLET, FILM COATED ORAL at 11:06

## 2025-06-06 RX ADMIN — AZACITIDINE 165 MG: 100 INJECTION, POWDER, LYOPHILIZED, FOR SOLUTION INTRAVENOUS; SUBCUTANEOUS at 11:06

## 2025-06-07 ENCOUNTER — INFUSION (OUTPATIENT)
Dept: INFUSION THERAPY | Facility: HOSPITAL | Age: 65
End: 2025-06-07
Payer: MEDICARE

## 2025-06-07 VITALS
OXYGEN SATURATION: 100 % | RESPIRATION RATE: 18 BRPM | DIASTOLIC BLOOD PRESSURE: 85 MMHG | TEMPERATURE: 98 F | SYSTOLIC BLOOD PRESSURE: 150 MMHG | HEART RATE: 62 BPM

## 2025-06-07 DIAGNOSIS — C92.00 ACUTE MYELOID LEUKEMIA NOT HAVING ACHIEVED REMISSION: Primary | ICD-10-CM

## 2025-06-07 PROCEDURE — 63600175 PHARM REV CODE 636 W HCPCS

## 2025-06-07 PROCEDURE — 96401 CHEMO ANTI-NEOPL SQ/IM: CPT

## 2025-06-07 PROCEDURE — 25000003 PHARM REV CODE 250

## 2025-06-07 RX ORDER — ONDANSETRON 4 MG/1
16 TABLET, FILM COATED ORAL
Status: COMPLETED | OUTPATIENT
Start: 2025-06-07 | End: 2025-06-07

## 2025-06-07 RX ORDER — AZACITIDINE 100 MG/1
75 INJECTION, POWDER, LYOPHILIZED, FOR SOLUTION INTRAVENOUS; SUBCUTANEOUS
Status: COMPLETED | OUTPATIENT
Start: 2025-06-07 | End: 2025-06-07

## 2025-06-07 RX ADMIN — ONDANSETRON HYDROCHLORIDE 16 MG: 4 TABLET, FILM COATED ORAL at 10:06

## 2025-06-07 RX ADMIN — AZACITIDINE 165 MG: 100 INJECTION, POWDER, LYOPHILIZED, FOR SOLUTION INTRAVENOUS; SUBCUTANEOUS at 11:06

## 2025-07-01 ENCOUNTER — OFFICE VISIT (OUTPATIENT)
Dept: HEMATOLOGY/ONCOLOGY | Facility: CLINIC | Age: 65
End: 2025-07-01
Payer: MEDICARE

## 2025-07-01 VITALS
SYSTOLIC BLOOD PRESSURE: 158 MMHG | DIASTOLIC BLOOD PRESSURE: 77 MMHG | OXYGEN SATURATION: 100 % | HEART RATE: 53 BPM | HEIGHT: 73 IN | BODY MASS INDEX: 27.09 KG/M2 | TEMPERATURE: 98 F | WEIGHT: 204.38 LBS

## 2025-07-01 DIAGNOSIS — C92.01 ACUTE MYELOID LEUKEMIA IN REMISSION: Primary | ICD-10-CM

## 2025-07-01 DIAGNOSIS — D84.81 IMMUNODEFICIENCY SECONDARY TO NEOPLASM: ICD-10-CM

## 2025-07-01 DIAGNOSIS — I50.20 HEART FAILURE WITH REDUCED EJECTION FRACTION: ICD-10-CM

## 2025-07-01 DIAGNOSIS — D61.818 PANCYTOPENIA: ICD-10-CM

## 2025-07-01 DIAGNOSIS — D49.9 IMMUNODEFICIENCY SECONDARY TO NEOPLASM: ICD-10-CM

## 2025-07-01 DIAGNOSIS — Z86.73 HISTORY OF CVA (CEREBROVASCULAR ACCIDENT): ICD-10-CM

## 2025-07-01 DIAGNOSIS — I10 HYPERTENSION, UNSPECIFIED TYPE: ICD-10-CM

## 2025-07-01 DIAGNOSIS — Z76.82 STEM CELL TRANSPLANT CANDIDATE: ICD-10-CM

## 2025-07-01 PROCEDURE — 99214 OFFICE O/P EST MOD 30 MIN: CPT | Mod: PBBFAC

## 2025-07-01 PROCEDURE — 99999 PR PBB SHADOW E&M-EST. PATIENT-LVL IV: CPT | Mod: PBBFAC,,,

## 2025-07-01 NOTE — PROGRESS NOTES
Section of Hematology and Stem Cell Transplantation  Follow Up Visit     Date of visit: 7/1/25  Visit diagnosis: No primary diagnosis found.  Referred by:  FERN Oneill MD    Oncologic History:     Primary Oncologic Diagnosis: Acute myeloid leukemia, adverse risk.    6/28/23: Diagnosed with heparin-induced thrombocytopenia. HIT Ab 1.87 OD (moderate-strong). ZAHIRA not available.   10/31/23: Peripheral blood flow cytometry sent due to worsening pancytopenia (CBC: WBC 3.43, Hgb 9, Plts 120, 19% blasts) revealed increased blasts (43.1%) concerning for acute myeloid leukemia.   11/9/23: Bone marrow biopsy revealed a hypercellular marrow (80%) with increased blasts consistent with acute myeloid leukemia. Karyotype 46,XY,del(20)(q11.2q13.3)[4]/46,XY[16]. FISH with 20q12 deletion. NGS with IDH2 (40%) and SRSF2 (40%).  12/2023: He started taking venetoclax 200mg daily (did not start azacitidine due to scheduling conflicts).    1/8/24: C1D1 of azacitidine x5 days plus venetoclax 21 of 28 days.  1/31/24: Bone marrow biopsy after cycle 1 revealed persistent AML with improvement in blasts to 3-7%. Karyotyping with 7 of 8 available metaphases with complex near-tetraploid karyotype (1 normal). NGS with IDH2 (41%) and SRSF2 (40%).  4/23/24: Bone marrow biopsy with normocellular marrow with no blasts consistent with complete remission. CG/FISH normal. NGS with IDH2 (11%) and SRSF2 (10%).   8/22/24: Bone marrow biopsy hypocellular (20%) with dysmegakaryopoiesis and marked myeloid hypoplasia.  No increase in blasts.  Diploid karyotype.  NGS IDH2 (9%) and SRSF2 (9%).  2/17/25: Bone marrow biopsy hypocellular (20%) with dysmegakaryopoiesis and marked myeloid hypoplasia.  No increase in blasts.  Diploid karyotype.  NGS IDH2 (42%), SRSF2 (42%), SH2B3 (31%). FISH normal.     History of Present Ilness:   Gustavo Tracey  (Gustavo) is a pleasant 65 y.o.male who presents for follow up. He is overall physically feeling okay with some minor  aches in his joints controlled by medication. They do not keep him from doing his daily life activities. Minor fatigue stable and rests as needed. He continues tolerating therapy well. No n/v/d. His next cycle to begin today (cycle 18).     He has called the dentist to make an appointment with them and will go 7/24/25.      PAST MEDICAL HISTORY:   Past Medical History:   Diagnosis Date    Abnormal nuclear stress test 11/26/2022    Acute myeloid leukemia     Cervical radiculopathy     to rt arm    CHF (congestive heart failure)     Chronic systolic congestive heart failure 11/28/2022    Dilated cardiomyopathy 11/26/2022    Hyperlipidemia     Hypertension     Obesity, unspecified     PAF (paroxysmal atrial fibrillation) 11/26/2022    Pulmonary HTN 11/28/2022    Stroke        PAST SURGICAL HISTORY:   Past Surgical History:   Procedure Laterality Date    BONE MARROW BIOPSY Right 1/31/2024    Procedure: Biopsy-bone marrow;  Surgeon: Rui Jacobsen MD;  Location: [address]);  Service: Oncology;  Laterality: Right;  1/24-pt confirmed-MS    CLOSURE OF LEFT ATRIAL APPENDAGE USING DEVICE Left 6/22/2023    Procedure: CLOSURE, LEFT ATRIAL APPENDAGE, USING DEVICE;  Surgeon: Rustam Dave MD;  Location: [facility];  Service: Cardiovascular;  Laterality: Left;  LIGATION OF LEFT ATRIAL APPENDAGE WITH OTILIA EXCLUSION SYSTEM    CORONARY ARTERY BYPASS GRAFT (CABG) N/A 6/22/2023    Procedure: CORONARY ARTERY BYPASS GRAFT (CABG);  Surgeon: Rustam Dave MD;  Location: [facility] OR;  Service: Cardiovascular;  Laterality: N/A;  3-VESSEL WITH EPI-AORTIC ULTRASOUND    PURDY MAZE PROCEDURE N/A 6/22/2023    Procedure: PURDY MAZE PROCEDURE;  Surgeon: Rustam Dave MD;  Location: [facility] OR;  Service: Cardiovascular;  Laterality: N/A;    ECHOCARDIOGRAM,TRANSESOPHAGEAL N/A 6/22/2023    Procedure: ECHOCARDIOGRAM,TRANSESOPHAGEAL;  Surgeon: Rustam Dave MD;  Location: [facility] OR;  Service: Cardiovascular;  Laterality: N/A;     ENDOSCOPIC HARVEST OF VEIN Left 6/22/2023    Procedure: SURGICAL PROCUREMENT, VEIN, ENDOSCOPIC;  Surgeon: Rustam Dave MD;  Location: [facility] OR;  Service: Cardiovascular;  Laterality: Left;    INJECTION OF ANESTHETIC AGENT AROUND MULTIPLE INTERCOSTAL NERVES N/A 6/22/2023    Procedure: BLOCK, NERVE, INTERCOSTAL, 2 OR MORE;  Surgeon: Rustam Dave MD;  Location: [facility] OR;  Service: Cardiovascular;  Laterality: N/A;  PARASTERNAL NERVE BLOCK    INSTANTANEOUS WAVE-FREE RATIO  6/20/2023    Procedure: Instantaneous Wave-Free Ratio;  Surgeon: Zion Ortega MD;  Location: [facility] CATH LAB;  Service: Cardiology;;    IVUS, CORONARY  6/20/2023    Procedure: IVUS, Coronary;  Surgeon: Zion Ortega MD;  Location: [facility] CATH LAB;  Service: Cardiology;;    LEFT HEART CATHETERIZATION Left 11/28/2022    Procedure: CATHETERIZATION, HEART, LEFT;  Surgeon: Zion Ortega MD;  Location: [facility] CATH LAB;  Service: Cardiology;  Laterality: Left;    LEFT HEART CATHETERIZATION Left 6/20/2023    Procedure: Left heart cath;  Surgeon: Zion Ortega MD;  Location: [facility] CATH LAB;  Service: Cardiology;  Laterality: Left;    PERCUTANEOUS TRANSLUMINAL BALLOON ANGIOPLASTY OF CORONARY ARTERY  6/20/2023    Procedure: Angioplasty-coronary;  Surgeon: Zion Ortega MD;  Location: [facility] CATH LAB;  Service: Cardiology;;    RIGHT HEART CATHETERIZATION N/A 11/28/2022    Procedure: INSERTION, CATHETER, RIGHT HEART;  Surgeon: Zion Ortega MD;  Location: [facility] CATH LAB;  Service: Cardiology;  Laterality: N/A;  Congestive heart failure       PAST SOCIAL HISTORY:  Social History     Tobacco Use    Smoking status: Never    Smokeless tobacco: Never   Substance Use Topics    Alcohol use: Yes    Drug use: Never       FAMILY HISTORY:  Family History   Problem Relation Name Age of Onset    Hypertension Mother      Diabetes Father      Hyperlipidemia Father      Hypertension Father      Heart attack Father      Coronary artery disease Father          CURRENT MEDICATIONS:   Current Outpatient Medications   Medication Sig    acyclovir (ZOVIRAX) 400 MG tablet Take 1 tablet (400 mg total) by mouth 2 (two) times daily.    allopurinoL (ZYLOPRIM) 300 MG tablet Take 1 tablet (300 mg total) by mouth once daily.    amitriptyline (ELAVIL) 50 MG tablet Take 50 mg by mouth every evening.    aspirin (ECOTRIN) 81 MG EC tablet Take 1 tablet (81 mg total) by mouth once daily.    cyclobenzaprine (FLEXERIL) 10 MG tablet Take 10 mg by mouth 2 (two) times daily as needed.    FARXIGA 10 mg tablet TAKE 1 TABLET BY MOUTH EVERY DAY    ferrous sulfate (FEOSOL) 325 mg (65 mg iron) Tab tablet Take 1 tablet by mouth once daily.    furosemide (LASIX) 20 MG tablet Take 1 tablet by mouth once daily.    HYDROcodone-acetaminophen (NORCO)  mg per tablet TAKE 1 TABLET BY MOUTH 1 TO 2 TIMES A DAY AS NEEDED    latanoprost 0.005 % ophthalmic solution Place 1 drop into both eyes nightly.    LORazepam (ATIVAN) 1 MG tablet Take 0.5 tablets (0.5 mg total) by mouth every 6 (six) hours as needed for Anxiety. Take one hour before bone marrow biopsy.    metoprolol succinate (TOPROL-XL) 100 MG 24 hr tablet Take 1 tablet by mouth once daily.    potassium chloride (K-TAB) 20 mEq Take 1 tablet (20 mEq total) by mouth 2 (two) times daily.    pravastatin (PRAVACHOL) 20 MG tablet Take 20 mg by mouth once daily.    valsartan (DIOVAN) 40 MG tablet TAKE 1 TABLET BY MOUTH EVERY DAY    venetoclax (VENCLEXTA) 100 mg Tab Take 4 tablets (400 mg total) by mouth once daily Take 4 tablets (400mg) once daily on days 1-7 of a 28 day cycle. Always start with azacitidine (Vidaza) injections..    warfarin (COUMADIN) 5 MG tablet TAKE 1 TABLET BY MOUTH ON SUN, MON, WED, FRI, SAT,& 1.5 ON TUES & THURS     Current Facility-Administered Medications   Medication    0.9%  NaCl infusion (for blood administration)    0.9%  NaCl infusion (for blood administration)    acetaminophen tablet 650 mg     diphenhydrAMINE capsule 25 mg     Facility-Administered Medications Ordered in Other Visits   Medication    sodium chloride 0.9% flush 10 mL       ALLERGIES:   Review of patient's allergies indicates:  No Known Allergies      Review of Systems:     Pertinent positives and negatives included in the HPI. Otherwise a complete review of systems is negative.    Physical Exam:     Vitals:    07/01/25 0838   BP: (!) 181/84   Pulse: (!) 57   Temp: 98.3 °F (36.8 °C)     Repeat /77            Physical Exam  Constitutional:       General: He is not in acute distress.  Eyes:      General: No scleral icterus.     Extraocular Movements: Extraocular movements intact.      Conjunctiva/sclera: Conjunctivae normal.   Cardiovascular:      Rate and Rhythm: Normal rate and regular rhythm.      Pulses: Normal pulses.   Pulmonary:      Effort: Pulmonary effort is normal. No respiratory distress.      Breath sounds: Normal breath sounds. No wheezing or rhonchi.   Abdominal:      General: There is no distension.      Palpations: Abdomen is soft.   Musculoskeletal:         General: No swelling or tenderness.      Right lower leg: No edema.      Left lower leg: No edema.   Skin:     General: Skin is warm and dry.      Findings: No bruising or rash.   Neurological:      Mental Status: He is alert and oriented to person, place, and time.      Coordination: Coordination normal.      Gait: Gait normal.   Psychiatric:         Mood and Affect: Mood normal.         Behavior: Behavior normal.        ECOG Performance Status: (foot note - ECOG PS provided by Eastern Cooperative Oncology Group) 1 - Symptomatic but completely ambulatory    Karnofsky Performance Score:  90%- Able to Carry on Normal Activity: Minor Symptoms of Disease    Labs:   Lab Results   Component Value Date    WBC 1.5 (LL) 06/25/2025    RBC 3.04 (L) 06/25/2025    HGB 9.5 (L) 06/25/2025    HCT 31.2 (L) 06/25/2025     (H) 06/25/2025    MCH 31.3 06/25/2025    MCHC 30.4 (L)  06/25/2025    RDW 22.5 (H) 06/25/2025     06/25/2025    MPV 8.2 06/20/2025    GRAN 52.0 06/20/2025    LYMPH 1.0 06/25/2025    MONO 2 06/25/2025    MONO 0.0 (L) 06/25/2025    EOS 0.0 06/25/2025    BASO 0.0 06/25/2025    EOSINOPHIL 1 06/25/2025    BASOPHIL 1 06/25/2025       CMP  Sodium   Date Value Ref Range Status   06/25/2025 141 134 - 144 mmol/L Final     Potassium   Date Value Ref Range Status   06/25/2025 4.1 3.5 - 5.2 mmol/L Final     Chloride   Date Value Ref Range Status   06/25/2025 102 96 - 106 mmol/L Final     CO2   Date Value Ref Range Status   06/25/2025 23 20 - 29 mmol/L Final     Glucose   Date Value Ref Range Status   06/25/2025 93 70 - 99 mg/dL Final     BUN   Date Value Ref Range Status   06/25/2025 21 8 - 27 mg/dL Final   06/03/2025 20 8 - 23 mg/dL Final     Creatinine   Date Value Ref Range Status   06/25/2025 1.31 (H) 0.76 - 1.27 mg/dL Final   06/03/2025 1.2 0.5 - 1.4 mg/dL Final     Calcium   Date Value Ref Range Status   06/25/2025 9.5 8.6 - 10.2 mg/dL Final     Total Protein   Date Value Ref Range Status   06/20/2025 8.2 6.3 - 8.2 g/dL Final     Albumin   Date Value Ref Range Status   06/25/2025 4.7 3.9 - 4.9 g/dL Final   06/20/2025 4.8 3.5 - 5.2 g/dL Final     Total Bilirubin   Date Value Ref Range Status   06/25/2025 0.6 0.0 - 1.2 mg/dL Final     Alkaline Phosphatase   Date Value Ref Range Status   06/20/2025 57 38 - 145 U/L Final     AST   Date Value Ref Range Status   06/25/2025 23 0 - 40 IU/L Final     ALT   Date Value Ref Range Status   06/25/2025 17 0 - 44 IU/L Final     Anion Gap   Date Value Ref Range Status   06/20/2025 7 (L) 8 - 16 mmol/L Final   06/03/2025 9 8 - 16 mmol/L Final     Comment:     UNABLE TO CALCULATE       Imaging:   Reviewed     Pathology:  Reviewed     Assessment and Plan:   Gustavo Tracey Jr. (Gustavo) is a pleasant 65 y.o.male who presents for follow up.    Acute myeloid leukemia, adverse risk:  Peripheral blood flow cytometry obtain 10/31/2023 concerning  for acute myeloid leukemia.  Bone marrow biopsy obtained on 11/09/2023 revealed acute myeloid leukemia with 20q deletion on FISH and NGS with IDH2 (40%) and SRSF2 (40%). He started aza x7 plus alonzo x21 of 28 days in 1/2024. Bone marrow biopsy at the end of cycle 1 revealed persistent disease but improvement in blasts. CG now showed near-tetraploid complex karyotype in 7 of 8 metaphases. NGS with persistent IDH2 (41%) and SRSF2 (40%). Bone marrow biopsy in 4/2024 showed complete remission with persistent molecular disease - NGS with IDH2 (11%) and SRSF2 (10%).  Bone marrow biopsy on 08/22/2024 showed continued remission with persistent dysplastic changes.  NGS with IDH2 (9%) and SRSF2 (9%). Stopped prophylactic fluconazole and levofloxacin and increased venetoclax to 400mg daily 11/13/24.  Most recent bone marrow biopsy on 2/17/25 shows hypocellularity (20%) with dysmegakaryopoiesis and marked myeloid hypoplasia.  No increase in blasts.  Diploid karyotype.  NGS with progression of dysplastic changes: IDH2 (42%), SRSF2 (42%), SH2B3 (31%). FISH normal.   - Continue azacitidine 75mg/m2 x5 days and venetoclax 400mg daily x7 of 28 days.  - Mr. Tracey is interested in pursuing SCT candidacy evaluation. He is working on completeing pre-transplant appointments needed such as the dentist.     Stem cell transplant candidate:  We discussed the role for allogeneic stem cell transplantation in this disease process as a potentially curative option. We had an extensive discussion about the rationale, logistics, risks, and benefits. We reviewed the requirement to stay in the [place] area for 100 days with a caregiver at all times. We discussed the risks, including infection, graft failure, organ toxicity, graft versus host disease, relapse of disease, and secondary cancers. We reviewed the need for long-term immunosuppression and need for close monitoring.  His HCT-CI score is 3 (high risk), although he is very fit and active  without limitations.  We reviewed this today again, and we discussed that transplant would carry significant risk for both morbidity and mortality (40% 2 year NRM).  He remains undecided on whether or not to proceed with transplant. We will continue to address but lately with shared decision making we have opted to continue deferring transplant given his excellent response and limited toxicity.     History of CVA:  Repeat MRI brain on 08/05/2024 revealed patchy gliotic white matter signal change in the subcortical and periventricular regions of bilateral cerebral hemispheres.  This is reportedly changed compared to his prior MRI brain in 2012.  Given our potential for proceeding with stem cell transplant, I will refer him to Neurology for further evaluation.  - Patient never recalled neurology to schedule appointment. Advised he should call as soon as possible to get in with them.     Pancytopenia:  Monitor CBC weekly with Dr. Oneill. Transfuse 1 unit of PRBC for hgb < 7 or clinically appropriate. Transfuse 1 unit of plts if platelets < 10 or clinically appropriate.      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 ANC has been stable after treatment so stopped fluconazole and levofloxacin.      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  Heparin antibody 1.87 OD (moderate-strong). ZAHIRA negative on 12/13/23 which rules out HIT. Heparin allergy removed. Ok to stop Warfarin. Dr. Jacobsen said it was okay to stop Warfarin from our perspective, but his cardiologist recommended continuing. With negative ZAHIRA, HIT would be very unlikely. He recommended stopping Warfarin with a negative ZAHIRA and intermittent thrombocytopenia from treatment. Patient discussed with his cardiologist.     Stem cell donors:  He has one full sister and 2 children as potential donor options . He has one potential MUD (13/16 match). His sister has medical issues so cannot be a donor.      HFrEF:  Cardiac function now back to baseline. Continue follow up with local cardiologist.     Hypertension  Asymptomatic. Multifactorial, endorses compliance with anti-HTN regimen. Continue to follow up with PCP and cardiology.   -/84 and he reports taking all of his medications this morning. Repeat /77 which is more his baseline. Advised to speak to PCP about this.     Orders/Follow Up:        Route Chart for Scheduling    BMT Chart Routing      Follow up with physician    Follow up with [facility] . 8/4   Provider visit type    Infusion scheduling note    Injection scheduling note aza starting 8/4 x5   Labs   Scheduling:  Preferred lab:  Lab interval:  [facility] local oncologist weekly. Do CBC,CMP, Mag, Phos here with next appointment   Imaging    Pharmacy appointment    Other referrals                Treatment Plan Information   OP AZACITIDINE 5-DAY (SUB-Q) + VENETOCLAX Rui Jacobsen MD   Associated diagnosis: Acute myeloid leukemia not having achieved remission   noted on 11/6/2023   Line of treatment: First Line  Treatment Goal: Control     Upcoming Treatment Dates - OP AZACITIDINE 5-DAY (SUB-Q) + VENETOCLAX    7/1/2025       Pre-Medications       ondansetron tablet 16 mg       Chemotherapy       azaCITIDine (VIDAZA) chemo injection 165 mg  7/2/2025       Pre-Medications       ondansetron tablet 16 mg       Chemotherapy       azaCITIDine (VIDAZA) chemo injection 165 mg  7/3/2025       Pre-Medications       ondansetron tablet 16 mg       Chemotherapy       azaCITIDine (VIDAZA) chemo injection 165 mg  7/4/2025       Pre-Medications       ondansetron tablet 16 mg       Chemotherapy       azaCITIDine (VIDAZA) chemo injection 165 mg    Supportive Plan Information  IV FLUIDS AND ELECTROLYTES Rui Jacobsen MD   Associated Diagnosis: Dehydration   noted on 9/30/2024  Associated Diagnosis: Acute myeloid leukemia not having achieved remission   noted on 11/6/2023   Line of treatment: Supportive Care    Treatment goal: Supportive     Upcoming Treatment Dates - IV FLUIDS AND ELECTROLYTES    No upcoming days in selected categories.    Therapy Plan Information  INF FLUIDS for Acute myeloid leukemia not having achieved remission, noted on 11/6/2023  None    INF FLUIDS for Acute myeloid leukemia not having achieved remission, noted on 11/6/2023  sodium chloride 0.9% bolus 500 mL 500 mL  500 mL, Intravenous, PRN      No therapy plan of the specified type found.    Total time of this visit was 35 minutes, including time spent face to face with patient and/or via video/audio, and also in preparing for today's visit for MDM and documentation. (Medical Decision Making, including consideration of possible diagnoses, management options, complex medical record review, review of diagnostic tests and information, consideration and discussion of significant complications based on comorbidities, and discussion with providers involved with the care of the patient). Greater than 50% was spent face to face with the patient counseling and coordinating care.    Mary Anne Barnett PA-C  Malignant Hematology, Stem Cell Transplant, and Cellular Therapy  [facility]  [facility]

## 2025-07-07 ENCOUNTER — INFUSION (OUTPATIENT)
Dept: INFUSION THERAPY | Facility: HOSPITAL | Age: 65
End: 2025-07-07
Payer: MEDICARE

## 2025-07-07 ENCOUNTER — LAB VISIT (OUTPATIENT)
Dept: LAB | Facility: HOSPITAL | Age: 65
End: 2025-07-07
Payer: MEDICARE

## 2025-07-07 VITALS
RESPIRATION RATE: 18 BRPM | SYSTOLIC BLOOD PRESSURE: 163 MMHG | BODY MASS INDEX: 27.11 KG/M2 | WEIGHT: 205.5 LBS | DIASTOLIC BLOOD PRESSURE: 71 MMHG | HEART RATE: 50 BPM

## 2025-07-07 DIAGNOSIS — C92.00 ACUTE MYELOID LEUKEMIA NOT HAVING ACHIEVED REMISSION: Primary | ICD-10-CM

## 2025-07-07 DIAGNOSIS — C92.01 ACUTE MYELOID LEUKEMIA IN REMISSION: ICD-10-CM

## 2025-07-07 LAB
ABSOLUTE EOSINOPHIL (OHS): 0.02 K/UL
ABSOLUTE MONOCYTE (OHS): 0.62 K/UL (ref 0.3–1)
ABSOLUTE NEUTROPHIL COUNT (OHS): 1.16 K/UL (ref 1.8–7.7)
ALBUMIN SERPL BCP-MCNC: 4.5 G/DL (ref 3.5–5.2)
ALP SERPL-CCNC: 60 UNIT/L (ref 40–150)
ALT SERPL W/O P-5'-P-CCNC: 14 UNIT/L (ref 10–44)
ANION GAP (OHS): 9 MMOL/L (ref 8–16)
AST SERPL-CCNC: 18 UNIT/L (ref 11–45)
BASOPHILS # BLD AUTO: 0 K/UL
BASOPHILS NFR BLD AUTO: 0 %
BILIRUB SERPL-MCNC: 0.6 MG/DL (ref 0.1–1)
BUN SERPL-MCNC: 26 MG/DL (ref 8–23)
CALCIUM SERPL-MCNC: 9.3 MG/DL (ref 8.7–10.5)
CHLORIDE SERPL-SCNC: 105 MMOL/L (ref 95–110)
CO2 SERPL-SCNC: 26 MMOL/L (ref 23–29)
CREAT SERPL-MCNC: 1.2 MG/DL (ref 0.5–1.4)
ERYTHROCYTE [DISTWIDTH] IN BLOOD BY AUTOMATED COUNT: 20 % (ref 11.5–14.5)
GFR SERPLBLD CREATININE-BSD FMLA CKD-EPI: >60 ML/MIN/1.73/M2
GLUCOSE SERPL-MCNC: 93 MG/DL (ref 70–110)
HCT VFR BLD AUTO: 30.1 % (ref 40–54)
HGB BLD-MCNC: 9.4 GM/DL (ref 14–18)
IMM GRANULOCYTES # BLD AUTO: 0.04 K/UL (ref 0–0.04)
IMM GRANULOCYTES NFR BLD AUTO: 1.4 % (ref 0–0.5)
LYMPHOCYTES # BLD AUTO: 1.08 K/UL (ref 1–4.8)
MAGNESIUM SERPL-MCNC: 2.1 MG/DL (ref 1.6–2.6)
MCH RBC QN AUTO: 30.4 PG (ref 27–31)
MCHC RBC AUTO-ENTMCNC: 31.2 G/DL (ref 32–36)
MCV RBC AUTO: 97 FL (ref 82–98)
NUCLEATED RBC (/100WBC) (OHS): 1 /100 WBC
PHOSPHATE SERPL-MCNC: 3.5 MG/DL (ref 2.7–4.5)
PLATELET # BLD AUTO: 95 K/UL (ref 150–450)
PMV BLD AUTO: 9.6 FL (ref 9.2–12.9)
POTASSIUM SERPL-SCNC: 3.9 MMOL/L (ref 3.5–5.1)
PROT SERPL-MCNC: 8.1 GM/DL (ref 6–8.4)
RBC # BLD AUTO: 3.09 M/UL (ref 4.6–6.2)
RELATIVE EOSINOPHIL (OHS): 0.7 %
RELATIVE LYMPHOCYTE (OHS): 37 % (ref 18–48)
RELATIVE MONOCYTE (OHS): 21.2 % (ref 4–15)
RELATIVE NEUTROPHIL (OHS): 39.7 % (ref 38–73)
SODIUM SERPL-SCNC: 140 MMOL/L (ref 136–145)
WBC # BLD AUTO: 2.92 K/UL (ref 3.9–12.7)

## 2025-07-07 PROCEDURE — 25000003 PHARM REV CODE 250

## 2025-07-07 PROCEDURE — 36415 COLL VENOUS BLD VENIPUNCTURE: CPT

## 2025-07-07 PROCEDURE — 82040 ASSAY OF SERUM ALBUMIN: CPT

## 2025-07-07 PROCEDURE — 85025 COMPLETE CBC W/AUTO DIFF WBC: CPT

## 2025-07-07 PROCEDURE — 96401 CHEMO ANTI-NEOPL SQ/IM: CPT

## 2025-07-07 PROCEDURE — 83735 ASSAY OF MAGNESIUM: CPT

## 2025-07-07 PROCEDURE — 63600175 PHARM REV CODE 636 W HCPCS

## 2025-07-07 PROCEDURE — 84100 ASSAY OF PHOSPHORUS: CPT

## 2025-07-07 RX ORDER — AZACITIDINE 100 MG/1
75 INJECTION, POWDER, LYOPHILIZED, FOR SOLUTION INTRAVENOUS; SUBCUTANEOUS
Status: CANCELLED | OUTPATIENT
Start: 2025-07-07

## 2025-07-07 RX ORDER — ONDANSETRON 8 MG/1
16 TABLET, FILM COATED ORAL
Status: CANCELLED | OUTPATIENT
Start: 2025-07-09

## 2025-07-07 RX ORDER — ONDANSETRON 8 MG/1
16 TABLET, FILM COATED ORAL
Status: CANCELLED | OUTPATIENT
Start: 2025-07-08

## 2025-07-07 RX ORDER — AZACITIDINE 100 MG/1
75 INJECTION, POWDER, LYOPHILIZED, FOR SOLUTION INTRAVENOUS; SUBCUTANEOUS
Status: COMPLETED | OUTPATIENT
Start: 2025-07-07 | End: 2025-07-07

## 2025-07-07 RX ORDER — ONDANSETRON 8 MG/1
16 TABLET, FILM COATED ORAL
Status: CANCELLED | OUTPATIENT
Start: 2025-07-10

## 2025-07-07 RX ORDER — ONDANSETRON 8 MG/1
16 TABLET, FILM COATED ORAL
Status: CANCELLED | OUTPATIENT
Start: 2025-07-11

## 2025-07-07 RX ORDER — AZACITIDINE 100 MG/1
75 INJECTION, POWDER, LYOPHILIZED, FOR SOLUTION INTRAVENOUS; SUBCUTANEOUS
Status: CANCELLED | OUTPATIENT
Start: 2025-07-08

## 2025-07-07 RX ORDER — ONDANSETRON 4 MG/1
16 TABLET, FILM COATED ORAL
Status: COMPLETED | OUTPATIENT
Start: 2025-07-07 | End: 2025-07-07

## 2025-07-07 RX ORDER — AZACITIDINE 100 MG/1
75 INJECTION, POWDER, LYOPHILIZED, FOR SOLUTION INTRAVENOUS; SUBCUTANEOUS
Status: CANCELLED | OUTPATIENT
Start: 2025-07-09

## 2025-07-07 RX ORDER — ONDANSETRON 8 MG/1
16 TABLET, FILM COATED ORAL
Status: CANCELLED | OUTPATIENT
Start: 2025-07-07

## 2025-07-07 RX ORDER — AZACITIDINE 100 MG/1
75 INJECTION, POWDER, LYOPHILIZED, FOR SOLUTION INTRAVENOUS; SUBCUTANEOUS
Status: CANCELLED | OUTPATIENT
Start: 2025-07-11

## 2025-07-07 RX ORDER — AZACITIDINE 100 MG/1
75 INJECTION, POWDER, LYOPHILIZED, FOR SOLUTION INTRAVENOUS; SUBCUTANEOUS
Status: CANCELLED | OUTPATIENT
Start: 2025-07-10

## 2025-07-07 RX ADMIN — ONDANSETRON HYDROCHLORIDE 16 MG: 4 TABLET, FILM COATED ORAL at 01:07

## 2025-07-07 RX ADMIN — AZACITIDINE 165 MG: 100 INJECTION, POWDER, LYOPHILIZED, FOR SOLUTION INTRAVENOUS; SUBCUTANEOUS at 01:07

## 2025-07-08 ENCOUNTER — INFUSION (OUTPATIENT)
Dept: INFUSION THERAPY | Facility: HOSPITAL | Age: 65
End: 2025-07-08
Payer: MEDICARE

## 2025-07-08 VITALS — HEART RATE: 57 BPM | RESPIRATION RATE: 16 BRPM | DIASTOLIC BLOOD PRESSURE: 62 MMHG | SYSTOLIC BLOOD PRESSURE: 131 MMHG

## 2025-07-08 DIAGNOSIS — C92.00 ACUTE MYELOID LEUKEMIA NOT HAVING ACHIEVED REMISSION: Primary | ICD-10-CM

## 2025-07-08 PROCEDURE — 96401 CHEMO ANTI-NEOPL SQ/IM: CPT

## 2025-07-08 PROCEDURE — 63600175 PHARM REV CODE 636 W HCPCS

## 2025-07-08 PROCEDURE — 25000003 PHARM REV CODE 250

## 2025-07-08 RX ORDER — ONDANSETRON 4 MG/1
16 TABLET, FILM COATED ORAL
Status: COMPLETED | OUTPATIENT
Start: 2025-07-08 | End: 2025-07-08

## 2025-07-08 RX ORDER — AZACITIDINE 100 MG/1
75 INJECTION, POWDER, LYOPHILIZED, FOR SOLUTION INTRAVENOUS; SUBCUTANEOUS
Status: COMPLETED | OUTPATIENT
Start: 2025-07-08 | End: 2025-07-08

## 2025-07-08 RX ADMIN — AZACITIDINE 165 MG: 100 INJECTION, POWDER, LYOPHILIZED, FOR SOLUTION INTRAVENOUS; SUBCUTANEOUS at 12:07

## 2025-07-08 RX ADMIN — ONDANSETRON HYDROCHLORIDE 16 MG: 4 TABLET, FILM COATED ORAL at 12:07

## 2025-07-09 ENCOUNTER — INFUSION (OUTPATIENT)
Dept: INFUSION THERAPY | Facility: HOSPITAL | Age: 65
End: 2025-07-09
Payer: MEDICARE

## 2025-07-09 VITALS — HEART RATE: 60 BPM | DIASTOLIC BLOOD PRESSURE: 72 MMHG | SYSTOLIC BLOOD PRESSURE: 158 MMHG

## 2025-07-09 DIAGNOSIS — C92.00 ACUTE MYELOID LEUKEMIA NOT HAVING ACHIEVED REMISSION: Primary | ICD-10-CM

## 2025-07-09 PROCEDURE — 96401 CHEMO ANTI-NEOPL SQ/IM: CPT

## 2025-07-09 PROCEDURE — 63600175 PHARM REV CODE 636 W HCPCS

## 2025-07-09 PROCEDURE — 25000003 PHARM REV CODE 250

## 2025-07-09 RX ORDER — AZACITIDINE 100 MG/1
75 INJECTION, POWDER, LYOPHILIZED, FOR SOLUTION INTRAVENOUS; SUBCUTANEOUS
Status: COMPLETED | OUTPATIENT
Start: 2025-07-09 | End: 2025-07-09

## 2025-07-09 RX ORDER — ONDANSETRON 4 MG/1
16 TABLET, FILM COATED ORAL
Status: COMPLETED | OUTPATIENT
Start: 2025-07-09 | End: 2025-07-09

## 2025-07-09 RX ADMIN — ONDANSETRON HYDROCHLORIDE 16 MG: 4 TABLET, FILM COATED ORAL at 12:07

## 2025-07-09 RX ADMIN — AZACITIDINE 165 MG: 100 INJECTION, POWDER, LYOPHILIZED, FOR SOLUTION INTRAVENOUS; SUBCUTANEOUS at 12:07

## 2025-07-10 ENCOUNTER — INFUSION (OUTPATIENT)
Dept: INFUSION THERAPY | Facility: HOSPITAL | Age: 65
End: 2025-07-10
Payer: MEDICARE

## 2025-07-10 VITALS
DIASTOLIC BLOOD PRESSURE: 69 MMHG | OXYGEN SATURATION: 99 % | TEMPERATURE: 98 F | SYSTOLIC BLOOD PRESSURE: 149 MMHG | RESPIRATION RATE: 16 BRPM | HEART RATE: 53 BPM

## 2025-07-10 DIAGNOSIS — C92.00 ACUTE MYELOID LEUKEMIA NOT HAVING ACHIEVED REMISSION: Primary | ICD-10-CM

## 2025-07-10 PROCEDURE — 96401 CHEMO ANTI-NEOPL SQ/IM: CPT

## 2025-07-10 PROCEDURE — 63600175 PHARM REV CODE 636 W HCPCS

## 2025-07-10 PROCEDURE — 25000003 PHARM REV CODE 250

## 2025-07-10 RX ORDER — ONDANSETRON 4 MG/1
16 TABLET, FILM COATED ORAL
Status: COMPLETED | OUTPATIENT
Start: 2025-07-10 | End: 2025-07-10

## 2025-07-10 RX ORDER — AZACITIDINE 100 MG/1
75 INJECTION, POWDER, LYOPHILIZED, FOR SOLUTION INTRAVENOUS; SUBCUTANEOUS
Status: COMPLETED | OUTPATIENT
Start: 2025-07-10 | End: 2025-07-10

## 2025-07-10 RX ADMIN — AZACITIDINE 165 MG: 100 INJECTION, POWDER, LYOPHILIZED, FOR SOLUTION INTRAVENOUS; SUBCUTANEOUS at 12:07

## 2025-07-10 RX ADMIN — ONDANSETRON HYDROCHLORIDE 16 MG: 4 TABLET, FILM COATED ORAL at 12:07

## 2025-07-11 ENCOUNTER — INFUSION (OUTPATIENT)
Dept: INFUSION THERAPY | Facility: HOSPITAL | Age: 65
End: 2025-07-11
Payer: MEDICARE

## 2025-07-11 VITALS — SYSTOLIC BLOOD PRESSURE: 160 MMHG | HEART RATE: 62 BPM | RESPIRATION RATE: 16 BRPM | DIASTOLIC BLOOD PRESSURE: 77 MMHG

## 2025-07-11 DIAGNOSIS — C92.00 ACUTE MYELOID LEUKEMIA NOT HAVING ACHIEVED REMISSION: Primary | ICD-10-CM

## 2025-07-11 PROCEDURE — 63600175 PHARM REV CODE 636 W HCPCS

## 2025-07-11 PROCEDURE — 96401 CHEMO ANTI-NEOPL SQ/IM: CPT

## 2025-07-11 PROCEDURE — 25000003 PHARM REV CODE 250

## 2025-07-11 RX ORDER — ONDANSETRON 4 MG/1
16 TABLET, FILM COATED ORAL
Status: COMPLETED | OUTPATIENT
Start: 2025-07-11 | End: 2025-07-11

## 2025-07-11 RX ORDER — AZACITIDINE 100 MG/1
75 INJECTION, POWDER, LYOPHILIZED, FOR SOLUTION INTRAVENOUS; SUBCUTANEOUS
Status: COMPLETED | OUTPATIENT
Start: 2025-07-11 | End: 2025-07-11

## 2025-07-11 RX ADMIN — ONDANSETRON HYDROCHLORIDE 16 MG: 4 TABLET, FILM COATED ORAL at 12:07

## 2025-07-11 RX ADMIN — AZACITIDINE 165 MG: 100 INJECTION, POWDER, LYOPHILIZED, FOR SOLUTION INTRAVENOUS; SUBCUTANEOUS at 12:07

## 2025-07-30 PROBLEM — R79.89 ABNORMAL THYROID BLOOD TEST: Status: ACTIVE | Noted: 2025-07-30

## 2025-08-04 ENCOUNTER — LAB VISIT (OUTPATIENT)
Dept: LAB | Facility: HOSPITAL | Age: 65
End: 2025-08-04
Payer: MEDICARE

## 2025-08-04 ENCOUNTER — TELEPHONE (OUTPATIENT)
Dept: HEMATOLOGY/ONCOLOGY | Facility: CLINIC | Age: 65
End: 2025-08-04
Payer: MEDICARE

## 2025-08-04 ENCOUNTER — OFFICE VISIT (OUTPATIENT)
Dept: HEMATOLOGY/ONCOLOGY | Facility: CLINIC | Age: 65
End: 2025-08-04
Payer: MEDICARE

## 2025-08-04 ENCOUNTER — INFUSION (OUTPATIENT)
Dept: INFUSION THERAPY | Facility: HOSPITAL | Age: 65
End: 2025-08-04
Payer: MEDICARE

## 2025-08-04 VITALS
WEIGHT: 204.38 LBS | OXYGEN SATURATION: 99 % | HEIGHT: 73 IN | HEART RATE: 62 BPM | RESPIRATION RATE: 16 BRPM | BODY MASS INDEX: 27.09 KG/M2 | TEMPERATURE: 99 F

## 2025-08-04 VITALS — SYSTOLIC BLOOD PRESSURE: 166 MMHG | DIASTOLIC BLOOD PRESSURE: 80 MMHG | HEART RATE: 56 BPM

## 2025-08-04 DIAGNOSIS — C92.01 ACUTE MYELOID LEUKEMIA IN REMISSION: ICD-10-CM

## 2025-08-04 DIAGNOSIS — C92.01 ACUTE MYELOID LEUKEMIA IN REMISSION: Primary | ICD-10-CM

## 2025-08-04 DIAGNOSIS — Z76.82 STEM CELL TRANSPLANT CANDIDATE: ICD-10-CM

## 2025-08-04 DIAGNOSIS — C92.00 ACUTE MYELOID LEUKEMIA NOT HAVING ACHIEVED REMISSION: Primary | ICD-10-CM

## 2025-08-04 DIAGNOSIS — Z51.11 ENCOUNTER FOR ANTINEOPLASTIC CHEMOTHERAPY: ICD-10-CM

## 2025-08-04 DIAGNOSIS — D61.818 PANCYTOPENIA: ICD-10-CM

## 2025-08-04 DIAGNOSIS — D84.81 IMMUNODEFICIENCY SECONDARY TO NEOPLASM: ICD-10-CM

## 2025-08-04 DIAGNOSIS — E87.8 ELECTROLYTE IMBALANCE: ICD-10-CM

## 2025-08-04 DIAGNOSIS — D49.9 IMMUNODEFICIENCY SECONDARY TO NEOPLASM: ICD-10-CM

## 2025-08-04 LAB
ABSOLUTE NEUTROPHIL MANUAL (OHS): 0.8 K/UL
ALBUMIN SERPL BCP-MCNC: 3.9 G/DL (ref 3.5–5.2)
ALP SERPL-CCNC: 55 UNIT/L (ref 40–150)
ALT SERPL W/O P-5'-P-CCNC: 18 UNIT/L (ref 0–55)
ANION GAP (OHS): 9 MMOL/L (ref 8–16)
AST SERPL-CCNC: 29 UNIT/L (ref 0–50)
BILIRUB SERPL-MCNC: 0.8 MG/DL (ref 0.1–1)
BUN SERPL-MCNC: 20 MG/DL (ref 8–23)
CALCIUM SERPL-MCNC: 9.3 MG/DL (ref 8.7–10.5)
CHLORIDE SERPL-SCNC: 103 MMOL/L (ref 95–110)
CO2 SERPL-SCNC: 25 MMOL/L (ref 23–29)
CREAT SERPL-MCNC: 1.3 MG/DL (ref 0.5–1.4)
ERYTHROCYTE [DISTWIDTH] IN BLOOD BY AUTOMATED COUNT: 16.1 % (ref 11.5–14.5)
GFR SERPLBLD CREATININE-BSD FMLA CKD-EPI: >60 ML/MIN/1.73/M2
GLUCOSE SERPL-MCNC: 99 MG/DL (ref 70–110)
HCT VFR BLD AUTO: 29 % (ref 40–54)
HGB BLD-MCNC: 9.2 GM/DL (ref 14–18)
LYMPHOCYTES NFR BLD MANUAL: 41 % (ref 18–48)
MAGNESIUM SERPL-MCNC: 2.2 MG/DL (ref 1.6–2.6)
MCH RBC QN AUTO: 31 PG (ref 27–31)
MCHC RBC AUTO-ENTMCNC: 31.7 G/DL (ref 32–36)
MCV RBC AUTO: 98 FL (ref 82–98)
MONOCYTES NFR BLD MANUAL: 14 % (ref 4–15)
NEUTROPHILS NFR BLD MANUAL: 45 % (ref 38–73)
NUCLEATED RBC (/100WBC) (OHS): 0 /100 WBC
OVALOCYTES BLD QL SMEAR: NORMAL
PHOSPHATE SERPL-MCNC: 2.8 MG/DL (ref 2.7–4.5)
PLATELET # BLD AUTO: 116 K/UL (ref 150–450)
PMV BLD AUTO: 10.3 FL (ref 9.2–12.9)
POTASSIUM SERPL-SCNC: 4 MMOL/L (ref 3.5–5.1)
PROT SERPL-MCNC: 8 GM/DL (ref 6–8.4)
RBC # BLD AUTO: 2.97 M/UL (ref 4.6–6.2)
SODIUM SERPL-SCNC: 137 MMOL/L (ref 136–145)
SPHEROCYTES BLD QL SMEAR: NORMAL
WBC # BLD AUTO: 1.85 K/UL (ref 3.9–12.7)

## 2025-08-04 PROCEDURE — 99999 PR PBB SHADOW E&M-EST. PATIENT-LVL IV: CPT | Mod: PBBFAC,,, | Performed by: INTERNAL MEDICINE

## 2025-08-04 PROCEDURE — 99214 OFFICE O/P EST MOD 30 MIN: CPT | Mod: PBBFAC,25 | Performed by: INTERNAL MEDICINE

## 2025-08-04 PROCEDURE — 82040 ASSAY OF SERUM ALBUMIN: CPT

## 2025-08-04 PROCEDURE — 83735 ASSAY OF MAGNESIUM: CPT

## 2025-08-04 PROCEDURE — 84100 ASSAY OF PHOSPHORUS: CPT

## 2025-08-04 PROCEDURE — 85007 BL SMEAR W/DIFF WBC COUNT: CPT

## 2025-08-04 PROCEDURE — 63600175 PHARM REV CODE 636 W HCPCS: Performed by: INTERNAL MEDICINE

## 2025-08-04 PROCEDURE — 25000003 PHARM REV CODE 250: Performed by: INTERNAL MEDICINE

## 2025-08-04 PROCEDURE — 36415 COLL VENOUS BLD VENIPUNCTURE: CPT

## 2025-08-04 PROCEDURE — 96401 CHEMO ANTI-NEOPL SQ/IM: CPT

## 2025-08-04 RX ORDER — FOLIC ACID 1 MG/1
1000 TABLET ORAL
COMMUNITY
Start: 2025-07-07

## 2025-08-04 RX ORDER — ONDANSETRON 8 MG/1
16 TABLET, FILM COATED ORAL
Status: CANCELLED | OUTPATIENT
Start: 2025-08-04

## 2025-08-04 RX ORDER — AZACITIDINE 100 MG/1
75 INJECTION, POWDER, LYOPHILIZED, FOR SOLUTION INTRAVENOUS; SUBCUTANEOUS
Status: CANCELLED | OUTPATIENT
Start: 2025-08-05

## 2025-08-04 RX ORDER — ONDANSETRON 8 MG/1
16 TABLET, FILM COATED ORAL
Status: CANCELLED | OUTPATIENT
Start: 2025-08-06

## 2025-08-04 RX ORDER — AZACITIDINE 100 MG/1
75 INJECTION, POWDER, LYOPHILIZED, FOR SOLUTION INTRAVENOUS; SUBCUTANEOUS
Status: COMPLETED | OUTPATIENT
Start: 2025-08-04 | End: 2025-08-04

## 2025-08-04 RX ORDER — ONDANSETRON 8 MG/1
16 TABLET, FILM COATED ORAL
Status: CANCELLED | OUTPATIENT
Start: 2025-08-08

## 2025-08-04 RX ORDER — AZACITIDINE 100 MG/1
75 INJECTION, POWDER, LYOPHILIZED, FOR SOLUTION INTRAVENOUS; SUBCUTANEOUS
Status: CANCELLED | OUTPATIENT
Start: 2025-08-06

## 2025-08-04 RX ORDER — AZACITIDINE 100 MG/1
75 INJECTION, POWDER, LYOPHILIZED, FOR SOLUTION INTRAVENOUS; SUBCUTANEOUS
Status: CANCELLED | OUTPATIENT
Start: 2025-08-07

## 2025-08-04 RX ORDER — ONDANSETRON 8 MG/1
16 TABLET, FILM COATED ORAL
Status: CANCELLED | OUTPATIENT
Start: 2025-08-05

## 2025-08-04 RX ORDER — AZACITIDINE 100 MG/1
75 INJECTION, POWDER, LYOPHILIZED, FOR SOLUTION INTRAVENOUS; SUBCUTANEOUS
Status: CANCELLED | OUTPATIENT
Start: 2025-08-08

## 2025-08-04 RX ORDER — AZACITIDINE 100 MG/1
75 INJECTION, POWDER, LYOPHILIZED, FOR SOLUTION INTRAVENOUS; SUBCUTANEOUS
Status: CANCELLED | OUTPATIENT
Start: 2025-08-04

## 2025-08-04 RX ORDER — ONDANSETRON 4 MG/1
16 TABLET, FILM COATED ORAL
Status: COMPLETED | OUTPATIENT
Start: 2025-08-04 | End: 2025-08-04

## 2025-08-04 RX ORDER — ONDANSETRON 8 MG/1
16 TABLET, FILM COATED ORAL
Status: CANCELLED | OUTPATIENT
Start: 2025-08-07

## 2025-08-04 RX ADMIN — AZACITIDINE 165 MG: 100 INJECTION, POWDER, LYOPHILIZED, FOR SOLUTION INTRAVENOUS; SUBCUTANEOUS at 10:08

## 2025-08-04 RX ADMIN — ONDANSETRON HYDROCHLORIDE 16 MG: 4 TABLET, FILM COATED ORAL at 10:08

## 2025-08-04 NOTE — NURSING
Pt here for C19D1 vidaza injection.  Labs reviewed and VSS.  Vidaza given SQ to abd x 3 injections.  Tolerated well.

## 2025-08-04 NOTE — PROGRESS NOTES
Section of Hematology and Stem Cell Transplantation  Follow Up Visit     Date of visit: 8/4/25  Visit diagnosis: No primary diagnosis found.  Referred by:  FERN Oneill MD    Oncologic History:     Primary Oncologic Diagnosis: Acute myeloid leukemia, adverse risk.    6/28/23: Diagnosed with heparin-induced thrombocytopenia. HIT Ab 1.87 OD (moderate-strong). ZAHIRA not available.   10/31/23: Peripheral blood flow cytometry sent due to worsening pancytopenia (CBC: WBC 3.43, Hgb 9, Plts 120, 19% blasts) revealed increased blasts (43.1%) concerning for acute myeloid leukemia.   11/9/23: Bone marrow biopsy revealed a hypercellular marrow (80%) with increased blasts consistent with acute myeloid leukemia. Karyotype 46,XY,del(20)(q11.2q13.3)[4]/46,XY[16]. FISH with 20q12 deletion. NGS with IDH2 (40%) and SRSF2 (40%).  12/2023: He started taking venetoclax 200mg daily (did not start azacitidine due to scheduling conflicts).    1/8/24: C1D1 of azacitidine x5 days plus venetoclax 21 of 28 days.  1/31/24: Bone marrow biopsy after cycle 1 revealed persistent AML with improvement in blasts to 3-7%. Karyotyping with 7 of 8 available metaphases with complex near-tetraploid karyotype (1 normal). NGS with IDH2 (41%) and SRSF2 (40%).  4/23/24: Bone marrow biopsy with normocellular marrow with no blasts consistent with complete remission. CG/FISH normal. NGS with IDH2 (11%) and SRSF2 (10%).   8/22/24: Bone marrow biopsy hypocellular (20%) with dysmegakaryopoiesis and marked myeloid hypoplasia.  No increase in blasts.  Diploid karyotype.  NGS IDH2 (9%) and SRSF2 (9%).  2/17/25: Bone marrow biopsy hypocellular (20%) with dysmegakaryopoiesis and marked myeloid hypoplasia.  No increase in blasts.  Diploid karyotype.  NGS IDH2 (42%), SRSF2 (42%), SH2B3 (31%). FISH normal.     History of Present Ilness:   Gustavo Tracey  (Gustavo) is a pleasant 65 y.o.male who presents for follow up. He is overall physically feeling okay with some minor  aches in his joints controlled by medication. They do not keep him from doing his daily life activities. Minor fatigue stable and rests as needed. He continues tolerating therapy well. No n/v/d. His next cycle to begin today (cycle 18).     He has called the dentist to make an appointment with them and will go 7/24/25.      PAST MEDICAL HISTORY:   Past Medical History:   Diagnosis Date    Abnormal nuclear stress test 11/26/2022    Acute myeloid leukemia     Cervical radiculopathy     to rt arm    CHF (congestive heart failure)     Chronic systolic congestive heart failure 11/28/2022    Dilated cardiomyopathy 11/26/2022    Hyperlipidemia     Hypertension     Obesity, unspecified     PAF (paroxysmal atrial fibrillation) 11/26/2022    Pulmonary HTN 11/28/2022    Stroke        PAST SURGICAL HISTORY:   Past Surgical History:   Procedure Laterality Date    BONE MARROW BIOPSY Right 1/31/2024    Procedure: Biopsy-bone marrow;  Surgeon: Rui Jacobsen MD;  Location: [address]);  Service: Oncology;  Laterality: Right;  1/24-pt confirmed-MS    CLOSURE OF LEFT ATRIAL APPENDAGE USING DEVICE Left 6/22/2023    Procedure: CLOSURE, LEFT ATRIAL APPENDAGE, USING DEVICE;  Surgeon: Rustam Dave MD;  Location: [facility] OR;  Service: Cardiovascular;  Laterality: Left;  LIGATION OF LEFT ATRIAL APPENDAGE WITH OTILIA EXCLUSION SYSTEM    CORONARY ARTERY BYPASS GRAFT (CABG) N/A 6/22/2023    Procedure: CORONARY ARTERY BYPASS GRAFT (CABG);  Surgeon: Rustam Dave MD;  Location: [facility] OR;  Service: Cardiovascular;  Laterality: N/A;  3-VESSEL WITH EPI-AORTIC ULTRASOUND    PURDY MAZE PROCEDURE N/A 6/22/2023    Procedure: PURDY MAZE PROCEDURE;  Surgeon: Rustam Dave MD;  Location: [facility] OR;  Service: Cardiovascular;  Laterality: N/A;    ECHOCARDIOGRAM,TRANSESOPHAGEAL N/A 6/22/2023    Procedure: ECHOCARDIOGRAM,TRANSESOPHAGEAL;  Surgeon: Rustam Dave MD;  Location: [facility] OR;  Service: Cardiovascular;  Laterality: N/A;    ENDOSCOPIC  HARVEST OF VEIN Left 6/22/2023    Procedure: SURGICAL PROCUREMENT, VEIN, ENDOSCOPIC;  Surgeon: Rustam Dave MD;  Location: [facility] OR;  Service: Cardiovascular;  Laterality: Left;    INJECTION OF ANESTHETIC AGENT AROUND MULTIPLE INTERCOSTAL NERVES N/A 6/22/2023    Procedure: BLOCK, NERVE, INTERCOSTAL, 2 OR MORE;  Surgeon: Rustam Dave MD;  Location: [facility] OR;  Service: Cardiovascular;  Laterality: N/A;  PARASTERNAL NERVE BLOCK    INSTANTANEOUS WAVE-FREE RATIO  6/20/2023    Procedure: Instantaneous Wave-Free Ratio;  Surgeon: Zion Ortega MD;  Location: [facility] CATH LAB;  Service: Cardiology;;    IVUS, CORONARY  6/20/2023    Procedure: IVUS, Coronary;  Surgeon: Zion Ortega MD;  Location: [facility] CATH LAB;  Service: Cardiology;;    LEFT HEART CATHETERIZATION Left 11/28/2022    Procedure: CATHETERIZATION, HEART, LEFT;  Surgeon: Zion Ortega MD;  Location: [facility] CATH LAB;  Service: Cardiology;  Laterality: Left;    LEFT HEART CATHETERIZATION Left 6/20/2023    Procedure: Left heart cath;  Surgeon: Zion Ortega MD;  Location: [facility] CATH LAB;  Service: Cardiology;  Laterality: Left;    PERCUTANEOUS TRANSLUMINAL BALLOON ANGIOPLASTY OF CORONARY ARTERY  6/20/2023    Procedure: Angioplasty-coronary;  Surgeon: Zion Ortega MD;  Location: [facility] CATH LAB;  Service: Cardiology;;    RIGHT HEART CATHETERIZATION N/A 11/28/2022    Procedure: INSERTION, CATHETER, RIGHT HEART;  Surgeon: Zion Ortega MD;  Location: [facility] CATH LAB;  Service: Cardiology;  Laterality: N/A;  Congestive heart failure       PAST SOCIAL HISTORY:  Social History     Tobacco Use    Smoking status: Never    Smokeless tobacco: Never   Substance Use Topics    Alcohol use: Yes    Drug use: Never       FAMILY HISTORY:  Family History   Problem Relation Name Age of Onset    Hypertension Mother      Diabetes Father      Hyperlipidemia Father      Hypertension Father      Heart attack Father      Coronary artery disease Father         CURRENT MEDICATIONS:    Current Outpatient Medications   Medication Sig    acyclovir (ZOVIRAX) 400 MG tablet Take 1 tablet (400 mg total) by mouth 2 (two) times daily.    allopurinoL (ZYLOPRIM) 300 MG tablet Take 1 tablet (300 mg total) by mouth once daily.    amitriptyline (ELAVIL) 50 MG tablet Take 50 mg by mouth every evening.    aspirin (ECOTRIN) 81 MG EC tablet Take 1 tablet (81 mg total) by mouth once daily.    cyclobenzaprine (FLEXERIL) 10 MG tablet Take 10 mg by mouth 2 (two) times daily as needed.    FARXIGA 10 mg tablet TAKE 1 TABLET BY MOUTH EVERY DAY    ferrous sulfate (FEOSOL) 325 mg (65 mg iron) Tab tablet Take 1 tablet by mouth once daily.    furosemide (LASIX) 20 MG tablet Take 1 tablet by mouth once daily.    HYDROcodone-acetaminophen (NORCO)  mg per tablet TAKE 1 TABLET BY MOUTH 1 TO 2 TIMES A DAY AS NEEDED    latanoprost 0.005 % ophthalmic solution Place 1 drop into both eyes nightly.    LORazepam (ATIVAN) 1 MG tablet Take 0.5 tablets (0.5 mg total) by mouth every 6 (six) hours as needed for Anxiety. Take one hour before bone marrow biopsy.    metoprolol succinate (TOPROL-XL) 100 MG 24 hr tablet Take 1 tablet by mouth once daily.    potassium chloride (K-TAB) 20 mEq Take 1 tablet (20 mEq total) by mouth 2 (two) times daily.    pravastatin (PRAVACHOL) 20 MG tablet Take 20 mg by mouth once daily.    valsartan (DIOVAN) 40 MG tablet TAKE 1 TABLET BY MOUTH EVERY DAY    venetoclax (VENCLEXTA) 100 mg Tab Take 4 tablets (400 mg total) by mouth once daily Take 4 tablets (400mg) once daily on days 1-7 of a 28 day cycle. Always start with azacitidine (Vidaza) injections..    warfarin (COUMADIN) 5 MG tablet TAKE 1 TABLET BY MOUTH ON SUN, MON, WED, FRI, SAT,& 1.5 ON TUES & THURS     Current Facility-Administered Medications   Medication    0.9%  NaCl infusion (for blood administration)    0.9%  NaCl infusion (for blood administration)    acetaminophen tablet 650 mg    diphenhydrAMINE capsule 25 mg     Facility-Administered  Medications Ordered in Other Visits   Medication    sodium chloride 0.9% flush 10 mL       ALLERGIES:   Review of patient's allergies indicates:  No Known Allergies      Review of Systems:     Pertinent positives and negatives included in the HPI. Otherwise a complete review of systems is negative.    Physical Exam:     There were no vitals filed for this visit.    Repeat /77            Physical Exam  Constitutional:       General: He is not in acute distress.  Eyes:      General: No scleral icterus.     Extraocular Movements: Extraocular movements intact.      Conjunctiva/sclera: Conjunctivae normal.   Cardiovascular:      Rate and Rhythm: Normal rate and regular rhythm.      Pulses: Normal pulses.   Pulmonary:      Effort: Pulmonary effort is normal. No respiratory distress.      Breath sounds: Normal breath sounds. No wheezing or rhonchi.   Abdominal:      General: There is no distension.      Palpations: Abdomen is soft.   Musculoskeletal:         General: No swelling or tenderness.      Right lower leg: No edema.      Left lower leg: No edema.   Skin:     General: Skin is warm and dry.      Findings: No bruising or rash.   Neurological:      Mental Status: He is alert and oriented to person, place, and time.      Coordination: Coordination normal.      Gait: Gait normal.   Psychiatric:         Mood and Affect: Mood normal.         Behavior: Behavior normal.          ECOG Performance Status: (foot note - ECOG PS provided by [employer]cology [employer]) 1 - Symptomatic but completely ambulatory    Karnofsky Performance Score:  90%- Able to Carry on Normal Activity: Minor Symptoms of Disease    Labs:   Lab Results   Component Value Date    WBC 1.20 (LL) 07/31/2025    RBC 3.01 (L) 07/31/2025    HGB 9.6 (L) 07/31/2025    HCT 28.5 (L) 07/31/2025    MCV 95 07/31/2025    MCH 32.0 (H) 07/31/2025    MCHC 33.7 07/31/2025    RDW 19.5 (H) 07/31/2025     07/31/2025    MPV 7.3 (L) 07/31/2025    GRAN 48.0  07/31/2025    LYMPH CANCELED 07/31/2025    LYMPH 47.0 07/31/2025    MONO CANCELED 07/31/2025    MONO 4.0 07/31/2025    EOS CANCELED 07/31/2025    BASO CANCELED 07/31/2025    EOSINOPHIL 1.0 07/31/2025    BASOPHIL 0.0 07/31/2025       CMP  Sodium   Date Value Ref Range Status   07/31/2025 137 136 - 145 mmol/L Final     Potassium   Date Value Ref Range Status   07/31/2025 4.3 3.5 - 5.1 mmol/L Final     Chloride   Date Value Ref Range Status   07/31/2025 102 95 - 110 mmol/L Final     CO2   Date Value Ref Range Status   07/31/2025 28 23 - 29 mmol/L Final     Glucose   Date Value Ref Range Status   07/31/2025 105 74 - 106 mg/dL Final     BUN   Date Value Ref Range Status   07/31/2025 21 (H) 9 - 20 mg/dL Final   07/07/2025 26 (H) 8 - 23 mg/dL Final     Creatinine   Date Value Ref Range Status   07/31/2025 1.36 0.80 - 1.50 mg/dL Final   07/07/2025 1.2 0.5 - 1.4 mg/dL Final     Calcium   Date Value Ref Range Status   07/31/2025 9.6 8.4 - 10.2 mg/dL Final     Total Protein   Date Value Ref Range Status   07/31/2025 8.3 (H) 6.3 - 8.2 g/dL Final     Albumin   Date Value Ref Range Status   07/31/2025 4.7 3.5 - 5.2 g/dL Final     Total Bilirubin   Date Value Ref Range Status   07/31/2025 1.2 0.2 - 1.3 mg/dL Final     Alkaline Phosphatase   Date Value Ref Range Status   07/31/2025 68 38 - 145 U/L Final     AST   Date Value Ref Range Status   07/31/2025 33 17 - 59 U/L Final     ALT   Date Value Ref Range Status   07/31/2025 20 10 - 44 U/L Final     Anion Gap   Date Value Ref Range Status   07/31/2025 7 (L) 8 - 16 mmol/L Final   07/07/2025 9 8 - 16 mmol/L Final       Imaging:   Reviewed     Pathology:  Reviewed     Assessment and Plan:   Gustavo Tracey Jr. (Gustavo) is a pleasant 65 y.o.male who presents for follow up.    Acute myeloid leukemia, adverse risk:  Peripheral blood flow cytometry obtain 10/31/2023 concerning for acute myeloid leukemia.  Bone marrow biopsy obtained on 11/09/2023 revealed acute myeloid leukemia with 20q  deletion on FISH and NGS with IDH2 (40%) and SRSF2 (40%). He started aza x7 plus alonzo x21 of 28 days in 1/2024. Bone marrow biopsy at the end of cycle 1 revealed persistent disease but improvement in blasts. CG now showed near-tetraploid complex karyotype in 7 of 8 metaphases. NGS with persistent IDH2 (41%) and SRSF2 (40%). Bone marrow biopsy in 4/2024 showed complete remission with persistent molecular disease - NGS with IDH2 (11%) and SRSF2 (10%).  Bone marrow biopsy on 08/22/2024 showed continued remission with persistent dysplastic changes.  NGS with IDH2 (9%) and SRSF2 (9%). Stopped prophylactic fluconazole and levofloxacin and increased venetoclax to 400mg daily 11/13/24.  Most recent bone marrow biopsy on 2/17/25 shows hypocellularity (20%) with dysmegakaryopoiesis and marked myeloid hypoplasia.  No increase in blasts.  Diploid karyotype.  NGS with progression of dysplastic changes: IDH2 (42%), SRSF2 (42%), SH2B3 (31%). FISH normal.   - Continue azacitidine 75mg/m2 x5 days and venetoclax 400mg daily x7 of 28 days.  - Mr. Tracey is interested in pursuing SCT candidacy evaluation. He is working on completeing pre-transplant appointments needed such as the dentist.     Stem cell transplant candidate:  We discussed the role for allogeneic stem cell transplantation in this disease process as a potentially curative option. We had an extensive discussion about the rationale, logistics, risks, and benefits. We reviewed the requirement to stay in the [place] area for 100 days with a caregiver at all times. We discussed the risks, including infection, graft failure, organ toxicity, graft versus host disease, relapse of disease, and secondary cancers. We reviewed the need for long-term immunosuppression and need for close monitoring.  His HCT-CI score is 3 (high risk), although he is very fit and active without limitations.  We reviewed this today again, and we discussed that transplant would carry significant risk  for both morbidity and mortality (40% 2 year NRM).  He remains undecided on whether or not to proceed with transplant. We will continue to address but lately with shared decision making we have opted to continue deferring transplant given his excellent response and limited toxicity.     History of CVA:  Repeat MRI brain on 08/05/2024 revealed patchy gliotic white matter signal change in the subcortical and periventricular regions of bilateral cerebral hemispheres.  This is reportedly changed compared to his prior MRI brain in 2012.  Given our potential for proceeding with stem cell transplant, I will refer him to Neurology for further evaluation.  - Patient never recalled neurology to schedule appointment. Advised he should call as soon as possible to get in with them.     Pancytopenia:  Monitor CBC weekly with Dr. Oneill. Transfuse 1 unit of PRBC for hgb < 7 or clinically appropriate. Transfuse 1 unit of plts if platelets < 10 or clinically appropriate.      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 ANC has been stable after treatment so stopped fluconazole and levofloxacin.      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  Heparin antibody 1.87 OD (moderate-strong). ZAHIRA negative on 12/13/23 which rules out HIT. Heparin allergy removed. Ok to stop Warfarin. Dr. Jacobsen said it was okay to stop Warfarin from our perspective, but his cardiologist recommended continuing. With negative ZAHIRA, HIT would be very unlikely. He recommended stopping Warfarin with a negative ZAHIRA and intermittent thrombocytopenia from treatment. Patient discussed with his cardiologist.     Stem cell donors:  He has one full sister and 2 children as potential donor options . He has one potential MUD (13/16 match). His sister has medical issues so cannot be a donor.     HFrEF:  Cardiac function now back to baseline. Continue follow up with local cardiologist.      Hypertension  Asymptomatic. Multifactorial, endorses compliance with anti-HTN regimen. Continue to follow up with PCP and cardiology.   -/84 and he reports taking all of his medications this morning. Repeat /77 which is more his baseline. Advised to speak to PCP about this.     Orders/Follow Up:        Route Chart for Scheduling  BMT Route Chart for Scheduling    Treatment Plan Information   OP AZACITIDINE 5-DAY (SUB-Q) + VENETOCLAX Rui Jacobsen MD   Associated diagnosis: Acute myeloid leukemia not having achieved remission   noted on 11/6/2023   Line of treatment: First Line  Treatment Goal: Control     Upcoming Treatment Dates - OP AZACITIDINE 5-DAY (SUB-Q) + VENETOCLAX    8/4/2025       Pre-Medications       ondansetron tablet 16 mg       Chemotherapy       azaCITIDine (VIDAZA) chemo injection 165 mg  8/5/2025       Pre-Medications       ondansetron tablet 16 mg       Chemotherapy       azaCITIDine (VIDAZA) chemo injection 165 mg  8/6/2025       Pre-Medications       ondansetron tablet 16 mg       Chemotherapy       azaCITIDine (VIDAZA) chemo injection 165 mg  8/7/2025       Pre-Medications       ondansetron tablet 16 mg       Chemotherapy       azaCITIDine (VIDAZA) chemo injection 165 mg    Supportive Plan Information  IV FLUIDS AND ELECTROLYTES Rui Jacobsen MD   Associated Diagnosis: Dehydration   noted on 9/30/2024  Associated Diagnosis: Acute myeloid leukemia not having achieved remission   noted on 11/6/2023   Line of treatment: Supportive Care   Treatment goal: Supportive     Upcoming Treatment Dates - IV FLUIDS AND ELECTROLYTES    No upcoming days in selected categories.    Therapy Plan Information  PORT FLUSH for Acute myeloid leukemia not having achieved remission, noted on 11/6/2023  heparin, porcine (PF) 100 unit/mL injection flush 500 Units  500 Units, Intravenous, Every visit  sodium chloride 0.9% flush 10 mL  10 mL, Intravenous, Every visit    INF FLUIDS for Acute  myeloid leukemia not having achieved remission, noted on 11/6/2023  sodium chloride 0.9% bolus 500 mL 500 mL  500 mL, Intravenous, PRN      No therapy plan of the specified type found.    Total time of this visit was 35 minutes, including time spent face to face with patient and/or via video/audio, and also in preparing for today's visit for MDM and documentation. (Medical Decision Making, including consideration of possible diagnoses, management options, complex medical record review, review of diagnostic tests and information, consideration and discussion of significant complications based on comorbidities, and discussion with providers involved with the care of the patient). Greater than 50% was spent face to face with the patient counseling and coordinating care.    Mary Anne Barnett PA-C  Malignant Hematology, Stem Cell Transplant, and Cellular Therapy  [facility] [place]  [facility] [place]

## 2025-08-04 NOTE — PROGRESS NOTES
[facility]    Progress NOTE    Patient ID: Gustavo Tracey Jr. is a 65 y.o. male.  MRN: [id number]  : 1960    Subjective:     Reason for visit: No chief complaint on file.    Chief Complaint: see ROS    History of Present Illness:     66 y/o  male pt with pmhx of HF, HLD, HTN, CAD s/p CABG and stroke who was referred due to pancytopenia. Reviewing his records around 2023 his cbc started with pancytopenia. Wbc 2-3's with severe-moderate neutropenia, moderate anemia/thrombocytopenia, differential with nRBC's and blasts all worrisome for neoplastic process like leukemia/mds. Please see ROS for his chief complaint and symptoms. He mentioned exposure to radiation at work for years. He denied exposure to chemicals/family history of cancer or smoking. He drinks socially.   Hypercoagulation history: stroke  Bleeding history: none    Interval History:   2025  Doing well overall. Notes no missed doses. No recent diarrhea, infections, bleeding, nausea, vomiting.Notes occasional fatigue but not worse than baseline. Still ambulating well. CMP, Mg, phos wnl. CBC pending. No recent imaging.     Interim history:  -10/2023 Flow cytometry in peripheral blood positive for 43.1% blasts consistent with AML diagnosis.   -2023 S/p Bone marrow Bx. 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 [place] [facility] [place]. Dr. Jacobsen and Dr. Hinds are planning for vidaza + venetoclax first. Appreciate inputs.   -2024 started venetoclax 200 mg po D1-D21 q 28 days + Vidaza 75 mg/m2 D1-D5 q 28 days   -2024 s/p 2 cycles; BM BX with R/R AML complex karyotype Intermediate-High Risk; NGS IDH2+  -2025 BM BX with CR/SHANNA of AML, negative MRD, IDH2+  -2025 noted neutropenia/worsening anemia, despite last cycle 3-4 weeks ago, could be chemo toxicity, defer to Dr. Jacobsen (notified)  -2025 labs at baseline   -On venetoclax 400 mg po daily D1-D7 + vidaza  D1-D5 q 28 days [place] [facility] [place] labs at baseline needing PRN IVF        Past Medical History:   Diagnosis Date    Abnormal nuclear stress test 11/26/2022    Acute myeloid leukemia     Cervical radiculopathy     to rt arm    CHF (congestive heart failure)     Chronic systolic congestive heart failure 11/28/2022    Dilated cardiomyopathy 11/26/2022    Hyperlipidemia     Hypertension     Obesity, unspecified     PAF (paroxysmal atrial fibrillation) 11/26/2022    Pulmonary HTN 11/28/2022    Stroke       Past Surgical History:   Procedure Laterality Date    BONE MARROW BIOPSY Right 1/31/2024    Procedure: Biopsy-bone marrow;  Surgeon: Rui Jacobsen MD;  Location: [facility] ENDO (4TH FLR);  Service: Oncology;  Laterality: Right;  1/24-pt confirmed-MS    CLOSURE OF LEFT ATRIAL APPENDAGE USING DEVICE Left 6/22/2023    Procedure: CLOSURE, LEFT ATRIAL APPENDAGE, USING DEVICE;  Surgeon: Rustam Dave MD;  Location: [facility] OR;  Service: Cardiovascular;  Laterality: Left;  LIGATION OF LEFT ATRIAL APPENDAGE WITH OTILIA EXCLUSION SYSTEM    CORONARY ARTERY BYPASS GRAFT (CABG) N/A 6/22/2023    Procedure: CORONARY ARTERY BYPASS GRAFT (CABG);  Surgeon: Rustam Dave MD;  Location: [facility] OR;  Service: Cardiovascular;  Laterality: N/A;  3-VESSEL WITH EPI-AORTIC ULTRASOUND    PURDY MAZE PROCEDURE N/A 6/22/2023    Procedure: PURDY MAZE PROCEDURE;  Surgeon: Rustam Dave MD;  Location: [facility] OR;  Service: Cardiovascular;  Laterality: N/A;    ECHOCARDIOGRAM,TRANSESOPHAGEAL N/A 6/22/2023    Procedure: ECHOCARDIOGRAM,TRANSESOPHAGEAL;  Surgeon: Rustam Dave MD;  Location: [facility] OR;  Service: Cardiovascular;  Laterality: N/A;    ENDOSCOPIC HARVEST OF VEIN Left 6/22/2023    Procedure: SURGICAL PROCUREMENT, VEIN, ENDOSCOPIC;  Surgeon: Rustam Dave MD;  Location: [facility] OR;  Service: Cardiovascular;  Laterality: Left;    INJECTION OF ANESTHETIC AGENT AROUND MULTIPLE INTERCOSTAL NERVES N/A 6/22/2023    Procedure: BLOCK, NERVE, INTERCOSTAL, 2 OR MORE;   Surgeon: Rustam Dave MD;  Location: [facility] OR;  Service: Cardiovascular;  Laterality: N/A;  PARASTERNAL NERVE BLOCK    INSTANTANEOUS WAVE-FREE RATIO  6/20/2023    Procedure: Instantaneous Wave-Free Ratio;  Surgeon: Zion Ortega MD;  Location: [facility] CATH LAB;  Service: Cardiology;;    IVUS, CORONARY  6/20/2023    Procedure: IVUS, Coronary;  Surgeon: Zion Ortega MD;  Location: [facility] CATH LAB;  Service: Cardiology;;    LEFT HEART CATHETERIZATION Left 11/28/2022    Procedure: CATHETERIZATION, HEART, LEFT;  Surgeon: Zion Ortega MD;  Location: [facility] CATH LAB;  Service: Cardiology;  Laterality: Left;    LEFT HEART CATHETERIZATION Left 6/20/2023    Procedure: Left heart cath;  Surgeon: Zion Ortega MD;  Location: [facility] CATH LAB;  Service: Cardiology;  Laterality: Left;    PERCUTANEOUS TRANSLUMINAL BALLOON ANGIOPLASTY OF CORONARY ARTERY  6/20/2023    Procedure: Angioplasty-coronary;  Surgeon: Zion Ortega MD;  Location: [facility] CATH LAB;  Service: Cardiology;;    RIGHT HEART CATHETERIZATION N/A 11/28/2022    Procedure: INSERTION, CATHETER, RIGHT HEART;  Surgeon: Zion Ortega MD;  Location: [facility] CATH LAB;  Service: Cardiology;  Laterality: N/A;  Congestive heart failure     Family History   Problem Relation Name Age of Onset    Hypertension Mother      Diabetes Father      Hyperlipidemia Father      Hypertension Father      Heart attack Father      Coronary artery disease Father        Social History     Tobacco Use    Smoking status: Never    Smokeless tobacco: Never   Substance and Sexual Activity    Alcohol use: Yes    Drug use: Never    Sexual activity: Not Currently     Smoking Cessation:  Counseling given: Not Answered     Non-smoker     Allergies:   Review of patient's allergies indicates:  No Known Allergies      Current medications:  Medication List with Changes/Refills   Current Medications    ACYCLOVIR (ZOVIRAX) 400 MG TABLET    Take 1 tablet (400 mg total) by mouth 2 (two) times daily.     ALLOPURINOL (ZYLOPRIM) 300 MG TABLET    Take 1 tablet (300 mg total) by mouth once daily.    AMITRIPTYLINE (ELAVIL) 50 MG TABLET    Take 50 mg by mouth every evening.    ASPIRIN (ECOTRIN) 81 MG EC TABLET    Take 1 tablet (81 mg total) by mouth once daily.    CYCLOBENZAPRINE (FLEXERIL) 10 MG TABLET    Take 10 mg by mouth 2 (two) times daily as needed.    FARXIGA 10 MG TABLET    TAKE 1 TABLET BY MOUTH EVERY DAY    FERROUS SULFATE (FEOSOL) 325 MG (65 MG IRON) TAB TABLET    Take 1 tablet by mouth once daily.    FUROSEMIDE (LASIX) 20 MG TABLET    Take 1 tablet by mouth once daily.    HYDROCODONE-ACETAMINOPHEN (NORCO)  MG PER TABLET    TAKE 1 TABLET BY MOUTH 1 TO 2 TIMES A DAY AS NEEDED    LATANOPROST 0.005 % OPHTHALMIC SOLUTION    Place 1 drop into both eyes nightly.    LORAZEPAM (ATIVAN) 1 MG TABLET    Take 0.5 tablets (0.5 mg total) by mouth every 6 (six) hours as needed for Anxiety. Take one hour before bone marrow biopsy.    METOPROLOL SUCCINATE (TOPROL-XL) 100 MG 24 HR TABLET    Take 1 tablet by mouth once daily.    POTASSIUM CHLORIDE (K-TAB) 20 MEQ    Take 1 tablet (20 mEq total) by mouth 2 (two) times daily.    PRAVASTATIN (PRAVACHOL) 20 MG TABLET    Take 20 mg by mouth once daily.    VALSARTAN (DIOVAN) 40 MG TABLET    TAKE 1 TABLET BY MOUTH EVERY DAY    VENETOCLAX (VENCLEXTA) 100 MG TAB    Take 4 tablets (400 mg total) by mouth once daily Take 4 tablets (400mg) once daily on days 1-7 of a 28 day cycle. Always start with azacitidine (Vidaza) injections..    WARFARIN (COUMADIN) 5 MG TABLET    TAKE 1 TABLET BY MOUTH ON SUN, MON, WED, FRI, SAT,& 1.5 ON TUES & THURS      ROS:   Review of Systems   Constitutional:  Negative for chills, fatigue, fever and unexpected weight change.   HENT:  Negative for nosebleeds.    Respiratory:  Negative for cough and shortness of breath.    Cardiovascular:  Negative for chest pain and leg swelling.   Gastrointestinal:  Negative for abdominal pain, blood in stool, constipation,  diarrhea, nausea and vomiting.   Genitourinary:  Negative for hematuria.   Musculoskeletal:  Positive for arthralgias.   Integumentary:  Negative for rash.   Hematological:  Negative for adenopathy. Does not bruise/bleed easily.     Objective:     There were no vitals filed for this visit.     Physical Examination:   Physical Exam  Vitals and nursing note reviewed.   Constitutional:       General: He is not in acute distress.     Appearance: Normal appearance. He is normal weight.   HENT:      Head: Normocephalic.   Eyes:      Conjunctiva/sclera: Conjunctivae normal.   Cardiovascular:      Rate and Rhythm: Normal rate and regular rhythm.      Heart sounds: Normal heart sounds.   Pulmonary:      Effort: Pulmonary effort is normal.      Breath sounds: Normal breath sounds. No wheezing or rales.   Abdominal:      General: Abdomen is flat. Bowel sounds are normal. There is no distension.      Palpations: Abdomen is soft.      Tenderness: There is no abdominal tenderness.   Musculoskeletal:         General: Tenderness present.      Right lower leg: No edema.      Left lower leg: No edema.   Lymphadenopathy:      Cervical: No cervical adenopathy.      Upper Body:      Right upper body: No axillary adenopathy.      Left upper body: No axillary adenopathy.      Lower Body: No right inguinal adenopathy. No left inguinal adenopathy.   Skin:     Findings: No bruising or rash.   Neurological:      General: No focal deficit present.      Mental Status: He is alert and oriented to person, place, and time.   Psychiatric:         Mood and Affect: Mood normal.         Behavior: Behavior normal.       Diagnostic Tests:  Significant Imaging: I have reviewed and interpreted all pertinent imaging results/findings.    Laboratory Data:  All pertinent labs have been reviewed.    Assessment:     64 y/o  male pt with pmhx of HF, HLD, HTN, CAD and stroke who was referred due to pancytopenia, found with AML:    #MDS->AML, IDH2  mutated:  -06-11/2023 CBC with pancytopenia. Wbc 2-3's with severe-moderate neutropenia, moderate anemia/thrombocytopenia, differential with nRBC's and blasts all worrisome for neoplastic process like Leukemia/MDS.   -10/2023 Flow cytometry in peripheral blood positive for 43.1% blasts consistent with AML diagnosis.   -11/2023 S/p Bone marrow Bx. 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 Evaluated at [facility] [place] for BM transplant. Dr. Jacobsen and Dr. Hinds are planning for vidaza + venetoclax first. Appreciate inputs.   -11/2023 MUGA scan with 52% EF. Port placed. US abdomen unremarkable.   -01/2024 started venetoclax 200 mg po D1-D21 q 28 days + Vidaza 75 mg/m2 D1-D5 q 28 days   -02/2024 s/p 2 cycles; BM BX with R/R AML complex karyotype Intermediate-High Risk; NGS IDH2+  -09/2024 s/p 9 cycles, BM BX with CR/SHANNA of AML, negative MRD, venetoclax 200 mg po daily D1-D7 + vidaza D1-D5 q 28 days at [facility] [place]  -02/2025 BM BX with CR/SHANNA of AML, negative MRD, IDH2+  -04/2025 noted neutropenia/worsening anemia, despite last cycle 3-4 weeks ago, could be chemo toxicity, defer to Dr. Jacobsen (notified)  -07/2025 Labs at baseline (unremarkable PB Flow cytometry and PS review)   -On venetoclax 400 mg po daily D1-D7 + vidaza D1-D5 q 28 days at [facility] [place], labs at baseline needing PRN IVF  -Ecog score 1. Pain score 4. Controlled with current and OTC meds.        Plan:     Oral chemotherapy drug name, dosage, frequency and duration: Continue venetoclax 400 mg po D1-D7 q 28 days + Vidaza 75 mg/m2 D1-D5 q 28 days until progression of disease or unacceptable toxicities Gustavo denies side effects. He confirms adherence to the drug regimen regarding dates and times. Discussed missed dose, patient denies at this time. Discussed and reinforced importance of adherence to oral chemotherapy.    - proceed with Vidaza 08/04/2025    -Possible future lines of therapy: allogenic hsct, Enasidenib, vyxeos.     -HSCT  evaluation and repeating BM BX as needed per [facility]; appreciate inputs      -CBC, CMP, LDH, MG, uric acid q 2 weeks for supportive care    -PRN transfusion support per guidelines     -PRN electrolyte replacement therapy. IVF PRN to prevent TLS/OVIDIO. Continue allopurinol.     -Continue close f/u with Dr. Jacobsen, and PCP for BP management; appreciate inputs     -F/u with us q 3 months     Follow Up:   No follow-ups on file.    Plan was discussed with the patient at length, and he verbalized understanding. Gustavo was given an opportunity to ask questions that were answered to his satisfaction, and he was advised to call in the interval if any problems or questions arise.    I spent greater than 40 minutes (L5) today both in chart review and greater than 50% of that time in discussion with the patient regarding past imaging results, lab results, pathology results, chemotherapy/immunotherapy regimen, chemotherapy/immunotherapy side effects, alternative treatments, and symptom management. I addressed all questions and concerns with the patient and team work members.      Electronically signed by Curt Knowles MD

## 2025-08-05 ENCOUNTER — INFUSION (OUTPATIENT)
Dept: INFUSION THERAPY | Facility: HOSPITAL | Age: 65
End: 2025-08-05
Payer: MEDICARE

## 2025-08-05 VITALS
HEART RATE: 50 BPM | SYSTOLIC BLOOD PRESSURE: 129 MMHG | DIASTOLIC BLOOD PRESSURE: 65 MMHG | TEMPERATURE: 98 F | OXYGEN SATURATION: 100 % | RESPIRATION RATE: 16 BRPM

## 2025-08-05 DIAGNOSIS — C92.00 ACUTE MYELOID LEUKEMIA NOT HAVING ACHIEVED REMISSION: Primary | ICD-10-CM

## 2025-08-05 PROCEDURE — 96401 CHEMO ANTI-NEOPL SQ/IM: CPT

## 2025-08-05 PROCEDURE — 25000003 PHARM REV CODE 250: Performed by: INTERNAL MEDICINE

## 2025-08-05 PROCEDURE — 63600175 PHARM REV CODE 636 W HCPCS: Performed by: INTERNAL MEDICINE

## 2025-08-05 RX ORDER — AZACITIDINE 100 MG/1
75 INJECTION, POWDER, LYOPHILIZED, FOR SOLUTION INTRAVENOUS; SUBCUTANEOUS
Status: COMPLETED | OUTPATIENT
Start: 2025-08-05 | End: 2025-08-05

## 2025-08-05 RX ORDER — ONDANSETRON 4 MG/1
16 TABLET, FILM COATED ORAL
Status: COMPLETED | OUTPATIENT
Start: 2025-08-05 | End: 2025-08-05

## 2025-08-05 RX ADMIN — ONDANSETRON HYDROCHLORIDE 16 MG: 4 TABLET, FILM COATED ORAL at 11:08

## 2025-08-05 RX ADMIN — AZACITIDINE 165 MG: 100 INJECTION, POWDER, LYOPHILIZED, FOR SOLUTION INTRAVENOUS; SUBCUTANEOUS at 11:08

## 2025-08-06 ENCOUNTER — INFUSION (OUTPATIENT)
Dept: INFUSION THERAPY | Facility: HOSPITAL | Age: 65
End: 2025-08-06
Payer: MEDICARE

## 2025-08-06 VITALS
OXYGEN SATURATION: 100 % | RESPIRATION RATE: 18 BRPM | DIASTOLIC BLOOD PRESSURE: 60 MMHG | SYSTOLIC BLOOD PRESSURE: 123 MMHG | HEART RATE: 47 BPM

## 2025-08-06 DIAGNOSIS — C92.00 ACUTE MYELOID LEUKEMIA NOT HAVING ACHIEVED REMISSION: Primary | ICD-10-CM

## 2025-08-06 PROCEDURE — 25000003 PHARM REV CODE 250: Performed by: INTERNAL MEDICINE

## 2025-08-06 PROCEDURE — 63600175 PHARM REV CODE 636 W HCPCS: Performed by: INTERNAL MEDICINE

## 2025-08-06 PROCEDURE — 96401 CHEMO ANTI-NEOPL SQ/IM: CPT

## 2025-08-06 RX ORDER — ONDANSETRON 4 MG/1
16 TABLET, FILM COATED ORAL
Status: COMPLETED | OUTPATIENT
Start: 2025-08-06 | End: 2025-08-06

## 2025-08-06 RX ORDER — AZACITIDINE 100 MG/1
75 INJECTION, POWDER, LYOPHILIZED, FOR SOLUTION INTRAVENOUS; SUBCUTANEOUS
Status: COMPLETED | OUTPATIENT
Start: 2025-08-06 | End: 2025-08-06

## 2025-08-06 RX ADMIN — ONDANSETRON HYDROCHLORIDE 16 MG: 4 TABLET, FILM COATED ORAL at 11:08

## 2025-08-06 RX ADMIN — AZACITIDINE 165 MG: 100 INJECTION, POWDER, LYOPHILIZED, FOR SOLUTION INTRAVENOUS; SUBCUTANEOUS at 11:08

## 2025-08-07 ENCOUNTER — INFUSION (OUTPATIENT)
Dept: INFUSION THERAPY | Facility: HOSPITAL | Age: 65
End: 2025-08-07
Payer: MEDICARE

## 2025-08-07 VITALS
RESPIRATION RATE: 16 BRPM | OXYGEN SATURATION: 99 % | SYSTOLIC BLOOD PRESSURE: 148 MMHG | TEMPERATURE: 98 F | HEART RATE: 52 BPM | DIASTOLIC BLOOD PRESSURE: 70 MMHG

## 2025-08-07 DIAGNOSIS — C92.00 ACUTE MYELOID LEUKEMIA NOT HAVING ACHIEVED REMISSION: Primary | ICD-10-CM

## 2025-08-07 PROCEDURE — 96401 CHEMO ANTI-NEOPL SQ/IM: CPT

## 2025-08-07 PROCEDURE — 25000003 PHARM REV CODE 250: Performed by: INTERNAL MEDICINE

## 2025-08-07 PROCEDURE — 63600175 PHARM REV CODE 636 W HCPCS: Performed by: INTERNAL MEDICINE

## 2025-08-07 RX ORDER — ONDANSETRON 4 MG/1
16 TABLET, FILM COATED ORAL
Status: COMPLETED | OUTPATIENT
Start: 2025-08-07 | End: 2025-08-07

## 2025-08-07 RX ORDER — AZACITIDINE 100 MG/1
75 INJECTION, POWDER, LYOPHILIZED, FOR SOLUTION INTRAVENOUS; SUBCUTANEOUS
Status: COMPLETED | OUTPATIENT
Start: 2025-08-07 | End: 2025-08-07

## 2025-08-07 RX ADMIN — AZACITIDINE 165 MG: 100 INJECTION, POWDER, LYOPHILIZED, FOR SOLUTION INTRAVENOUS; SUBCUTANEOUS at 10:08

## 2025-08-07 RX ADMIN — ONDANSETRON HYDROCHLORIDE 16 MG: 4 TABLET, FILM COATED ORAL at 10:08

## 2025-08-08 ENCOUNTER — INFUSION (OUTPATIENT)
Dept: INFUSION THERAPY | Facility: HOSPITAL | Age: 65
End: 2025-08-08
Payer: MEDICARE

## 2025-08-08 VITALS
TEMPERATURE: 98 F | SYSTOLIC BLOOD PRESSURE: 145 MMHG | DIASTOLIC BLOOD PRESSURE: 73 MMHG | OXYGEN SATURATION: 100 % | HEART RATE: 59 BPM | RESPIRATION RATE: 18 BRPM

## 2025-08-08 DIAGNOSIS — C92.00 ACUTE MYELOID LEUKEMIA NOT HAVING ACHIEVED REMISSION: Primary | ICD-10-CM

## 2025-08-08 PROCEDURE — 96401 CHEMO ANTI-NEOPL SQ/IM: CPT

## 2025-08-08 PROCEDURE — 25000003 PHARM REV CODE 250: Performed by: INTERNAL MEDICINE

## 2025-08-08 PROCEDURE — 63600175 PHARM REV CODE 636 W HCPCS: Performed by: INTERNAL MEDICINE

## 2025-08-08 RX ORDER — AZACITIDINE 100 MG/1
75 INJECTION, POWDER, LYOPHILIZED, FOR SOLUTION INTRAVENOUS; SUBCUTANEOUS
Status: COMPLETED | OUTPATIENT
Start: 2025-08-08 | End: 2025-08-08

## 2025-08-08 RX ORDER — ONDANSETRON 4 MG/1
16 TABLET, FILM COATED ORAL
Status: COMPLETED | OUTPATIENT
Start: 2025-08-08 | End: 2025-08-08

## 2025-08-08 RX ADMIN — ONDANSETRON HYDROCHLORIDE 16 MG: 4 TABLET, FILM COATED ORAL at 11:08

## 2025-08-08 RX ADMIN — AZACITIDINE 165 MG: 100 INJECTION, POWDER, LYOPHILIZED, FOR SOLUTION INTRAVENOUS; SUBCUTANEOUS at 11:08

## 2025-08-08 NOTE — PROGRESS NOTES
Route Chart for Scheduling    BMT Chart Routing      Follow up with physician . Mary Anne 8/29 or Bonny 9/2 prior to treatment   Follow up with [facility]    Provider visit type    Infusion scheduling note    Injection scheduling note aza x5 days 9/2 - 9/6   Labs CBC, CMP, phosphorus and magnesium   Scheduling:  Preferred lab:  Lab interval: once a week  weekly. [facility] labs when not here for treatment   Imaging    Pharmacy appointment    Other referrals                    Treatment Plan Information   OP AZACITIDINE 5-DAY (SUB-Q) + VENETOCLAX Rui Jacobsen MD   Associated diagnosis: Acute myeloid leukemia not having achieved remission   noted on 11/6/2023   Line of treatment: First Line  Treatment Goal: Control     Upcoming Treatment Dates - OP AZACITIDINE 5-DAY (SUB-Q) + VENETOCLAX    9/1/2025       Pre-Medications       ondansetron tablet 16 mg       Chemotherapy       azaCITIDine (VIDAZA) chemo injection 165 mg  9/2/2025       Pre-Medications       ondansetron tablet 16 mg       Chemotherapy       azaCITIDine (VIDAZA) chemo injection 165 mg  9/3/2025       Pre-Medications       ondansetron tablet 16 mg       Chemotherapy       azaCITIDine (VIDAZA) chemo injection 165 mg  9/4/2025       Pre-Medications       ondansetron tablet 16 mg       Chemotherapy       azaCITIDine (VIDAZA) chemo injection 165 mg    Supportive Plan Information  IV FLUIDS AND ELECTROLYTES Rui Jacobsen MD   Associated Diagnosis: Dehydration   noted on 9/30/2024  Associated Diagnosis: Acute myeloid leukemia not having achieved remission   noted on 11/6/2023   Line of treatment: Supportive Care   Treatment goal: Supportive     Upcoming Treatment Dates - IV FLUIDS AND ELECTROLYTES    No upcoming days in selected categories.    Therapy Plan Information  PORT FLUSH for Acute myeloid leukemia not having achieved remission, noted on 11/6/2023  heparin, porcine (PF) 100 unit/mL injection flush 500 Units  500 Units, Intravenous, Every  visit  sodium chloride 0.9% flush 10 mL  10 mL, Intravenous, Every visit    INF FLUIDS for Acute myeloid leukemia not having achieved remission, noted on 11/6/2023  sodium chloride 0.9% bolus 500 mL 500 mL  500 mL, Intravenous, PRN      No therapy plan of the specified type found.

## 2025-09-02 ENCOUNTER — TELEPHONE (OUTPATIENT)
Dept: HEMATOLOGY/ONCOLOGY | Facility: CLINIC | Age: 65
End: 2025-09-02
Payer: MEDICARE

## 2025-09-05 RX ORDER — AZACITIDINE 100 MG/1
75 INJECTION, POWDER, LYOPHILIZED, FOR SOLUTION INTRAVENOUS; SUBCUTANEOUS
OUTPATIENT
Start: 2025-09-05 | End: 2025-09-05

## 2025-09-05 RX ORDER — ONDANSETRON 8 MG/1
16 TABLET, FILM COATED ORAL
OUTPATIENT
Start: 2025-09-05 | End: 2025-09-05

## (undated) DEVICE — CABLE PACING WIRE EPICARD 12FT

## (undated) DEVICE — COVER MAYO STND XL 30X57IN

## (undated) DEVICE — PACK ANGIOPLASTY ACCESS PLUS

## (undated) DEVICE — RETRACTOR OCTOBASE INSERT HOLD

## (undated) DEVICE — GOWN POLY REINF X-LONG XL

## (undated) DEVICE — CANNULA VENOUS MC2X 29FR

## (undated) DEVICE — CANNULA VSL W/DUCKBILL

## (undated) DEVICE — CATH PIG145 INFINITI 5X110CM

## (undated) DEVICE — NDL SAFETY 22G X 1.5 ECLIPSE

## (undated) DEVICE — SOL NORMAL USPCA 0.9%

## (undated) DEVICE — PERFUSION FX X-COATED

## (undated) DEVICE — CONTAINER SPECIMEN OR STER 4OZ

## (undated) DEVICE — CATH JL3.5 5FR

## (undated) DEVICE — SUT SILK 1 6-30 LABYRINTH

## (undated) DEVICE — SUT 8/0 24IN PROLENE BL MON

## (undated) DEVICE — SEE MEDLINE ITEM 147518

## (undated) DEVICE — SPONGE LAP 18X18 PREWASHED

## (undated) DEVICE — SUT 7/0 24IN PROLENE BL MO

## (undated) DEVICE — GUIDEWIRE WHOLEY HI TORQ 175CM

## (undated) DEVICE — CATH EMERGE MR 20 X 2.50

## (undated) DEVICE — PACK SPY-PHI DRUG DRAPE

## (undated) DEVICE — ANGIOTOUCH KIT

## (undated) DEVICE — BLANKET HYPOTHERMIA 25X64IN

## (undated) DEVICE — POWDER ARISTA AH 3G

## (undated) DEVICE — CATH JL4 5FR

## (undated) DEVICE — COVER OVERHEAD SURG LT BLUE

## (undated) DEVICE — SUT SILK 2-0 STRANDS 30IN

## (undated) DEVICE — DRAPE THREE-QUARTER 53X77IN

## (undated) DEVICE — GLOVE BIOGEL 7.5

## (undated) DEVICE — TIP YANKAUERS BULB NO VENT

## (undated) DEVICE — SET DECANTER MEDICHOICE

## (undated) DEVICE — GLOVE BIOGEL PI MICRO SZ 6

## (undated) DEVICE — ADAPTER DLP Y PERF 3.5 &10IN

## (undated) DEVICE — RESERVOIR CARDIOTOMY.

## (undated) DEVICE — TOWEL OR DISP STRL BLUE 4/PK

## (undated) DEVICE — CATH THORACIC ANGLE 24F

## (undated) DEVICE — OMNIPAQUE 300MG 150ML VIAL

## (undated) DEVICE — CONNECTOR 6 IN 1 Y TUBING STRL

## (undated) DEVICE — TUBING CONNECTION 5MMX 15 18IN

## (undated) DEVICE — INSERT STEALTH SURGICAL CLAMP

## (undated) DEVICE — SOL ISOLYTE S PH 7.4 1000ML

## (undated) DEVICE — SUT VICRYL 1 OB 36 CTX

## (undated) DEVICE — BAND TR COMP DEVICE REG 24CM

## (undated) DEVICE — DRAIN CHANNEL ROUND 19FR

## (undated) DEVICE — KIT GLIDESHEATH SLEND 6FR 10CM

## (undated) DEVICE — TAPE SILK 3IN

## (undated) DEVICE — PACK CATH LAB CUSTOM BR

## (undated) DEVICE — SUT PERMA HAND SILK BLK 0-0

## (undated) DEVICE — PACK OPEN HEART BR

## (undated) DEVICE — SUT SILK 2-0 SH 18IN BLACK

## (undated) DEVICE — SUT PROLENE 4-0 RB-1 BL MO

## (undated) DEVICE — GUIDEWIRE EMERALD .035IN 260CM

## (undated) DEVICE — SUT PROLENE 6-0 C-1 30IN BL

## (undated) DEVICE — GOWN POLY REINF BRTH SLV XL

## (undated) DEVICE — SPONGE COTTON TRAY 4X4IN

## (undated) DEVICE — WIRE BMW 014X190

## (undated) DEVICE — SHEATH INTRODUCER 6FR 11CM

## (undated) DEVICE — CATH GUIDE Q-CRV Q3.5 RUNWY 6F

## (undated) DEVICE — DRAPE SLUSH WARMER WITH DISC

## (undated) DEVICE — TRAY CATH FOL SIL TEMP 10 16FR

## (undated) DEVICE — CLAMP ELECSURG ISLTR 2 ELECTRD

## (undated) DEVICE — APPLIER CLIP LIAGCLIP 9.375IN

## (undated) DEVICE — CANNULA SOFT FLOW EXT 24F 8MM

## (undated) DEVICE — BLADE KNIFE UNITOME 4.0MM

## (undated) DEVICE — CATH EAGLE EYE PLATINUM

## (undated) DEVICE — DRAPE ANGIO BRACH 38X44IN

## (undated) DEVICE — CATH JR4 5FR

## (undated) DEVICE — Device

## (undated) DEVICE — SUT ETHIBOND XTRA 5-0 RB-1

## (undated) DEVICE — KIT PREVENA PLUS

## (undated) DEVICE — SUT VICRYL 2-0 36 CT-1

## (undated) DEVICE — CELL SAVER 4/CASE

## (undated) DEVICE — SET SUCTION ANTICOAGULANT

## (undated) DEVICE — CANNULA IMA 1MM

## (undated) DEVICE — WIRE X-SUP CHOICE PT .014X182

## (undated) DEVICE — ELECTRODE BLADE E-Z CLEAN 4IN

## (undated) DEVICE — ELECTRODE REM PLYHSV RETURN 9

## (undated) DEVICE — SYR 20ML BLUE LUER LOCK

## (undated) DEVICE — SET PNEUMOCLEAR HEAT HUM SE HF

## (undated) DEVICE — DRESSING MEPORE ISLAND 31/2X4

## (undated) DEVICE — KIT MANIFOLD LOW PRESS TUBING

## (undated) DEVICE — COUNTER BDL BLADEGUARD LI DBL

## (undated) DEVICE — SYS VIRTUOSAPH PLUS EVM

## (undated) DEVICE — ORGNZR TUBING CLR W/CLIPS

## (undated) DEVICE — CANNULA PERF RCSP 15FR SINUS

## (undated) DEVICE — TUBING MEDI-VAC 20FT .25IN

## (undated) DEVICE — CATH URETHRAL RED RUBBER 18FR

## (undated) DEVICE — GUIDEWIRE .014 J TIP 3.0X190CM

## (undated) DEVICE — SYR 30CC LUER LOCK

## (undated) DEVICE — SOL NACL IRR 1000ML BTL

## (undated) DEVICE — KIT PROBE COVER WITH GEL

## (undated) DEVICE — KIT SURGIFLO HEMOSTATIC MATRIX

## (undated) DEVICE — DRAIN CHAN RND HUBLS 8MM 24FR

## (undated) DEVICE — APPLIER LIGACLIP SM 9.38IN

## (undated) DEVICE — CANNULA AG CARDPLG 14G FLANGE

## (undated) DEVICE — PAD DEFIB CADENCE ADULT R2

## (undated) DEVICE — SET PERFUSION

## (undated) DEVICE — DRESSING MEPORE ADH 3.5X12

## (undated) DEVICE — EVACUATOR WOUND BULB 100CC

## (undated) DEVICE — SOL IRRI STRL WATER 1000ML

## (undated) DEVICE — GUIDEWIRE .035 175CM VERSACORE

## (undated) DEVICE — VENTILATOR TRANSPORT CIRCUIT

## (undated) DEVICE — PUNCH AORTIC SHORT 4.4MM

## (undated) DEVICE — SUT STEEL 7 MONO B&S18 CCS

## (undated) DEVICE — GUIDEWIRE OMNI J TIP 185CM

## (undated) DEVICE — CONNECTOR 3/8X3/8 STERILE

## (undated) DEVICE — DRESSING MEPORE 3.6 X 10

## (undated) DEVICE — KIT WATCHDOG HEMSTAS VALVE 8FR

## (undated) DEVICE — CATH REVOLUTION IVUS 45MHZ

## (undated) DEVICE — GOWN NONREINF SET-IN SLV 2XL

## (undated) DEVICE — SUT PLEDGET LG SOFT

## (undated) DEVICE — BOWL CELL SAVER 5 225ML

## (undated) DEVICE — INFLATOR ENCORE 26 BLLN INFL

## (undated) DEVICE — BANDAGE ACE DOUBLE STER 6IN

## (undated) DEVICE — GUIDE RUNWAY 6FR CLS 3.5

## (undated) DEVICE — DRAIN CHEST DRY SUCTION

## (undated) DEVICE — DRESSING ANTIMICROBIAL 1 INCH

## (undated) DEVICE — CONNECTOR STRAIGHT 1/4X1/4IN

## (undated) DEVICE — SPONGE DERMACEA GAUZE 4X4

## (undated) DEVICE — SUT MONOCRYL 4-0 PS-1 UND

## (undated) DEVICE — CLIP STEALTH LATIS 1/4 FORCE 6

## (undated) DEVICE — SENSORS CDI

## (undated) DEVICE — SET CARDIOPLEGIA DEL

## (undated) DEVICE — GUIDE LAUNCHER 6FR EBU 3.5

## (undated) DEVICE — SUT PROLENE 4-0 SH BLU 36IN

## (undated) DEVICE — KIT SYR REUSABLE